# Patient Record
Sex: MALE | Race: BLACK OR AFRICAN AMERICAN | NOT HISPANIC OR LATINO | URBAN - METROPOLITAN AREA
[De-identification: names, ages, dates, MRNs, and addresses within clinical notes are randomized per-mention and may not be internally consistent; named-entity substitution may affect disease eponyms.]

---

## 2021-07-29 ENCOUNTER — INPATIENT (INPATIENT)
Facility: HOSPITAL | Age: 56
LOS: 45 days | Discharge: HOME HEALTH | DRG: 871 | End: 2021-09-13
Attending: INTERNAL MEDICINE | Admitting: INTERNAL MEDICINE
Payer: COMMERCIAL

## 2021-07-29 VITALS
TEMPERATURE: 100 F | SYSTOLIC BLOOD PRESSURE: 113 MMHG | WEIGHT: 250 LBS | OXYGEN SATURATION: 81 % | DIASTOLIC BLOOD PRESSURE: 75 MMHG | HEIGHT: 67 IN | HEART RATE: 84 BPM | RESPIRATION RATE: 22 BRPM

## 2021-07-29 DIAGNOSIS — S76.119A STRAIN OF UNSPECIFIED QUADRICEPS MUSCLE, FASCIA AND TENDON, INITIAL ENCOUNTER: Chronic | ICD-10-CM

## 2021-07-29 DIAGNOSIS — Z98.890 OTHER SPECIFIED POSTPROCEDURAL STATES: Chronic | ICD-10-CM

## 2021-07-29 DIAGNOSIS — R09.02 HYPOXEMIA: ICD-10-CM

## 2021-07-29 DIAGNOSIS — U07.1 COVID-19: ICD-10-CM

## 2021-07-29 LAB
ALBUMIN SERPL ELPH-MCNC: 3.7 G/DL — SIGNIFICANT CHANGE UP (ref 3.3–5)
ALP SERPL-CCNC: 60 U/L — SIGNIFICANT CHANGE UP (ref 40–120)
ALT FLD-CCNC: 54 U/L — HIGH (ref 10–45)
ANION GAP SERPL CALC-SCNC: 12 MMOL/L — SIGNIFICANT CHANGE UP (ref 5–17)
AST SERPL-CCNC: 85 U/L — HIGH (ref 10–40)
BASE EXCESS BLDV CALC-SCNC: 6.6 MMOL/L — HIGH (ref -2–2)
BASOPHILS # BLD AUTO: 0.01 K/UL — SIGNIFICANT CHANGE UP (ref 0–0.2)
BASOPHILS NFR BLD AUTO: 0.1 % — SIGNIFICANT CHANGE UP (ref 0–2)
BILIRUB SERPL-MCNC: 0.4 MG/DL — SIGNIFICANT CHANGE UP (ref 0.2–1.2)
BUN SERPL-MCNC: 16 MG/DL — SIGNIFICANT CHANGE UP (ref 7–23)
CA-I SERPL-SCNC: 1 MMOL/L — LOW (ref 1.12–1.3)
CALCIUM SERPL-MCNC: 9.2 MG/DL — SIGNIFICANT CHANGE UP (ref 8.4–10.5)
CHLORIDE BLDV-SCNC: 105 MMOL/L — SIGNIFICANT CHANGE UP (ref 96–108)
CHLORIDE SERPL-SCNC: 97 MMOL/L — SIGNIFICANT CHANGE UP (ref 96–108)
CO2 BLDV-SCNC: 33 MMOL/L — HIGH (ref 22–30)
CO2 SERPL-SCNC: 26 MMOL/L — SIGNIFICANT CHANGE UP (ref 22–31)
CREAT SERPL-MCNC: 1.01 MG/DL — SIGNIFICANT CHANGE UP (ref 0.5–1.3)
CRP SERPL-MCNC: 33 MG/L — HIGH (ref 0–4)
D DIMER BLD IA.RAPID-MCNC: 188 NG/ML DDU — SIGNIFICANT CHANGE UP
EOSINOPHIL # BLD AUTO: 0 K/UL — SIGNIFICANT CHANGE UP (ref 0–0.5)
EOSINOPHIL NFR BLD AUTO: 0 % — SIGNIFICANT CHANGE UP (ref 0–6)
FERRITIN SERPL-MCNC: 1296 NG/ML — HIGH (ref 30–400)
GAS PNL BLDV: 132 MMOL/L — LOW (ref 135–145)
GAS PNL BLDV: SIGNIFICANT CHANGE UP
GAS PNL BLDV: SIGNIFICANT CHANGE UP
GLUCOSE BLDV-MCNC: 110 MG/DL — HIGH (ref 70–99)
GLUCOSE SERPL-MCNC: 110 MG/DL — HIGH (ref 70–99)
HCO3 BLDV-SCNC: 32 MMOL/L — HIGH (ref 21–29)
HCT VFR BLD CALC: 45.3 % — SIGNIFICANT CHANGE UP (ref 39–50)
HCT VFR BLDA CALC: 46 % — SIGNIFICANT CHANGE UP (ref 39–50)
HGB BLD CALC-MCNC: 15.1 G/DL — SIGNIFICANT CHANGE UP (ref 13–17)
HGB BLD-MCNC: 14.7 G/DL — SIGNIFICANT CHANGE UP (ref 13–17)
IMM GRANULOCYTES NFR BLD AUTO: 0.6 % — SIGNIFICANT CHANGE UP (ref 0–1.5)
LACTATE BLDV-MCNC: 2.1 MMOL/L — HIGH (ref 0.7–2)
LDH SERPL L TO P-CCNC: 592 U/L — HIGH (ref 50–242)
LYMPHOCYTES # BLD AUTO: 0.99 K/UL — LOW (ref 1–3.3)
LYMPHOCYTES # BLD AUTO: 11.7 % — LOW (ref 13–44)
MCHC RBC-ENTMCNC: 27.8 PG — SIGNIFICANT CHANGE UP (ref 27–34)
MCHC RBC-ENTMCNC: 32.5 GM/DL — SIGNIFICANT CHANGE UP (ref 32–36)
MCV RBC AUTO: 85.6 FL — SIGNIFICANT CHANGE UP (ref 80–100)
MONOCYTES # BLD AUTO: 0.66 K/UL — SIGNIFICANT CHANGE UP (ref 0–0.9)
MONOCYTES NFR BLD AUTO: 7.8 % — SIGNIFICANT CHANGE UP (ref 2–14)
NEUTROPHILS # BLD AUTO: 6.74 K/UL — SIGNIFICANT CHANGE UP (ref 1.8–7.4)
NEUTROPHILS NFR BLD AUTO: 79.8 % — HIGH (ref 43–77)
NRBC # BLD: 0 /100 WBCS — SIGNIFICANT CHANGE UP (ref 0–0)
OTHER CELLS CSF MANUAL: 5 ML/DL — LOW (ref 18–22)
PCO2 BLDV: 47 MMHG — SIGNIFICANT CHANGE UP (ref 35–50)
PH BLDV: 7.44 — SIGNIFICANT CHANGE UP (ref 7.35–7.45)
PLATELET # BLD AUTO: 162 K/UL — SIGNIFICANT CHANGE UP (ref 150–400)
PO2 BLDV: <20 MMHG — LOW (ref 25–45)
POTASSIUM BLDV-SCNC: 3.9 MMOL/L — SIGNIFICANT CHANGE UP (ref 3.5–5.3)
POTASSIUM SERPL-MCNC: 4.3 MMOL/L — SIGNIFICANT CHANGE UP (ref 3.5–5.3)
POTASSIUM SERPL-SCNC: 4.3 MMOL/L — SIGNIFICANT CHANGE UP (ref 3.5–5.3)
PROCALCITONIN SERPL-MCNC: 0.09 NG/ML — SIGNIFICANT CHANGE UP (ref 0.02–0.1)
PROT SERPL-MCNC: 7.8 G/DL — SIGNIFICANT CHANGE UP (ref 6–8.3)
RBC # BLD: 5.29 M/UL — SIGNIFICANT CHANGE UP (ref 4.2–5.8)
RBC # FLD: 12.9 % — SIGNIFICANT CHANGE UP (ref 10.3–14.5)
SAO2 % BLDV: 22 % — LOW (ref 67–88)
SARS-COV-2 RNA SPEC QL NAA+PROBE: DETECTED
SODIUM SERPL-SCNC: 135 MMOL/L — SIGNIFICANT CHANGE UP (ref 135–145)
WBC # BLD: 8.45 K/UL — SIGNIFICANT CHANGE UP (ref 3.8–10.5)
WBC # FLD AUTO: 8.45 K/UL — SIGNIFICANT CHANGE UP (ref 3.8–10.5)

## 2021-07-29 PROCEDURE — 99222 1ST HOSP IP/OBS MODERATE 55: CPT

## 2021-07-29 PROCEDURE — 99291 CRITICAL CARE FIRST HOUR: CPT

## 2021-07-29 PROCEDURE — 71045 X-RAY EXAM CHEST 1 VIEW: CPT | Mod: 26

## 2021-07-29 PROCEDURE — 93010 ELECTROCARDIOGRAM REPORT: CPT

## 2021-07-29 RX ORDER — DEXAMETHASONE 0.5 MG/5ML
6 ELIXIR ORAL DAILY
Refills: 0 | Status: DISCONTINUED | OUTPATIENT
Start: 2021-07-30 | End: 2021-08-10

## 2021-07-29 RX ORDER — PANTOPRAZOLE SODIUM 20 MG/1
40 TABLET, DELAYED RELEASE ORAL
Refills: 0 | Status: DISCONTINUED | OUTPATIENT
Start: 2021-07-29 | End: 2021-08-09

## 2021-07-29 RX ORDER — REMDESIVIR 5 MG/ML
INJECTION INTRAVENOUS
Refills: 0 | Status: COMPLETED | OUTPATIENT
Start: 2021-07-29 | End: 2021-08-02

## 2021-07-29 RX ORDER — REMDESIVIR 5 MG/ML
100 INJECTION INTRAVENOUS EVERY 24 HOURS
Refills: 0 | Status: COMPLETED | OUTPATIENT
Start: 2021-07-30 | End: 2021-08-02

## 2021-07-29 RX ORDER — LANOLIN ALCOHOL/MO/W.PET/CERES
3 CREAM (GRAM) TOPICAL AT BEDTIME
Refills: 0 | Status: DISCONTINUED | OUTPATIENT
Start: 2021-07-29 | End: 2021-08-03

## 2021-07-29 RX ORDER — ENOXAPARIN SODIUM 100 MG/ML
40 INJECTION SUBCUTANEOUS
Refills: 0 | Status: DISCONTINUED | OUTPATIENT
Start: 2021-07-29 | End: 2021-08-04

## 2021-07-29 RX ORDER — DEXAMETHASONE 0.5 MG/5ML
6 ELIXIR ORAL ONCE
Refills: 0 | Status: COMPLETED | OUTPATIENT
Start: 2021-07-29 | End: 2021-07-29

## 2021-07-29 RX ORDER — REMDESIVIR 5 MG/ML
200 INJECTION INTRAVENOUS EVERY 24 HOURS
Refills: 0 | Status: COMPLETED | OUTPATIENT
Start: 2021-07-29 | End: 2021-07-29

## 2021-07-29 RX ORDER — ACETAMINOPHEN 500 MG
975 TABLET ORAL ONCE
Refills: 0 | Status: COMPLETED | OUTPATIENT
Start: 2021-07-29 | End: 2021-07-29

## 2021-07-29 RX ADMIN — ENOXAPARIN SODIUM 40 MILLIGRAM(S): 100 INJECTION SUBCUTANEOUS at 17:02

## 2021-07-29 RX ADMIN — Medication 100 MILLIGRAM(S): at 14:07

## 2021-07-29 RX ADMIN — Medication 975 MILLIGRAM(S): at 07:32

## 2021-07-29 RX ADMIN — REMDESIVIR 500 MILLIGRAM(S): 5 INJECTION INTRAVENOUS at 17:01

## 2021-07-29 RX ADMIN — Medication 100 MILLIGRAM(S): at 22:02

## 2021-07-29 RX ADMIN — Medication 975 MILLIGRAM(S): at 09:51

## 2021-07-29 RX ADMIN — Medication 6 MILLIGRAM(S): at 06:55

## 2021-07-29 NOTE — CONSULT NOTE ADULT - ASSESSMENT
56 y/o M with PMH of DVT/PE s/p achilles tendon repair years ago (not on AC currently). Presents with complaints of SOB, intermittent and fevers with cough productive of white sputum for past week. Tested positive for COVID 2 days ago. Pt is unvaccinated. Endorses progressively worsening SOB over the past 5 days, worse with ambulation, found to be hypoxic on arrival to the  ER. CXR with b/l opacities.

## 2021-07-29 NOTE — ED ADULT NURSE NOTE - OBJECTIVE STATEMENT
Pt A&Ox4, ambulatory with steady gait. Pt presented to ED with c/o worsening SOB and cough. States he noticed symptoms approx 1 week ago and tested positive for COVID on 7/26. Denies N/V/D, dizziness, LOC, weakness, numbness, tingling, fever, chills.   Hypoxic in triage and currently on 6L NC with O2 sat 93%-95%, feels more comfortable on O2

## 2021-07-29 NOTE — CONSULT NOTE ADULT - ASSESSMENT
55 yr old unvaccinated presents with fever, cough, sob for a week after attending a party the week before.  mild loss of taste. history of hypertension but has not been compliant with treatment  currently on 4-5 liters nc, not sob.  chest xray reviewed and compatible with covid  covid test pending but most likely diagnosis  Inflammatory markers are modest.  hx of DVT yrs ago.      decadron' remdesivir   await covid test.

## 2021-07-29 NOTE — ED PROVIDER NOTE - OBJECTIVE STATEMENT
56yo M with Hx of DVT s/p achilles tendon repair years ago not on AC presenting with complaints of SOB. intermittent and fevers with cough productive of white sputum for he past week, tested positive for covid 2 days ago. progressively worsening SOB over the past 5 days, worse with ambulation. found to be hypoxic on arrival to the ED. chest pain with deep breath, no n/v/d, abd pain, LE edema, urinary symptoms.

## 2021-07-29 NOTE — H&P ADULT - NSHPLABSRESULTS_GEN_ALL_CORE
14.7   8.45  )-----------( 162      ( 29 Jul 2021 06:50 )             45.3       07-29    135  |  97  |  16  ----------------------------<  110<H>  4.3   |  26  |  1.01    Ca    9.2      29 Jul 2021 06:50    TPro  7.8  /  Alb  3.7  /  TBili  0.4  /  DBili  x   /  AST  85<H>  /  ALT  54<H>  /  AlkPhos  60  07-29                      Lactate Trend            CAPILLARY BLOOD GLUCOSE

## 2021-07-29 NOTE — CONSULT NOTE ADULT - SUBJECTIVE AND OBJECTIVE BOX
Patient is a 55y old  Male who presents with a chief complaint of   HPI:  56yo M with Hx of DVT s/p achilles tendon repair years ago not on AC presenting with complaints of SOB. intermittent and fevers with cough productive of white sputum for past week, tested positive for covid 2 days ago.  pt is unvaccinated   progressively worsening SOB over the past 5 days, worse with ambulation. found to be hypoxic on arrival to the  er    (29 Jul 2021 10:31)      PAST MEDICAL & SURGICAL HISTORY:  Hyperlipidemia    HTN (hypertension)    Rupture, tendon, quadriceps    History of Achilles tendon repair        Social history:    FAMILY HISTORY:  No pertinent family history in first degree relatives      REVIEW OF SYSTEMS  General:	Denies any malaise fatigue or chills. Fevers absent    Skin:No rash  	  Ophthalmologic:Denies any visual complaints,discharge redness or photophobia  	       Hematology/Lymphatics:	No LN swelling.No gum bleeding     Endocrine:	No recent weight gain or loss.No abnormal heat/cold intolerance    Allergic/Immunologic:	No hives or rash   Allergies    No Known Allergies    Intolerances        Antimicrobials:          Vital Signs Last 24 Hrs  T(C): 36.8 (29 Jul 2021 09:00), Max: 38.4 (29 Jul 2021 07:09)  T(F): 98.2 (29 Jul 2021 09:00), Max: 101.2 (29 Jul 2021 07:09)  HR: 81 (29 Jul 2021 09:00) (81 - 89)  BP: 97/81 (29 Jul 2021 09:00) (97/81 - 121/75)  BP(mean): --  RR: 24 (29 Jul 2021 09:00) (22 - 27)  SpO2: 92% (29 Jul 2021 09:00) (81% - 92%)              No cachexia     Eyes:PERRL EOMI.NO discharge or conjunctival injection    ENMT:No sinus tenderness.No thrush.No pharyngeal exudate or erythema.Fair dental hygiene    Neck:Supple,No LN,no JVD      Respiratory:Good air entry bilaterally,CTA          Rectal:    Extremities:No cyanosis,clubbing or edema.    Vascular:peripheral pulses felt    Neurological:AAO X 3,No grossly focal deficits    Skin:No rash     Lymph Nodes:No palpable LNs    Musculoskeletal:No joint swelling or LOM                                   14.7   8.45  )-----------( 162      ( 29 Jul 2021 06:50 )             45.3         07-29    135  |  97  |  16  ----------------------------<  110<H>  4.3   |  26  |  1.01    Ca    9.2      29 Jul 2021 06:50    TPro  7.8  /  Alb  3.7  /  TBili  0.4  /  DBili  x   /  AST  85<H>  /  ALT  54<H>  /  AlkPhos  60  07-29      RECENT CULTURES:      MICROBIOLOGY:          Radiology:      Assessment:        Recommendations and Plan:    Pager [phone]  After 5 pm/weekends or if no response :[phone]

## 2021-07-29 NOTE — H&P ADULT - ASSESSMENT
pt w/ covid  hypoxia  iv steroids   remdesivir  id / pulm evals  gi / dvt proph  o2  hx htn/ pt noncompliant  monitor bp  f/u inflammatory markers  discussed w/ pt/ id /

## 2021-07-29 NOTE — ED ADULT NURSE NOTE - NSIMPLEMENTINTERV_GEN_ALL_ED
Implemented All Universal Safety Interventions:  [place] to call system. Call bell, personal items and telephone within reach. Instruct patient to call for assistance. Room bathroom lighting operational. Non-slip footwear when patient is off stretcher. Physically safe environment: no spills, clutter or unnecessary equipment. Stretcher in lowest position, wheels locked, appropriate side rails in place.

## 2021-07-29 NOTE — ED PROVIDER NOTE - CLINICAL SUMMARY MEDICAL DECISION MAKING FREE TEXT BOX
54yo M intermittent fevers and cough for one week tested positive for covid 3 days ago. progressively worsening sob and ORDAZ. found to be hypoxic to 79% with ambulation. placed on 6L NC. will obtain labs, cxr, decadron and admission.

## 2021-07-29 NOTE — ED PROVIDER NOTE - ATTENDING CONTRIBUTION TO CARE
Afebrile. Awake and Alert. Lungs bibasilar fine crackles. 75% RA. Corrects to 90% 6L NC. Heart RRR. Abdomen soft NTND. CN II-XII grossly intact. Moves all extremities without lateralization.    COVID-19 with hypoxia requiring oxygen therapy  IV decadron given  Will need admission for hypoxia

## 2021-07-29 NOTE — ED ADULT NURSE REASSESSMENT NOTE - NS ED NURSE REASSESS COMMENT FT1
received report from ROBLES Welch. patient is resting in bed, tachypneic on 6L NC and febrile. MD aware. patient denies pain at this time. to attempt to prone s/p xray. patient is AAOx3. lung sounds diminished. skin intact. pending xray and admission. patient aware. NSR on monitor, call bell In reach, will continue to monitor. patient aware of plan of care

## 2021-07-29 NOTE — ED PROVIDER NOTE - RESPIRATORY, MLM
crackles in the LLL, becomes tachypneic when speaking but able to speak in full sentences, no accessory muscle use, saturating 79% after ambulating to room on RA.

## 2021-07-29 NOTE — CONSULT NOTE ADULT - PROBLEM SELECTOR RECOMMENDATION 2
2nd to COVID PNA  -Wean O2 as tolerated, keep sats >90% (currently 6LNC)  -Prone/side lying positioning as tolerated

## 2021-07-29 NOTE — ED ADULT NURSE REASSESSMENT NOTE - NS ED NURSE REASSESS COMMENT FT1
MD aware of blood work, does not want fluids / repeat lactate. patient resting in bed in no acute distress. VSS. denies pain. waiting for bed

## 2021-07-29 NOTE — ED ADULT NURSE NOTE - CHPI ED NUR SYMPTOMS NEG
no abdominal pain/no chills/no decreased eating/drinking/no diarrhea/no fever/no headache/no rash/no vomiting

## 2021-07-29 NOTE — H&P ADULT - HISTORY OF PRESENT ILLNESS
54yo M with Hx of DVT s/p achilles tendon repair years ago not on AC presenting with complaints of SOB. intermittent and fevers with cough productive of white sputum for past week, tested positive for covid 2 days ago.  pt is unvaccinated   progressively worsening SOB over the past 5 days, worse with ambulation. found to be hypoxic on arrival to the  er

## 2021-07-30 DIAGNOSIS — J96.01 ACUTE RESPIRATORY FAILURE WITH HYPOXIA: ICD-10-CM

## 2021-07-30 LAB
ALBUMIN SERPL ELPH-MCNC: 3 G/DL — LOW (ref 3.3–5)
ALBUMIN SERPL ELPH-MCNC: 3.1 G/DL — LOW (ref 3.3–5)
ALP SERPL-CCNC: 51 U/L — SIGNIFICANT CHANGE UP (ref 40–120)
ALP SERPL-CCNC: 53 U/L — SIGNIFICANT CHANGE UP (ref 40–120)
ALT FLD-CCNC: 45 U/L — SIGNIFICANT CHANGE UP (ref 10–45)
ALT FLD-CCNC: 45 U/L — SIGNIFICANT CHANGE UP (ref 10–45)
ANION GAP SERPL CALC-SCNC: 12 MMOL/L — SIGNIFICANT CHANGE UP (ref 5–17)
AST SERPL-CCNC: 59 U/L — HIGH (ref 10–40)
AST SERPL-CCNC: 64 U/L — HIGH (ref 10–40)
BASE EXCESS BLDA CALC-SCNC: 3.2 MMOL/L — HIGH (ref -2–2)
BILIRUB DIRECT SERPL-MCNC: <0.1 MG/DL — SIGNIFICANT CHANGE UP (ref 0–0.2)
BILIRUB INDIRECT FLD-MCNC: >0.2 MG/DL — SIGNIFICANT CHANGE UP (ref 0.2–1)
BILIRUB SERPL-MCNC: 0.2 MG/DL — SIGNIFICANT CHANGE UP (ref 0.2–1.2)
BILIRUB SERPL-MCNC: 0.3 MG/DL — SIGNIFICANT CHANGE UP (ref 0.2–1.2)
BUN SERPL-MCNC: 16 MG/DL — SIGNIFICANT CHANGE UP (ref 7–23)
CALCIUM SERPL-MCNC: 8.2 MG/DL — LOW (ref 8.4–10.5)
CHLORIDE SERPL-SCNC: 103 MMOL/L — SIGNIFICANT CHANGE UP (ref 96–108)
CO2 BLDA-SCNC: 27 MMOL/L — SIGNIFICANT CHANGE UP (ref 22–30)
CO2 SERPL-SCNC: 24 MMOL/L — SIGNIFICANT CHANGE UP (ref 22–31)
COVID-19 SPIKE DOMAIN AB INTERP: NEGATIVE — SIGNIFICANT CHANGE UP
COVID-19 SPIKE DOMAIN ANTIBODY RESULT: 0.4 U/ML — SIGNIFICANT CHANGE UP
CREAT SERPL-MCNC: 0.85 MG/DL — SIGNIFICANT CHANGE UP (ref 0.5–1.3)
CREAT SERPL-MCNC: 0.87 MG/DL — SIGNIFICANT CHANGE UP (ref 0.5–1.3)
CRP SERPL-MCNC: 37 MG/L — HIGH (ref 0–4)
D DIMER BLD IA.RAPID-MCNC: 196 NG/ML DDU — SIGNIFICANT CHANGE UP
ERYTHROCYTE [SEDIMENTATION RATE] IN BLOOD: 38 MM/HR — HIGH (ref 0–20)
FERRITIN SERPL-MCNC: 1186 NG/ML — HIGH (ref 30–400)
GLUCOSE SERPL-MCNC: 99 MG/DL — SIGNIFICANT CHANGE UP (ref 70–99)
HCO3 BLDA-SCNC: 26 MMOL/L — SIGNIFICANT CHANGE UP (ref 21–29)
HCT VFR BLD CALC: 43.7 % — SIGNIFICANT CHANGE UP (ref 39–50)
HGB BLD-MCNC: 14.1 G/DL — SIGNIFICANT CHANGE UP (ref 13–17)
INR BLD: 1.11 RATIO — SIGNIFICANT CHANGE UP (ref 0.88–1.16)
LDH SERPL L TO P-CCNC: 578 U/L — HIGH (ref 50–242)
MCHC RBC-ENTMCNC: 28 PG — SIGNIFICANT CHANGE UP (ref 27–34)
MCHC RBC-ENTMCNC: 32.3 GM/DL — SIGNIFICANT CHANGE UP (ref 32–36)
MCV RBC AUTO: 86.7 FL — SIGNIFICANT CHANGE UP (ref 80–100)
NRBC # BLD: 0 /100 WBCS — SIGNIFICANT CHANGE UP (ref 0–0)
PCO2 BLDA: 36 MMHG — SIGNIFICANT CHANGE UP (ref 32–46)
PH BLDA: 7.48 — HIGH (ref 7.35–7.45)
PLATELET # BLD AUTO: 174 K/UL — SIGNIFICANT CHANGE UP (ref 150–400)
PO2 BLDA: 69 MMHG — LOW (ref 74–108)
POTASSIUM SERPL-MCNC: 4.3 MMOL/L — SIGNIFICANT CHANGE UP (ref 3.5–5.3)
POTASSIUM SERPL-SCNC: 4.3 MMOL/L — SIGNIFICANT CHANGE UP (ref 3.5–5.3)
PROCALCITONIN SERPL-MCNC: 0.07 NG/ML — SIGNIFICANT CHANGE UP (ref 0.02–0.1)
PROT SERPL-MCNC: 6.6 G/DL — SIGNIFICANT CHANGE UP (ref 6–8.3)
PROT SERPL-MCNC: 6.6 G/DL — SIGNIFICANT CHANGE UP (ref 6–8.3)
PROTHROM AB SERPL-ACNC: 13.3 SEC — SIGNIFICANT CHANGE UP (ref 10.6–13.6)
RBC # BLD: 5.04 M/UL — SIGNIFICANT CHANGE UP (ref 4.2–5.8)
RBC # FLD: 12.8 % — SIGNIFICANT CHANGE UP (ref 10.3–14.5)
SAO2 % BLDA: 94 % — SIGNIFICANT CHANGE UP (ref 92–96)
SARS-COV-2 IGG+IGM SERPL QL IA: 0.4 U/ML — SIGNIFICANT CHANGE UP
SARS-COV-2 IGG+IGM SERPL QL IA: NEGATIVE — SIGNIFICANT CHANGE UP
SODIUM SERPL-SCNC: 139 MMOL/L — SIGNIFICANT CHANGE UP (ref 135–145)
WBC # BLD: 10.14 K/UL — SIGNIFICANT CHANGE UP (ref 3.8–10.5)
WBC # FLD AUTO: 10.14 K/UL — SIGNIFICANT CHANGE UP (ref 3.8–10.5)

## 2021-07-30 PROCEDURE — 99232 SBSQ HOSP IP/OBS MODERATE 35: CPT

## 2021-07-30 PROCEDURE — 71045 X-RAY EXAM CHEST 1 VIEW: CPT | Mod: 26

## 2021-07-30 RX ORDER — TOCILIZUMAB 20 MG/ML
800 INJECTION, SOLUTION, CONCENTRATE INTRAVENOUS ONCE
Refills: 0 | Status: COMPLETED | OUTPATIENT
Start: 2021-07-30 | End: 2021-07-30

## 2021-07-30 RX ADMIN — ENOXAPARIN SODIUM 40 MILLIGRAM(S): 100 INJECTION SUBCUTANEOUS at 05:33

## 2021-07-30 RX ADMIN — TOCILIZUMAB 100 MILLIGRAM(S): 20 INJECTION, SOLUTION, CONCENTRATE INTRAVENOUS at 12:47

## 2021-07-30 RX ADMIN — REMDESIVIR 500 MILLIGRAM(S): 5 INJECTION INTRAVENOUS at 17:32

## 2021-07-30 RX ADMIN — Medication 3 MILLIGRAM(S): at 21:46

## 2021-07-30 RX ADMIN — Medication 100 MILLIGRAM(S): at 21:46

## 2021-07-30 RX ADMIN — ENOXAPARIN SODIUM 40 MILLIGRAM(S): 100 INJECTION SUBCUTANEOUS at 17:32

## 2021-07-30 RX ADMIN — Medication 6 MILLIGRAM(S): at 05:33

## 2021-07-30 NOTE — PROGRESS NOTE ADULT - ASSESSMENT
pt w/ covid  hypoxia  iv steroids   remdesivir  id / pulm evals oted  worsening hypoxia  discussed w/ id   additional tx as per id recs  recheck cxr   gi / dvt proph  o2  hx htn/ pt noncompliant  monitor bp  monitor inflammatory markers

## 2021-07-30 NOTE — PROGRESS NOTE ADULT - SUBJECTIVE AND OBJECTIVE BOX
DATE OF SERVICE: 07-30-21 @ 10:39  CHIEF COMPLAINT:Patient is a 55y old  Male who presents with a chief complaint of covid (29 Jul 2021 10:44)    	        PAST MEDICAL & SURGICAL HISTORY:  Hyperlipidemia    HTN (hypertension)    Rupture, tendon, quadriceps    History of Achilles tendon repair            weak  RESPIRATORY: breathing about the same as last night  CARDIOVASCULAR: No chest pain, palpitations, passing out, dizziness, or leg swelling  GASTROINTESTINAL: No abdominal or epigastric pain. No nausea, vomiting, or hematemesis; No diarrhea or constipation. No melena or hematochezia.  GENITOURINARY: No dysuria, frequency, hematuria, or incontinence  NEUROLOGICAL: No headaches, memory loss,     Medications:  MEDICATIONS  (STANDING):  benzonatate 100 milliGRAM(s) Oral every 8 hours  dexAMETHasone  Injectable 6 milliGRAM(s) IV Push daily  enoxaparin Injectable 40 milliGRAM(s) SubCutaneous two times a day  melatonin 3 milliGRAM(s) Oral at bedtime  pantoprazole    Tablet 40 milliGRAM(s) Oral before breakfast  remdesivir  IVPB   IV Intermittent   remdesivir  IVPB 100 milliGRAM(s) IV Intermittent every 24 hours    MEDICATIONS  (PRN):    	    PHYSICAL EXAM:  T(C): 37.2 (07-30-21 @ 08:23), Max: 37.4 (07-30-21 @ 04:56)  HR: 82 (07-30-21 @ 08:23) (69 - 93)  BP: 117/72 (07-30-21 @ 08:23) (108/76 - 125/69)  RR: 26 (07-30-21 @ 08:23) (19 - 32)  SpO2: 95% (07-30-21 @ 08:23) (81% - 95%)  Wt(kg): --  I&O's Summary    29 Jul 2021 07:01  -  30 Jul 2021 07:00  --------------------------------------------------------  IN: 0 mL / OUT: 1050 mL / NET: -1050 mL        Appearance: Normal	  HEENT:   Normal oral mucosa, PERRL, EOMI	  Lymphatic: No lymphadenopathy  Cardiovascular: Normal S1 S2, No JVD, No murmurs, No edema  Respiratory: dec bs   Gastrointestinal:  Soft, Non-tender, + BS	  Skin: No rashes, No ecchymoses, No cyanosis	  Neurologic: Non-focal  Extremities: Normal range of motion, No clubbing, cyanosis or edema  Vascular: Peripheral pulses palpable 2+ bilaterally    TELEMETRY: 	    ECG:  	  RADIOLOGY:  OTHER: 	  	  LABS:	 	    CARDIAC MARKERS:                                14.1   10.14 )-----------( 174      ( 30 Jul 2021 06:31 )             43.7     07-30    139  |  103  |  16  ----------------------------<  99  4.3   |  24  |  0.85    Ca    8.2<L>      30 Jul 2021 06:31    TPro  6.6  /  Alb  3.1<L>  /  TBili  0.2  /  DBili  <0.1  /  AST  59<H>  /  ALT  45  /  AlkPhos  51  07-30    proBNP:   Lipid Profile:   HgA1c:   TSH:

## 2021-07-30 NOTE — PROGRESS NOTE ADULT - PROBLEM SELECTOR PLAN 2
2nd to COVID PNA  -Bipap 14/7/100% PRN, can try to transition to HFNC 60L/100%  -keep sats >90% with O2  -Check ABG

## 2021-07-30 NOTE — PROGRESS NOTE ADULT - ASSESSMENT
55 yr old unvaccinated presents with fever, cough, sob for a week after attending a party the week before.  mild loss of taste. history of hypertension but has not been compliant with treatment  currently on 4-5 liters nc, not sob.  chest xray reviewed and compatible with covid  covid test pending but most likely diagnosis  Inflammatory markers are modest.  hx of DVT yrs ago. Covid positve     decadron' remdesivir  day 2  looks much worse this am  now on BIPAP  was previously on NC/  Sob and  tired.    I think he meets the indication for toci in view of rapidly progressive deterioration on day 8 of covid.  Discussed with collegues  Toci ordered        .

## 2021-07-30 NOTE — PROGRESS NOTE ADULT - SUBJECTIVE AND OBJECTIVE BOX
infectious diseases progress note:    Patient is a 55y old  Male who presents with a chief complaint of covid (29 Jul 2021 10:44)        Infection due to severe acute respiratory syndrome coronavirus 2 (SARS-CoV-2)           s   s    Allergies    No Known Allergies    Intolerances        ANTIBIOTICS/RELEVANT:  antimicrobials  remdesivir  IVPB   IV Intermittent   remdesivir  IVPB 100 milliGRAM(s) IV Intermittent every 24 hours    immunologic:  tocilizumab IVPB 800 milliGRAM(s) IV Intermittent once    OTHER:  benzonatate 100 milliGRAM(s) Oral every 8 hours  dexAMETHasone  Injectable 6 milliGRAM(s) IV Push daily  enoxaparin Injectable 40 milliGRAM(s) SubCutaneous two times a day  melatonin 3 milliGRAM(s) Oral at bedtime  pantoprazole    Tablet 40 milliGRAM(s) Oral before breakfast      Objective:  Vital Signs Last 24 Hrs  T(C): 36.4 (30 Jul 2021 11:22), Max: 37.4 (30 Jul 2021 04:56)  T(F): 97.6 (30 Jul 2021 11:22), Max: 99.3 (30 Jul 2021 04:56)  HR: 86 (30 Jul 2021 11:22) (73 - 93)  BP: 114/73 (30 Jul 2021 11:22) (108/76 - 125/69)  BP(mean): --  RR: 20 (30 Jul 2021 11:22) (19 - 32)  SpO2: 93% (30 Jul 2021 11:22) (81% - 95%)     Eyes:PORFIRIO, EOMI  Ear/Nose/Throat: no oral lesion, no sinus tenderness on percussion	  Neck:no JVD, no lymphadenopathy, supple  Respiratory: CTA geovany  Cardiovascular: S1S2 RRR, no murmurs  Gastrointestinal:soft, (+) BS, no HSM  Extremities:no e/e/c        LABS:                        14.1   10.14 )-----------( 174      ( 30 Jul 2021 06:31 )             43.7     07-30    139  |  103  |  16  ----------------------------<  99  4.3   |  24  |  0.85    Ca    8.2<L>      30 Jul 2021 06:31    TPro  6.6  /  Alb  3.1<L>  /  TBili  0.2  /  DBili  <0.1  /  AST  59<H>  /  ALT  45  /  AlkPhos  51  07-30    PT/INR - ( 30 Jul 2021 06:36 )   PT: 13.3 sec;   INR: 1.11 ratio                 MICROBIOLOGY:    RECENT CULTURES:        RESPIRATORY CULTURES:              RADIOLOGY & ADDITIONAL STUDIES:        Pager [phone]  After 5 pm/weekends or if no response :[phone]

## 2021-07-30 NOTE — PROGRESS NOTE ADULT - SUBJECTIVE AND OBJECTIVE BOX
Follow-up Pulm Progress Note    worsening hypoxia overnight requiring bipap  currently on bipap 14/8/100% with sats mid 90s  states uncomfortable with mask     Medications:  MEDICATIONS  (STANDING):  benzonatate 100 milliGRAM(s) Oral every 8 hours  dexAMETHasone  Injectable 6 milliGRAM(s) IV Push daily  enoxaparin Injectable 40 milliGRAM(s) SubCutaneous two times a day  melatonin 3 milliGRAM(s) Oral at bedtime  pantoprazole    Tablet 40 milliGRAM(s) Oral before breakfast  remdesivir  IVPB   IV Intermittent   remdesivir  IVPB 100 milliGRAM(s) IV Intermittent every 24 hours          Vital Signs Last 24 Hrs  T(C): 36.4 (30 Jul 2021 11:22), Max: 37.4 (30 Jul 2021 04:56)  T(F): 97.6 (30 Jul 2021 11:22), Max: 99.3 (30 Jul 2021 04:56)  HR: 86 (30 Jul 2021 11:22) (73 - 93)  BP: 114/73 (30 Jul 2021 11:22) (108/76 - 125/69)  BP(mean): --  RR: 20 (30 Jul 2021 11:22) (19 - 32)  SpO2: 93% (30 Jul 2021 11:22) (81% - 95%)      VBG pH 7.44 07-29 @ 06:50    VBG pCO2 47 07-29 @ 06:50    VBG O2 sat 22 07-29 @ 06:50    VBG lactate 2.1 07-29 @ 06:50      07-29 @ 07:01  -  07-30 @ 07:00  --------------------------------------------------------  IN: 0 mL / OUT: 1050 mL / NET: -1050 mL          LABS:                        14.1   10.14 )-----------( 174      ( 30 Jul 2021 06:31 )             43.7     07-30    139  |  103  |  16  ----------------------------<  99  4.3   |  24  |  0.85    Ca    8.2<L>      30 Jul 2021 06:31    TPro  6.6  /  Alb  3.1<L>  /  TBili  0.2  /  DBili  <0.1  /  AST  59<H>  /  ALT  45  /  AlkPhos  51  07-30            PT/INR - ( 30 Jul 2021 06:36 )   PT: 13.3 sec;   INR: 1.11 ratio             Procalcitonin, Serum: 0.07 ng/mL (07-30-21 @ 06:31)  Procalcitonin, Serum: 0.09 ng/mL (07-29-21 @ 06:50)            Physical Examination:  PULM: bilateral crackles   CVS: S1, S2 heard    RADIOLOGY REVIEWED  CXR 7/30: increase in b/l patchy opacities

## 2021-07-30 NOTE — PROGRESS NOTE ADULT - PROBLEM SELECTOR PLAN 1
+COVID PCR as an outpatient  -CXR 7/30 with increase in b/l opacities  -Remdesivir x 5 days (monitor creatinine, LFTs)  -Decadron 6mg IVP qd x 10 days  -Trend inflammatory markers  -Tessalon perle 100mg PO TID.  -Worsening hypoxic respiratory failure today, ID to give Tocilizumab

## 2021-07-31 LAB
ALBUMIN SERPL ELPH-MCNC: 3.1 G/DL — LOW (ref 3.3–5)
ALBUMIN SERPL ELPH-MCNC: 3.2 G/DL — LOW (ref 3.3–5)
ALP SERPL-CCNC: 54 U/L — SIGNIFICANT CHANGE UP (ref 40–120)
ALP SERPL-CCNC: 55 U/L — SIGNIFICANT CHANGE UP (ref 40–120)
ALT FLD-CCNC: 79 U/L — HIGH (ref 10–45)
ALT FLD-CCNC: 81 U/L — HIGH (ref 10–45)
ANION GAP SERPL CALC-SCNC: 12 MMOL/L — SIGNIFICANT CHANGE UP (ref 5–17)
AST SERPL-CCNC: 85 U/L — HIGH (ref 10–40)
AST SERPL-CCNC: 85 U/L — HIGH (ref 10–40)
BILIRUB DIRECT SERPL-MCNC: 0.1 MG/DL — SIGNIFICANT CHANGE UP (ref 0–0.2)
BILIRUB INDIRECT FLD-MCNC: 0.3 MG/DL — SIGNIFICANT CHANGE UP (ref 0.2–1)
BILIRUB SERPL-MCNC: 0.4 MG/DL — SIGNIFICANT CHANGE UP (ref 0.2–1.2)
BILIRUB SERPL-MCNC: 0.4 MG/DL — SIGNIFICANT CHANGE UP (ref 0.2–1.2)
BUN SERPL-MCNC: 19 MG/DL — SIGNIFICANT CHANGE UP (ref 7–23)
CALCIUM SERPL-MCNC: 8.9 MG/DL — SIGNIFICANT CHANGE UP (ref 8.4–10.5)
CHLORIDE SERPL-SCNC: 101 MMOL/L — SIGNIFICANT CHANGE UP (ref 96–108)
CO2 SERPL-SCNC: 23 MMOL/L — SIGNIFICANT CHANGE UP (ref 22–31)
CREAT SERPL-MCNC: 0.76 MG/DL — SIGNIFICANT CHANGE UP (ref 0.5–1.3)
CREAT SERPL-MCNC: 0.81 MG/DL — SIGNIFICANT CHANGE UP (ref 0.5–1.3)
GLUCOSE SERPL-MCNC: 96 MG/DL — SIGNIFICANT CHANGE UP (ref 70–99)
HCT VFR BLD CALC: 45.8 % — SIGNIFICANT CHANGE UP (ref 39–50)
HGB BLD-MCNC: 14.6 G/DL — SIGNIFICANT CHANGE UP (ref 13–17)
INR BLD: 1.2 RATIO — HIGH (ref 0.88–1.16)
MCHC RBC-ENTMCNC: 27.7 PG — SIGNIFICANT CHANGE UP (ref 27–34)
MCHC RBC-ENTMCNC: 31.9 GM/DL — LOW (ref 32–36)
MCV RBC AUTO: 86.9 FL — SIGNIFICANT CHANGE UP (ref 80–100)
NRBC # BLD: 0 /100 WBCS — SIGNIFICANT CHANGE UP (ref 0–0)
PLATELET # BLD AUTO: 217 K/UL — SIGNIFICANT CHANGE UP (ref 150–400)
POTASSIUM SERPL-MCNC: 4.6 MMOL/L — SIGNIFICANT CHANGE UP (ref 3.5–5.3)
POTASSIUM SERPL-SCNC: 4.6 MMOL/L — SIGNIFICANT CHANGE UP (ref 3.5–5.3)
PROT SERPL-MCNC: 6.7 G/DL — SIGNIFICANT CHANGE UP (ref 6–8.3)
PROT SERPL-MCNC: 6.8 G/DL — SIGNIFICANT CHANGE UP (ref 6–8.3)
PROTHROM AB SERPL-ACNC: 14.3 SEC — HIGH (ref 10.6–13.6)
RBC # BLD: 5.27 M/UL — SIGNIFICANT CHANGE UP (ref 4.2–5.8)
RBC # FLD: 12.7 % — SIGNIFICANT CHANGE UP (ref 10.3–14.5)
SODIUM SERPL-SCNC: 136 MMOL/L — SIGNIFICANT CHANGE UP (ref 135–145)
WBC # BLD: 6.86 K/UL — SIGNIFICANT CHANGE UP (ref 3.8–10.5)
WBC # FLD AUTO: 6.86 K/UL — SIGNIFICANT CHANGE UP (ref 3.8–10.5)

## 2021-07-31 PROCEDURE — 99232 SBSQ HOSP IP/OBS MODERATE 35: CPT

## 2021-07-31 RX ORDER — DEXTROSE MONOHYDRATE, SODIUM CHLORIDE, AND POTASSIUM CHLORIDE 50; .745; 4.5 G/1000ML; G/1000ML; G/1000ML
1000 INJECTION, SOLUTION INTRAVENOUS
Refills: 0 | Status: DISCONTINUED | OUTPATIENT
Start: 2021-07-31 | End: 2021-08-01

## 2021-07-31 RX ORDER — POTASSIUM CHLORIDE 20 MEQ
10 PACKET (EA) ORAL ONCE
Refills: 0 | Status: COMPLETED | OUTPATIENT
Start: 2021-07-31 | End: 2021-07-31

## 2021-07-31 RX ADMIN — ENOXAPARIN SODIUM 40 MILLIGRAM(S): 100 INJECTION SUBCUTANEOUS at 05:18

## 2021-07-31 RX ADMIN — Medication 100 MILLIEQUIVALENT(S): at 10:58

## 2021-07-31 RX ADMIN — Medication 100 MILLIGRAM(S): at 05:18

## 2021-07-31 RX ADMIN — Medication 100 MILLIGRAM(S): at 13:55

## 2021-07-31 RX ADMIN — DEXTROSE MONOHYDRATE, SODIUM CHLORIDE, AND POTASSIUM CHLORIDE 70 MILLILITER(S): 50; .745; 4.5 INJECTION, SOLUTION INTRAVENOUS at 10:59

## 2021-07-31 RX ADMIN — REMDESIVIR 500 MILLIGRAM(S): 5 INJECTION INTRAVENOUS at 17:44

## 2021-07-31 RX ADMIN — PANTOPRAZOLE SODIUM 40 MILLIGRAM(S): 20 TABLET, DELAYED RELEASE ORAL at 05:17

## 2021-07-31 RX ADMIN — Medication 6 MILLIGRAM(S): at 05:19

## 2021-07-31 RX ADMIN — Medication 3 MILLIGRAM(S): at 21:19

## 2021-07-31 RX ADMIN — Medication 100 MILLIGRAM(S): at 21:19

## 2021-07-31 RX ADMIN — ENOXAPARIN SODIUM 40 MILLIGRAM(S): 100 INJECTION SUBCUTANEOUS at 17:43

## 2021-07-31 NOTE — PROGRESS NOTE ADULT - PROBLEM SELECTOR PLAN 2
2nd to COVID PNA  -Seen on HFNC 100%/60L, O2 sats 91-92%  -States is more comfortable with HFNC  -Bipap 14/7/100% PRN  -keep sats >90% with O2  -ABG noted  -Incentive spirometer

## 2021-07-31 NOTE — PROGRESS NOTE ADULT - SUBJECTIVE AND OBJECTIVE BOX
INFECTIOUS DISEASES FOLLOW UP--Koko Shin MD  Pager [phone]    This is a follow up note for this  55y Male with  Infection due to severe acute respiratory syndrome coronavirus 2 (SARS-CoV-2)  on bipap    Further ROS:  CARDIOVASCULAR:  No chest pain or palpitations  RESPIRATORY:  No dyspnea  GASTROINTESTINAL:  No nausea, vomiting, diarrhea, or abdominal pain  GENITOURINARY:  No dysuria  NEUROLOGIC:  No headache,     Allergies  No Known Allergies    ANTIBIOTICS/RELEVANT:  antimicrobials  remdesivir  IVPB   IV Intermittent   remdesivir  IVPB 100 milliGRAM(s) IV Intermittent every 24 hours    OTHER:  benzonatate 100 milliGRAM(s) Oral every 8 hours  dexAMETHasone  Injectable 6 milliGRAM(s) IV Push daily  enoxaparin Injectable 40 milliGRAM(s) SubCutaneous two times a day  melatonin 3 milliGRAM(s) Oral at bedtime  pantoprazole    Tablet 40 milliGRAM(s) Oral before breakfast      Objective:  Vital Signs Last 24 Hrs  T(C): 36.5 (31 Jul 2021 05:32), Max: 37.2 (30 Jul 2021 08:23)  T(F): 97.7 (31 Jul 2021 05:32), Max: 98.9 (30 Jul 2021 08:23)  HR: 62 (31 Jul 2021 05:32) (62 - 88)  BP: 110/74 (31 Jul 2021 05:32) (105/69 - 128/85)  BP(mean): --  RR: 20 (31 Jul 2021 05:32) (20 - 26)  SpO2: 96% (31 Jul 2021 05:32) (91% - 96%)    PHYSICAL EXAM:  Constitutional:no acute distress  Eyes:PORFIRIO, EOMI  Ear/Nose/Throat: no oral lesions, 	  Respiratory: clear BL  Cardiovascular: S1S2  Gastrointestinal:soft, (+) BS, no tenderness  Extremities:no e/e/c  No Lymphadenopathy  IV sites not inflammed.    LABS:                        14.6   6.86  )-----------( 217      ( 31 Jul 2021 06:32 )             45.8     07-31    136  |  101  |  19  ----------------------------<  96  4.6   |  23  |  0.76    Ca    8.9      31 Jul 2021 06:32    TPro  6.7  /  Alb  3.1<L>  /  TBili  0.4  /  DBili  x   /  AST  85<H>  /  ALT  79<H>  /  AlkPhos  54  07-31    PT/INR - ( 31 Jul 2021 06:32 )   PT: 14.3 sec;   INR: 1.20 ratio      imp/rx:    severe covid.  on steroids and rdv.  s/p toci.    to continue current Rx.

## 2021-07-31 NOTE — PROGRESS NOTE ADULT - SUBJECTIVE AND OBJECTIVE BOX
Follow-up Pulm Progress Note    Seen on HFNC 100%/60L  O2 sats 90-91%  Mild dyspnea  Denies CP    Medications:  MEDICATIONS  (STANDING):  benzonatate 100 milliGRAM(s) Oral every 8 hours  dexAMETHasone  Injectable 6 milliGRAM(s) IV Push daily  dextrose 5% + sodium chloride 0.9% with potassium chloride 20 mEq/L 1000 milliLiter(s) (70 mL/Hr) IV Continuous <Continuous>  enoxaparin Injectable 40 milliGRAM(s) SubCutaneous two times a day  melatonin 3 milliGRAM(s) Oral at bedtime  pantoprazole    Tablet 40 milliGRAM(s) Oral before breakfast  remdesivir  IVPB   IV Intermittent   remdesivir  IVPB 100 milliGRAM(s) IV Intermittent every 24 hours      Vital Signs Last 24 Hrs  T(C): 36.7 (31 Jul 2021 10:49), Max: 37 (30 Jul 2021 16:01)  T(F): 98.1 (31 Jul 2021 10:49), Max: 98.6 (30 Jul 2021 16:01)  HR: 70 (31 Jul 2021 10:49) (62 - 82)  BP: 100/69 (31 Jul 2021 10:49) (100/69 - 128/85)  BP(mean): --  RR: 20 (31 Jul 2021 10:49) (20 - 23)  SpO2: 89% (31 Jul 2021 10:49) (89% - 96%)    ABG - ( 30 Jul 2021 15:14 )  pH, Arterial: 7.48  pH, Blood: x     /  pCO2: 36    /  pO2: 69    / HCO3: 26    / Base Excess: 3.2   /  SaO2: 94        07-30 @ 07:01  -  07-31 @ 07:00  --------------------------------------------------------  IN: 0 mL / OUT: 600 mL / NET: -600 mL      LABS:                        14.6   6.86  )-----------( 217      ( 31 Jul 2021 06:32 )             45.8     07-31    136  |  101  |  19  ----------------------------<  96  4.6   |  23  |  0.76    Ca    8.9      31 Jul 2021 06:32    TPro  6.7  /  Alb  3.1<L>  /  TBili  0.4  /  DBili  0.1  /  AST  85<H>  /  ALT  79<H>  /  AlkPhos  54  07-31    PT/INR - ( 31 Jul 2021 06:32 )   PT: 14.3 sec;   INR: 1.20 ratio        Procalcitonin, Serum: 0.07 ng/mL (07-30-21 @ 06:31)  Procalcitonin, Serum: 0.09 ng/mL (07-29-21 @ 06:50)    Physical Examination:  PULM: Decreased BS  CVS: RRR    RADIOLOGY REVIEWED  CXR 7/30: increase in b/l patchy opacities

## 2021-07-31 NOTE — PROGRESS NOTE ADULT - PROBLEM SELECTOR PLAN 1
+COVID PCR as an outpatient  -CXR 7/30 with increase in b/l opacities  -Remdesivir x 5 days (monitor creatinine, LFTs)  -Decadron 6mg IVP qd x 10 days  -Trend inflammatory markers  -Tessalon perle 100mg PO TID  -S/p Tocilizumab 7/30 per ID for worsening hypoxic respiratory failure  -Self proning as tolerated  -Incentive spirometer

## 2021-07-31 NOTE — PROGRESS NOTE ADULT - SUBJECTIVE AND OBJECTIVE BOX
DATE OF SERVICE: 07-31-21 @ 10:19  CHIEF COMPLAINT:Patient is a 55y old  Male who presents with a chief complaint of covid (30 Jul 2021 11:34)    	        PAST MEDICAL & SURGICAL HISTORY:  Hyperlipidemia    HTN (hypertension)    Rupture, tendon, quadriceps    History of Achilles tendon repair              RESPIRATORY: breathing w/o change  on bpap  CARDIOVASCULAR: No chest pain, palpitations, passing out, dizziness, or leg swelling  GASTROINTESTINAL: No abdominal or epigastric pain. No nausea, vomiting, or hematemesis;   GENITOURINARY: No dysuria, frequency, hematuria,   NEUROLOGICAL: No headaches,   Medications:  MEDICATIONS  (STANDING):  benzonatate 100 milliGRAM(s) Oral every 8 hours  dexAMETHasone  Injectable 6 milliGRAM(s) IV Push daily  dextrose 5% + sodium chloride 0.9% with potassium chloride 20 mEq/L 1000 milliLiter(s) (70 mL/Hr) IV Continuous <Continuous>  enoxaparin Injectable 40 milliGRAM(s) SubCutaneous two times a day  melatonin 3 milliGRAM(s) Oral at bedtime  pantoprazole    Tablet 40 milliGRAM(s) Oral before breakfast  potassium chloride  10 mEq/100 mL IVPB 10 milliEquivalent(s) IV Intermittent once  remdesivir  IVPB   IV Intermittent   remdesivir  IVPB 100 milliGRAM(s) IV Intermittent every 24 hours    MEDICATIONS  (PRN):    	    PHYSICAL EXAM:  T(C): 36.5 (07-31-21 @ 05:32), Max: 37 (07-30-21 @ 16:01)  HR: 62 (07-31-21 @ 05:32) (62 - 88)  BP: 110/74 (07-31-21 @ 05:32) (105/69 - 128/85)  RR: 20 (07-31-21 @ 05:32) (20 - 20)  SpO2: 96% (07-31-21 @ 05:32) (91% - 96%)  Wt(kg): --  I&O's Summary    30 Jul 2021 07:01  -  31 Jul 2021 07:00  --------------------------------------------------------  IN: 0 mL / OUT: 600 mL / NET: -600 mL        	  HEENT:   Normal oral mucosa, PERRL, EOMI	  Lymphatic: No lymphadenopathy  Cardiovascular: Normal S1 S2, No JVD, No murmurs, No edema  Respiratory: dec bs   Gastrointestinal:  Soft, Non-tender, + BS	  Skin: No rashes, No ecchymoses, No cyanosis	  Neurologic: Non-focal  Extremities: Normal range of motion, No clubbing, cyanosis or edema  Vascular: Peripheral pulses palpable 2+ bilaterally    TELEMETRY: 	    ECG:  	  RADIOLOGY:  OTHER: 	  	  LABS:	 	    CARDIAC MARKERS:                                14.6   6.86  )-----------( 217      ( 31 Jul 2021 06:32 )             45.8     07-31    136  |  101  |  19  ----------------------------<  96  4.6   |  23  |  0.76    Ca    8.9      31 Jul 2021 06:32    TPro  6.7  /  Alb  3.1<L>  /  TBili  0.4  /  DBili  0.1  /  AST  85<H>  /  ALT  79<H>  /  AlkPhos  54  07-31    proBNP:   Lipid Profile:   HgA1c:   TSH:

## 2021-08-01 LAB
ALBUMIN SERPL ELPH-MCNC: 3 G/DL — LOW (ref 3.3–5)
ALP SERPL-CCNC: 52 U/L — SIGNIFICANT CHANGE UP (ref 40–120)
ALT FLD-CCNC: 93 U/L — HIGH (ref 10–45)
ANION GAP SERPL CALC-SCNC: 10 MMOL/L — SIGNIFICANT CHANGE UP (ref 5–17)
AST SERPL-CCNC: 75 U/L — HIGH (ref 10–40)
BILIRUB SERPL-MCNC: 0.4 MG/DL — SIGNIFICANT CHANGE UP (ref 0.2–1.2)
BUN SERPL-MCNC: 21 MG/DL — SIGNIFICANT CHANGE UP (ref 7–23)
CALCIUM SERPL-MCNC: 8.9 MG/DL — SIGNIFICANT CHANGE UP (ref 8.4–10.5)
CHLORIDE SERPL-SCNC: 104 MMOL/L — SIGNIFICANT CHANGE UP (ref 96–108)
CO2 SERPL-SCNC: 21 MMOL/L — LOW (ref 22–31)
CREAT SERPL-MCNC: 0.78 MG/DL — SIGNIFICANT CHANGE UP (ref 0.5–1.3)
CRP SERPL-MCNC: 15 MG/L — HIGH (ref 0–4)
D DIMER BLD IA.RAPID-MCNC: 192 NG/ML DDU — SIGNIFICANT CHANGE UP
FERRITIN SERPL-MCNC: 1342 NG/ML — HIGH (ref 30–400)
GLUCOSE SERPL-MCNC: 108 MG/DL — HIGH (ref 70–99)
INR BLD: 1.21 RATIO — HIGH (ref 0.88–1.16)
POTASSIUM SERPL-MCNC: 4.7 MMOL/L — SIGNIFICANT CHANGE UP (ref 3.5–5.3)
POTASSIUM SERPL-SCNC: 4.7 MMOL/L — SIGNIFICANT CHANGE UP (ref 3.5–5.3)
PROCALCITONIN SERPL-MCNC: 0.07 NG/ML — SIGNIFICANT CHANGE UP (ref 0.02–0.1)
PROT SERPL-MCNC: 7 G/DL — SIGNIFICANT CHANGE UP (ref 6–8.3)
PROTHROM AB SERPL-ACNC: 14.4 SEC — HIGH (ref 10.6–13.6)
SODIUM SERPL-SCNC: 135 MMOL/L — SIGNIFICANT CHANGE UP (ref 135–145)

## 2021-08-01 RX ORDER — DEXTROSE MONOHYDRATE, SODIUM CHLORIDE, AND POTASSIUM CHLORIDE 50; .745; 4.5 G/1000ML; G/1000ML; G/1000ML
1000 INJECTION, SOLUTION INTRAVENOUS
Refills: 0 | Status: DISCONTINUED | OUTPATIENT
Start: 2021-08-01 | End: 2021-08-09

## 2021-08-01 RX ADMIN — ENOXAPARIN SODIUM 40 MILLIGRAM(S): 100 INJECTION SUBCUTANEOUS at 05:34

## 2021-08-01 RX ADMIN — ENOXAPARIN SODIUM 40 MILLIGRAM(S): 100 INJECTION SUBCUTANEOUS at 17:29

## 2021-08-01 RX ADMIN — Medication 6 MILLIGRAM(S): at 05:35

## 2021-08-01 RX ADMIN — REMDESIVIR 500 MILLIGRAM(S): 5 INJECTION INTRAVENOUS at 17:26

## 2021-08-01 RX ADMIN — DEXTROSE MONOHYDRATE, SODIUM CHLORIDE, AND POTASSIUM CHLORIDE 50 MILLILITER(S): 50; .745; 4.5 INJECTION, SOLUTION INTRAVENOUS at 09:50

## 2021-08-01 RX ADMIN — Medication 100 MILLIGRAM(S): at 05:34

## 2021-08-01 RX ADMIN — PANTOPRAZOLE SODIUM 40 MILLIGRAM(S): 20 TABLET, DELAYED RELEASE ORAL at 05:34

## 2021-08-01 NOTE — PROGRESS NOTE ADULT - ASSESSMENT
pt w/ covid  hypoxia  iv steroids   remdesivir  id / pulm evals oted  worsening hypoxia  discussed w/ /pulm  c/e resp support  mild fluids as pt not able to eat or drink  hypokalemia better  supp k+ prn  additional tx as per id recs  s/p toci  gi / dvt proph  o2  hx htn/ pt noncompliant  monitor bp  monitor inflammatory markers  prognosis guarded

## 2021-08-01 NOTE — PROGRESS NOTE ADULT - SUBJECTIVE AND OBJECTIVE BOX
DATE OF SERVICE: 08-01-21 @ 13:06  CHIEF COMPLAINT:Patient is a 55y old  Male who presents with a chief complaint of covid (30 Jul 2021 11:34)    	        PAST MEDICAL & SURGICAL HISTORY:  Hyperlipidemia    HTN (hypertension)    Rupture, tendon, quadriceps    History of Achilles tendon repair            REVIEW OF SYSTEMS:  weak  breathing no better  on bipap  no cp  cough  no abd pain  no diarrhea  Medications:  MEDICATIONS  (STANDING):  benzonatate 100 milliGRAM(s) Oral every 8 hours  dexAMETHasone  Injectable 6 milliGRAM(s) IV Push daily  dextrose 5% + sodium chloride 0.9% with potassium chloride 20 mEq/L 1000 milliLiter(s) (50 mL/Hr) IV Continuous <Continuous>  enoxaparin Injectable 40 milliGRAM(s) SubCutaneous two times a day  melatonin 3 milliGRAM(s) Oral at bedtime  pantoprazole    Tablet 40 milliGRAM(s) Oral before breakfast  remdesivir  IVPB   IV Intermittent   remdesivir  IVPB 100 milliGRAM(s) IV Intermittent every 24 hours    MEDICATIONS  (PRN):    	    PHYSICAL EXAM:  T(C): 36.4 (08-01-21 @ 11:48), Max: 37.2 (07-31-21 @ 20:30)  HR: 75 (08-01-21 @ 11:48) (64 - 80)  BP: 119/75 (08-01-21 @ 11:48) (108/72 - 142/87)  RR: 22 (08-01-21 @ 11:48) (20 - 22)  SpO2: 95% (08-01-21 @ 11:48) (90% - 100%)  Wt(kg): --  I&O's Summary    31 Jul 2021 07:01  -  01 Aug 2021 07:00  --------------------------------------------------------  IN: 1820 mL / OUT: 800 mL / NET: 1020 mL        Appearance: Normal	  HEENT:   Normal oral mucosa, PERRL, EOMI	  Lymphatic: No lymphadenopathy  Cardiovascular: Normal S1 S2, No JVD, No murmurs, No edema  Respiratory: dec bs   Gastrointestinal:  Soft, Non-tender, + BS	  Skin: No rashes, No ecchymoses, No cyanosis	  Neurologic: Non-focal  Extremities: Normal range of motionly    TELEMETRY: 	    ECG:  	  RADIOLOGY:  OTHER: 	  	  LABS:	 	    CARDIAC MARKERS:                                14.6   6.86  )-----------( 217      ( 31 Jul 2021 06:32 )             45.8     08-01    135  |  104  |  21  ----------------------------<  108<H>  4.7   |  21<L>  |  0.78    Ca    8.9      01 Aug 2021 07:21    TPro  7.0  /  Alb  3.0<L>  /  TBili  0.4  /  DBili  x   /  AST  75<H>  /  ALT  93<H>  /  AlkPhos  52  08-01    proBNP:   Lipid Profile:   HgA1c:   TSH:

## 2021-08-02 LAB
ALBUMIN SERPL ELPH-MCNC: 3 G/DL — LOW (ref 3.3–5)
ALP SERPL-CCNC: 47 U/L — SIGNIFICANT CHANGE UP (ref 40–120)
ALT FLD-CCNC: 88 U/L — HIGH (ref 10–45)
AST SERPL-CCNC: 68 U/L — HIGH (ref 10–40)
BILIRUB DIRECT SERPL-MCNC: 0.1 MG/DL — SIGNIFICANT CHANGE UP (ref 0–0.2)
BILIRUB INDIRECT FLD-MCNC: 0.3 MG/DL — SIGNIFICANT CHANGE UP (ref 0.2–1)
BILIRUB SERPL-MCNC: 0.4 MG/DL — SIGNIFICANT CHANGE UP (ref 0.2–1.2)
CREAT SERPL-MCNC: 0.8 MG/DL — SIGNIFICANT CHANGE UP (ref 0.5–1.3)
CREAT SERPL-MCNC: 0.81 MG/DL — SIGNIFICANT CHANGE UP (ref 0.5–1.3)
INR BLD: 1.26 RATIO — HIGH (ref 0.88–1.16)
PROT SERPL-MCNC: 6.5 G/DL — SIGNIFICANT CHANGE UP (ref 6–8.3)
PROTHROM AB SERPL-ACNC: 15 SEC — HIGH (ref 10.6–13.6)

## 2021-08-02 PROCEDURE — 99232 SBSQ HOSP IP/OBS MODERATE 35: CPT

## 2021-08-02 RX ADMIN — Medication 100 MILLIGRAM(S): at 22:30

## 2021-08-02 RX ADMIN — Medication 100 MILLIGRAM(S): at 14:40

## 2021-08-02 RX ADMIN — Medication 3 MILLIGRAM(S): at 22:31

## 2021-08-02 RX ADMIN — DEXTROSE MONOHYDRATE, SODIUM CHLORIDE, AND POTASSIUM CHLORIDE 50 MILLILITER(S): 50; .745; 4.5 INJECTION, SOLUTION INTRAVENOUS at 05:41

## 2021-08-02 RX ADMIN — REMDESIVIR 500 MILLIGRAM(S): 5 INJECTION INTRAVENOUS at 18:25

## 2021-08-02 RX ADMIN — ENOXAPARIN SODIUM 40 MILLIGRAM(S): 100 INJECTION SUBCUTANEOUS at 05:40

## 2021-08-02 RX ADMIN — Medication 6 MILLIGRAM(S): at 05:41

## 2021-08-02 RX ADMIN — DEXTROSE MONOHYDRATE, SODIUM CHLORIDE, AND POTASSIUM CHLORIDE 50 MILLILITER(S): 50; .745; 4.5 INJECTION, SOLUTION INTRAVENOUS at 22:32

## 2021-08-02 RX ADMIN — ENOXAPARIN SODIUM 40 MILLIGRAM(S): 100 INJECTION SUBCUTANEOUS at 18:25

## 2021-08-02 NOTE — PROGRESS NOTE ADULT - SUBJECTIVE AND OBJECTIVE BOX
Follow-up Pulm Progress Note    Still SOB, but slightly better today  Sats 97-98% on HFNC 60L/100%  Denies CP     Medications:  MEDICATIONS  (STANDING):  benzonatate 100 milliGRAM(s) Oral every 8 hours  dexAMETHasone  Injectable 6 milliGRAM(s) IV Push daily  dextrose 5% + sodium chloride 0.9% with potassium chloride 20 mEq/L 1000 milliLiter(s) (50 mL/Hr) IV Continuous <Continuous>  enoxaparin Injectable 40 milliGRAM(s) SubCutaneous two times a day  melatonin 3 milliGRAM(s) Oral at bedtime  pantoprazole    Tablet 40 milliGRAM(s) Oral before breakfast  remdesivir  IVPB   IV Intermittent   remdesivir  IVPB 100 milliGRAM(s) IV Intermittent every 24 hours      Vital Signs Last 24 Hrs  T(C): 36.9 (02 Aug 2021 11:15), Max: 36.9 (02 Aug 2021 11:15)  T(F): 98.4 (02 Aug 2021 11:15), Max: 98.4 (02 Aug 2021 11:15)  HR: 80 (02 Aug 2021 11:15) (64 - 85)  BP: 125/78 (02 Aug 2021 11:15) (115/70 - 141/80)  BP(mean): --  RR: 18 (02 Aug 2021 11:15) (18 - 19)  SpO2: 97% (02 Aug 2021 11:15) (91% - 100%)          08-01 @ 07:01  -  08-02 @ 07:00  --------------------------------------------------------  IN: 600 mL / OUT: 1450 mL / NET: -850 mL          LABS:    08-02    x   |  x   |  x   ----------------------------<  x   x    |  x   |  0.80    Ca    8.9      01 Aug 2021 07:21    TPro  6.5  /  Alb  3.0<L>  /  TBili  0.4  /  DBili  0.1  /  AST  68<H>  /  ALT  88<H>  /  AlkPhos  47  08-02        PT/INR - ( 02 Aug 2021 06:45 )   PT: 15.0 sec;   INR: 1.26 ratio         Procalcitonin, Serum: 0.07 ng/mL (08-01-21 @ 07:21)        Physical Examination:  PULM: Decreased BS  CVS: RRR    RADIOLOGY REVIEWED  CXR 7/30: increase in b/l patchy opacities

## 2021-08-02 NOTE — PROGRESS NOTE ADULT - SUBJECTIVE AND OBJECTIVE BOX
infectious diseases progress note:    Patient is a 55y old  Male who presents with a chief complaint of covid (30 Jul 2021 11:34)        Infection due to severe acute respiratory syndrome coronavirus 2 (SARS-CoV-2)               Allergies    No Known Allergies    Intolerances        ANTIBIOTICS/RELEVANT:  antimicrobials  remdesivir  IVPB   IV Intermittent   remdesivir  IVPB 100 milliGRAM(s) IV Intermittent every 24 hours    immunologic:    OTHER:  benzonatate 100 milliGRAM(s) Oral every 8 hours  dexAMETHasone  Injectable 6 milliGRAM(s) IV Push daily  dextrose 5% + sodium chloride 0.9% with potassium chloride 20 mEq/L 1000 milliLiter(s) IV Continuous <Continuous>  enoxaparin Injectable 40 milliGRAM(s) SubCutaneous two times a day  melatonin 3 milliGRAM(s) Oral at bedtime  pantoprazole    Tablet 40 milliGRAM(s) Oral before breakfast      Objective:  Vital Signs Last 24 Hrs  T(C): 36.3 (02 Aug 2021 05:57), Max: 36.8 (01 Aug 2021 17:41)  T(F): 97.4 (02 Aug 2021 05:57), Max: 98.3 (01 Aug 2021 17:41)  HR: 72 (02 Aug 2021 05:57) (64 - 85)  BP: 115/70 (02 Aug 2021 05:57) (115/70 - 141/80)  BP(mean): --  RR: 18 (02 Aug 2021 05:57) (18 - 22)  SpO2: 98% (02 Aug 2021 05:57) (90% - 99%)     s  Eyes:PORFIRIO, EOMI  Ear/Nose/Throat: no oral lesion, no sinus tenderness on percussion	  Neck:no JVD, no lymphadenopathy, supple  Respiratory: CTA geovany  Cardiovascular: S1S2 RRR, no murmurs  Gastrointestinal:soft, (+) BS, no HSM  Extremities:no e/e/c        LABS:    08-02    x   |  x   |  x   ----------------------------<  x   x    |  x   |  0.80    Ca    8.9      01 Aug 2021 07:21    TPro  6.5  /  Alb  3.0<L>  /  TBili  0.4  /  DBili  0.1  /  AST  68<H>  /  ALT  88<H>  /  AlkPhos  47  08-02    PT/INR - ( 02 Aug 2021 06:45 )   PT: 15.0 sec;   INR: 1.26 ratio                 MICROBIOLOGY:    RECENT CULTURES:        RESPIRATORY CULTURES:              RADIOLOGY & ADDITIONAL STUDIES:        Pager [phone]  After 5 pm/weekends or if no response :[phone]

## 2021-08-02 NOTE — PROGRESS NOTE ADULT - ASSESSMENT
covid    seems better sats reimroved eating  small amounts  and not as sob  I would hope to lower hisFIO2 as much as possible   continue remdesivir and decadron and proning

## 2021-08-02 NOTE — PROGRESS NOTE ADULT - PROBLEM SELECTOR PLAN 2
2nd to COVID PNA  -Decrease O2 to 60L/90%. Wean O2 as tolerated, keep sats >90%.   -Bipap 14/7/90% PRN  -Incentive spirometer

## 2021-08-02 NOTE — PROGRESS NOTE ADULT - ASSESSMENT
pt w/ covid  hypoxia  iv steroids   remdesivir  id / pulm evals oted  pt better this am on h.f o2   discussed w/ /pulm  c/e resp support    hypokalemia better  supp k+ prn  additional tx as per id recs  s/p toci  gi / dvt proph  o2  hx htn/ pt noncompliant  monitor bp  monitor inflammatory markers  prognosis guarded

## 2021-08-02 NOTE — PROGRESS NOTE ADULT - SUBJECTIVE AND OBJECTIVE BOX
DATE OF SERVICE: 08-02-21 @ 12:53  CHIEF COMPLAINT:Patient is a 55y old  Male who presents with a chief complaint of covid (02 Aug 2021 07:46)    	        PAST MEDICAL & SURGICAL HISTORY:  Hyperlipidemia    HTN (hypertension)    Rupture, tendon, quadriceps    History of Achilles tendon repair            REVIEW OF SYSTEMS:  feels better  RESPIRATORY: dec sob  CARDIOVASCULAR: No chest pain, palpitations, passing out, dizziness, or leg swelling  GASTROINTESTINAL: No abdominal or epigastric pain. No nausea, vomiting, or hematemesis; No diarrhea or constipation. No melena or hematochezia.  GENITOURINARY: No dysuria, frequency, hematuria, or incontinence  NEUROLOGICAL: No headaches,     Medications:  MEDICATIONS  (STANDING):  benzonatate 100 milliGRAM(s) Oral every 8 hours  dexAMETHasone  Injectable 6 milliGRAM(s) IV Push daily  dextrose 5% + sodium chloride 0.9% with potassium chloride 20 mEq/L 1000 milliLiter(s) (50 mL/Hr) IV Continuous <Continuous>  enoxaparin Injectable 40 milliGRAM(s) SubCutaneous two times a day  melatonin 3 milliGRAM(s) Oral at bedtime  pantoprazole    Tablet 40 milliGRAM(s) Oral before breakfast  remdesivir  IVPB   IV Intermittent   remdesivir  IVPB 100 milliGRAM(s) IV Intermittent every 24 hours    MEDICATIONS  (PRN):    	    PHYSICAL EXAM:  T(C): 36.9 (08-02-21 @ 11:15), Max: 36.9 (08-02-21 @ 11:15)  HR: 80 (08-02-21 @ 11:15) (64 - 85)  BP: 125/78 (08-02-21 @ 11:15) (115/70 - 141/80)  RR: 18 (08-02-21 @ 11:15) (18 - 19)  SpO2: 97% (08-02-21 @ 11:15) (91% - 100%)  Wt(kg): --  I&O's Summary    01 Aug 2021 07:01  -  02 Aug 2021 07:00  --------------------------------------------------------  IN: 600 mL / OUT: 1450 mL / NET: -850 mL    02 Aug 2021 07:01  -  02 Aug 2021 12:53  --------------------------------------------------------  IN: 0 mL / OUT: 600 mL / NET: -600 mL        Appearance: Normal	  HEENT:   Normal oral mucosa, PERRL, EOMI	  Lymphatic: No lymphadenopathy  Cardiovascular: Normal S1 S2, No JVD, No murmurs, No edema  Respiratory: dec bs   Psychiatry: A & O x 3, Mood & affect appropriate  Gastrointestinal:  Soft, Non-tender, + BS	  Skin: No rashes, No ecchymoses, No cyanosis	  Neurologic: Non-focal  Extremities: Normal range of motion, No clubbing, cyanosis or edema  Vascular: Peripheral pulses palpable 2+ bilaterally    TELEMETRY: 	    ECG:  	  RADIOLOGY:  OTHER: 	  	  LABS:	 	    CARDIAC MARKERS:            08-02    x   |  x   |  x   ----------------------------<  x   x    |  x   |  0.80    Ca    8.9      01 Aug 2021 07:21    TPro  6.5  /  Alb  3.0<L>  /  TBili  0.4  /  DBili  0.1  /  AST  68<H>  /  ALT  88<H>  /  AlkPhos  47  08-02    proBNP:   Lipid Profile:   HgA1c:   TSH:

## 2021-08-03 LAB
ALBUMIN SERPL ELPH-MCNC: 3 G/DL — LOW (ref 3.3–5)
ALP SERPL-CCNC: 45 U/L — SIGNIFICANT CHANGE UP (ref 40–120)
ALT FLD-CCNC: 81 U/L — HIGH (ref 10–45)
ANION GAP SERPL CALC-SCNC: 10 MMOL/L — SIGNIFICANT CHANGE UP (ref 5–17)
APTT BLD: 25.8 SEC — LOW (ref 27.5–35.5)
AST SERPL-CCNC: 72 U/L — HIGH (ref 10–40)
BASOPHILS # BLD AUTO: 0.02 K/UL — SIGNIFICANT CHANGE UP (ref 0–0.2)
BASOPHILS NFR BLD AUTO: 0.3 % — SIGNIFICANT CHANGE UP (ref 0–2)
BILIRUB SERPL-MCNC: 0.5 MG/DL — SIGNIFICANT CHANGE UP (ref 0.2–1.2)
BUN SERPL-MCNC: 18 MG/DL — SIGNIFICANT CHANGE UP (ref 7–23)
CALCIUM SERPL-MCNC: 8.6 MG/DL — SIGNIFICANT CHANGE UP (ref 8.4–10.5)
CHLORIDE SERPL-SCNC: 104 MMOL/L — SIGNIFICANT CHANGE UP (ref 96–108)
CO2 SERPL-SCNC: 19 MMOL/L — LOW (ref 22–31)
CREAT SERPL-MCNC: 0.8 MG/DL — SIGNIFICANT CHANGE UP (ref 0.5–1.3)
CRP SERPL-MCNC: 4 MG/L — SIGNIFICANT CHANGE UP (ref 0–4)
D DIMER BLD IA.RAPID-MCNC: 411 NG/ML DDU — HIGH
EOSINOPHIL # BLD AUTO: 0.16 K/UL — SIGNIFICANT CHANGE UP (ref 0–0.5)
EOSINOPHIL NFR BLD AUTO: 2.2 % — SIGNIFICANT CHANGE UP (ref 0–6)
FERRITIN SERPL-MCNC: 1209 NG/ML — HIGH (ref 30–400)
GLUCOSE BLDC GLUCOMTR-MCNC: 85 MG/DL — SIGNIFICANT CHANGE UP (ref 70–99)
GLUCOSE SERPL-MCNC: 93 MG/DL — SIGNIFICANT CHANGE UP (ref 70–99)
HCT VFR BLD CALC: 45.1 % — SIGNIFICANT CHANGE UP (ref 39–50)
HGB BLD-MCNC: 14.7 G/DL — SIGNIFICANT CHANGE UP (ref 13–17)
IMM GRANULOCYTES NFR BLD AUTO: 0.7 % — SIGNIFICANT CHANGE UP (ref 0–1.5)
INR BLD: 1.37 RATIO — HIGH (ref 0.88–1.16)
LYMPHOCYTES # BLD AUTO: 1.21 K/UL — SIGNIFICANT CHANGE UP (ref 1–3.3)
LYMPHOCYTES # BLD AUTO: 16.3 % — SIGNIFICANT CHANGE UP (ref 13–44)
MAGNESIUM SERPL-MCNC: 2.2 MG/DL — SIGNIFICANT CHANGE UP (ref 1.6–2.6)
MCHC RBC-ENTMCNC: 27.9 PG — SIGNIFICANT CHANGE UP (ref 27–34)
MCHC RBC-ENTMCNC: 32.6 GM/DL — SIGNIFICANT CHANGE UP (ref 32–36)
MCV RBC AUTO: 85.7 FL — SIGNIFICANT CHANGE UP (ref 80–100)
MONOCYTES # BLD AUTO: 0.34 K/UL — SIGNIFICANT CHANGE UP (ref 0–0.9)
MONOCYTES NFR BLD AUTO: 4.6 % — SIGNIFICANT CHANGE UP (ref 2–14)
NEUTROPHILS # BLD AUTO: 5.64 K/UL — SIGNIFICANT CHANGE UP (ref 1.8–7.4)
NEUTROPHILS NFR BLD AUTO: 75.9 % — SIGNIFICANT CHANGE UP (ref 43–77)
NRBC # BLD: 0 /100 WBCS — SIGNIFICANT CHANGE UP (ref 0–0)
PHOSPHATE SERPL-MCNC: 2.8 MG/DL — SIGNIFICANT CHANGE UP (ref 2.5–4.5)
PLATELET # BLD AUTO: 267 K/UL — SIGNIFICANT CHANGE UP (ref 150–400)
POTASSIUM SERPL-MCNC: 4.1 MMOL/L — SIGNIFICANT CHANGE UP (ref 3.5–5.3)
POTASSIUM SERPL-SCNC: 4.1 MMOL/L — SIGNIFICANT CHANGE UP (ref 3.5–5.3)
PROT SERPL-MCNC: 6.1 G/DL — SIGNIFICANT CHANGE UP (ref 6–8.3)
PROTHROM AB SERPL-ACNC: 16.2 SEC — HIGH (ref 10.6–13.6)
RBC # BLD: 5.26 M/UL — SIGNIFICANT CHANGE UP (ref 4.2–5.8)
RBC # FLD: 12.4 % — SIGNIFICANT CHANGE UP (ref 10.3–14.5)
SODIUM SERPL-SCNC: 133 MMOL/L — LOW (ref 135–145)
WBC # BLD: 7.42 K/UL — SIGNIFICANT CHANGE UP (ref 3.8–10.5)
WBC # FLD AUTO: 7.42 K/UL — SIGNIFICANT CHANGE UP (ref 3.8–10.5)

## 2021-08-03 PROCEDURE — 99232 SBSQ HOSP IP/OBS MODERATE 35: CPT

## 2021-08-03 PROCEDURE — 71045 X-RAY EXAM CHEST 1 VIEW: CPT | Mod: 26

## 2021-08-03 RX ORDER — LANOLIN ALCOHOL/MO/W.PET/CERES
5 CREAM (GRAM) TOPICAL AT BEDTIME
Refills: 0 | Status: DISCONTINUED | OUTPATIENT
Start: 2021-08-03 | End: 2021-08-10

## 2021-08-03 RX ADMIN — DEXTROSE MONOHYDRATE, SODIUM CHLORIDE, AND POTASSIUM CHLORIDE 50 MILLILITER(S): 50; .745; 4.5 INJECTION, SOLUTION INTRAVENOUS at 09:00

## 2021-08-03 RX ADMIN — Medication 5 MILLIGRAM(S): at 21:53

## 2021-08-03 RX ADMIN — ENOXAPARIN SODIUM 40 MILLIGRAM(S): 100 INJECTION SUBCUTANEOUS at 05:53

## 2021-08-03 RX ADMIN — Medication 1200 MILLIGRAM(S): at 20:49

## 2021-08-03 RX ADMIN — Medication 6 MILLIGRAM(S): at 05:52

## 2021-08-03 RX ADMIN — ENOXAPARIN SODIUM 40 MILLIGRAM(S): 100 INJECTION SUBCUTANEOUS at 18:34

## 2021-08-03 NOTE — PROGRESS NOTE ADULT - SUBJECTIVE AND OBJECTIVE BOX
Follow-up Pulm Progress Note    RRT overnight for hypoxia - improved with proning  Per RN at bedside, pt with multiple episodes of desaturation today on HFNC  Currently on bipap 14/7/100% TV ~1100  settings changed to 10/5/100% TV decreased to 700s/800s sats maintained 97%  C/o productive sputum production - sometimes brownish, sometimes white. Difficult to expectorate at times.  Denies CP, pleuritic CP    Medications:  MEDICATIONS  (STANDING):  dexAMETHasone  Injectable 6 milliGRAM(s) IV Push daily  dextrose 5% + sodium chloride 0.9% with potassium chloride 20 mEq/L 1000 milliLiter(s) (50 mL/Hr) IV Continuous <Continuous>  enoxaparin Injectable 40 milliGRAM(s) SubCutaneous two times a day  guaiFENesin ER 1200 milliGRAM(s) Oral every 12 hours  melatonin 3 milliGRAM(s) Oral at bedtime  pantoprazole    Tablet 40 milliGRAM(s) Oral before breakfast        Vital Signs Last 24 Hrs  T(C): 36.1 (03 Aug 2021 12:13), Max: 37.1 (02 Aug 2021 22:19)  T(F): 97 (03 Aug 2021 12:13), Max: 98.7 (02 Aug 2021 22:19)  HR: 85 (03 Aug 2021 10:24) (52 - 86)  BP: 116/76 (03 Aug 2021 12:13) (113/73 - 130/81)  BP(mean): --  RR: 20 (03 Aug 2021 12:13) (18 - 20)  SpO2: 96% (03 Aug 2021 12:13) (86% - 97%)          08-02 @ 07:01  -  08-03 @ 07:00  --------------------------------------------------------  IN: 240 mL / OUT: 1350 mL / NET: -1110 mL          LABS:                        14.7   7.42  )-----------( 267      ( 03 Aug 2021 02:16 )             45.1     08-03    133<L>  |  104  |  18  ----------------------------<  93  4.1   |  19<L>  |  0.80    Ca    8.6      03 Aug 2021 02:16  Phos  2.8     08-03  Mg     2.2     08-03    TPro  6.1  /  Alb  3.0<L>  /  TBili  0.5  /  DBili  x   /  AST  72<H>  /  ALT  81<H>  /  AlkPhos  45  08-03          CAPILLARY BLOOD GLUCOSE      POCT Blood Glucose.: 85 mg/dL (03 Aug 2021 02:00)    PT/INR - ( 03 Aug 2021 02:16 )   PT: 16.2 sec;   INR: 1.37 ratio         PTT - ( 03 Aug 2021 02:16 )  PTT:25.8 sec    Procalcitonin, Serum: 0.07 ng/mL (08-01-21 @ 07:21)            Physical Examination:  PULM: Coarse bilaterally   CVS: S1, S2 heard      RADIOLOGY REVIEWED  CXR 7/30: increase in b/l patchy opacities

## 2021-08-03 NOTE — PROGRESS NOTE ADULT - SUBJECTIVE AND OBJECTIVE BOX
DATE OF SERVICE: 08-03-21 @ 10:56  CHIEF COMPLAINT:Patient is a 55y old  Male who presents with a chief complaint of covid (03 Aug 2021 09:04)    	        PAST MEDICAL & SURGICAL HISTORY:  Hyperlipidemia    HTN (hypertension)    Rupture, tendon, quadriceps    History of Achilles tendon repair            REVIEW OF SYSTEMS:  weak  RESPIRATORY: breathing better than earlier this am  CARDIOVASCULAR: No chest pain, palpitations, passing out, dizziness, or leg swelling  GASTROINTESTINAL: No abdominal or epigastric pain. No nausea, vomiting, or hematemesis; No diarrhea or constipation. No melena or hematochezia.  GENITOURINARY: No dysuria, frequency, hematuria, or incontinence  NEUROLOGICAL: No headaches,    Medications:  MEDICATIONS  (STANDING):  benzonatate 100 milliGRAM(s) Oral every 8 hours  dexAMETHasone  Injectable 6 milliGRAM(s) IV Push daily  dextrose 5% + sodium chloride 0.9% with potassium chloride 20 mEq/L 1000 milliLiter(s) (50 mL/Hr) IV Continuous <Continuous>  enoxaparin Injectable 40 milliGRAM(s) SubCutaneous two times a day  melatonin 3 milliGRAM(s) Oral at bedtime  pantoprazole    Tablet 40 milliGRAM(s) Oral before breakfast    MEDICATIONS  (PRN):    	    PHYSICAL EXAM:  T(C): 36.6 (08-03-21 @ 04:56), Max: 37.1 (08-02-21 @ 22:19)  HR: 76 (08-03-21 @ 04:56) (52 - 86)  BP: 130/81 (08-03-21 @ 04:56) (113/73 - 130/81)  RR: 20 (08-03-21 @ 04:56) (18 - 20)  SpO2: 95% (08-03-21 @ 04:56) (86% - 97%)  Wt(kg): --  I&O's Summary    02 Aug 2021 07:01  -  03 Aug 2021 07:00  --------------------------------------------------------  IN: 240 mL / OUT: 1350 mL / NET: -1110 mL        Appearance: Normal	  HEENT:   Normal oral mucosa, PERRL, EOMI	  Lymphatic: No lymphadenopathy  Cardiovascular: Normal S1 S2, No JVD, No murmurs, No edema  Respiratory: dec bs   Gastrointestinal:  Soft, Non-tender, + BS	    Neurologic: Non-focal  Extremities: Normal range of motion, No clubbing, cyanosis or edema  Vascular: Peripheral pulses palpable 2+ bilaterally    TELEMETRY: 	    ECG:  	  RADIOLOGY:  OTHER: 	  	  LABS:	 	    CARDIAC MARKERS:                                14.7   7.42  )-----------( 267      ( 03 Aug 2021 02:16 )             45.1     08-03    133<L>  |  104  |  18  ----------------------------<  93  4.1   |  19<L>  |  0.80    Ca    8.6      03 Aug 2021 02:16  Phos  2.8     08-03  Mg     2.2     08-03    TPro  6.1  /  Alb  3.0<L>  /  TBili  0.5  /  DBili  x   /  AST  72<H>  /  ALT  81<H>  /  AlkPhos  45  08-03    proBNP:   Lipid Profile:   HgA1c:   TSH:

## 2021-08-03 NOTE — CHART NOTE - NSCHARTNOTEFT_GEN_A_CORE
RRT called by RN for patient who was sustaining oxygen saturation of 80% with brief drop to 68% on high flow nasal cannula 60L, 80%. Patient was placed in prone position and HFNC settings were increased to 60L, 100%. Patients oxygen saturation with improvement to low 90%. Patient remains on HFNC. Patients wife, Sayra, was called and updated with overnight events. Dr. Higginbotham to be called and updated in the AM. Please see RRT note for additional information.    Tiana Douglas PA-C  [facility]  [id number] RRT called by RN for patient who was sustaining oxygen saturation of 80% with brief drop to 68% on high flow nasal cannula 60L, 80%. Patient was placed in prone position and HFNC settings were increased to 60L, 100% and chest PT was performed. Patients oxygen saturation improved to low 90%. Patient remains on HFNC with goal O2 sat >80%. If patient desats, will switch to to BiPAP. Patients wife, Sayra, was called and updated with overnight events. Dr. Higginbotham to be called and updated in the AM. Please see RRT note for additional information.    Tiana Douglas PA-C  [facility]  [id number] RRT called by RN for patient who was sustaining oxygen saturation of 80% with brief drop to 68% on high flow nasal cannula 60L, 80%. Patient was placed in prone position and HFNC settings were increased to 60L, 100% and chest PT was performed. Patients oxygen saturation improved to low 90%. Patient remains on HFNC with goal O2 sat >88%. If patient desats, will switch to to BiPAP. Patients wife, Sayra, was called and updated with overnight events. Dr. Higginbotham to be called and updated in the AM. Please see RRT note for additional information.    Tiana Douglas PA-C  [facility]  [id number] RRT called by RN for patient who was sustaining oxygen saturation of 80% with brief drop to 68% on high flow nasal cannula 60L, 80%. Patient was placed in prone position and HFNC settings were increased to 60L, 100% and chest PT was performed. Patients oxygen saturation improved to low 90%. Patient remains on HFNC with goal O2 sat >88%. If patient desats, will switch to to BiPAP. Patients wife, Sayra, was called and updated with overnight events. Dr. Higginbotham notified. Please see RRT note for additional information.    Tiana Douglas PA-C  [facility]  [id number]

## 2021-08-03 NOTE — RAPID RESPONSE TEAM SUMMARY - NSOTHERINTERVENTIONSRRT_GEN_ALL_CORE
Plan:  - continue with HFNC (goal O2 >88%); if patient desats can escalate to BiPAP (patient was previously stable on BiPAP)  - try to keep patient prone for most of day to increase oxygenation   - f/u RRT labs

## 2021-08-03 NOTE — PROGRESS NOTE ADULT - SUBJECTIVE AND OBJECTIVE BOX
infectious diseases progress note:    Patient is a 55y old  Male who presents with a chief complaint of covid (02 Aug 2021 07:46)        Infection due to severe acute respiratory syndrome coronavirus 2 (SARS-CoV-2)           s    Allergies    No Known Allergies    Intolerances        ANTIBIOTICS/RELEVANT:  antimicrobials    immunologic:    OTHER:  benzonatate 100 milliGRAM(s) Oral every 8 hours  dexAMETHasone  Injectable 6 milliGRAM(s) IV Push daily  dextrose 5% + sodium chloride 0.9% with potassium chloride 20 mEq/L 1000 milliLiter(s) IV Continuous <Continuous>  enoxaparin Injectable 40 milliGRAM(s) SubCutaneous two times a day  melatonin 3 milliGRAM(s) Oral at bedtime  pantoprazole    Tablet 40 milliGRAM(s) Oral before breakfast      Objective:  Vital Signs Last 24 Hrs  T(C): 36.6 (03 Aug 2021 04:56), Max: 37.1 (02 Aug 2021 22:19)  T(F): 97.9 (03 Aug 2021 04:56), Max: 98.7 (02 Aug 2021 22:19)  HR: 76 (03 Aug 2021 04:56) (52 - 86)  BP: 130/81 (03 Aug 2021 04:56) (113/73 - 130/81)  BP(mean): --  RR: 20 (03 Aug 2021 04:56) (18 - 20)  SpO2: 95% (03 Aug 2021 04:56) (86% - 97%)       Eyes:PORFIRIO, EOMI  Ear/Nose/Throat: no oral lesion, no sinus tenderness on percussion	  Neck:no JVD, no lymphadenopathy, supple  Respiratory: CTA geovany  Cardiovascular: S1S2 RRR, no murmurs  Gastrointestinal:soft, (+) BS, no HSM  Extremities:no e/e/c        LABS:                        14.7   7.42  )-----------( 267      ( 03 Aug 2021 02:16 )             45.1     08-03    133<L>  |  104  |  18  ----------------------------<  93  4.1   |  19<L>  |  0.80    Ca    8.6      03 Aug 2021 02:16  Phos  2.8     08-03  Mg     2.2     08-03    TPro  6.1  /  Alb  3.0<L>  /  TBili  0.5  /  DBili  x   /  AST  72<H>  /  ALT  81<H>  /  AlkPhos  45  08-03    PT/INR - ( 03 Aug 2021 02:16 )   PT: 16.2 sec;   INR: 1.37 ratio         PTT - ( 03 Aug 2021 02:16 )  PTT:25.8 sec        MICROBIOLOGY:    RECENT CULTURES:        RESPIRATORY CULTURES:              RADIOLOGY & ADDITIONAL STUDIES:        Pager [phone]  After 5 pm/weekends or if no response :[phone]

## 2021-08-03 NOTE — PROGRESS NOTE ADULT - PROBLEM SELECTOR PLAN 1
+COVID PCR as an outpatient  -CXR 7/30 with increase in b/l opacities  -S/p Remdesivir x 5 days   -Decadron 6mg IVP qd x 10 days  -Trend inflammatory markers  -S/p Tocilizumab 7/30 per ID for worsening hypoxic respiratory failure  -Self proning as tolerated  -Incentive spirometer  -DVT ppx  -Pt with white/sometimes brownish sputum production. Check CXR, check sputum culture. No new fevers or increase in WBC, but may need to consider empiric coverage for superimposed bacterial PNA.   -Start Mucinex 1200mg PO BID   -DDimer in AM +COVID PCR as an outpatient  -CXR 7/30 with increase in b/l opacities  -S/p Remdesivir x 5 days   -Decadron 6mg IVP qd x 10 days  -Trend inflammatory markers  -S/p Tocilizumab 7/30 per ID for worsening hypoxic respiratory failure  -Self proning as tolerated  -Incentive spirometer  -DVT ppx  -Pt with white/sometimes brownish sputum production. Check CXR, check sputum culture. No new fevers or increase in WBC. Daily CBC, trend procalcitonin, monitor closely superimposed bacterial PNA.   -Start Mucinex 1200mg PO BID   -DDimer in AM

## 2021-08-03 NOTE — PROGRESS NOTE ADULT - ASSESSMENT
pt w/ covid  hypoxia  iv steroids   remdesivir  id / f/u noted  pulm to f/u  c/w bipap  c/e resp support    hypokalemia better  supp k+ prn  additional tx as per id recs  s/p toci  gi / dvt proph  o2  hx htn/ pt noncompliant  monitor bp  monitor inflammatory markers  prognosis guarded

## 2021-08-03 NOTE — PROGRESS NOTE ADULT - PROBLEM SELECTOR PLAN 2
2nd to COVID PNA  -Has not been able to tolerate HFNC much today.  -Bipap settings changed from 14/7/100% to 10/5/100% due to very high TV. Sats maintaining high 90's on new settings.   -Can attempt HFNC 60L/100% if sats maintaining high 90s on bipap  -Keep sats >90% with O2 as able  -Incentive spirometer

## 2021-08-03 NOTE — PROGRESS NOTE ADULT - ASSESSMENT
covid    seems better sats  improved eating  small amounts  had several episodes of desat yesterday   I would hope to lower hisFIO2    continue remdesivir and decadron and proning   no other good options   sp toci

## 2021-08-04 LAB
ALBUMIN SERPL ELPH-MCNC: 3.2 G/DL — LOW (ref 3.3–5)
ALP SERPL-CCNC: 53 U/L — SIGNIFICANT CHANGE UP (ref 40–120)
ALT FLD-CCNC: 75 U/L — HIGH (ref 10–45)
ANION GAP SERPL CALC-SCNC: 11 MMOL/L — SIGNIFICANT CHANGE UP (ref 5–17)
AST SERPL-CCNC: 65 U/L — HIGH (ref 10–40)
BILIRUB SERPL-MCNC: 0.5 MG/DL — SIGNIFICANT CHANGE UP (ref 0.2–1.2)
BUN SERPL-MCNC: 20 MG/DL — SIGNIFICANT CHANGE UP (ref 7–23)
CALCIUM SERPL-MCNC: 8.6 MG/DL — SIGNIFICANT CHANGE UP (ref 8.4–10.5)
CHLORIDE SERPL-SCNC: 105 MMOL/L — SIGNIFICANT CHANGE UP (ref 96–108)
CO2 SERPL-SCNC: 20 MMOL/L — LOW (ref 22–31)
CREAT SERPL-MCNC: 0.88 MG/DL — SIGNIFICANT CHANGE UP (ref 0.5–1.3)
CRP SERPL-MCNC: <3 MG/L — SIGNIFICANT CHANGE UP (ref 0–4)
D DIMER BLD IA.RAPID-MCNC: 1295 NG/ML DDU — HIGH
FERRITIN SERPL-MCNC: 993 NG/ML — HIGH (ref 30–400)
GLUCOSE BLDC GLUCOMTR-MCNC: 94 MG/DL — SIGNIFICANT CHANGE UP (ref 70–99)
GLUCOSE SERPL-MCNC: 86 MG/DL — SIGNIFICANT CHANGE UP (ref 70–99)
GRAM STN FLD: SIGNIFICANT CHANGE UP
HCT VFR BLD CALC: 46.5 % — SIGNIFICANT CHANGE UP (ref 39–50)
HGB BLD-MCNC: 14.8 G/DL — SIGNIFICANT CHANGE UP (ref 13–17)
INR BLD: 1.28 RATIO — HIGH (ref 0.88–1.16)
MCHC RBC-ENTMCNC: 27.7 PG — SIGNIFICANT CHANGE UP (ref 27–34)
MCHC RBC-ENTMCNC: 31.8 GM/DL — LOW (ref 32–36)
MCV RBC AUTO: 86.9 FL — SIGNIFICANT CHANGE UP (ref 80–100)
NRBC # BLD: 0 /100 WBCS — SIGNIFICANT CHANGE UP (ref 0–0)
PLATELET # BLD AUTO: 284 K/UL — SIGNIFICANT CHANGE UP (ref 150–400)
POTASSIUM SERPL-MCNC: 4.4 MMOL/L — SIGNIFICANT CHANGE UP (ref 3.5–5.3)
POTASSIUM SERPL-SCNC: 4.4 MMOL/L — SIGNIFICANT CHANGE UP (ref 3.5–5.3)
PROCALCITONIN SERPL-MCNC: 0.05 NG/ML — SIGNIFICANT CHANGE UP (ref 0.02–0.1)
PROT SERPL-MCNC: 6.2 G/DL — SIGNIFICANT CHANGE UP (ref 6–8.3)
PROTHROM AB SERPL-ACNC: 15.2 SEC — HIGH (ref 10.6–13.6)
RBC # BLD: 5.35 M/UL — SIGNIFICANT CHANGE UP (ref 4.2–5.8)
RBC # FLD: 12.5 % — SIGNIFICANT CHANGE UP (ref 10.3–14.5)
SODIUM SERPL-SCNC: 136 MMOL/L — SIGNIFICANT CHANGE UP (ref 135–145)
SPECIMEN SOURCE: SIGNIFICANT CHANGE UP
WBC # BLD: 7.66 K/UL — SIGNIFICANT CHANGE UP (ref 3.8–10.5)
WBC # FLD AUTO: 7.66 K/UL — SIGNIFICANT CHANGE UP (ref 3.8–10.5)

## 2021-08-04 PROCEDURE — 93970 EXTREMITY STUDY: CPT | Mod: 26

## 2021-08-04 PROCEDURE — 99232 SBSQ HOSP IP/OBS MODERATE 35: CPT

## 2021-08-04 RX ORDER — ENOXAPARIN SODIUM 100 MG/ML
90 INJECTION SUBCUTANEOUS EVERY 12 HOURS
Refills: 0 | Status: DISCONTINUED | OUTPATIENT
Start: 2021-08-04 | End: 2021-08-10

## 2021-08-04 RX ADMIN — ENOXAPARIN SODIUM 40 MILLIGRAM(S): 100 INJECTION SUBCUTANEOUS at 05:24

## 2021-08-04 RX ADMIN — PANTOPRAZOLE SODIUM 40 MILLIGRAM(S): 20 TABLET, DELAYED RELEASE ORAL at 05:24

## 2021-08-04 RX ADMIN — Medication 1200 MILLIGRAM(S): at 05:24

## 2021-08-04 RX ADMIN — DEXTROSE MONOHYDRATE, SODIUM CHLORIDE, AND POTASSIUM CHLORIDE 50 MILLILITER(S): 50; .745; 4.5 INJECTION, SOLUTION INTRAVENOUS at 05:24

## 2021-08-04 RX ADMIN — ENOXAPARIN SODIUM 90 MILLIGRAM(S): 100 INJECTION SUBCUTANEOUS at 17:57

## 2021-08-04 RX ADMIN — Medication 6 MILLIGRAM(S): at 05:24

## 2021-08-04 NOTE — PROGRESS NOTE ADULT - PROBLEM SELECTOR PLAN 1
+COVID PCR as an outpatient  -CXR 7/30 with increase in b/l opacities, CXR 8/3 grossly unchanged from prior   -S/p Remdesivir x 5 days   -Decadron 6mg IVP qd x 10 days  -Trend inflammatory markers  -S/p Tocilizumab 7/30 per ID for worsening hypoxic respiratory failure  -Self proning as tolerated  -Incentive spirometer  -Pt with white/sometimes brownish sputum production. F/u sputum culture. WBC normal, PCT normal. No new fevers or increase in WBC, WBC normal.   -Mucinex 1200mg PO BID   -ddimer increased from 411 to 1295 in 24 hrs, will start empiric full dose AC with Lovenox 1mg/kg BID and check LE duplex

## 2021-08-04 NOTE — PROGRESS NOTE ADULT - SUBJECTIVE AND OBJECTIVE BOX
infectious diseases progress note:    Patient is a 55y old  Male who presents with a chief complaint of covid (03 Aug 2021 09:04)        Infection due to severe acute respiratory syndrome coronavirus 2 (SARS-CoV-2)               Allergies    No Known Allergies    Intolerances        ANTIBIOTICS/RELEVANT:  antimicrobials    immunologic:    OTHER:  dexAMETHasone  Injectable 6 milliGRAM(s) IV Push daily  dextrose 5% + sodium chloride 0.9% with potassium chloride 20 mEq/L 1000 milliLiter(s) IV Continuous <Continuous>  enoxaparin Injectable 40 milliGRAM(s) SubCutaneous two times a day  guaiFENesin ER 1200 milliGRAM(s) Oral every 12 hours  melatonin 5 milliGRAM(s) Oral at bedtime  pantoprazole    Tablet 40 milliGRAM(s) Oral before breakfast      Objective:  Vital Signs Last 24 Hrs  T(C): 36.9 (04 Aug 2021 04:55), Max: 36.9 (03 Aug 2021 21:33)  T(F): 98.4 (04 Aug 2021 04:55), Max: 98.4 (03 Aug 2021 21:33)  HR: 72 (04 Aug 2021 05:25) (68 - 85)  BP: 98/62 (04 Aug 2021 04:55) (98/62 - 116/76)  BP(mean): --  RR: 20 (04 Aug 2021 04:55) (20 - 20)  SpO2: 92% (04 Aug 2021 05:25) (92% - 96%)     ss  Eyes:PORFIRIO, EOMI  Ear/Nose/Throat: no oral lesion, no sinus tenderness on percussion	  Neck:no JVD, no lymphadenopathy, supple  Respiratory: CTA geovany  Cardiovascular: S1S2 RRR, no murmurs  Gastrointestinal:soft, (+) BS, no HSM  Extremities:no e/e/c        LABS:                        14.8   7.66  )-----------( 284      ( 04 Aug 2021 06:39 )             46.5     08-04    136  |  105  |  20  ----------------------------<  86  4.4   |  20<L>  |  0.88    Ca    8.6      04 Aug 2021 06:39  Phos  2.8     08-03  Mg     2.2     08-03    TPro  6.2  /  Alb  3.2<L>  /  TBili  0.5  /  DBili  x   /  AST  65<H>  /  ALT  75<H>  /  AlkPhos  53  08-04    PT/INR - ( 04 Aug 2021 06:39 )   PT: 15.2 sec;   INR: 1.28 ratio         PTT - ( 03 Aug 2021 02:16 )  PTT:25.8 sec        MICROBIOLOGY:    RECENT CULTURES:        RESPIRATORY CULTURES:              RADIOLOGY & ADDITIONAL STUDIES:        Pager [phone]  After 5 pm/weekends or if no response :[phone]

## 2021-08-04 NOTE — DIETITIAN INITIAL EVALUATION ADULT. - OTHER INFO
spouse Denies nausea/vomit :  spouse Denies difficulty chewing /swallow :  spouse Denies diarrhea/constipation:  Last BM : 7/28 as per flow sheet  NKFA  Ht: 5'7.5"  Ht taken from spouse  Dosing ht: 170.2cm  Dosing wt: 92kg  BMI: 31.2  BMI calculated using wt from dosing  BMI calculated using ht from spouse  Education Provided : spouse was educated on the importance of supplements to increase calorie and protein intake in light of RD's nutritional findings.   pressure injury: none

## 2021-08-04 NOTE — DIETITIAN INITIAL EVALUATION ADULT. - ORAL INTAKE PTA/DIET HISTORY
spoke with spouse who could not provide details of what pt was consuming PTA, she did say that he generally eats 2 meals daily at no specific times. breakfast may consist of bagel, cereal or oatmeal.

## 2021-08-04 NOTE — PROGRESS NOTE ADULT - ASSESSMENT
covid       had several episodes of desat yesterday and looks SOB       continue remdesivir and decadron and jesusita   completed 5 days of remdesivir not sure there is a benefit of going longer but it is allowed   no other good options   sp toci

## 2021-08-04 NOTE — PROGRESS NOTE ADULT - SUBJECTIVE AND OBJECTIVE BOX
DATE OF SERVICE: 08-04-21 @ 13:12  CHIEF COMPLAINT:Patient is a 55y old  Male who presents with a chief complaint of covid (04 Aug 2021 08:56)    	        PAST MEDICAL & SURGICAL HISTORY:  Hyperlipidemia    HTN (hypertension)    Rupture, tendon, quadriceps    History of Achilles tendon repair            REVIEW OF SYSTEMS:  weak  breathing same  no cp  no fever  no vomiting      Medications:  MEDICATIONS  (STANDING):  dexAMETHasone  Injectable 6 milliGRAM(s) IV Push daily  dextrose 5% + sodium chloride 0.9% with potassium chloride 20 mEq/L 1000 milliLiter(s) (50 mL/Hr) IV Continuous <Continuous>  enoxaparin Injectable 40 milliGRAM(s) SubCutaneous two times a day  guaiFENesin ER 1200 milliGRAM(s) Oral every 12 hours  melatonin 5 milliGRAM(s) Oral at bedtime  pantoprazole    Tablet 40 milliGRAM(s) Oral before breakfast    MEDICATIONS  (PRN):    	    PHYSICAL EXAM:  T(C): 36.6 (08-04-21 @ 12:21), Max: 36.9 (08-03-21 @ 21:33)  HR: 91 (08-04-21 @ 12:38) (68 - 91)  BP: 108/72 (08-04-21 @ 12:21) (98/62 - 113/73)  RR: 26 (08-04-21 @ 12:38) (20 - 26)  SpO2: 86% (08-04-21 @ 12:38) (86% - 95%)  Wt(kg): --  I&O's Summary    03 Aug 2021 07:01  -  04 Aug 2021 07:00  --------------------------------------------------------  IN: 0 mL / OUT: 675 mL / NET: -675 mL        Appearance: Normal	  HEENT:   Normal oral mucosa, PERRL, EOMI	  Lymphatic: No lymphadenopathy  Cardiovascular: Normal S1 S2, No JVD, No murmurs, No edema  Respiratory:dec bs   Psychiatry: A & O  Gastrointestinal:  Soft, Non-tender, + BS	  Skin: No rashes, No ecchymoses, No cyanosis	  Neurologic: Non-focal  Extremities: Normal range of motion, No clubbing, cyanosis or edema      TELEMETRY: 	    ECG:  	  RADIOLOGY:  OTHER: 	  	  LABS:	 	    CARDIAC MARKERS:                                14.8   7.66  )-----------( 284      ( 04 Aug 2021 06:39 )             46.5     08-04    136  |  105  |  20  ----------------------------<  86  4.4   |  20<L>  |  0.88    Ca    8.6      04 Aug 2021 06:39  Phos  2.8     08-03  Mg     2.2     08-03    TPro  6.2  /  Alb  3.2<L>  /  TBili  0.5  /  DBili  x   /  AST  65<H>  /  ALT  75<H>  /  AlkPhos  53  08-04    proBNP:   Lipid Profile:   HgA1c:   TSH:

## 2021-08-04 NOTE — PROGRESS NOTE ADULT - ASSESSMENT
pt w/ covid  hypoxia  iv steroids   remdesivir  id / f/u noted  pulm to f/u  c/w bipap/h/f o2 as able  c/e resp support    hypokalemia improved  supp k+ prn  additional tx as per id recs  s/p toci  gi / dvt proph  o2  hx htn/ pt noncompliant  monitor bp  monitor inflammatory markers  prognosis guarded

## 2021-08-04 NOTE — PROGRESS NOTE ADULT - PROBLEM SELECTOR PLAN 2
2nd to COVID PNA  -Only tolerated minimal time off bipap today   -C/w Bipap 10/5/100% continuous for now, can attempt HFNC 60L/100% if sats maintaining high 90s on bipap  -Keep sats >90% with O2 as able  -Incentive spirometer  -Prognosis guarded

## 2021-08-04 NOTE — CHART NOTE - NSCHARTNOTEFT_GEN_A_CORE
CC: Post RRT    Notified by RN that RRT was called for hypoxia.  Patient was eating dinner on Hi-flow and desaturated to the 70's and RRT was subsequently called.  Patient placed back on Bipap, proned and now with an O2 saturation of ~92%.  Please refer to RRT note for further details.  Will hold off on PO medications overnight.  Spoke with patient and wife, Sayra, in detail regarding GOC and they agree on intubation if it was necessary.  Dr. Higginbotham updated.  Will continue to monitor patient closely.  Will endorse to AM team, Attending to follow.    Jerome Yuen PA-C  Dept. of Medicine  #[id number]

## 2021-08-04 NOTE — DIETITIAN INITIAL EVALUATION ADULT. - PERTINENT MEDS FT
MEDICATIONS  (STANDING):  dexAMETHasone  Injectable 6 milliGRAM(s) IV Push daily  dextrose 5% + sodium chloride 0.9% with potassium chloride 20 mEq/L 1000 milliLiter(s) (50 mL/Hr) IV Continuous <Continuous>  guaiFENesin ER 1200 milliGRAM(s) Oral every 12 hours  melatonin 5 milliGRAM(s) Oral at bedtime  pantoprazole    Tablet 40 milliGRAM(s) Oral before breakfast    MEDICATIONS  (PRN):

## 2021-08-04 NOTE — DIETITIAN INITIAL EVALUATION ADULT. - ENERGY INTAKE
varies-50% of breakfast today/Fair (>50%) intake varies, spouse reports pt did not eat anything yesterday due to difficulty breathing. today he was able to consume some breakfast.

## 2021-08-04 NOTE — PROGRESS NOTE ADULT - SUBJECTIVE AND OBJECTIVE BOX
Follow-up Pulm Progress Note    Only able to tolerate about 45 min off bipap this AM then desaturated to low 80s  Currently in prone position on bipap 10/5/100% with sats 93/94%, MV ~18  minimal sputum production today  Denies CP/pleuritic CP       Medications:  MEDICATIONS  (STANDING):  dexAMETHasone  Injectable 6 milliGRAM(s) IV Push daily  dextrose 5% + sodium chloride 0.9% with potassium chloride 20 mEq/L 1000 milliLiter(s) (50 mL/Hr) IV Continuous <Continuous>  enoxaparin Injectable 40 milliGRAM(s) SubCutaneous two times a day  guaiFENesin ER 1200 milliGRAM(s) Oral every 12 hours  melatonin 5 milliGRAM(s) Oral at bedtime  pantoprazole    Tablet 40 milliGRAM(s) Oral before breakfast        Vital Signs Last 24 Hrs  T(C): 36.6 (04 Aug 2021 12:21), Max: 36.9 (03 Aug 2021 21:33)  T(F): 97.8 (04 Aug 2021 12:21), Max: 98.4 (03 Aug 2021 21:33)  HR: 91 (04 Aug 2021 12:38) (68 - 91)  BP: 108/72 (04 Aug 2021 12:21) (98/62 - 113/73)  BP(mean): --  RR: 26 (04 Aug 2021 12:38) (20 - 26)  SpO2: 86% (04 Aug 2021 12:38) (86% - 95%)          08-03 @ 07:01  -  08-04 @ 07:00  --------------------------------------------------------  IN: 0 mL / OUT: 675 mL / NET: -675 mL          LABS:                        14.8   7.66  )-----------( 284      ( 04 Aug 2021 06:39 )             46.5     08-04    136  |  105  |  20  ----------------------------<  86  4.4   |  20<L>  |  0.88    Ca    8.6      04 Aug 2021 06:39  Phos  2.8     08-03  Mg     2.2     08-03    TPro  6.2  /  Alb  3.2<L>  /  TBili  0.5  /  DBili  x   /  AST  65<H>  /  ALT  75<H>  /  AlkPhos  53  08-04          CAPILLARY BLOOD GLUCOSE      POCT Blood Glucose.: 85 mg/dL (03 Aug 2021 02:00)    PT/INR - ( 04 Aug 2021 06:39 )   PT: 15.2 sec;   INR: 1.28 ratio         PTT - ( 03 Aug 2021 02:16 )  PTT:25.8 sec    Procalcitonin, Serum: 0.05 ng/mL (08-04-21 @ 06:39)            Physical Examination:  PULM: Coarse bilaterally   CVS: S1, S2 heard      RADIOLOGY REVIEWED  CXR 8/3: b/l patchy opacities grossly unchanged from prior

## 2021-08-04 NOTE — DIETITIAN INITIAL EVALUATION ADULT. - PERTINENT LABORATORY DATA
08-04 @ 06:39: Na 136, BUN 20, Cr 0.88, BG 86, K+ 4.4, Phos --, Mg --, Alk Phos 53, ALT/SGPT 75<H>, AST/SGOT 65<H>, HbA1c --

## 2021-08-04 NOTE — DIETITIAN INITIAL EVALUATION ADULT. - ORAL NUTRITION SUPPLEMENTS
spouse prefers supplements that at not dairy based. RD discontinued mighty shake. recommend ensure clear x 2 daily.

## 2021-08-05 LAB
ALBUMIN SERPL ELPH-MCNC: 3 G/DL — LOW (ref 3.3–5)
ALP SERPL-CCNC: 68 U/L — SIGNIFICANT CHANGE UP (ref 40–120)
ALT FLD-CCNC: 58 U/L — HIGH (ref 10–45)
AST SERPL-CCNC: 53 U/L — HIGH (ref 10–40)
BILIRUB DIRECT SERPL-MCNC: 0.1 MG/DL — SIGNIFICANT CHANGE UP (ref 0–0.2)
BILIRUB INDIRECT FLD-MCNC: 0.4 MG/DL — SIGNIFICANT CHANGE UP (ref 0.2–1)
BILIRUB SERPL-MCNC: 0.5 MG/DL — SIGNIFICANT CHANGE UP (ref 0.2–1.2)
CREAT SERPL-MCNC: 0.82 MG/DL — SIGNIFICANT CHANGE UP (ref 0.5–1.3)
INR BLD: 1.36 RATIO — HIGH (ref 0.88–1.16)
PROT SERPL-MCNC: 6.2 G/DL — SIGNIFICANT CHANGE UP (ref 6–8.3)
PROTHROM AB SERPL-ACNC: 16.1 SEC — HIGH (ref 10.6–13.6)

## 2021-08-05 PROCEDURE — 99221 1ST HOSP IP/OBS SF/LOW 40: CPT | Mod: GC

## 2021-08-05 PROCEDURE — 99232 SBSQ HOSP IP/OBS MODERATE 35: CPT

## 2021-08-05 RX ADMIN — ENOXAPARIN SODIUM 90 MILLIGRAM(S): 100 INJECTION SUBCUTANEOUS at 17:12

## 2021-08-05 RX ADMIN — Medication 6 MILLIGRAM(S): at 03:53

## 2021-08-05 RX ADMIN — DEXTROSE MONOHYDRATE, SODIUM CHLORIDE, AND POTASSIUM CHLORIDE 50 MILLILITER(S): 50; .745; 4.5 INJECTION, SOLUTION INTRAVENOUS at 05:14

## 2021-08-05 RX ADMIN — ENOXAPARIN SODIUM 90 MILLIGRAM(S): 100 INJECTION SUBCUTANEOUS at 05:13

## 2021-08-05 NOTE — PROGRESS NOTE ADULT - SUBJECTIVE AND OBJECTIVE BOX
infectious diseases progress note:    Patient is a 55y old  Male who presents with a chief complaint of COVID PNA (05 Aug 2021 06:21)        Infection due to severe acute respiratory syndrome coronavirus 2 (SARS-CoV-2)          RO     Allergies    No Known Allergies    Intolerances     Eyes:PORFIRIO, EOMI  Ear/Nose/Throat: no oral lesion, no sinus tenderness on percussion	  Neck:no JVD, no lymphadenopathy, supple  Respiratory: CTA geovany  Cardiovascular: S1S2 RRR, no murmurs  Gastrointestinal:soft, (+) BS, no HSM  Extremities:no e/e/c        LABS:                        14.8   7.66  )-----------( 284      ( 04 Aug 2021 06:39 )             46.5     08-05    x   |  x   |  x   ----------------------------<  x   x    |  x   |  0.82    Ca    8.6      04 Aug 2021 06:39    TPro  6.2  /  Alb  3.0<L>  /  TBili  0.5  /  DBili  0.1  /  AST  53<H>  /  ALT  58<H>  /  AlkPhos  68  08-05    PT/INR - ( 05 Aug 2021 06:15 )   PT: 16.1 sec;   INR: 1.36 ratio                 MICROBIOLOGY:    RECENT CULTURES:  08-04 @ 09:06 .Sputum Sputum, cup       Few polymorphonuclear leukocytes per low power field  Rare Squamous epithelial cells per low power field  Moderate Gram Positive Cocci in Pairs and Chains per oil power field  Rare Gram Negative Rods per oil power field           Normal Respiratory Tiki present          RESPIRATORY CULTURES:              RADIOLOGY & ADDITIONAL STUDIES:        Pager [phone]  After 5 pm/weekends or if no response :[phone]

## 2021-08-05 NOTE — PROGRESS NOTE ADULT - SUBJECTIVE AND OBJECTIVE BOX
Follow-up Pulm Progress Note    RRT yesterday evening for hypoxia   Currently on bipap 12/7/100% with sats 94%     Medications:  MEDICATIONS  (STANDING):  dexAMETHasone  Injectable 6 milliGRAM(s) IV Push daily  dextrose 5% + sodium chloride 0.9% with potassium chloride 20 mEq/L 1000 milliLiter(s) (50 mL/Hr) IV Continuous <Continuous>  enoxaparin Injectable 90 milliGRAM(s) SubCutaneous every 12 hours  guaiFENesin ER 1200 milliGRAM(s) Oral every 12 hours  melatonin 5 milliGRAM(s) Oral at bedtime  pantoprazole    Tablet 40 milliGRAM(s) Oral before breakfast      Vital Signs Last 24 Hrs  T(C): 36.4 (05 Aug 2021 08:53), Max: 37 (04 Aug 2021 21:13)  T(F): 97.6 (05 Aug 2021 08:53), Max: 98.6 (04 Aug 2021 21:13)  HR: 67 (05 Aug 2021 08:53) (62 - 91)  BP: 99/67 (05 Aug 2021 08:53) (99/67 - 108/72)  BP(mean): --  RR: 23 (05 Aug 2021 08:53) (20 - 29)  SpO2: 97% (05 Aug 2021 08:53) (73% - 98%)          08-04 @ 07:01  -  08-05 @ 07:00  --------------------------------------------------------  IN: 180 mL / OUT: 750 mL / NET: -570 mL          LABS:                        14.8   7.66  )-----------( 284      ( 04 Aug 2021 06:39 )             46.5     08-05    x   |  x   |  x   ----------------------------<  x   x    |  x   |  0.82    Ca    8.6      04 Aug 2021 06:39    TPro  6.2  /  Alb  3.0<L>  /  TBili  0.5  /  DBili  0.1  /  AST  53<H>  /  ALT  58<H>  /  AlkPhos  68  08-05          CAPILLARY BLOOD GLUCOSE      POCT Blood Glucose.: 94 mg/dL (04 Aug 2021 19:10)    PT/INR - ( 05 Aug 2021 06:15 )   PT: 16.1 sec;   INR: 1.36 ratio             Procalcitonin, Serum: 0.05 ng/mL (08-04-21 @ 06:39)          Physical Examination:  PULM: Coarse bilaterally   CVS: S1, S2 heard      RADIOLOGY REVIEWED  CXR 8/3: b/l patchy opacities grossly unchanged from prior

## 2021-08-05 NOTE — PROGRESS NOTE ADULT - SUBJECTIVE AND OBJECTIVE BOX
DATE OF SERVICE: 08-05-21 @ 11:54  CHIEF COMPLAINT:Patient is a 55y old  Male who presents with a chief complaint of sob (05 Aug 2021 09:02)    	        PAST MEDICAL & SURGICAL HISTORY:  Hyperlipidemia    HTN (hypertension)    Rupture, tendon, quadriceps    History of Achilles tendon repair            REVIEW OF SYSTEMS:  weak    RESPIRATORY:sob  CARDIOVASCULAR: No chest pain, palpitations,  GASTROINTESTINAL: No abdominal or epigastric pain. No nausea, vomiting, or hematemesis;   GENITOURINARY: No dysuria, frequency, hematuria,   NEUROLOGICAL: No headaches,     Medications:  MEDICATIONS  (STANDING):  dexAMETHasone  Injectable 6 milliGRAM(s) IV Push daily  dextrose 5% + sodium chloride 0.9% with potassium chloride 20 mEq/L 1000 milliLiter(s) (50 mL/Hr) IV Continuous <Continuous>  enoxaparin Injectable 90 milliGRAM(s) SubCutaneous every 12 hours  guaiFENesin ER 1200 milliGRAM(s) Oral every 12 hours  melatonin 5 milliGRAM(s) Oral at bedtime  pantoprazole    Tablet 40 milliGRAM(s) Oral before breakfast    MEDICATIONS  (PRN):    	    PHYSICAL EXAM:  T(C): 36.4 (08-05-21 @ 08:53), Max: 37 (08-04-21 @ 21:13)  HR: 67 (08-05-21 @ 08:53) (62 - 91)  BP: 99/67 (08-05-21 @ 08:53) (99/67 - 108/72)  RR: 23 (08-05-21 @ 08:53) (20 - 29)  SpO2: 97% (08-05-21 @ 08:53) (73% - 98%)  Wt(kg): --  I&O's Summary    04 Aug 2021 07:01  -  05 Aug 2021 07:00  --------------------------------------------------------  IN: 180 mL / OUT: 750 mL / NET: -570 mL        Appearance: Normal	  HEENT:   Normal oral mucosa, PERRL, EOMI	  Lymphatic: No lymphadenopathy  Cardiovascular: Normal S1 S2, No JVD, No murmurs, No edema  Respiratory:dec bs   Psychiatry: A & O  Gastrointestinal:  Soft, Non-tender, + BS	  Skin: No rashes, No ecchymoses, No cyanosis	  Neurologic: Non-focal  Extremities: Normal range of motion, No clubbing, cyanosis or edema  Vascular: Peripheral pulses palpable 2+ bilaterally    TELEMETRY: 	    ECG:  	  RADIOLOGY:  OTHER: 	  	  LABS:	 	    CARDIAC MARKERS:                                14.8   7.66  )-----------( 284      ( 04 Aug 2021 06:39 )             46.5     08-05    x   |  x   |  x   ----------------------------<  x   x    |  x   |  0.82    Ca    8.6      04 Aug 2021 06:39    TPro  6.2  /  Alb  3.0<L>  /  TBili  0.5  /  DBili  0.1  /  AST  53<H>  /  ALT  58<H>  /  AlkPhos  68  08-05    proBNP:   Lipid Profile:   HgA1c:   TSH:

## 2021-08-05 NOTE — CONSULT NOTE ADULT - ASSESSMENT
54 y/o male with pmh of DVT/PE s/p achilles tendon repair who presented to the ED for SOB, intermittent fevers, and productive cough who tested positive for COVID on 7/27/21 found to be in acute hypoxic respiratory failure, initially requiring HFNC and now transitioned to BIPAP, MICU consulted in setting of pt having worsening respiratory status with increased work of breathing. S/p RRT on 8/4/21 for transient hypoxia while pt was on BIPAP and attempting to eat.    # Acute Hypoxic Respiratory Failure in the setting of COVID PNA  COVID + on 7/29/21  CXR demonstrated increased bilateral opacities from 7/30 to 8/3. Euvolemic on exam.  Currently pt does not appear lethargic, is communicative on exam, and at bedside pulse ox of 98%  S/p tocilizumab on 7/30 per ID   Completed 5 day course of remdesivir  Continue dexamethasone 6mg IVPq6 Day 8/10  Trial HFNC while pt eats, while off HFNC can be on BIPAP  If pt is unsuccessful on HFNC while eating and desat's will likely require intubation  Pt will speak with his family today requiring whether he would be interested in intubation, currently pt is leaning towards it  Continue to prone pt as necessary  Continue to trend inflammatory markers    Thank you for the consult, please re-consult as necessary.    Concepcion Christensen MD, PGY2  Internal Medicine     56 y/o male with pmh of DVT/PE s/p achilles tendon repair who presented to the ED for SOB, intermittent fevers, and productive cough who tested positive for COVID on 7/27/21 found to be in acute hypoxic respiratory failure, initially requiring HFNC and now transitioned to BIPAP, MICU consulted in setting of pt having worsening respiratory status with increased work of breathing. S/p RRT on 8/4/21 for transient hypoxia while pt was on BIPAP and attempting to eat.    # Acute Hypoxic Respiratory Failure in the setting of COVID PNA  COVID + on 7/29/21  CXR demonstrated increased bilateral opacities from 7/30 to 8/3. Euvolemic on exam.  Currently pt does not appear lethargic, is communicative on exam, and at bedside pulse ox of 98%  S/p tocilizumab on 7/30 per ID   Completed 5 day course of remdesivir  Continue dexamethasone 6mg IVPq6 Day 8/10  Trial HFNC while pt eats, while off HFNC can be on BIPAP  If pt is unsuccessful on HFNC while eating and desat's will likely require intubation  Pt will speak with his family today requiring whether he would be interested in intubation, currently pt is leaning towards it  Continue to prone pt as necessary  Continue to trend inflammatory markers    Pt not a candidate for the MICU currently, thank you for the consult, please re-consult as necessary.    Concepcion Christensen MD, PGY2  Internal Medicine

## 2021-08-05 NOTE — PROGRESS NOTE ADULT - ASSESSMENT
pt w/ covid  hypoxia  iv steroids   remdesivir  id / f/u noted  pulm to f/u  c/w bipap/h/f o2 as able  c/e resp support/intubation strong possibility   apprec micu eval  additional tx as per id/pulm / micu  recs  s/p toci  gi / dvt proph  o2  hx htn/   monitor bp  monitor inflammatory markers  prognosis guarded

## 2021-08-05 NOTE — PROGRESS NOTE ADULT - ASSESSMENT
covid       had several episodes of desat yesterday and looks SOB       continue remdesivir and decadron and proning   completed 5 days of remdesivir  i dont think there is a benefit  there is a benefit of going longer    no other good options   sp toci   [id number] situation  continue bicap

## 2021-08-05 NOTE — CONSULT NOTE ADULT - SUBJECTIVE AND OBJECTIVE BOX
CHIEF COMPLAINT: SOB  REVIEW OF SYSTEMS:    CONSTITUTIONAL: No weakness, fevers or chills  EYES/ENT: No visual changes;  No vertigo or throat pain   NECK: No pain or stiffness  RESPIRATORY: No cough, wheezing, hemoptysis; No shortness of breath  CARDIOVASCULAR: No chest pain or palpitations  GASTROINTESTINAL: No abdominal or epigastric pain. No nausea, vomiting, or hematemesis; No diarrhea or constipation. No melena or hematochezia.  GENITOURINARY: No dysuria, frequency or hematuria  NEUROLOGICAL: No numbness or weakness  SKIN: No itching, rashes    HPI:    54 y/o male with pmh of DVT/PE s/p achilles tendon repair who presented to the ED for SOB, intermittent fevers, and productive cough who tested positive for COVID on 7/27/21. Pt noted to have been unvaccinated. Chest xray demonstrated bilateral patchy infiltrates on 7/30 with progression on 8/3. Pt completed a 5 day course of Remdesivir and is currently on dexamethasone Day 8/10. RRT called on 8/3/21 for hypoxia to 70s while pt removed their HFNC mask, however improved with proning. RRT on 8/4/21 called for hypoxia while pt was off BIPAP, with improvment once placed on HFNC and BIPAP. MICU consulted in the setting of pt appearing lethargic while on BIPAP with increased respiratory effort.    RRT was called for hypoxia to 70's patient removed HFNC mask and desatted immediately after.    PAST MEDICAL & SURGICAL HISTORY:    DVT/PE s/p achilles tendon repair    FAMILY HISTORY:  No pertinent family history in first degree relatives    SOCIAL HISTORY:  Smoking: None  EtOH Use: Unknown  Marital Status:    Advance Directives: Full Code    Allergies    No Known Allergies    Intolerances    HOME MEDICATIONS:    Albuterol inhaler    REVIEW OF SYSTEMS:    REVIEW OF SYSTEMS:    CONSTITUTIONAL: No weakness, fevers or chills  EYES/ENT: No visual changes;  No vertigo or throat pain   NECK: No pain or stiffness  RESPIRATORY: SOB, no wheezing, hemoptysis.  CARDIOVASCULAR: No chest pain or palpitations  GASTROINTESTINAL: No abdominal or epigastric pain. No nausea, vomiting, or hematemesis; No diarrhea or constipation. No melena or hematochezia.  GENITOURINARY: No dysuria, frequency or hematuria  NEUROLOGICAL: No numbness or weakness  SKIN: No itching, rashes    OBJECTIVE:  ICU Vital Signs Last 24 Hrs  T(C): 36.8 (05 Aug 2021 04:55), Max: 37 (04 Aug 2021 21:13)  T(F): 98.3 (05 Aug 2021 04:55), Max: 98.6 (04 Aug 2021 21:13)  HR: 89 (05 Aug 2021 05:41) (68 - 91)  BP: 108/70 (05 Aug 2021 04:55) (101/66 - 113/73)  BP(mean): --  ABP: --  ABP(mean): --  RR: 29 (05 Aug 2021 05:41) (20 - 29)  SpO2: 96% (05 Aug 2021 05:41) (73% - 98%)        08-03 @ 07:01  -  08-04 @ 07:00  --------------------------------------------------------  IN: 0 mL / OUT: 675 mL / NET: -675 mL    08-04 @ 07:01  -  08-05 @ 06:23  --------------------------------------------------------  IN: 180 mL / OUT: 750 mL / NET: -570 mL      CAPILLARY BLOOD GLUCOSE      POCT Blood Glucose.: 94 mg/dL (04 Aug 2021 19:10)      PHYSICAL EXAM:  T(C): 36.8 (08-05-21 @ 04:55), Max: 37 (08-04-21 @ 21:13)  HR: 89 (08-05-21 @ 05:41) (68 - 91)  BP: 108/70 (08-05-21 @ 04:55) (101/66 - 113/73)  RR: 29 (08-05-21 @ 05:41) (20 - 29)  SpO2: 96% (08-05-21 @ 05:41) (73% - 98%)  GENERAL: NAD, well-developed, pt using some respiratory muscles to assist with breathing, no apparent intercostal retractions  HEAD:  Atraumatic, Normocephalic  EYES: EOMI, PERRLA, conjunctiva and sclera clear  NECK: Supple, No JVD  CHEST/LUNG: Decreased air entry bilaterally in lung bases, no crackles noted  HEART: Regular rate and rhythm; No murmurs, rubs, or gallops  ABDOMEN: Soft, Nontender, Nondistended; Bowel sounds present  EXTREMITIES:  2+ Peripheral Pulses, No clubbing, cyanosis, or edema  PSYCH: AAOx4  NEUROLOGY: non-focal  SKIN: No rashes or lesions  Psych: Not depressed or anxious    HOSPITAL MEDICATIONS:  MEDICATIONS  (STANDING):  dexAMETHasone  Injectable 6 milliGRAM(s) IV Push daily  dextrose 5% + sodium chloride 0.9% with potassium chloride 20 mEq/L 1000 milliLiter(s) (50 mL/Hr) IV Continuous <Continuous>  enoxaparin Injectable 90 milliGRAM(s) SubCutaneous every 12 hours  guaiFENesin ER 1200 milliGRAM(s) Oral every 12 hours  melatonin 5 milliGRAM(s) Oral at bedtime  pantoprazole    Tablet 40 milliGRAM(s) Oral before breakfast    MEDICATIONS  (PRN):      LABS:                        14.8   7.66  )-----------( 284      ( 04 Aug 2021 06:39 )             46.5     08-04    136  |  105  |  20  ----------------------------<  86  4.4   |  20<L>  |  0.88    Ca    8.6      04 Aug 2021 06:39    TPro  6.2  /  Alb  3.2<L>  /  TBili  0.5  /  DBili  x   /  AST  65<H>  /  ALT  75<H>  /  AlkPhos  53  08-04    PT/INR - ( 04 Aug 2021 06:39 )   PT: 15.2 sec;   INR: 1.28 ratio      < from: Xray Chest 1 View- PORTABLE-Urgent (Xray Chest 1 View- PORTABLE-Urgent .) (07.30.21 @ 11:15) >  IMPRESSION:  The cardiomediastinal silhouette is within normal limits.  Left lower lung opacities not significantly changed - consistent with Covid 19 pneumonia.  Progression of mid and lower right lung opacities - consistent with Covid 19 pneumonia.  No pleural effusion or pneumothorax.  Bony structures are intact.    < end of copied text >      < from: Xray Chest 1 View- PORTABLE-Urgent (Xray Chest 1 View- PORTABLE-Urgent .) (08.03.21 @ 13:04) >  IMPRESSION:  Progression of infiltrates.    < end of copied text         EKG reviewed by myself:    Normal sinus rhythm, HR 84, no ST or T wave abnormalities CHIEF COMPLAINT: SOB    HPI:    56 y/o male with pmh of DVT/PE s/p achilles tendon repair who presented to the ED for SOB, intermittent fevers, and productive cough who tested positive for COVID on 7/27/21. Pt noted to have been unvaccinated. Chest xray demonstrated bilateral patchy infiltrates on 7/30 with progression on 8/3. Pt completed a 5 day course of Remdesivir and is currently on dexamethasone Day 8/10. RRT called on 8/3/21 for hypoxia to 70s while pt removed their HFNC mask, however improved with proning. RRT on 8/4/21 called for hypoxia while pt was off BIPAP, with improvment once placed on HFNC and BIPAP. MICU consulted in the setting of pt appearing lethargic while on BIPAP with increased respiratory effort.    RRT was called for hypoxia to 70's patient removed HFNC mask and desatted immediately after.    PAST MEDICAL & SURGICAL HISTORY:    DVT/PE s/p achilles tendon repair    FAMILY HISTORY:  No pertinent family history in first degree relatives    SOCIAL HISTORY:  Smoking: None  EtOH Use: Unknown  Marital Status:    Advance Directives: Full Code    Allergies    No Known Allergies    Intolerances    HOME MEDICATIONS:    Albuterol inhaler    REVIEW OF SYSTEMS:    CONSTITUTIONAL: No weakness, fevers or chills  EYES/ENT: No visual changes;  No vertigo or throat pain   NECK: No pain or stiffness  RESPIRATORY: SOB, no wheezing, hemoptysis.  CARDIOVASCULAR: No chest pain or palpitations  GASTROINTESTINAL: No abdominal or epigastric pain. No nausea, vomiting, or hematemesis; No diarrhea or constipation. No melena or hematochezia.  GENITOURINARY: No dysuria, frequency or hematuria  NEUROLOGICAL: No numbness or weakness  SKIN: No itching, rashes    OBJECTIVE:  ICU Vital Signs Last 24 Hrs  T(C): 36.8 (05 Aug 2021 04:55), Max: 37 (04 Aug 2021 21:13)  T(F): 98.3 (05 Aug 2021 04:55), Max: 98.6 (04 Aug 2021 21:13)  HR: 89 (05 Aug 2021 05:41) (68 - 91)  BP: 108/70 (05 Aug 2021 04:55) (101/66 - 113/73)  BP(mean): --  ABP: --  ABP(mean): --  RR: 29 (05 Aug 2021 05:41) (20 - 29)  SpO2: 96% (05 Aug 2021 05:41) (73% - 98%)        08-03 @ 07:01  -  08-04 @ 07:00  --------------------------------------------------------  IN: 0 mL / OUT: 675 mL / NET: -675 mL    08-04 @ 07:01  -  08-05 @ 06:23  --------------------------------------------------------  IN: 180 mL / OUT: 750 mL / NET: -570 mL      CAPILLARY BLOOD GLUCOSE      POCT Blood Glucose.: 94 mg/dL (04 Aug 2021 19:10)      PHYSICAL EXAM:  T(C): 36.8 (08-05-21 @ 04:55), Max: 37 (08-04-21 @ 21:13)  HR: 89 (08-05-21 @ 05:41) (68 - 91)  BP: 108/70 (08-05-21 @ 04:55) (101/66 - 113/73)  RR: 29 (08-05-21 @ 05:41) (20 - 29)  SpO2: 96% (08-05-21 @ 05:41) (73% - 98%)  GENERAL: NAD, well-developed, pt using some respiratory muscles to assist with breathing, no apparent intercostal retractions  HEAD:  Atraumatic, Normocephalic  EYES: EOMI, PERRLA, conjunctiva and sclera clear  NECK: Supple, No JVD  CHEST/LUNG: Decreased air entry bilaterally in lung bases, no crackles noted  HEART: Regular rate and rhythm; No murmurs, rubs, or gallops  ABDOMEN: Soft, Nontender, Nondistended; Bowel sounds present  EXTREMITIES:  2+ Peripheral Pulses, No clubbing, cyanosis, or edema  PSYCH: AAOx4  NEUROLOGY: non-focal  SKIN: No rashes or lesions  Psych: Not depressed or anxious    HOSPITAL MEDICATIONS:  MEDICATIONS  (STANDING):  dexAMETHasone  Injectable 6 milliGRAM(s) IV Push daily  dextrose 5% + sodium chloride 0.9% with potassium chloride 20 mEq/L 1000 milliLiter(s) (50 mL/Hr) IV Continuous <Continuous>  enoxaparin Injectable 90 milliGRAM(s) SubCutaneous every 12 hours  guaiFENesin ER 1200 milliGRAM(s) Oral every 12 hours  melatonin 5 milliGRAM(s) Oral at bedtime  pantoprazole    Tablet 40 milliGRAM(s) Oral before breakfast    MEDICATIONS  (PRN):      LABS:                        14.8   7.66  )-----------( 284      ( 04 Aug 2021 06:39 )             46.5     08-04    136  |  105  |  20  ----------------------------<  86  4.4   |  20<L>  |  0.88    Ca    8.6      04 Aug 2021 06:39    TPro  6.2  /  Alb  3.2<L>  /  TBili  0.5  /  DBili  x   /  AST  65<H>  /  ALT  75<H>  /  AlkPhos  53  08-04    PT/INR - ( 04 Aug 2021 06:39 )   PT: 15.2 sec;   INR: 1.28 ratio      < from: Xray Chest 1 View- PORTABLE-Urgent (Xray Chest 1 View- PORTABLE-Urgent .) (07.30.21 @ 11:15) >  IMPRESSION:  The cardiomediastinal silhouette is within normal limits.  Left lower lung opacities not significantly changed - consistent with Covid 19 pneumonia.  Progression of mid and lower right lung opacities - consistent with Covid 19 pneumonia.  No pleural effusion or pneumothorax.  Bony structures are intact.    < end of copied text >      < from: Xray Chest 1 View- PORTABLE-Urgent (Xray Chest 1 View- PORTABLE-Urgent .) (08.03.21 @ 13:04) >  IMPRESSION:  Progression of infiltrates.    < end of copied text         EKG reviewed by myself:    Normal sinus rhythm, HR 84, no ST or T wave abnormalities CHIEF COMPLAINT: SOB    HPI:    56 y/o male with pmh of DVT/PE s/p achilles tendon repair who presented to the ED for SOB, intermittent fevers, and productive cough who tested positive for COVID on 7/27/21. Pt noted to have been unvaccinated. Chest xray demonstrated bilateral patchy infiltrates on 7/30 with progression on 8/3. Pt completed a 5 day course of Remdesivir and is currently on dexamethasone Day 8/10. RRT called on 8/3/21 for hypoxia to 70s while pt removed their HFNC mask, however improved with proning. RRT on 8/4/21 called for hypoxia while pt was off BIPAP, with improvment once placed on HFNC and BIPAP. MICU consulted in the setting of pt appearing lethargic while on BIPAP with increased respiratory effort.     PAST MEDICAL & SURGICAL HISTORY:    DVT/PE s/p achilles tendon repair    FAMILY HISTORY:  No pertinent family history in first degree relatives    SOCIAL HISTORY:  Smoking: None  EtOH Use: Unknown  Marital Status:    Advance Directives: Full Code    Allergies    No Known Allergies    Intolerances    HOME MEDICATIONS:    Albuterol inhaler    REVIEW OF SYSTEMS:    CONSTITUTIONAL: No weakness, fevers or chills  EYES/ENT: No visual changes;  No vertigo or throat pain   NECK: No pain or stiffness  RESPIRATORY: SOB, no wheezing, hemoptysis.  CARDIOVASCULAR: No chest pain or palpitations  GASTROINTESTINAL: No abdominal or epigastric pain. No nausea, vomiting, or hematemesis; No diarrhea or constipation. No melena or hematochezia.  GENITOURINARY: No dysuria, frequency or hematuria  NEUROLOGICAL: No numbness or weakness  SKIN: No itching, rashes    OBJECTIVE:  ICU Vital Signs Last 24 Hrs  T(C): 36.8 (05 Aug 2021 04:55), Max: 37 (04 Aug 2021 21:13)  T(F): 98.3 (05 Aug 2021 04:55), Max: 98.6 (04 Aug 2021 21:13)  HR: 89 (05 Aug 2021 05:41) (68 - 91)  BP: 108/70 (05 Aug 2021 04:55) (101/66 - 113/73)  BP(mean): --  ABP: --  ABP(mean): --  RR: 29 (05 Aug 2021 05:41) (20 - 29)  SpO2: 96% (05 Aug 2021 05:41) (73% - 98%)        08-03 @ 07:01  -  08-04 @ 07:00  --------------------------------------------------------  IN: 0 mL / OUT: 675 mL / NET: -675 mL    08-04 @ 07:01  -  08-05 @ 06:23  --------------------------------------------------------  IN: 180 mL / OUT: 750 mL / NET: -570 mL      CAPILLARY BLOOD GLUCOSE      POCT Blood Glucose.: 94 mg/dL (04 Aug 2021 19:10)      PHYSICAL EXAM:  T(C): 36.8 (08-05-21 @ 04:55), Max: 37 (08-04-21 @ 21:13)  HR: 89 (08-05-21 @ 05:41) (68 - 91)  BP: 108/70 (08-05-21 @ 04:55) (101/66 - 113/73)  RR: 29 (08-05-21 @ 05:41) (20 - 29)  SpO2: 96% (08-05-21 @ 05:41) (73% - 98%)  GENERAL: NAD, well-developed, pt using some respiratory muscles to assist with breathing, no apparent intercostal retractions  HEAD:  Atraumatic, Normocephalic  EYES: EOMI, PERRLA, conjunctiva and sclera clear  NECK: Supple, No JVD  CHEST/LUNG: Decreased air entry bilaterally in lung bases, no crackles noted  HEART: Regular rate and rhythm; No murmurs, rubs, or gallops  ABDOMEN: Soft, Nontender, Nondistended; Bowel sounds present  EXTREMITIES:  2+ Peripheral Pulses, No clubbing, cyanosis, or edema  PSYCH: AAOx4  NEUROLOGY: non-focal  SKIN: No rashes or lesions  Psych: Not depressed or anxious    HOSPITAL MEDICATIONS:  MEDICATIONS  (STANDING):  dexAMETHasone  Injectable 6 milliGRAM(s) IV Push daily  dextrose 5% + sodium chloride 0.9% with potassium chloride 20 mEq/L 1000 milliLiter(s) (50 mL/Hr) IV Continuous <Continuous>  enoxaparin Injectable 90 milliGRAM(s) SubCutaneous every 12 hours  guaiFENesin ER 1200 milliGRAM(s) Oral every 12 hours  melatonin 5 milliGRAM(s) Oral at bedtime  pantoprazole    Tablet 40 milliGRAM(s) Oral before breakfast    MEDICATIONS  (PRN):      LABS:                        14.8   7.66  )-----------( 284      ( 04 Aug 2021 06:39 )             46.5     08-04    136  |  105  |  20  ----------------------------<  86  4.4   |  20<L>  |  0.88    Ca    8.6      04 Aug 2021 06:39    TPro  6.2  /  Alb  3.2<L>  /  TBili  0.5  /  DBili  x   /  AST  65<H>  /  ALT  75<H>  /  AlkPhos  53  08-04    PT/INR - ( 04 Aug 2021 06:39 )   PT: 15.2 sec;   INR: 1.28 ratio      < from: Xray Chest 1 View- PORTABLE-Urgent (Xray Chest 1 View- PORTABLE-Urgent .) (07.30.21 @ 11:15) >  IMPRESSION:  The cardiomediastinal silhouette is within normal limits.  Left lower lung opacities not significantly changed - consistent with Covid 19 pneumonia.  Progression of mid and lower right lung opacities - consistent with Covid 19 pneumonia.  No pleural effusion or pneumothorax.  Bony structures are intact.    < end of copied text >      < from: Xray Chest 1 View- PORTABLE-Urgent (Xray Chest 1 View- PORTABLE-Urgent .) (08.03.21 @ 13:04) >  IMPRESSION:  Progression of infiltrates.    < end of copied text         EKG reviewed by myself:    Normal sinus rhythm, HR 84, no ST or T wave abnormalities

## 2021-08-05 NOTE — PROGRESS NOTE ADULT - PROBLEM SELECTOR PLAN 1
+COVID PCR as an outpatient  -CXR 7/30 with increase in b/l opacities, CXR 8/3 grossly unchanged from prior   -S/p Remdesivir x 5 days   -Decadron 6mg IVP qd x 10 days  -Trend inflammatory markers  -S/p Tocilizumab 7/30 per ID for worsening hypoxic respiratory failure  -Self proning as tolerated  -Incentive spirometer  -Sputum culture with normal respiratory aba. WBC normal, PCT normal, afebrile   -Mucinex 1200mg PO BID   -ddimer increased from 411 to 1295. LE duplex 8/4 neg DVT. C/w empiric full dose AC with Lovenox 1mg/kg BID for now, if ddimer starts to downtrend will change to Lovenox 40mg BID.

## 2021-08-05 NOTE — PROGRESS NOTE ADULT - PROBLEM SELECTOR PLAN 2
2nd to COVID PNA  -S/p RRT 8/3 and 8/4 for hypoxia.   -Was unable to tolerate being off high flow yesterday evening.   -C/w Bipap 12/5/100% continuous for now, can attempt HFNC 60L/100% if sats maintaining mid high 90s on bipap. -Keep sats >90% with O2 as able  -If patient is unsuccessful on HFNC while eating and continues to desaturate, will likely require intubation. Discussed with pt at length today at bedside as well as his wife (Sayra) over the phone.   -Prognosis guarded

## 2021-08-06 LAB
ALBUMIN SERPL ELPH-MCNC: 3.3 G/DL — SIGNIFICANT CHANGE UP (ref 3.3–5)
ALP SERPL-CCNC: 74 U/L — SIGNIFICANT CHANGE UP (ref 40–120)
ALT FLD-CCNC: 51 U/L — HIGH (ref 10–45)
AST SERPL-CCNC: 44 U/L — HIGH (ref 10–40)
BILIRUB DIRECT SERPL-MCNC: 0.1 MG/DL — SIGNIFICANT CHANGE UP (ref 0–0.2)
BILIRUB INDIRECT FLD-MCNC: 0.5 MG/DL — SIGNIFICANT CHANGE UP (ref 0.2–1)
BILIRUB SERPL-MCNC: 0.6 MG/DL — SIGNIFICANT CHANGE UP (ref 0.2–1.2)
CREAT SERPL-MCNC: 0.85 MG/DL — SIGNIFICANT CHANGE UP (ref 0.5–1.3)
CULTURE RESULTS: SIGNIFICANT CHANGE UP
INR BLD: 1.32 RATIO — HIGH (ref 0.88–1.16)
PROT SERPL-MCNC: 6.5 G/DL — SIGNIFICANT CHANGE UP (ref 6–8.3)
PROTHROM AB SERPL-ACNC: 15.6 SEC — HIGH (ref 10.6–13.6)
SPECIMEN SOURCE: SIGNIFICANT CHANGE UP

## 2021-08-06 PROCEDURE — 99232 SBSQ HOSP IP/OBS MODERATE 35: CPT

## 2021-08-06 RX ADMIN — Medication 6 MILLIGRAM(S): at 05:29

## 2021-08-06 RX ADMIN — ENOXAPARIN SODIUM 90 MILLIGRAM(S): 100 INJECTION SUBCUTANEOUS at 16:59

## 2021-08-06 RX ADMIN — ENOXAPARIN SODIUM 90 MILLIGRAM(S): 100 INJECTION SUBCUTANEOUS at 05:29

## 2021-08-06 NOTE — PROGRESS NOTE ADULT - PROBLEM SELECTOR PLAN 2
2nd to COVID PNA  -S/p RRT 8/3 and 8/4 for hypoxia.   -Unable to tolerate being off high flow for the past 2 days  -Sats currently high 90s on bipap, can attempt HFNC 60L/100%.   -C/w Bipap 12/5/100%  -Keep sats >90% with O2 as able  -If patient is unsuccessful on HFNC while eating and continues to desaturate, will likely require intubation.   -Prognosis guarded

## 2021-08-06 NOTE — PROGRESS NOTE ADULT - SUBJECTIVE AND OBJECTIVE BOX
DATE OF SERVICE: 08-06-21 @ 11:25  CHIEF COMPLAINT:Patient is a 55y old  Male who presents with a chief complaint of sob (05 Aug 2021 09:02)    	        PAST MEDICAL & SURGICAL HISTORY:  Hyperlipidemia    HTN (hypertension)    Rupture, tendon, quadriceps    History of Achilles tendon repair            REVIEW OF SYSTEMS:  feels approx chris  RESPIRATORY: breathing better on bipap  CARDIOVASCULAR: No chest pain, palpitations, passing out,   GASTROINTESTINAL: No abdominal or epigastric pain. No nausea, vomiting, or hematemesis;   GENITOURINARY: No dysuria, frequency, hematuria, or incontinence  NEUROLOGICAL: No headaches, memory loss, loss of strength, numbness, or tremors      Medications:  MEDICATIONS  (STANDING):  dexAMETHasone  Injectable 6 milliGRAM(s) IV Push daily  dextrose 5% + sodium chloride 0.9% with potassium chloride 20 mEq/L 1000 milliLiter(s) (50 mL/Hr) IV Continuous <Continuous>  enoxaparin Injectable 90 milliGRAM(s) SubCutaneous every 12 hours  guaiFENesin ER 1200 milliGRAM(s) Oral every 12 hours  melatonin 5 milliGRAM(s) Oral at bedtime  pantoprazole    Tablet 40 milliGRAM(s) Oral before breakfast    MEDICATIONS  (PRN):    	    PHYSICAL EXAM:  T(C): 36.4 (08-06-21 @ 08:20), Max: 36.4 (08-05-21 @ 12:30)  HR: 73 (08-06-21 @ 08:20) (70 - 81)  BP: 99/64 (08-06-21 @ 08:20) (94/60 - 112/62)  RR: 22 (08-06-21 @ 08:20) (22 - 24)  SpO2: 95% (08-06-21 @ 08:20) (90% - 98%)  Wt(kg): --  I&O's Summary    05 Aug 2021 07:01  -  06 Aug 2021 07:00  --------------------------------------------------------  IN: 1190 mL / OUT: 400 mL / NET: 790 mL        Appearance: Normal	  HEENT:   Normal oral mucosa, PERRL, EOMI	  Lymphatic: No lymphadenopathy  Cardiovascular: Normal S1 S2, No JVD, No murmurs, No edema  Respiratory: dec bs   Gastrointestinal:  Soft, Non-tender, + BS	  Skin: No rashes, No ecchymoses, No cyanosis	  Neurologic: Non-focal  Extremities: Normal range of motion, No clubbing, cyanosis or edema  Vascular: Peripheral pulses palpable 2+ bilaterally    TELEMETRY: 	    ECG:  	  RADIOLOGY:  OTHER: 	  	  LABS:	 	    CARDIAC MARKERS:            08-06    x   |  x   |  x   ----------------------------<  x   x    |  x   |  0.85      TPro  6.5  /  Alb  3.3  /  TBili  0.6  /  DBili  0.1  /  AST  44<H>  /  ALT  51<H>  /  AlkPhos  74  08-06    proBNP:   Lipid Profile:   HgA1c:   TSH:

## 2021-08-06 NOTE — PROGRESS NOTE ADULT - ASSESSMENT
pt w/ covid  hypoxia  iv steroids   s/p remdesivir  id / f/u noted  pulm to f/u  c/w bipap/h/f o2 as able  c/e resp support/intubation strong possibility   apprec micu eval  discussed w pt and with at length at bedside  wife was facetime  additional tx as per id/pulm / micu  recs  s/p toci  gi / dvt proph  o2  hx htn/   monitor bp  monitor inflammatory markers  prognosis guarded

## 2021-08-06 NOTE — PROGRESS NOTE ADULT - SUBJECTIVE AND OBJECTIVE BOX
infectious diseases progress note:    Patient is a 55y old  Male who presents with a chief complaint of sob (05 Aug 2021 09:02)        Infection due to severe acute respiratory syndrome coronavirus 2 (SARS-CoV-2)           Allergies    No Known Allergies    Intolerances        ANTIBIOTICS/RELEVANT:  antimicrobials    immunologic:    OTHER:  dexAMETHasone  Injectable 6 milliGRAM(s) IV Push daily  dextrose 5% + sodium chloride 0.9% with potassium chloride 20 mEq/L 1000 milliLiter(s) IV Continuous <Continuous>  enoxaparin Injectable 90 milliGRAM(s) SubCutaneous every 12 hours  guaiFENesin ER 1200 milliGRAM(s) Oral every 12 hours  melatonin 5 milliGRAM(s) Oral at bedtime  pantoprazole    Tablet 40 milliGRAM(s) Oral before breakfast      Objective:  Vital Signs Last 24 Hrs  T(C): 36.4 (06 Aug 2021 08:20), Max: 36.4 (05 Aug 2021 16:23)  T(F): 97.6 (06 Aug 2021 08:20), Max: 97.6 (05 Aug 2021 16:23)  HR: 73 (06 Aug 2021 08:20) (70 - 78)  BP: 99/64 (06 Aug 2021 08:20) (94/60 - 112/62)  BP(mean): --  RR: 22 (06 Aug 2021 08:20) (22 - 24)  SpO2: 95% (06 Aug 2021 08:20) (94% - 98%)       Eyes:PORFIRIO, EOMI  Ear/Nose/Throat: no oral lesion, no sinus tenderness on percussion	  Neck:no JVD, no lymphadenopathy, supple  Respiratory: CTA geovany  Cardiovascular: S1S2 RRR, no murmurs  Gastrointestinal:soft, (+) BS, no HSM  Extremities:no e/e/c        LABS:    08-06    x   |  x   |  x   ----------------------------<  x   x    |  x   |  0.85      TPro  6.5  /  Alb  3.3  /  TBili  0.6  /  DBili  0.1  /  AST  44<H>  /  ALT  51<H>  /  AlkPhos  74  08-06    PT/INR - ( 06 Aug 2021 06:22 )   PT: 15.6 sec;   INR: 1.32 ratio                 MICROBIOLOGY:    RECENT CULTURES:  08-04 @ 09:06 .Sputum Sputum, cup       Few polymorphonuclear leukocytes per low power field  Rare Squamous epithelial cells per low power field  Moderate Gram Positive Cocci in Pairs and Chains per oil power field  Rare Gram Negative Rods per oil power field           Normal Respiratory Tiki present          RESPIRATORY CULTURES:              RADIOLOGY & ADDITIONAL STUDIES:        Pager [phone]  After 5 pm/weekends or if no response :[phone]

## 2021-08-06 NOTE — PROGRESS NOTE ADULT - PROBLEM SELECTOR PLAN 1
+COVID PCR as an outpatient  -CXR 7/30 with increase in b/l opacities, CXR 8/3 grossly unchanged from prior   -S/p Remdesivir x 5 days   -Decadron 6mg IVP qd x 10 days  -Trend inflammatory markers  -S/p Tocilizumab 7/30 per ID for worsening hypoxic respiratory failure  -Self proning as tolerated  -Incentive spirometer  -Sputum culture with normal respiratory aba. WBC normal, PCT normal, afebrile   -Mucinex 1200mg PO BID   -ddimer increased from 411 to 1295. LE duplex 8/4 neg DVT. C/w empiric full dose AC with Lovenox 1mg/kg BID for now, if ddimer starts to downtrend will change to Lovenox 40mg BID. Check ddimer in AM.

## 2021-08-06 NOTE — PROGRESS NOTE ADULT - ASSESSMENT
54 y/o M with PMH of DVT/PE s/p achilles tendon repair years ago (not on AC currently). Presents with complaints of SOB, intermittent and fevers with cough productive of white sputum for past week. Tested positive for COVID 2 days ago. Pt is unvaccinated. Endorses progressively worsening SOB over the past 5 days, worse with ambulation, found to be hypoxic on arrival to the  ER. CXR with b/l opacities.

## 2021-08-06 NOTE — PROGRESS NOTE ADULT - SUBJECTIVE AND OBJECTIVE BOX
Follow-up Pulm Progress Note    Seen on bipap 12/7/100%   MV ~18  Sats 97%   Mild dyspnea while on bipap   Denies CP      Medications:  MEDICATIONS  (STANDING):  dexAMETHasone  Injectable 6 milliGRAM(s) IV Push daily  dextrose 5% + sodium chloride 0.9% with potassium chloride 20 mEq/L 1000 milliLiter(s) (50 mL/Hr) IV Continuous <Continuous>  enoxaparin Injectable 90 milliGRAM(s) SubCutaneous every 12 hours  guaiFENesin ER 1200 milliGRAM(s) Oral every 12 hours  melatonin 5 milliGRAM(s) Oral at bedtime  pantoprazole    Tablet 40 milliGRAM(s) Oral before breakfast        Vital Signs Last 24 Hrs  T(C): 36.4 (06 Aug 2021 08:20), Max: 36.4 (05 Aug 2021 16:23)  T(F): 97.6 (06 Aug 2021 08:20), Max: 97.6 (05 Aug 2021 16:23)  HR: 73 (06 Aug 2021 08:20) (70 - 78)  BP: 99/64 (06 Aug 2021 08:20) (94/60 - 112/62)  BP(mean): --  RR: 22 (06 Aug 2021 08:20) (22 - 24)  SpO2: 95% (06 Aug 2021 08:20) (94% - 98%)          08-05 @ 07:01  -  08-06 @ 07:00  --------------------------------------------------------  IN: 1190 mL / OUT: 400 mL / NET: 790 mL          LABS:    08-06    x   |  x   |  x   ----------------------------<  x   x    |  x   |  0.85      TPro  6.5  /  Alb  3.3  /  TBili  0.6  /  DBili  0.1  /  AST  44<H>  /  ALT  51<H>  /  AlkPhos  74  08-06          CAPILLARY BLOOD GLUCOSE      POCT Blood Glucose.: 94 mg/dL (04 Aug 2021 19:10)    PT/INR - ( 06 Aug 2021 06:22 )   PT: 15.6 sec;   INR: 1.32 ratio             Procalcitonin, Serum: 0.05 ng/mL (08-04-21 @ 06:39)            Physical Examination:  PULM: Coarse bilaterally   CVS: S1, S2 heard      RADIOLOGY REVIEWED  CXR 8/3: b/l patchy opacities grossly unchanged from prior

## 2021-08-06 NOTE — PROGRESS NOTE ADULT - ASSESSMENT
covid       had several episodes of desat yesterday and looks SOB       continue remdesivir and decadron and proning   completed 5 days of remdesivir  i dont think there is a benefit  there is a benefit of going longer    no other good options   sp toci    situation remains' difficult   continue bicap     no additional therapy complete 10 days of decadron

## 2021-08-07 LAB
ALBUMIN SERPL ELPH-MCNC: 3.1 G/DL — LOW (ref 3.3–5)
ALP SERPL-CCNC: 80 U/L — SIGNIFICANT CHANGE UP (ref 40–120)
ALT FLD-CCNC: 39 U/L — SIGNIFICANT CHANGE UP (ref 10–45)
AST SERPL-CCNC: 44 U/L — HIGH (ref 10–40)
BILIRUB DIRECT SERPL-MCNC: 0.1 MG/DL — SIGNIFICANT CHANGE UP (ref 0–0.2)
BILIRUB INDIRECT FLD-MCNC: 0.4 MG/DL — SIGNIFICANT CHANGE UP (ref 0.2–1)
BILIRUB SERPL-MCNC: 0.5 MG/DL — SIGNIFICANT CHANGE UP (ref 0.2–1.2)
CREAT SERPL-MCNC: 0.85 MG/DL — SIGNIFICANT CHANGE UP (ref 0.5–1.3)
D DIMER BLD IA.RAPID-MCNC: 3785 NG/ML DDU — HIGH
INR BLD: 1.32 RATIO — HIGH (ref 0.88–1.16)
PROT SERPL-MCNC: 5.8 G/DL — LOW (ref 6–8.3)
PROTHROM AB SERPL-ACNC: 15.6 SEC — HIGH (ref 10.6–13.6)

## 2021-08-07 RX ADMIN — DEXTROSE MONOHYDRATE, SODIUM CHLORIDE, AND POTASSIUM CHLORIDE 50 MILLILITER(S): 50; .745; 4.5 INJECTION, SOLUTION INTRAVENOUS at 17:42

## 2021-08-07 RX ADMIN — ENOXAPARIN SODIUM 90 MILLIGRAM(S): 100 INJECTION SUBCUTANEOUS at 06:11

## 2021-08-07 RX ADMIN — Medication 5 MILLIGRAM(S): at 21:18

## 2021-08-07 RX ADMIN — Medication 6 MILLIGRAM(S): at 06:10

## 2021-08-07 RX ADMIN — ENOXAPARIN SODIUM 90 MILLIGRAM(S): 100 INJECTION SUBCUTANEOUS at 17:41

## 2021-08-07 RX ADMIN — Medication 1200 MILLIGRAM(S): at 06:10

## 2021-08-07 RX ADMIN — PANTOPRAZOLE SODIUM 40 MILLIGRAM(S): 20 TABLET, DELAYED RELEASE ORAL at 06:10

## 2021-08-07 NOTE — PROGRESS NOTE ADULT - PROBLEM SELECTOR PLAN 2
2nd to COVID PNA  -S/p RRT 8/3 and 8/4 for hypoxia.   -Able to tolerate a few hrs off bipap yesterday. Can attempt HFNC again today.  Bipap 12/7/100% qHS and PRN, HFNC 60L/100% while off bipap   -Keep sats >88% with O2 as able  -Prognosis guarded

## 2021-08-07 NOTE — PROGRESS NOTE ADULT - PROBLEM SELECTOR PLAN 1
+COVID PCR as an outpatient  -CXR 7/30 with increase in b/l opacities, CXR 8/3 grossly unchanged from prior   -S/p Remdesivir x 5 days   -Decadron 6mg IVP qd x 10 days  -Trend inflammatory markers  -S/p Tocilizumab 7/30 per ID for worsening hypoxic respiratory failure  -Self proning as tolerated  -Incentive spirometer  -Sputum culture with normal respiratory aba. WBC normal, PCT normal, afebrile   -Mucinex 1200mg PO BID   -LE duplex 8/4 neg DVT. C/w empiric full dose AC with Lovenox 1mg/kg BID for now as ddimer keeps increasing, too unstable for transport to CTA chest. Continue to trend ddimer.

## 2021-08-07 NOTE — PROGRESS NOTE ADULT - ASSESSMENT
pt w/ covid  hypoxia  iv steroids   s/p remdesivir  id / f/u noted  pulm f/u   on h/f this am sats in low 90 s   c/e resp support/  apprec micu eval  additional tx as per id/pulm / micu  recs  s/p toci  gi / dvt proph  o2  hx htn/   monitor bp  monitor inflammatory markers  prognosis guarded

## 2021-08-07 NOTE — PROGRESS NOTE ADULT - SUBJECTIVE AND OBJECTIVE BOX
DATE OF SERVICE: 08-07-21 @ 14:24  CHIEF COMPLAINT:Patient is a 55y old  Male who presents with a chief complaint of covid (06 Aug 2021 14:06)    	        PAST MEDICAL & SURGICAL HISTORY:  Hyperlipidemia    HTN (hypertension)    Rupture, tendon, quadriceps    History of Achilles tendon repair            REVIEW OF SYSTEMS:  feels bettr  RESPIRATORY:breathing slightly better  CARDIOVASCULAR: No chest pain, palpitations, passing out, dizziness, or leg swelling  GASTROINTESTINAL: No abdominal or epigastric pain. No nausea, vomiting, or hematemesis; No diarrhea or constipation. No melena or hematochezia.  GENITOURINARY: No dysuria, frequency, hematuria, or incontinence  NEUROLOGICAL: No headaches,     Medications:  MEDICATIONS  (STANDING):  dexAMETHasone  Injectable 6 milliGRAM(s) IV Push daily  dextrose 5% + sodium chloride 0.9% with potassium chloride 20 mEq/L 1000 milliLiter(s) (50 mL/Hr) IV Continuous <Continuous>  enoxaparin Injectable 90 milliGRAM(s) SubCutaneous every 12 hours  guaiFENesin ER 1200 milliGRAM(s) Oral every 12 hours  melatonin 5 milliGRAM(s) Oral at bedtime  pantoprazole    Tablet 40 milliGRAM(s) Oral before breakfast    MEDICATIONS  (PRN):    	    PHYSICAL EXAM:  T(C): 36.8 (08-07-21 @ 13:07), Max: 36.8 (08-07-21 @ 13:07)  HR: 79 (08-07-21 @ 13:07) (72 - 85)  BP: 98/65 (08-07-21 @ 13:07) (97/64 - 115/73)  RR: 20 (08-07-21 @ 13:07) (14 - 22)  SpO2: 90% (08-07-21 @ 13:07) (90% - 97%)  Wt(kg): --  I&O's Summary    06 Aug 2021 07:01  -  07 Aug 2021 07:00  --------------------------------------------------------  IN: 0 mL / OUT: 950 mL / NET: -950 mL        Appearance: Normal	  HEENT:   Normal oral mucosa, PERRL, EOMI	  Lymphatic: No lymphadenopathy  Cardiovascular: Normal S1 S2, No JVD, No murmurs, No edema  Respiratory:dec bs   Gastrointestinal:  Soft, Non-tender, + BS	  Neurologic: Non-focal  Extremities: Normal range of motion, No clubbing, cyanosis or edema  Vascular: Peripheral pulses palpable 2+ bilaterally    TELEMETRY: 	    ECG:  	  RADIOLOGY:  OTHER: 	  	  LABS:	 	    CARDIAC MARKERS:            08-07    x   |  x   |  x   ----------------------------<  x   x    |  x   |  0.85      TPro  5.8<L>  /  Alb  3.1<L>  /  TBili  0.5  /  DBili  0.1  /  AST  44<H>  /  ALT  39  /  AlkPhos  80  08-07    proBNP:   Lipid Profile:   HgA1c:   TSH:

## 2021-08-07 NOTE — PROGRESS NOTE ADULT - SUBJECTIVE AND OBJECTIVE BOX
Follow-up Pulm Progress Note    Able to tolerate about 3 hrs off bipap yesterday evening   Changed to HFNC 60L/100% sats maintaining 89-90%   +SOB  Denies CP, pleuritic CP    Medications:  MEDICATIONS  (STANDING):  dexAMETHasone  Injectable 6 milliGRAM(s) IV Push daily  dextrose 5% + sodium chloride 0.9% with potassium chloride 20 mEq/L 1000 milliLiter(s) (50 mL/Hr) IV Continuous <Continuous>  enoxaparin Injectable 90 milliGRAM(s) SubCutaneous every 12 hours  guaiFENesin ER 1200 milliGRAM(s) Oral every 12 hours  melatonin 5 milliGRAM(s) Oral at bedtime  pantoprazole    Tablet 40 milliGRAM(s) Oral before breakfast        Vital Signs Last 24 Hrs  T(C): 36.7 (07 Aug 2021 08:49), Max: 36.7 (07 Aug 2021 04:00)  T(F): 98 (07 Aug 2021 08:49), Max: 98 (07 Aug 2021 04:00)  HR: 73 (07 Aug 2021 11:31) (72 - 85)  BP: 112/74 (07 Aug 2021 08:49) (97/64 - 115/73)  BP(mean): --  RR: 20 (07 Aug 2021 11:31) (14 - 22)  SpO2: 91% (07 Aug 2021 11:31) (90% - 97%)          08-06 @ 07:01  -  08-07 @ 07:00  --------------------------------------------------------  IN: 0 mL / OUT: 950 mL / NET: -950 mL          LABS:    08-07    x   |  x   |  x   ----------------------------<  x   x    |  x   |  0.85      TPro  5.8<L>  /  Alb  3.1<L>  /  TBili  0.5  /  DBili  0.1  /  AST  44<H>  /  ALT  39  /  AlkPhos  80  08-07          PT/INR - ( 07 Aug 2021 05:55 )   PT: 15.6 sec;   INR: 1.32 ratio             Physical Examination:  PULM: Coarse bilaterally   CVS: S1, S2 heard      RADIOLOGY REVIEWED  CXR 8/3: b/l patchy opacities grossly unchanged from prior    Patient

## 2021-08-07 NOTE — PROVIDER CONTACT NOTE (OTHER) - RECOMMENDATIONS
NP Autumn aware, leave Pt on Bipap at this time, respiratory also made aware, Pt asymptomatic during episode, mentating appropriately

## 2021-08-08 LAB
ALBUMIN SERPL ELPH-MCNC: 3.1 G/DL — LOW (ref 3.3–5)
ALP SERPL-CCNC: 108 U/L — SIGNIFICANT CHANGE UP (ref 40–120)
ALT FLD-CCNC: 39 U/L — SIGNIFICANT CHANGE UP (ref 10–45)
ANION GAP SERPL CALC-SCNC: 10 MMOL/L — SIGNIFICANT CHANGE UP (ref 5–17)
AST SERPL-CCNC: 55 U/L — HIGH (ref 10–40)
BASE EXCESS BLDA CALC-SCNC: 0.6 MMOL/L — SIGNIFICANT CHANGE UP (ref -2–2)
BILIRUB SERPL-MCNC: 0.4 MG/DL — SIGNIFICANT CHANGE UP (ref 0.2–1.2)
BUN SERPL-MCNC: 18 MG/DL — SIGNIFICANT CHANGE UP (ref 7–23)
CALCIUM SERPL-MCNC: 7.8 MG/DL — LOW (ref 8.4–10.5)
CHLORIDE SERPL-SCNC: 106 MMOL/L — SIGNIFICANT CHANGE UP (ref 96–108)
CO2 BLDA-SCNC: 26 MMOL/L — SIGNIFICANT CHANGE UP (ref 22–30)
CO2 SERPL-SCNC: 21 MMOL/L — LOW (ref 22–31)
CREAT SERPL-MCNC: 0.68 MG/DL — SIGNIFICANT CHANGE UP (ref 0.5–1.3)
D DIMER BLD IA.RAPID-MCNC: 3038 NG/ML DDU — HIGH
FERRITIN SERPL-MCNC: 743 NG/ML — HIGH (ref 30–400)
GAS PNL BLDA: SIGNIFICANT CHANGE UP
GLUCOSE SERPL-MCNC: 276 MG/DL — HIGH (ref 70–99)
HCO3 BLDA-SCNC: 25 MMOL/L — SIGNIFICANT CHANGE UP (ref 21–29)
HCT VFR BLD CALC: 42.6 % — SIGNIFICANT CHANGE UP (ref 39–50)
HGB BLD-MCNC: 13.6 G/DL — SIGNIFICANT CHANGE UP (ref 13–17)
INR BLD: 1.24 RATIO — HIGH (ref 0.88–1.16)
LDH SERPL L TO P-CCNC: 942 U/L — HIGH (ref 50–242)
MCHC RBC-ENTMCNC: 28.2 PG — SIGNIFICANT CHANGE UP (ref 27–34)
MCHC RBC-ENTMCNC: 31.9 GM/DL — LOW (ref 32–36)
MCV RBC AUTO: 88.4 FL — SIGNIFICANT CHANGE UP (ref 80–100)
NRBC # BLD: 0 /100 WBCS — SIGNIFICANT CHANGE UP (ref 0–0)
PCO2 BLDA: 41 MMHG — SIGNIFICANT CHANGE UP (ref 32–46)
PH BLDA: 7.4 — SIGNIFICANT CHANGE UP (ref 7.35–7.45)
PLATELET # BLD AUTO: 152 K/UL — SIGNIFICANT CHANGE UP (ref 150–400)
PO2 BLDA: 62 MMHG — LOW (ref 74–108)
POTASSIUM SERPL-MCNC: 5.3 MMOL/L — SIGNIFICANT CHANGE UP (ref 3.5–5.3)
POTASSIUM SERPL-SCNC: 5.3 MMOL/L — SIGNIFICANT CHANGE UP (ref 3.5–5.3)
PROT SERPL-MCNC: 5.7 G/DL — LOW (ref 6–8.3)
PROTHROM AB SERPL-ACNC: 14.7 SEC — HIGH (ref 10.6–13.6)
RBC # BLD: 4.82 M/UL — SIGNIFICANT CHANGE UP (ref 4.2–5.8)
RBC # FLD: 12.5 % — SIGNIFICANT CHANGE UP (ref 10.3–14.5)
SAO2 % BLDA: 90 % — LOW (ref 92–96)
SODIUM SERPL-SCNC: 137 MMOL/L — SIGNIFICANT CHANGE UP (ref 135–145)
WBC # BLD: 11.02 K/UL — HIGH (ref 3.8–10.5)
WBC # FLD AUTO: 11.02 K/UL — HIGH (ref 3.8–10.5)

## 2021-08-08 PROCEDURE — 99232 SBSQ HOSP IP/OBS MODERATE 35: CPT

## 2021-08-08 RX ADMIN — Medication 5 MILLIGRAM(S): at 21:39

## 2021-08-08 RX ADMIN — Medication 6 MILLIGRAM(S): at 05:43

## 2021-08-08 RX ADMIN — Medication 1200 MILLIGRAM(S): at 19:39

## 2021-08-08 RX ADMIN — DEXTROSE MONOHYDRATE, SODIUM CHLORIDE, AND POTASSIUM CHLORIDE 50 MILLILITER(S): 50; .745; 4.5 INJECTION, SOLUTION INTRAVENOUS at 21:39

## 2021-08-08 RX ADMIN — ENOXAPARIN SODIUM 90 MILLIGRAM(S): 100 INJECTION SUBCUTANEOUS at 05:43

## 2021-08-08 RX ADMIN — ENOXAPARIN SODIUM 90 MILLIGRAM(S): 100 INJECTION SUBCUTANEOUS at 17:59

## 2021-08-08 NOTE — PROGRESS NOTE ADULT - ASSESSMENT
55 M wiyh covid     Had episodic desaturation       completed remdesivir, continue decadron and proning   completed 5 days of remdesivir    no other good options   sp toci    situation remains' difficult     continue bicap   On enoxaparin for DVT prophylaxis (D-dimer uo) Duplex negative for DVT  LDH increased, ferritin declining  Sputum with few polys G+ and g-s, likely colonization    no additional therapy complete 10 days of decadron     Cordell Valderrama MD  office [phone]  ID is available over the weekend for questions at [phone]

## 2021-08-08 NOTE — PROGRESS NOTE ADULT - SUBJECTIVE AND OBJECTIVE BOX
Patient is a 55y old  Male who presents with a chief complaint of covid (06 Aug 2021 14:06)    Being followed by ID for OVID    Interval history:  Afebrile, elevated WBC  No acute events      ROS:  Breathing stable  No cough, CP  No N/V/D./abd pain  No other complaints      Antimicrobials:        Vital Signs Last 24 Hrs  T(C): 36.5 (08-08-21 @ 08:36), Max: 36.8 (08-07-21 @ 13:07)  T(F): 97.7 (08-08-21 @ 08:36), Max: 98.3 (08-07-21 @ 13:07)  HR: 87 (08-08-21 @ 08:36) (65 - 87)  BP: 127/65 (08-08-21 @ 08:36) (98/65 - 127/65)  BP(mean): --  RR: 20 (08-08-21 @ 08:36) (20 - 22)  SpO2: 95% (08-08-21 @ 08:36) (75% - 97%)    Physical Exam:    Constitutional well preserved, NAD    HEENT EOMI, No pallor or icterus, BIPAP    No oral exudate or erythema    Neck supple no LN    Chest Decreased BS    CVS S1 S2 WNl No murmur or rub or gallop    Abd soft BS normal No tenderness no masses    Ext No cyanosis clubbing or edema    IV site no erythema tenderness or discharge    Joints no swelling or LOM    CNS AAO X 3 non focal    Lab Data:                          13.6   11.02 )-----------( 152      ( 08 Aug 2021 06:55 )             42.6       08-08    137  |  106  |  18  ----------------------------<  276<H>  5.3   |  21<L>  |  0.68    Ca    7.8<L>      08 Aug 2021 06:55    TPro  5.7<L>  /  Alb  3.1<L>  /  TBili  0.4  /  DBili  x   /  AST  55<H>  /  ALT  39  /  AlkPhos  108  08-08        .Sputum Sputum, cup  08-04-21   Normal Respiratory Tiki present  --    Few polymorphonuclear leukocytes per low power field  Rare Squamous epithelial cells per low power field  Moderate Gram Positive Cocci in Pairs and Chains per oil power field  Rare Gram Negative Rods per oil power field      < from: [facility] Duplex Lower Ext Vein Scan, Bilat (08.04.21 @ 16:22) >  EXAM:  DUPLEX SCAN EXT VEINS LOWER BI                            PROCEDURE DATE:  08/04/2021            INTERPRETATION:  CLINICAL INFORMATION: COVID positive, short of breath, elevated d-dimer values.    COMPARISON: A prior examination, dated 2/26/2015, showed DVT affecting a right posterior tibial and peroneal vein.    TECHNIQUE: Duplex sonography of the BILATERAL LOWER extremity veins with color and spectral Doppler, with and without compression.    FINDINGS:    RIGHT:  Normal compressibilityof the RIGHT common femoral, femoral and popliteal veins.  Doppler examination shows normal spontaneous and phasic flow.  No RIGHT calf vein thrombosis is detected.    LEFT:  Normal compressibility of the LEFT common femoral, femoral and popliteal veins.  Doppler examination shows normal spontaneous and phasic flow.  No LEFT calf vein thrombosis is detected.    IMPRESSION:  No evidence of deep venous thrombosis in either lower extremity.      < end of copied text >  < from: Xray Chest 1 View- PORTABLE-Urgent (Xray Chest 1 View- PORTABLE-Urgent .) (08.03.21 @ 13:04) >  EXAM:  XR CHEST PORTABLE URGENT 1V                            PROCEDURE DATE:  08/03/2021            INTERPRETATION:  Chest one view    HISTORY: COVID-19    COMPARISON STUDY: 7/30/2021    Frontal expiratory view of the chest shows the heart to be normal in size. The lungs show progression of bilateral patchy infiltrates and there is no evidence of pneumothorax nor pleural effusion. Accessory ossicles near the distal left clavicle are again noted.    IMPRESSION:  Progression of infiltrates.    < end of copied text >

## 2021-08-08 NOTE — PROGRESS NOTE ADULT - SUBJECTIVE AND OBJECTIVE BOX
DATE OF SERVICE: 08-08-21 @ 12:29  CHIEF COMPLAINT:Patient is a 55y old  Male who presents with a chief complaint of COVID (08 Aug 2021 10:59)    	        PAST MEDICAL & SURGICAL HISTORY:  Hyperlipidemia    HTN (hypertension)    Rupture, tendon, quadriceps    History of Achilles tendon repair              NECK: No pain or stiffness  RESPIRATORY: breathng apprx same  CARDIOVASCULAR: No chest pain, palpitations, passing out, dizziness, or leg swelling  GASTROINTESTINAL: No abdominal or epigastric pain. No nausea, vomiting, or hematemesis; No diarrhea or constipation. No melena or hematochezia.  GENITOURINARY: No dysuria, frequency, hematuria, or incontinence  NEUROLOGICAL: No headaches,     Medications:  MEDICATIONS  (STANDING):  dexAMETHasone  Injectable 6 milliGRAM(s) IV Push daily  dextrose 5% + sodium chloride 0.9% with potassium chloride 20 mEq/L 1000 milliLiter(s) (50 mL/Hr) IV Continuous <Continuous>  enoxaparin Injectable 90 milliGRAM(s) SubCutaneous every 12 hours  guaiFENesin ER 1200 milliGRAM(s) Oral every 12 hours  melatonin 5 milliGRAM(s) Oral at bedtime  pantoprazole    Tablet 40 milliGRAM(s) Oral before breakfast    MEDICATIONS  (PRN):    	    PHYSICAL EXAM:  T(C): 36.5 (08-08-21 @ 08:36), Max: 36.8 (08-07-21 @ 13:07)  HR: 87 (08-08-21 @ 08:36) (65 - 87)  BP: 127/65 (08-08-21 @ 08:36) (98/65 - 127/65)  RR: 20 (08-08-21 @ 08:36) (20 - 22)  SpO2: 95% (08-08-21 @ 08:36) (75% - 97%)  Wt(kg): --  I&O's Summary    07 Aug 2021 07:01  -  08 Aug 2021 07:00  --------------------------------------------------------  IN: 0 mL / OUT: 150 mL / NET: -150 mL    08 Aug 2021 07:01  -  08 Aug 2021 12:29  --------------------------------------------------------  IN: 0 mL / OUT: 150 mL / NET: -150 mL        Appearance: Normal	  HEENT:   Normal oral mucosa, PERRL, EOMI	  Lymphatic: No lymphadenopathy  Cardiovascular: Normal S1 S2, No JVD, No murmurs, No edema  Respiratory: dec bs   Psychiatry: A & O x 3, Mood & affect appropriate  Gastrointestinal:  Soft, Non-tender, + BS	  Skin: No rashes, No ecchymoses, No cyanosis	  Neurologic: Non-focal  Extremities: Normal range of motion, No clubbing, cyanosis or edema  Vascular: Peripheral pulses palpable 2+ bilaterally    TELEMETRY: 	    ECG:  	  RADIOLOGY:  OTHER: 	  	  LABS:	 	    CARDIAC MARKERS:                                13.6   11.02 )-----------( 152      ( 08 Aug 2021 06:55 )             42.6     08-08    137  |  106  |  18  ----------------------------<  276<H>  5.3   |  21<L>  |  0.68    Ca    7.8<L>      08 Aug 2021 06:55    TPro  5.7<L>  /  Alb  3.1<L>  /  TBili  0.4  /  DBili  x   /  AST  55<H>  /  ALT  39  /  AlkPhos  108  08-08    proBNP:   Lipid Profile:   HgA1c:   TSH:

## 2021-08-09 LAB
A1C WITH ESTIMATED AVERAGE GLUCOSE RESULT: 6.3 % — HIGH (ref 4–5.6)
ALBUMIN SERPL ELPH-MCNC: 2.9 G/DL — LOW (ref 3.3–5)
ALP SERPL-CCNC: 110 U/L — SIGNIFICANT CHANGE UP (ref 40–120)
ALT FLD-CCNC: 39 U/L — SIGNIFICANT CHANGE UP (ref 10–45)
ANION GAP SERPL CALC-SCNC: 11 MMOL/L — SIGNIFICANT CHANGE UP (ref 5–17)
ANION GAP SERPL CALC-SCNC: 6 MMOL/L — SIGNIFICANT CHANGE UP (ref 5–17)
AST SERPL-CCNC: 51 U/L — HIGH (ref 10–40)
B-OH-BUTYR SERPL-SCNC: 0.1 MMOL/L — SIGNIFICANT CHANGE UP
BILIRUB SERPL-MCNC: 0.5 MG/DL — SIGNIFICANT CHANGE UP (ref 0.2–1.2)
BUN SERPL-MCNC: 16 MG/DL — SIGNIFICANT CHANGE UP (ref 7–23)
BUN SERPL-MCNC: 18 MG/DL — SIGNIFICANT CHANGE UP (ref 7–23)
CALCIUM SERPL-MCNC: 7.3 MG/DL — LOW (ref 8.4–10.5)
CALCIUM SERPL-MCNC: 8.4 MG/DL — SIGNIFICANT CHANGE UP (ref 8.4–10.5)
CHLORIDE SERPL-SCNC: 101 MMOL/L — SIGNIFICANT CHANGE UP (ref 96–108)
CHLORIDE SERPL-SCNC: 107 MMOL/L — SIGNIFICANT CHANGE UP (ref 96–108)
CO2 SERPL-SCNC: 21 MMOL/L — LOW (ref 22–31)
CO2 SERPL-SCNC: 24 MMOL/L — SIGNIFICANT CHANGE UP (ref 22–31)
CREAT SERPL-MCNC: 0.61 MG/DL — SIGNIFICANT CHANGE UP (ref 0.5–1.3)
CREAT SERPL-MCNC: 0.72 MG/DL — SIGNIFICANT CHANGE UP (ref 0.5–1.3)
ESTIMATED AVERAGE GLUCOSE: 134 MG/DL — HIGH (ref 68–114)
GLUCOSE BLDC GLUCOMTR-MCNC: 108 MG/DL — HIGH (ref 70–99)
GLUCOSE BLDC GLUCOMTR-MCNC: 92 MG/DL — SIGNIFICANT CHANGE UP (ref 70–99)
GLUCOSE BLDC GLUCOMTR-MCNC: 98 MG/DL — SIGNIFICANT CHANGE UP (ref 70–99)
GLUCOSE SERPL-MCNC: 100 MG/DL — HIGH (ref 70–99)
GLUCOSE SERPL-MCNC: 437 MG/DL — HIGH (ref 70–99)
HCT VFR BLD CALC: 39.4 % — SIGNIFICANT CHANGE UP (ref 39–50)
HCT VFR BLD CALC: 44.5 % — SIGNIFICANT CHANGE UP (ref 39–50)
HGB BLD-MCNC: 12.5 G/DL — LOW (ref 13–17)
HGB BLD-MCNC: 14.3 G/DL — SIGNIFICANT CHANGE UP (ref 13–17)
MAGNESIUM SERPL-MCNC: 1.9 MG/DL — SIGNIFICANT CHANGE UP (ref 1.6–2.6)
MCHC RBC-ENTMCNC: 27.5 PG — SIGNIFICANT CHANGE UP (ref 27–34)
MCHC RBC-ENTMCNC: 27.8 PG — SIGNIFICANT CHANGE UP (ref 27–34)
MCHC RBC-ENTMCNC: 31.7 GM/DL — LOW (ref 32–36)
MCHC RBC-ENTMCNC: 32.1 GM/DL — SIGNIFICANT CHANGE UP (ref 32–36)
MCV RBC AUTO: 86.6 FL — SIGNIFICANT CHANGE UP (ref 80–100)
MCV RBC AUTO: 86.8 FL — SIGNIFICANT CHANGE UP (ref 80–100)
NRBC # BLD: 0 /100 WBCS — SIGNIFICANT CHANGE UP (ref 0–0)
NRBC # BLD: 0 /100 WBCS — SIGNIFICANT CHANGE UP (ref 0–0)
PLATELET # BLD AUTO: 127 K/UL — LOW (ref 150–400)
PLATELET # BLD AUTO: 153 K/UL — SIGNIFICANT CHANGE UP (ref 150–400)
POTASSIUM SERPL-MCNC: 4.5 MMOL/L — SIGNIFICANT CHANGE UP (ref 3.5–5.3)
POTASSIUM SERPL-MCNC: 5.9 MMOL/L — HIGH (ref 3.5–5.3)
POTASSIUM SERPL-SCNC: 4.5 MMOL/L — SIGNIFICANT CHANGE UP (ref 3.5–5.3)
POTASSIUM SERPL-SCNC: 5.9 MMOL/L — HIGH (ref 3.5–5.3)
PROCALCITONIN SERPL-MCNC: 0.03 NG/ML — SIGNIFICANT CHANGE UP (ref 0.02–0.1)
PROT SERPL-MCNC: 5.3 G/DL — LOW (ref 6–8.3)
RBC # BLD: 4.54 M/UL — SIGNIFICANT CHANGE UP (ref 4.2–5.8)
RBC # BLD: 5.14 M/UL — SIGNIFICANT CHANGE UP (ref 4.2–5.8)
RBC # FLD: 12.5 % — SIGNIFICANT CHANGE UP (ref 10.3–14.5)
RBC # FLD: 12.6 % — SIGNIFICANT CHANGE UP (ref 10.3–14.5)
SODIUM SERPL-SCNC: 134 MMOL/L — LOW (ref 135–145)
SODIUM SERPL-SCNC: 136 MMOL/L — SIGNIFICANT CHANGE UP (ref 135–145)
WBC # BLD: 11.14 K/UL — HIGH (ref 3.8–10.5)
WBC # BLD: 9.42 K/UL — SIGNIFICANT CHANGE UP (ref 3.8–10.5)
WBC # FLD AUTO: 11.14 K/UL — HIGH (ref 3.8–10.5)
WBC # FLD AUTO: 9.42 K/UL — SIGNIFICANT CHANGE UP (ref 3.8–10.5)

## 2021-08-09 PROCEDURE — 71045 X-RAY EXAM CHEST 1 VIEW: CPT | Mod: 26

## 2021-08-09 RX ORDER — DEXTROSE MONOHYDRATE, SODIUM CHLORIDE, AND POTASSIUM CHLORIDE 50; .745; 4.5 G/1000ML; G/1000ML; G/1000ML
1000 INJECTION, SOLUTION INTRAVENOUS
Refills: 0 | Status: DISCONTINUED | OUTPATIENT
Start: 2021-08-09 | End: 2021-08-11

## 2021-08-09 RX ORDER — SODIUM CHLORIDE 9 MG/ML
1000 INJECTION, SOLUTION INTRAVENOUS
Refills: 0 | Status: DISCONTINUED | OUTPATIENT
Start: 2021-08-09 | End: 2021-08-09

## 2021-08-09 RX ORDER — INSULIN LISPRO 100/ML
VIAL (ML) SUBCUTANEOUS EVERY 6 HOURS
Refills: 0 | Status: DISCONTINUED | OUTPATIENT
Start: 2021-08-09 | End: 2021-08-09

## 2021-08-09 RX ORDER — DEXTROSE 50 % IN WATER 50 %
25 SYRINGE (ML) INTRAVENOUS ONCE
Refills: 0 | Status: DISCONTINUED | OUTPATIENT
Start: 2021-08-09 | End: 2021-08-09

## 2021-08-09 RX ORDER — PANTOPRAZOLE SODIUM 20 MG/1
40 TABLET, DELAYED RELEASE ORAL DAILY
Refills: 0 | Status: DISCONTINUED | OUTPATIENT
Start: 2021-08-09 | End: 2021-08-26

## 2021-08-09 RX ORDER — GLUCAGON INJECTION, SOLUTION 0.5 MG/.1ML
1 INJECTION, SOLUTION SUBCUTANEOUS ONCE
Refills: 0 | Status: DISCONTINUED | OUTPATIENT
Start: 2021-08-09 | End: 2021-08-09

## 2021-08-09 RX ORDER — SODIUM ZIRCONIUM CYCLOSILICATE 10 G/10G
10 POWDER, FOR SUSPENSION ORAL EVERY 8 HOURS
Refills: 0 | Status: DISCONTINUED | OUTPATIENT
Start: 2021-08-09 | End: 2021-08-09

## 2021-08-09 RX ORDER — DEXTROSE 50 % IN WATER 50 %
15 SYRINGE (ML) INTRAVENOUS ONCE
Refills: 0 | Status: DISCONTINUED | OUTPATIENT
Start: 2021-08-09 | End: 2021-08-09

## 2021-08-09 RX ORDER — DEXTROSE 50 % IN WATER 50 %
12.5 SYRINGE (ML) INTRAVENOUS ONCE
Refills: 0 | Status: DISCONTINUED | OUTPATIENT
Start: 2021-08-09 | End: 2021-08-09

## 2021-08-09 RX ORDER — BUDESONIDE AND FORMOTEROL FUMARATE DIHYDRATE 160; 4.5 UG/1; UG/1
2 AEROSOL RESPIRATORY (INHALATION)
Refills: 0 | Status: DISCONTINUED | OUTPATIENT
Start: 2021-08-09 | End: 2021-08-26

## 2021-08-09 RX ADMIN — Medication 1200 MILLIGRAM(S): at 06:12

## 2021-08-09 RX ADMIN — PANTOPRAZOLE SODIUM 40 MILLIGRAM(S): 20 TABLET, DELAYED RELEASE ORAL at 13:30

## 2021-08-09 RX ADMIN — Medication 1200 MILLIGRAM(S): at 18:08

## 2021-08-09 RX ADMIN — DEXTROSE MONOHYDRATE, SODIUM CHLORIDE, AND POTASSIUM CHLORIDE 50 MILLILITER(S): 50; .745; 4.5 INJECTION, SOLUTION INTRAVENOUS at 18:08

## 2021-08-09 RX ADMIN — Medication 5 MILLIGRAM(S): at 21:23

## 2021-08-09 RX ADMIN — BUDESONIDE AND FORMOTEROL FUMARATE DIHYDRATE 2 PUFF(S): 160; 4.5 AEROSOL RESPIRATORY (INHALATION) at 18:08

## 2021-08-09 RX ADMIN — ENOXAPARIN SODIUM 90 MILLIGRAM(S): 100 INJECTION SUBCUTANEOUS at 18:08

## 2021-08-09 RX ADMIN — ENOXAPARIN SODIUM 90 MILLIGRAM(S): 100 INJECTION SUBCUTANEOUS at 06:11

## 2021-08-09 RX ADMIN — PANTOPRAZOLE SODIUM 40 MILLIGRAM(S): 20 TABLET, DELAYED RELEASE ORAL at 06:12

## 2021-08-09 RX ADMIN — Medication 6 MILLIGRAM(S): at 06:11

## 2021-08-09 NOTE — PROGRESS NOTE ADULT - NSICDXPILOT_GEN_ALL_CORE
[place]

## 2021-08-09 NOTE — PROGRESS NOTE ADULT - SUBJECTIVE AND OBJECTIVE BOX
Follow-up Pulm Progress Note    Seen on Bipap 15/10/100%  No acute distress, o2 sat 90-91%  Wife at bedside    Medications:  MEDICATIONS  (STANDING):  budesonide 160 MICROgram(s)/formoterol 4.5 MICROgram(s) Inhaler 2 Puff(s) Inhalation two times a day  dexAMETHasone  Injectable 6 milliGRAM(s) IV Push daily  dextrose 5% + sodium chloride 0.9% with potassium chloride 20 mEq/L 1000 milliLiter(s) (50 mL/Hr) IV Continuous <Continuous>  enoxaparin Injectable 90 milliGRAM(s) SubCutaneous every 12 hours  guaiFENesin ER 1200 milliGRAM(s) Oral every 12 hours  melatonin 5 milliGRAM(s) Oral at bedtime  pantoprazole  Injectable 40 milliGRAM(s) IV Push daily    Vital Signs Last 24 Hrs  T(C): 36.4 (09 Aug 2021 11:14), Max: 36.8 (09 Aug 2021 05:41)  T(F): 97.5 (09 Aug 2021 11:14), Max: 98.3 (09 Aug 2021 05:41)  HR: 73 (09 Aug 2021 12:54) (63 - 83)  BP: 115/75 (09 Aug 2021 11:14) (107/65 - 136/87)  BP(mean): --  RR: 20 (09 Aug 2021 11:14) (20 - 20)  SpO2: 90% (09 Aug 2021 12:54) (90% - 97%)    ABG - ( 08 Aug 2021 00:42 )  pH, Arterial: 7.40  pH, Blood: x     /  pCO2: 41    /  pO2: 62    / HCO3: 25    / Base Excess: .6    /  SaO2: 90        08-08 @ 07:01  -  08-09 @ 07:00  --------------------------------------------------------  IN: 0 mL / OUT: 550 mL / NET: -550 mL    LABS:                        14.3   11.14 )-----------( 153      ( 09 Aug 2021 09:21 )             44.5     08-09    136  |  101  |  18  ----------------------------<  100<H>  4.5   |  24  |  0.72    Ca    8.4      09 Aug 2021 09:21  Mg     1.9     08-09    TPro  5.3<L>  /  Alb  2.9<L>  /  TBili  0.5  /  DBili  x   /  AST  51<H>  /  ALT  39  /  AlkPhos  110  08-09    CAPILLARY BLOOD GLUCOSE  POCT Blood Glucose.: 98 mg/dL (09 Aug 2021 08:48)    PT/INR - ( 08 Aug 2021 06:55 )   PT: 14.7 sec;   INR: 1.24 ratio      Physical Examination:  PULM: Decreased at bases   CVS: RRR     RADIOLOGY REVIEWED  CXR: 8/9 worsening b/l lung opacities       < from: [facility] Duplex Lower Ext Vein Scan, Bilat (08.04.21 @ 16:22) >    FINDINGS:    RIGHT:  Normal compressibilityof the RIGHT common femoral, femoral and popliteal veins.  Doppler examination shows normal spontaneous and phasic flow.  No RIGHT calf vein thrombosis is detected.    LEFT:  Normal compressibility of the LEFT common femoral, femoral and popliteal veins.  Doppler examination shows normal spontaneous and phasic flow.  No LEFT calf vein thrombosis is detected.    IMPRESSION:  No evidence of deep venous thrombosis in either lower extremity.    < end of copied text >

## 2021-08-09 NOTE — PROGRESS NOTE ADULT - SUBJECTIVE AND OBJECTIVE BOX
DATE OF SERVICE: 08-09-21 @ 12:08  CHIEF COMPLAINT:Patient is a 55y old  Male who presents with a chief complaint of covid (09 Aug 2021 09:13)    	        PAST MEDICAL & SURGICAL HISTORY:  Hyperlipidemia    HTN (hypertension)    Rupture, tendon, quadriceps    History of Achilles tendon repair            REVIEW OF SYSTEMS:  weak  RESPIRATORY: sob  CARDIOVASCULAR: No chest pain, palpitations, passing out, dizziness, or leg swelling  GASTROINTESTINAL: No abdominal or epigastric pain. No nausea, vomiting, or hematemesis;  GENITOURINARY: No dysuria, frequency, hematuria, or incontinence  NEUROLOGICAL: No headaches,    Medications:  MEDICATIONS  (STANDING):  budesonide 160 MICROgram(s)/formoterol 4.5 MICROgram(s) Inhaler 2 Puff(s) Inhalation two times a day  dexAMETHasone  Injectable 6 milliGRAM(s) IV Push daily  dextrose 5% + sodium chloride 0.9% with potassium chloride 20 mEq/L 1000 milliLiter(s) (50 mL/Hr) IV Continuous <Continuous>  enoxaparin Injectable 90 milliGRAM(s) SubCutaneous every 12 hours  guaiFENesin ER 1200 milliGRAM(s) Oral every 12 hours  melatonin 5 milliGRAM(s) Oral at bedtime  pantoprazole  Injectable 40 milliGRAM(s) IV Push daily    MEDICATIONS  (PRN):    	    PHYSICAL EXAM:  T(C): 36.4 (08-09-21 @ 11:14), Max: 36.8 (08-09-21 @ 05:41)  HR: 65 (08-09-21 @ 11:14) (63 - 83)  BP: 115/75 (08-09-21 @ 11:14) (103/71 - 136/87)  RR: 20 (08-09-21 @ 11:14) (20 - 20)  SpO2: 94% (08-09-21 @ 11:14) (90% - 97%)  Wt(kg): --  I&O's Summary    08 Aug 2021 07:01  -  09 Aug 2021 07:00  --------------------------------------------------------  IN: 0 mL / OUT: 550 mL / NET: -550 mL        Appearance: Normal	  HEENT:   Normal oral mucosa, PERRL, EOMI	  Lymphatic: No lymphadenopathy  Cardiovascular: Normal S1 S2, No JVD, No murmurs, No edema  Respiratory: dec bs   Gastrointestinal:  Soft, Non-tender, + BS	  Skin: No rashes, No ecchymoses, No cyanosis	  Neurologic: Non-focal  Extremities: Normal range of motion, No clubbing, cyanosis or edema  Vascular: Peripheral pulses palpable 2+ bilaterally    TELEMETRY: 	    ECG:  	  RADIOLOGY:  OTHER: 	  	  LABS:	 	    CARDIAC MARKERS:                                14.3   11.14 )-----------( 153      ( 09 Aug 2021 09:21 )             44.5     08-09    136  |  101  |  18  ----------------------------<  100<H>  4.5   |  24  |  0.72    Ca    8.4      09 Aug 2021 09:21  Mg     1.9     08-09    TPro  5.3<L>  /  Alb  2.9<L>  /  TBili  0.5  /  DBili  x   /  AST  51<H>  /  ALT  39  /  AlkPhos  110  08-09    proBNP:   Lipid Profile:   HgA1c:   TSH:

## 2021-08-09 NOTE — PROGRESS NOTE ADULT - PROBLEM SELECTOR PLAN 2
2nd to COVID PNA  -S/p RRT 8/3 and 8/4 for hypoxia.   -Per RN was able to tolerate 5-6 hrs of HFNC yesterday, but has not been able to tolerate HFNC this AM.  -Can attempt HFNC again if able  -Bipap 15/10/100% qHS and PRN, HFNC 60L/100% while off bipap   -Keep sats >88% with O2 as able  -Prognosis guarded

## 2021-08-09 NOTE — CHART NOTE - NSCHARTNOTEFT_GEN_A_CORE
DEEJAY KARISSA  55y Male  Patient is a 55y old  Male who presents with a chief complaint of COVID (08 Aug 2021 10:59)    Event Summary:  Reviewed patient's AM labs, notable for K 5.9 (not hemolyzed) & glucose 430s. Patient seen & examined, inquired about ? DM hx, however patient denies. Bedside fingerstick check x 2 are 98 & 100.  BMP & CBC to be repeated STAT for concern for lab or collection error. Patient & wife informed of plan via FaceTime. Patient's respiratory status unchanged from yesterday.    Nathaniel Saintus DNP, AGACNP-BC, FNP-BC  #[phone]

## 2021-08-09 NOTE — PROGRESS NOTE ADULT - PROBLEM SELECTOR PLAN 1
+COVID PCR as an outpatient  -CXR 8/9 with worsening b/l lung opacities   -S/p Remdesivir x 5 days   -Decadron 6mg IVP qd x 10 days (to complete tomorrow 8/10)  -Trend inflammatory markers  -S/p Tocilizumab 7/30 per ID for worsening hypoxic respiratory failure  -Self proning as tolerated  -Incentive spirometer  -Sputum culture with normal respiratory aba. WBC normal, PCT normal, afebrile   -Mucinex 1200mg PO BID   -LE duplex 8/4 neg DVT. C/w empiric full dose AC with Lovenox 1mg/kg BID for now as ddimer keeps increasing, too unstable for transport to CTA chest. Continue to trend ddimer.

## 2021-08-09 NOTE — PROGRESS NOTE ADULT - ASSESSMENT
55 M wiyh covid     Had episodic desaturation       completed remdesivir, continue decadron and proning   completed 5 days of remdesivir    no other good options   sp toci    situation remains' difficult     continue bicap   On enoxaparin for DVT prophylaxis (D-dimer uo) Duplex negative for DVT  LDH increased, ferritin declining  Sputum with few polys G+ and g-s, likely colonization    no additional therapy complete 10 days of decadron today

## 2021-08-09 NOTE — PROGRESS NOTE ADULT - ASSESSMENT
pt w/ covid  hypoxia  iv steroids   s/p remdesivir  id / f/u noted  pulm f/u   on bipap this am   c/e resp support/  additional tx as per id/pulm / micu  recs  s/p toci  gi / dvt proph  o2  hx htn/   monitor bp  monitor inflammatory markers  f/u cxr noted  discussed w/ wife at length this am   prognosis guarded

## 2021-08-09 NOTE — PROGRESS NOTE ADULT - SUBJECTIVE AND OBJECTIVE BOX
infectious diseases progress note:    Patient is a 55y old  Male who presents with a chief complaint of COVID (08 Aug 2021 10:59)        Infection due to severe acute respiratory syndrome coronavirus 2 (SARS-CoV-2)             Allergies    No Known Allergies    Intolerances        ANTIBIOTICS/RELEVANT:  antimicrobials    immunologic:    OTHER:  dexAMETHasone  Injectable 6 milliGRAM(s) IV Push daily  enoxaparin Injectable 90 milliGRAM(s) SubCutaneous every 12 hours  guaiFENesin ER 1200 milliGRAM(s) Oral every 12 hours  melatonin 5 milliGRAM(s) Oral at bedtime  pantoprazole    Tablet 40 milliGRAM(s) Oral before breakfast      Objective:  Vital Signs Last 24 Hrs  T(C): 36.8 (09 Aug 2021 05:41), Max: 36.8 (09 Aug 2021 05:41)  T(F): 98.3 (09 Aug 2021 05:41), Max: 98.3 (09 Aug 2021 05:41)  HR: 74 (09 Aug 2021 08:38) (67 - 83)  BP: 107/65 (09 Aug 2021 05:41) (103/71 - 136/87)  BP(mean): --  RR: 20 (09 Aug 2021 08:38) (20 - 20)           LABS:                        12.5   9.42  )-----------( 127      ( 09 Aug 2021 07:01 )             39.4     08-09    134<L>  |  107  |  16  ----------------------------<  437<H>  5.9<H>   |  21<L>  |  0.61    Ca    7.3<L>      09 Aug 2021 07:01  Mg     1.9     08-09    TPro  5.3<L>  /  Alb  2.9<L>  /  TBili  0.5  /  DBili  x   /  AST  51<H>  /  ALT  39  /  AlkPhos  110  08-09    PT/INR - ( 08 Aug 2021 06:55 )   PT: 14.7 sec;   INR: 1.24 ratio                 MICROBIOLOGY:    RECENT CULTURES:  08-04 @ 09:06 .Sputum Sputum, cup       Few polymorphonuclear leukocytes per low power field  Rare Squamous epithelial cells per low power field  Moderate Gram Positive Cocci in Pairs and Chains per oil power field  Rare Gram Negative Rods per oil power field           Normal Respiratory Tiki present          RESPIRATORY CULTURES:              RADIOLOGY & ADDITIONAL STUDIES:        Pager [phone]  After 5 pm/weekends or if no response :[phone]

## 2021-08-10 LAB
A1C WITH ESTIMATED AVERAGE GLUCOSE RESULT: 6 % — HIGH (ref 4–5.6)
ALBUMIN SERPL ELPH-MCNC: 3.5 G/DL — SIGNIFICANT CHANGE UP (ref 3.3–5)
ALP SERPL-CCNC: 153 U/L — HIGH (ref 40–120)
ALT FLD-CCNC: 59 U/L — HIGH (ref 10–45)
ANION GAP SERPL CALC-SCNC: 13 MMOL/L — SIGNIFICANT CHANGE UP (ref 5–17)
AST SERPL-CCNC: 69 U/L — HIGH (ref 10–40)
BILIRUB SERPL-MCNC: 0.7 MG/DL — SIGNIFICANT CHANGE UP (ref 0.2–1.2)
BUN SERPL-MCNC: 15 MG/DL — SIGNIFICANT CHANGE UP (ref 7–23)
CALCIUM SERPL-MCNC: 8.9 MG/DL — SIGNIFICANT CHANGE UP (ref 8.4–10.5)
CHLORIDE SERPL-SCNC: 97 MMOL/L — SIGNIFICANT CHANGE UP (ref 96–108)
CO2 SERPL-SCNC: 23 MMOL/L — SIGNIFICANT CHANGE UP (ref 22–31)
CREAT SERPL-MCNC: 0.73 MG/DL — SIGNIFICANT CHANGE UP (ref 0.5–1.3)
CRP SERPL-MCNC: <3 MG/L — SIGNIFICANT CHANGE UP (ref 0–4)
ERYTHROCYTE [SEDIMENTATION RATE] IN BLOOD: 13 MM/HR — SIGNIFICANT CHANGE UP (ref 0–20)
ESTIMATED AVERAGE GLUCOSE: 126 MG/DL — HIGH (ref 68–114)
GLUCOSE BLDC GLUCOMTR-MCNC: 95 MG/DL — SIGNIFICANT CHANGE UP (ref 70–99)
GLUCOSE SERPL-MCNC: 89 MG/DL — SIGNIFICANT CHANGE UP (ref 70–99)
HCT VFR BLD CALC: 43.5 % — SIGNIFICANT CHANGE UP (ref 39–50)
HGB BLD-MCNC: 14.1 G/DL — SIGNIFICANT CHANGE UP (ref 13–17)
MAGNESIUM SERPL-MCNC: 2.3 MG/DL — SIGNIFICANT CHANGE UP (ref 1.6–2.6)
MCHC RBC-ENTMCNC: 28.1 PG — SIGNIFICANT CHANGE UP (ref 27–34)
MCHC RBC-ENTMCNC: 32.4 GM/DL — SIGNIFICANT CHANGE UP (ref 32–36)
MCV RBC AUTO: 86.8 FL — SIGNIFICANT CHANGE UP (ref 80–100)
NRBC # BLD: 0 /100 WBCS — SIGNIFICANT CHANGE UP (ref 0–0)
PHOSPHATE SERPL-MCNC: 3.2 MG/DL — SIGNIFICANT CHANGE UP (ref 2.5–4.5)
PLATELET # BLD AUTO: 142 K/UL — LOW (ref 150–400)
POTASSIUM SERPL-MCNC: 4.1 MMOL/L — SIGNIFICANT CHANGE UP (ref 3.5–5.3)
POTASSIUM SERPL-SCNC: 4.1 MMOL/L — SIGNIFICANT CHANGE UP (ref 3.5–5.3)
PROT SERPL-MCNC: 6.3 G/DL — SIGNIFICANT CHANGE UP (ref 6–8.3)
RBC # BLD: 5.01 M/UL — SIGNIFICANT CHANGE UP (ref 4.2–5.8)
RBC # FLD: 12.4 % — SIGNIFICANT CHANGE UP (ref 10.3–14.5)
SODIUM SERPL-SCNC: 133 MMOL/L — LOW (ref 135–145)
WBC # BLD: 10.27 K/UL — SIGNIFICANT CHANGE UP (ref 3.8–10.5)
WBC # FLD AUTO: 10.27 K/UL — SIGNIFICANT CHANGE UP (ref 3.8–10.5)

## 2021-08-10 PROCEDURE — 99291 CRITICAL CARE FIRST HOUR: CPT

## 2021-08-10 PROCEDURE — 99232 SBSQ HOSP IP/OBS MODERATE 35: CPT

## 2021-08-10 RX ORDER — POLYETHYLENE GLYCOL 3350 17 G/17G
17 POWDER, FOR SOLUTION ORAL DAILY
Refills: 0 | Status: DISCONTINUED | OUTPATIENT
Start: 2021-08-10 | End: 2021-08-26

## 2021-08-10 RX ORDER — INSULIN LISPRO 100/ML
VIAL (ML) SUBCUTANEOUS EVERY 6 HOURS
Refills: 0 | Status: DISCONTINUED | OUTPATIENT
Start: 2021-08-10 | End: 2021-08-11

## 2021-08-10 RX ORDER — SENNA PLUS 8.6 MG/1
2 TABLET ORAL AT BEDTIME
Refills: 0 | Status: DISCONTINUED | OUTPATIENT
Start: 2021-08-10 | End: 2021-08-26

## 2021-08-10 RX ORDER — ENOXAPARIN SODIUM 100 MG/ML
40 INJECTION SUBCUTANEOUS
Refills: 0 | Status: DISCONTINUED | OUTPATIENT
Start: 2021-08-10 | End: 2021-08-11

## 2021-08-10 RX ORDER — CHLORHEXIDINE GLUCONATE 213 G/1000ML
1 SOLUTION TOPICAL
Refills: 0 | Status: DISCONTINUED | OUTPATIENT
Start: 2021-08-10 | End: 2021-08-19

## 2021-08-10 RX ADMIN — CHLORHEXIDINE GLUCONATE 1 APPLICATION(S): 213 SOLUTION TOPICAL at 21:00

## 2021-08-10 RX ADMIN — ENOXAPARIN SODIUM 90 MILLIGRAM(S): 100 INJECTION SUBCUTANEOUS at 05:23

## 2021-08-10 RX ADMIN — ENOXAPARIN SODIUM 90 MILLIGRAM(S): 100 INJECTION SUBCUTANEOUS at 17:26

## 2021-08-10 RX ADMIN — Medication 1200 MILLIGRAM(S): at 05:23

## 2021-08-10 RX ADMIN — BUDESONIDE AND FORMOTEROL FUMARATE DIHYDRATE 2 PUFF(S): 160; 4.5 AEROSOL RESPIRATORY (INHALATION) at 05:25

## 2021-08-10 RX ADMIN — PANTOPRAZOLE SODIUM 40 MILLIGRAM(S): 20 TABLET, DELAYED RELEASE ORAL at 12:49

## 2021-08-10 RX ADMIN — DEXTROSE MONOHYDRATE, SODIUM CHLORIDE, AND POTASSIUM CHLORIDE 50 MILLILITER(S): 50; .745; 4.5 INJECTION, SOLUTION INTRAVENOUS at 12:51

## 2021-08-10 RX ADMIN — Medication 1200 MILLIGRAM(S): at 18:30

## 2021-08-10 RX ADMIN — Medication 6 MILLIGRAM(S): at 05:22

## 2021-08-10 NOTE — CHART NOTE - NSCHARTNOTEFT_GEN_A_CORE
COVID ICU Accept Note  ---------------------------  Transfer from: [facility]  Accepted by: [facility] ICU  Accepting Physician: Dr. Ben Viera     [facility] COURSE:        OBJECTIVE --  Vital Signs Last 24 Hrs  T(C): 36.9 (10 Aug 2021 19:00), Max: 37 (10 Aug 2021 10:40)  T(F): 98.5 (10 Aug 2021 19:00), Max: 98.6 (10 Aug 2021 10:40)  HR: 52 (10 Aug 2021 21:00) (52 - 83)  BP: 130/63 (10 Aug 2021 21:00) (97/55 - 130/63)  BP(mean): 89 (10 Aug 2021 21:00) (69 - 98)  RR: 26 (10 Aug 2021 21:00) (20 - 37)  SpO2: 95% (10 Aug 2021 21:00) (87% - 99%)    I&O's Summary    09 Aug 2021 07:01  -  10 Aug 2021 07:00  --------------------------------------------------------  IN: 650 mL / OUT: 520 mL / NET: 130 mL    10 Aug 2021 07:01  -  10 Aug 2021 21:36  --------------------------------------------------------  IN: 970 mL / OUT: 1475 mL / NET: -505 mL        MEDICATIONS  (STANDING):  budesonide 160 MICROgram(s)/formoterol 4.5 MICROgram(s) Inhaler 2 Puff(s) Inhalation two times a day  chlorhexidine 4% Liquid 1 Application(s) Topical <User Schedule>  dextrose 5% + sodium chloride 0.9% with potassium chloride 20 mEq/L 1000 milliLiter(s) (50 mL/Hr) IV Continuous <Continuous>  enoxaparin Injectable 40 milliGRAM(s) SubCutaneous two times a day  guaiFENesin ER 1200 milliGRAM(s) Oral every 12 hours  insulin lispro (ADMELOG) corrective regimen sliding scale   SubCutaneous every 6 hours  pantoprazole  Injectable 40 milliGRAM(s) IV Push daily  polyethylene glycol 3350 17 Gram(s) Oral daily  senna 2 Tablet(s) Oral at bedtime    MEDICATIONS  (PRN):      LABS                                            14.1                  Neurophils% (auto):   x      (08-10 @ 07:08):    10.27)-----------(142          Lymphocytes% (auto):  x                                             43.5                   Eosinphils% (auto):   x        Manual%: Neutrophils x    ; Lymphocytes x    ; Eosinophils x    ; Bands%: x    ; Blasts x                                    133    |  97     |  15                  Calcium: 8.9   / iCa: x      (08-10 @ 07:02)    ----------------------------<  89        Magnesium: 2.3                              4.1     |  23     |  0.73             Phosphorous: 3.2      TPro  6.3    /  Alb  3.5    /  TBili  0.7    /  DBili  x      /  AST  69     /  ALT  59     /  AlkPhos  153    10 Aug 2021 07:02        ASSESSMENT & PLAN:   55y-year-old Male with a past medical history of HTN, RLL DVT and Pe ( not on AC at home) admitted for acute respiratory failure secondary to COVID-19, now requiring HFNC with nocturnal BiPAP for oxygen supplementation, admitted to ICU for further assessment and management.     NEUROLOGY:  - A,Ox4 at baseline; No Active Issues     PULMONARY:  Acute Respiratory Failure  - Continue HFNC 100%/60L with Nocturnal BiPAP   - Titrate FiO2 and PEEP as tolerated.     CARDIOLOGY:  Hypotension  - Levophed to maintain MAP > 65.     GASTROINTESTINAL:  - Continue Regular Diet   - Continue   - Start Bowel Regimen with Miralax and Senna     RENAL/:  - Santana  - Strict I&Os.     INFECTIOUS DISEASE:  -   -  COVID-19  - Continue with Remdesvmir and Dexamethasone to fully complete course (    HEMATOLOGY:  - VTE prophylaxis with Lovenox 40 BID   -      ENDOCRINE  - Glucose checks Q6 while on tube feeds.   - Low Dose SSI  - Add NPH as needed.     ETHICS/DISPOSITION:  - Full code.   - Transfer to COVID ICU .     LINES/DRAINS/TUBES/DEVICES:  -   -    Above assesment and plan of care performed, created, and reviewed in real time with ICU attending physician  ____.     Celestino Padilla NP COVID ICU Accept Note  ---------------------------  Transfer from: [facility]  Accepted by: COVID ICU  Accepting Physician: Dr. Ben Viera     [facility] [place] COURSE:  56 y/o M with PMH of HTN, DVT and PE ( not on AC at home), s/p Achilles Tendon repair years ago wiecent diagnosis of COVID-19 on 7/27/2021 presented with complaints of SOB, intermittent fevers, productive cough with white sputum and reports to be unvaccinated for COVID-19.       OBJECTIVE --  Vital Signs Last 24 Hrs  T(C): 36.9 (10 Aug 2021 19:00), Max: 37 (10 Aug 2021 10:40)  T(F): 98.5 (10 Aug 2021 19:00), Max: 98.6 (10 Aug 2021 10:40)  HR: 52 (10 Aug 2021 21:00) (52 - 83)  BP: 130/63 (10 Aug 2021 21:00) (97/55 - 130/63)  BP(mean): 89 (10 Aug 2021 21:00) (69 - 98)  RR: 26 (10 Aug 2021 21:00) (20 - 37)  SpO2: 95% (10 Aug 2021 21:00) (87% - 99%)    I&O's Summary    09 Aug 2021 07:01  -  10 Aug 2021 07:00  --------------------------------------------------------  IN: 650 mL / OUT: 520 mL / NET: 130 mL    10 Aug 2021 07:01  -  10 Aug 2021 21:36  --------------------------------------------------------  IN: 970 mL / OUT: 1475 mL / NET: -505 mL        MEDICATIONS  (STANDING):  budesonide 160 MICROgram(s)/formoterol 4.5 MICROgram(s) Inhaler 2 Puff(s) Inhalation two times a day  chlorhexidine 4% Liquid 1 Application(s) Topical <User Schedule>  dextrose 5% + sodium chloride 0.9% with potassium chloride 20 mEq/L 1000 milliLiter(s) (50 mL/Hr) IV Continuous <Continuous>  enoxaparin Injectable 40 milliGRAM(s) SubCutaneous two times a day  guaiFENesin ER 1200 milliGRAM(s) Oral every 12 hours  insulin lispro (ADMELOG) corrective regimen sliding scale   SubCutaneous every 6 hours  pantoprazole  Injectable 40 milliGRAM(s) IV Push daily  polyethylene glycol 3350 17 Gram(s) Oral daily  senna 2 Tablet(s) Oral at bedtime    MEDICATIONS  (PRN):      LABS                                            14.1                  Neurophils% (auto):   x      (08-10 @ 07:08):    10.27)-----------(142          Lymphocytes% (auto):  x                                             43.5                   Eosinphils% (auto):   x        Manual%: Neutrophils x    ; Lymphocytes x    ; Eosinophils x    ; Bands%: x    ; Blasts x                                    133    |  97     |  15                  Calcium: 8.9   / iCa: x      (08-10 @ 07:02)    ----------------------------<  89        Magnesium: 2.3                              4.1     |  23     |  0.73             Phosphorous: 3.2      TPro  6.3    /  Alb  3.5    /  TBili  0.7    /  DBili  x      /  AST  69     /  ALT  59     /  AlkPhos  153    10 Aug 2021 07:02        ASSESSMENT & PLAN:   55y-year-old Male with a past medical history of HTN, RLL DVT and Pe ( not on AC at home) admitted for acute respiratory failure secondary to COVID-19, now requiring HFNC with nocturnal BiPAP for oxygen supplementation, admitted to ICU for further assessment and management.     NEUROLOGY:  - A,Ox4 at baseline; No Active Issues     PULMONARY:  Acute Respiratory Failure  - Continue HFNC 100%/60L with Nocturnal BiPAP   - Titrate FiO2 and PEEP as tolerated.     CARDIOLOGY:  Hypotension  - Levophed to maintain MAP > 65.     GASTROINTESTINAL:  - Continue Regular Diet   - Continue   - Start Bowel Regimen with Miralax and Senna     RENAL/:  - Santana  - Strict I&Os.     INFECTIOUS DISEASE:  -   -  COVID-19  - Continue with Remdesvmir and Dexamethasone to fully complete course (    HEMATOLOGY:  - VTE prophylaxis with Lovenox 40 BID   -      ENDOCRINE  - Glucose checks Q6 while on tube feeds.   - Low Dose SSI  - Add NPH as needed.     ETHICS/DISPOSITION:  - Full code.   - Transfer to [facility] ICU .     LINES/DRAINS/TUBES/DEVICES:  -   -    Above assesment and plan of care performed, created, and reviewed in real time with ICU attending physician  ____.     Celestino Padilla NP COVID ICU Accept Note  ---------------------------  Transfer from: [facility]  Accepted by: COVID ICU  Accepting Physician: Dr. Ben Viera     [facility] COURSE:  56 y/o M with PMH of HTN, DVT and PE ( not on AC at home), s/p Achilles Tendon repair years ago presented on 07/29/21 with complaints of SOB, intermittent fevers, productive cough with white sputum and reports to be unvaccinated for COVID-19.       OBJECTIVE --  Vital Signs Last 24 Hrs  T(C): 36.9 (10 Aug 2021 19:00), Max: 37 (10 Aug 2021 10:40)  T(F): 98.5 (10 Aug 2021 19:00), Max: 98.6 (10 Aug 2021 10:40)  HR: 52 (10 Aug 2021 21:00) (52 - 83)  BP: 130/63 (10 Aug 2021 21:00) (97/55 - 130/63)  BP(mean): 89 (10 Aug 2021 21:00) (69 - 98)  RR: 26 (10 Aug 2021 21:00) (20 - 37)  SpO2: 95% (10 Aug 2021 21:00) (87% - 99%)    I&O's Summary    09 Aug 2021 07:01  -  10 Aug 2021 07:00  --------------------------------------------------------  IN: 650 mL / OUT: 520 mL / NET: 130 mL    10 Aug 2021 07:01  -  10 Aug 2021 21:36  --------------------------------------------------------  IN: 970 mL / OUT: 1475 mL / NET: -505 mL        MEDICATIONS  (STANDING):  budesonide 160 MICROgram(s)/formoterol 4.5 MICROgram(s) Inhaler 2 Puff(s) Inhalation two times a day  chlorhexidine 4% Liquid 1 Application(s) Topical <User Schedule>  dextrose 5% + sodium chloride 0.9% with potassium chloride 20 mEq/L 1000 milliLiter(s) (50 mL/Hr) IV Continuous <Continuous>  enoxaparin Injectable 40 milliGRAM(s) SubCutaneous two times a day  guaiFENesin ER 1200 milliGRAM(s) Oral every 12 hours  insulin lispro (ADMELOG) corrective regimen sliding scale   SubCutaneous every 6 hours  pantoprazole  Injectable 40 milliGRAM(s) IV Push daily  polyethylene glycol 3350 17 Gram(s) Oral daily  senna 2 Tablet(s) Oral at bedtime    MEDICATIONS  (PRN):      LABS                                            14.1                  Neurophils% (auto):   x      (08-10 @ 07:08):    10.27)-----------(142          Lymphocytes% (auto):  x                                             43.5                   Eosinphils% (auto):   x        Manual%: Neutrophils x    ; Lymphocytes x    ; Eosinophils x    ; Bands%: x    ; Blasts x                                    133    |  97     |  15                  Calcium: 8.9   / iCa: x      (08-10 @ 07:02)    ----------------------------<  89        Magnesium: 2.3                              4.1     |  23     |  0.73             Phosphorous: 3.2      TPro  6.3    /  Alb  3.5    /  TBili  0.7    /  DBili  x      /  AST  69     /  ALT  59     /  AlkPhos  153    10 Aug 2021 07:02        ASSESSMENT & PLAN:   55y-year-old Male with a past medical history of HTN, RLL DVT and Pe ( not on AC at home) admitted for acute respiratory failure secondary to COVID-19, now requiring HFNC with nocturnal BiPAP for oxygen supplementation, admitted to ICU for further assessment and management.     NEUROLOGY:  - A,Ox4 at baseline; No Active Issues     PULMONARY:  Acute Respiratory Failure  - Continue HFNC 100%/60L with Nocturnal BiPAP   - Titrate FiO2 and PEEP as tolerated.     CARDIOLOGY:  Hypotension  - Levophed to maintain MAP > 65.     GASTROINTESTINAL:  - Continue Regular Diet   - Continue   - Start Bowel Regimen with Miralax and Senna     RENAL/:  - Santana  - Strict I&Os.     INFECTIOUS DISEASE:  -   -  COVID-19  - Continue with Remdesvmir and Dexamethasone to fully complete course (    HEMATOLOGY:  - VTE prophylaxis with Lovenox 40 BID   -      ENDOCRINE  - Glucose checks Q6 while on tube feeds.   - Low Dose SSI  - Add NPH as needed.     ETHICS/DISPOSITION:  - Full code.   - Transfer to [facility] ICU .     LINES/DRAINS/TUBES/DEVICES:  -   -    Above assesment and plan of care performed, created, and reviewed in real time with ICU attending physician  ____.     Celestino Padilla NP COVID ICU Accept Note  ---------------------------  Transfer from: [facility]  Accepted by: COVID ICU  Accepting Physician: Dr. Calvillo     HOSPITAL COURSE:  54 y/o M with PMH of HTN, DVT and PE ( not on AC at home), s/p Achilles Tendon repair years ago presented on 07/29/21 with complaints of SOB, intermittent fevers, and productive cough with white sputum. Patient states he was not vaccinated for COVID-19 and was recently diagnosed on 07/27/21 as outpatient. Patient admitted for COVID PNA completing 5 day course of Remdesivir and 10 day course of Decadron. Hospital Course c/b RRT called x2 for hypoxia desaturating to 70s while on HFNC requiring BiPAP      OBJECTIVE --  Vital Signs Last 24 Hrs  T(C): 36.9 (10 Aug 2021 19:00), Max: 37 (10 Aug 2021 10:40)  T(F): 98.5 (10 Aug 2021 19:00), Max: 98.6 (10 Aug 2021 10:40)  HR: 52 (10 Aug 2021 21:00) (52 - 83)  BP: 130/63 (10 Aug 2021 21:00) (97/55 - 130/63)  BP(mean): 89 (10 Aug 2021 21:00) (69 - 98)  RR: 26 (10 Aug 2021 21:00) (20 - 37)  SpO2: 95% (10 Aug 2021 21:00) (87% - 99%)    I&O's Summary    09 Aug 2021 07:01  -  10 Aug 2021 07:00  --------------------------------------------------------  IN: 650 mL / OUT: 520 mL / NET: 130 mL    10 Aug 2021 07:01  -  10 Aug 2021 21:36  --------------------------------------------------------  IN: 970 mL / OUT: 1475 mL / NET: -505 mL        MEDICATIONS  (STANDING):  budesonide 160 MICROgram(s)/formoterol 4.5 MICROgram(s) Inhaler 2 Puff(s) Inhalation two times a day  chlorhexidine 4% Liquid 1 Application(s) Topical <User Schedule>  dextrose 5% + sodium chloride 0.9% with potassium chloride 20 mEq/L 1000 milliLiter(s) (50 mL/Hr) IV Continuous <Continuous>  enoxaparin Injectable 40 milliGRAM(s) SubCutaneous two times a day  guaiFENesin ER 1200 milliGRAM(s) Oral every 12 hours  insulin lispro (ADMELOG) corrective regimen sliding scale   SubCutaneous every 6 hours  pantoprazole  Injectable 40 milliGRAM(s) IV Push daily  polyethylene glycol 3350 17 Gram(s) Oral daily  senna 2 Tablet(s) Oral at bedtime    MEDICATIONS  (PRN):      LABS                                            14.1                  Neurophils% (auto):   x      (08-10 @ 07:08):    10.27)-----------(142          Lymphocytes% (auto):  x                                             43.5                   Eosinphils% (auto):   x        Manual%: Neutrophils x    ; Lymphocytes x    ; Eosinophils x    ; Bands%: x    ; Blasts x                                    133    |  97     |  15                  Calcium: 8.9   / iCa: x      (08-10 @ 07:02)    ----------------------------<  89        Magnesium: 2.3                              4.1     |  23     |  0.73             Phosphorous: 3.2      TPro  6.3    /  Alb  3.5    /  TBili  0.7    /  DBili  x      /  AST  69     /  ALT  59     /  AlkPhos  153    10 Aug 2021 07:02        ASSESSMENT & PLAN:   55y-year-old Male with a past medical history of HTN, RLL DVT and Pe ( not on AC at home) admitted for acute respiratory failure secondary to COVID-19, now requiring HFNC with nocturnal BiPAP for oxygen supplementation, admitted to ICU for further assessment and management.     NEUROLOGY:  - A,Ox4 at baseline; No Active Issues     PULMONARY:  Acute Respiratory Failure  - Continue HFNC 100%/60L with Nocturnal BiPAP   - Titrate FiO2 and PEEP as tolerated.     CARDIOLOGY:  Hypotension  - Levophed to maintain MAP > 65.     GASTROINTESTINAL:  - Continue Regular Diet   - Continue   - Start Bowel Regimen with Miralax and Senna     RENAL/:  - Santana  - Strict I&Os.     INFECTIOUS DISEASE:  -   -  COVID-19  - Continue with Remdesvmir and Dexamethasone to fully complete course (    HEMATOLOGY:  - VTE prophylaxis with Lovenox 40 BID   -      ENDOCRINE  - Glucose checks Q6 while on tube feeds.   - Low Dose SSI  - Add NPH as needed.     ETHICS/DISPOSITION:  - Full code.   - Transfer to [facility] ICU .     LINES/DRAINS/TUBES/DEVICES:  -   -    Above assesment and plan of care performed, created, and reviewed in real time with ICU attending physician  ____.     Celestino Padilla NP COVID ICU Accept Note  ---------------------------  Transfer from: [facility]  Accepted by: [facility] ICU  Accepting Physician: Dr. Calvillo     HOSPITAL COURSE:  56 y/o M with PMH of HTN, DVT and PE ( not on AC at home), s/p Achilles Tendon repair years ago presented on 07/29/21 with complaints of SOB, intermittent fevers, and productive cough with white sputum. Patient states he was not vaccinated for COVID-19 and was recently diagnosed on 07/27/21 as outpatient. Patient admitted for [facility] PNA completing 5 day course of Remdesivir and 10 day course of Decadron. Hospital Course c/b RRT called x2 for hypoxia desaturating to 70s while on HFNC requiring BiPAP. Patient transferred to [facility] ICU for continuing assessment and management.       OBJECTIVE --  Vital Signs Last 24 Hrs  T(C): 36.9 (10 Aug 2021 19:00), Max: 37 (10 Aug 2021 10:40)  T(F): 98.5 (10 Aug 2021 19:00), Max: 98.6 (10 Aug 2021 10:40)  HR: 52 (10 Aug 2021 21:00) (52 - 83)  BP: 130/63 (10 Aug 2021 21:00) (97/55 - 130/63)  BP(mean): 89 (10 Aug 2021 21:00) (69 - 98)  RR: 26 (10 Aug 2021 21:00) (20 - 37)  SpO2: 95% (10 Aug 2021 21:00) (87% - 99%)    I&O's Summary    09 Aug 2021 07:01  -  10 Aug 2021 07:00  --------------------------------------------------------  IN: 650 mL / OUT: 520 mL / NET: 130 mL    10 Aug 2021 07:01  -  10 Aug 2021 21:36  --------------------------------------------------------  IN: 970 mL / OUT: 1475 mL / NET: -505 mL        MEDICATIONS  (STANDING):  budesonide 160 MICROgram(s)/formoterol 4.5 MICROgram(s) Inhaler 2 Puff(s) Inhalation two times a day  chlorhexidine 4% Liquid 1 Application(s) Topical <User Schedule>  dextrose 5% + sodium chloride 0.9% with potassium chloride 20 mEq/L 1000 milliLiter(s) (50 mL/Hr) IV Continuous <Continuous>  enoxaparin Injectable 40 milliGRAM(s) SubCutaneous two times a day  guaiFENesin ER 1200 milliGRAM(s) Oral every 12 hours  insulin lispro (ADMELOG) corrective regimen sliding scale   SubCutaneous every 6 hours  pantoprazole  Injectable 40 milliGRAM(s) IV Push daily  polyethylene glycol 3350 17 Gram(s) Oral daily  senna 2 Tablet(s) Oral at bedtime    MEDICATIONS  (PRN):      LABS                                            14.1                  Neurophils% (auto):   x      (08-10 @ 07:08):    10.27)-----------(142          Lymphocytes% (auto):  x                                             43.5                   Eosinphils% (auto):   x        Manual%: Neutrophils x    ; Lymphocytes x    ; Eosinophils x    ; Bands%: x    ; Blasts x                                    133    |  97     |  15                  Calcium: 8.9   / iCa: x      (08-10 @ 07:02)    ----------------------------<  89        Magnesium: 2.3                              4.1     |  23     |  0.73             Phosphorous: 3.2      TPro  6.3    /  Alb  3.5    /  TBili  0.7    /  DBili  x      /  AST  69     /  ALT  59     /  AlkPhos  153    10 Aug 2021 07:02        ASSESSMENT & PLAN:   55y-year-old Male with a past medical history of HTN, RLL DVT and Pe ( not on AC at home) admitted for acute respiratory failure secondary to COVID-19, now requiring HFNC with nocturnal BiPAP for oxygen supplementation, admitted to ICU for further assessment and management.     NEUROLOGY:  - A,Ox4 at baseline; No Active Issues     PULMONARY:  Acute Respiratory Failure  - Continue HFNC 100%/60L with Nocturnal BiPAP   - Titrate FiO2 and PEEP as tolerated.     CARDIOLOGY:  Hypotension  - Levophed to maintain MAP > 65.     GASTROINTESTINAL:  - Continue Regular Diet   - Continue   - Start Bowel Regimen with Miralax and Senna     RENAL/:  - Santana  - Strict I&Os.     INFECTIOUS DISEASE:  -   -  COVID-19  - Continue with Remdesvmir and Dexamethasone to fully complete course (    HEMATOLOGY:  - VTE prophylaxis with Lovenox 40 BID   -      ENDOCRINE  - Glucose checks Q6 while on tube feeds.   - Low Dose SSI  - Add NPH as needed.     ETHICS/DISPOSITION:  - Full code.   - Transfer to [facility] ICU .     LINES/DRAINS/TUBES/DEVICES:  -   -    Above assesment and plan of care performed, created, and reviewed in real time with ICU attending physician  ____.     Celestino Padilla NP COVID ICU Accept Note  ---------------------------  Transfer from: [facility]  Accepted by: [facility] ICU  Accepting Physician: Dr. Calvillo     HOSPITAL COURSE:  56 y/o M with PMH of HTN, DVT and PE ( not on AC at home), s/p Achilles Tendon repair years ago presented on 07/29/21 with complaints of SOB, intermittent fevers, and productive cough with white sputum for 5 days. Patient states he was not vaccinated for COVID-19 and was recently diagnosed on 07/27/21 as an outpatient. Patient admitted for COVID PNA completing 5 day course of Remdesivir and 10 day course of Decadron. Hospital Course c/b RRT called x2 for hypoxia desaturating to 70s while on HFNC requiring BiPAP. Patient transferred to [facility] ICU for continuing assessment and management.       OBJECTIVE --  Vital Signs Last 24 Hrs  T(C): 36.9 (10 Aug 2021 19:00), Max: 37 (10 Aug 2021 10:40)  T(F): 98.5 (10 Aug 2021 19:00), Max: 98.6 (10 Aug 2021 10:40)  HR: 52 (10 Aug 2021 21:00) (52 - 83)  BP: 130/63 (10 Aug 2021 21:00) (97/55 - 130/63)  BP(mean): 89 (10 Aug 2021 21:00) (69 - 98)  RR: 26 (10 Aug 2021 21:00) (20 - 37)  SpO2: 95% (10 Aug 2021 21:00) (87% - 99%)    I&O's Summary    09 Aug 2021 07:01  -  10 Aug 2021 07:00  --------------------------------------------------------  IN: 650 mL / OUT: 520 mL / NET: 130 mL    10 Aug 2021 07:01  -  10 Aug 2021 21:36  --------------------------------------------------------  IN: 970 mL / OUT: 1475 mL / NET: -505 mL        MEDICATIONS  (STANDING):  budesonide 160 MICROgram(s)/formoterol 4.5 MICROgram(s) Inhaler 2 Puff(s) Inhalation two times a day  chlorhexidine 4% Liquid 1 Application(s) Topical <User Schedule>  dextrose 5% + sodium chloride 0.9% with potassium chloride 20 mEq/L 1000 milliLiter(s) (50 mL/Hr) IV Continuous <Continuous>  enoxaparin Injectable 40 milliGRAM(s) SubCutaneous two times a day  guaiFENesin ER 1200 milliGRAM(s) Oral every 12 hours  insulin lispro (ADMELOG) corrective regimen sliding scale   SubCutaneous every 6 hours  pantoprazole  Injectable 40 milliGRAM(s) IV Push daily  polyethylene glycol 3350 17 Gram(s) Oral daily  senna 2 Tablet(s) Oral at bedtime    MEDICATIONS  (PRN):      LABS                                            14.1                  Neurophils% (auto):   x      (08-10 @ 07:08):    10.27)-----------(142          Lymphocytes% (auto):  x                                             43.5                   Eosinphils% (auto):   x        Manual%: Neutrophils x    ; Lymphocytes x    ; Eosinophils x    ; Bands%: x    ; Blasts x                                    133    |  97     |  15                  Calcium: 8.9   / iCa: x      (08-10 @ 07:02)    ----------------------------<  89        Magnesium: 2.3                              4.1     |  23     |  0.73             Phosphorous: 3.2      TPro  6.3    /  Alb  3.5    /  TBili  0.7    /  DBili  x      /  AST  69     /  ALT  59     /  AlkPhos  153    10 Aug 2021 07:02        ASSESSMENT & PLAN:   55y-year-old Male with a past medical history of HTN, RLL DVT and PE ( not on AC at home) admitted for acute respiratory failure secondary to COVID-19, now requiring HFNC with nocturnal BiPAP for oxygen supplementation, admitted to ICU for further assessment and management.     NEUROLOGY:  - A,Ox4 at baseline; No Active Issues     PULMONARY:  Acute Respiratory Failure  - Continue HFNC 100%/60L with Nocturnal BiPAP   - Titrate FiO2 and PEEP as tolerated.     CARDIOLOGY:  - No Active Issues     GASTROINTESTINAL:  - Continue Regular Diet   - Continue Protonix 40mg Daily  - Start Bowel Regimen with Miralax and Senna     RENAL/:  - Strict I&Os     INFECTIOUS DISEASE:  COVID-19  - Completed 5 day course Remdesivir and 10 day course Decadron   - s/p Toci on 07/30/21    HEMATOLOGY:  - VTE prophylaxis with Lovenox 40 BID     ENDOCRINE  - HbA1c 6.0 on 08/10/21  - Glucose checks Q6 with Low Dose SSI  - Add NPH as needed.     ETHICS/DISPOSITION:  - Full code.   - Transfer to [facility] ICU .       Above assesment and plan of care performed, created, and reviewed in real time with ICU attending physician Dr. Karli Castillo, PA #[id number]

## 2021-08-10 NOTE — PROGRESS NOTE ADULT - SUBJECTIVE AND OBJECTIVE BOX
Follow-up Pulm Progress Note    Seen on Bipap 15/10/100%  States he feels much more tired today  O2 sats 93%    Medications:  MEDICATIONS  (STANDING):  budesonide 160 MICROgram(s)/formoterol 4.5 MICROgram(s) Inhaler 2 Puff(s) Inhalation two times a day  dexAMETHasone  Injectable 6 milliGRAM(s) IV Push daily  dextrose 5% + sodium chloride 0.9% with potassium chloride 20 mEq/L 1000 milliLiter(s) (50 mL/Hr) IV Continuous <Continuous>  enoxaparin Injectable 90 milliGRAM(s) SubCutaneous every 12 hours  guaiFENesin ER 1200 milliGRAM(s) Oral every 12 hours  melatonin 5 milliGRAM(s) Oral at bedtime  pantoprazole  Injectable 40 milliGRAM(s) IV Push daily    Vital Signs Last 24 Hrs  T(C): 36.6 (10 Aug 2021 09:49), Max: 36.7 (09 Aug 2021 20:29)  T(F): 97.9 (10 Aug 2021 09:49), Max: 98.1 (09 Aug 2021 20:29)  HR: 66 (10 Aug 2021 09:49) (63 - 83)  BP: 99/64 (10 Aug 2021 09:49) (99/64 - 132/80)  BP(mean): --  RR: 20 (10 Aug 2021 09:49) (19 - 20)  SpO2: 96% (10 Aug 2021 09:49) (90% - 96%)      08-09 @ 07:01  -  08-10 @ 07:00  --------------------------------------------------------  IN: 650 mL / OUT: 520 mL / NET: 130 mL    LABS:                        14.1   10.27 )-----------( 142      ( 10 Aug 2021 07:08 )             43.5     08-10    133<L>  |  97  |  15  ----------------------------<  89  4.1   |  23  |  0.73    Ca    8.9      10 Aug 2021 07:02  Phos  3.2     08-10  Mg     2.3     08-10    TPro  6.3  /  Alb  3.5  /  TBili  0.7  /  DBili  x   /  AST  69<H>  /  ALT  59<H>  /  AlkPhos  153<H>  08-10    CAPILLARY BLOOD GLUCOSE  POCT Blood Glucose.: 95 mg/dL (10 Aug 2021 06:19)    Procalcitonin, Serum: 0.03 ng/mL (08-09-21 @ 09:21)    Physical Examination:  PULM: Decreased BS  CVS: RRR     RADIOLOGY REVIEWED  CXR: 8/9 worsening b/l opacities     < from: [facility] Duplex Lower Ext Vein Scan, Bilat (08.04.21 @ 16:22) >    FINDINGS:    RIGHT:  Normal compressibilityof the RIGHT common femoral, femoral and popliteal veins.  Doppler examination shows normal spontaneous and phasic flow.  No RIGHT calf vein thrombosis is detected.    LEFT:  Normal compressibility of the LEFT common femoral, femoral and popliteal veins.  Doppler examination shows normal spontaneous and phasic flow.  No LEFT calf vein thrombosis is detected.    IMPRESSION:  No evidence of deep venous thrombosis in either lower extremity.      < end of copied text >

## 2021-08-10 NOTE — PROGRESS NOTE ADULT - PROBLEM SELECTOR PLAN 2
2nd to COVID PNA  -S/p RRT 8/3 and 8/4 for hypoxia.   -Continues to require Bipap 15/10/100%, unable to tolerate HFNC when previously able to tolerate for a few hours  -Pt appears more tachypneic, verbalizing that he is more tired today   -MICU consult, pt to be transferred to 5ICU for closer monitoring   -Keep sats >88% with O2 as able  -Prognosis guarded  -Plan of care discussed with patient and wife

## 2021-08-10 NOTE — CHART NOTE - NSCHARTNOTEFT_GEN_A_CORE
CHIEF COMPLAINT: Hypoxic Respiratory Failure    HPI:This is 55yr old male with PMHx of PE and RLL DVT (not on AC currently) s/p achilles tendon repair years ago presents with progresseively worsening SOB, intermittent fevers and  with cough with white sputum production for past week. Tested positive for COVID 2 days prior to arrival to the hospital. Pt is unvaccinated. In the ED pt febrile 100.4, hypoxic to 81 on RA, placed on NC 6L, o2sat improved to 93-95%. CXR with b/l peripheral patchy infiltares consistent with COVID infection. Pt admitted for COVID PNA. Today (8/10) MICU consulted for hypoxic respiratory failure. Pt hypoxic to 80s, placed on 100% NRB and transfered to COVID ICU for closer monitoring and mangement.      PAST MEDICAL & SURGICAL HISTORY:  Hyperlipidemia    HTN (hypertension)    Rupture, tendon, quadriceps    History of Achilles tendon repair        FAMILY HISTORY:  No pertinent family history in first degree relatives        SOCIAL HISTORY:  Smoking: [ ] Never Smoked [ ] Former Smoker (__ packs x ___ years) [ ] Current Smoker  (__ packs x ___ years)  Substance Use: [ ] Never Used [ ] Used ____  EtOH Use:  Marital Status: [ ] Single [ ]  [ ]  [ ]   Sexual History:   Occupation:  Recent Travel:  Country of Birth:  Advance Directives:    Allergies    No Known Allergies    Intolerances        HOME MEDICATIONS:    REVIEW OF SYSTEMS:  Constitutional: [ ] fevers [ ] chills [ ] weight loss [ ] weight gain  HEENT: [ ] dry eyes [ ] eye irritation [ ] postnasal drip [ ] nasal congestion  CV: [ ] chest pain [ ] orthopnea [ ] palpitations [ ] murmur  Resp: [ ] cough [ ] shortness of breath [ ] dyspnea [ ] wheezing [ ] sputum [ ] hemoptysis  GI: [ ] nausea [ ] vomiting [ ] diarrhea [ ] constipation [ ] abd pain [ ] dysphagia   : [ ] dysuria [ ] nocturia [ ] hematuria [ ] increased urinary frequency  Musculoskeletal: [ ] back pain [ ] myalgias [ ] arthralgias [ ] fracture  Skin: [ ] rash [ ] itch  Neurological: [ ] headache [ ] dizziness [ ] syncope [ ] weakness [ ] numbness  Psychiatric: [ ] anxiety [ ] depression  Endocrine: [ ] diabetes [ ] thyroid problem  Hematologic/Lymphatic: [ ] anemia [ ] bleeding problem  Allergic/Immunologic: [ ] itchy eyes [ ] nasal discharge [ ] hives [ ] angioedema  [ ] All other systems negative  [ ] Unable to assess ROS because ________    OBJECTIVE:  ICU Vital Signs Last 24 Hrs  T(C): 37 (10 Aug 2021 10:40), Max: 37 (10 Aug 2021 10:40)  T(F): 98.6 (10 Aug 2021 10:40), Max: 98.6 (10 Aug 2021 10:40)  HR: 69 (10 Aug 2021 11:00) (63 - 83)  BP: 117/68 (10 Aug 2021 11:00) (99/64 - 132/80)  BP(mean): 87 (10 Aug 2021 11:00) (87 - 88)  ABP: --  ABP(mean): --  RR: 34 (10 Aug 2021 11:00) (19 - 34)  SpO2: 98% (10 Aug 2021 11:00) (90% - 98%)        08-09 @ 07:01  -  08-10 @ 07:00  --------------------------------------------------------  IN: 650 mL / OUT: 520 mL / NET: 130 mL    08-10 @ 07:01 - 08-10 @ 12:05  --------------------------------------------------------  IN: 100 mL / OUT: 0 mL / NET: 100 mL      CAPILLARY BLOOD GLUCOSE      POCT Blood Glucose.: 95 mg/dL (10 Aug 2021 06:19)      PHYSICAL EXAM:  General:   HEENT:   Lymph Nodes:  Neck:   Respiratory:   Cardiovascular:   Abdomen:   Extremities:   Skin:   Neurological:  Psychiatry:    LINES:     HOSPITAL MEDICATIONS:  MEDICATIONS  (STANDING):  budesonide 160 MICROgram(s)/formoterol 4.5 MICROgram(s) Inhaler 2 Puff(s) Inhalation two times a day  chlorhexidine 4% Liquid 1 Application(s) Topical <User Schedule>  dexAMETHasone  Injectable 6 milliGRAM(s) IV Push daily  dextrose 5% + sodium chloride 0.9% with potassium chloride 20 mEq/L 1000 milliLiter(s) (50 mL/Hr) IV Continuous <Continuous>  enoxaparin Injectable 90 milliGRAM(s) SubCutaneous every 12 hours  guaiFENesin ER 1200 milliGRAM(s) Oral every 12 hours  pantoprazole  Injectable 40 milliGRAM(s) IV Push daily    MEDICATIONS  (PRN):      LABS:                        14.1   10.27 )-----------( 142      ( 10 Aug 2021 07:08 )             43.5     Hgb Trend: 14.1<--, 14.3<--, 12.5<--, 13.6<--, 14.8<--  08-10    133<L>  |  97  |  15  ----------------------------<  89  4.1   |  23  |  0.73    Ca    8.9      10 Aug 2021 07:02  Phos  3.2     08-10  Mg     2.3     08-10    TPro  6.3  /  Alb  3.5  /  TBili  0.7  /  DBili  x   /  AST  69<H>  /  ALT  59<H>  /  AlkPhos  153<H>  08-10    Creatinine Trend: 0.73<--, 0.72<--, 0.61<--, 0.68<--, 0.85<--, 0.85<--            MICROBIOLOGY:     RADIOLOGY:  [ ] Reviewed and interpreted by me    EKG: CHIEF COMPLAINT: Hypoxic Respiratory Failure    HPI: This is 55yr old male with PMHx of PE and RLL DVT (not on AC currently) s/p achilles tendon repair years ago presents with progressively worsening SOB, intermittent fevers and  with cough with white sputum production for past week. Tested positive for COVID 2 days prior to arrival to the hospital. Pt is unvaccinated. In the ED pt febrile 100.4, hypoxic to 81 on RA, placed on NC 6L, o2sat improved to 93-95%. CXR with b/l peripheral patchy infiltrates consistent with COVID infection. Pt admitted for COVID PNA. Today (8/10) MICU consulted for hypoxic respiratory failure. Pt hypoxic to 80s, placed on 100% NRB and transferred to COVID ICU for closer monitoring and management.      PAST MEDICAL & SURGICAL HISTORY:  Hyperlipidemia    HTN (hypertension)    Rupture, tendon, quadriceps    History of Achilles tendon repair        FAMILY HISTORY:  No pertinent family history in first degree relatives        SOCIAL HISTORY:  Smoking: [ ] Never Smoked [ ] Former Smoker (__ packs x ___ years) [ ] Current Smoker  (__ packs x ___ years)  Substance Use: [ ] Never Used [ ] Used ____  EtOH Use:  Marital Status: [ ] Single [ ]  [ ]  [ ]   Sexual History:   Occupation:  Recent Travel:  Country of Birth:  Advance Directives:    Allergies    No Known Allergies    Intolerances        HOME MEDICATIONS:    REVIEW OF SYSTEMS:  Constitutional: [ ] fevers [ ] chills [ ] weight loss [ ] weight gain  HEENT: [ ] dry eyes [ ] eye irritation [ ] postnasal drip [ ] nasal congestion  CV: [ ] chest pain [ ] orthopnea [ ] palpitations [ ] murmur  Resp: [ ] cough [ ] shortness of breath [ ] dyspnea [ ] wheezing [ ] sputum [ ] hemoptysis  GI: [ ] nausea [ ] vomiting [ ] diarrhea [ ] constipation [ ] abd pain [ ] dysphagia   : [ ] dysuria [ ] nocturia [ ] hematuria [ ] increased urinary frequency  Musculoskeletal: [ ] back pain [ ] myalgias [ ] arthralgias [ ] fracture  Skin: [ ] rash [ ] itch  Neurological: [ ] headache [ ] dizziness [ ] syncope [ ] weakness [ ] numbness  Psychiatric: [ ] anxiety [ ] depression  Endocrine: [ ] diabetes [ ] thyroid problem  Hematologic/Lymphatic: [ ] anemia [ ] bleeding problem  Allergic/Immunologic: [ ] itchy eyes [ ] nasal discharge [ ] hives [ ] angioedema  [ ] All other systems negative  [ ] Unable to assess ROS because ________    OBJECTIVE:  ICU Vital Signs Last 24 Hrs  T(C): 37 (10 Aug 2021 10:40), Max: 37 (10 Aug 2021 10:40)  T(F): 98.6 (10 Aug 2021 10:40), Max: 98.6 (10 Aug 2021 10:40)  HR: 69 (10 Aug 2021 11:00) (63 - 83)  BP: 117/68 (10 Aug 2021 11:00) (99/64 - 132/80)  BP(mean): 87 (10 Aug 2021 11:00) (87 - 88)  ABP: --  ABP(mean): --  RR: 34 (10 Aug 2021 11:00) (19 - 34)  SpO2: 98% (10 Aug 2021 11:00) (90% - 98%)        08-09 @ 07:01  -  08-10 @ 07:00  --------------------------------------------------------  IN: 650 mL / OUT: 520 mL / NET: 130 mL    08-10 @ 07:01  -  08-10 @ 12:05  --------------------------------------------------------  IN: 100 mL / OUT: 0 mL / NET: 100 mL      CAPILLARY BLOOD GLUCOSE      POCT Blood Glucose.: 95 mg/dL (10 Aug 2021 06:19)      PHYSICAL EXAM:  General:   HEENT:   Lymph Nodes:  Neck:   Respiratory:   Cardiovascular:   Abdomen:   Extremities:   Skin:   Neurological:  Psychiatry:    LINES:     HOSPITAL MEDICATIONS:  MEDICATIONS  (STANDING):  budesonide 160 MICROgram(s)/formoterol 4.5 MICROgram(s) Inhaler 2 Puff(s) Inhalation two times a day  chlorhexidine 4% Liquid 1 Application(s) Topical <User Schedule>  dexAMETHasone  Injectable 6 milliGRAM(s) IV Push daily  dextrose 5% + sodium chloride 0.9% with potassium chloride 20 mEq/L 1000 milliLiter(s) (50 mL/Hr) IV Continuous <Continuous>  enoxaparin Injectable 90 milliGRAM(s) SubCutaneous every 12 hours  guaiFENesin ER 1200 milliGRAM(s) Oral every 12 hours  pantoprazole  Injectable 40 milliGRAM(s) IV Push daily    MEDICATIONS  (PRN):      LABS:                        14.1   10.27 )-----------( 142      ( 10 Aug 2021 07:08 )             43.5     Hgb Trend: 14.1<--, 14.3<--, 12.5<--, 13.6<--, 14.8<--  08-10    133<L>  |  97  |  15  ----------------------------<  89  4.1   |  23  |  0.73    Ca    8.9      10 Aug 2021 07:02  Phos  3.2     08-10  Mg     2.3     08-10    TPro  6.3  /  Alb  3.5  /  TBili  0.7  /  DBili  x   /  AST  69<H>  /  ALT  59<H>  /  AlkPhos  153<H>  08-10    Creatinine Trend: 0.73<--, 0.72<--, 0.61<--, 0.68<--, 0.85<--, 0.85<--            This is 55yr old male with above history presents with progressively worsening SOB, intermittent fevers, COVID positive, admitted for COVID PNA.  Today (8/10) MICU consulted for hypoxic respiratory failure. Pt hypoxic to 80s, placed on 100% NRB and transferred to COVID ICU for closer monitoring and management.    #Neuro:  -Pt A&Ox3    #Cardiac:  -Hemodynamically stable  -No Pressors    #Pulm:  -Pt COVID positive  -CXR with b/l peripheral patchy infiltrates consistent with COVID infection.  -Pt currently on Bipap 15/10 100% o2sat in 90s, will transition to HighFlow as tolerated    #GI:  -NO active issues  -NPO for now    #Renal:  -Making adequate urine  -Strict I/O    #ID:  -S/p Remdesivir x 5 days   -S/p Decadron 6mg IVP qd x 10 days   -S/p Tocilizumab 7/30 per ID for worsening hypoxic respiratory failure  -Sputum culture with normal respiratory aba.   - WBC normal, afebrile     #Heme:  -c/w Lovenox  -LE duplex 8/4 neg DVT.   -Worsening ddimer 3038 from 8/8  -Continue to trend ddimer & inflammatory markers.     #Endo:  -Monitor FS q6hrs CHIEF COMPLAINT: Hypoxic Respiratory Failure    HPI: This is 55yr old male with PMHx of HTN, PE and RLL DVT (not on AC currently) s/p achilles tendon repair years ago presents with progressively worsening SOB, intermittent fevers, and cough with white sputum production for past week. Tested positive for COVID two days prior to hospital admission. Pt is unvaccinated. In the ED pt febrile 100.4, hypoxic to 81 on RA, placed on NC 6L, o2sat improved to 93-95%. CXR with b/l peripheral patchy infiltrates consistent with COVID infection. Pt admitted for COVID PNA. Today (8/10) MICU consulted for hypoxic respiratory failure. Pt hypoxic to 80s, placed on 100% NRB and transferred to COVID ICU for closer monitoring and management.      PAST MEDICAL & SURGICAL HISTORY:  Hyperlipidemia    HTN (hypertension)    Rupture, tendon, quadriceps    History of Achilles tendon repair        FAMILY HISTORY:  No pertinent family history in first degree relatives        SOCIAL HISTORY:  Smoking: [ ] Never Smoked [ ] Former Smoker (__ packs x ___ years) [ ] Current Smoker  (__ packs x ___ years)  Substance Use: [ ] Never Used [ ] Used ____  EtOH Use:  Marital Status: [ ] Single [ ]  [ ]  [ ]   Sexual History:   Occupation:  Recent Travel:  Country of Birth:  Advance Directives:    Allergies    No Known Allergies    Intolerances        HOME MEDICATIONS:    REVIEW OF SYSTEMS:  Constitutional: [ ] fevers [ ] chills [ ] weight loss [ ] weight gain  HEENT: [ ] dry eyes [ ] eye irritation [ ] postnasal drip [ ] nasal congestion  CV: [ ] chest pain [ ] orthopnea [ ] palpitations [ ] murmur  Resp: [ ] cough [ ] shortness of breath [ ] dyspnea [ ] wheezing [ ] sputum [ ] hemoptysis  GI: [ ] nausea [ ] vomiting [ ] diarrhea [ ] constipation [ ] abd pain [ ] dysphagia   : [ ] dysuria [ ] nocturia [ ] hematuria [ ] increased urinary frequency  Musculoskeletal: [ ] back pain [ ] myalgias [ ] arthralgias [ ] fracture  Skin: [ ] rash [ ] itch  Neurological: [ ] headache [ ] dizziness [ ] syncope [ ] weakness [ ] numbness  Psychiatric: [ ] anxiety [ ] depression  Endocrine: [ ] diabetes [ ] thyroid problem  Hematologic/Lymphatic: [ ] anemia [ ] bleeding problem  Allergic/Immunologic: [ ] itchy eyes [ ] nasal discharge [ ] hives [ ] angioedema  [ ] All other systems negative  [ ] Unable to assess ROS because ________    OBJECTIVE:  ICU Vital Signs Last 24 Hrs  T(C): 37 (10 Aug 2021 10:40), Max: 37 (10 Aug 2021 10:40)  T(F): 98.6 (10 Aug 2021 10:40), Max: 98.6 (10 Aug 2021 10:40)  HR: 69 (10 Aug 2021 11:00) (63 - 83)  BP: 117/68 (10 Aug 2021 11:00) (99/64 - 132/80)  BP(mean): 87 (10 Aug 2021 11:00) (87 - 88)  ABP: --  ABP(mean): --  RR: 34 (10 Aug 2021 11:00) (19 - 34)  SpO2: 98% (10 Aug 2021 11:00) (90% - 98%)        08-09 @ 07:01  -  08-10 @ 07:00  --------------------------------------------------------  IN: 650 mL / OUT: 520 mL / NET: 130 mL    08-10 @ 07:01  -  08-10 @ 12:05  --------------------------------------------------------  IN: 100 mL / OUT: 0 mL / NET: 100 mL      CAPILLARY BLOOD GLUCOSE      POCT Blood Glucose.: 95 mg/dL (10 Aug 2021 06:19)      PHYSICAL EXAM:  General:   HEENT:   Lymph Nodes:  Neck:   Respiratory:   Cardiovascular:   Abdomen:   Extremities:   Skin:   Neurological:  Psychiatry:    LINES:     HOSPITAL MEDICATIONS:  MEDICATIONS  (STANDING):  budesonide 160 MICROgram(s)/formoterol 4.5 MICROgram(s) Inhaler 2 Puff(s) Inhalation two times a day  chlorhexidine 4% Liquid 1 Application(s) Topical <User Schedule>  dexAMETHasone  Injectable 6 milliGRAM(s) IV Push daily  dextrose 5% + sodium chloride 0.9% with potassium chloride 20 mEq/L 1000 milliLiter(s) (50 mL/Hr) IV Continuous <Continuous>  enoxaparin Injectable 90 milliGRAM(s) SubCutaneous every 12 hours  guaiFENesin ER 1200 milliGRAM(s) Oral every 12 hours  pantoprazole  Injectable 40 milliGRAM(s) IV Push daily    MEDICATIONS  (PRN):      LABS:                        14.1   10.27 )-----------( 142      ( 10 Aug 2021 07:08 )             43.5     Hgb Trend: 14.1<--, 14.3<--, 12.5<--, 13.6<--, 14.8<--  08-10    133<L>  |  97  |  15  ----------------------------<  89  4.1   |  23  |  0.73    Ca    8.9      10 Aug 2021 07:02  Phos  3.2     08-10  Mg     2.3     08-10    TPro  6.3  /  Alb  3.5  /  TBili  0.7  /  DBili  x   /  AST  69<H>  /  ALT  59<H>  /  AlkPhos  153<H>  08-10    Creatinine Trend: 0.73<--, 0.72<--, 0.61<--, 0.68<--, 0.85<--, 0.85<--        Assessment/ Plan:    This is 55yr old male with above history presents with progressively worsening SOB, intermittent fevers, COVID positive, admitted for COVID PNA.  Today (8/10) MICU consulted for hypoxic respiratory failure. Pt hypoxic to 80s, placed on 100% NRB and transferred to COVID ICU for closer monitoring and management.    #Neuro:  -Pt A&Ox3    #Cardiac:  -Hemodynamically stable  -No Pressors    #Pulm:  -Pt COVID positive  -CXR with b/l peripheral patchy infiltrates consistent with COVID infection.  -Pt currently on Bipap 15/10 100% o2sat in 90s, will transition to HighFlow as tolerated    #GI:  -NO active issues  -NPO for now    #Renal:  -Making adequate urine  -Strict I/O    #ID:  -S/p Remdesivir x 5 days   -S/p Decadron 6mg IVP qd x 10 days   -S/p Tocilizumab 7/30 per ID for worsening hypoxic respiratory failure  -Sputum culture with moderate GP cocci in pairs and chains & rare GNRs, likely colonization  -WBC normal, afebrile     #Heme:  -c/w Lovenox  -LE duplex 8/4 neg DVT.   -Worsening ddimer 3038 from 8/8  -Continue to trend ddimer & inflammatory markers.     #Endo:  -Monitor FS q6hrs CHIEF COMPLAINT: Hypoxic Respiratory Failure    HPI: This is 55yr old male with PMHx of HTN, PE and RLL DVT (not on AC currently) s/p achilles tendon repair years ago presents with progressively worsening SOB, intermittent fevers, and cough with white sputum production for past week. Tested positive for COVID two days prior to hospital admission. Pt is unvaccinated. In the ED pt febrile 100.4, hypoxic to 81 on RA, placed on NC 6L, o2sat improved to 93-95%. CXR with b/l peripheral patchy infiltrates consistent with COVID infection. Pt admitted for COVID PNA. Today (8/10) MICU consulted for hypoxic respiratory failure. Pt hypoxic to 80s, placed on 100% NRB and transferred to COVID ICU for closer monitoring and management.      PAST MEDICAL & SURGICAL HISTORY:  Hyperlipidemia    HTN (hypertension)    Rupture, tendon, quadriceps    History of Achilles tendon repair        FAMILY HISTORY:  No pertinent family history in first degree relatives        SOCIAL HISTORY:  Smoking: [ ] Never Smoked [ ] Former Smoker (__ packs x ___ years) [ ] Current Smoker  (__ packs x ___ years)  Substance Use: [ ] Never Used [ ] Used ____  EtOH Use:  Marital Status: [ ] Single [ ]  [ ]  [ ]   Sexual History:   Occupation:  Recent Travel:  Country of Birth:  Advance Directives:    Allergies    No Known Allergies    Intolerances        HOME MEDICATIONS:    REVIEW OF SYSTEMS:  Constitutional: [ ] fevers [ ] chills [ ] weight loss [ ] weight gain  HEENT: [ ] dry eyes [ ] eye irritation [ ] postnasal drip [ ] nasal congestion  CV: [ ] chest pain [ ] orthopnea [ ] palpitations [ ] murmur  Resp: [ ] cough [ ] shortness of breath [ ] dyspnea [ ] wheezing [ ] sputum [ ] hemoptysis  GI: [ ] nausea [ ] vomiting [ ] diarrhea [ ] constipation [ ] abd pain [ ] dysphagia   : [ ] dysuria [ ] nocturia [ ] hematuria [ ] increased urinary frequency  Musculoskeletal: [ ] back pain [ ] myalgias [ ] arthralgias [ ] fracture  Skin: [ ] rash [ ] itch  Neurological: [ ] headache [ ] dizziness [ ] syncope [ ] weakness [ ] numbness  Psychiatric: [ ] anxiety [ ] depression  Endocrine: [ ] diabetes [ ] thyroid problem  Hematologic/Lymphatic: [ ] anemia [ ] bleeding problem  Allergic/Immunologic: [ ] itchy eyes [ ] nasal discharge [ ] hives [ ] angioedema  [ ] All other systems negative  [ ] Unable to assess ROS because ________    OBJECTIVE:  ICU Vital Signs Last 24 Hrs  T(C): 37 (10 Aug 2021 10:40), Max: 37 (10 Aug 2021 10:40)  T(F): 98.6 (10 Aug 2021 10:40), Max: 98.6 (10 Aug 2021 10:40)  HR: 69 (10 Aug 2021 11:00) (63 - 83)  BP: 117/68 (10 Aug 2021 11:00) (99/64 - 132/80)  BP(mean): 87 (10 Aug 2021 11:00) (87 - 88)  ABP: --  ABP(mean): --  RR: 34 (10 Aug 2021 11:00) (19 - 34)  SpO2: 98% (10 Aug 2021 11:00) (90% - 98%)        08-09 @ 07:01  -  08-10 @ 07:00  --------------------------------------------------------  IN: 650 mL / OUT: 520 mL / NET: 130 mL    08-10 @ 07:01  -  08-10 @ 12:05  --------------------------------------------------------  IN: 100 mL / OUT: 0 mL / NET: 100 mL      CAPILLARY BLOOD GLUCOSE      POCT Blood Glucose.: 95 mg/dL (10 Aug 2021 06:19)      PHYSICAL EXAM:  General:   HEENT:   Lymph Nodes:  Neck:   Respiratory:   Cardiovascular:   Abdomen:   Extremities:   Skin:   Neurological:  Psychiatry:    LINES:     HOSPITAL MEDICATIONS:  MEDICATIONS  (STANDING):  budesonide 160 MICROgram(s)/formoterol 4.5 MICROgram(s) Inhaler 2 Puff(s) Inhalation two times a day  chlorhexidine 4% Liquid 1 Application(s) Topical <User Schedule>  dexAMETHasone  Injectable 6 milliGRAM(s) IV Push daily  dextrose 5% + sodium chloride 0.9% with potassium chloride 20 mEq/L 1000 milliLiter(s) (50 mL/Hr) IV Continuous <Continuous>  enoxaparin Injectable 90 milliGRAM(s) SubCutaneous every 12 hours  guaiFENesin ER 1200 milliGRAM(s) Oral every 12 hours  pantoprazole  Injectable 40 milliGRAM(s) IV Push daily    MEDICATIONS  (PRN):      LABS:                        14.1   10.27 )-----------( 142      ( 10 Aug 2021 07:08 )             43.5     Hgb Trend: 14.1<--, 14.3<--, 12.5<--, 13.6<--, 14.8<--  08-10    133<L>  |  97  |  15  ----------------------------<  89  4.1   |  23  |  0.73    Ca    8.9      10 Aug 2021 07:02  Phos  3.2     08-10  Mg     2.3     08-10    TPro  6.3  /  Alb  3.5  /  TBili  0.7  /  DBili  x   /  AST  69<H>  /  ALT  59<H>  /  AlkPhos  153<H>  08-10    Creatinine Trend: 0.73<--, 0.72<--, 0.61<--, 0.68<--, 0.85<--, 0.85<--        Assessment/ Plan:    This is 55yr old male with above history presents with progressively worsening SOB, intermittent fevers, COVID positive, admitted for COVID PNA.  Today (8/10) MICU consulted for hypoxic respiratory failure. Pt hypoxic to 80s, placed on 100% NRB and transferred to COVID ICU for closer monitoring and management.    #Neuro:  -Pt A&Ox3    #Cardiac:  -Hemodynamically stable  -No Pressors    #Pulm:  -Pt COVID positive  -CXR with b/l peripheral patchy infiltrates consistent with COVID infection.  -For the past day, patient has been unable to tolerate HFNC for meals. Will require further monitoring in ICU  -Pt currently on Bipap 15/10 100% o2sat in 90s, will transition to HighFlow as tolerated    #GI:  -NO active issues  -NPO for now    #Renal:  -Making adequate urine  -Strict I/O    #ID:  -S/p Remdesivir x 5 days   -S/p Decadron 6mg IVP qd x 10 days   -S/p Tocilizumab 7/30 per ID for worsening hypoxic respiratory failure  -Sputum culture with moderate GP cocci in pairs and chains & rare GNRs, likely colonization  -WBC normal, afebrile     #Heme:  -c/w full dose Lovenox  -LE duplex 8/4 neg DVT.   -Worsening ddimer 3038 from 8/8  -Continue to trend ddimer & inflammatory markers.     #Endo:  -Monitor FS q6hrs CHIEF COMPLAINT: Hypoxic Respiratory Failure    HPI: This is 55yr old male with PMHx of HTN, PE and RLL DVT (not on AC currently) s/p achilles tendon repair years ago presents with progressively worsening SOB, intermittent fevers, and cough with white sputum production for past week. Tested positive for COVID two days prior to hospital admission. Pt is unvaccinated. In the ED pt febrile 100.4, hypoxic to 81 on RA, placed on NC 6L, o2sat improved to 93-95%. CXR with b/l peripheral patchy infiltrates consistent with COVID infection. Pt admitted for COVID PNA. Today (8/10) MICU consulted for hypoxic respiratory failure. Pt hypoxic to 80s, placed on 100% NRB and transferred to COVID ICU for closer monitoring and management.      PAST MEDICAL & SURGICAL HISTORY:  Hyperlipidemia    HTN (hypertension)    Rupture, tendon, quadriceps    History of Achilles tendon repair        FAMILY HISTORY:  No pertinent family history in first degree relatives        SOCIAL HISTORY:  Smoking: [ ] Never Smoked [ ] Former Smoker (__ packs x ___ years) [ ] Current Smoker  (__ packs x ___ years)  Substance Use: [ ] Never Used [ ] Used ____  EtOH Use:  Marital Status: [ ] Single [ ]  [ ]  [ ]   Sexual History:   Occupation:  Recent Travel:  Country of Birth:  Advance Directives:    Allergies    No Known Allergies    Intolerances        HOME MEDICATIONS:    REVIEW OF SYSTEMS:  Constitutional: [ ] fevers [ ] chills [ ] weight loss [ ] weight gain  HEENT: [ ] dry eyes [ ] eye irritation [ ] postnasal drip [ ] nasal congestion  CV: [ ] chest pain [ ] orthopnea [ ] palpitations [ ] murmur  Resp: [ ] cough [ ] shortness of breath [ ] dyspnea [ ] wheezing [ ] sputum [ ] hemoptysis  GI: [ ] nausea [ ] vomiting [ ] diarrhea [ ] constipation [ ] abd pain [ ] dysphagia   : [ ] dysuria [ ] nocturia [ ] hematuria [ ] increased urinary frequency  Musculoskeletal: [ ] back pain [ ] myalgias [ ] arthralgias [ ] fracture  Skin: [ ] rash [ ] itch  Neurological: [ ] headache [ ] dizziness [ ] syncope [ ] weakness [ ] numbness  Psychiatric: [ ] anxiety [ ] depression  Endocrine: [ ] diabetes [ ] thyroid problem  Hematologic/Lymphatic: [ ] anemia [ ] bleeding problem  Allergic/Immunologic: [ ] itchy eyes [ ] nasal discharge [ ] hives [ ] angioedema  [ ] All other systems negative  [ ] Unable to assess ROS because ________    OBJECTIVE:  ICU Vital Signs Last 24 Hrs  T(C): 37 (10 Aug 2021 10:40), Max: 37 (10 Aug 2021 10:40)  T(F): 98.6 (10 Aug 2021 10:40), Max: 98.6 (10 Aug 2021 10:40)  HR: 69 (10 Aug 2021 11:00) (63 - 83)  BP: 117/68 (10 Aug 2021 11:00) (99/64 - 132/80)  BP(mean): 87 (10 Aug 2021 11:00) (87 - 88)  ABP: --  ABP(mean): --  RR: 34 (10 Aug 2021 11:00) (19 - 34)  SpO2: 98% (10 Aug 2021 11:00) (90% - 98%)        08-09 @ 07:01  -  08-10 @ 07:00  --------------------------------------------------------  IN: 650 mL / OUT: 520 mL / NET: 130 mL    08-10 @ 07:01  -  08-10 @ 12:05  --------------------------------------------------------  IN: 100 mL / OUT: 0 mL / NET: 100 mL      CAPILLARY BLOOD GLUCOSE      POCT Blood Glucose.: 95 mg/dL (10 Aug 2021 06:19)      PHYSICAL EXAM:  General:   HEENT:   Lymph Nodes:  Neck:   Respiratory:   Cardiovascular:   Abdomen:   Extremities:   Skin:   Neurological:  Psychiatry:    LINES:     HOSPITAL MEDICATIONS:  MEDICATIONS  (STANDING):  budesonide 160 MICROgram(s)/formoterol 4.5 MICROgram(s) Inhaler 2 Puff(s) Inhalation two times a day  chlorhexidine 4% Liquid 1 Application(s) Topical <User Schedule>  dexAMETHasone  Injectable 6 milliGRAM(s) IV Push daily  dextrose 5% + sodium chloride 0.9% with potassium chloride 20 mEq/L 1000 milliLiter(s) (50 mL/Hr) IV Continuous <Continuous>  enoxaparin Injectable 90 milliGRAM(s) SubCutaneous every 12 hours  guaiFENesin ER 1200 milliGRAM(s) Oral every 12 hours  pantoprazole  Injectable 40 milliGRAM(s) IV Push daily    MEDICATIONS  (PRN):      LABS:                        14.1   10.27 )-----------( 142      ( 10 Aug 2021 07:08 )             43.5     Hgb Trend: 14.1<--, 14.3<--, 12.5<--, 13.6<--, 14.8<--  08-10    133<L>  |  97  |  15  ----------------------------<  89  4.1   |  23  |  0.73    Ca    8.9      10 Aug 2021 07:02  Phos  3.2     08-10  Mg     2.3     08-10    TPro  6.3  /  Alb  3.5  /  TBili  0.7  /  DBili  x   /  AST  69<H>  /  ALT  59<H>  /  AlkPhos  153<H>  08-10    Creatinine Trend: 0.73<--, 0.72<--, 0.61<--, 0.68<--, 0.85<--, 0.85<--        Assessment/ Plan:    This is 55yr old male with above history presents with progressively worsening SOB, intermittent fevers, COVID positive, admitted for COVID PNA.  Today (8/10) MICU consulted for hypoxic respiratory failure. Pt hypoxic to 80s, placed on 100% NRB and transferred to COVID ICU for closer monitoring and management.    #Neuro:  -Pt A&Ox3    #Cardiac:  -Hemodynamically stable  -No Pressors    #Pulm:  -Pt COVID positive  -CXR with b/l peripheral patchy infiltrates consistent with COVID infection.  -For the past day, patient has been unable to tolerate HFNC for meals. Will require further monitoring in ICU  -Pt currently on Bipap 15/10 100% o2sat in 90s, will transition to HighFlow as tolerated    #GI:  -NO active issues  -NPO for now    #Renal:  -Making adequate urine  -Strict I/O    #ID:  -S/p Remdesivir x 5 days   -S/p Decadron 6mg IVP qd x 10 days   -S/p Tocilizumab 7/30 per ID for worsening hypoxic respiratory failure  -Sputum culture with moderate GP cocci in pairs and chains & rare GNRs, likely colonization  -WBC normal, afebrile     #Heme:  -c/w full dose Lovenox  -LE duplex 8/4 neg DVT.   -Worsening ddimer 3038 from 8/8  -Continue to trend ddimer & inflammatory markers.     #Endo:  -Monitor FS q6hrs      ATTENDING ATTESTATION:    Acute hypoxemic respiratory failure due to COVID-19 viral pneumonia, now BiPAP-dependent.    - Continue BiPAP 15/10, FiO2 decreased from 100% to 80%  - Trial on high flow nasal cannula 50-60 LPM/100% FiO2 intermittently for meals  - S/p tocilizumab 7/30  - Day 10/10 of dexamethasone  - S/p Remdesivir 5/5 (completed 8/2)  - Continue therapeutic enoxaparin 90 mg sq q12h  - Check D-dimer in AM  - HD stable  - Awake and alert, following all commands, moving all extremities  - Stable kidney function and lytes  - No evidence of bacterial superinfection. CRP is negative and ESR is normalized  - Close respiratory monitoring in ICU, does not require intubation at this time    CC time spent: 35 min

## 2021-08-10 NOTE — PROGRESS NOTE ADULT - SUBJECTIVE AND OBJECTIVE BOX
DATE OF SERVICE: 08-10-21 @ 15:33  CHIEF COMPLAINT:Patient is a 55y old  Male who presents with a chief complaint of covid (09 Aug 2021 09:13)    	        PAST MEDICAL & SURGICAL HISTORY:  Hyperlipidemia    HTN (hypertension)    Rupture, tendon, quadriceps    History of Achilles tendon repair            sob  weak  CARDIOVASCULAR: No chest pain, palpitations, passing out, dizziness, or leg swelling  GASTROINTESTINAL: No abdominal or epigastric pain. No nausea, vomiting, or hematemesis; No diarrhea or constipation. No melena or hematochezia.  GENITOURINARY: No dysuria, frequency, hematuria, or incontinence  NEUROLOGICAL: No headaches,   MUSCULOSKELETAL: No joint pain or swelling;    Medications:  MEDICATIONS  (STANDING):  budesonide 160 MICROgram(s)/formoterol 4.5 MICROgram(s) Inhaler 2 Puff(s) Inhalation two times a day  chlorhexidine 4% Liquid 1 Application(s) Topical <User Schedule>  dexAMETHasone  Injectable 6 milliGRAM(s) IV Push daily  dextrose 5% + sodium chloride 0.9% with potassium chloride 20 mEq/L 1000 milliLiter(s) (50 mL/Hr) IV Continuous <Continuous>  enoxaparin Injectable 90 milliGRAM(s) SubCutaneous every 12 hours  guaiFENesin ER 1200 milliGRAM(s) Oral every 12 hours  pantoprazole  Injectable 40 milliGRAM(s) IV Push daily    MEDICATIONS  (PRN):    	    PHYSICAL EXAM:  T(C): 37 (08-10-21 @ 10:40), Max: 37 (08-10-21 @ 10:40)  HR: 58 (08-10-21 @ 15:02) (57 - 83)  BP: 129/78 (08-10-21 @ 14:00) (99/64 - 132/80)  RR: 28 (08-10-21 @ 14:00) (19 - 36)  SpO2: 95% (08-10-21 @ 15:02) (91% - 99%)  Wt(kg): --  I&O's Summary    09 Aug 2021 07:01  -  10 Aug 2021 07:00  --------------------------------------------------------  IN: 650 mL / OUT: 520 mL / NET: 130 mL    10 Aug 2021 07:01  -  10 Aug 2021 15:33  --------------------------------------------------------  IN: 250 mL / OUT: 275 mL / NET: -25 mL        Appearance: Normal	    Cardiovascular: Normal S1 S2, No JVD, No murmurs, No edema  Respiratory: dec bs   Psychiatry: A & O x 3, Mood & affect appropriate  Gastrointestinal:  Soft, Non-tender, + BS	  Skin: No rashes, No ecchymoses, No cyanosis	  Neurologic: Non-focal  Extremities: Normal range of motion, No clubbing, cyanosis or edema  Vascular: Peripheral pulses palpable 2+ bilaterally    TELEMETRY: 	    ECG:  	  RADIOLOGY:  OTHER: 	  	  LABS:	 	    CARDIAC MARKERS:                                14.1   10.27 )-----------( 142      ( 10 Aug 2021 07:08 )             43.5     08-10    133<L>  |  97  |  15  ----------------------------<  89  4.1   |  23  |  0.73    Ca    8.9      10 Aug 2021 07:02  Phos  3.2     08-10  Mg     2.3     08-10    TPro  6.3  /  Alb  3.5  /  TBili  0.7  /  DBili  x   /  AST  69<H>  /  ALT  59<H>  /  AlkPhos  153<H>  08-10    proBNP:   Lipid Profile:   HgA1c:   TSH:

## 2021-08-10 NOTE — PROGRESS NOTE ADULT - ASSESSMENT
pt w/ covid  hypoxia worse  iv steroids   s/p remdesivir  id / f/u noted  pulm f/u   c/e resp support/  transferring to micu  family aware  additional tx as per id/pulm / micu  recs  s/p toci  gi / dvt proph  o2  hx htn/   monitor bp  monitor inflammatory markers     prognosis guarded

## 2021-08-10 NOTE — PROGRESS NOTE ADULT - PROBLEM SELECTOR PLAN 1
+COVID PCR as an outpatient  -CXR 8/9 with worsening b/l lung opacities   -S/p Remdesivir x 5 days   -S/p Decadron 6mg IVP qd x 10 days (completed 8/10)  -S/p Tocilizumab 7/30 per ID for worsening hypoxic respiratory failure  -Sputum culture with normal respiratory aba. WBC normal, PCT normal, afebrile   -LE duplex 8/4 neg DVT.   -C/w empiric full dose AC with Lovenox 1mg/kg BID for now as ddimer keeps increasing, too unstable for transport to CTA chest.   -Continue to trend ddimer & inflammatory markers

## 2021-08-11 LAB
ALBUMIN SERPL ELPH-MCNC: 3.1 G/DL — LOW (ref 3.3–5)
ALP SERPL-CCNC: 139 U/L — HIGH (ref 40–120)
ALT FLD-CCNC: 59 U/L — HIGH (ref 10–45)
ANION GAP SERPL CALC-SCNC: 12 MMOL/L — SIGNIFICANT CHANGE UP (ref 5–17)
APTT BLD: 32.5 SEC — SIGNIFICANT CHANGE UP (ref 27.5–35.5)
AST SERPL-CCNC: 61 U/L — HIGH (ref 10–40)
BILIRUB SERPL-MCNC: 0.6 MG/DL — SIGNIFICANT CHANGE UP (ref 0.2–1.2)
BUN SERPL-MCNC: 12 MG/DL — SIGNIFICANT CHANGE UP (ref 7–23)
CALCIUM SERPL-MCNC: 8.5 MG/DL — SIGNIFICANT CHANGE UP (ref 8.4–10.5)
CHLORIDE SERPL-SCNC: 102 MMOL/L — SIGNIFICANT CHANGE UP (ref 96–108)
CO2 SERPL-SCNC: 22 MMOL/L — SIGNIFICANT CHANGE UP (ref 22–31)
COVID-19 NUCLEOCAPSID GAM AB INTERP: POSITIVE
COVID-19 NUCLEOCAPSID TOTAL GAM ANTIBODY RESULT: 93.7 INDEX — HIGH
COVID-19 SPIKE DOMAIN AB INTERP: POSITIVE
COVID-19 SPIKE DOMAIN ANTIBODY RESULT: >250 U/ML — HIGH
CREAT SERPL-MCNC: 0.61 MG/DL — SIGNIFICANT CHANGE UP (ref 0.5–1.3)
D DIMER BLD IA.RAPID-MCNC: 2068 NG/ML DDU — HIGH
FERRITIN SERPL-MCNC: 953 NG/ML — HIGH (ref 30–400)
GAS PNL BLDA: SIGNIFICANT CHANGE UP
GLUCOSE BLDC GLUCOMTR-MCNC: 92 MG/DL — SIGNIFICANT CHANGE UP (ref 70–99)
GLUCOSE SERPL-MCNC: 78 MG/DL — SIGNIFICANT CHANGE UP (ref 70–99)
HCT VFR BLD CALC: 40.3 % — SIGNIFICANT CHANGE UP (ref 39–50)
HGB BLD-MCNC: 13.1 G/DL — SIGNIFICANT CHANGE UP (ref 13–17)
INR BLD: 1.22 RATIO — HIGH (ref 0.88–1.16)
LDH SERPL L TO P-CCNC: 1388 U/L — HIGH (ref 50–242)
MAGNESIUM SERPL-MCNC: 2.2 MG/DL — SIGNIFICANT CHANGE UP (ref 1.6–2.6)
MCHC RBC-ENTMCNC: 28 PG — SIGNIFICANT CHANGE UP (ref 27–34)
MCHC RBC-ENTMCNC: 32.5 GM/DL — SIGNIFICANT CHANGE UP (ref 32–36)
MCV RBC AUTO: 86.1 FL — SIGNIFICANT CHANGE UP (ref 80–100)
NRBC # BLD: 0 /100 WBCS — SIGNIFICANT CHANGE UP (ref 0–0)
PHOSPHATE SERPL-MCNC: 3.1 MG/DL — SIGNIFICANT CHANGE UP (ref 2.5–4.5)
PLATELET # BLD AUTO: 105 K/UL — LOW (ref 150–400)
POTASSIUM SERPL-MCNC: 4.5 MMOL/L — SIGNIFICANT CHANGE UP (ref 3.5–5.3)
POTASSIUM SERPL-SCNC: 4.5 MMOL/L — SIGNIFICANT CHANGE UP (ref 3.5–5.3)
PROCALCITONIN SERPL-MCNC: 0.04 NG/ML — SIGNIFICANT CHANGE UP (ref 0.02–0.1)
PROT SERPL-MCNC: 5.9 G/DL — LOW (ref 6–8.3)
PROTHROM AB SERPL-ACNC: 14.5 SEC — HIGH (ref 10.6–13.6)
RBC # BLD: 4.68 M/UL — SIGNIFICANT CHANGE UP (ref 4.2–5.8)
RBC # FLD: 12.5 % — SIGNIFICANT CHANGE UP (ref 10.3–14.5)
SARS-COV-2 IGG+IGM SERPL QL IA: 93.7 INDEX — HIGH
SARS-COV-2 IGG+IGM SERPL QL IA: >250 U/ML — HIGH
SARS-COV-2 IGG+IGM SERPL QL IA: POSITIVE
SARS-COV-2 IGG+IGM SERPL QL IA: POSITIVE
SODIUM SERPL-SCNC: 136 MMOL/L — SIGNIFICANT CHANGE UP (ref 135–145)
WBC # BLD: 11.4 K/UL — HIGH (ref 3.8–10.5)
WBC # FLD AUTO: 11.4 K/UL — HIGH (ref 3.8–10.5)

## 2021-08-11 PROCEDURE — 99291 CRITICAL CARE FIRST HOUR: CPT

## 2021-08-11 PROCEDURE — 99232 SBSQ HOSP IP/OBS MODERATE 35: CPT

## 2021-08-11 RX ORDER — ALPRAZOLAM 0.25 MG
0.25 TABLET ORAL EVERY 12 HOURS
Refills: 0 | Status: DISCONTINUED | OUTPATIENT
Start: 2021-08-11 | End: 2021-08-14

## 2021-08-11 RX ORDER — ACETAMINOPHEN 500 MG
650 TABLET ORAL EVERY 6 HOURS
Refills: 0 | Status: DISCONTINUED | OUTPATIENT
Start: 2021-08-11 | End: 2021-08-17

## 2021-08-11 RX ORDER — CHOLECALCIFEROL (VITAMIN D3) 125 MCG
2000 CAPSULE ORAL DAILY
Refills: 0 | Status: DISCONTINUED | OUTPATIENT
Start: 2021-08-11 | End: 2021-08-26

## 2021-08-11 RX ORDER — DEXTROSE MONOHYDRATE, SODIUM CHLORIDE, AND POTASSIUM CHLORIDE 50; .745; 4.5 G/1000ML; G/1000ML; G/1000ML
1000 INJECTION, SOLUTION INTRAVENOUS
Refills: 0 | Status: DISCONTINUED | OUTPATIENT
Start: 2021-08-11 | End: 2021-08-14

## 2021-08-11 RX ORDER — ENOXAPARIN SODIUM 100 MG/ML
90 INJECTION SUBCUTANEOUS EVERY 12 HOURS
Refills: 0 | Status: DISCONTINUED | OUTPATIENT
Start: 2021-08-11 | End: 2021-08-26

## 2021-08-11 RX ORDER — ACETAMINOPHEN 500 MG
1000 TABLET ORAL ONCE
Refills: 0 | Status: COMPLETED | OUTPATIENT
Start: 2021-08-11 | End: 2021-08-11

## 2021-08-11 RX ADMIN — ENOXAPARIN SODIUM 40 MILLIGRAM(S): 100 INJECTION SUBCUTANEOUS at 05:54

## 2021-08-11 RX ADMIN — Medication 0.25 MILLIGRAM(S): at 12:48

## 2021-08-11 RX ADMIN — Medication 0.25 MILLIGRAM(S): at 09:35

## 2021-08-11 RX ADMIN — BUDESONIDE AND FORMOTEROL FUMARATE DIHYDRATE 2 PUFF(S): 160; 4.5 AEROSOL RESPIRATORY (INHALATION) at 05:18

## 2021-08-11 RX ADMIN — PANTOPRAZOLE SODIUM 40 MILLIGRAM(S): 20 TABLET, DELAYED RELEASE ORAL at 11:37

## 2021-08-11 RX ADMIN — DEXTROSE MONOHYDRATE, SODIUM CHLORIDE, AND POTASSIUM CHLORIDE 50 MILLILITER(S): 50; .745; 4.5 INJECTION, SOLUTION INTRAVENOUS at 05:54

## 2021-08-11 RX ADMIN — ENOXAPARIN SODIUM 90 MILLIGRAM(S): 100 INJECTION SUBCUTANEOUS at 17:02

## 2021-08-11 RX ADMIN — Medication 1 TABLET(S): at 12:02

## 2021-08-11 RX ADMIN — BUDESONIDE AND FORMOTEROL FUMARATE DIHYDRATE 2 PUFF(S): 160; 4.5 AEROSOL RESPIRATORY (INHALATION) at 18:38

## 2021-08-11 RX ADMIN — Medication 2000 UNIT(S): at 12:02

## 2021-08-11 RX ADMIN — CHLORHEXIDINE GLUCONATE 1 APPLICATION(S): 213 SOLUTION TOPICAL at 06:01

## 2021-08-11 NOTE — PROGRESS NOTE ADULT - ASSESSMENT
55 M wiyh covid     Had episodic desaturation       completed remdesivir, continue decadron and proning   completed 5 days of remdesivir    no other good options   sp toci    situation remains' difficult     continue bicap   On enoxaparin for DVT prophylaxis (D-dimer uo) Duplex negative for DVT  LDH increased, ferritin declining  Sputum with few polys G+ and g-s, likely colonization    no additional therapy completed 10 days of decadron  supportive care by MICU

## 2021-08-11 NOTE — PROGRESS NOTE ADULT - SUBJECTIVE AND OBJECTIVE BOX
infectious diseases progress note:    Patient is a 55y old  Male who presents with a chief complaint of covid (09 Aug 2021 09:13)        Infection due to severe acute respiratory syndrome coronavirus 2 (SARS-CoV-2)          ROS:       Allergies    No Known Allergies    Intolerances        ANTIBIOTICS/RELEVANT:  antimicrobials    immunologic:    OTHER:  budesonide 160 MICROgram(s)/formoterol 4.5 MICROgram(s) Inhaler 2 Puff(s) Inhalation two times a day  chlorhexidine 4% Liquid 1 Application(s) Topical <User Schedule>  dextrose 5% + sodium chloride 0.9% with potassium chloride 20 mEq/L 1000 milliLiter(s) IV Continuous <Continuous>  enoxaparin Injectable 40 milliGRAM(s) SubCutaneous two times a day  guaiFENesin ER 1200 milliGRAM(s) Oral every 12 hours  insulin lispro (ADMELOG) corrective regimen sliding scale   SubCutaneous every 6 hours  pantoprazole  Injectable 40 milliGRAM(s) IV Push daily  polyethylene glycol 3350 17 Gram(s) Oral daily  senna 2 Tablet(s) Oral at bedtime      Objective:  Vital Signs Last 24 Hrs  T(C): 36.9 (11 Aug 2021 07:00), Max: 37 (10 Aug 2021 10:40)  T(F): 98.4 (11 Aug 2021 07:00), Max: 98.6 (10 Aug 2021 10:40)  HR: 71 (11 Aug 2021 08:00) (52 - 89)  BP: 107/70 (11 Aug 2021 08:00) (97/55 - 143/78)  BP(mean): 84 (11 Aug 2021 08:00) (69 - 104)  RR: 32 (11 Aug 2021 08:00) (20 - 38)  SpO2: 95% (11 Aug 2021 08:00) (87% - 99%)       Eyes:PORFIRIO, EOMI  Ear/Nose/Throat: no oral lesion, no sinus tenderness on percussion	  Neck:no JVD, no lymphadenopathy, supple  Respiratory: CTA geovany  Cardiovascular: S1S2 RRR, no murmurs  Gastrointestinal:soft, (+) BS, no HSM  Extremities:no e/e/c        LABS:                        13.1   11.40 )-----------( 105      ( 11 Aug 2021 00:37 )             40.3     08-11    136  |  102  |  12  ----------------------------<  78  4.5   |  22  |  0.61    Ca    8.5      11 Aug 2021 00:37  Phos  3.1     08-11  Mg     2.2     08-11    TPro  5.9<L>  /  Alb  3.1<L>  /  TBili  0.6  /  DBili  x   /  AST  61<H>  /  ALT  59<H>  /  AlkPhos  139<H>  08-11    PT/INR - ( 11 Aug 2021 00:37 )   PT: 14.5 sec;   INR: 1.22 ratio         PTT - ( 11 Aug 2021 00:37 )  PTT:32.5 sec        MICROBIOLOGY:    RECENT CULTURES:  08-04 @ 09:06 .Sputum Sputum, cup       Few polymorphonuclear leukocytes per low power field  Rare Squamous epithelial cells per low power field  Moderate Gram Positive Cocci in Pairs and Chains per oil power field  Rare Gram Negative Rods per oil power field           Normal Respiratory Tiki present          RESPIRATORY CULTURES:              RADIOLOGY & ADDITIONAL STUDIES:        Pager [phone]  After 5 pm/weekends or if no response :[phone]

## 2021-08-11 NOTE — PROGRESS NOTE ADULT - SUBJECTIVE AND OBJECTIVE BOX
DATE OF SERVICE: 08-11-21 @ 10:47  CHIEF COMPLAINT:Patient is a 55y old  Male who presents with a chief complaint of covid (11 Aug 2021 08:04)    	        PAST MEDICAL & SURGICAL HISTORY:  Hyperlipidemia    HTN (hypertension)    Rupture, tendon, quadriceps    History of Achilles tendon repair            REVIEW OF SYSTEMS:  weak  RESPIRATORY: sob  CARDIOVASCULAR: No chest pain, palpitations, passing out, dizziness, or leg swelling  GASTROINTESTINAL: No abdominal or epigastric pain. No nausea, vomiting, or hematemesis;   GENITOURINARY: No dysuria, frequency, hematuria  NEUROLOGICAL: No headaches,     Medications:  MEDICATIONS  (STANDING):  budesonide 160 MICROgram(s)/formoterol 4.5 MICROgram(s) Inhaler 2 Puff(s) Inhalation two times a day  chlorhexidine 4% Liquid 1 Application(s) Topical <User Schedule>  dextrose 5% + sodium chloride 0.9% with potassium chloride 20 mEq/L 1000 milliLiter(s) (50 mL/Hr) IV Continuous <Continuous>  enoxaparin Injectable 40 milliGRAM(s) SubCutaneous two times a day  guaiFENesin ER 1200 milliGRAM(s) Oral every 12 hours  pantoprazole  Injectable 40 milliGRAM(s) IV Push daily  polyethylene glycol 3350 17 Gram(s) Oral daily  senna 2 Tablet(s) Oral at bedtime    MEDICATIONS  (PRN):  ALPRAZolam 0.25 milliGRAM(s) Oral every 12 hours PRN anxiety    	    PHYSICAL EXAM:  T(C): 36.9 (08-11-21 @ 07:00), Max: 36.9 (08-10-21 @ 19:00)  HR: 57 (08-11-21 @ 10:00) (52 - 89)  BP: 117/71 (08-11-21 @ 10:00) (97/55 - 143/78)  RR: 24 (08-11-21 @ 10:00) (24 - 38)  SpO2: 98% (08-11-21 @ 10:00) (87% - 99%)  Wt(kg): --  I&O's Summary    10 Aug 2021 07:01  -  11 Aug 2021 07:00  --------------------------------------------------------  IN: 1520 mL / OUT: 1775 mL / NET: -255 mL    11 Aug 2021 07:01  -  11 Aug 2021 10:47  --------------------------------------------------------  IN: 150 mL / OUT: 125 mL / NET: 25 mL        Appearance: Normal	  HEENT:   Normal oral mucosa, PERRL, EOMI	  Lymphatic: No lymphadenopathy  Cardiovascular: Normal S1 S2, No JVD, No murmurs, No edema  Respiratory: dec bs   Gastrointestinal:  Soft, Non-tender, + BS	  Skin: No rashes, No ecchymoses, No cyanosis	  Neurologic: Non-focal  Extremities: Normal range of motion, No clubbing, cyanosis or edema  Vascular: Peripheral pulses palpable 2+ bilaterally    TELEMETRY: 	    ECG:  	  RADIOLOGY:  OTHER: 	  	  LABS:	 	    CARDIAC MARKERS:                                13.1   11.40 )-----------( 105      ( 11 Aug 2021 00:37 )             40.3     08-11    136  |  102  |  12  ----------------------------<  78  4.5   |  22  |  0.61    Ca    8.5      11 Aug 2021 00:37  Phos  3.1     08-11  Mg     2.2     08-11    TPro  5.9<L>  /  Alb  3.1<L>  /  TBili  0.6  /  DBili  x   /  AST  61<H>  /  ALT  59<H>  /  AlkPhos  139<H>  08-11    proBNP:   Lipid Profile:   HgA1c:   TSH:

## 2021-08-11 NOTE — PROGRESS NOTE ADULT - PROBLEM SELECTOR PLAN 2
2nd to COVID PNA  -S/p RRT 8/3 and 8/4 for hypoxia  -Tx to 5ICU 8/10 for further management  -Continues to require Bipap 15/10/100% qhs & PRN  -Tolerates few hours of HFNC with O2 sats 88-90%  -Keep sats >88% with O2 as able  -ABG noted  -Prognosis guarded  -Management per ICU team

## 2021-08-11 NOTE — PROGRESS NOTE ADULT - SUBJECTIVE AND OBJECTIVE BOX
CHIEF COMPLAINT:  Patient is a 55y old  Male who presents with a chief complaint of covid (09 Aug 2021 09:13)    HPI:  54yo M with Hx of DVT s/p achilles tendon repair years ago not on AC presenting with complaints of SOB. intermittent and fevers with cough productive of white sputum for past week, tested positive for covid 2 days ago.  pt is unvaccinated   progressively worsening SOB over the past 5 days, worse with ambulation. found to be hypoxic on arrival to the  er    (29 Jul 2021 10:31)      Interval Events:      REVIEW OF SYSTEMS:          OBJECTIVE:  ICU Vital Signs Last 24 Hrs  T(C): 36.9 (11 Aug 2021 05:00), Max: 37 (10 Aug 2021 10:40)  T(F): 98.5 (11 Aug 2021 05:00), Max: 98.6 (10 Aug 2021 10:40)  HR: 73 (11 Aug 2021 06:00) (52 - 89)  BP: 100/61 (11 Aug 2021 06:00) (97/55 - 143/78)  BP(mean): 74 (11 Aug 2021 06:00) (69 - 104)  ABP: --  ABP(mean): --  RR: 32 (11 Aug 2021 06:00) (20 - 38)  SpO2: 93% (11 Aug 2021 06:00) (87% - 99%)        08-10-21 @ 07:01  -  08-11-21 @ 07:00  --------------------------------------------------------  IN: 1470 mL / OUT: 1775 mL / NET: -305 mL      CAPILLARY BLOOD GLUCOSE      POCT Blood Glucose.: 92 mg/dL (11 Aug 2021 05:58)          PHYSICAL EXAM:          HOSPITAL MEDICATIONS:  MEDICATIONS  (STANDING):  budesonide 160 MICROgram(s)/formoterol 4.5 MICROgram(s) Inhaler 2 Puff(s) Inhalation two times a day  chlorhexidine 4% Liquid 1 Application(s) Topical <User Schedule>  dextrose 5% + sodium chloride 0.9% with potassium chloride 20 mEq/L 1000 milliLiter(s) (50 mL/Hr) IV Continuous <Continuous>  enoxaparin Injectable 40 milliGRAM(s) SubCutaneous two times a day  guaiFENesin ER 1200 milliGRAM(s) Oral every 12 hours  insulin lispro (ADMELOG) corrective regimen sliding scale   SubCutaneous every 6 hours  pantoprazole  Injectable 40 milliGRAM(s) IV Push daily  polyethylene glycol 3350 17 Gram(s) Oral daily  senna 2 Tablet(s) Oral at bedtime    MEDICATIONS  (PRN):      LABS:                        13.1   11.40 )-----------( 105      ( 11 Aug 2021 00:37 )             40.3     Hgb Trend: 13.1<--, 14.1<--, 14.3<--, 12.5<--, 13.6<--  08-11    136  |  102  |  12  ----------------------------<  78  4.5   |  22  |  0.61    Ca    8.5      11 Aug 2021 00:37  Phos  3.1     08-11  Mg     2.2     08-11    TPro  5.9<L>  /  Alb  3.1<L>  /  TBili  0.6  /  DBili  x   /  AST  61<H>  /  ALT  59<H>  /  AlkPhos  139<H>  08-11    LIVER FUNCTIONS - ( 11 Aug 2021 00:37 )  Alb: 3.1 g/dL / Pro: 5.9 g/dL / ALK PHOS: 139 U/L / ALT: 59 U/L / AST: 61 U/L / GGT: x           Creatinine Trend: 0.61<--, 0.73<--, 0.72<--, 0.61<--, 0.68<--, 0.85<--  PT/INR - ( 11 Aug 2021 00:37 )   PT: 14.5 sec;   INR: 1.22 ratio         PTT - ( 11 Aug 2021 00:37 )  PTT:32.5 sec    Arterial Blood Gas:  08-11 @ 00:39  7.44/43/57/29/88/4.4  ABG lactate: --        MICROBIOLOGY: Reviewed      RADIOLOGY: Reviewed and interpreted by me    EKG:   CHIEF COMPLAINT:  Patient is a 55y old  Male who presents with a chief complaint of covid (09 Aug 2021 09:13)    HPI:  54yo M with Hx of DVT s/p achilles tendon repair years ago not on AC presenting with complaints of SOB. intermittent and fevers with cough productive of white sputum for past week, tested positive for covid 2 days ago.  pt is unvaccinated   progressively worsening SOB over the past 5 days, worse with ambulation. found to be hypoxic on arrival to the  er    (29 Jul 2021 10:31)      Interval Events:  -intermittent bipap use overnight 15/10 90%    OBJECTIVE:  ICU Vital Signs Last 24 Hrs  T(C): 36.9 (11 Aug 2021 05:00), Max: 37 (10 Aug 2021 10:40)  T(F): 98.5 (11 Aug 2021 05:00), Max: 98.6 (10 Aug 2021 10:40)  HR: 73 (11 Aug 2021 06:00) (52 - 89)  BP: 100/61 (11 Aug 2021 06:00) (97/55 - 143/78)  BP(mean): 74 (11 Aug 2021 06:00) (69 - 104)  ABP: --  ABP(mean): --  RR: 32 (11 Aug 2021 06:00) (20 - 38)  SpO2: 93% (11 Aug 2021 06:00) (87% - 99%)        08-10-21 @ 07:01  -  08-11-21 @ 07:00  --------------------------------------------------------  IN: 1470 mL / OUT: 1775 mL / NET: -305 mL      CAPILLARY BLOOD GLUCOSE  POCT Blood Glucose.: 92 mg/dL (11 Aug 2021 05:58)      PHYSICAL EXAM:      HOSPITAL MEDICATIONS:  MEDICATIONS  (STANDING):  budesonide 160 MICROgram(s)/formoterol 4.5 MICROgram(s) Inhaler 2 Puff(s) Inhalation two times a day  chlorhexidine 4% Liquid 1 Application(s) Topical <User Schedule>  dextrose 5% + sodium chloride 0.9% with potassium chloride 20 mEq/L 1000 milliLiter(s) (50 mL/Hr) IV Continuous <Continuous>  enoxaparin Injectable 40 milliGRAM(s) SubCutaneous two times a day  guaiFENesin ER 1200 milliGRAM(s) Oral every 12 hours  insulin lispro (ADMELOG) corrective regimen sliding scale   SubCutaneous every 6 hours  pantoprazole  Injectable 40 milliGRAM(s) IV Push daily  polyethylene glycol 3350 17 Gram(s) Oral daily  senna 2 Tablet(s) Oral at bedtime    MEDICATIONS  (PRN):      LABS:                        13.1   11.40 )-----------( 105      ( 11 Aug 2021 00:37 )             40.3     Hgb Trend: 13.1<--, 14.1<--, 14.3<--, 12.5<--, 13.6<--  08-11    136  |  102  |  12  ----------------------------<  78  4.5   |  22  |  0.61    Ca    8.5      11 Aug 2021 00:37  Phos  3.1     08-11  Mg     2.2     08-11    TPro  5.9<L>  /  Alb  3.1<L>  /  TBili  0.6  /  DBili  x   /  AST  61<H>  /  ALT  59<H>  /  AlkPhos  139<H>  08-11    LIVER FUNCTIONS - ( 11 Aug 2021 00:37 )  Alb: 3.1 g/dL / Pro: 5.9 g/dL / ALK PHOS: 139 U/L / ALT: 59 U/L / AST: 61 U/L / GGT: x           Creatinine Trend: 0.61<--, 0.73<--, 0.72<--, 0.61<--, 0.68<--, 0.85<--  PT/INR - ( 11 Aug 2021 00:37 )   PT: 14.5 sec;   INR: 1.22 ratio    PTT - ( 11 Aug 2021 00:37 )  PTT:32.5 sec    Arterial Blood Gas:  08-11 @ 00:39  7.44/43/57/29/88/4.4  ABG lactate: --    MICROBIOLOGY: Reviewed    RADIOLOGY: Reviewed and interpreted by me    EKG: nsr   CHIEF COMPLAINT:  Patient is a 55y old  Male who presents with a chief complaint of covid (09 Aug 2021 09:13)    HPI:  54yo M with Hx of DVT s/p achilles tendon repair years ago not on AC presenting with complaints of SOB. intermittent and fevers with cough productive of white sputum for past week, tested positive for covid 2 days ago.  pt is unvaccinated   progressively worsening SOB over the past 5 days, worse with ambulation. found to be hypoxic on arrival to the  er    (29 Jul 2021 10:31)      Interval Events:  -intermittent bipap use overnight 15/10 90%    OBJECTIVE:  ICU Vital Signs Last 24 Hrs  T(C): 36.9 (11 Aug 2021 05:00), Max: 37 (10 Aug 2021 10:40)  T(F): 98.5 (11 Aug 2021 05:00), Max: 98.6 (10 Aug 2021 10:40)  HR: 73 (11 Aug 2021 06:00) (52 - 89)  BP: 100/61 (11 Aug 2021 06:00) (97/55 - 143/78)  BP(mean): 74 (11 Aug 2021 06:00) (69 - 104)  ABP: --  ABP(mean): --  RR: 32 (11 Aug 2021 06:00) (20 - 38)  SpO2: 93% (11 Aug 2021 06:00) (87% - 99%)      08-10-21 @ 07:01  -  08-11-21 @ 07:00  --------------------------------------------------------  IN: 1470 mL / OUT: 1775 mL / NET: -305 mL      CAPILLARY BLOOD GLUCOSE  POCT Blood Glucose.: 92 mg/dL (11 Aug 2021 05:58)      PHYSICAL EXAM:  GENERAL: NAD on BiPap, well-groomed, well-developed  HEAD: Atraumatic, Normocephalic  EYES: PERRL, conjunctiva and sclera clear  ENMT: No oropharyngeal exudates, erythema or lesions,  Moist mucous membranes  NECK: Supple, no JVD  NERVOUS SYSTEM: A&O X3, Moves all extremities; Upper extremities 5/5; Lower extremities 5/5, full sensation to light touch   CHEST/LUNG: Breath sounds bilaterally without crackles, rhonchi, wheezes, rubs   HEART: S1/S2 without murmurs, without rubs, or gallops.   ABDOMEN: Soft, Nontender, Nondistended; Bowel sounds present, Bladder non distended, non palpable  EXTREMITIES: Pulses palpable radial pulses 2+ bilat, DP/PT 1+/1+ bilat, without clubbing, cyanosis. Digits warm to touch with good cap refill <3 secs  SKIN: warm, dry, intact, normal color, no rash or abnormal lesions    HOSPITAL MEDICATIONS:  MEDICATIONS  (STANDING):  budesonide 160 MICROgram(s)/formoterol 4.5 MICROgram(s) Inhaler 2 Puff(s) Inhalation two times a day  chlorhexidine 4% Liquid 1 Application(s) Topical <User Schedule>  dextrose 5% + sodium chloride 0.9% with potassium chloride 20 mEq/L 1000 milliLiter(s) (50 mL/Hr) IV Continuous <Continuous>  enoxaparin Injectable 40 milliGRAM(s) SubCutaneous two times a day  guaiFENesin ER 1200 milliGRAM(s) Oral every 12 hours  insulin lispro (ADMELOG) corrective regimen sliding scale   SubCutaneous every 6 hours  pantoprazole  Injectable 40 milliGRAM(s) IV Push daily  polyethylene glycol 3350 17 Gram(s) Oral daily  senna 2 Tablet(s) Oral at bedtime    MEDICATIONS  (PRN):      LABS:                        13.1   11.40 )-----------( 105      ( 11 Aug 2021 00:37 )             40.3     Hgb Trend: 13.1<--, 14.1<--, 14.3<--, 12.5<--, 13.6<--  08-11    136  |  102  |  12  ----------------------------<  78  4.5   |  22  |  0.61    Ca    8.5      11 Aug 2021 00:37  Phos  3.1     08-11  Mg     2.2     08-11    TPro  5.9<L>  /  Alb  3.1<L>  /  TBili  0.6  /  DBili  x   /  AST  61<H>  /  ALT  59<H>  /  AlkPhos  139<H>  08-11    LIVER FUNCTIONS - ( 11 Aug 2021 00:37 )  Alb: 3.1 g/dL / Pro: 5.9 g/dL / ALK PHOS: 139 U/L / ALT: 59 U/L / AST: 61 U/L / GGT: x           Creatinine Trend: 0.61<--, 0.73<--, 0.72<--, 0.61<--, 0.68<--, 0.85<--  PT/INR - ( 11 Aug 2021 00:37 )   PT: 14.5 sec;   INR: 1.22 ratio    PTT - ( 11 Aug 2021 00:37 )  PTT:32.5 sec    Arterial Blood Gas:  08-11 @ 00:39  7.44/43/57/29/88/4.4  ABG lactate: --    MICROBIOLOGY: Reviewed    RADIOLOGY: Reviewed and interpreted by me    EKG: david

## 2021-08-11 NOTE — PROGRESS NOTE ADULT - ASSESSMENT
55y-year-old Male with a past medical history of HTN, RLL DVT and PE (not on AC at home) admitted for acute respiratory failure secondary to COVID-19, now requiring HFNC with nocturnal BiPAP for oxygen supplementation, admitted to ICU for further assessment and management.     NEUROLOGY:  - A&Ox4 at baseline; No Active Issues     PULMONARY:  Acute Respiratory Failure  - Continue HFNC with BiPAP Nocternal/PRN   - Titrate settings as tolerated to maintain SpO2>88%   - Prone as tolerated    CARDIOLOGY:  - No Active Issues     GASTROINTESTINAL:  - c/w Regular Diet w/ supp shakes while intermittently on HFNC  - c/w Protonix 40mg QD while intermittently NPO on Bipap  - c/w bowel regimen with Miralax and Senna     RENAL/:  - Strict I&Os   - Monitor and replete lytes daily    INFECTIOUS DISEASE:  COVID-19  - s/p completed courses of Remdesivir x 5 days and Decadron x 10 days   - s/p Toci on 7/30    HEMATOLOGY:  - c/w Full AC Lovenox 90mg BID (D-dimer peaked at 3785 on 8/7 in setting of severe hypoxia; now downtrending)  - LE duplex neg for DVT on 8/4  - Consider CTA chest when stabilized    ENDOCRINE:  - HbA1c 6.0 on 8/10  - Glucose checks Q6    ETHICS/DISPOSITION:  - Full code   - Remain in ICU

## 2021-08-11 NOTE — PROGRESS NOTE ADULT - SUBJECTIVE AND OBJECTIVE BOX
Follow-up Pulm Progress Note    Now in 5ICU  Awake & alert  Seen on HFNC 100%/60L  O2 sats 88-89%  Tachypneic, but no acute distress    Medications:  MEDICATIONS  (STANDING):  budesonide 160 MICROgram(s)/formoterol 4.5 MICROgram(s) Inhaler 2 Puff(s) Inhalation two times a day  chlorhexidine 4% Liquid 1 Application(s) Topical <User Schedule>  cholecalciferol 2000 Unit(s) Oral daily  enoxaparin Injectable 90 milliGRAM(s) SubCutaneous every 12 hours  guaiFENesin ER 1200 milliGRAM(s) Oral every 12 hours  multivitamin 1 Tablet(s) Oral daily  pantoprazole  Injectable 40 milliGRAM(s) IV Push daily  polyethylene glycol 3350 17 Gram(s) Oral daily  senna 2 Tablet(s) Oral at bedtime    MEDICATIONS  (PRN):  ALPRAZolam 0.25 milliGRAM(s) Oral every 12 hours PRN anxiety    Vital Signs Last 24 Hrs  T(C): 36.8 (11 Aug 2021 11:00), Max: 36.9 (10 Aug 2021 19:00)  T(F): 98.2 (11 Aug 2021 11:00), Max: 98.5 (10 Aug 2021 19:00)  HR: 80 (11 Aug 2021 12:00) (52 - 89)  BP: 104/75 (11 Aug 2021 12:00) (97/55 - 143/78)  BP(mean): 82 (11 Aug 2021 12:00) (69 - 104)  RR: 27 (11 Aug 2021 12:00) (24 - 38)  SpO2: 89% (11 Aug 2021 12:00) (87% - 98%)    ABG - ( 11 Aug 2021 00:39 )  pH, Arterial: 7.44  pH, Blood: x     /  pCO2: 43    /  pO2: 57    / HCO3: 29    / Base Excess: 4.4   /  SaO2: 88        08-10 @ 07:01  -  08-11 @ 07:00  --------------------------------------------------------  IN: 1520 mL / OUT: 1775 mL / NET: -255 mL    LABS:                        13.1   11.40 )-----------( 105      ( 11 Aug 2021 00:37 )             40.3     08-11    136  |  102  |  12  ----------------------------<  78  4.5   |  22  |  0.61    Ca    8.5      11 Aug 2021 00:37  Phos  3.1     08-11  Mg     2.2     08-11    TPro  5.9<L>  /  Alb  3.1<L>  /  TBili  0.6  /  DBili  x   /  AST  61<H>  /  ALT  59<H>  /  AlkPhos  139<H>  08-11      CAPILLARY BLOOD GLUCOSE  POCT Blood Glucose.: 92 mg/dL (11 Aug 2021 05:58)    PT/INR - ( 11 Aug 2021 00:37 )   PT: 14.5 sec;   INR: 1.22 ratio    PTT - ( 11 Aug 2021 00:37 )  PTT:32.5 sec    Procalcitonin, Serum: 0.04 ng/mL (08-11-21 @ 00:37)  Procalcitonin, Serum: 0.03 ng/mL (08-09-21 @ 09:21)    Physical Examination:  PULM: Decreased BS at bases   CVS: RRR     RADIOLOGY REVIEWED  CXR: 8/9 b/l lung opacities     < from: [facility] Duplex Lower Ext Vein Scan, Bilat (08.04.21 @ 16:22) >  FINDINGS:    RIGHT:  Normal compressibilityof the RIGHT common femoral, femoral and popliteal veins.  Doppler examination shows normal spontaneous and phasic flow.  No RIGHT calf vein thrombosis is detected.    LEFT:  Normal compressibility of the LEFT common femoral, femoral and popliteal veins.  Doppler examination shows normal spontaneous and phasic flow.  No LEFT calf vein thrombosis is detected.    IMPRESSION:  No evidence of deep venous thrombosis in either lower extremity.      < end of copied text >

## 2021-08-11 NOTE — PROGRESS NOTE ADULT - ASSESSMENT
pt w/ covid  hypoxia worse  iv steroids   s/p remdesivir  id / f/u noted  pulm f/u   c/e resp support/    additional tx as per id/pulm / micu  recs  s/p toci  gi / dvt proph  o2  hx htn/   monitor bp  monitor inflammatory markers     prognosis guarded   c/w micu txx

## 2021-08-11 NOTE — CHART NOTE - NSCHARTNOTEFT_GEN_A_CORE
Nutrition Follow Up Note  Patient seen for: new ICU admission, nutrition follow-up    Chart reviewed, events noted.    Source: [x] Patient       [x] EMR        [x] RN        [] Family at bedside       [] Other:  - Limited information obtained, pt is on hi flow focusing on breathing and catching his breath. Extensive conversation was not appropriate.     Nutrition-Related Events:   - Pressors:  [] Yes    [x] No   - Propofol:  [] Yes    [x] No    Diet Order:   Diet, Regular (08-11-21)   - was previously ordered for Ensure clear TID as well, pt's wife preferred pt to avoid milk-based items    Is current diet order appropriate/adequate? [] Yes  [x]  No: pt requires nutrition supplementation    PO intake :   [] >75%  Adequate    [] 50-75%  Fair       [x] <50%  Poor    Nutrition-related concerns: BiPAP need prohibits PO intake. Pt was on bipap this morning, did not have breakfast. Now progressed to [place] to allow him to eat. Will provide Orgain protein supplement as well- pt amenable to try.    GI:    Bowel Regimen? [x] Yes   [] No    Weights:   Daily     MEDICATIONS  (STANDING):  cholecalciferol  multivitamin  pantoprazole  Injectable  polyethylene glycol 3350  senna    Pertinent Labs: 08-11 @ 00:37: Na 136, BUN 12, Cr 0.61, BG 78, K+ 4.5, Phos 3.1, Mg 2.2, Alk Phos 139<H>, ALT/SGPT 59<H>, AST/SGOT 61<H>, HbA1c --    A1C with Estimated Average Glucose Result: 6.0 % (08-10-21 @ 09:48)  A1C with Estimated Average Glucose Result: 6.3 % (08-09-21 @ 14:51)    Finger Sticks:  POCT Blood Glucose.: 92 mg/dL (08-11 @ 05:58)        Skin per nursing documentation: no pressure injuries  Edema: none    Previous Nutrition Diagnosis: inadequate protein-energy intake, obesity  Nutrition Diagnosis is: [x] ongoing  [] resolved [] not applicable     New Nutrition Diagnosis: Severe, acute malnutrition related to inability to eat while on BiPAP as evidenced by <50% estimated energy needs >7 days, suspected wt loss with inadequate intake and increased WOB    Nutrition Care Plan:  [x] In Progress  [] Achieved  [] Not applicable    Nutrition Interventions:     Education Provided:       [] Yes:  [x] No:        Recommendations:         [x] Continue current diet order- add Orgain BID as provider to RN, increase as needed.     [x] Continue current micronutrient supplementation: Multivitamin + Vitamin D      [] Other:     Monitoring and Evaluation:   Continue to monitor nutritional intake, tolerance to diet prescription, weights, labs, skin integrity    Quita Palafox RD, CDN. Pager: [phone]   RD remains available upon request and will follow up per protocol

## 2021-08-11 NOTE — PROGRESS NOTE ADULT - PROBLEM SELECTOR PLAN 1
+COVID PCR as an outpatient  -CXR 8/9 with worsening b/l lung opacities   -S/p Remdesivir x 5 days   -S/p Decadron 6mg IVP qd x 10 days (completed 8/10)  -S/p Tocilizumab 7/30 per ID for worsening hypoxic respiratory failure  -Sputum culture with normal respiratory aba. WBC normal, PCT normal, afebrile   -LE duplex 8/4 neg DVT.   -On full dose AC Lovenox 1mg/kg BID given elevated ddimer, too unstable for transport to CTA chest.   -Continue to trend ddimer & inflammatory markers

## 2021-08-12 LAB
ALBUMIN SERPL ELPH-MCNC: 3.4 G/DL — SIGNIFICANT CHANGE UP (ref 3.3–5)
ALP SERPL-CCNC: 138 U/L — HIGH (ref 40–120)
ALT FLD-CCNC: 66 U/L — HIGH (ref 10–45)
ANION GAP SERPL CALC-SCNC: 13 MMOL/L — SIGNIFICANT CHANGE UP (ref 5–17)
APTT BLD: 27 SEC — LOW (ref 27.5–35.5)
AST SERPL-CCNC: 54 U/L — HIGH (ref 10–40)
BASE EXCESS BLDV CALC-SCNC: 2.8 MMOL/L — HIGH (ref -2–2)
BILIRUB SERPL-MCNC: 0.5 MG/DL — SIGNIFICANT CHANGE UP (ref 0.2–1.2)
BUN SERPL-MCNC: 16 MG/DL — SIGNIFICANT CHANGE UP (ref 7–23)
CA-I SERPL-SCNC: 1.19 MMOL/L — SIGNIFICANT CHANGE UP (ref 1.12–1.3)
CALCIUM SERPL-MCNC: 8.6 MG/DL — SIGNIFICANT CHANGE UP (ref 8.4–10.5)
CHLORIDE BLDV-SCNC: 102 MMOL/L — SIGNIFICANT CHANGE UP (ref 96–108)
CHLORIDE SERPL-SCNC: 102 MMOL/L — SIGNIFICANT CHANGE UP (ref 96–108)
CO2 BLDV-SCNC: 30 MMOL/L — SIGNIFICANT CHANGE UP (ref 22–30)
CO2 SERPL-SCNC: 23 MMOL/L — SIGNIFICANT CHANGE UP (ref 22–31)
CREAT SERPL-MCNC: 0.68 MG/DL — SIGNIFICANT CHANGE UP (ref 0.5–1.3)
GAS PNL BLDV: 136 MMOL/L — SIGNIFICANT CHANGE UP (ref 135–145)
GAS PNL BLDV: SIGNIFICANT CHANGE UP
GAS PNL BLDV: SIGNIFICANT CHANGE UP
GLUCOSE BLDV-MCNC: 98 MG/DL — SIGNIFICANT CHANGE UP (ref 70–99)
GLUCOSE SERPL-MCNC: 95 MG/DL — SIGNIFICANT CHANGE UP (ref 70–99)
HCO3 BLDV-SCNC: 28 MMOL/L — SIGNIFICANT CHANGE UP (ref 21–29)
HCT VFR BLD CALC: 41.4 % — SIGNIFICANT CHANGE UP (ref 39–50)
HCT VFR BLDA CALC: 42 % — SIGNIFICANT CHANGE UP (ref 39–50)
HEPARIN-PF4 AB RESULT: SIGNIFICANT CHANGE UP U/ML (ref 0–0.9)
HGB BLD CALC-MCNC: 13.6 G/DL — SIGNIFICANT CHANGE UP (ref 13–17)
HGB BLD-MCNC: 13.2 G/DL — SIGNIFICANT CHANGE UP (ref 13–17)
INR BLD: 1.19 RATIO — HIGH (ref 0.88–1.16)
LACTATE BLDV-MCNC: 2.7 MMOL/L — HIGH (ref 0.7–2)
MAGNESIUM SERPL-MCNC: 2.2 MG/DL — SIGNIFICANT CHANGE UP (ref 1.6–2.6)
MCHC RBC-ENTMCNC: 28.1 PG — SIGNIFICANT CHANGE UP (ref 27–34)
MCHC RBC-ENTMCNC: 31.9 GM/DL — LOW (ref 32–36)
MCV RBC AUTO: 88.3 FL — SIGNIFICANT CHANGE UP (ref 80–100)
NRBC # BLD: 0 /100 WBCS — SIGNIFICANT CHANGE UP (ref 0–0)
OTHER CELLS CSF MANUAL: 15 ML/DL — LOW (ref 18–22)
PCO2 BLDV: 48 MMHG — SIGNIFICANT CHANGE UP (ref 35–50)
PF4 HEPARIN CMPLX AB SER-ACNC: NEGATIVE — SIGNIFICANT CHANGE UP
PH BLDV: 7.38 — SIGNIFICANT CHANGE UP (ref 7.35–7.45)
PHOSPHATE SERPL-MCNC: 3.6 MG/DL — SIGNIFICANT CHANGE UP (ref 2.5–4.5)
PLATELET # BLD AUTO: 137 K/UL — LOW (ref 150–400)
PO2 BLDV: 50 MMHG — HIGH (ref 25–45)
POTASSIUM BLDV-SCNC: 4.3 MMOL/L — SIGNIFICANT CHANGE UP (ref 3.5–5.3)
POTASSIUM SERPL-MCNC: 4.6 MMOL/L — SIGNIFICANT CHANGE UP (ref 3.5–5.3)
POTASSIUM SERPL-SCNC: 4.6 MMOL/L — SIGNIFICANT CHANGE UP (ref 3.5–5.3)
PROT SERPL-MCNC: 6.2 G/DL — SIGNIFICANT CHANGE UP (ref 6–8.3)
PROTHROM AB SERPL-ACNC: 14.2 SEC — HIGH (ref 10.6–13.6)
RBC # BLD: 4.69 M/UL — SIGNIFICANT CHANGE UP (ref 4.2–5.8)
RBC # FLD: 12.6 % — SIGNIFICANT CHANGE UP (ref 10.3–14.5)
SAO2 % BLDV: 82 % — SIGNIFICANT CHANGE UP (ref 67–88)
SODIUM SERPL-SCNC: 138 MMOL/L — SIGNIFICANT CHANGE UP (ref 135–145)
WBC # BLD: 10.4 K/UL — SIGNIFICANT CHANGE UP (ref 3.8–10.5)
WBC # FLD AUTO: 10.4 K/UL — SIGNIFICANT CHANGE UP (ref 3.8–10.5)

## 2021-08-12 PROCEDURE — 71045 X-RAY EXAM CHEST 1 VIEW: CPT | Mod: 26

## 2021-08-12 PROCEDURE — 99291 CRITICAL CARE FIRST HOUR: CPT

## 2021-08-12 RX ADMIN — Medication 650 MILLIGRAM(S): at 15:58

## 2021-08-12 RX ADMIN — ENOXAPARIN SODIUM 90 MILLIGRAM(S): 100 INJECTION SUBCUTANEOUS at 05:27

## 2021-08-12 RX ADMIN — Medication 1200 MILLIGRAM(S): at 07:31

## 2021-08-12 RX ADMIN — PANTOPRAZOLE SODIUM 40 MILLIGRAM(S): 20 TABLET, DELAYED RELEASE ORAL at 11:34

## 2021-08-12 RX ADMIN — CHLORHEXIDINE GLUCONATE 1 APPLICATION(S): 213 SOLUTION TOPICAL at 06:00

## 2021-08-12 RX ADMIN — BUDESONIDE AND FORMOTEROL FUMARATE DIHYDRATE 2 PUFF(S): 160; 4.5 AEROSOL RESPIRATORY (INHALATION) at 05:26

## 2021-08-12 RX ADMIN — Medication 1200 MILLIGRAM(S): at 17:09

## 2021-08-12 RX ADMIN — DEXTROSE MONOHYDRATE, SODIUM CHLORIDE, AND POTASSIUM CHLORIDE 50 MILLILITER(S): 50; .745; 4.5 INJECTION, SOLUTION INTRAVENOUS at 05:28

## 2021-08-12 RX ADMIN — Medication 0.25 MILLIGRAM(S): at 09:47

## 2021-08-12 RX ADMIN — Medication 1 TABLET(S): at 11:34

## 2021-08-12 RX ADMIN — Medication 650 MILLIGRAM(S): at 17:04

## 2021-08-12 RX ADMIN — Medication 0.25 MILLIGRAM(S): at 05:27

## 2021-08-12 RX ADMIN — POLYETHYLENE GLYCOL 3350 17 GRAM(S): 17 POWDER, FOR SOLUTION ORAL at 11:34

## 2021-08-12 RX ADMIN — Medication 2000 UNIT(S): at 11:34

## 2021-08-12 RX ADMIN — ENOXAPARIN SODIUM 90 MILLIGRAM(S): 100 INJECTION SUBCUTANEOUS at 17:08

## 2021-08-12 NOTE — PROGRESS NOTE ADULT - ASSESSMENT
55y-year-old Male with a past medical history of HTN, RLL DVT and PE (not on AC at home) admitted for acute respiratory failure secondary to COVID-19, now requiring HFNC alternating with BiPAP for oxygen supplementation, admitted to ICU for further assessment and management.     NEUROLOGY:  - A&Ox4 at baseline; No Active Issues     PULMONARY:  Acute Respiratory Failure  - Continue HFNC with BiPAP Nocternal/PRN   - Titrate settings as tolerated to maintain SpO2>88%   - Prone as tolerated    CARDIOLOGY:  - No Active Issues     GASTROINTESTINAL:  - c/w Regular Diet w/ supp shakes while intermittently on HFNC  - c/w Protonix 40mg QD while intermittently NPO on Bipap  - c/w bowel regimen with Miralax and Senna     RENAL/:  - Strict I&Os   - Monitor and replete lytes daily    INFECTIOUS DISEASE:  COVID-19  - s/p completed courses of Remdesivir x 5 days and Decadron x 10 days   - s/p Toci on 7/30    HEMATOLOGY:  - c/w Full AC Lovenox 90mg BID (D-dimer peaked at 3785 on 8/7 in setting of severe hypoxia; now downtrending)  - LE duplex neg for DVT on 8/4  - Consider CTA chest when stabilized    ENDOCRINE:  - HbA1c 6.0 on 8/10  - Glucose checks Q6    ETHICS/DISPOSITION:  - Full code   - Remain in ICU

## 2021-08-12 NOTE — PROGRESS NOTE ADULT - SUBJECTIVE AND OBJECTIVE BOX
Follow-up Pulm Progress Note    Anxious    Was on HFNC this AM 60L/100%, sats were maintaining low 90s but pt tachypneic with increased WOB  Currently on Bipap 15/10/90% with sats high 90s     Medications:  MEDICATIONS  (STANDING):  budesonide 160 MICROgram(s)/formoterol 4.5 MICROgram(s) Inhaler 2 Puff(s) Inhalation two times a day  chlorhexidine 4% Liquid 1 Application(s) Topical <User Schedule>  cholecalciferol 2000 Unit(s) Oral daily  dextrose 5% + sodium chloride 0.9% with potassium chloride 20 mEq/L 1000 milliLiter(s) (50 mL/Hr) IV Continuous <Continuous>  enoxaparin Injectable 90 milliGRAM(s) SubCutaneous every 12 hours  guaiFENesin ER 1200 milliGRAM(s) Oral every 12 hours  multivitamin 1 Tablet(s) Oral daily  pantoprazole  Injectable 40 milliGRAM(s) IV Push daily  polyethylene glycol 3350 17 Gram(s) Oral daily  senna 2 Tablet(s) Oral at bedtime    MEDICATIONS  (PRN):  acetaminophen   Tablet .. 650 milliGRAM(s) Oral every 6 hours PRN Temp greater or equal to 38C (100.4F), Mild Pain (1 - 3)  ALPRAZolam 0.25 milliGRAM(s) Oral every 12 hours PRN anxiety  benzonatate 100 milliGRAM(s) Oral every 8 hours PRN Cough          Vital Signs Last 24 Hrs  T(C): 36.7 (12 Aug 2021 03:00), Max: 37 (11 Aug 2021 23:00)  T(F): 98 (12 Aug 2021 03:00), Max: 98.6 (11 Aug 2021 23:00)  HR: 91 (12 Aug 2021 08:00) (58 - 96)  BP: 104/66 (12 Aug 2021 08:00) (82/53 - 117/73)  BP(mean): 80 (12 Aug 2021 08:00) (61 - 90)  RR: 40 (12 Aug 2021 08:00) (15 - 40)  SpO2: 92% (12 Aug 2021 08:00) (88% - 100%)    ABG - ( 11 Aug 2021 00:39 )  pH, Arterial: 7.44  pH, Blood: x     /  pCO2: 43    /  pO2: 57    / HCO3: 29    / Base Excess: 4.4   /  SaO2: 88                VBG pH 7.38 08-12 @ 00:35    VBG pCO2 48 08-12 @ 00:35    VBG O2 sat 82 08-12 @ 00:35    VBG lactate 2.7 08-12 @ 00:35      08-11 @ 07:01  -  08-12 @ 07:00  --------------------------------------------------------  IN: 1100 mL / OUT: 825 mL / NET: 275 mL          LABS:                        13.2   10.40 )-----------( 137      ( 12 Aug 2021 00:33 )             41.4     08-12    138  |  102  |  16  ----------------------------<  95  4.6   |  23  |  0.68    Ca    8.6      12 Aug 2021 00:33  Phos  3.6     08-12  Mg     2.2     08-12    TPro  6.2  /  Alb  3.4  /  TBili  0.5  /  DBili  x   /  AST  54<H>  /  ALT  66<H>  /  AlkPhos  138<H>  08-12          CAPILLARY BLOOD GLUCOSE      POCT Blood Glucose.: 92 mg/dL (11 Aug 2021 05:58)    PT/INR - ( 12 Aug 2021 00:34 )   PT: 14.2 sec;   INR: 1.19 ratio         PTT - ( 12 Aug 2021 00:34 )  PTT:27.0 sec    Procalcitonin, Serum: 0.04 ng/mL (08-11-21 @ 00:37)                  CULTURES:    Culture - Blood (collected 08-11-21 @ 03:10)  Source: .Blood Blood-Peripheral  Preliminary Report (08-12-21 @ 04:01):    No growth to date.          Physical Examination:  PULM: Decreased BS at bases   CVS: RRR     RADIOLOGY REVIEWED  CXR: 8/9 b/l lung opacities     < from: [facility] Duplex Lower Ext Vein Scan, Bilat (08.04.21 @ 16:22) >  FINDINGS:    RIGHT:  Normal compressibilityof the RIGHT common femoral, femoral and popliteal veins.  Doppler examination shows normal spontaneous and phasic flow.  No RIGHT calf vein thrombosis is detected.    LEFT:  Normal compressibility of the LEFT common femoral, femoral and popliteal veins.  Doppler examination shows normal spontaneous and phasic flow.  No LEFT calf vein thrombosis is detected.    IMPRESSION:  No evidence of deep venous thrombosis in either lower extremity.      < end of copied text >

## 2021-08-12 NOTE — PROGRESS NOTE ADULT - PROBLEM SELECTOR PLAN 1
+COVID PCR as an outpatient  -CXR 8/9 with worsening b/l lung opacities   -S/p Remdesivir x 5 days   -S/p Decadron 6mg IVP qd x 10 days (completed 8/10)  -S/p Tocilizumab 7/30 per ID for worsening hypoxic respiratory failure  -Sputum culture 8/4 with normal respiratory aba. WBC normal, PCT normal, afebrile   -LE duplex 8/4 neg DVT.   -On full dose AC Lovenox 1mg/kg BID given elevated ddimer, too unstable for transport to CTA chest.   -Continue to trend ddimer & inflammatory markers

## 2021-08-12 NOTE — PROGRESS NOTE ADULT - PROBLEM SELECTOR PLAN 2
2nd to COVID PNA  -S/p RRT 8/3 and 8/4 for hypoxia  -Tx to 5ICU 8/10 for further management  -Continues to require Bipap 15/10/100% qhs & PRN  -Tolerates few hours of HFNC with O2 sats 88-90%  -Keep sats >88% with O2 as able  -Prognosis guarded  -Management per ICU team

## 2021-08-12 NOTE — PROGRESS NOTE ADULT - SUBJECTIVE AND OBJECTIVE BOX
DATE OF SERVICE: 08-12-21 @ 12:12  CHIEF COMPLAINT:Patient is a 55y old  Male who presents with a chief complaint of covid (11 Aug 2021 08:04)    	        PAST MEDICAL & SURGICAL HISTORY:  Hyperlipidemia    HTN (hypertension)    Rupture, tendon, quadriceps    History of Achilles tendon repair          weak  sob  on bipap  no cp  no abd pain    Medications:  MEDICATIONS  (STANDING):  budesonide 160 MICROgram(s)/formoterol 4.5 MICROgram(s) Inhaler 2 Puff(s) Inhalation two times a day  chlorhexidine 4% Liquid 1 Application(s) Topical <User Schedule>  cholecalciferol 2000 Unit(s) Oral daily  dextrose 5% + sodium chloride 0.9% with potassium chloride 20 mEq/L 1000 milliLiter(s) (50 mL/Hr) IV Continuous <Continuous>  enoxaparin Injectable 90 milliGRAM(s) SubCutaneous every 12 hours  guaiFENesin ER 1200 milliGRAM(s) Oral every 12 hours  multivitamin 1 Tablet(s) Oral daily  pantoprazole  Injectable 40 milliGRAM(s) IV Push daily  polyethylene glycol 3350 17 Gram(s) Oral daily  senna 2 Tablet(s) Oral at bedtime    MEDICATIONS  (PRN):  acetaminophen   Tablet .. 650 milliGRAM(s) Oral every 6 hours PRN Temp greater or equal to 38C (100.4F), Mild Pain (1 - 3)  ALPRAZolam 0.25 milliGRAM(s) Oral every 12 hours PRN anxiety  benzonatate 100 milliGRAM(s) Oral every 8 hours PRN Cough    	    PHYSICAL EXAM:  T(C): 36.7 (08-12-21 @ 03:00), Max: 37 (08-11-21 @ 23:00)  HR: 69 (08-12-21 @ 11:00) (58 - 96)  BP: 113/71 (08-12-21 @ 11:00) (82/53 - 117/73)  RR: 26 (08-12-21 @ 11:00) (15 - 40)  SpO2: 97% (08-12-21 @ 11:00) (88% - 100%)  Wt(kg): --  I&O's Summary    11 Aug 2021 07:01  -  12 Aug 2021 07:00  --------------------------------------------------------  IN: 1100 mL / OUT: 825 mL / NET: 275 mL    12 Aug 2021 07:01  -  12 Aug 2021 12:12  --------------------------------------------------------  IN: 200 mL / OUT: 0 mL / NET: 200 mL        	  HEENT:   Normal oral mucosa, PERRL, EOMI	    Cardiovascular: Normal S1 S2, No JVD, No murmurs, No edema  Respiratory:dec b s   Gastrointestinal:  Soft, Non-tender, + BS	  Skin: No rashes, No ecchymoses, No cyanosis	  Neurologic: Non-focal  Extremities: Normal range of motion, No clubbing, cyanosis or edema  Vascular: Peripheral pulses palpable 2+ bilaterally    TELEMETRY: 	    ECG:  	  RADIOLOGY:  OTHER: 	  	  LABS:	 	    CARDIAC MARKERS:                                13.2   10.40 )-----------( 137      ( 12 Aug 2021 00:33 )             41.4     08-12    138  |  102  |  16  ----------------------------<  95  4.6   |  23  |  0.68    Ca    8.6      12 Aug 2021 00:33  Phos  3.6     08-12  Mg     2.2     08-12    TPro  6.2  /  Alb  3.4  /  TBili  0.5  /  DBili  x   /  AST  54<H>  /  ALT  66<H>  /  AlkPhos  138<H>  08-12    proBNP:   Lipid Profile:   HgA1c:   TSH:

## 2021-08-12 NOTE — PROGRESS NOTE ADULT - SUBJECTIVE AND OBJECTIVE BOX
CHIEF COMPLAINT:  Patient is a 55y old  Male who presents with a chief complaint of covid (11 Aug 2021 08:04)    HPI:  56yo M with Hx of DVT s/p achilles tendon repair years ago not on AC presenting with complaints of SOB. intermittent and fevers with cough productive of white sputum for past week, tested positive for covid 2 days ago.  pt is unvaccinated   progressively worsening SOB over the past 5 days, worse with ambulation. found to be hypoxic on arrival to the  er    (29 Jul 2021 10:31)      Interval Events:      REVIEW OF SYSTEMS:          OBJECTIVE:  ICU Vital Signs Last 24 Hrs  T(C): 36.7 (12 Aug 2021 03:00), Max: 37 (11 Aug 2021 23:00)  T(F): 98 (12 Aug 2021 03:00), Max: 98.6 (11 Aug 2021 23:00)  HR: 67 (12 Aug 2021 06:00) (57 - 96)  BP: 116/63 (12 Aug 2021 06:00) (82/53 - 117/73)  BP(mean): 81 (12 Aug 2021 06:00) (61 - 90)  ABP: --  ABP(mean): --  RR: 29 (12 Aug 2021 06:00) (15 - 40)  SpO2: 98% (12 Aug 2021 06:00) (88% - 100%)        08-11 @ 07:01  -  08-12 @ 07:00  --------------------------------------------------------  IN: 1100 mL / OUT: 825 mL / NET: 275 mL      CAPILLARY BLOOD GLUCOSE      POCT Blood Glucose.: 92 mg/dL (11 Aug 2021 05:58)          PHYSICAL EXAM:          HOSPITAL MEDICATIONS:  MEDICATIONS  (STANDING):  budesonide 160 MICROgram(s)/formoterol 4.5 MICROgram(s) Inhaler 2 Puff(s) Inhalation two times a day  chlorhexidine 4% Liquid 1 Application(s) Topical <User Schedule>  cholecalciferol 2000 Unit(s) Oral daily  dextrose 5% + sodium chloride 0.9% with potassium chloride 20 mEq/L 1000 milliLiter(s) (50 mL/Hr) IV Continuous <Continuous>  enoxaparin Injectable 90 milliGRAM(s) SubCutaneous every 12 hours  guaiFENesin ER 1200 milliGRAM(s) Oral every 12 hours  multivitamin 1 Tablet(s) Oral daily  pantoprazole  Injectable 40 milliGRAM(s) IV Push daily  polyethylene glycol 3350 17 Gram(s) Oral daily  senna 2 Tablet(s) Oral at bedtime    MEDICATIONS  (PRN):  acetaminophen   Tablet .. 650 milliGRAM(s) Oral every 6 hours PRN Temp greater or equal to 38C (100.4F), Mild Pain (1 - 3)  ALPRAZolam 0.25 milliGRAM(s) Oral every 12 hours PRN anxiety  benzonatate 100 milliGRAM(s) Oral every 8 hours PRN Cough      LABS:                        13.2   10.40 )-----------( 137      ( 12 Aug 2021 00:33 )             41.4     Hgb Trend: 13.2<--, 13.1<--, 14.1<--, 14.3<--, 12.5<--  08-12    138  |  102  |  16  ----------------------------<  95  4.6   |  23  |  0.68    Ca    8.6      12 Aug 2021 00:33  Phos  3.6     08-12  Mg     2.2     08-12    TPro  6.2  /  Alb  3.4  /  TBili  0.5  /  DBili  x   /  AST  54<H>  /  ALT  66<H>  /  AlkPhos  138<H>  08-12    LIVER FUNCTIONS - ( 12 Aug 2021 00:33 )  Alb: 3.4 g/dL / Pro: 6.2 g/dL / ALK PHOS: 138 U/L / ALT: 66 U/L / AST: 54 U/L / GGT: x           Creatinine Trend: 0.68<--, 0.61<--, 0.73<--, 0.72<--, 0.61<--, 0.68<--  PT/INR - ( 12 Aug 2021 00:34 )   PT: 14.2 sec;   INR: 1.19 ratio         PTT - ( 12 Aug 2021 00:34 )  PTT:27.0 sec    Arterial Blood Gas:  08-11 @ 00:39  7.44/43/57/29/88/4.4  ABG lactate: --    Venous Blood Gas:  08-12 @ 00:35  7.38/48/50/28/82  VBG Lactate: 2.7      MICROBIOLOGY:     RADIOLOGY:  [ ] Reviewed and interpreted by me    EKG:       CHIEF COMPLAINT:  Patient is a 55y old  Male who presents with a chief complaint of covid (11 Aug 2021 08:04)    HPI:  54yo M with Hx of DVT s/p achilles tendon repair years ago not on AC presenting with complaints of SOB. intermittent and fevers with cough productive of white sputum for past week, tested positive for covid 2 days ago.  pt is unvaccinated   progressively worsening SOB over the past 5 days, worse with ambulation. found to be hypoxic on arrival to the  er    (29 Jul 2021 10:31)      Interval Events:      REVIEW OF SYSTEMS:          OBJECTIVE:  ICU Vital Signs Last 24 Hrs  T(C): 36.7 (12 Aug 2021 03:00), Max: 37 (11 Aug 2021 23:00)  T(F): 98 (12 Aug 2021 03:00), Max: 98.6 (11 Aug 2021 23:00)  HR: 67 (12 Aug 2021 06:00) (57 - 96)  BP: 116/63 (12 Aug 2021 06:00) (82/53 - 117/73)  BP(mean): 81 (12 Aug 2021 06:00) (61 - 90)  ABP: --  ABP(mean): --  RR: 29 (12 Aug 2021 06:00) (15 - 40)  SpO2: 98% (12 Aug 2021 06:00) (88% - 100%)        08-11 @ 07:01  -  08-12 @ 07:00  --------------------------------------------------------  IN: 1100 mL / OUT: 825 mL / NET: 275 mL      CAPILLARY BLOOD GLUCOSE      POCT Blood Glucose.: 92 mg/dL (11 Aug 2021 05:58)          PHYSICAL EXAM:  GENERAL: NAD,  HEENT:  Atraumatic, Normocephalic  EYES: PERRLA, conjunctiva and sclera clear  NECK: Supple, No JVD  CHEST/LUNG: diminished bilaterally; No wheezes, rales, or rhonchi  HEART: Regular rate and rhythm; No murmurs, rubs, or gallops  ABDOMEN: Soft, Nontender, Nondistended; Bowel sounds present  EXTREMITIES:  2+ Peripheral Pulses, No clubbing, cyanosis, or edema  PSYCH: unable as pt is sedated  NEUROLOGY: Awake alert anxious  SKIN: No rashes or lesions        HOSPITAL MEDICATIONS:  MEDICATIONS  (STANDING):  budesonide 160 MICROgram(s)/formoterol 4.5 MICROgram(s) Inhaler 2 Puff(s) Inhalation two times a day  chlorhexidine 4% Liquid 1 Application(s) Topical <User Schedule>  cholecalciferol 2000 Unit(s) Oral daily  dextrose 5% + sodium chloride 0.9% with potassium chloride 20 mEq/L 1000 milliLiter(s) (50 mL/Hr) IV Continuous <Continuous>  enoxaparin Injectable 90 milliGRAM(s) SubCutaneous every 12 hours  guaiFENesin ER 1200 milliGRAM(s) Oral every 12 hours  multivitamin 1 Tablet(s) Oral daily  pantoprazole  Injectable 40 milliGRAM(s) IV Push daily  polyethylene glycol 3350 17 Gram(s) Oral daily  senna 2 Tablet(s) Oral at bedtime    MEDICATIONS  (PRN):  acetaminophen   Tablet .. 650 milliGRAM(s) Oral every 6 hours PRN Temp greater or equal to 38C (100.4F), Mild Pain (1 - 3)  ALPRAZolam 0.25 milliGRAM(s) Oral every 12 hours PRN anxiety  benzonatate 100 milliGRAM(s) Oral every 8 hours PRN Cough      LABS:                        13.2   10.40 )-----------( 137      ( 12 Aug 2021 00:33 )             41.4     Hgb Trend: 13.2<--, 13.1<--, 14.1<--, 14.3<--, 12.5<--  08-12    138  |  102  |  16  ----------------------------<  95  4.6   |  23  |  0.68    Ca    8.6      12 Aug 2021 00:33  Phos  3.6     08-12  Mg     2.2     08-12    TPro  6.2  /  Alb  3.4  /  TBili  0.5  /  DBili  x   /  AST  54<H>  /  ALT  66<H>  /  AlkPhos  138<H>  08-12    LIVER FUNCTIONS - ( 12 Aug 2021 00:33 )  Alb: 3.4 g/dL / Pro: 6.2 g/dL / ALK PHOS: 138 U/L / ALT: 66 U/L / AST: 54 U/L / GGT: x           Creatinine Trend: 0.68<--, 0.61<--, 0.73<--, 0.72<--, 0.61<--, 0.68<--  PT/INR - ( 12 Aug 2021 00:34 )   PT: 14.2 sec;   INR: 1.19 ratio         PTT - ( 12 Aug 2021 00:34 )  PTT:27.0 sec    Arterial Blood Gas:  08-11 @ 00:39  7.44/43/57/29/88/4.4  ABG lactate: --    Venous Blood Gas:  08-12 @ 00:35  7.38/48/50/28/82  VBG Lactate: 2.7      MICROBIOLOGY:     RADIOLOGY:  [ ] Reviewed and interpreted by me    EKG:       CHIEF COMPLAINT:  Patient is a 55y old  Male who presents with a chief complaint of covid (11 Aug 2021 08:04)    HPI:  56yo M with Hx of DVT s/p achilles tendon repair years ago not on AC presenting with complaints of SOB. intermittent and fevers with cough productive of white sputum for past week, tested positive for covid 2 days ago.  pt is unvaccinated   progressively worsening SOB over the past 5 days, worse with ambulation. found to be hypoxic on arrival to the  er    (29 Jul 2021 10:31)      Interval Events: No events O/N      REVIEW OF SYSTEMS: increased work of breathing per patient          OBJECTIVE:  ICU Vital Signs Last 24 Hrs  T(C): 36.7 (12 Aug 2021 03:00), Max: 37 (11 Aug 2021 23:00)  T(F): 98 (12 Aug 2021 03:00), Max: 98.6 (11 Aug 2021 23:00)  HR: 67 (12 Aug 2021 06:00) (57 - 96)  BP: 116/63 (12 Aug 2021 06:00) (82/53 - 117/73)  BP(mean): 81 (12 Aug 2021 06:00) (61 - 90)  ABP: --  ABP(mean): --  RR: 29 (12 Aug 2021 06:00) (15 - 40)  SpO2: 98% (12 Aug 2021 06:00) (88% - 100%)        08-11 @ 07:01  -  08-12 @ 07:00  --------------------------------------------------------  IN: 1100 mL / OUT: 825 mL / NET: 275 mL      CAPILLARY BLOOD GLUCOSE      POCT Blood Glucose.: 92 mg/dL (11 Aug 2021 05:58)          PHYSICAL EXAM:  GENERAL: NAD,  HEENT:  Atraumatic, Normocephalic  EYES: PERRLA, conjunctiva and sclera clear  NECK: Supple, No JVD  CHEST/LUNG: diminished bilaterally; No wheezes, rales, or rhonchi  HEART: Regular rate and rhythm; No murmurs, rubs, or gallops  ABDOMEN: Soft, Nontender, Nondistended; Bowel sounds present  EXTREMITIES:  2+ Peripheral Pulses, No clubbing, cyanosis, or edema  PSYCH: unable as pt is sedated  NEUROLOGY: Awake alert anxious  SKIN: No rashes or lesions        HOSPITAL MEDICATIONS:  MEDICATIONS  (STANDING):  budesonide 160 MICROgram(s)/formoterol 4.5 MICROgram(s) Inhaler 2 Puff(s) Inhalation two times a day  chlorhexidine 4% Liquid 1 Application(s) Topical <User Schedule>  cholecalciferol 2000 Unit(s) Oral daily  dextrose 5% + sodium chloride 0.9% with potassium chloride 20 mEq/L 1000 milliLiter(s) (50 mL/Hr) IV Continuous <Continuous>  enoxaparin Injectable 90 milliGRAM(s) SubCutaneous every 12 hours  guaiFENesin ER 1200 milliGRAM(s) Oral every 12 hours  multivitamin 1 Tablet(s) Oral daily  pantoprazole  Injectable 40 milliGRAM(s) IV Push daily  polyethylene glycol 3350 17 Gram(s) Oral daily  senna 2 Tablet(s) Oral at bedtime    MEDICATIONS  (PRN):  acetaminophen   Tablet .. 650 milliGRAM(s) Oral every 6 hours PRN Temp greater or equal to 38C (100.4F), Mild Pain (1 - 3)  ALPRAZolam 0.25 milliGRAM(s) Oral every 12 hours PRN anxiety  benzonatate 100 milliGRAM(s) Oral every 8 hours PRN Cough      LABS:                        13.2   10.40 )-----------( 137      ( 12 Aug 2021 00:33 )             41.4     Hgb Trend: 13.2<--, 13.1<--, 14.1<--, 14.3<--, 12.5<--  08-12    138  |  102  |  16  ----------------------------<  95  4.6   |  23  |  0.68    Ca    8.6      12 Aug 2021 00:33  Phos  3.6     08-12  Mg     2.2     08-12    TPro  6.2  /  Alb  3.4  /  TBili  0.5  /  DBili  x   /  AST  54<H>  /  ALT  66<H>  /  AlkPhos  138<H>  08-12    LIVER FUNCTIONS - ( 12 Aug 2021 00:33 )  Alb: 3.4 g/dL / Pro: 6.2 g/dL / ALK PHOS: 138 U/L / ALT: 66 U/L / AST: 54 U/L / GGT: x           Creatinine Trend: 0.68<--, 0.61<--, 0.73<--, 0.72<--, 0.61<--, 0.68<--  PT/INR - ( 12 Aug 2021 00:34 )   PT: 14.2 sec;   INR: 1.19 ratio         PTT - ( 12 Aug 2021 00:34 )  PTT:27.0 sec    Arterial Blood Gas:  08-11 @ 00:39  7.44/43/57/29/88/4.4  ABG lactate: --    Venous Blood Gas:  08-12 @ 00:35  7.38/48/50/28/82  VBG Lactate: 2.7      MICROBIOLOGY:     RADIOLOGY:  [ ] Reviewed and interpreted by me    EKG:

## 2021-08-12 NOTE — PROGRESS NOTE ADULT - ASSESSMENT
pt w/ covid  hypoxia worse  iv steroids   s/p remdesivir  id / f/u noted  pulm f/u   c/e resp support/bipap    additional tx as per id/pulm / micu  recs  s/p toci  gi / dvt proph  o2  hx htn/   monitor bp  monitor inflammatory markers     prognosis guarded   c/w micu txx

## 2021-08-13 LAB
ALBUMIN SERPL ELPH-MCNC: 3.3 G/DL — SIGNIFICANT CHANGE UP (ref 3.3–5)
ALP SERPL-CCNC: 129 U/L — HIGH (ref 40–120)
ALT FLD-CCNC: 65 U/L — HIGH (ref 10–45)
ANION GAP SERPL CALC-SCNC: 11 MMOL/L — SIGNIFICANT CHANGE UP (ref 5–17)
APTT BLD: 29.1 SEC — SIGNIFICANT CHANGE UP (ref 27.5–35.5)
AST SERPL-CCNC: 48 U/L — HIGH (ref 10–40)
BILIRUB SERPL-MCNC: 0.6 MG/DL — SIGNIFICANT CHANGE UP (ref 0.2–1.2)
BUN SERPL-MCNC: 14 MG/DL — SIGNIFICANT CHANGE UP (ref 7–23)
CALCIUM SERPL-MCNC: 8.8 MG/DL — SIGNIFICANT CHANGE UP (ref 8.4–10.5)
CHLORIDE SERPL-SCNC: 105 MMOL/L — SIGNIFICANT CHANGE UP (ref 96–108)
CO2 SERPL-SCNC: 23 MMOL/L — SIGNIFICANT CHANGE UP (ref 22–31)
CREAT SERPL-MCNC: 0.69 MG/DL — SIGNIFICANT CHANGE UP (ref 0.5–1.3)
D DIMER BLD IA.RAPID-MCNC: 2175 NG/ML DDU — HIGH
FERRITIN SERPL-MCNC: 734 NG/ML — HIGH (ref 30–400)
GAS PNL BLDA: SIGNIFICANT CHANGE UP
GLUCOSE SERPL-MCNC: 89 MG/DL — SIGNIFICANT CHANGE UP (ref 70–99)
HCT VFR BLD CALC: 40 % — SIGNIFICANT CHANGE UP (ref 39–50)
HGB BLD-MCNC: 12.9 G/DL — LOW (ref 13–17)
INR BLD: 1.22 RATIO — HIGH (ref 0.88–1.16)
LDH SERPL L TO P-CCNC: 1117 U/L — HIGH (ref 50–242)
MAGNESIUM SERPL-MCNC: 2.2 MG/DL — SIGNIFICANT CHANGE UP (ref 1.6–2.6)
MCHC RBC-ENTMCNC: 28.4 PG — SIGNIFICANT CHANGE UP (ref 27–34)
MCHC RBC-ENTMCNC: 32.3 GM/DL — SIGNIFICANT CHANGE UP (ref 32–36)
MCV RBC AUTO: 87.9 FL — SIGNIFICANT CHANGE UP (ref 80–100)
NRBC # BLD: 0 /100 WBCS — SIGNIFICANT CHANGE UP (ref 0–0)
PHOSPHATE SERPL-MCNC: 3 MG/DL — SIGNIFICANT CHANGE UP (ref 2.5–4.5)
PLATELET # BLD AUTO: 146 K/UL — LOW (ref 150–400)
POTASSIUM SERPL-MCNC: 4.6 MMOL/L — SIGNIFICANT CHANGE UP (ref 3.5–5.3)
POTASSIUM SERPL-SCNC: 4.6 MMOL/L — SIGNIFICANT CHANGE UP (ref 3.5–5.3)
PROCALCITONIN SERPL-MCNC: 0.04 NG/ML — SIGNIFICANT CHANGE UP (ref 0.02–0.1)
PROT SERPL-MCNC: 6 G/DL — SIGNIFICANT CHANGE UP (ref 6–8.3)
PROTHROM AB SERPL-ACNC: 14.5 SEC — HIGH (ref 10.6–13.6)
RBC # BLD: 4.55 M/UL — SIGNIFICANT CHANGE UP (ref 4.2–5.8)
RBC # FLD: 12.9 % — SIGNIFICANT CHANGE UP (ref 10.3–14.5)
SODIUM SERPL-SCNC: 139 MMOL/L — SIGNIFICANT CHANGE UP (ref 135–145)
WBC # BLD: 9.8 K/UL — SIGNIFICANT CHANGE UP (ref 3.8–10.5)
WBC # FLD AUTO: 9.8 K/UL — SIGNIFICANT CHANGE UP (ref 3.8–10.5)

## 2021-08-13 PROCEDURE — 99291 CRITICAL CARE FIRST HOUR: CPT

## 2021-08-13 RX ORDER — ALPRAZOLAM 0.25 MG
0.25 TABLET ORAL ONCE
Refills: 0 | Status: DISCONTINUED | OUTPATIENT
Start: 2021-08-13 | End: 2021-08-13

## 2021-08-13 RX ORDER — LANOLIN ALCOHOL/MO/W.PET/CERES
3 CREAM (GRAM) TOPICAL AT BEDTIME
Refills: 0 | Status: DISCONTINUED | OUTPATIENT
Start: 2021-08-13 | End: 2021-08-26

## 2021-08-13 RX ORDER — SODIUM CHLORIDE 0.65 %
1 AEROSOL, SPRAY (ML) NASAL
Refills: 0 | Status: DISCONTINUED | OUTPATIENT
Start: 2021-08-13 | End: 2021-08-26

## 2021-08-13 RX ORDER — DEXMEDETOMIDINE HYDROCHLORIDE IN 0.9% SODIUM CHLORIDE 4 UG/ML
0.2 INJECTION INTRAVENOUS
Qty: 200 | Refills: 0 | Status: DISCONTINUED | OUTPATIENT
Start: 2021-08-13 | End: 2021-08-14

## 2021-08-13 RX ADMIN — DEXTROSE MONOHYDRATE, SODIUM CHLORIDE, AND POTASSIUM CHLORIDE 50 MILLILITER(S): 50; .745; 4.5 INJECTION, SOLUTION INTRAVENOUS at 06:06

## 2021-08-13 RX ADMIN — DEXTROSE MONOHYDRATE, SODIUM CHLORIDE, AND POTASSIUM CHLORIDE 50 MILLILITER(S): 50; .745; 4.5 INJECTION, SOLUTION INTRAVENOUS at 20:46

## 2021-08-13 RX ADMIN — POLYETHYLENE GLYCOL 3350 17 GRAM(S): 17 POWDER, FOR SOLUTION ORAL at 12:08

## 2021-08-13 RX ADMIN — Medication 200 MILLIGRAM(S): at 12:09

## 2021-08-13 RX ADMIN — Medication 1 SPRAY(S): at 20:47

## 2021-08-13 RX ADMIN — DEXTROSE MONOHYDRATE, SODIUM CHLORIDE, AND POTASSIUM CHLORIDE 50 MILLILITER(S): 50; .745; 4.5 INJECTION, SOLUTION INTRAVENOUS at 13:06

## 2021-08-13 RX ADMIN — Medication 1 SPRAY(S): at 13:06

## 2021-08-13 RX ADMIN — CHLORHEXIDINE GLUCONATE 1 APPLICATION(S): 213 SOLUTION TOPICAL at 06:22

## 2021-08-13 RX ADMIN — DEXTROSE MONOHYDRATE, SODIUM CHLORIDE, AND POTASSIUM CHLORIDE 50 MILLILITER(S): 50; .745; 4.5 INJECTION, SOLUTION INTRAVENOUS at 01:43

## 2021-08-13 RX ADMIN — PANTOPRAZOLE SODIUM 40 MILLIGRAM(S): 20 TABLET, DELAYED RELEASE ORAL at 12:08

## 2021-08-13 RX ADMIN — Medication 0.25 MILLIGRAM(S): at 21:28

## 2021-08-13 RX ADMIN — Medication 1 TABLET(S): at 12:10

## 2021-08-13 RX ADMIN — Medication 200 MILLIGRAM(S): at 20:47

## 2021-08-13 RX ADMIN — ENOXAPARIN SODIUM 90 MILLIGRAM(S): 100 INJECTION SUBCUTANEOUS at 06:06

## 2021-08-13 RX ADMIN — Medication 0.25 MILLIGRAM(S): at 13:21

## 2021-08-13 RX ADMIN — ENOXAPARIN SODIUM 90 MILLIGRAM(S): 100 INJECTION SUBCUTANEOUS at 17:10

## 2021-08-13 RX ADMIN — Medication 3 MILLIGRAM(S): at 21:28

## 2021-08-13 RX ADMIN — Medication 2000 UNIT(S): at 12:08

## 2021-08-13 RX ADMIN — Medication 1200 MILLIGRAM(S): at 12:09

## 2021-08-13 RX ADMIN — DEXTROSE MONOHYDRATE, SODIUM CHLORIDE, AND POTASSIUM CHLORIDE 50 MILLILITER(S): 50; .745; 4.5 INJECTION, SOLUTION INTRAVENOUS at 13:21

## 2021-08-13 NOTE — PROGRESS NOTE ADULT - ASSESSMENT
NEUROLOGY:  - A&Ox4 at baseline; No Active Issues     PULMONARY:  Acute Respiratory Failure  - Continue HFNC 60L 100% alternate with BiPAP 15/10 70% Nocternal/PRN   - Titrate settings as tolerated to maintain SpO2>88%   - Not tolerating and refusing prone positioning.     CARDIOLOGY:  - No Active Issues     GASTROINTESTINAL:  - Regular Diet w/ supp shakes while intermittently on HFNC  - Protonix 40mg QD while intermittently NPO on Bipap  - D5NS + 20 MEq of Potassium at 50 mL/hour while NPO.   - Bowel regimen with Miralax and Senna BM. Last BM 8/12.     RENAL/:  - Strict I&Os   - Monitor and replete lytes daily    INFECTIOUS DISEASE:  COVID-19  - s/p completed courses of Remdesivir x 5 days and Decadron x 10 days   - s/p Toci on 7/30    HEMATOLOGY:  - Full AC Lovenox 90mg BID   - LE duplex neg for DVT on 8/4. History of RLE DVT & PE.   - Consider CTA chest when stabilized    ENDOCRINE:  - HbA1c 6.0    ETHICS/DISPOSITION:  - Full code   - Remain in ICU

## 2021-08-13 NOTE — CHART NOTE - NSCHARTNOTEFT_GEN_A_CORE
Seen for: malnutrition follow up  Source: patient, EMR, NP    Chart reviewed, events noted. COVID-19. Intermittently on BiPAP/HiFlo    Diet Order: Full liquid, No Dairy, Ensure Enlive 3x/day, Orgain 3x/day    PO intake :   <50%  Has been NPO on BiPAP  Tolerated 1 soy milk and 1 Orgain so far today, hasn't tried Ensure  Patient willing for try regular foods while on HiFlo, NP in agreement    GI:  Last BM 8/12 x2.   Bowel Regimen: Miralax, senna  - Denies GI distress    No new weight to address.     Drug Dosing Weight  Height (cm): 170.2 (30 Jul 2021 15:42)  Weight (kg): 92 (30 Jul 2021 15:42)  BMI (kg/m2): 31.8 (30 Jul 2021 15:42)  BSA (m2): 2.03 (30 Jul 2021 15:42)    MEDICATIONS  (STANDING):  cholecalciferol  dextrose 5% + sodium chloride 0.9% with potassium chloride 20 mEq/L  multivitamin  pantoprazole  Injectable  polyethylene glycol 3350  senna    Pertinent Labs: 08-13 @ 00:15: Na 139, BUN 14, Cr 0.69, BG 89, K+ 4.6, Phos 3.0, Mg 2.2, Alk Phos 129<H>, ALT/SGPT 65<H>, AST/SGOT 48<H>, HbA1c --    A1C with Estimated Average Glucose Result: 6.0 % (08-10-21 @ 09:48)  A1C with Estimated Average Glucose Result: 6.3 % (08-09-21 @ 14:51)    Skin per chart: no pressure injuries  Edema per chart: 1+ generalized    Estimated Nutrition Needs:   - no change since previous assessment    Previous Nutrition Diagnosis:  Severe malnutrition, obesity  Nutrition diagnoses are ongoing and addressed wit oral nutrition supplements     New Nutrition Diagnosis: none at this time       Recommendations:      1) Consider upgrading diet from full liquid to regular food to better assist in meeting nutrient needs; texture/consistency per team  2) Continue provider to RN for Orgain 2x/day  3) Continue multivitamin, vit D  4) Encourage PO intake as appropriate  5) IVF per team  6) Obtain new weight and trend as able    Continue monitoring and evaluating:   - Labs: blood glucose, electrolytes, renal indices  - GI function and bowel movements  - Nutritional intake, weight trends, skin integrity    RD remains available PRN and will follow up per protocol.   Elizabet Nicolas RD, CDN, [facility]   Pager # [phone]

## 2021-08-13 NOTE — PROGRESS NOTE ADULT - SUBJECTIVE AND OBJECTIVE BOX
DATE OF SERVICE: 08-13-21 @ 11:58  CHIEF COMPLAINT:Patient is a 55y old  Male who presents with a chief complaint of covid (11 Aug 2021 08:04)    	        PAST MEDICAL & SURGICAL HISTORY:  Hyperlipidemia    HTN (hypertension)    Rupture, tendon, quadriceps    History of Achilles tendon repair            REVIEW OF SYSTEMS:    NECK: No pain or stiffness  RESPIRATORY: breathing about same  CARDIOVASCULAR: No chest pain, palpitations, passing out, dizziness, or leg swelling  GASTROINTESTINAL: No abdominal or epigastric pain. No nausea, vomiting, or hematemesis; No diarrhea or constipation. No melena or hematochezia.  GENITOURINARY: No dysuria, frequency, hematuria, or incontinence  NEUROLOGICAL: No headaches,    Medications:  MEDICATIONS  (STANDING):  benzonatate 200 milliGRAM(s) Oral every 8 hours  budesonide 160 MICROgram(s)/formoterol 4.5 MICROgram(s) Inhaler 2 Puff(s) Inhalation two times a day  chlorhexidine 4% Liquid 1 Application(s) Topical <User Schedule>  cholecalciferol 2000 Unit(s) Oral daily  dextrose 5% + sodium chloride 0.9% with potassium chloride 20 mEq/L 1000 milliLiter(s) (50 mL/Hr) IV Continuous <Continuous>  enoxaparin Injectable 90 milliGRAM(s) SubCutaneous every 12 hours  guaiFENesin ER 1200 milliGRAM(s) Oral every 12 hours  multivitamin 1 Tablet(s) Oral daily  pantoprazole  Injectable 40 milliGRAM(s) IV Push daily  polyethylene glycol 3350 17 Gram(s) Oral daily  senna 2 Tablet(s) Oral at bedtime    MEDICATIONS  (PRN):  acetaminophen   Tablet .. 650 milliGRAM(s) Oral every 6 hours PRN Temp greater or equal to 38C (100.4F), Mild Pain (1 - 3)  ALPRAZolam 0.25 milliGRAM(s) Oral every 12 hours PRN anxiety    	    PHYSICAL EXAM:  T(C): 36.1 (08-13-21 @ 11:00), Max: 36.4 (08-12-21 @ 13:00)  HR: 62 (08-13-21 @ 11:00) (55 - 92)  BP: 104/62 (08-13-21 @ 11:00) (83/51 - 124/67)  RR: 19 (08-13-21 @ 11:00) (19 - 40)  SpO2: 96% (08-13-21 @ 11:00) (88% - 99%)  Wt(kg): --  I&O's Summary    12 Aug 2021 07:01  -  13 Aug 2021 07:00  --------------------------------------------------------  IN: 1200 mL / OUT: 500 mL / NET: 700 mL    13 Aug 2021 07:01  -  13 Aug 2021 11:58  --------------------------------------------------------  IN: 200 mL / OUT: 0 mL / NET: 200 mL        Appearance: Normal	  HEENT:   Normal oral mucosa, PERRL, EOMI	  Lymphatic: No lymphadenopathy  Cardiovascular: Normal S1 S2, No JVD, No murmurs, No edema  Respiratory: dec bs   Gastrointestinal:  Soft, Non-tender, + BS	  Skin: No rashes, No ecchymoses, No cyanosis	  Neurologic: Non-focal  Extremities: Normal range of motion, No clubbing, cyanosis or edema  Vascular: Peripheral pulses palpable 2+ bilaterally    TELEMETRY: 	    ECG:  	  RADIOLOGY:  OTHER: 	  	  LABS:	 	    CARDIAC MARKERS:                                12.9   9.80  )-----------( 146      ( 13 Aug 2021 00:15 )             40.0     08-13    139  |  105  |  14  ----------------------------<  89  4.6   |  23  |  0.69    Ca    8.8      13 Aug 2021 00:15  Phos  3.0     08-13  Mg     2.2     08-13    TPro  6.0  /  Alb  3.3  /  TBili  0.6  /  DBili  x   /  AST  48<H>  /  ALT  65<H>  /  AlkPhos  129<H>  08-13    proBNP:   Lipid Profile:   HgA1c:   TSH:

## 2021-08-13 NOTE — PROGRESS NOTE ADULT - SUBJECTIVE AND OBJECTIVE BOX
Follow-up Pulm Progress Note    Seen on Bipap 15/10 100%  Non-labored, appears comfortable  Plan to try HFNC to eat lunch this afternoon  O2 sats 95-96%    Medications:  MEDICATIONS  (STANDING):  benzonatate 200 milliGRAM(s) Oral every 8 hours  budesonide 160 MICROgram(s)/formoterol 4.5 MICROgram(s) Inhaler 2 Puff(s) Inhalation two times a day  chlorhexidine 4% Liquid 1 Application(s) Topical <User Schedule>  cholecalciferol 2000 Unit(s) Oral daily  dextrose 5% + sodium chloride 0.9% with potassium chloride 20 mEq/L 1000 milliLiter(s) (50 mL/Hr) IV Continuous <Continuous>  enoxaparin Injectable 90 milliGRAM(s) SubCutaneous every 12 hours  guaiFENesin ER 1200 milliGRAM(s) Oral every 12 hours  multivitamin 1 Tablet(s) Oral daily  pantoprazole  Injectable 40 milliGRAM(s) IV Push daily  polyethylene glycol 3350 17 Gram(s) Oral daily  senna 2 Tablet(s) Oral at bedtime    MEDICATIONS  (PRN):  acetaminophen   Tablet .. 650 milliGRAM(s) Oral every 6 hours PRN Temp greater or equal to 38C (100.4F), Mild Pain (1 - 3)  ALPRAZolam 0.25 milliGRAM(s) Oral every 12 hours PRN anxiety    Vital Signs Last 24 Hrs  T(C): 36.1 (13 Aug 2021 07:00), Max: 36.4 (12 Aug 2021 13:00)  T(F): 97 (13 Aug 2021 07:00), Max: 97.6 (12 Aug 2021 13:00)  HR: 71 (13 Aug 2021 10:00) (55 - 92)  BP: 102/57 (13 Aug 2021 10:00) (83/51 - 124/67)  BP(mean): 73 (13 Aug 2021 10:00) (61 - 86)  RR: 29 (13 Aug 2021 10:00) (22 - 40)  SpO2: 98% (13 Aug 2021 10:00) (88% - 99%)    ABG - ( 13 Aug 2021 00:06 )  pH, Arterial: 7.43  pH, Blood: x     /  pCO2: 40    /  pO2: 179   / HCO3: 26    / Base Excess: 2.5   /  SaO2: 99          VBG pH 7.38 08-12 @ 00:35    VBG pCO2 48 08-12 @ 00:35    VBG O2 sat 82 08-12 @ 00:35    VBG lactate 2.7 08-12 @ 00:35      08-12 @ 07:01  -  08-13 @ 07:00  --------------------------------------------------------  IN: 1200 mL / OUT: 500 mL / NET: 700 mL      LABS:                        12.9   9.80  )-----------( 146      ( 13 Aug 2021 00:15 )             40.0     08-13    139  |  105  |  14  ----------------------------<  89  4.6   |  23  |  0.69    Ca    8.8      13 Aug 2021 00:15  Phos  3.0     08-13  Mg     2.2     08-13    TPro  6.0  /  Alb  3.3  /  TBili  0.6  /  DBili  x   /  AST  48<H>  /  ALT  65<H>  /  AlkPhos  129<H>  08-13    PT/INR - ( 13 Aug 2021 00:15 )   PT: 14.5 sec;   INR: 1.22 ratio        PTT - ( 13 Aug 2021 00:15 )  PTT:29.1 sec    Procalcitonin, Serum: 0.04 ng/mL (08-13-21 @ 00:15)  Procalcitonin, Serum: 0.04 ng/mL (08-11-21 @ 00:37)      CULTURES:    Culture - Blood (collected 08-11-21 @ 03:10)  Source: .Blood Blood-Peripheral  Preliminary Report (08-12-21 @ 04:01):    No growth to date.      Physical Examination:  PULM: Decreased at bases   CVS: RRR     RADIOLOGY REVIEWED  CXR: 8/12 b/l lung opacities, largely unchanged compared to 8/9    < from: [facility] Duplex Lower Ext Vein Scan, Bilat (08.04.21 @ 16:22) >    FINDINGS:    RIGHT:  Normal compressibilityof the RIGHT common femoral, femoral and popliteal veins.  Doppler examination shows normal spontaneous and phasic flow.  No RIGHT calf vein thrombosis is detected.    LEFT:  Normal compressibility of the LEFT common femoral, femoral and popliteal veins.  Doppler examination shows normal spontaneous and phasic flow.  No LEFT calf vein thrombosis is detected.    IMPRESSION:  No evidence of deep venous thrombosis in either lower extremity.    < end of copied text >

## 2021-08-13 NOTE — PROGRESS NOTE ADULT - ASSESSMENT
pt w/ covid  hypoxia   s/p steroids  s/p remdesivir  id / f/u noted  pulm f/u noted  c/e resp support/bipap    additional tx as per id/pulm / micu  recs  s/p toci  gi / dvt proph  o2  hx htn/   monitor bp  monitor inflammatory markers     prognosis guarded   c/w micu txx

## 2021-08-13 NOTE — PROGRESS NOTE ADULT - SUBJECTIVE AND OBJECTIVE BOX
CHIEF COMPLAINT:  Patient is a 55y old  Male who presents with a chief complaint of covid (11 Aug 2021 08:04)    HPI:  54yo M with Hx of DVT s/p achilles tendon repair years ago not on AC presenting with complaints of SOB. intermittent and fevers with cough productive of white sputum for past week, tested positive for covid 2 days ago.  pt is unvaccinated   progressively worsening SOB over the past 5 days, worse with ambulation. found to be hypoxic on arrival to the  er    (29 Jul 2021 10:31)      Interval Events:      REVIEW OF SYSTEMS:          OBJECTIVE:  ICU Vital Signs Last 24 Hrs  T(C): 36.1 (13 Aug 2021 07:00), Max: 36.4 (12 Aug 2021 13:00)  T(F): 97 (13 Aug 2021 07:00), Max: 97.6 (12 Aug 2021 13:00)  HR: 74 (13 Aug 2021 08:52) (55 - 92)  BP: 105/59 (13 Aug 2021 08:00) (83/51 - 124/67)  BP(mean): 77 (13 Aug 2021 08:00) (61 - 89)  ABP: --  ABP(mean): --  RR: 30 (13 Aug 2021 08:00) (22 - 40)  SpO2: 98% (13 Aug 2021 08:52) (88% - 100%)        08-12 @ 07:01  -  08-13 @ 07:00  --------------------------------------------------------  IN: 1200 mL / OUT: 500 mL / NET: 700 mL    08-13 @ 07:01  -  08-13 @ 09:23  --------------------------------------------------------  IN: 50 mL / OUT: 0 mL / NET: 50 mL      CAPILLARY BLOOD GLUCOSE              PHYSICAL EXAM:  GENERAL: NAD,  HEENT:  Atraumatic, Normocephalic  EYES: PERRLA, conjunctiva and sclera clear  NECK: Supple, No JVD  CHEST/LUNG: diminished bilaterally; No wheezes, rales, or rhonchi  HEART: Regular rate and rhythm; No murmurs, rubs, or gallops  ABDOMEN: Soft, Nontender, Nondistended; Bowel sounds present  EXTREMITIES:  2+ Peripheral Pulses, No clubbing, cyanosis, or edema  PSYCH: Calm intermittently anxious  NEUROLOGY: A+Ox3  SKIN: No rashes or lesions        HOSPITAL MEDICATIONS:  MEDICATIONS  (STANDING):  benzonatate 200 milliGRAM(s) Oral every 8 hours  budesonide 160 MICROgram(s)/formoterol 4.5 MICROgram(s) Inhaler 2 Puff(s) Inhalation two times a day  chlorhexidine 4% Liquid 1 Application(s) Topical <User Schedule>  cholecalciferol 2000 Unit(s) Oral daily  dextrose 5% + sodium chloride 0.9% with potassium chloride 20 mEq/L 1000 milliLiter(s) (50 mL/Hr) IV Continuous <Continuous>  enoxaparin Injectable 90 milliGRAM(s) SubCutaneous every 12 hours  guaiFENesin ER 1200 milliGRAM(s) Oral every 12 hours  multivitamin 1 Tablet(s) Oral daily  pantoprazole  Injectable 40 milliGRAM(s) IV Push daily  polyethylene glycol 3350 17 Gram(s) Oral daily  senna 2 Tablet(s) Oral at bedtime    MEDICATIONS  (PRN):  acetaminophen   Tablet .. 650 milliGRAM(s) Oral every 6 hours PRN Temp greater or equal to 38C (100.4F), Mild Pain (1 - 3)  ALPRAZolam 0.25 milliGRAM(s) Oral every 12 hours PRN anxiety      LABS:                        12.9   9.80  )-----------( 146      ( 13 Aug 2021 00:15 )             40.0     Hgb Trend: 12.9<--, 13.2<--, 13.1<--, 14.1<--, 14.3<--  08-13    139  |  105  |  14  ----------------------------<  89  4.6   |  23  |  0.69    Ca    8.8      13 Aug 2021 00:15  Phos  3.0     08-13  Mg     2.2     08-13    TPro  6.0  /  Alb  3.3  /  TBili  0.6  /  DBili  x   /  AST  48<H>  /  ALT  65<H>  /  AlkPhos  129<H>  08-13    LIVER FUNCTIONS - ( 13 Aug 2021 00:15 )  Alb: 3.3 g/dL / Pro: 6.0 g/dL / ALK PHOS: 129 U/L / ALT: 65 U/L / AST: 48 U/L / GGT: x           Creatinine Trend: 0.69<--, 0.68<--, 0.61<--, 0.73<--, 0.72<--, 0.61<--  PT/INR - ( 13 Aug 2021 00:15 )   PT: 14.5 sec;   INR: 1.22 ratio         PTT - ( 13 Aug 2021 00:15 )  PTT:29.1 sec    Arterial Blood Gas:  08-13 @ 00:06  7.43/40/179/26/99/2.5  ABG lactate: --    Venous Blood Gas:  08-12 @ 00:35  7.38/48/50/28/82  VBG Lactate: 2.7      MICROBIOLOGY:     RADIOLOGY:  [ ] Reviewed and interpreted by me    EKG:

## 2021-08-14 LAB
ALBUMIN SERPL ELPH-MCNC: 3 G/DL — LOW (ref 3.3–5)
ALP SERPL-CCNC: 108 U/L — SIGNIFICANT CHANGE UP (ref 40–120)
ALT FLD-CCNC: 49 U/L — HIGH (ref 10–45)
ANION GAP SERPL CALC-SCNC: 9 MMOL/L — SIGNIFICANT CHANGE UP (ref 5–17)
AST SERPL-CCNC: 44 U/L — HIGH (ref 10–40)
BILIRUB SERPL-MCNC: 0.3 MG/DL — SIGNIFICANT CHANGE UP (ref 0.2–1.2)
BUN SERPL-MCNC: 17 MG/DL — SIGNIFICANT CHANGE UP (ref 7–23)
CALCIUM SERPL-MCNC: 8.2 MG/DL — LOW (ref 8.4–10.5)
CHLORIDE SERPL-SCNC: 106 MMOL/L — SIGNIFICANT CHANGE UP (ref 96–108)
CO2 SERPL-SCNC: 24 MMOL/L — SIGNIFICANT CHANGE UP (ref 22–31)
CREAT SERPL-MCNC: 0.83 MG/DL — SIGNIFICANT CHANGE UP (ref 0.5–1.3)
GLUCOSE SERPL-MCNC: 104 MG/DL — HIGH (ref 70–99)
HCT VFR BLD CALC: 36.6 % — LOW (ref 39–50)
HGB BLD-MCNC: 11.9 G/DL — LOW (ref 13–17)
MAGNESIUM SERPL-MCNC: 2.2 MG/DL — SIGNIFICANT CHANGE UP (ref 1.6–2.6)
MCHC RBC-ENTMCNC: 28.5 PG — SIGNIFICANT CHANGE UP (ref 27–34)
MCHC RBC-ENTMCNC: 32.5 GM/DL — SIGNIFICANT CHANGE UP (ref 32–36)
MCV RBC AUTO: 87.8 FL — SIGNIFICANT CHANGE UP (ref 80–100)
NRBC # BLD: 0 /100 WBCS — SIGNIFICANT CHANGE UP (ref 0–0)
PHOSPHATE SERPL-MCNC: 3.1 MG/DL — SIGNIFICANT CHANGE UP (ref 2.5–4.5)
PLATELET # BLD AUTO: 160 K/UL — SIGNIFICANT CHANGE UP (ref 150–400)
POTASSIUM SERPL-MCNC: 4.8 MMOL/L — SIGNIFICANT CHANGE UP (ref 3.5–5.3)
POTASSIUM SERPL-SCNC: 4.8 MMOL/L — SIGNIFICANT CHANGE UP (ref 3.5–5.3)
PROT SERPL-MCNC: 5.6 G/DL — LOW (ref 6–8.3)
RBC # BLD: 4.17 M/UL — LOW (ref 4.2–5.8)
RBC # FLD: 12.8 % — SIGNIFICANT CHANGE UP (ref 10.3–14.5)
SODIUM SERPL-SCNC: 139 MMOL/L — SIGNIFICANT CHANGE UP (ref 135–145)
WBC # BLD: 9.43 K/UL — SIGNIFICANT CHANGE UP (ref 3.8–10.5)
WBC # FLD AUTO: 9.43 K/UL — SIGNIFICANT CHANGE UP (ref 3.8–10.5)

## 2021-08-14 PROCEDURE — 99291 CRITICAL CARE FIRST HOUR: CPT

## 2021-08-14 RX ORDER — CLONAZEPAM 1 MG
0.5 TABLET ORAL EVERY 8 HOURS
Refills: 0 | Status: DISCONTINUED | OUTPATIENT
Start: 2021-08-14 | End: 2021-08-21

## 2021-08-14 RX ORDER — FUROSEMIDE 40 MG
20 TABLET ORAL ONCE
Refills: 0 | Status: COMPLETED | OUTPATIENT
Start: 2021-08-14 | End: 2021-08-14

## 2021-08-14 RX ADMIN — Medication 1 TABLET(S): at 11:08

## 2021-08-14 RX ADMIN — Medication 1200 MILLIGRAM(S): at 17:12

## 2021-08-14 RX ADMIN — Medication 200 MILLIGRAM(S): at 21:02

## 2021-08-14 RX ADMIN — Medication 0.5 MILLIGRAM(S): at 21:01

## 2021-08-14 RX ADMIN — Medication 200 MILLIGRAM(S): at 05:51

## 2021-08-14 RX ADMIN — PANTOPRAZOLE SODIUM 40 MILLIGRAM(S): 20 TABLET, DELAYED RELEASE ORAL at 11:08

## 2021-08-14 RX ADMIN — BUDESONIDE AND FORMOTEROL FUMARATE DIHYDRATE 2 PUFF(S): 160; 4.5 AEROSOL RESPIRATORY (INHALATION) at 06:41

## 2021-08-14 RX ADMIN — Medication 2000 UNIT(S): at 11:08

## 2021-08-14 RX ADMIN — Medication 200 MILLIGRAM(S): at 13:53

## 2021-08-14 RX ADMIN — CHLORHEXIDINE GLUCONATE 1 APPLICATION(S): 213 SOLUTION TOPICAL at 05:52

## 2021-08-14 RX ADMIN — Medication 650 MILLIGRAM(S): at 22:00

## 2021-08-14 RX ADMIN — Medication 20 MILLIGRAM(S): at 16:44

## 2021-08-14 RX ADMIN — BUDESONIDE AND FORMOTEROL FUMARATE DIHYDRATE 2 PUFF(S): 160; 4.5 AEROSOL RESPIRATORY (INHALATION) at 16:46

## 2021-08-14 RX ADMIN — Medication 0.25 MILLIGRAM(S): at 09:18

## 2021-08-14 RX ADMIN — Medication 650 MILLIGRAM(S): at 21:01

## 2021-08-14 RX ADMIN — Medication 1200 MILLIGRAM(S): at 05:52

## 2021-08-14 RX ADMIN — ENOXAPARIN SODIUM 90 MILLIGRAM(S): 100 INJECTION SUBCUTANEOUS at 05:51

## 2021-08-14 RX ADMIN — Medication 3 MILLIGRAM(S): at 21:01

## 2021-08-14 RX ADMIN — ENOXAPARIN SODIUM 90 MILLIGRAM(S): 100 INJECTION SUBCUTANEOUS at 17:12

## 2021-08-14 RX ADMIN — Medication 0.5 MILLIGRAM(S): at 13:53

## 2021-08-14 NOTE — PROGRESS NOTE ADULT - ASSESSMENT
pt w/ covid  hypoxia   s/p steroids  s/p remdesivir  id / f/u noted  pulm f/u noted  c/e resp support/bipap/h/f    additional tx as per id/pulm / micu  recs  s/p toci  gi / dvt proph  o2  hx htn/   monitor bp  monitor inflammatory markers     prognosis guarded   c/w micu txx

## 2021-08-14 NOTE — PROGRESS NOTE ADULT - SUBJECTIVE AND OBJECTIVE BOX
CHIEF COMPLAINT:  Patient is a 55y old  Male who presents with a chief complaint of covid (11 Aug 2021 08:04)    HPI:  54yo M with Hx of DVT s/p achilles tendon repair years ago not on AC presenting with complaints of SOB. intermittent and fevers with cough productive of white sputum for past week, tested positive for covid 2 days ago.  pt is unvaccinated   progressively worsening SOB over the past 5 days, worse with ambulation. found to be hypoxic on arrival to the  er    (29 Jul 2021 10:31)      Interval Events: Anxiety attack overnight resulting in precedex placement to which pt became bradycardic to 40. Precedex was stopped IVF with potassium stopped as K was 4.8      REVIEW OF SYSTEMS: unremarkable          OBJECTIVE:  ICU Vital Signs Last 24 Hrs  T(C): 36.3 (14 Aug 2021 04:00), Max: 37.3 (13 Aug 2021 20:00)  T(F): 97.3 (14 Aug 2021 04:00), Max: 99.1 (13 Aug 2021 20:00)  HR: 61 (14 Aug 2021 07:00) (47 - 93)  BP: 146/67 (14 Aug 2021 07:00) (89/55 - 146/67)  BP(mean): 96 (14 Aug 2021 07:00) (65 - 96)  ABP: --  ABP(mean): --  RR: 28 (14 Aug 2021 07:00) (19 - 32)  SpO2: 94% (14 Aug 2021 07:00) (90% - 98%)        08-13 @ 07:01  -  08-14 @ 07:00  --------------------------------------------------------  IN: 1375.3 mL / OUT: 1000 mL / NET: 375.3 mL      CAPILLARY BLOOD GLUCOSE              PHYSICAL EXAM:  GENERAL: NAD,  HEENT:  Atraumatic, Normocephalic  EYES: PERRLA, conjunctiva and sclera clear  NECK: Supple, No JVD  CHEST/LUNG: diminished bilaterally; No wheezes, rales, or rhonchi  HEART: Regular rate and rhythm; No murmurs, rubs, or gallops  ABDOMEN: Soft, Nontender, Nondistended; Bowel sounds present  EXTREMITIES:  2+ Peripheral Pulses, No clubbing, cyanosis, or edema  PSYCH: calm with occasional periods of anxiety  NEUROLOGY: A=Ox4  SKIN: No rashes or lesions        HOSPITAL MEDICATIONS:  MEDICATIONS  (STANDING):  benzonatate 200 milliGRAM(s) Oral every 8 hours  budesonide 160 MICROgram(s)/formoterol 4.5 MICROgram(s) Inhaler 2 Puff(s) Inhalation two times a day  chlorhexidine 4% Liquid 1 Application(s) Topical <User Schedule>  cholecalciferol 2000 Unit(s) Oral daily  enoxaparin Injectable 90 milliGRAM(s) SubCutaneous every 12 hours  guaiFENesin ER 1200 milliGRAM(s) Oral every 12 hours  melatonin 3 milliGRAM(s) Oral at bedtime  multivitamin 1 Tablet(s) Oral daily  pantoprazole  Injectable 40 milliGRAM(s) IV Push daily  polyethylene glycol 3350 17 Gram(s) Oral daily  senna 2 Tablet(s) Oral at bedtime    MEDICATIONS  (PRN):  acetaminophen   Tablet .. 650 milliGRAM(s) Oral every 6 hours PRN Temp greater or equal to 38C (100.4F), Mild Pain (1 - 3)  ALPRAZolam 0.25 milliGRAM(s) Oral every 12 hours PRN anxiety  sodium chloride 0.65% Nasal 1 Spray(s) Both Nostrils every 2 hours PRN Nasal Congestion      LABS:                        11.9   9.43  )-----------( 160      ( 14 Aug 2021 00:20 )             36.6     Hgb Trend: 11.9<--, 12.9<--, 13.2<--, 13.1<--, 14.1<--  08-14    139  |  106  |  17  ----------------------------<  104<H>  4.8   |  24  |  0.83    Ca    8.2<L>      14 Aug 2021 00:20  Phos  3.1     08-14  Mg     2.2     08-14    TPro  5.6<L>  /  Alb  3.0<L>  /  TBili  0.3  /  DBili  x   /  AST  44<H>  /  ALT  49<H>  /  AlkPhos  108  08-14    LIVER FUNCTIONS - ( 14 Aug 2021 00:20 )  Alb: 3.0 g/dL / Pro: 5.6 g/dL / ALK PHOS: 108 U/L / ALT: 49 U/L / AST: 44 U/L / GGT: x           Creatinine Trend: 0.83<--, 0.69<--, 0.68<--, 0.61<--, 0.73<--, 0.72<--  PT/INR - ( 13 Aug 2021 00:15 )   PT: 14.5 sec;   INR: 1.22 ratio         PTT - ( 13 Aug 2021 00:15 )  PTT:29.1 sec    Arterial Blood Gas:  08-13 @ 00:06  7.43/40/179/26/99/2.5  ABG lactate: --        MICROBIOLOGY:     RADIOLOGY:  [ ] Reviewed and interpreted by me    EKG:

## 2021-08-14 NOTE — PROGRESS NOTE ADULT - ASSESSMENT
55yr old male with HTN, RLE DVT, and PE (not on home AC) presents with progressively worsening SOB, intermittent fevers, COVID positive, admitted for COVID PNA.  Course complciated by worsening respiratory failure requiring NIPPV and ICU admission.     NEUROLOGY:  - A&Ox4 at baseline; No Active Issues   - Xanax 0.25 mg q12 prn for anxiety.   - Started on Precedex overnight for tachypnea and anxiety.     PULMONARY:  Acute Respiratory Failure  - Continue HFNC 60L 100% alternate with BiPAP 15/10/80% Nocternal/PRN   - Titrate settings as tolerated to maintain SpO2>88%   - Not tolerating and refusing prone positioning.     CARDIOLOGY:  - No Active Issues     GASTROINTESTINAL:  - Regular Diet w/ supp shakes while intermittently on HFNC  - Protonix 40mg QD while intermittently NPO on Bipap  - D5NS of Potassium at 75 mL/hour while NPO.   - Bowel regimen with Miralax and Senna BM. Last BM 8/12.     RENAL/:  - Strict I&Os   - Monitor and replete lytes daily    INFECTIOUS DISEASE:  COVID-19  - s/p completed courses of Remdesivir x 5 days and Decadron x 10 days   - s/p Toci on 7/30    HEMATOLOGY:  - Full AC Lovenox 90mg BID   - LE duplex neg for DVT on 8/4. History of RLE DVT & PE.   - Consider CTA chest when stabilized    ENDOCRINE:  - HbA1c 6.0    ETHICS/DISPOSITION:  - Full code   - Remain in ICU

## 2021-08-14 NOTE — PROGRESS NOTE ADULT - SUBJECTIVE AND OBJECTIVE BOX
DATE OF SERVICE: 08-14-21 @ 13:26  CHIEF COMPLAINT:Patient is a 55y old  Male who presents with a chief complaint of covid (11 Aug 2021 08:04)    	        PAST MEDICAL & SURGICAL HISTORY:  Hyperlipidemia    HTN (hypertension)    Rupture, tendon, quadriceps    History of Achilles tendon repair            on bipapa  weak  anxiety last night  events noted      Medications:  MEDICATIONS  (STANDING):  benzonatate 200 milliGRAM(s) Oral every 8 hours  budesonide 160 MICROgram(s)/formoterol 4.5 MICROgram(s) Inhaler 2 Puff(s) Inhalation two times a day  chlorhexidine 4% Liquid 1 Application(s) Topical <User Schedule>  cholecalciferol 2000 Unit(s) Oral daily  clonazePAM  Tablet 0.5 milliGRAM(s) Oral every 8 hours  enoxaparin Injectable 90 milliGRAM(s) SubCutaneous every 12 hours  guaiFENesin ER 1200 milliGRAM(s) Oral every 12 hours  melatonin 3 milliGRAM(s) Oral at bedtime  multivitamin 1 Tablet(s) Oral daily  pantoprazole  Injectable 40 milliGRAM(s) IV Push daily  polyethylene glycol 3350 17 Gram(s) Oral daily  senna 2 Tablet(s) Oral at bedtime    MEDICATIONS  (PRN):  acetaminophen   Tablet .. 650 milliGRAM(s) Oral every 6 hours PRN Temp greater or equal to 38C (100.4F), Mild Pain (1 - 3)  sodium chloride 0.65% Nasal 1 Spray(s) Both Nostrils every 2 hours PRN Nasal Congestion    	    PHYSICAL EXAM:  T(C): 36 (08-14-21 @ 07:00), Max: 37.3 (08-13-21 @ 20:00)  HR: 68 (08-14-21 @ 11:00) (47 - 93)  BP: 109/65 (08-14-21 @ 11:00) (91/55 - 146/67)  RR: 26 (08-14-21 @ 11:00) (20 - 33)  SpO2: 98% (08-14-21 @ 11:00) (90% - 98%)  Wt(kg): --  I&O's Summary    13 Aug 2021 07:01  -  14 Aug 2021 07:00  --------------------------------------------------------  IN: 1375.3 mL / OUT: 1000 mL / NET: 375.3 mL    14 Aug 2021 07:01  -  14 Aug 2021 13:26  --------------------------------------------------------  IN: 240 mL / OUT: 300 mL / NET: -60 mL      Appearance: Normal	  HEENT:   Normal oral mucosa, PERRL, EOMI	  Lymphatic: No lymphadenopathy  Cardiovascular: Normal S1 S2,   Respiratory: dec  bs   Gastrointestinal:  Soft, Non-tender, + BS	  	  Neurologic: Non-focal  Extremities: Normal range of motion, No clubbing, cyanosis or edema  Vascular: Peripheral pulses palpable 2+ bilaterally    TELEMETRY: 	    ECG:  	  RADIOLOGY:  OTHER: 	  	  LABS:	 	    CARDIAC MARKERS:                                11.9   9.43  )-----------( 160      ( 14 Aug 2021 00:20 )             36.6     08-14    139  |  106  |  17  ----------------------------<  104<H>  4.8   |  24  |  0.83    Ca    8.2<L>      14 Aug 2021 00:20  Phos  3.1     08-14  Mg     2.2     08-14    TPro  5.6<L>  /  Alb  3.0<L>  /  TBili  0.3  /  DBili  x   /  AST  44<H>  /  ALT  49<H>  /  AlkPhos  108  08-14    proBNP:   Lipid Profile:   HgA1c:   TSH:

## 2021-08-15 LAB
ALBUMIN SERPL ELPH-MCNC: 3.3 G/DL — SIGNIFICANT CHANGE UP (ref 3.3–5)
ALP SERPL-CCNC: 107 U/L — SIGNIFICANT CHANGE UP (ref 40–120)
ALT FLD-CCNC: 49 U/L — HIGH (ref 10–45)
ANION GAP SERPL CALC-SCNC: 9 MMOL/L — SIGNIFICANT CHANGE UP (ref 5–17)
APTT BLD: 27 SEC — LOW (ref 27.5–35.5)
AST SERPL-CCNC: 40 U/L — SIGNIFICANT CHANGE UP (ref 10–40)
BILIRUB SERPL-MCNC: 0.4 MG/DL — SIGNIFICANT CHANGE UP (ref 0.2–1.2)
BUN SERPL-MCNC: 16 MG/DL — SIGNIFICANT CHANGE UP (ref 7–23)
CALCIUM SERPL-MCNC: 8.9 MG/DL — SIGNIFICANT CHANGE UP (ref 8.4–10.5)
CHLORIDE SERPL-SCNC: 102 MMOL/L — SIGNIFICANT CHANGE UP (ref 96–108)
CO2 SERPL-SCNC: 27 MMOL/L — SIGNIFICANT CHANGE UP (ref 22–31)
CREAT SERPL-MCNC: 0.81 MG/DL — SIGNIFICANT CHANGE UP (ref 0.5–1.3)
GLUCOSE SERPL-MCNC: 89 MG/DL — SIGNIFICANT CHANGE UP (ref 70–99)
HCT VFR BLD CALC: 38.5 % — LOW (ref 39–50)
HGB BLD-MCNC: 12.3 G/DL — LOW (ref 13–17)
INR BLD: 1.25 RATIO — HIGH (ref 0.88–1.16)
MAGNESIUM SERPL-MCNC: 2.2 MG/DL — SIGNIFICANT CHANGE UP (ref 1.6–2.6)
MCHC RBC-ENTMCNC: 28.4 PG — SIGNIFICANT CHANGE UP (ref 27–34)
MCHC RBC-ENTMCNC: 31.9 GM/DL — LOW (ref 32–36)
MCV RBC AUTO: 88.9 FL — SIGNIFICANT CHANGE UP (ref 80–100)
NRBC # BLD: 0 /100 WBCS — SIGNIFICANT CHANGE UP (ref 0–0)
PHOSPHATE SERPL-MCNC: 3.9 MG/DL — SIGNIFICANT CHANGE UP (ref 2.5–4.5)
PLATELET # BLD AUTO: 162 K/UL — SIGNIFICANT CHANGE UP (ref 150–400)
POTASSIUM SERPL-MCNC: 5 MMOL/L — SIGNIFICANT CHANGE UP (ref 3.5–5.3)
POTASSIUM SERPL-SCNC: 5 MMOL/L — SIGNIFICANT CHANGE UP (ref 3.5–5.3)
PROT SERPL-MCNC: 6.2 G/DL — SIGNIFICANT CHANGE UP (ref 6–8.3)
PROTHROM AB SERPL-ACNC: 14.8 SEC — HIGH (ref 10.6–13.6)
RBC # BLD: 4.33 M/UL — SIGNIFICANT CHANGE UP (ref 4.2–5.8)
RBC # FLD: 12.8 % — SIGNIFICANT CHANGE UP (ref 10.3–14.5)
SODIUM SERPL-SCNC: 138 MMOL/L — SIGNIFICANT CHANGE UP (ref 135–145)
WBC # BLD: 9.26 K/UL — SIGNIFICANT CHANGE UP (ref 3.8–10.5)
WBC # FLD AUTO: 9.26 K/UL — SIGNIFICANT CHANGE UP (ref 3.8–10.5)

## 2021-08-15 PROCEDURE — 99291 CRITICAL CARE FIRST HOUR: CPT

## 2021-08-15 RX ORDER — FENTANYL CITRATE 50 UG/ML
50 INJECTION INTRAVENOUS ONCE
Refills: 0 | Status: DISCONTINUED | OUTPATIENT
Start: 2021-08-15 | End: 2021-08-15

## 2021-08-15 RX ORDER — KETOROLAC TROMETHAMINE 30 MG/ML
15 SYRINGE (ML) INJECTION ONCE
Refills: 0 | Status: DISCONTINUED | OUTPATIENT
Start: 2021-08-15 | End: 2021-08-15

## 2021-08-15 RX ORDER — ACETAMINOPHEN 500 MG
1000 TABLET ORAL ONCE
Refills: 0 | Status: COMPLETED | OUTPATIENT
Start: 2021-08-15 | End: 2021-08-15

## 2021-08-15 RX ORDER — ACETAMINOPHEN 500 MG
1000 TABLET ORAL ONCE
Refills: 0 | Status: DISCONTINUED | OUTPATIENT
Start: 2021-08-15 | End: 2021-08-15

## 2021-08-15 RX ORDER — LIDOCAINE 4 G/100G
1 CREAM TOPICAL EVERY 24 HOURS
Refills: 0 | Status: DISCONTINUED | OUTPATIENT
Start: 2021-08-15 | End: 2021-08-26

## 2021-08-15 RX ADMIN — Medication 0.5 MILLIGRAM(S): at 08:40

## 2021-08-15 RX ADMIN — LIDOCAINE 1 PATCH: 4 CREAM TOPICAL at 19:52

## 2021-08-15 RX ADMIN — Medication 15 MILLIGRAM(S): at 23:23

## 2021-08-15 RX ADMIN — Medication 3 MILLIGRAM(S): at 22:24

## 2021-08-15 RX ADMIN — FENTANYL CITRATE 50 MICROGRAM(S): 50 INJECTION INTRAVENOUS at 17:20

## 2021-08-15 RX ADMIN — Medication 1 TABLET(S): at 11:26

## 2021-08-15 RX ADMIN — Medication 15 MILLIGRAM(S): at 23:53

## 2021-08-15 RX ADMIN — POLYETHYLENE GLYCOL 3350 17 GRAM(S): 17 POWDER, FOR SOLUTION ORAL at 11:36

## 2021-08-15 RX ADMIN — Medication 400 MILLIGRAM(S): at 18:51

## 2021-08-15 RX ADMIN — Medication 0.5 MILLIGRAM(S): at 15:49

## 2021-08-15 RX ADMIN — CHLORHEXIDINE GLUCONATE 1 APPLICATION(S): 213 SOLUTION TOPICAL at 05:57

## 2021-08-15 RX ADMIN — Medication 650 MILLIGRAM(S): at 11:56

## 2021-08-15 RX ADMIN — Medication 1200 MILLIGRAM(S): at 08:40

## 2021-08-15 RX ADMIN — ENOXAPARIN SODIUM 90 MILLIGRAM(S): 100 INJECTION SUBCUTANEOUS at 17:08

## 2021-08-15 RX ADMIN — Medication 650 MILLIGRAM(S): at 11:26

## 2021-08-15 RX ADMIN — Medication 1000 MILLIGRAM(S): at 19:06

## 2021-08-15 RX ADMIN — Medication 2000 UNIT(S): at 11:26

## 2021-08-15 RX ADMIN — Medication 200 MILLIGRAM(S): at 22:23

## 2021-08-15 RX ADMIN — BUDESONIDE AND FORMOTEROL FUMARATE DIHYDRATE 2 PUFF(S): 160; 4.5 AEROSOL RESPIRATORY (INHALATION) at 18:02

## 2021-08-15 RX ADMIN — BUDESONIDE AND FORMOTEROL FUMARATE DIHYDRATE 2 PUFF(S): 160; 4.5 AEROSOL RESPIRATORY (INHALATION) at 07:02

## 2021-08-15 RX ADMIN — Medication 0.5 MILLIGRAM(S): at 22:24

## 2021-08-15 RX ADMIN — Medication 1200 MILLIGRAM(S): at 17:09

## 2021-08-15 RX ADMIN — PANTOPRAZOLE SODIUM 40 MILLIGRAM(S): 20 TABLET, DELAYED RELEASE ORAL at 11:27

## 2021-08-15 RX ADMIN — Medication 200 MILLIGRAM(S): at 08:40

## 2021-08-15 RX ADMIN — ENOXAPARIN SODIUM 90 MILLIGRAM(S): 100 INJECTION SUBCUTANEOUS at 05:57

## 2021-08-15 RX ADMIN — FENTANYL CITRATE 50 MICROGRAM(S): 50 INJECTION INTRAVENOUS at 17:35

## 2021-08-15 RX ADMIN — LIDOCAINE 1 PATCH: 4 CREAM TOPICAL at 17:09

## 2021-08-15 RX ADMIN — Medication 200 MILLIGRAM(S): at 15:49

## 2021-08-15 NOTE — PROGRESS NOTE ADULT - SUBJECTIVE AND OBJECTIVE BOX
CHIEF COMPLAINT:  Patient is a 55y old  Male who presents with a chief complaint of covid (11 Aug 2021 08:04)    HPI:  56yo M with Hx of DVT s/p achilles tendon repair years ago not on AC presenting with complaints of SOB. intermittent and fevers with cough productive of white sputum for past week, tested positive for covid 2 days ago.  pt is unvaccinated   progressively worsening SOB over the past 5 days, worse with ambulation. found to be hypoxic on arrival to the  er    (29 Jul 2021 10:31)      Interval Events:  -no overnight events    OBJECTIVE:  ICU Vital Signs Last 24 Hrs  T(C): 37 (15 Aug 2021 06:00), Max: 37.2 (14 Aug 2021 23:00)  T(F): 98.6 (15 Aug 2021 06:00), Max: 98.9 (14 Aug 2021 23:00)  HR: 58 (15 Aug 2021 06:00) (58 - 94)  BP: 98/56 (15 Aug 2021 06:00) (83/59 - 145/63)  BP(mean): 72 (15 Aug 2021 06:00) (67 - 98)  ABP: --  ABP(mean): --  RR: 27 (15 Aug 2021 06:00) (21 - 35)  SpO2: 95% (15 Aug 2021 06:00) (78% - 98%)        08-14-21 @ 07:01  -  08-15-21 @ 07:00  --------------------------------------------------------  IN: 840 mL / OUT: 2400 mL / NET: -1560 mL      CAPILLARY BLOOD GLUCOSE              PHYSICAL EXAM:          HOSPITAL MEDICATIONS:  MEDICATIONS  (STANDING):  benzonatate 200 milliGRAM(s) Oral every 8 hours  budesonide 160 MICROgram(s)/formoterol 4.5 MICROgram(s) Inhaler 2 Puff(s) Inhalation two times a day  chlorhexidine 4% Liquid 1 Application(s) Topical <User Schedule>  cholecalciferol 2000 Unit(s) Oral daily  clonazePAM  Tablet 0.5 milliGRAM(s) Oral every 8 hours  enoxaparin Injectable 90 milliGRAM(s) SubCutaneous every 12 hours  guaiFENesin ER 1200 milliGRAM(s) Oral every 12 hours  melatonin 3 milliGRAM(s) Oral at bedtime  multivitamin 1 Tablet(s) Oral daily  pantoprazole  Injectable 40 milliGRAM(s) IV Push daily  polyethylene glycol 3350 17 Gram(s) Oral daily  senna 2 Tablet(s) Oral at bedtime    MEDICATIONS  (PRN):  acetaminophen   Tablet .. 650 milliGRAM(s) Oral every 6 hours PRN Temp greater or equal to 38C (100.4F), Mild Pain (1 - 3)  sodium chloride 0.65% Nasal 1 Spray(s) Both Nostrils every 2 hours PRN Nasal Congestion      LABS:                        12.3   9.26  )-----------( 162      ( 15 Aug 2021 02:54 )             38.5     Hgb Trend: 12.3<--, 11.9<--, 12.9<--, 13.2<--, 13.1<--  08-15    138  |  102  |  16  ----------------------------<  89  5.0   |  27  |  0.81    Ca    8.9      15 Aug 2021 02:54  Phos  3.9     08-15  Mg     2.2     08-15    TPro  6.2  /  Alb  3.3  /  TBili  0.4  /  DBili  x   /  AST  40  /  ALT  49<H>  /  AlkPhos  107  08-15    LIVER FUNCTIONS - ( 15 Aug 2021 02:54 )  Alb: 3.3 g/dL / Pro: 6.2 g/dL / ALK PHOS: 107 U/L / ALT: 49 U/L / AST: 40 U/L / GGT: x           Creatinine Trend: 0.81<--, 0.83<--, 0.69<--, 0.68<--, 0.61<--, 0.73<--  PT/INR - ( 15 Aug 2021 02:54 )   PT: 14.8 sec;   INR: 1.25 ratio         PTT - ( 15 Aug 2021 02:54 )  PTT:27.0 sec          MICROBIOLOGY: Reviewed      RADIOLOGY: Reviewed and interpreted by me    EKG:   CHIEF COMPLAINT:  Patient is a 55y old  Male who presents with a chief complaint of covid (11 Aug 2021 08:04)    HPI:  56yo M with Hx of DVT s/p achilles tendon repair years ago not on AC presenting with complaints of SOB. intermittent and fevers with cough productive of white sputum for past week, tested positive for covid 2 days ago.  pt is unvaccinated   progressively worsening SOB over the past 5 days, worse with ambulation. found to be hypoxic on arrival to the  er    (29 Jul 2021 10:31)      Interval Events:  -no overnight events    OBJECTIVE:  ICU Vital Signs Last 24 Hrs  T(C): 37 (15 Aug 2021 06:00), Max: 37.2 (14 Aug 2021 23:00)  T(F): 98.6 (15 Aug 2021 06:00), Max: 98.9 (14 Aug 2021 23:00)  HR: 58 (15 Aug 2021 06:00) (58 - 94)  BP: 98/56 (15 Aug 2021 06:00) (83/59 - 145/63)  BP(mean): 72 (15 Aug 2021 06:00) (67 - 98)  ABP: --  ABP(mean): --  RR: 27 (15 Aug 2021 06:00) (21 - 35)  SpO2: 95% (15 Aug 2021 06:00) (78% - 98%)        08-14-21 @ 07:01  -  08-15-21 @ 07:00  --------------------------------------------------------  IN: 840 mL / OUT: 2400 mL / NET: -1560 mL      CAPILLARY BLOOD GLUCOSE      PHYSICAL EXAM:  GENERAL: NAD  HEENT:  Atraumatic, Normocephalic  EYES: PERRL, conjunctiva and sclera clear  NECK: Supple, No JVD  CHEST/LUNG: diminished bilaterally; No wheezes, rales, or rhonchi; nonproductive cough  HEART: Regular rate and rhythm; No murmurs, rubs, or gallops  ABDOMEN: Soft, Nontender, Nondistended; Bowel sounds present  EXTREMITIES:  2+ Peripheral Pulses, No clubbing, cyanosis, or edema  PSYCH: calm with occasional periods of anxiety  NEUROLOGY: A&Ox4  SKIN: No rashes or lesions    HOSPITAL MEDICATIONS:  MEDICATIONS  (STANDING):  benzonatate 200 milliGRAM(s) Oral every 8 hours  budesonide 160 MICROgram(s)/formoterol 4.5 MICROgram(s) Inhaler 2 Puff(s) Inhalation two times a day  chlorhexidine 4% Liquid 1 Application(s) Topical <User Schedule>  cholecalciferol 2000 Unit(s) Oral daily  clonazePAM  Tablet 0.5 milliGRAM(s) Oral every 8 hours  enoxaparin Injectable 90 milliGRAM(s) SubCutaneous every 12 hours  guaiFENesin ER 1200 milliGRAM(s) Oral every 12 hours  melatonin 3 milliGRAM(s) Oral at bedtime  multivitamin 1 Tablet(s) Oral daily  pantoprazole  Injectable 40 milliGRAM(s) IV Push daily  polyethylene glycol 3350 17 Gram(s) Oral daily  senna 2 Tablet(s) Oral at bedtime    MEDICATIONS  (PRN):  acetaminophen   Tablet .. 650 milliGRAM(s) Oral every 6 hours PRN Temp greater or equal to 38C (100.4F), Mild Pain (1 - 3)  sodium chloride 0.65% Nasal 1 Spray(s) Both Nostrils every 2 hours PRN Nasal Congestion      LABS:                        12.3   9.26  )-----------( 162      ( 15 Aug 2021 02:54 )             38.5     Hgb Trend: 12.3<--, 11.9<--, 12.9<--, 13.2<--, 13.1<--  08-15    138  |  102  |  16  ----------------------------<  89  5.0   |  27  |  0.81    Ca    8.9      15 Aug 2021 02:54  Phos  3.9     08-15  Mg     2.2     08-15    TPro  6.2  /  Alb  3.3  /  TBili  0.4  /  DBili  x   /  AST  40  /  ALT  49<H>  /  AlkPhos  107  08-15    LIVER FUNCTIONS - ( 15 Aug 2021 02:54 )  Alb: 3.3 g/dL / Pro: 6.2 g/dL / ALK PHOS: 107 U/L / ALT: 49 U/L / AST: 40 U/L / GGT: x           Creatinine Trend: 0.81<--, 0.83<--, 0.69<--, 0.68<--, 0.61<--, 0.73<--  PT/INR - ( 15 Aug 2021 02:54 )   PT: 14.8 sec;   INR: 1.25 ratio    PTT - ( 15 Aug 2021 02:54 )  PTT:27.0 sec    MICROBIOLOGY: Reviewed    RADIOLOGY: Reviewed and interpreted by me    EKG: nsr

## 2021-08-15 NOTE — PROGRESS NOTE ADULT - ASSESSMENT
pt w/ covid  hypoxia   s/p steroids  s/p remdesivir  id / f/u   pulm f/u   c/e resp support/bipap/h/f  h/f this am     additional tx as per id/pulm / micu  recs  s/p toci  gi / dvt proph  o2  hx htn/   monitor bp  monitor inflammatory markers/improving     prognosis guarded   c/w micu txx

## 2021-08-15 NOTE — PROGRESS NOTE ADULT - SUBJECTIVE AND OBJECTIVE BOX
DATE OF SERVICE: 08-15-21 @ 10:03  CHIEF COMPLAINT:Patient is a 55y old  Male who presents with a chief complaint of covid (11 Aug 2021 08:04)    	        PAST MEDICAL & SURGICAL HISTORY:  Hyperlipidemia    HTN (hypertension)    Rupture, tendon, quadriceps    History of Achilles tendon repair            REVIEW OF SYSTEMS:  on h/f this am  conversing  breathing a bit better  no cp   no vomiting       Medications:  MEDICATIONS  (STANDING):  benzonatate 200 milliGRAM(s) Oral every 8 hours  budesonide 160 MICROgram(s)/formoterol 4.5 MICROgram(s) Inhaler 2 Puff(s) Inhalation two times a day  chlorhexidine 4% Liquid 1 Application(s) Topical <User Schedule>  cholecalciferol 2000 Unit(s) Oral daily  clonazePAM  Tablet 0.5 milliGRAM(s) Oral every 8 hours  enoxaparin Injectable 90 milliGRAM(s) SubCutaneous every 12 hours  guaiFENesin ER 1200 milliGRAM(s) Oral every 12 hours  melatonin 3 milliGRAM(s) Oral at bedtime  multivitamin 1 Tablet(s) Oral daily  pantoprazole  Injectable 40 milliGRAM(s) IV Push daily  polyethylene glycol 3350 17 Gram(s) Oral daily  senna 2 Tablet(s) Oral at bedtime    MEDICATIONS  (PRN):  acetaminophen   Tablet .. 650 milliGRAM(s) Oral every 6 hours PRN Temp greater or equal to 38C (100.4F), Mild Pain (1 - 3)  sodium chloride 0.65% Nasal 1 Spray(s) Both Nostrils every 2 hours PRN Nasal Congestion    	    PHYSICAL EXAM:  T(C): 37.2 (08-15-21 @ 07:00), Max: 37.2 (08-14-21 @ 23:00)  HR: 93 (08-15-21 @ 09:26) (58 - 94)  BP: 99/63 (08-15-21 @ 09:00) (83/59 - 119/72)  RR: 28 (08-15-21 @ 09:00) (20 - 35)  SpO2: 93% (08-15-21 @ 09:26) (78% - 98%)  Wt(kg): --  I&O's Summary    14 Aug 2021 07:01  -  15 Aug 2021 07:00  --------------------------------------------------------  IN: 840 mL / OUT: 2400 mL / NET: -1560 mL        Appearance: Normal	  HEENT:   Normal oral mucosa, PERRL, EOMI	  Lymphatic: No lymphadenopathy  Cardiovascular: Normal S1 S2, No JVD, No murmurs, No edema  Respiratory:dec bs   Psychiatry: A & O x 3, Mood & affect appropriate  Gastrointestinal:  Soft, Non-tender, + BS	  Skin: No rashes, No ecchymoses, No cyanosis	  Neurologic: Non-focal  Extremities: Normal range of motion, No clubbing, cyanosis or edema  Vascular: Peripheral pulses palpable 2+ bilaterally    TELEMETRY: 	    ECG:  	  RADIOLOGY:  OTHER: 	  	  LABS:	 	    CARDIAC MARKERS:                                12.3   9.26  )-----------( 162      ( 15 Aug 2021 02:54 )             38.5     08-15    138  |  102  |  16  ----------------------------<  89  5.0   |  27  |  0.81    Ca    8.9      15 Aug 2021 02:54  Phos  3.9     08-15  Mg     2.2     08-15    TPro  6.2  /  Alb  3.3  /  TBili  0.4  /  DBili  x   /  AST  40  /  ALT  49<H>  /  AlkPhos  107  08-15    proBNP:   Lipid Profile:   HgA1c:   TSH:

## 2021-08-15 NOTE — PROGRESS NOTE ADULT - ASSESSMENT
55yr old male with HTN, RLE DVT, and PE (not on home AC) presents with progressively worsening SOB, intermittent fevers, COVID positive, admitted for COVID PNA.  Course complciated by worsening respiratory failure requiring NIPPV and ICU admission.     NEUROLOGY:  - A&Ox4 at baseline; No Active Issues   - Xanax 0.25 mg q12 prn for anxiety.   - Started on Precedex overnight for tachypnea and anxiety.     PULMONARY:  Acute Respiratory Failure  - Continue HFNC 60L 100% alternate with BiPAP 15/10/80% Nocternal/PRN   - Titrate settings as tolerated to maintain SpO2>88%   - Not tolerating and refusing prone positioning.     CARDIOLOGY:  - No Active Issues     GASTROINTESTINAL:  - Regular Diet w/ supp shakes while intermittently on HFNC  - Protonix 40mg QD while intermittently NPO on Bipap  - D5NS of Potassium at 75 mL/hour while NPO.   - Bowel regimen with Miralax and Senna BM. Last BM 8/12.     RENAL/:  - Strict I&Os   - Monitor and replete lytes daily    INFECTIOUS DISEASE:  COVID-19  - s/p completed courses of Remdesivir x 5 days and Decadron x 10 days   - s/p Toci on 7/30    HEMATOLOGY:  - Full AC Lovenox 90mg BID   - LE duplex neg for DVT on 8/4. History of RLE DVT & PE.   - Consider CTA chest when stabilized    ENDOCRINE:  - HbA1c 6.0    ETHICS/DISPOSITION:  - Full code   - Remain in ICU   55yr old male with HTN, RLE DVT, and PE (not on home AC) presents with progressively worsening SOB, intermittent fevers, COVID positive, admitted for COVID PNA.  Course complciated by worsening respiratory failure requiring NIPPV and ICU admission.     NEUROLOGY:  - A&Ox4 at baseline; No Active Issues   - Klonopin 0.5 mg q8h for anxiety    PULMONARY:  Acute Respiratory Failure  - Continue HFNC 60L 100% alternate with BiPAP 15/10/80% Nocternal/PRN   - Titrate settings as tolerated to maintain SpO2>88%   - Encourage prone positioning and bed in chair position.     CARDIOLOGY:  - No Active Issues     GASTROINTESTINAL:  - Regular Diet w/ supp shakes while intermittently on HFNC  - Protonix 40mg QD while intermittently NPO on Bipap  - D5NS of Potassium at 75 mL/hour while NPO.   - Bowel regimen with Miralax and Senna BM. Last BM 8/12.     RENAL/:  - Strict I&Os   - Monitor and replete lytes daily    INFECTIOUS DISEASE:  COVID-19  - s/p completed courses of Remdesivir x 5 days and Decadron x 10 days   - s/p Toci on 7/30    HEMATOLOGY:  - Full AC Lovenox 90mg BID   - LE duplex neg for DVT on 8/4. History of RLE DVT & PE.   - Consider CTA chest when stabilized    ENDOCRINE:  - HbA1c 6.0    ETHICS/DISPOSITION:  - Full code   - Remain in ICU

## 2021-08-16 LAB
ALBUMIN SERPL ELPH-MCNC: 3.3 G/DL — SIGNIFICANT CHANGE UP (ref 3.3–5)
ALP SERPL-CCNC: 99 U/L — SIGNIFICANT CHANGE UP (ref 40–120)
ALT FLD-CCNC: 45 U/L — SIGNIFICANT CHANGE UP (ref 10–45)
ANION GAP SERPL CALC-SCNC: 9 MMOL/L — SIGNIFICANT CHANGE UP (ref 5–17)
APTT BLD: 31.5 SEC — SIGNIFICANT CHANGE UP (ref 27.5–35.5)
AST SERPL-CCNC: 38 U/L — SIGNIFICANT CHANGE UP (ref 10–40)
BILIRUB SERPL-MCNC: 0.4 MG/DL — SIGNIFICANT CHANGE UP (ref 0.2–1.2)
BUN SERPL-MCNC: 17 MG/DL — SIGNIFICANT CHANGE UP (ref 7–23)
CALCIUM SERPL-MCNC: 8.9 MG/DL — SIGNIFICANT CHANGE UP (ref 8.4–10.5)
CHLORIDE SERPL-SCNC: 101 MMOL/L — SIGNIFICANT CHANGE UP (ref 96–108)
CO2 SERPL-SCNC: 28 MMOL/L — SIGNIFICANT CHANGE UP (ref 22–31)
CREAT SERPL-MCNC: 0.82 MG/DL — SIGNIFICANT CHANGE UP (ref 0.5–1.3)
CRP SERPL-MCNC: <3 MG/L — SIGNIFICANT CHANGE UP (ref 0–4)
CULTURE RESULTS: SIGNIFICANT CHANGE UP
D DIMER BLD IA.RAPID-MCNC: 1743 NG/ML DDU — HIGH
FERRITIN SERPL-MCNC: 643 NG/ML — HIGH (ref 30–400)
GLUCOSE SERPL-MCNC: 90 MG/DL — SIGNIFICANT CHANGE UP (ref 70–99)
HCT VFR BLD CALC: 39.5 % — SIGNIFICANT CHANGE UP (ref 39–50)
HGB BLD-MCNC: 12.4 G/DL — LOW (ref 13–17)
INR BLD: 1.18 RATIO — HIGH (ref 0.88–1.16)
LDH SERPL L TO P-CCNC: 879 U/L — HIGH (ref 50–242)
MAGNESIUM SERPL-MCNC: 2.3 MG/DL — SIGNIFICANT CHANGE UP (ref 1.6–2.6)
MCHC RBC-ENTMCNC: 27.9 PG — SIGNIFICANT CHANGE UP (ref 27–34)
MCHC RBC-ENTMCNC: 31.4 GM/DL — LOW (ref 32–36)
MCV RBC AUTO: 89 FL — SIGNIFICANT CHANGE UP (ref 80–100)
NRBC # BLD: 0 /100 WBCS — SIGNIFICANT CHANGE UP (ref 0–0)
PHOSPHATE SERPL-MCNC: 3 MG/DL — SIGNIFICANT CHANGE UP (ref 2.5–4.5)
PLATELET # BLD AUTO: 169 K/UL — SIGNIFICANT CHANGE UP (ref 150–400)
POTASSIUM SERPL-MCNC: 3.9 MMOL/L — SIGNIFICANT CHANGE UP (ref 3.5–5.3)
POTASSIUM SERPL-SCNC: 3.9 MMOL/L — SIGNIFICANT CHANGE UP (ref 3.5–5.3)
PROCALCITONIN SERPL-MCNC: 0.05 NG/ML — SIGNIFICANT CHANGE UP (ref 0.02–0.1)
PROT SERPL-MCNC: 6.3 G/DL — SIGNIFICANT CHANGE UP (ref 6–8.3)
PROTHROM AB SERPL-ACNC: 14.1 SEC — HIGH (ref 10.6–13.6)
RBC # BLD: 4.44 M/UL — SIGNIFICANT CHANGE UP (ref 4.2–5.8)
RBC # FLD: 13.1 % — SIGNIFICANT CHANGE UP (ref 10.3–14.5)
SODIUM SERPL-SCNC: 138 MMOL/L — SIGNIFICANT CHANGE UP (ref 135–145)
SPECIMEN SOURCE: SIGNIFICANT CHANGE UP
WBC # BLD: 9.48 K/UL — SIGNIFICANT CHANGE UP (ref 3.8–10.5)
WBC # FLD AUTO: 9.48 K/UL — SIGNIFICANT CHANGE UP (ref 3.8–10.5)

## 2021-08-16 PROCEDURE — 71045 X-RAY EXAM CHEST 1 VIEW: CPT | Mod: 26

## 2021-08-16 PROCEDURE — 99291 CRITICAL CARE FIRST HOUR: CPT

## 2021-08-16 RX ORDER — KETOROLAC TROMETHAMINE 30 MG/ML
15 SYRINGE (ML) INJECTION ONCE
Refills: 0 | Status: DISCONTINUED | OUTPATIENT
Start: 2021-08-16 | End: 2021-08-16

## 2021-08-16 RX ORDER — ACETAMINOPHEN 500 MG
1000 TABLET ORAL ONCE
Refills: 0 | Status: COMPLETED | OUTPATIENT
Start: 2021-08-16 | End: 2021-08-16

## 2021-08-16 RX ORDER — OXYCODONE HYDROCHLORIDE 5 MG/1
10 TABLET ORAL EVERY 4 HOURS
Refills: 0 | Status: DISCONTINUED | OUTPATIENT
Start: 2021-08-16 | End: 2021-08-19

## 2021-08-16 RX ORDER — LIDOCAINE 4 G/100G
1 CREAM TOPICAL EVERY 24 HOURS
Refills: 0 | Status: DISCONTINUED | OUTPATIENT
Start: 2021-08-16 | End: 2021-08-19

## 2021-08-16 RX ORDER — OXYCODONE HYDROCHLORIDE 5 MG/1
5 TABLET ORAL EVERY 4 HOURS
Refills: 0 | Status: DISCONTINUED | OUTPATIENT
Start: 2021-08-16 | End: 2021-08-23

## 2021-08-16 RX ADMIN — Medication 1000 MILLIGRAM(S): at 05:48

## 2021-08-16 RX ADMIN — Medication 3 MILLIGRAM(S): at 21:49

## 2021-08-16 RX ADMIN — OXYCODONE HYDROCHLORIDE 10 MILLIGRAM(S): 5 TABLET ORAL at 14:00

## 2021-08-16 RX ADMIN — OXYCODONE HYDROCHLORIDE 10 MILLIGRAM(S): 5 TABLET ORAL at 13:30

## 2021-08-16 RX ADMIN — OXYCODONE HYDROCHLORIDE 10 MILLIGRAM(S): 5 TABLET ORAL at 07:57

## 2021-08-16 RX ADMIN — Medication 200 MILLIGRAM(S): at 05:16

## 2021-08-16 RX ADMIN — Medication 15 MILLIGRAM(S): at 15:00

## 2021-08-16 RX ADMIN — LIDOCAINE 1 PATCH: 4 CREAM TOPICAL at 06:04

## 2021-08-16 RX ADMIN — ENOXAPARIN SODIUM 90 MILLIGRAM(S): 100 INJECTION SUBCUTANEOUS at 17:30

## 2021-08-16 RX ADMIN — Medication 1200 MILLIGRAM(S): at 17:30

## 2021-08-16 RX ADMIN — Medication 15 MILLIGRAM(S): at 08:00

## 2021-08-16 RX ADMIN — LIDOCAINE 1 PATCH: 4 CREAM TOPICAL at 18:51

## 2021-08-16 RX ADMIN — Medication 2000 UNIT(S): at 13:30

## 2021-08-16 RX ADMIN — LIDOCAINE 1 PATCH: 4 CREAM TOPICAL at 17:03

## 2021-08-16 RX ADMIN — Medication 0.5 MILLIGRAM(S): at 13:30

## 2021-08-16 RX ADMIN — LIDOCAINE 1 PATCH: 4 CREAM TOPICAL at 17:04

## 2021-08-16 RX ADMIN — LIDOCAINE 1 PATCH: 4 CREAM TOPICAL at 05:57

## 2021-08-16 RX ADMIN — Medication 0.5 MILLIGRAM(S): at 05:16

## 2021-08-16 RX ADMIN — OXYCODONE HYDROCHLORIDE 10 MILLIGRAM(S): 5 TABLET ORAL at 17:30

## 2021-08-16 RX ADMIN — BUDESONIDE AND FORMOTEROL FUMARATE DIHYDRATE 2 PUFF(S): 160; 4.5 AEROSOL RESPIRATORY (INHALATION) at 17:02

## 2021-08-16 RX ADMIN — OXYCODONE HYDROCHLORIDE 10 MILLIGRAM(S): 5 TABLET ORAL at 21:49

## 2021-08-16 RX ADMIN — CHLORHEXIDINE GLUCONATE 1 APPLICATION(S): 213 SOLUTION TOPICAL at 05:17

## 2021-08-16 RX ADMIN — Medication 15 MILLIGRAM(S): at 07:45

## 2021-08-16 RX ADMIN — Medication 200 MILLIGRAM(S): at 13:30

## 2021-08-16 RX ADMIN — Medication 0.5 MILLIGRAM(S): at 21:49

## 2021-08-16 RX ADMIN — OXYCODONE HYDROCHLORIDE 10 MILLIGRAM(S): 5 TABLET ORAL at 22:04

## 2021-08-16 RX ADMIN — OXYCODONE HYDROCHLORIDE 10 MILLIGRAM(S): 5 TABLET ORAL at 18:00

## 2021-08-16 RX ADMIN — Medication 200 MILLIGRAM(S): at 21:49

## 2021-08-16 RX ADMIN — Medication 1 TABLET(S): at 13:51

## 2021-08-16 RX ADMIN — PANTOPRAZOLE SODIUM 40 MILLIGRAM(S): 20 TABLET, DELAYED RELEASE ORAL at 13:30

## 2021-08-16 RX ADMIN — SENNA PLUS 2 TABLET(S): 8.6 TABLET ORAL at 21:49

## 2021-08-16 RX ADMIN — OXYCODONE HYDROCHLORIDE 10 MILLIGRAM(S): 5 TABLET ORAL at 08:30

## 2021-08-16 RX ADMIN — Medication 15 MILLIGRAM(S): at 14:36

## 2021-08-16 RX ADMIN — POLYETHYLENE GLYCOL 3350 17 GRAM(S): 17 POWDER, FOR SOLUTION ORAL at 13:31

## 2021-08-16 RX ADMIN — LIDOCAINE 1 PATCH: 4 CREAM TOPICAL at 07:47

## 2021-08-16 RX ADMIN — Medication 400 MILLIGRAM(S): at 05:18

## 2021-08-16 RX ADMIN — ENOXAPARIN SODIUM 90 MILLIGRAM(S): 100 INJECTION SUBCUTANEOUS at 05:16

## 2021-08-16 RX ADMIN — Medication 1200 MILLIGRAM(S): at 05:14

## 2021-08-16 NOTE — PROGRESS NOTE ADULT - ASSESSMENT
55yr old male with HTN, RLE DVT, and PE (not on home AC) presents with progressively worsening SOB, intermittent fevers, COVID positive, admitted for COVID PNA.  Course complciated by worsening respiratory failure requiring NIPPV and ICU admission.     NEUROLOGY:  - A&Ox4 at baseline; No Active Issues   - Klonopin 0.5 mg q8h for anxiety    PULMONARY:  Acute Respiratory Failure  - Continue HFNC 60L 100% alternate with BiPAP 15/10/80% Nocternal/PRN   - Titrate settings as tolerated to maintain SpO2>88%   - Encourage prone positioning and bed in chair position.     CARDIOLOGY:  - No Active Issues     GASTROINTESTINAL:  - Regular Diet w/ supp shakes while intermittently on HFNC  - Protonix 40mg QD while intermittently NPO on Bipap  - D5NS of Potassium at 75 mL/hour while NPO.   - Bowel regimen with Miralax and Senna BM. Last BM 8/12.     RENAL/:  - Strict I&Os   - Monitor and replete lytes daily    INFECTIOUS DISEASE:  COVID-19  - s/p completed courses of Remdesivir x 5 days and Decadron x 10 days   - s/p Toci on 7/30    HEMATOLOGY:  - Full AC Lovenox 90mg BID   - LE duplex neg for DVT on 8/4. History of RLE DVT & PE.   - Consider CTA chest when stabilized    ENDOCRINE:  - HbA1c 6.0    ETHICS/DISPOSITION:  - Full code   - Remain in ICU

## 2021-08-16 NOTE — PROGRESS NOTE ADULT - PROBLEM SELECTOR PLAN 2
2nd to COVID PNA  -S/p RRT 8/3 and 8/4 for hypoxia  -Tx to 5ICU 8/10 for further management  -Continues to require Bipap 15/10/80% qhs & PRN  -HFNC while not on bipap (currently 60L/90%), keep sats >88%   -Prognosis guarded  -Management per ICU team

## 2021-08-16 NOTE — PROGRESS NOTE ADULT - SUBJECTIVE AND OBJECTIVE BOX
Follow-up Pulm Progress Note    Bipap worn overnight  Currently on HFNC 60L/90% with sats mid/high 90s     Medications:  MEDICATIONS  (STANDING):  benzonatate 200 milliGRAM(s) Oral every 8 hours  budesonide 160 MICROgram(s)/formoterol 4.5 MICROgram(s) Inhaler 2 Puff(s) Inhalation two times a day  chlorhexidine 4% Liquid 1 Application(s) Topical <User Schedule>  cholecalciferol 2000 Unit(s) Oral daily  clonazePAM  Tablet 0.5 milliGRAM(s) Oral every 8 hours  enoxaparin Injectable 90 milliGRAM(s) SubCutaneous every 12 hours  guaiFENesin ER 1200 milliGRAM(s) Oral every 12 hours  lidocaine   4% Patch 1 Patch Transdermal every 24 hours  lidocaine   4% Patch 1 Patch Transdermal every 24 hours  melatonin 3 milliGRAM(s) Oral at bedtime  multivitamin 1 Tablet(s) Oral daily  pantoprazole  Injectable 40 milliGRAM(s) IV Push daily  polyethylene glycol 3350 17 Gram(s) Oral daily  senna 2 Tablet(s) Oral at bedtime    MEDICATIONS  (PRN):  acetaminophen   Tablet .. 650 milliGRAM(s) Oral every 6 hours PRN Temp greater or equal to 38C (100.4F), Mild Pain (1 - 3)  oxyCODONE    IR 10 milliGRAM(s) Oral every 4 hours PRN Severe Pain (7 - 10)  oxyCODONE    IR 5 milliGRAM(s) Oral every 4 hours PRN Moderate Pain (4 - 6)  sodium chloride 0.65% Nasal 1 Spray(s) Both Nostrils every 2 hours PRN Nasal Congestion          Vital Signs Last 24 Hrs  T(C): 36.1 (16 Aug 2021 08:00), Max: 36.8 (15 Aug 2021 15:00)  T(F): 97 (16 Aug 2021 08:00), Max: 98.3 (15 Aug 2021 15:00)  HR: 62 (16 Aug 2021 11:46) (56 - 93)  BP: 99/61 (16 Aug 2021 09:00) (94/54 - 139/78)  BP(mean): 74 (16 Aug 2021 09:00) (66 - 101)  RR: 17 (16 Aug 2021 11:45) (17 - 42)  SpO2: 97% (16 Aug 2021 11:46) (90% - 98%)          08-15 @ 07:01  -  08-16 @ 07:00  --------------------------------------------------------  IN: 440 mL / OUT: 650 mL / NET: -210 mL          LABS:                        12.4   9.48  )-----------( 169      ( 16 Aug 2021 05:42 )             39.5     08-16    138  |  101  |  17  ----------------------------<  90  3.9   |  28  |  0.82    Ca    8.9      16 Aug 2021 05:42  Phos  3.0     08-16  Mg     2.3     08-16    TPro  6.3  /  Alb  3.3  /  TBili  0.4  /  DBili  x   /  AST  38  /  ALT  45  /  AlkPhos  99  08-16          CAPILLARY BLOOD GLUCOSE        PT/INR - ( 16 Aug 2021 05:42 )   PT: 14.1 sec;   INR: 1.18 ratio         PTT - ( 16 Aug 2021 05:42 )  PTT:31.5 sec    Procalcitonin, Serum: 0.05 ng/mL (08-16-21 @ 05:42)                  CULTURES:     Culture - Blood (collected 08-11-21 @ 03:10)  Source: .Blood Blood-Peripheral  Final Report (08-16-21 @ 04:01):    No Growth Final            Physical Examination:  PULM: Decreased at bases   CVS: RRR     RADIOLOGY REVIEWED  CXR 8/16: grossly unchanged diffuse b/l opacities

## 2021-08-16 NOTE — PROGRESS NOTE ADULT - SUBJECTIVE AND OBJECTIVE BOX
DATE OF SERVICE: 08-16-21 @ 11:47  CHIEF COMPLAINT:Patient is a 55y old  Male who presents with a chief complaint of covid (11 Aug 2021 08:04)    	        PAST MEDICAL & SURGICAL HISTORY:  Hyperlipidemia    HTN (hypertension)    Rupture, tendon, quadriceps    History of Achilles tendon repair            REVIEW OF SYSTEMS:  weak   RESPIRATORY: dec bs   CARDIOVASCULAR: No chest pain, palpitations, passing out,  GASTROINTESTINAL: No abdominal or epigastric pain. No nausea, vomiting, or hematemesis;  GENITOURINARY: No dysuria, frequency, hematuria, or incontinence  NEUROLOGICAL: No headaches,   Medications:  MEDICATIONS  (STANDING):  benzonatate 200 milliGRAM(s) Oral every 8 hours  budesonide 160 MICROgram(s)/formoterol 4.5 MICROgram(s) Inhaler 2 Puff(s) Inhalation two times a day  chlorhexidine 4% Liquid 1 Application(s) Topical <User Schedule>  cholecalciferol 2000 Unit(s) Oral daily  clonazePAM  Tablet 0.5 milliGRAM(s) Oral every 8 hours  enoxaparin Injectable 90 milliGRAM(s) SubCutaneous every 12 hours  guaiFENesin ER 1200 milliGRAM(s) Oral every 12 hours  lidocaine   4% Patch 1 Patch Transdermal every 24 hours  lidocaine   4% Patch 1 Patch Transdermal every 24 hours  melatonin 3 milliGRAM(s) Oral at bedtime  multivitamin 1 Tablet(s) Oral daily  pantoprazole  Injectable 40 milliGRAM(s) IV Push daily  polyethylene glycol 3350 17 Gram(s) Oral daily  senna 2 Tablet(s) Oral at bedtime    MEDICATIONS  (PRN):  acetaminophen   Tablet .. 650 milliGRAM(s) Oral every 6 hours PRN Temp greater or equal to 38C (100.4F), Mild Pain (1 - 3)  oxyCODONE    IR 10 milliGRAM(s) Oral every 4 hours PRN Severe Pain (7 - 10)  oxyCODONE    IR 5 milliGRAM(s) Oral every 4 hours PRN Moderate Pain (4 - 6)  sodium chloride 0.65% Nasal 1 Spray(s) Both Nostrils every 2 hours PRN Nasal Congestion    	    PHYSICAL EXAM:  T(C): 36.1 (08-16-21 @ 08:00), Max: 36.8 (08-15-21 @ 15:00)  HR: 62 (08-16-21 @ 11:46) (56 - 93)  BP: 99/61 (08-16-21 @ 09:00) (94/54 - 139/78)  RR: 17 (08-16-21 @ 11:45) (17 - 42)  SpO2: 97% (08-16-21 @ 11:46) (90% - 98%)  Wt(kg): --  I&O's Summary    15 Aug 2021 07:01  -  16 Aug 2021 07:00  --------------------------------------------------------  IN: 440 mL / OUT: 650 mL / NET: -210 mL        Appearance: Normal	  HEENT:   Normal oral mucosa, PERRL, EOMI	  Lymphatic: No lymphadenopathy  Cardiovascular: Normal S1 S2, No JVD, No murmurs, No edema  Respiratory: dec bs   Gastrointestinal:  Soft, Non-tender, + BS	  Skin: No rashes, No ecchymoses, No cyanosis	  Neurologic: Non-focal  Extremities: Normal range of motion, No clubbing, cyanosis or edema  Vascular: Peripheral pulses palpable 2+ bilaterally    TELEMETRY: 	    ECG:  	  RADIOLOGY:  OTHER: 	  	  LABS:	 	    CARDIAC MARKERS:                                12.4   9.48  )-----------( 169      ( 16 Aug 2021 05:42 )             39.5     08-16    138  |  101  |  17  ----------------------------<  90  3.9   |  28  |  0.82    Ca    8.9      16 Aug 2021 05:42  Phos  3.0     08-16  Mg     2.3     08-16    TPro  6.3  /  Alb  3.3  /  TBili  0.4  /  DBili  x   /  AST  38  /  ALT  45  /  AlkPhos  99  08-16    proBNP:   Lipid Profile:   HgA1c:   TSH:

## 2021-08-16 NOTE — PROGRESS NOTE ADULT - SUBJECTIVE AND OBJECTIVE BOX
CHIEF COMPLAINT:  Patient is a 55y old  Male who presents with a chief complaint of covid (11 Aug 2021 08:04)    HPI:  54yo M with Hx of DVT s/p achilles tendon repair years ago not on AC presenting with complaints of SOB. intermittent and fevers with cough productive of white sputum for past week, tested positive for covid 2 days ago.  pt is unvaccinated   progressively worsening SOB over the past 5 days, worse with ambulation. found to be hypoxic on arrival to the  er    (29 Jul 2021 10:31)      Interval Events:  -tolerated nocturnal bipap well  -c/o pleuritic back and chest pain--> Lidocaine patch and Toradol with good relief    OBJECTIVE:  ICU Vital Signs Last 24 Hrs  T(C): 36.7 (16 Aug 2021 05:00), Max: 37.1 (15 Aug 2021 11:00)  T(F): 98.1 (16 Aug 2021 05:00), Max: 98.7 (15 Aug 2021 11:00)  HR: 60 (16 Aug 2021 06:00) (56 - 93)  BP: 100/61 (16 Aug 2021 06:00) (91/50 - 139/78)  BP(mean): 78 (16 Aug 2021 06:00) (63 - 101)  ABP: --  ABP(mean): --  RR: 25 (16 Aug 2021 06:00) (19 - 42)  SpO2: 97% (16 Aug 2021 06:00) (90% - 98%)        08-15-21 @ 07:01  -  08-16-21 @ 07:00  --------------------------------------------------------  IN: 440 mL / OUT: 650 mL / NET: -210 mL      CAPILLARY BLOOD GLUCOSE    PHYSICAL EXAM:  GENERAL: NAD  HEENT:  Atraumatic, Normocephalic  EYES: PERRL, conjunctiva and sclera clear  NECK: Supple, No JVD  CHEST/LUNG: diminished bilaterally; No wheezes, rales, or rhonchi; nonproductive cough  HEART: Regular rate and rhythm; No murmurs, rubs, or gallops  ABDOMEN: Soft, Nontender, Nondistended; Bowel sounds present  EXTREMITIES:  2+ Peripheral Pulses, No clubbing, cyanosis, or edema  PSYCH: calm with occasional periods of anxiety  NEUROLOGY: A&Ox4  SKIN: No rashes or lesions    HOSPITAL MEDICATIONS:  MEDICATIONS  (STANDING):  benzonatate 200 milliGRAM(s) Oral every 8 hours  budesonide 160 MICROgram(s)/formoterol 4.5 MICROgram(s) Inhaler 2 Puff(s) Inhalation two times a day  chlorhexidine 4% Liquid 1 Application(s) Topical <User Schedule>  cholecalciferol 2000 Unit(s) Oral daily  clonazePAM  Tablet 0.5 milliGRAM(s) Oral every 8 hours  enoxaparin Injectable 90 milliGRAM(s) SubCutaneous every 12 hours  guaiFENesin ER 1200 milliGRAM(s) Oral every 12 hours  ketorolac   Injectable 15 milliGRAM(s) IV Push once  lidocaine   4% Patch 1 Patch Transdermal every 24 hours  lidocaine   4% Patch 1 Patch Transdermal every 24 hours  melatonin 3 milliGRAM(s) Oral at bedtime  multivitamin 1 Tablet(s) Oral daily  pantoprazole  Injectable 40 milliGRAM(s) IV Push daily  polyethylene glycol 3350 17 Gram(s) Oral daily  senna 2 Tablet(s) Oral at bedtime    MEDICATIONS  (PRN):  acetaminophen   Tablet .. 650 milliGRAM(s) Oral every 6 hours PRN Temp greater or equal to 38C (100.4F), Mild Pain (1 - 3)  oxyCODONE    IR 5 milliGRAM(s) Oral every 4 hours PRN Moderate Pain (4 - 6)  oxyCODONE    IR 10 milliGRAM(s) Oral every 4 hours PRN Severe Pain (7 - 10)  sodium chloride 0.65% Nasal 1 Spray(s) Both Nostrils every 2 hours PRN Nasal Congestion      LABS:                        12.4   9.48  )-----------( 169      ( 16 Aug 2021 05:42 )             39.5     Hgb Trend: 12.4<--, 12.3<--, 11.9<--, 12.9<--, 13.2<--  08-16    138  |  101  |  17  ----------------------------<  90  3.9   |  28  |  0.82    Ca    8.9      16 Aug 2021 05:42  Phos  3.0     08-16  Mg     2.3     08-16    TPro  6.3  /  Alb  3.3  /  TBili  0.4  /  DBili  x   /  AST  38  /  ALT  45  /  AlkPhos  99  08-16    LIVER FUNCTIONS - ( 16 Aug 2021 05:42 )  Alb: 3.3 g/dL / Pro: 6.3 g/dL / ALK PHOS: 99 U/L / ALT: 45 U/L / AST: 38 U/L / GGT: x           Creatinine Trend: 0.82<--, 0.81<--, 0.83<--, 0.69<--, 0.68<--, 0.61<--  PT/INR - ( 16 Aug 2021 05:42 )   PT: 14.1 sec;   INR: 1.18 ratio    PTT - ( 16 Aug 2021 05:42 )  PTT:31.5 sec    MICROBIOLOGY: Reviewed    RADIOLOGY: Reviewed and interpreted by me    EKG: nsr

## 2021-08-17 LAB
ALBUMIN SERPL ELPH-MCNC: 3.7 G/DL — SIGNIFICANT CHANGE UP (ref 3.3–5)
ALBUMIN SERPL ELPH-MCNC: 3.7 G/DL — SIGNIFICANT CHANGE UP (ref 3.3–5)
ALBUMIN SERPL ELPH-MCNC: 3.8 G/DL — SIGNIFICANT CHANGE UP (ref 3.3–5)
ALP SERPL-CCNC: 101 U/L — SIGNIFICANT CHANGE UP (ref 40–120)
ALP SERPL-CCNC: 102 U/L — SIGNIFICANT CHANGE UP (ref 40–120)
ALP SERPL-CCNC: 97 U/L — SIGNIFICANT CHANGE UP (ref 40–120)
ALT FLD-CCNC: 42 U/L — SIGNIFICANT CHANGE UP (ref 10–45)
ALT FLD-CCNC: 43 U/L — SIGNIFICANT CHANGE UP (ref 10–45)
ALT FLD-CCNC: 45 U/L — SIGNIFICANT CHANGE UP (ref 10–45)
ANION GAP SERPL CALC-SCNC: 10 MMOL/L — SIGNIFICANT CHANGE UP (ref 5–17)
ANION GAP SERPL CALC-SCNC: 11 MMOL/L — SIGNIFICANT CHANGE UP (ref 5–17)
ANION GAP SERPL CALC-SCNC: 14 MMOL/L — SIGNIFICANT CHANGE UP (ref 5–17)
AST SERPL-CCNC: 32 U/L — SIGNIFICANT CHANGE UP (ref 10–40)
AST SERPL-CCNC: 35 U/L — SIGNIFICANT CHANGE UP (ref 10–40)
AST SERPL-CCNC: 35 U/L — SIGNIFICANT CHANGE UP (ref 10–40)
BILIRUB SERPL-MCNC: 0.3 MG/DL — SIGNIFICANT CHANGE UP (ref 0.2–1.2)
BILIRUB SERPL-MCNC: 0.3 MG/DL — SIGNIFICANT CHANGE UP (ref 0.2–1.2)
BILIRUB SERPL-MCNC: 0.4 MG/DL — SIGNIFICANT CHANGE UP (ref 0.2–1.2)
BUN SERPL-MCNC: 16 MG/DL — SIGNIFICANT CHANGE UP (ref 7–23)
BUN SERPL-MCNC: 17 MG/DL — SIGNIFICANT CHANGE UP (ref 7–23)
BUN SERPL-MCNC: 19 MG/DL — SIGNIFICANT CHANGE UP (ref 7–23)
CALCIUM SERPL-MCNC: 9.2 MG/DL — SIGNIFICANT CHANGE UP (ref 8.4–10.5)
CALCIUM SERPL-MCNC: 9.2 MG/DL — SIGNIFICANT CHANGE UP (ref 8.4–10.5)
CALCIUM SERPL-MCNC: 9.3 MG/DL — SIGNIFICANT CHANGE UP (ref 8.4–10.5)
CHLORIDE SERPL-SCNC: 101 MMOL/L — SIGNIFICANT CHANGE UP (ref 96–108)
CHLORIDE SERPL-SCNC: 99 MMOL/L — SIGNIFICANT CHANGE UP (ref 96–108)
CHLORIDE SERPL-SCNC: 99 MMOL/L — SIGNIFICANT CHANGE UP (ref 96–108)
CO2 SERPL-SCNC: 26 MMOL/L — SIGNIFICANT CHANGE UP (ref 22–31)
CO2 SERPL-SCNC: 26 MMOL/L — SIGNIFICANT CHANGE UP (ref 22–31)
CO2 SERPL-SCNC: 27 MMOL/L — SIGNIFICANT CHANGE UP (ref 22–31)
CREAT SERPL-MCNC: 0.81 MG/DL — SIGNIFICANT CHANGE UP (ref 0.5–1.3)
CREAT SERPL-MCNC: 0.83 MG/DL — SIGNIFICANT CHANGE UP (ref 0.5–1.3)
CREAT SERPL-MCNC: 0.83 MG/DL — SIGNIFICANT CHANGE UP (ref 0.5–1.3)
GLUCOSE SERPL-MCNC: 106 MG/DL — HIGH (ref 70–99)
GLUCOSE SERPL-MCNC: 89 MG/DL — SIGNIFICANT CHANGE UP (ref 70–99)
GLUCOSE SERPL-MCNC: 98 MG/DL — SIGNIFICANT CHANGE UP (ref 70–99)
HCT VFR BLD CALC: 42.6 % — SIGNIFICANT CHANGE UP (ref 39–50)
HCT VFR BLD CALC: 43.3 % — SIGNIFICANT CHANGE UP (ref 39–50)
HGB BLD-MCNC: 13.3 G/DL — SIGNIFICANT CHANGE UP (ref 13–17)
HGB BLD-MCNC: 13.3 G/DL — SIGNIFICANT CHANGE UP (ref 13–17)
MAGNESIUM SERPL-MCNC: 2.4 MG/DL — SIGNIFICANT CHANGE UP (ref 1.6–2.6)
MAGNESIUM SERPL-MCNC: 2.4 MG/DL — SIGNIFICANT CHANGE UP (ref 1.6–2.6)
MCHC RBC-ENTMCNC: 27.7 PG — SIGNIFICANT CHANGE UP (ref 27–34)
MCHC RBC-ENTMCNC: 28.2 PG — SIGNIFICANT CHANGE UP (ref 27–34)
MCHC RBC-ENTMCNC: 30.7 GM/DL — LOW (ref 32–36)
MCHC RBC-ENTMCNC: 31.2 GM/DL — LOW (ref 32–36)
MCV RBC AUTO: 90 FL — SIGNIFICANT CHANGE UP (ref 80–100)
MCV RBC AUTO: 90.3 FL — SIGNIFICANT CHANGE UP (ref 80–100)
NRBC # BLD: 0 /100 WBCS — SIGNIFICANT CHANGE UP (ref 0–0)
NRBC # BLD: 0 /100 WBCS — SIGNIFICANT CHANGE UP (ref 0–0)
PHOSPHATE SERPL-MCNC: 3.3 MG/DL — SIGNIFICANT CHANGE UP (ref 2.5–4.5)
PHOSPHATE SERPL-MCNC: 3.5 MG/DL — SIGNIFICANT CHANGE UP (ref 2.5–4.5)
PLATELET # BLD AUTO: 183 K/UL — SIGNIFICANT CHANGE UP (ref 150–400)
PLATELET # BLD AUTO: 214 K/UL — SIGNIFICANT CHANGE UP (ref 150–400)
POTASSIUM SERPL-MCNC: 4.8 MMOL/L — SIGNIFICANT CHANGE UP (ref 3.5–5.3)
POTASSIUM SERPL-MCNC: 4.9 MMOL/L — SIGNIFICANT CHANGE UP (ref 3.5–5.3)
POTASSIUM SERPL-MCNC: 5.4 MMOL/L — HIGH (ref 3.5–5.3)
POTASSIUM SERPL-SCNC: 4.8 MMOL/L — SIGNIFICANT CHANGE UP (ref 3.5–5.3)
POTASSIUM SERPL-SCNC: 4.9 MMOL/L — SIGNIFICANT CHANGE UP (ref 3.5–5.3)
POTASSIUM SERPL-SCNC: 5.4 MMOL/L — HIGH (ref 3.5–5.3)
PROCALCITONIN SERPL-MCNC: 0.03 NG/ML — SIGNIFICANT CHANGE UP (ref 0.02–0.1)
PROT SERPL-MCNC: 6.8 G/DL — SIGNIFICANT CHANGE UP (ref 6–8.3)
PROT SERPL-MCNC: 7 G/DL — SIGNIFICANT CHANGE UP (ref 6–8.3)
PROT SERPL-MCNC: 7.1 G/DL — SIGNIFICANT CHANGE UP (ref 6–8.3)
RBC # BLD: 4.72 M/UL — SIGNIFICANT CHANGE UP (ref 4.2–5.8)
RBC # BLD: 4.81 M/UL — SIGNIFICANT CHANGE UP (ref 4.2–5.8)
RBC # FLD: 13.2 % — SIGNIFICANT CHANGE UP (ref 10.3–14.5)
RBC # FLD: 13.2 % — SIGNIFICANT CHANGE UP (ref 10.3–14.5)
SODIUM SERPL-SCNC: 137 MMOL/L — SIGNIFICANT CHANGE UP (ref 135–145)
SODIUM SERPL-SCNC: 137 MMOL/L — SIGNIFICANT CHANGE UP (ref 135–145)
SODIUM SERPL-SCNC: 139 MMOL/L — SIGNIFICANT CHANGE UP (ref 135–145)
WBC # BLD: 14.06 K/UL — HIGH (ref 3.8–10.5)
WBC # BLD: 14.12 K/UL — HIGH (ref 3.8–10.5)
WBC # FLD AUTO: 14.06 K/UL — HIGH (ref 3.8–10.5)
WBC # FLD AUTO: 14.12 K/UL — HIGH (ref 3.8–10.5)

## 2021-08-17 PROCEDURE — 99291 CRITICAL CARE FIRST HOUR: CPT

## 2021-08-17 RX ORDER — ACETAMINOPHEN 500 MG
1000 TABLET ORAL ONCE
Refills: 0 | Status: COMPLETED | OUTPATIENT
Start: 2021-08-17 | End: 2021-08-17

## 2021-08-17 RX ORDER — IBUPROFEN 200 MG
600 TABLET ORAL EVERY 6 HOURS
Refills: 0 | Status: DISCONTINUED | OUTPATIENT
Start: 2021-08-17 | End: 2021-08-20

## 2021-08-17 RX ORDER — ACETAMINOPHEN 500 MG
650 TABLET ORAL EVERY 6 HOURS
Refills: 0 | Status: DISCONTINUED | OUTPATIENT
Start: 2021-08-17 | End: 2021-08-21

## 2021-08-17 RX ORDER — SODIUM ZIRCONIUM CYCLOSILICATE 10 G/10G
10 POWDER, FOR SUSPENSION ORAL EVERY 8 HOURS
Refills: 0 | Status: DISCONTINUED | OUTPATIENT
Start: 2021-08-17 | End: 2021-08-17

## 2021-08-17 RX ORDER — CYCLOBENZAPRINE HYDROCHLORIDE 10 MG/1
5 TABLET, FILM COATED ORAL THREE TIMES A DAY
Refills: 0 | Status: DISCONTINUED | OUTPATIENT
Start: 2021-08-17 | End: 2021-08-19

## 2021-08-17 RX ADMIN — Medication 0.5 MILLIGRAM(S): at 05:14

## 2021-08-17 RX ADMIN — Medication 400 MILLIGRAM(S): at 23:43

## 2021-08-17 RX ADMIN — OXYCODONE HYDROCHLORIDE 10 MILLIGRAM(S): 5 TABLET ORAL at 02:49

## 2021-08-17 RX ADMIN — LIDOCAINE 1 PATCH: 4 CREAM TOPICAL at 04:32

## 2021-08-17 RX ADMIN — BUDESONIDE AND FORMOTEROL FUMARATE DIHYDRATE 2 PUFF(S): 160; 4.5 AEROSOL RESPIRATORY (INHALATION) at 06:36

## 2021-08-17 RX ADMIN — Medication 600 MILLIGRAM(S): at 16:41

## 2021-08-17 RX ADMIN — LIDOCAINE 1 PATCH: 4 CREAM TOPICAL at 06:04

## 2021-08-17 RX ADMIN — POLYETHYLENE GLYCOL 3350 17 GRAM(S): 17 POWDER, FOR SOLUTION ORAL at 11:09

## 2021-08-17 RX ADMIN — ENOXAPARIN SODIUM 90 MILLIGRAM(S): 100 INJECTION SUBCUTANEOUS at 17:00

## 2021-08-17 RX ADMIN — Medication 1 TABLET(S): at 11:09

## 2021-08-17 RX ADMIN — CYCLOBENZAPRINE HYDROCHLORIDE 5 MILLIGRAM(S): 10 TABLET, FILM COATED ORAL at 17:01

## 2021-08-17 RX ADMIN — Medication 600 MILLIGRAM(S): at 10:35

## 2021-08-17 RX ADMIN — BUDESONIDE AND FORMOTEROL FUMARATE DIHYDRATE 2 PUFF(S): 160; 4.5 AEROSOL RESPIRATORY (INHALATION) at 17:54

## 2021-08-17 RX ADMIN — OXYCODONE HYDROCHLORIDE 10 MILLIGRAM(S): 5 TABLET ORAL at 10:35

## 2021-08-17 RX ADMIN — LIDOCAINE 1 PATCH: 4 CREAM TOPICAL at 17:58

## 2021-08-17 RX ADMIN — LIDOCAINE 1 PATCH: 4 CREAM TOPICAL at 19:07

## 2021-08-17 RX ADMIN — PANTOPRAZOLE SODIUM 40 MILLIGRAM(S): 20 TABLET, DELAYED RELEASE ORAL at 11:09

## 2021-08-17 RX ADMIN — LIDOCAINE 1 PATCH: 4 CREAM TOPICAL at 05:22

## 2021-08-17 RX ADMIN — Medication 2000 UNIT(S): at 11:09

## 2021-08-17 RX ADMIN — Medication 600 MILLIGRAM(S): at 18:07

## 2021-08-17 RX ADMIN — Medication 1000 MILLIGRAM(S): at 03:04

## 2021-08-17 RX ADMIN — Medication 1200 MILLIGRAM(S): at 17:00

## 2021-08-17 RX ADMIN — LIDOCAINE 1 PATCH: 4 CREAM TOPICAL at 17:00

## 2021-08-17 RX ADMIN — Medication 200 MILLIGRAM(S): at 21:01

## 2021-08-17 RX ADMIN — Medication 200 MILLIGRAM(S): at 13:06

## 2021-08-17 RX ADMIN — ENOXAPARIN SODIUM 90 MILLIGRAM(S): 100 INJECTION SUBCUTANEOUS at 05:13

## 2021-08-17 RX ADMIN — Medication 0.5 MILLIGRAM(S): at 21:00

## 2021-08-17 RX ADMIN — OXYCODONE HYDROCHLORIDE 10 MILLIGRAM(S): 5 TABLET ORAL at 07:58

## 2021-08-17 RX ADMIN — CHLORHEXIDINE GLUCONATE 1 APPLICATION(S): 213 SOLUTION TOPICAL at 05:54

## 2021-08-17 RX ADMIN — Medication 0.5 MILLIGRAM(S): at 13:06

## 2021-08-17 RX ADMIN — Medication 600 MILLIGRAM(S): at 08:43

## 2021-08-17 RX ADMIN — OXYCODONE HYDROCHLORIDE 10 MILLIGRAM(S): 5 TABLET ORAL at 03:19

## 2021-08-17 RX ADMIN — CYCLOBENZAPRINE HYDROCHLORIDE 5 MILLIGRAM(S): 10 TABLET, FILM COATED ORAL at 22:56

## 2021-08-17 RX ADMIN — SENNA PLUS 2 TABLET(S): 8.6 TABLET ORAL at 21:00

## 2021-08-17 RX ADMIN — Medication 3 MILLIGRAM(S): at 21:00

## 2021-08-17 RX ADMIN — Medication 400 MILLIGRAM(S): at 02:49

## 2021-08-17 RX ADMIN — Medication 200 MILLIGRAM(S): at 05:14

## 2021-08-17 RX ADMIN — Medication 1200 MILLIGRAM(S): at 05:14

## 2021-08-17 NOTE — PHYSICAL THERAPY INITIAL EVALUATION ADULT - GENERAL OBSERVATIONS, REHAB EVAL
Pt received sitting in chair, +ICU monitoring, +HFNC 60L/80%, A&Ox4, follows commands, willing to participate in PT.

## 2021-08-17 NOTE — PROGRESS NOTE ADULT - SUBJECTIVE AND OBJECTIVE BOX
Follow-up Pulm Progress Note    OOB to chair  Sats mid 90s on HFNC 60L/80%     Medications:  MEDICATIONS  (STANDING):  benzonatate 200 milliGRAM(s) Oral every 8 hours  budesonide 160 MICROgram(s)/formoterol 4.5 MICROgram(s) Inhaler 2 Puff(s) Inhalation two times a day  chlorhexidine 4% Liquid 1 Application(s) Topical <User Schedule>  cholecalciferol 2000 Unit(s) Oral daily  clonazePAM  Tablet 0.5 milliGRAM(s) Oral every 8 hours  enoxaparin Injectable 90 milliGRAM(s) SubCutaneous every 12 hours  guaiFENesin ER 1200 milliGRAM(s) Oral every 12 hours  lidocaine   4% Patch 1 Patch Transdermal every 24 hours  lidocaine   4% Patch 1 Patch Transdermal every 24 hours  melatonin 3 milliGRAM(s) Oral at bedtime  multivitamin 1 Tablet(s) Oral daily  pantoprazole  Injectable 40 milliGRAM(s) IV Push daily  polyethylene glycol 3350 17 Gram(s) Oral daily  senna 2 Tablet(s) Oral at bedtime    MEDICATIONS  (PRN):  acetaminophen   Tablet .. 650 milliGRAM(s) Oral every 6 hours PRN Temp greater or equal to 38C (100.4F)  ibuprofen  Tablet. 600 milliGRAM(s) Oral every 6 hours PRN Mild Pain (1 - 3)  oxyCODONE    IR 5 milliGRAM(s) Oral every 4 hours PRN Moderate Pain (4 - 6)  oxyCODONE    IR 10 milliGRAM(s) Oral every 4 hours PRN Severe Pain (7 - 10)  sodium chloride 0.65% Nasal 1 Spray(s) Both Nostrils every 2 hours PRN Nasal Congestion          Vital Signs Last 24 Hrs  T(C): 36.2 (17 Aug 2021 00:00), Max: 36.2 (17 Aug 2021 00:00)  T(F): 97.1 (17 Aug 2021 00:00), Max: 97.1 (17 Aug 2021 00:00)  HR: 81 (17 Aug 2021 12:00) (56 - 95)  BP: 97/60 (17 Aug 2021 12:00) (88/62 - 121/77)  BP(mean): 74 (17 Aug 2021 12:00) (67 - 93)  RR: 26 (17 Aug 2021 12:00) (15 - 34)  SpO2: 95% (17 Aug 2021 12:00) (89% - 100%)          08-16 @ 07:01  -  08-17 @ 07:00  --------------------------------------------------------  IN: 700 mL / OUT: 800 mL / NET: -100 mL          LABS:                        13.3   14.12 )-----------( 183      ( 17 Aug 2021 03:15 )             43.3     08-17    137  |  99  |  17  ----------------------------<  98  4.8   |  27  |  0.83    Ca    9.2      17 Aug 2021 07:46  Phos  3.3     08-17  Mg     2.4     08-17    TPro  6.8  /  Alb  3.7  /  TBili  0.4  /  DBili  x   /  AST  32  /  ALT  42  /  AlkPhos  97  08-17            PT/INR - ( 16 Aug 2021 05:42 )   PT: 14.1 sec;   INR: 1.18 ratio         PTT - ( 16 Aug 2021 05:42 )  PTT:31.5 sec    Procalcitonin, Serum: 0.05 ng/mL (08-16-21 @ 05:42)            CULTURES:     Culture - Blood (collected 08-11-21 @ 03:10)  Source: .Blood Blood-Peripheral  Final Report (08-16-21 @ 04:01):    No Growth Final          Physical Examination:  PULM: Decreased at bases   CVS: RRR     RADIOLOGY REVIEWED  CXR 8/16: grossly unchanged diffuse b/l opacities

## 2021-08-17 NOTE — PROGRESS NOTE ADULT - ASSESSMENT
55yr old male with HTN, RLE DVT, and PE (not on home AC) presents with progressively worsening SOB, intermittent fevers, COVID positive, admitted for COVID PNA.  Course complciated by worsening respiratory failure requiring NIPPV and ICU admission.     NEUROLOGY:  - A&Ox4 at baseline; No Active Issues   - Klonopin 0.5 mg q8h for anxiety    PULMONARY:  Acute Respiratory Failure  - Continue HFNC 60L 100% alternate with BiPAP 15/10/80% Nocternal/PRN   - Titrate settings as tolerated to maintain SpO2>88%   - Encourage prone positioning and bed in chair position.     CARDIOLOGY:  - No Active Issues     GASTROINTESTINAL:  - Regular Diet w/ supp shakes while intermittently on HFNC  - Protonix 40mg QD while intermittently NPO on Bipap  - D5NS of Potassium at 75 mL/hour while NPO.   - Bowel regimen with Miralax and Senna BM. Last BM 8/12.     RENAL/:  - Strict I&Os   - Monitor and replete lytes daily    INFECTIOUS DISEASE:  COVID-19  - s/p completed courses of Remdesivir x 5 days and Decadron x 10 days   - s/p Toci on 7/30    HEMATOLOGY:  - Full AC Lovenox 90mg BID   - LE duplex neg for DVT on 8/4. History of RLE DVT & PE.   - Consider CTA chest when stabilized    ENDOCRINE:  - HbA1c 6.0    ETHICS/DISPOSITION:  - Full code   - Remain in ICU 55yr old male with HTN, RLE DVT, and PE (not on home AC) presents with progressively worsening SOB, intermittent fevers, COVID positive, admitted for COVID PNA.  Course complciated by worsening respiratory failure requiring NIPPV and ICU admission.     NEUROLOGY:  - A&Ox4 at baseline; No Active Issues   - Klonopin 0.5 mg q8h for anxiety    PULMONARY:  Acute Respiratory Failure  - Continue HFNC alternate with BiPAP Nocternal/PRN   - Titrate settings as tolerated to maintain SpO2>88%   - Encourage prone positioning and bed in chair position.     CARDIOLOGY:  - No Active Issues     GASTROINTESTINAL:  - Regular Diet w/ supp shakes while intermittently on HFNC  - Protonix 40mg QD while intermittently NPO on Bipap  - Bowel regimen with Miralax and Senna BM. Last BM 8/12.     RENAL/:  - Strict I&Os   - Monitor and replete lytes daily    INFECTIOUS DISEASE:  COVID-19  - s/p completed courses of Remdesivir x 5 days and Decadron x 10 days   - s/p Toci on 7/30    HEMATOLOGY:  - Full AC Lovenox 90mg BID   - LE duplex neg for DVT on 8/4. History of RLE DVT & PE.   - Consider CTA chest when stabilized    ENDOCRINE:  - HbA1c 6.0    ETHICS/DISPOSITION:  - Full code   - Remain in ICU

## 2021-08-17 NOTE — PHYSICAL THERAPY INITIAL EVALUATION ADULT - ADDITIONAL COMMENTS
pt lives at home with spouse and kids, +1 step to enter, 2 steps to kitchen, flight of stairs in home, PTa ind amb and ADLs, +dirves and works as .

## 2021-08-17 NOTE — PHYSICAL THERAPY INITIAL EVALUATION ADULT - DISCHARGE DISPOSITION, PT EVAL
DC working towards home with home PT services, assist from family for mobility/ADLs, recommend rolling walker

## 2021-08-17 NOTE — PROGRESS NOTE ADULT - SUBJECTIVE AND OBJECTIVE BOX
DATE OF SERVICE: 08-17-21 @ 11:48  CHIEF COMPLAINT:Patient is a 55y old  Male who presents with a chief complaint of covid (11 Aug 2021 08:04)    	        PAST MEDICAL & SURGICAL HISTORY:  Hyperlipidemia    HTN (hypertension)    Rupture, tendon, quadriceps    History of Achilles tendon repair            feels better  on h/f o2  no cp  no vomiting  tolerating po     Medications:  MEDICATIONS  (STANDING):  benzonatate 200 milliGRAM(s) Oral every 8 hours  budesonide 160 MICROgram(s)/formoterol 4.5 MICROgram(s) Inhaler 2 Puff(s) Inhalation two times a day  chlorhexidine 4% Liquid 1 Application(s) Topical <User Schedule>  cholecalciferol 2000 Unit(s) Oral daily  clonazePAM  Tablet 0.5 milliGRAM(s) Oral every 8 hours  enoxaparin Injectable 90 milliGRAM(s) SubCutaneous every 12 hours  guaiFENesin ER 1200 milliGRAM(s) Oral every 12 hours  lidocaine   4% Patch 1 Patch Transdermal every 24 hours  lidocaine   4% Patch 1 Patch Transdermal every 24 hours  melatonin 3 milliGRAM(s) Oral at bedtime  multivitamin 1 Tablet(s) Oral daily  pantoprazole  Injectable 40 milliGRAM(s) IV Push daily  polyethylene glycol 3350 17 Gram(s) Oral daily  senna 2 Tablet(s) Oral at bedtime    MEDICATIONS  (PRN):  acetaminophen   Tablet .. 650 milliGRAM(s) Oral every 6 hours PRN Temp greater or equal to 38C (100.4F)  ibuprofen  Tablet. 600 milliGRAM(s) Oral every 6 hours PRN Mild Pain (1 - 3)  oxyCODONE    IR 5 milliGRAM(s) Oral every 4 hours PRN Moderate Pain (4 - 6)  oxyCODONE    IR 10 milliGRAM(s) Oral every 4 hours PRN Severe Pain (7 - 10)  sodium chloride 0.65% Nasal 1 Spray(s) Both Nostrils every 2 hours PRN Nasal Congestion    	    PHYSICAL EXAM:  T(C): 36.2 (08-17-21 @ 00:00), Max: 36.2 (08-16-21 @ 12:00)  HR: 95 (08-17-21 @ 11:00) (56 - 95)  BP: 121/77 (08-17-21 @ 11:00) (88/62 - 121/77)  RR: 27 (08-17-21 @ 11:00) (15 - 38)  SpO2: 98% (08-17-21 @ 11:00) (88% - 100%)  Wt(kg): --  I&O's Summary    16 Aug 2021 07:01  -  17 Aug 2021 07:00  --------------------------------------------------------  IN: 700 mL / OUT: 800 mL / NET: -100 mL    17 Aug 2021 07:01  -  17 Aug 2021 11:48  --------------------------------------------------------  IN: 0 mL / OUT: 200 mL / NET: -200 mL        Appearance: Normal	  HEENT:   Normal oral mucosa, PERRL, EOMI	  Lymphatic: No lymphadenopathy  Cardiovascular: Normal S1 S2, No JVD, No murmurs, No edema  Respiratory:dec bs 	  Psychiatry: A & O   Gastrointestinal:  Soft, Non-tender, + BS	  Skin: No rashes, No ecchymoses, No cyanosis	  Neurologic: Non-focal  Extremities: Normal range of motion, No clubbing, cyanosis or edema  Vascular: Peripheral pulses palpable 2+ bilaterally    TELEMETRY: 	    ECG:  	  RADIOLOGY:  OTHER: 	  	  LABS:	 	    CARDIAC MARKERS:                                13.3   14.12 )-----------( 183      ( 17 Aug 2021 03:15 )             43.3     08-17    137  |  99  |  17  ----------------------------<  98  4.8   |  27  |  0.83    Ca    9.2      17 Aug 2021 07:46  Phos  3.3     08-17  Mg     2.4     08-17    TPro  6.8  /  Alb  3.7  /  TBili  0.4  /  DBili  x   /  AST  32  /  ALT  42  /  AlkPhos  97  08-17    proBNP:   Lipid Profile:   HgA1c:   TSH:

## 2021-08-17 NOTE — PHYSICAL THERAPY INITIAL EVALUATION ADULT - PERTINENT HX OF CURRENT PROBLEM, REHAB EVAL
54yo M with Hx of DVT s/p achilles tendon repair years ago not on AC presenting with complaints of SOB. intermittent and fevers with cough productive of white sputum for past week, tested positive for covid 2 days ago.

## 2021-08-17 NOTE — PROGRESS NOTE ADULT - PROBLEM SELECTOR PLAN 2
2nd to COVID PNA  -S/p RRT 8/3 and 8/4 for hypoxia  -Tx to 5ICU 8/10 for further management  -Continues to require Bipap 15/10/80% qhs & PRN  -HFNC while not on bipap (currently 60L/80%), keep sats >88%   -Prognosis guarded  -Management per ICU team

## 2021-08-17 NOTE — PROGRESS NOTE ADULT - SUBJECTIVE AND OBJECTIVE BOX
CHIEF COMPLAINT:  Patient is a 55y old  Male who presents with a chief complaint of covid (11 Aug 2021 08:04)    HPI:  54yo M with Hx of DVT s/p achilles tendon repair years ago not on AC presenting with complaints of SOB. intermittent and fevers with cough productive of white sputum for past week, tested positive for covid 2 days ago.  pt is unvaccinated   progressively worsening SOB over the past 5 days, worse with ambulation. found to be hypoxic on arrival to the  er    (29 Jul 2021 10:31)      Interval Events:      REVIEW OF SYSTEMS:          OBJECTIVE:  ICU Vital Signs Last 24 Hrs  T(C): 36.2 (17 Aug 2021 00:00), Max: 36.2 (16 Aug 2021 12:00)  T(F): 97.1 (17 Aug 2021 00:00), Max: 97.1 (16 Aug 2021 12:00)  HR: 82 (17 Aug 2021 08:00) (56 - 91)  BP: 106/76 (17 Aug 2021 08:00) (90/58 - 109/67)  BP(mean): 87 (17 Aug 2021 08:00) (63 - 87)  ABP: --  ABP(mean): --  RR: 25 (17 Aug 2021 08:00) (15 - 38)  SpO2: 95% (17 Aug 2021 08:00) (88% - 100%)        08-16-21 @ 07:01  -  08-17-21 @ 07:00  --------------------------------------------------------  IN: 700 mL / OUT: 800 mL / NET: -100 mL      CAPILLARY BLOOD GLUCOSE              PHYSICAL EXAM:          HOSPITAL MEDICATIONS:  MEDICATIONS  (STANDING):  benzonatate 200 milliGRAM(s) Oral every 8 hours  budesonide 160 MICROgram(s)/formoterol 4.5 MICROgram(s) Inhaler 2 Puff(s) Inhalation two times a day  chlorhexidine 4% Liquid 1 Application(s) Topical <User Schedule>  cholecalciferol 2000 Unit(s) Oral daily  clonazePAM  Tablet 0.5 milliGRAM(s) Oral every 8 hours  enoxaparin Injectable 90 milliGRAM(s) SubCutaneous every 12 hours  guaiFENesin ER 1200 milliGRAM(s) Oral every 12 hours  lidocaine   4% Patch 1 Patch Transdermal every 24 hours  lidocaine   4% Patch 1 Patch Transdermal every 24 hours  melatonin 3 milliGRAM(s) Oral at bedtime  multivitamin 1 Tablet(s) Oral daily  pantoprazole  Injectable 40 milliGRAM(s) IV Push daily  polyethylene glycol 3350 17 Gram(s) Oral daily  senna 2 Tablet(s) Oral at bedtime  sodium zirconium cyclosilicate 10 Gram(s) Oral every 8 hours    MEDICATIONS  (PRN):  acetaminophen   Tablet .. 650 milliGRAM(s) Oral every 6 hours PRN Temp greater or equal to 38C (100.4F), Mild Pain (1 - 3)  oxyCODONE    IR 5 milliGRAM(s) Oral every 4 hours PRN Moderate Pain (4 - 6)  oxyCODONE    IR 10 milliGRAM(s) Oral every 4 hours PRN Severe Pain (7 - 10)  sodium chloride 0.65% Nasal 1 Spray(s) Both Nostrils every 2 hours PRN Nasal Congestion      LABS:                        13.3   14.12 )-----------( 183      ( 17 Aug 2021 03:15 )             43.3     Hgb Trend: 13.3<--, 12.4<--, 12.3<--, 11.9<--, 12.9<--  08-17    137  |  101  |  16  ----------------------------<  106<H>  5.4<H>   |  26  |  0.81    Ca    9.2      17 Aug 2021 03:15  Phos  3.3     08-17  Mg     2.4     08-17    TPro  7.0  /  Alb  3.8  /  TBili  0.3  /  DBili  x   /  AST  35  /  ALT  45  /  AlkPhos  102  08-17    LIVER FUNCTIONS - ( 17 Aug 2021 03:15 )  Alb: 3.8 g/dL / Pro: 7.0 g/dL / ALK PHOS: 102 U/L / ALT: 45 U/L / AST: 35 U/L / GGT: x           Creatinine Trend: 0.81<--, 0.82<--, 0.81<--, 0.83<--, 0.69<--, 0.68<--  PT/INR - ( 16 Aug 2021 05:42 )   PT: 14.1 sec;   INR: 1.18 ratio         PTT - ( 16 Aug 2021 05:42 )  PTT:31.5 sec          MICROBIOLOGY: Reviewed      RADIOLOGY: Reviewed and interpreted by me    EKG:   CHIEF COMPLAINT:  Patient is a 55y old  Male who presents with a chief complaint of covid (11 Aug 2021 08:04)    HPI:  56yo M with Hx of DVT s/p achilles tendon repair years ago not on AC presenting with complaints of SOB. intermittent and fevers with cough productive of white sputum for past week, tested positive for covid 2 days ago.  pt is unvaccinated   progressively worsening SOB over the past 5 days, worse with ambulation. found to be hypoxic on arrival to the  er    (29 Jul 2021 10:31)      Interval Events:  -no overnight events    OBJECTIVE:  ICU Vital Signs Last 24 Hrs  T(C): 36.2 (17 Aug 2021 00:00), Max: 36.2 (16 Aug 2021 12:00)  T(F): 97.1 (17 Aug 2021 00:00), Max: 97.1 (16 Aug 2021 12:00)  HR: 82 (17 Aug 2021 08:00) (56 - 91)  BP: 106/76 (17 Aug 2021 08:00) (90/58 - 109/67)  BP(mean): 87 (17 Aug 2021 08:00) (63 - 87)  ABP: --  ABP(mean): --  RR: 25 (17 Aug 2021 08:00) (15 - 38)  SpO2: 95% (17 Aug 2021 08:00) (88% - 100%)        08-16-21 @ 07:01  -  08-17-21 @ 07:00  --------------------------------------------------------  IN: 700 mL / OUT: 800 mL / NET: -100 mL      CAPILLARY BLOOD GLUCOSE      PHYSICAL EXAM:  GENERAL: NAD  HEENT:  Atraumatic, Normocephalic  EYES: PERRL, conjunctiva and sclera clear  NECK: Supple, No JVD  CHEST/LUNG: diminished bilaterally; No wheezes, rales, or rhonchi; nonproductive cough  HEART: Regular rate and rhythm; No murmurs, rubs, or gallops  ABDOMEN: Soft, Nontender, Nondistended; Bowel sounds present  EXTREMITIES:  2+ Peripheral Pulses, No clubbing, cyanosis, or edema  PSYCH: calm with occasional periods of anxiety  NEUROLOGY: A&Ox4  SKIN: No rashes or lesions    HOSPITAL MEDICATIONS:  MEDICATIONS  (STANDING):  benzonatate 200 milliGRAM(s) Oral every 8 hours  budesonide 160 MICROgram(s)/formoterol 4.5 MICROgram(s) Inhaler 2 Puff(s) Inhalation two times a day  chlorhexidine 4% Liquid 1 Application(s) Topical <User Schedule>  cholecalciferol 2000 Unit(s) Oral daily  clonazePAM  Tablet 0.5 milliGRAM(s) Oral every 8 hours  enoxaparin Injectable 90 milliGRAM(s) SubCutaneous every 12 hours  guaiFENesin ER 1200 milliGRAM(s) Oral every 12 hours  lidocaine   4% Patch 1 Patch Transdermal every 24 hours  lidocaine   4% Patch 1 Patch Transdermal every 24 hours  melatonin 3 milliGRAM(s) Oral at bedtime  multivitamin 1 Tablet(s) Oral daily  pantoprazole  Injectable 40 milliGRAM(s) IV Push daily  polyethylene glycol 3350 17 Gram(s) Oral daily  senna 2 Tablet(s) Oral at bedtime  sodium zirconium cyclosilicate 10 Gram(s) Oral every 8 hours    MEDICATIONS  (PRN):  acetaminophen   Tablet .. 650 milliGRAM(s) Oral every 6 hours PRN Temp greater or equal to 38C (100.4F), Mild Pain (1 - 3)  oxyCODONE    IR 5 milliGRAM(s) Oral every 4 hours PRN Moderate Pain (4 - 6)  oxyCODONE    IR 10 milliGRAM(s) Oral every 4 hours PRN Severe Pain (7 - 10)  sodium chloride 0.65% Nasal 1 Spray(s) Both Nostrils every 2 hours PRN Nasal Congestion      LABS:                        13.3   14.12 )-----------( 183      ( 17 Aug 2021 03:15 )             43.3     Hgb Trend: 13.3<--, 12.4<--, 12.3<--, 11.9<--, 12.9<--  08-17    137  |  101  |  16  ----------------------------<  106<H>  5.4<H>   |  26  |  0.81    Ca    9.2      17 Aug 2021 03:15  Phos  3.3     08-17  Mg     2.4     08-17    TPro  7.0  /  Alb  3.8  /  TBili  0.3  /  DBili  x   /  AST  35  /  ALT  45  /  AlkPhos  102  08-17    LIVER FUNCTIONS - ( 17 Aug 2021 03:15 )  Alb: 3.8 g/dL / Pro: 7.0 g/dL / ALK PHOS: 102 U/L / ALT: 45 U/L / AST: 35 U/L / GGT: x           Creatinine Trend: 0.81<--, 0.82<--, 0.81<--, 0.83<--, 0.69<--, 0.68<--  PT/INR - ( 16 Aug 2021 05:42 )   PT: 14.1 sec;   INR: 1.18 ratio    PTT - ( 16 Aug 2021 05:42 )  PTT:31.5 sec    MICROBIOLOGY: Reviewed    RADIOLOGY: Reviewed and interpreted by me    EKG: david

## 2021-08-18 LAB
ALBUMIN SERPL ELPH-MCNC: 3.6 G/DL — SIGNIFICANT CHANGE UP (ref 3.3–5)
ALP SERPL-CCNC: 96 U/L — SIGNIFICANT CHANGE UP (ref 40–120)
ALT FLD-CCNC: 41 U/L — SIGNIFICANT CHANGE UP (ref 10–45)
ANION GAP SERPL CALC-SCNC: 13 MMOL/L — SIGNIFICANT CHANGE UP (ref 5–17)
APTT BLD: 32.2 SEC — SIGNIFICANT CHANGE UP (ref 27.5–35.5)
AST SERPL-CCNC: 29 U/L — SIGNIFICANT CHANGE UP (ref 10–40)
BILIRUB SERPL-MCNC: 0.4 MG/DL — SIGNIFICANT CHANGE UP (ref 0.2–1.2)
BUN SERPL-MCNC: 17 MG/DL — SIGNIFICANT CHANGE UP (ref 7–23)
CALCIUM SERPL-MCNC: 9.6 MG/DL — SIGNIFICANT CHANGE UP (ref 8.4–10.5)
CHLORIDE SERPL-SCNC: 98 MMOL/L — SIGNIFICANT CHANGE UP (ref 96–108)
CO2 SERPL-SCNC: 26 MMOL/L — SIGNIFICANT CHANGE UP (ref 22–31)
CREAT SERPL-MCNC: 0.73 MG/DL — SIGNIFICANT CHANGE UP (ref 0.5–1.3)
CRP SERPL-MCNC: 20 MG/L — HIGH (ref 0–4)
D DIMER BLD IA.RAPID-MCNC: 890 NG/ML DDU — HIGH
FERRITIN SERPL-MCNC: 643 NG/ML — HIGH (ref 30–400)
GAS PNL BLDA: SIGNIFICANT CHANGE UP
GLUCOSE SERPL-MCNC: 110 MG/DL — HIGH (ref 70–99)
HCT VFR BLD CALC: 40.9 % — SIGNIFICANT CHANGE UP (ref 39–50)
HGB BLD-MCNC: 12.5 G/DL — LOW (ref 13–17)
INR BLD: 1.21 RATIO — HIGH (ref 0.88–1.16)
LDH SERPL L TO P-CCNC: 705 U/L — HIGH (ref 50–242)
MAGNESIUM SERPL-MCNC: 2.2 MG/DL — SIGNIFICANT CHANGE UP (ref 1.6–2.6)
MCHC RBC-ENTMCNC: 27.5 PG — SIGNIFICANT CHANGE UP (ref 27–34)
MCHC RBC-ENTMCNC: 30.6 GM/DL — LOW (ref 32–36)
MCV RBC AUTO: 90.1 FL — SIGNIFICANT CHANGE UP (ref 80–100)
NRBC # BLD: 0 /100 WBCS — SIGNIFICANT CHANGE UP (ref 0–0)
PHOSPHATE SERPL-MCNC: 3.4 MG/DL — SIGNIFICANT CHANGE UP (ref 2.5–4.5)
PLATELET # BLD AUTO: 198 K/UL — SIGNIFICANT CHANGE UP (ref 150–400)
POTASSIUM SERPL-MCNC: 4.8 MMOL/L — SIGNIFICANT CHANGE UP (ref 3.5–5.3)
POTASSIUM SERPL-SCNC: 4.8 MMOL/L — SIGNIFICANT CHANGE UP (ref 3.5–5.3)
PROCALCITONIN SERPL-MCNC: 0.04 NG/ML — SIGNIFICANT CHANGE UP (ref 0.02–0.1)
PROT SERPL-MCNC: 6.9 G/DL — SIGNIFICANT CHANGE UP (ref 6–8.3)
PROTHROM AB SERPL-ACNC: 14.4 SEC — HIGH (ref 10.6–13.6)
RBC # BLD: 4.54 M/UL — SIGNIFICANT CHANGE UP (ref 4.2–5.8)
RBC # FLD: 13.2 % — SIGNIFICANT CHANGE UP (ref 10.3–14.5)
SODIUM SERPL-SCNC: 137 MMOL/L — SIGNIFICANT CHANGE UP (ref 135–145)
WBC # BLD: 13.65 K/UL — HIGH (ref 3.8–10.5)
WBC # FLD AUTO: 13.65 K/UL — HIGH (ref 3.8–10.5)

## 2021-08-18 PROCEDURE — 99291 CRITICAL CARE FIRST HOUR: CPT

## 2021-08-18 RX ORDER — BACLOFEN 100 %
10 POWDER (GRAM) MISCELLANEOUS EVERY 8 HOURS
Refills: 0 | Status: COMPLETED | OUTPATIENT
Start: 2021-08-18 | End: 2021-08-19

## 2021-08-18 RX ORDER — MAGNESIUM SULFATE 500 MG/ML
1 VIAL (ML) INJECTION ONCE
Refills: 0 | Status: COMPLETED | OUTPATIENT
Start: 2021-08-18 | End: 2021-08-18

## 2021-08-18 RX ADMIN — OXYCODONE HYDROCHLORIDE 10 MILLIGRAM(S): 5 TABLET ORAL at 21:21

## 2021-08-18 RX ADMIN — OXYCODONE HYDROCHLORIDE 10 MILLIGRAM(S): 5 TABLET ORAL at 00:52

## 2021-08-18 RX ADMIN — LIDOCAINE 1 PATCH: 4 CREAM TOPICAL at 17:01

## 2021-08-18 RX ADMIN — Medication 100 GRAM(S): at 01:55

## 2021-08-18 RX ADMIN — Medication 0.5 MILLIGRAM(S): at 21:36

## 2021-08-18 RX ADMIN — CYCLOBENZAPRINE HYDROCHLORIDE 5 MILLIGRAM(S): 10 TABLET, FILM COATED ORAL at 17:10

## 2021-08-18 RX ADMIN — Medication 600 MILLIGRAM(S): at 08:25

## 2021-08-18 RX ADMIN — POLYETHYLENE GLYCOL 3350 17 GRAM(S): 17 POWDER, FOR SOLUTION ORAL at 11:21

## 2021-08-18 RX ADMIN — BUDESONIDE AND FORMOTEROL FUMARATE DIHYDRATE 2 PUFF(S): 160; 4.5 AEROSOL RESPIRATORY (INHALATION) at 06:26

## 2021-08-18 RX ADMIN — Medication 1200 MILLIGRAM(S): at 17:01

## 2021-08-18 RX ADMIN — LIDOCAINE 1 PATCH: 4 CREAM TOPICAL at 17:40

## 2021-08-18 RX ADMIN — Medication 10 MILLIGRAM(S): at 21:36

## 2021-08-18 RX ADMIN — ENOXAPARIN SODIUM 90 MILLIGRAM(S): 100 INJECTION SUBCUTANEOUS at 06:15

## 2021-08-18 RX ADMIN — SENNA PLUS 2 TABLET(S): 8.6 TABLET ORAL at 21:36

## 2021-08-18 RX ADMIN — CHLORHEXIDINE GLUCONATE 1 APPLICATION(S): 213 SOLUTION TOPICAL at 06:33

## 2021-08-18 RX ADMIN — Medication 1000 MILLIGRAM(S): at 00:15

## 2021-08-18 RX ADMIN — Medication 200 MILLIGRAM(S): at 13:06

## 2021-08-18 RX ADMIN — Medication 1 TABLET(S): at 11:22

## 2021-08-18 RX ADMIN — OXYCODONE HYDROCHLORIDE 10 MILLIGRAM(S): 5 TABLET ORAL at 08:25

## 2021-08-18 RX ADMIN — Medication 10 MILLIGRAM(S): at 01:55

## 2021-08-18 RX ADMIN — ENOXAPARIN SODIUM 90 MILLIGRAM(S): 100 INJECTION SUBCUTANEOUS at 17:01

## 2021-08-18 RX ADMIN — Medication 0.5 MILLIGRAM(S): at 06:16

## 2021-08-18 RX ADMIN — Medication 10 MILLIGRAM(S): at 13:28

## 2021-08-18 RX ADMIN — LIDOCAINE 1 PATCH: 4 CREAM TOPICAL at 06:00

## 2021-08-18 RX ADMIN — Medication 0.5 MILLIGRAM(S): at 13:06

## 2021-08-18 RX ADMIN — Medication 200 MILLIGRAM(S): at 06:16

## 2021-08-18 RX ADMIN — CYCLOBENZAPRINE HYDROCHLORIDE 5 MILLIGRAM(S): 10 TABLET, FILM COATED ORAL at 06:26

## 2021-08-18 RX ADMIN — BUDESONIDE AND FORMOTEROL FUMARATE DIHYDRATE 2 PUFF(S): 160; 4.5 AEROSOL RESPIRATORY (INHALATION) at 18:05

## 2021-08-18 RX ADMIN — PANTOPRAZOLE SODIUM 40 MILLIGRAM(S): 20 TABLET, DELAYED RELEASE ORAL at 11:22

## 2021-08-18 RX ADMIN — OXYCODONE HYDROCHLORIDE 10 MILLIGRAM(S): 5 TABLET ORAL at 20:51

## 2021-08-18 RX ADMIN — Medication 2000 UNIT(S): at 11:21

## 2021-08-18 RX ADMIN — Medication 1200 MILLIGRAM(S): at 06:16

## 2021-08-18 RX ADMIN — LIDOCAINE 1 PATCH: 4 CREAM TOPICAL at 06:15

## 2021-08-18 RX ADMIN — LIDOCAINE 1 PATCH: 4 CREAM TOPICAL at 10:38

## 2021-08-18 RX ADMIN — OXYCODONE HYDROCHLORIDE 10 MILLIGRAM(S): 5 TABLET ORAL at 09:37

## 2021-08-18 RX ADMIN — Medication 3 MILLIGRAM(S): at 21:36

## 2021-08-18 RX ADMIN — Medication 600 MILLIGRAM(S): at 09:37

## 2021-08-18 RX ADMIN — Medication 200 MILLIGRAM(S): at 21:42

## 2021-08-18 RX ADMIN — OXYCODONE HYDROCHLORIDE 10 MILLIGRAM(S): 5 TABLET ORAL at 01:22

## 2021-08-18 NOTE — PROGRESS NOTE ADULT - ASSESSMENT
55yr old male with HTN, RLE DVT, and PE (not on home AC) presents with progressively worsening SOB, intermittent fevers, COVID positive, admitted for COVID PNA.  Course complciated by worsening respiratory failure requiring NIPPV and ICU admission.     NEUROLOGY:  - A&Ox4 at baseline; No Active Issues   - Klonopin 0.5 mg q8h for anxiety    PULMONARY:  Acute Respiratory Failure  - Continue HFNC alternate with BiPAP Nocternal/PRN   - Titrate settings as tolerated to maintain SpO2>88%   - Encourage prone positioning and bed in chair position.     CARDIOLOGY:  - No Active Issues     GASTROINTESTINAL:  - Regular Diet w/ supp shakes while intermittently on HFNC  - Protonix 40mg QD while intermittently NPO on Bipap  - Bowel regimen with Miralax and Senna BM. Last BM 8/12.     RENAL/:  - Strict I&Os   - Monitor and replete lytes daily    INFECTIOUS DISEASE:  COVID-19  - s/p completed courses of Remdesivir x 5 days and Decadron x 10 days   - s/p Toci on 7/30    HEMATOLOGY:  - Full AC Lovenox 90mg BID   - LE duplex neg for DVT on 8/4. History of RLE DVT & PE.   - Consider CTA chest when stabilized    ENDOCRINE:  - HbA1c 6.0    ETHICS/DISPOSITION:  - Full code   - Remain in ICU

## 2021-08-18 NOTE — CHART NOTE - NSCHARTNOTEFT_GEN_A_CORE
Nutrition Follow Up Note  Patient seen for: Malnutrition Follow up on 5ICU    Chart reviewed, events noted. 56 y/o male with COVID-19 infection. Pt continues on HFNC and BiPap as needed.    Source:    [x] EMR        [x] RN      Diet Order:   Diet, Regular:   No Beef  No Dairy  No Egg (21)  Oral Nutrition Supplement: Orgain 2x/day    - Is current order appropriate/adequate? [x] Yes     - PO intake:    [x] 50-75%  Fair           - Nutrition-related concerns: Upon RD visit, pt currently on phone and not wanting to speak with RD at this time. Per discussion with nurse, pt's appetite has been slightly improving. Reports pt does better with liquids and has been drinking the Orgain shakes daily; will try to eat some of the solid foods. No nausea, vomiting, or diarrhea at this time. Last bowel movement ? per flow sheets. Pt ordered for bowel regimen at this time.    Weights:   Daily Weight in k.6 (08-15); daily weight noted, will continue to monitor trends. Mostly consistent with dosing weight at this time    Nutritionally Pertinent MEDICATIONS  (STANDING):  cholecalciferol  multivitamin  pantoprazole  Injectable  polyethylene glycol 3350  senna    Pertinent Labs:  @ 00:21: Na 137, BUN 17, Cr 0.73, <H>, K+ 4.8, Phos 3.4, Mg 2.2, Alk Phos 96, ALT/SGPT 41, AST/SGOT 29, HbA1c --   @ 16:47: Na 139, BUN 19, Cr 0.83, BG 89, K+ 4.9, Phos 3.5, Mg 2.4, Alk Phos 101, ALT/SGPT 43, AST/SGOT 35, HbA1c --    A1C with Estimated Average Glucose Result: 6.0 % (08-10-21 @ 09:48)  A1C with Estimated Average Glucose Result: 6.3 % (21 @ 14:51)    Finger Sticks: none at this time    Skin per nursing documentation: no pressure injuries per flow sheets  Edema: none noted per flow sheets    Estimated Needs:   [x] no change since previous assessment    Previous Nutrition Diagnosis: Severe, acute malnutrition, Obesity  Nutrition Diagnosis is: [x] ongoing, both ongoing, being addressed with oral nutrition supplementation at this time, food preferences     New Nutrition Diagnosis: [x] Not applicable    Nutrition Care Plan:  [x] In Progress    Nutrition Recommendations:      1. Continue current diet as tolerated; consider adding additional Orgain supplement daily to 3x/day  2. Continue with multivitamin + Vitamin D supplementation daily as appropriate  3. Honor pt food preferences to promote adequate oral intake while in-house     Monitoring and Evaluation:   Continue to monitor nutritional intake, tolerance to diet prescription, weights, labs, skin integrity    RD remains available upon request and will follow up per protocol  Nisha Yusuf MS, RD, CDN, [facility] pgr #[phone]

## 2021-08-18 NOTE — PROGRESS NOTE ADULT - SUBJECTIVE AND OBJECTIVE BOX
DATE OF SERVICE: 08-18-21 @ 10:44  CHIEF COMPLAINT:Patient is a 55y old  Male who presents with a chief complaint of covid (11 Aug 2021 08:04)    	        PAST MEDICAL & SURGICAL HISTORY:  Hyperlipidemia    HTN (hypertension)    Rupture, tendon, quadriceps    History of Achilles tendon repair            feels better  RESPIRATORY: breathing better  CARDIOVASCULAR: No chest pain, palpitations, passing out, dizziness, or leg swelling  GASTROINTESTINAL: No abdominal or epigastric pain. No nausea, vomiting, or hematemesis; No diarrhea or constipation. No melena or hematochezia.  GENITOURINARY: No dysuria, frequency, hematuria, or incontinence  NEUROLOGICAL: No headaches,     Medications:  MEDICATIONS  (STANDING):  benzonatate 200 milliGRAM(s) Oral every 8 hours  budesonide 160 MICROgram(s)/formoterol 4.5 MICROgram(s) Inhaler 2 Puff(s) Inhalation two times a day  chlorhexidine 4% Liquid 1 Application(s) Topical <User Schedule>  cholecalciferol 2000 Unit(s) Oral daily  clonazePAM  Tablet 0.5 milliGRAM(s) Oral every 8 hours  enoxaparin Injectable 90 milliGRAM(s) SubCutaneous every 12 hours  guaiFENesin ER 1200 milliGRAM(s) Oral every 12 hours  lidocaine   4% Patch 1 Patch Transdermal every 24 hours  lidocaine   4% Patch 1 Patch Transdermal every 24 hours  melatonin 3 milliGRAM(s) Oral at bedtime  multivitamin 1 Tablet(s) Oral daily  pantoprazole  Injectable 40 milliGRAM(s) IV Push daily  polyethylene glycol 3350 17 Gram(s) Oral daily  senna 2 Tablet(s) Oral at bedtime    MEDICATIONS  (PRN):  acetaminophen   Tablet .. 650 milliGRAM(s) Oral every 6 hours PRN Temp greater or equal to 38C (100.4F)  baclofen 10 milliGRAM(s) Oral every 8 hours PRN Muscle Spasm  cyclobenzaprine 5 milliGRAM(s) Oral three times a day PRN Muscle Spasm  ibuprofen  Tablet. 600 milliGRAM(s) Oral every 6 hours PRN Mild Pain (1 - 3)  oxyCODONE    IR 5 milliGRAM(s) Oral every 4 hours PRN Moderate Pain (4 - 6)  oxyCODONE    IR 10 milliGRAM(s) Oral every 4 hours PRN Severe Pain (7 - 10)  sodium chloride 0.65% Nasal 1 Spray(s) Both Nostrils every 2 hours PRN Nasal Congestion    	    PHYSICAL EXAM:  T(C): 36.9 (08-18-21 @ 04:00), Max: 37.5 (08-17-21 @ 13:00)  HR: 79 (08-18-21 @ 10:00) (71 - 109)  BP: 88/54 (08-18-21 @ 10:00) (85/54 - 121/77)  RR: 17 (08-18-21 @ 10:00) (17 - 31)  SpO2: 96% (08-18-21 @ 10:00) (88% - 99%)  Wt(kg): --  I&O's Summary    17 Aug 2021 07:01  -  18 Aug 2021 07:00  --------------------------------------------------------  IN: 200 mL / OUT: 750 mL / NET: -550 mL    18 Aug 2021 07:01  -  18 Aug 2021 10:44  --------------------------------------------------------  IN: 0 mL / OUT: 250 mL / NET: -250 mL        Appearance: Normal	  HEENT:   Normal oral mucosa, PERRL, EOMI	  Lymphatic: No lymphadenopathy  Cardiovascular: Normal S1 S2, No JVD, No murmurs, No edema  Respiratory: dec bs   Psychiatry: A & O x 3, Mood & affect appropriate  Gastrointestinal:  Soft, Non-tender, + BS	  Skin: No rashes, No ecchymoses, No cyanosis	  Neurologic: Non-focal  Extremities: Normal range of motion, No clubbing, cyanosis or edema  Vascular: Peripheral pulses palpable 2+ bilaterally    TELEMETRY: 	    ECG:  	  RADIOLOGY:  OTHER: 	  	  LABS:	 	    CARDIAC MARKERS:                                12.5   13.65 )-----------( 198      ( 18 Aug 2021 00:21 )             40.9     08-18    137  |  98  |  17  ----------------------------<  110<H>  4.8   |  26  |  0.73    Ca    9.6      18 Aug 2021 00:21  Phos  3.4     08-18  Mg     2.2     08-18    TPro  6.9  /  Alb  3.6  /  TBili  0.4  /  DBili  x   /  AST  29  /  ALT  41  /  AlkPhos  96  08-18    proBNP:   Lipid Profile:   HgA1c:   TSH:

## 2021-08-18 NOTE — PROGRESS NOTE ADULT - SUBJECTIVE AND OBJECTIVE BOX
CHIEF COMPLAINT:  Patient is a 55y old  Male who presents with a chief complaint of covid (11 Aug 2021 08:04)    HPI:  56yo M with Hx of DVT s/p achilles tendon repair years ago not on AC presenting with complaints of SOB. intermittent and fevers with cough productive of white sputum for past week, tested positive for covid 2 days ago.  pt is unvaccinated   progressively worsening SOB over the past 5 days, worse with ambulation. found to be hypoxic on arrival to the  er    (29 Jul 2021 10:31)      Interval Events:      REVIEW OF SYSTEMS:          OBJECTIVE:  ICU Vital Signs Last 24 Hrs  T(C): 36.9 (18 Aug 2021 04:00), Max: 37.5 (17 Aug 2021 13:00)  T(F): 98.5 (18 Aug 2021 04:00), Max: 99.5 (17 Aug 2021 13:00)  HR: 83 (18 Aug 2021 06:26) (71 - 99)  BP: 94/58 (18 Aug 2021 06:00) (85/54 - 121/77)  BP(mean): 69 (18 Aug 2021 06:00) (64 - 93)  ABP: --  ABP(mean): --  RR: 25 (18 Aug 2021 06:00) (18 - 31)  SpO2: 94% (18 Aug 2021 06:26) (88% - 99%)        08-17 @ 07:01  -  08-18 @ 07:00  --------------------------------------------------------  IN: 200 mL / OUT: 750 mL / NET: -550 mL      CAPILLARY BLOOD GLUCOSE              PHYSICAL EXAM:          HOSPITAL MEDICATIONS:  MEDICATIONS  (STANDING):  benzonatate 200 milliGRAM(s) Oral every 8 hours  budesonide 160 MICROgram(s)/formoterol 4.5 MICROgram(s) Inhaler 2 Puff(s) Inhalation two times a day  chlorhexidine 4% Liquid 1 Application(s) Topical <User Schedule>  cholecalciferol 2000 Unit(s) Oral daily  clonazePAM  Tablet 0.5 milliGRAM(s) Oral every 8 hours  enoxaparin Injectable 90 milliGRAM(s) SubCutaneous every 12 hours  guaiFENesin ER 1200 milliGRAM(s) Oral every 12 hours  lidocaine   4% Patch 1 Patch Transdermal every 24 hours  lidocaine   4% Patch 1 Patch Transdermal every 24 hours  melatonin 3 milliGRAM(s) Oral at bedtime  multivitamin 1 Tablet(s) Oral daily  pantoprazole  Injectable 40 milliGRAM(s) IV Push daily  polyethylene glycol 3350 17 Gram(s) Oral daily  senna 2 Tablet(s) Oral at bedtime    MEDICATIONS  (PRN):  acetaminophen   Tablet .. 650 milliGRAM(s) Oral every 6 hours PRN Temp greater or equal to 38C (100.4F)  baclofen 10 milliGRAM(s) Oral every 8 hours PRN Muscle Spasm  cyclobenzaprine 5 milliGRAM(s) Oral three times a day PRN Muscle Spasm  ibuprofen  Tablet. 600 milliGRAM(s) Oral every 6 hours PRN Mild Pain (1 - 3)  oxyCODONE    IR 5 milliGRAM(s) Oral every 4 hours PRN Moderate Pain (4 - 6)  oxyCODONE    IR 10 milliGRAM(s) Oral every 4 hours PRN Severe Pain (7 - 10)  sodium chloride 0.65% Nasal 1 Spray(s) Both Nostrils every 2 hours PRN Nasal Congestion      LABS:                        12.5   13.65 )-----------( 198      ( 18 Aug 2021 00:21 )             40.9     Hgb Trend: 12.5<--, 13.3<--, 13.3<--, 12.4<--, 12.3<--  08-18    137  |  98  |  17  ----------------------------<  110<H>  4.8   |  26  |  0.73    Ca    9.6      18 Aug 2021 00:21  Phos  3.4     08-18  Mg     2.2     08-18    TPro  6.9  /  Alb  3.6  /  TBili  0.4  /  DBili  x   /  AST  29  /  ALT  41  /  AlkPhos  96  08-18    LIVER FUNCTIONS - ( 18 Aug 2021 00:21 )  Alb: 3.6 g/dL / Pro: 6.9 g/dL / ALK PHOS: 96 U/L / ALT: 41 U/L / AST: 29 U/L / GGT: x           Creatinine Trend: 0.73<--, 0.83<--, 0.83<--, 0.81<--, 0.82<--, 0.81<--  PT/INR - ( 18 Aug 2021 00:21 )   PT: 14.4 sec;   INR: 1.21 ratio         PTT - ( 18 Aug 2021 00:21 )  PTT:32.2 sec    Arterial Blood Gas:  08-18 @ 00:16  7.37/53/197/31/100.0/4.1  ABG lactate: --        MICROBIOLOGY:     RADIOLOGY:  [ ] Reviewed and interpreted by me    EKG:       CHIEF COMPLAINT:  Patient is a 55y old  Male who presents with a chief complaint of covid (11 Aug 2021 08:04)    HPI:  54yo M with Hx of DVT s/p achilles tendon repair years ago not on AC presenting with complaints of SOB. intermittent and fevers with cough productive of white sputum for past week, tested positive for covid 2 days ago.  pt is unvaccinated   progressively worsening SOB over the past 5 days, worse with ambulation. found to be hypoxic on arrival to the  er    (29 Jul 2021 10:31)      Interval Events: Pleuritic pain with coughing o/n      REVIEW OF SYSTEMS: Pleuritic pain with coughing           OBJECTIVE:  ICU Vital Signs Last 24 Hrs  T(C): 36.9 (18 Aug 2021 04:00), Max: 37.5 (17 Aug 2021 13:00)  T(F): 98.5 (18 Aug 2021 04:00), Max: 99.5 (17 Aug 2021 13:00)  HR: 83 (18 Aug 2021 06:26) (71 - 99)  BP: 94/58 (18 Aug 2021 06:00) (85/54 - 121/77)  BP(mean): 69 (18 Aug 2021 06:00) (64 - 93)  ABP: --  ABP(mean): --  RR: 25 (18 Aug 2021 06:00) (18 - 31)  SpO2: 94% (18 Aug 2021 06:26) (88% - 99%)        08-17 @ 07:01  -  08-18 @ 07:00  --------------------------------------------------------  IN: 200 mL / OUT: 750 mL / NET: -550 mL      CAPILLARY BLOOD GLUCOSE              PHYSICAL EXAM:  GENERAL: NAD,  HEENT:  Atraumatic, Normocephalic  EYES: PERRLA, conjunctiva and sclera clear  NECK: Supple, No JVD  CHEST/LUNG: diminished bilaterally; No wheezes, rales, or rhonchi  HEART: Regular rate and rhythm; No murmurs, rubs, or gallops  ABDOMEN: Soft, Nontender, Nondistended; Bowel sounds present  EXTREMITIES:  2+ Peripheral Pulses, No clubbing, cyanosis, or edema  PSYCH: calm  NEUROLOGY: A+O x4  SKIN: No rashes or lesions        HOSPITAL MEDICATIONS:  MEDICATIONS  (STANDING):  benzonatate 200 milliGRAM(s) Oral every 8 hours  budesonide 160 MICROgram(s)/formoterol 4.5 MICROgram(s) Inhaler 2 Puff(s) Inhalation two times a day  chlorhexidine 4% Liquid 1 Application(s) Topical <User Schedule>  cholecalciferol 2000 Unit(s) Oral daily  clonazePAM  Tablet 0.5 milliGRAM(s) Oral every 8 hours  enoxaparin Injectable 90 milliGRAM(s) SubCutaneous every 12 hours  guaiFENesin ER 1200 milliGRAM(s) Oral every 12 hours  lidocaine   4% Patch 1 Patch Transdermal every 24 hours  lidocaine   4% Patch 1 Patch Transdermal every 24 hours  melatonin 3 milliGRAM(s) Oral at bedtime  multivitamin 1 Tablet(s) Oral daily  pantoprazole  Injectable 40 milliGRAM(s) IV Push daily  polyethylene glycol 3350 17 Gram(s) Oral daily  senna 2 Tablet(s) Oral at bedtime    MEDICATIONS  (PRN):  acetaminophen   Tablet .. 650 milliGRAM(s) Oral every 6 hours PRN Temp greater or equal to 38C (100.4F)  baclofen 10 milliGRAM(s) Oral every 8 hours PRN Muscle Spasm  cyclobenzaprine 5 milliGRAM(s) Oral three times a day PRN Muscle Spasm  ibuprofen  Tablet. 600 milliGRAM(s) Oral every 6 hours PRN Mild Pain (1 - 3)  oxyCODONE    IR 5 milliGRAM(s) Oral every 4 hours PRN Moderate Pain (4 - 6)  oxyCODONE    IR 10 milliGRAM(s) Oral every 4 hours PRN Severe Pain (7 - 10)  sodium chloride 0.65% Nasal 1 Spray(s) Both Nostrils every 2 hours PRN Nasal Congestion      LABS:                        12.5   13.65 )-----------( 198      ( 18 Aug 2021 00:21 )             40.9     Hgb Trend: 12.5<--, 13.3<--, 13.3<--, 12.4<--, 12.3<--  08-18    137  |  98  |  17  ----------------------------<  110<H>  4.8   |  26  |  0.73    Ca    9.6      18 Aug 2021 00:21  Phos  3.4     08-18  Mg     2.2     08-18    TPro  6.9  /  Alb  3.6  /  TBili  0.4  /  DBili  x   /  AST  29  /  ALT  41  /  AlkPhos  96  08-18    LIVER FUNCTIONS - ( 18 Aug 2021 00:21 )  Alb: 3.6 g/dL / Pro: 6.9 g/dL / ALK PHOS: 96 U/L / ALT: 41 U/L / AST: 29 U/L / GGT: x           Creatinine Trend: 0.73<--, 0.83<--, 0.83<--, 0.81<--, 0.82<--, 0.81<--  PT/INR - ( 18 Aug 2021 00:21 )   PT: 14.4 sec;   INR: 1.21 ratio         PTT - ( 18 Aug 2021 00:21 )  PTT:32.2 sec    Arterial Blood Gas:  08-18 @ 00:16  7.37/53/197/31/100.0/4.1  ABG lactate: --        MICROBIOLOGY:     RADIOLOGY:  [ ] Reviewed and interpreted by me    EKG:

## 2021-08-18 NOTE — PROGRESS NOTE ADULT - SUBJECTIVE AND OBJECTIVE BOX
Follow-up Pulm Progress Note    Seen OOB to chair  Feeling better   Sats low 90s on HFNC 60L/80%     Medications:  MEDICATIONS  (STANDING):  benzonatate 200 milliGRAM(s) Oral every 8 hours  budesonide 160 MICROgram(s)/formoterol 4.5 MICROgram(s) Inhaler 2 Puff(s) Inhalation two times a day  chlorhexidine 4% Liquid 1 Application(s) Topical <User Schedule>  cholecalciferol 2000 Unit(s) Oral daily  clonazePAM  Tablet 0.5 milliGRAM(s) Oral every 8 hours  enoxaparin Injectable 90 milliGRAM(s) SubCutaneous every 12 hours  guaiFENesin ER 1200 milliGRAM(s) Oral every 12 hours  lidocaine   4% Patch 1 Patch Transdermal every 24 hours  lidocaine   4% Patch 1 Patch Transdermal every 24 hours  melatonin 3 milliGRAM(s) Oral at bedtime  multivitamin 1 Tablet(s) Oral daily  pantoprazole  Injectable 40 milliGRAM(s) IV Push daily  polyethylene glycol 3350 17 Gram(s) Oral daily  senna 2 Tablet(s) Oral at bedtime    MEDICATIONS  (PRN):  acetaminophen   Tablet .. 650 milliGRAM(s) Oral every 6 hours PRN Temp greater or equal to 38C (100.4F)  baclofen 10 milliGRAM(s) Oral every 8 hours PRN Muscle Spasm  cyclobenzaprine 5 milliGRAM(s) Oral three times a day PRN Muscle Spasm  ibuprofen  Tablet. 600 milliGRAM(s) Oral every 6 hours PRN Mild Pain (1 - 3)  oxyCODONE    IR 5 milliGRAM(s) Oral every 4 hours PRN Moderate Pain (4 - 6)  oxyCODONE    IR 10 milliGRAM(s) Oral every 4 hours PRN Severe Pain (7 - 10)  sodium chloride 0.65% Nasal 1 Spray(s) Both Nostrils every 2 hours PRN Nasal Congestion          Vital Signs Last 24 Hrs  T(C): 36.9 (18 Aug 2021 04:00), Max: 37.5 (17 Aug 2021 13:00)  T(F): 98.5 (18 Aug 2021 04:00), Max: 99.5 (17 Aug 2021 13:00)  HR: 79 (18 Aug 2021 10:00) (71 - 99)  BP: 88/54 (18 Aug 2021 10:00) (85/54 - 121/77)  BP(mean): 66 (18 Aug 2021 10:00) (64 - 93)  RR: 17 (18 Aug 2021 10:00) (17 - 31)  SpO2: 96% (18 Aug 2021 10:00) (88% - 99%)    ABG - ( 18 Aug 2021 00:16 )  pH, Arterial: 7.37  pH, Blood: x     /  pCO2: 53    /  pO2: 197   / HCO3: 31    / Base Excess: 4.1   /  SaO2: 100.0                 08-17 @ 07:01  -  08-18 @ 07:00  --------------------------------------------------------  IN: 200 mL / OUT: 750 mL / NET: -550 mL          LABS:                        12.5   13.65 )-----------( 198      ( 18 Aug 2021 00:21 )             40.9     08-18    137  |  98  |  17  ----------------------------<  110<H>  4.8   |  26  |  0.73    Ca    9.6      18 Aug 2021 00:21  Phos  3.4     08-18  Mg     2.2     08-18    TPro  6.9  /  Alb  3.6  /  TBili  0.4  /  DBili  x   /  AST  29  /  ALT  41  /  AlkPhos  96  08-18          CAPILLARY BLOOD GLUCOSE        PT/INR - ( 18 Aug 2021 00:21 )   PT: 14.4 sec;   INR: 1.21 ratio         PTT - ( 18 Aug 2021 00:21 )  PTT:32.2 sec    Procalcitonin, Serum: 0.04 ng/mL (08-18-21 @ 00:21)  Procalcitonin, Serum: 0.03 ng/mL (08-17-21 @ 16:47)  Procalcitonin, Serum: 0.05 ng/mL (08-16-21 @ 05:42)                  CULTURES:    Culture - Blood (collected 08-11-21 @ 03:10)  Source: .Blood Blood-Peripheral  Final Report (08-16-21 @ 04:01):    No Growth Final                Physical Examination:  PULM: Decreased at bases   CVS: RRR     RADIOLOGY REVIEWED  CXR 8/16: grossly unchanged diffuse b/l opacities

## 2021-08-18 NOTE — PROGRESS NOTE ADULT - ASSESSMENT
pt w/ covid  hypoxia   s/p steroids  s/p remdesivir  id / f/u   pulm f/u   c/e resp support/bipap/h/f  h/f this am /oxygenation slowly improving    additional tx as per id/pulm / micu  recs  s/p toci  gi / dvt proph  o2  hx htn/   monitor bp  monitor inflammatory markers/improving     prognosis guarded   c/w micu txx

## 2021-08-18 NOTE — PROGRESS NOTE ADULT - PROBLEM SELECTOR PLAN 2
2nd to COVID PNA  -S/p RRT 8/3 and 8/4 for hypoxia  -Tx to 5ICU 8/10 for further management  -Continues to require Bipap 15/10/60% qhs & PRN  -HFNC while not on bipap (currently 60L/80%), keep sats >88%   -Oxygenation with slow improvement   -Management per ICU team

## 2021-08-19 LAB
ALBUMIN SERPL ELPH-MCNC: 3.2 G/DL — LOW (ref 3.3–5)
ALP SERPL-CCNC: 81 U/L — SIGNIFICANT CHANGE UP (ref 40–120)
ALT FLD-CCNC: 31 U/L — SIGNIFICANT CHANGE UP (ref 10–45)
ANION GAP SERPL CALC-SCNC: 10 MMOL/L — SIGNIFICANT CHANGE UP (ref 5–17)
APTT BLD: 19.8 SEC — LOW (ref 27.5–35.5)
AST SERPL-CCNC: 23 U/L — SIGNIFICANT CHANGE UP (ref 10–40)
BILIRUB SERPL-MCNC: 0.3 MG/DL — SIGNIFICANT CHANGE UP (ref 0.2–1.2)
BUN SERPL-MCNC: 17 MG/DL — SIGNIFICANT CHANGE UP (ref 7–23)
CALCIUM SERPL-MCNC: 8.9 MG/DL — SIGNIFICANT CHANGE UP (ref 8.4–10.5)
CHLORIDE SERPL-SCNC: 99 MMOL/L — SIGNIFICANT CHANGE UP (ref 96–108)
CO2 SERPL-SCNC: 28 MMOL/L — SIGNIFICANT CHANGE UP (ref 22–31)
CREAT SERPL-MCNC: 0.77 MG/DL — SIGNIFICANT CHANGE UP (ref 0.5–1.3)
GAS PNL BLDA: SIGNIFICANT CHANGE UP
GLUCOSE SERPL-MCNC: 96 MG/DL — SIGNIFICANT CHANGE UP (ref 70–99)
HCT VFR BLD CALC: 36.8 % — LOW (ref 39–50)
HGB BLD-MCNC: 11.5 G/DL — LOW (ref 13–17)
INR BLD: 1.22 RATIO — HIGH (ref 0.88–1.16)
MAGNESIUM SERPL-MCNC: 2.2 MG/DL — SIGNIFICANT CHANGE UP (ref 1.6–2.6)
MCHC RBC-ENTMCNC: 28 PG — SIGNIFICANT CHANGE UP (ref 27–34)
MCHC RBC-ENTMCNC: 31.3 GM/DL — LOW (ref 32–36)
MCV RBC AUTO: 89.8 FL — SIGNIFICANT CHANGE UP (ref 80–100)
NRBC # BLD: 0 /100 WBCS — SIGNIFICANT CHANGE UP (ref 0–0)
PHOSPHATE SERPL-MCNC: 3.4 MG/DL — SIGNIFICANT CHANGE UP (ref 2.5–4.5)
PLATELET # BLD AUTO: 188 K/UL — SIGNIFICANT CHANGE UP (ref 150–400)
POTASSIUM SERPL-MCNC: 4.2 MMOL/L — SIGNIFICANT CHANGE UP (ref 3.5–5.3)
POTASSIUM SERPL-SCNC: 4.2 MMOL/L — SIGNIFICANT CHANGE UP (ref 3.5–5.3)
PROT SERPL-MCNC: 6.3 G/DL — SIGNIFICANT CHANGE UP (ref 6–8.3)
PROTHROM AB SERPL-ACNC: 14.5 SEC — HIGH (ref 10.6–13.6)
RBC # BLD: 4.1 M/UL — LOW (ref 4.2–5.8)
RBC # FLD: 13.2 % — SIGNIFICANT CHANGE UP (ref 10.3–14.5)
SODIUM SERPL-SCNC: 137 MMOL/L — SIGNIFICANT CHANGE UP (ref 135–145)
WBC # BLD: 14.67 K/UL — HIGH (ref 3.8–10.5)
WBC # FLD AUTO: 14.67 K/UL — HIGH (ref 3.8–10.5)

## 2021-08-19 PROCEDURE — 99291 CRITICAL CARE FIRST HOUR: CPT

## 2021-08-19 RX ADMIN — PANTOPRAZOLE SODIUM 40 MILLIGRAM(S): 20 TABLET, DELAYED RELEASE ORAL at 11:33

## 2021-08-19 RX ADMIN — Medication 200 MILLIGRAM(S): at 13:21

## 2021-08-19 RX ADMIN — Medication 600 MILLIGRAM(S): at 09:42

## 2021-08-19 RX ADMIN — Medication 1200 MILLIGRAM(S): at 17:22

## 2021-08-19 RX ADMIN — BUDESONIDE AND FORMOTEROL FUMARATE DIHYDRATE 2 PUFF(S): 160; 4.5 AEROSOL RESPIRATORY (INHALATION) at 05:01

## 2021-08-19 RX ADMIN — LIDOCAINE 1 PATCH: 4 CREAM TOPICAL at 05:39

## 2021-08-19 RX ADMIN — Medication 0.5 MILLIGRAM(S): at 13:20

## 2021-08-19 RX ADMIN — Medication 200 MILLIGRAM(S): at 21:12

## 2021-08-19 RX ADMIN — Medication 1200 MILLIGRAM(S): at 06:11

## 2021-08-19 RX ADMIN — Medication 10 MILLIGRAM(S): at 18:28

## 2021-08-19 RX ADMIN — SENNA PLUS 2 TABLET(S): 8.6 TABLET ORAL at 21:12

## 2021-08-19 RX ADMIN — OXYCODONE HYDROCHLORIDE 5 MILLIGRAM(S): 5 TABLET ORAL at 11:10

## 2021-08-19 RX ADMIN — Medication 3 MILLIGRAM(S): at 21:12

## 2021-08-19 RX ADMIN — Medication 1 TABLET(S): at 11:33

## 2021-08-19 RX ADMIN — Medication 600 MILLIGRAM(S): at 11:10

## 2021-08-19 RX ADMIN — Medication 10 MILLIGRAM(S): at 08:30

## 2021-08-19 RX ADMIN — CHLORHEXIDINE GLUCONATE 1 APPLICATION(S): 213 SOLUTION TOPICAL at 06:12

## 2021-08-19 RX ADMIN — LIDOCAINE 1 PATCH: 4 CREAM TOPICAL at 19:46

## 2021-08-19 RX ADMIN — Medication 200 MILLIGRAM(S): at 06:11

## 2021-08-19 RX ADMIN — LIDOCAINE 1 PATCH: 4 CREAM TOPICAL at 06:10

## 2021-08-19 RX ADMIN — ENOXAPARIN SODIUM 90 MILLIGRAM(S): 100 INJECTION SUBCUTANEOUS at 06:11

## 2021-08-19 RX ADMIN — LIDOCAINE 1 PATCH: 4 CREAM TOPICAL at 07:35

## 2021-08-19 RX ADMIN — LIDOCAINE 1 PATCH: 4 CREAM TOPICAL at 18:16

## 2021-08-19 RX ADMIN — ENOXAPARIN SODIUM 90 MILLIGRAM(S): 100 INJECTION SUBCUTANEOUS at 17:24

## 2021-08-19 RX ADMIN — Medication 2000 UNIT(S): at 11:33

## 2021-08-19 RX ADMIN — BUDESONIDE AND FORMOTEROL FUMARATE DIHYDRATE 2 PUFF(S): 160; 4.5 AEROSOL RESPIRATORY (INHALATION) at 17:32

## 2021-08-19 RX ADMIN — Medication 0.5 MILLIGRAM(S): at 21:12

## 2021-08-19 RX ADMIN — OXYCODONE HYDROCHLORIDE 5 MILLIGRAM(S): 5 TABLET ORAL at 09:42

## 2021-08-19 RX ADMIN — LIDOCAINE 1 PATCH: 4 CREAM TOPICAL at 17:23

## 2021-08-19 RX ADMIN — Medication 0.5 MILLIGRAM(S): at 06:11

## 2021-08-19 RX ADMIN — POLYETHYLENE GLYCOL 3350 17 GRAM(S): 17 POWDER, FOR SOLUTION ORAL at 11:33

## 2021-08-19 NOTE — PROGRESS NOTE ADULT - ASSESSMENT
55yr old male with HTN, RLE DVT, and PE (not on home AC) presents with progressively worsening SOB, intermittent fevers, COVID positive, admitted for COVID PNA.  Course complciated by worsening respiratory failure requiring NIPPV and ICU admission.     NEUROLOGY:  - A&Ox4 at baseline; No Active Issues   - Klonopin 0.5 mg q8h for anxiety    PULMONARY:  Acute Respiratory Failure--HF 60L 70%  - Continue to wean HFNC with BiPAP Nocternal/PRN   - Titrate settings as tolerated to maintain SpO2>88%   - Encourage prone positioning and bed in chair position.     CARDIOLOGY:  - No Active Issues     GASTROINTESTINAL:  - Regular Diet w/ supp shakes while intermittently on HFNC  - Protonix 40mg QD while intermittently NPO on Bipap  - Bowel regimen with Miralax and Senna BM. Last BM 8/12.     RENAL/:  - Strict I&Os   - Monitor and replete lytes daily    INFECTIOUS DISEASE:  COVID-19  - s/p completed courses of Remdesivir x 5 days and Decadron x 10 days   - s/p Toci on 7/30    HEMATOLOGY:  - Full AC Lovenox 90mg BID   - LE duplex neg for DVT on 8/4. History of RLE DVT & PE.   - Consider CTA chest when stabilized    ENDOCRINE:  - HbA1c 6.0    ETHICS/DISPOSITION:  - Full code   - Remain in ICU

## 2021-08-19 NOTE — PROGRESS NOTE ADULT - PROBLEM SELECTOR PLAN 2
2nd to COVID PNA  -S/p RRT 8/3 and 8/4 for hypoxia  -Tx to 5ICU 8/10 for further management  -Continues to require Bipap 15/10/60% qhs & PRN  -HFNC while not on bipap (currently 60L/70%), keep sats >88%   -Oxygenation with slow improvement   -Management per ICU team

## 2021-08-19 NOTE — CHART NOTE - NSCHARTNOTEFT_GEN_A_CORE
5 ICU Transfer Note    Transfer from: 5U  Transfer to:  (x  ) Medicine    (  ) Telemetry    (  ) RCU    (  ) Palliative    (  ) Stroke Unit    (  ) _______________  Accepting physican: Dr. Angel Higginbotham        Vital Signs Last 24 Hrs  T(C): 36.8 (19 Aug 2021 03:00), Max: 36.8 (19 Aug 2021 03:00)  T(F): 98.3 (19 Aug 2021 03:00), Max: 98.3 (19 Aug 2021 03:00)  HR: 98 (19 Aug 2021 12:52) (68 - 100)  BP: 92/71 (19 Aug 2021 12:00) (89/50 - 127/75)  BP(mean): 77 (19 Aug 2021 12:00) (64 - 95)  RR: 29 (19 Aug 2021 12:52) (11 - 49)  SpO2: 92% (19 Aug 2021 12:52) (88% - 99%)  I&O's Summary    18 Aug 2021 07:01  -  19 Aug 2021 07:00  --------------------------------------------------------  IN: 200 mL / OUT: 750 mL / NET: -550 mL    19 Aug 2021 07:01  -  19 Aug 2021 14:39  --------------------------------------------------------  IN: 0 mL / OUT: 350 mL / NET: -350 mL          MEDICATIONS  (STANDING):  benzonatate 200 milliGRAM(s) Oral every 8 hours  budesonide 160 MICROgram(s)/formoterol 4.5 MICROgram(s) Inhaler 2 Puff(s) Inhalation two times a day  chlorhexidine 4% Liquid 1 Application(s) Topical <User Schedule>  cholecalciferol 2000 Unit(s) Oral daily  clonazePAM  Tablet 0.5 milliGRAM(s) Oral every 8 hours  enoxaparin Injectable 90 milliGRAM(s) SubCutaneous every 12 hours  guaiFENesin ER 1200 milliGRAM(s) Oral every 12 hours  lidocaine   4% Patch 1 Patch Transdermal every 24 hours  lidocaine   4% Patch 1 Patch Transdermal every 24 hours  melatonin 3 milliGRAM(s) Oral at bedtime  multivitamin 1 Tablet(s) Oral daily  pantoprazole  Injectable 40 milliGRAM(s) IV Push daily  polyethylene glycol 3350 17 Gram(s) Oral daily  senna 2 Tablet(s) Oral at bedtime    MEDICATIONS  (PRN):  acetaminophen   Tablet .. 650 milliGRAM(s) Oral every 6 hours PRN Temp greater or equal to 38C (100.4F)  baclofen 10 milliGRAM(s) Oral every 8 hours PRN Muscle Spasm  hydrocodone/homatropine Syrup 5 milliLiter(s) Oral every 6 hours PRN Cough  ibuprofen  Tablet. 600 milliGRAM(s) Oral every 6 hours PRN Mild Pain (1 - 3)  oxyCODONE    IR 5 milliGRAM(s) Oral every 4 hours PRN Moderate Pain (4 - 6)  sodium chloride 0.65% Nasal 1 Spray(s) Both Nostrils every 2 hours PRN Nasal Congestion        LABS                                            11.5                  Neurophils% (auto):   x      (08-19 @ 04:17):    14.67)-----------(188          Lymphocytes% (auto):  x                                             36.8                   Eosinphils% (auto):   x        Manual%: Neutrophils x    ; Lymphocytes x    ; Eosinophils x    ; Bands%: x    ; Blasts x                                    137    |  99     |  17                  Calcium: 8.9   / iCa: x      (08-19 @ 04:17)    ----------------------------<  96        Magnesium: 2.2                              4.2     |  28     |  0.77             Phosphorous: 3.4      TPro  6.3    /  Alb  3.2    /  TBili  0.3    /  DBili  x      /  AST  23     /  ALT  31     /  AlkPhos  81     19 Aug 2021 04:17    ( 08-19 @ 04:17 )   PT: 14.5 sec;   INR: 1.22 ratio  aPTT: 19.8 sec            5ICU COURSE:  55yr old unvaccinated male with hx of HTN, RLE DVT, and PE (not on home AC) presents with progressively worsening SOB, intermittent fevers, COVID positive, admitted for COVID PNA.  Course complciated by worsening respiratory failure requiring NIPPV and ICU admission. While in the ICU pt alternated with High Flow and BIPAP with eventually less time on BIPap. Pt has now transitioned to Day time on high flow 60L and 70% and remains on nocturnal bipap 15/10 60%. Now OOB to chair and works with physical therapy.        ASSESSMENT & PLAN:   55yr old male with HTN, RLE DVT, and PE (not on home AC) presents with progressively worsening SOB, intermittent fevers, COVID positive, admitted for COVID PNA.  Course complciated by worsening respiratory failure requiring NIPPV and ICU admission.     NEUROLOGY:  - A&Ox4 at baseline; No Active Issues   - Klonopin 0.5 mg q8h for anxiety    PULMONARY:  Acute Respiratory Failure--HF 60L 70%  - Continue to wean HFNC with BiPAP Nocternal/PRN   - Titrate settings as tolerated to maintain SpO2>88%   - Encourage oob to chair.     CARDIOLOGY:  - No Active Issues     GASTROINTESTINAL:  - Regular Diet w/ supp shakes while on HFNC  - Protonix 40mg QD while intermittently NPO on Bipap, may d/c when tolerating High Flow  - Bowel regimen with Miralax and Senna BM. Last BM 8/12.     RENAL/:  - Strict I&Os   - Monitor and replete lytes daily    INFECTIOUS DISEASE:  COVID-19  - s/p completed courses of Remdesivir x 5 days and Decadron x 10 days   - s/p Toci on 7/30    HEMATOLOGY:  - Full AC Lovenox 90mg BID   - LE duplex neg for DVT on 8/4. History of RLE DVT & PE.   - Consider CTA chest when stabilized    ENDOCRINE:  - HbA1c 6.0    ETHICS/DISPOSITION:  - Full code         For Follow-Up:

## 2021-08-19 NOTE — PROGRESS NOTE ADULT - SUBJECTIVE AND OBJECTIVE BOX
Follow-up Pulm Progress Note    Feeling well today  OOB to chair  O2 sats 92-93% on HFNC 70%/60L    Medications:  MEDICATIONS  (STANDING):  benzonatate 200 milliGRAM(s) Oral every 8 hours  budesonide 160 MICROgram(s)/formoterol 4.5 MICROgram(s) Inhaler 2 Puff(s) Inhalation two times a day  chlorhexidine 4% Liquid 1 Application(s) Topical <User Schedule>  cholecalciferol 2000 Unit(s) Oral daily  clonazePAM  Tablet 0.5 milliGRAM(s) Oral every 8 hours  enoxaparin Injectable 90 milliGRAM(s) SubCutaneous every 12 hours  guaiFENesin ER 1200 milliGRAM(s) Oral every 12 hours  lidocaine   4% Patch 1 Patch Transdermal every 24 hours  lidocaine   4% Patch 1 Patch Transdermal every 24 hours  melatonin 3 milliGRAM(s) Oral at bedtime  multivitamin 1 Tablet(s) Oral daily  pantoprazole  Injectable 40 milliGRAM(s) IV Push daily  polyethylene glycol 3350 17 Gram(s) Oral daily  senna 2 Tablet(s) Oral at bedtime    MEDICATIONS  (PRN):  acetaminophen   Tablet .. 650 milliGRAM(s) Oral every 6 hours PRN Temp greater or equal to 38C (100.4F)  baclofen 10 milliGRAM(s) Oral every 8 hours PRN Muscle Spasm  hydrocodone/homatropine Syrup 5 milliLiter(s) Oral every 6 hours PRN Cough  ibuprofen  Tablet. 600 milliGRAM(s) Oral every 6 hours PRN Mild Pain (1 - 3)  oxyCODONE    IR 5 milliGRAM(s) Oral every 4 hours PRN Moderate Pain (4 - 6)  sodium chloride 0.65% Nasal 1 Spray(s) Both Nostrils every 2 hours PRN Nasal Congestion    Vital Signs Last 24 Hrs  T(C): 36.8 (19 Aug 2021 03:00), Max: 36.8 (19 Aug 2021 03:00)  T(F): 98.3 (19 Aug 2021 03:00), Max: 98.3 (19 Aug 2021 03:00)  HR: 97 (19 Aug 2021 10:00) (68 - 110)  BP: 97/66 (19 Aug 2021 10:00) (89/50 - 127/75)  BP(mean): 77 (19 Aug 2021 10:00) (64 - 95)  RR: 26 (19 Aug 2021 10:00) (11 - 49)  SpO2: 93% (19 Aug 2021 10:00) (88% - 99%)    ABG - ( 19 Aug 2021 04:20 )  pH, Arterial: 7.42  pH, Blood: x     /  pCO2: 52    /  pO2: 117   / HCO3: 34    / Base Excess: 7.9   /  SaO2: 99.6      08-18 @ 07:01  -  08-19 @ 07:00  --------------------------------------------------------  IN: 200 mL / OUT: 750 mL / NET: -550 mL    LABS:                        11.5   14.67 )-----------( 188      ( 19 Aug 2021 04:17 )             36.8     08-19    137  |  99  |  17  ----------------------------<  96  4.2   |  28  |  0.77    Ca    8.9      19 Aug 2021 04:17  Phos  3.4     08-19  Mg     2.2     08-19    TPro  6.3  /  Alb  3.2<L>  /  TBili  0.3  /  DBili  x   /  AST  23  /  ALT  31  /  AlkPhos  81  08-19      PT/INR - ( 19 Aug 2021 04:17 )   PT: 14.5 sec;   INR: 1.22 ratio    PTT - ( 19 Aug 2021 04:17 )  PTT:19.8 sec    Procalcitonin, Serum: 0.04 ng/mL (08-18-21 @ 00:21)  Procalcitonin, Serum: 0.03 ng/mL (08-17-21 @ 16:47)      CULTURES:     Culture - Blood (collected 08-11-21 @ 03:10)  Source: .Blood Blood-Peripheral  Final Report (08-16-21 @ 04:01):    No Growth Final    Physical Examination:  PULM: Decreased at bases  CVS: RRR    RADIOLOGY REVIEWED  CXR 8/16: grossly unchanged diffuse b/l opacities

## 2021-08-19 NOTE — PROGRESS NOTE ADULT - PROBLEM SELECTOR PLAN 1
+COVID PCR as an outpatient  -CXR 8/9 with b/l lung opacities, CXR 8/16 grossly unchanged   -S/p Remdesivir x 5 days   -S/p Decadron 6mg IVP qd x 10 days (completed 8/10)  -S/p Tocilizumab 7/30 per ID for worsening hypoxic respiratory failure  -Sputum culture 8/4 with normal respiratory aba. WBC normal, PCT normal, afebrile   -LE duplex 8/4 neg DVT.   -On full dose AC Lovenox 1mg/kg BID given elevated ddimer, too unstable for transport to CTA chest.   -Ddimer now downtrending   -Continue to trend ddimer & inflammatory markers

## 2021-08-19 NOTE — PROGRESS NOTE ADULT - SUBJECTIVE AND OBJECTIVE BOX
CHIEF COMPLAINT:  Patient is a 55y old  Male who presents with a chief complaint of covid (11 Aug 2021 08:04)    HPI:  56yo M with Hx of DVT s/p achilles tendon repair years ago not on AC presenting with complaints of SOB. intermittent and fevers with cough productive of white sputum for past week, tested positive for covid 2 days ago.  pt is unvaccinated   progressively worsening SOB over the past 5 days, worse with ambulation. found to be hypoxic on arrival to the  er    (29 Jul 2021 10:31)      Interval Events:      REVIEW OF SYSTEMS:          OBJECTIVE:  ICU Vital Signs Last 24 Hrs  T(C): 36.8 (19 Aug 2021 03:00), Max: 36.8 (19 Aug 2021 03:00)  T(F): 98.3 (19 Aug 2021 03:00), Max: 98.3 (19 Aug 2021 03:00)  HR: 74 (19 Aug 2021 07:00) (68 - 110)  BP: 95/53 (19 Aug 2021 07:00) (88/54 - 127/75)  BP(mean): 68 (19 Aug 2021 07:00) (66 - 95)  ABP: --  ABP(mean): --  RR: 11 (19 Aug 2021 07:00) (11 - 49)  SpO2: 98% (19 Aug 2021 07:00) (88% - 99%)        08-18 @ 07:01  -  08-19 @ 07:00  --------------------------------------------------------  IN: 200 mL / OUT: 750 mL / NET: -550 mL      CAPILLARY BLOOD GLUCOSE              PHYSICAL EXAM:          HOSPITAL MEDICATIONS:  MEDICATIONS  (STANDING):  benzonatate 200 milliGRAM(s) Oral every 8 hours  budesonide 160 MICROgram(s)/formoterol 4.5 MICROgram(s) Inhaler 2 Puff(s) Inhalation two times a day  chlorhexidine 4% Liquid 1 Application(s) Topical <User Schedule>  cholecalciferol 2000 Unit(s) Oral daily  clonazePAM  Tablet 0.5 milliGRAM(s) Oral every 8 hours  enoxaparin Injectable 90 milliGRAM(s) SubCutaneous every 12 hours  guaiFENesin ER 1200 milliGRAM(s) Oral every 12 hours  lidocaine   4% Patch 1 Patch Transdermal every 24 hours  lidocaine   4% Patch 1 Patch Transdermal every 24 hours  melatonin 3 milliGRAM(s) Oral at bedtime  multivitamin 1 Tablet(s) Oral daily  pantoprazole  Injectable 40 milliGRAM(s) IV Push daily  polyethylene glycol 3350 17 Gram(s) Oral daily  senna 2 Tablet(s) Oral at bedtime    MEDICATIONS  (PRN):  acetaminophen   Tablet .. 650 milliGRAM(s) Oral every 6 hours PRN Temp greater or equal to 38C (100.4F)  baclofen 10 milliGRAM(s) Oral every 8 hours PRN Muscle Spasm  cyclobenzaprine 5 milliGRAM(s) Oral three times a day PRN Muscle Spasm  hydrocodone/homatropine Syrup 5 milliLiter(s) Oral every 6 hours PRN Cough  ibuprofen  Tablet. 600 milliGRAM(s) Oral every 6 hours PRN Mild Pain (1 - 3)  oxyCODONE    IR 10 milliGRAM(s) Oral every 4 hours PRN Severe Pain (7 - 10)  oxyCODONE    IR 5 milliGRAM(s) Oral every 4 hours PRN Moderate Pain (4 - 6)  sodium chloride 0.65% Nasal 1 Spray(s) Both Nostrils every 2 hours PRN Nasal Congestion      LABS:                        11.5   14.67 )-----------( 188      ( 19 Aug 2021 04:17 )             36.8     Hgb Trend: 11.5<--, 12.5<--, 13.3<--, 13.3<--, 12.4<--  08-19    137  |  99  |  17  ----------------------------<  96  4.2   |  28  |  0.77    Ca    8.9      19 Aug 2021 04:17  Phos  3.4     08-19  Mg     2.2     08-19    TPro  6.3  /  Alb  3.2<L>  /  TBili  0.3  /  DBili  x   /  AST  23  /  ALT  31  /  AlkPhos  81  08-19    LIVER FUNCTIONS - ( 19 Aug 2021 04:17 )  Alb: 3.2 g/dL / Pro: 6.3 g/dL / ALK PHOS: 81 U/L / ALT: 31 U/L / AST: 23 U/L / GGT: x           Creatinine Trend: 0.77<--, 0.73<--, 0.83<--, 0.83<--, 0.81<--, 0.82<--  PT/INR - ( 19 Aug 2021 04:17 )   PT: 14.5 sec;   INR: 1.22 ratio         PTT - ( 19 Aug 2021 04:17 )  PTT:19.8 sec    Arterial Blood Gas:  08-19 @ 04:20  7.42/52/117/34/99.6/7.9  ABG lactate: --  Arterial Blood Gas:  08-18 @ 00:16  7.37/53/197/31/100.0/4.1  ABG lactate: --        MICROBIOLOGY:     RADIOLOGY:  [ ] Reviewed and interpreted by me    EKG:

## 2021-08-19 NOTE — PROGRESS NOTE ADULT - SUBJECTIVE AND OBJECTIVE BOX
DATE OF SERVICE: 08-19-21 @ 11:31  CHIEF COMPLAINT:Patient is a 55y old  Male who presents with a chief complaint of covid (11 Aug 2021 08:04)    	        PAST MEDICAL & SURGICAL HISTORY:  Hyperlipidemia    HTN (hypertension)    Rupture, tendon, quadriceps    History of Achilles tendon repair            feels better  RESPIRATORY: less sob  CARDIOVASCULAR: No chest pain, palpitations, passing out, dizziness, or leg swelling  GASTROINTESTINAL: No abdominal or epigastric pain. No nausea, vomiting, or hematemesis; No diarrhea   GENITOURINARY: No dysuria, frequency, hematuria, or incontinence  NEUROLOGICAL: No headaches,     Medications:  MEDICATIONS  (STANDING):  benzonatate 200 milliGRAM(s) Oral every 8 hours  budesonide 160 MICROgram(s)/formoterol 4.5 MICROgram(s) Inhaler 2 Puff(s) Inhalation two times a day  chlorhexidine 4% Liquid 1 Application(s) Topical <User Schedule>  cholecalciferol 2000 Unit(s) Oral daily  clonazePAM  Tablet 0.5 milliGRAM(s) Oral every 8 hours  enoxaparin Injectable 90 milliGRAM(s) SubCutaneous every 12 hours  guaiFENesin ER 1200 milliGRAM(s) Oral every 12 hours  lidocaine   4% Patch 1 Patch Transdermal every 24 hours  lidocaine   4% Patch 1 Patch Transdermal every 24 hours  melatonin 3 milliGRAM(s) Oral at bedtime  multivitamin 1 Tablet(s) Oral daily  pantoprazole  Injectable 40 milliGRAM(s) IV Push daily  polyethylene glycol 3350 17 Gram(s) Oral daily  senna 2 Tablet(s) Oral at bedtime    MEDICATIONS  (PRN):  acetaminophen   Tablet .. 650 milliGRAM(s) Oral every 6 hours PRN Temp greater or equal to 38C (100.4F)  baclofen 10 milliGRAM(s) Oral every 8 hours PRN Muscle Spasm  hydrocodone/homatropine Syrup 5 milliLiter(s) Oral every 6 hours PRN Cough  ibuprofen  Tablet. 600 milliGRAM(s) Oral every 6 hours PRN Mild Pain (1 - 3)  oxyCODONE    IR 5 milliGRAM(s) Oral every 4 hours PRN Moderate Pain (4 - 6)  sodium chloride 0.65% Nasal 1 Spray(s) Both Nostrils every 2 hours PRN Nasal Congestion    	    PHYSICAL EXAM:  T(C): 36.8 (08-19-21 @ 03:00), Max: 36.8 (08-19-21 @ 03:00)  HR: 97 (08-19-21 @ 10:00) (68 - 110)  BP: 97/66 (08-19-21 @ 10:00) (89/50 - 127/75)  RR: 26 (08-19-21 @ 10:00) (11 - 49)  SpO2: 93% (08-19-21 @ 10:00) (88% - 99%)  Wt(kg): --  I&O's Summary    18 Aug 2021 07:01  -  19 Aug 2021 07:00  --------------------------------------------------------  IN: 200 mL / OUT: 750 mL / NET: -550 mL    19 Aug 2021 07:01  -  19 Aug 2021 11:31  --------------------------------------------------------  IN: 0 mL / OUT: 350 mL / NET: -350 mL        Appearance: Normal	  HEENT:   Normal oral mucosa, PERRL, EOMI	  Lymphatic: No lymphadenopathy  Cardiovascular: Normal S1 S2, No JVD, No murmurs, No edema  Respiratory: dec bs   Gastrointestinal:  Soft, Non-tender, + BS	  Skin: No rashes, No ecchymoses, No cyanosis	  Neurologic: Non-focal  Extremities: Normal range of motion, No clubbing, cyanosis or edema  Vascular: Peripheral pulses palpable 2+ bilaterally    TELEMETRY: 	    ECG:  	  RADIOLOGY:  OTHER: 	  	  LABS:	 	    CARDIAC MARKERS:                                11.5   14.67 )-----------( 188      ( 19 Aug 2021 04:17 )             36.8     08-19    137  |  99  |  17  ----------------------------<  96  4.2   |  28  |  0.77    Ca    8.9      19 Aug 2021 04:17  Phos  3.4     08-19  Mg     2.2     08-19    TPro  6.3  /  Alb  3.2<L>  /  TBili  0.3  /  DBili  x   /  AST  23  /  ALT  31  /  AlkPhos  81  08-19    proBNP:   Lipid Profile:   HgA1c:   TSH:

## 2021-08-20 RX ORDER — CYCLOBENZAPRINE HYDROCHLORIDE 10 MG/1
5 TABLET, FILM COATED ORAL THREE TIMES A DAY
Refills: 0 | Status: DISCONTINUED | OUTPATIENT
Start: 2021-08-20 | End: 2021-08-26

## 2021-08-20 RX ADMIN — Medication 0.5 MILLIGRAM(S): at 05:29

## 2021-08-20 RX ADMIN — Medication 200 MILLIGRAM(S): at 05:30

## 2021-08-20 RX ADMIN — ENOXAPARIN SODIUM 90 MILLIGRAM(S): 100 INJECTION SUBCUTANEOUS at 05:30

## 2021-08-20 RX ADMIN — BUDESONIDE AND FORMOTEROL FUMARATE DIHYDRATE 2 PUFF(S): 160; 4.5 AEROSOL RESPIRATORY (INHALATION) at 05:31

## 2021-08-20 RX ADMIN — Medication 600 MILLIGRAM(S): at 10:41

## 2021-08-20 RX ADMIN — LIDOCAINE 1 PATCH: 4 CREAM TOPICAL at 21:31

## 2021-08-20 RX ADMIN — OXYCODONE HYDROCHLORIDE 5 MILLIGRAM(S): 5 TABLET ORAL at 10:41

## 2021-08-20 RX ADMIN — Medication 200 MILLIGRAM(S): at 21:31

## 2021-08-20 RX ADMIN — Medication 0.5 MILLIGRAM(S): at 21:03

## 2021-08-20 RX ADMIN — Medication 1200 MILLIGRAM(S): at 05:30

## 2021-08-20 RX ADMIN — Medication 200 MILLIGRAM(S): at 13:01

## 2021-08-20 RX ADMIN — Medication 600 MILLIGRAM(S): at 11:10

## 2021-08-20 RX ADMIN — ENOXAPARIN SODIUM 90 MILLIGRAM(S): 100 INJECTION SUBCUTANEOUS at 17:16

## 2021-08-20 RX ADMIN — PANTOPRAZOLE SODIUM 40 MILLIGRAM(S): 20 TABLET, DELAYED RELEASE ORAL at 10:47

## 2021-08-20 RX ADMIN — SENNA PLUS 2 TABLET(S): 8.6 TABLET ORAL at 21:03

## 2021-08-20 RX ADMIN — Medication 0.5 MILLIGRAM(S): at 13:41

## 2021-08-20 RX ADMIN — OXYCODONE HYDROCHLORIDE 5 MILLIGRAM(S): 5 TABLET ORAL at 20:21

## 2021-08-20 RX ADMIN — Medication 1200 MILLIGRAM(S): at 17:16

## 2021-08-20 RX ADMIN — OXYCODONE HYDROCHLORIDE 5 MILLIGRAM(S): 5 TABLET ORAL at 21:15

## 2021-08-20 RX ADMIN — LIDOCAINE 1 PATCH: 4 CREAM TOPICAL at 17:16

## 2021-08-20 RX ADMIN — OXYCODONE HYDROCHLORIDE 5 MILLIGRAM(S): 5 TABLET ORAL at 11:10

## 2021-08-20 RX ADMIN — Medication 3 MILLIGRAM(S): at 21:03

## 2021-08-20 RX ADMIN — Medication 2000 UNIT(S): at 13:01

## 2021-08-20 RX ADMIN — LIDOCAINE 1 PATCH: 4 CREAM TOPICAL at 09:55

## 2021-08-20 RX ADMIN — Medication 1 TABLET(S): at 10:47

## 2021-08-20 RX ADMIN — BUDESONIDE AND FORMOTEROL FUMARATE DIHYDRATE 2 PUFF(S): 160; 4.5 AEROSOL RESPIRATORY (INHALATION) at 17:17

## 2021-08-20 NOTE — PROGRESS NOTE ADULT - SUBJECTIVE AND OBJECTIVE BOX
Follow-up Pulm Progress Note    Seen on HFNC 80%/60L  O2 sats 96-97%  Reporting chest discomfort r/t coughing  Mild dyspnea    Medications:  MEDICATIONS  (STANDING):  benzonatate 200 milliGRAM(s) Oral every 8 hours  budesonide 160 MICROgram(s)/formoterol 4.5 MICROgram(s) Inhaler 2 Puff(s) Inhalation two times a day  cholecalciferol 2000 Unit(s) Oral daily  clonazePAM  Tablet 0.5 milliGRAM(s) Oral every 8 hours  enoxaparin Injectable 90 milliGRAM(s) SubCutaneous every 12 hours  guaiFENesin ER 1200 milliGRAM(s) Oral every 12 hours  lidocaine   4% Patch 1 Patch Transdermal every 24 hours  melatonin 3 milliGRAM(s) Oral at bedtime  multivitamin 1 Tablet(s) Oral daily  pantoprazole  Injectable 40 milliGRAM(s) IV Push daily  polyethylene glycol 3350 17 Gram(s) Oral daily  senna 2 Tablet(s) Oral at bedtime    MEDICATIONS  (PRN):  acetaminophen   Tablet .. 650 milliGRAM(s) Oral every 6 hours PRN Temp greater or equal to 38C (100.4F)  hydrocodone/homatropine Syrup 5 milliLiter(s) Oral every 6 hours PRN Cough  oxyCODONE    IR 5 milliGRAM(s) Oral every 4 hours PRN Moderate Pain (4 - 6)  sodium chloride 0.65% Nasal 1 Spray(s) Both Nostrils every 2 hours PRN Nasal Congestion    Vital Signs Last 24 Hrs  T(C): 36.8 (20 Aug 2021 11:15), Max: 36.8 (19 Aug 2021 16:40)  T(F): 98.3 (20 Aug 2021 11:15), Max: 98.3 (20 Aug 2021 11:15)  HR: 87 (20 Aug 2021 11:15) (86 - 102)  BP: 109/66 (20 Aug 2021 11:15) (97/46 - 120/77)  BP(mean): 67 (19 Aug 2021 15:00) (65 - 67)  RR: 18 (20 Aug 2021 11:15) (18 - 29)  SpO2: 98% (20 Aug 2021 11:15) (88% - 98%)    ABG - ( 19 Aug 2021 04:20 )  pH, Arterial: 7.42  pH, Blood: x     /  pCO2: 52    /  pO2: 117   / HCO3: 34    / Base Excess: 7.9   /  SaO2: 99.6      08-19 @ 07:01  -  08-20 @ 07:00  --------------------------------------------------------  IN: 200 mL / OUT: 1050 mL / NET: -850 mL    LABS:                        11.5   14.67 )-----------( 188      ( 19 Aug 2021 04:17 )             36.8     08-19    137  |  99  |  17  ----------------------------<  96  4.2   |  28  |  0.77    Ca    8.9      19 Aug 2021 04:17  Phos  3.4     08-19  Mg     2.2     08-19    TPro  6.3  /  Alb  3.2<L>  /  TBili  0.3  /  DBili  x   /  AST  23  /  ALT  31  /  AlkPhos  81  08-19    PT/INR - ( 19 Aug 2021 04:17 )   PT: 14.5 sec;   INR: 1.22 ratio    PTT - ( 19 Aug 2021 04:17 )  PTT:19.8 sec    Procalcitonin, Serum: 0.04 ng/mL (08-18-21 @ 00:21)  Procalcitonin, Serum: 0.03 ng/mL (08-17-21 @ 16:47)    CULTURES    Culture - Blood (collected 08-11-21 @ 03:10)  Source: .Blood Blood-Peripheral  Final Report (08-16-21 @ 04:01):    No Growth Final    Physical Examination:  PULM: Decreased at bases   CVS: RRR    RADIOLOGY REVIEWED  CXR: 8/16 b/l opacities grossly unchanged    < from: [facility] Duplex Lower Ext Vein Scan, Bilat (08.04.21 @ 16:22) >  FINDINGS:    RIGHT:  Normal compressibilityof the RIGHT common femoral, femoral and popliteal veins.  Doppler examination shows normal spontaneous and phasic flow.  No RIGHT calf vein thrombosis is detected.    LEFT:  Normal compressibility of the LEFT common femoral, femoral and popliteal veins.  Doppler examination shows normal spontaneous and phasic flow.  No LEFT calf vein thrombosis is detected.    IMPRESSION:  No evidence of deep venous thrombosis in either lower extremity.      < end of copied text >

## 2021-08-20 NOTE — PROGRESS NOTE ADULT - SUBJECTIVE AND OBJECTIVE BOX
DATE OF SERVICE: 08-20-21 @ 11:04  CHIEF COMPLAINT:Patient is a 55y old  Male who presents with a chief complaint of covid (11 Aug 2021 08:04)    	        PAST MEDICAL & SURGICAL HISTORY:  Hyperlipidemia    HTN (hypertension)    Rupture, tendon, quadriceps    History of Achilles tendon repair            REVIEW OF SYSTEMS:  feels better overall  transferred to flooe  RESPIRATORY: Nsob better  less cough  CARDIOVASCULAR: No chest pain, palpitations, passing out, dizziness, or leg swelling  GASTROINTESTINAL: No abdominal or epigastric pain. No nausea, vomiting, or hematemesis; No diarrhea or constipation. No melena or hematochezia.  GENITOURINARY: No dysuria, frequency, hematuria, or incontinence  NEUROLOGICAL: No headaches,     Medications:  MEDICATIONS  (STANDING):  benzonatate 200 milliGRAM(s) Oral every 8 hours  budesonide 160 MICROgram(s)/formoterol 4.5 MICROgram(s) Inhaler 2 Puff(s) Inhalation two times a day  cholecalciferol 2000 Unit(s) Oral daily  clonazePAM  Tablet 0.5 milliGRAM(s) Oral every 8 hours  enoxaparin Injectable 90 milliGRAM(s) SubCutaneous every 12 hours  guaiFENesin ER 1200 milliGRAM(s) Oral every 12 hours  lidocaine   4% Patch 1 Patch Transdermal every 24 hours  melatonin 3 milliGRAM(s) Oral at bedtime  multivitamin 1 Tablet(s) Oral daily  pantoprazole  Injectable 40 milliGRAM(s) IV Push daily  polyethylene glycol 3350 17 Gram(s) Oral daily  senna 2 Tablet(s) Oral at bedtime    MEDICATIONS  (PRN):  acetaminophen   Tablet .. 650 milliGRAM(s) Oral every 6 hours PRN Temp greater or equal to 38C (100.4F)  hydrocodone/homatropine Syrup 5 milliLiter(s) Oral every 6 hours PRN Cough  oxyCODONE    IR 5 milliGRAM(s) Oral every 4 hours PRN Moderate Pain (4 - 6)  sodium chloride 0.65% Nasal 1 Spray(s) Both Nostrils every 2 hours PRN Nasal Congestion    	    PHYSICAL EXAM:  T(C): 36.8 (08-20-21 @ 05:12), Max: 36.8 (08-19-21 @ 16:40)  HR: 94 (08-20-21 @ 10:33) (86 - 102)  BP: 120/77 (08-20-21 @ 05:12) (92/71 - 120/77)  RR: 18 (08-20-21 @ 10:33) (18 - 29)  SpO2: 97% (08-20-21 @ 10:33) (88% - 97%)  Wt(kg): --  I&O's Summary    19 Aug 2021 07:01  -  20 Aug 2021 07:00  --------------------------------------------------------  IN: 200 mL / OUT: 1050 mL / NET: -850 mL        Appearance: Normal	  HEENT:   Normal oral mucosa, PERRL, EOMI	  Lymphatic: No lymphadenopathy  Cardiovascular: Normal S1 S2, No JVD,   Respiratory: dec bs  Gastrointestinal:  Soft, Non-tender, + BS	  Skin: No rashes, No ecchymoses, No cyanosis	  Neurologic: Non-focal  Extremities: Normal range of motion, No clubbing, cyanosis or edema  Vascular: Peripheral pulses palpable 2+ bilaterally    TELEMETRY: 	    ECG:  	  RADIOLOGY:  OTHER: 	  	  LABS:	 	    CARDIAC MARKERS:                                11.5   14.67 )-----------( 188      ( 19 Aug 2021 04:17 )             36.8     08-19    137  |  99  |  17  ----------------------------<  96  4.2   |  28  |  0.77    Ca    8.9      19 Aug 2021 04:17  Phos  3.4     08-19  Mg     2.2     08-19    TPro  6.3  /  Alb  3.2<L>  /  TBili  0.3  /  DBili  x   /  AST  23  /  ALT  31  /  AlkPhos  81  08-19    proBNP:   Lipid Profile:   HgA1c:   TSH:

## 2021-08-20 NOTE — PROGRESS NOTE ADULT - PROBLEM SELECTOR PLAN 2
2nd to COVID PNA  -S/p RRT 8/3 and 8/4 for hypoxia  -Tx to 5ICU 8/10 for further management  -Now on 4Monti, hypoxia slowly improving   -Was able to sleep comfortably on HFNC last night  -C/w Bipap 15/10/60% PRN  -HFNC currently at 80%/60L, wean o2 as tolerated to keep sats >88%

## 2021-08-20 NOTE — PROGRESS NOTE ADULT - ASSESSMENT
pt w/ covid  hypoxia   s/p steroids  s/p remdesivir  id / f/u   pulm f/u   c/e resp support/bipap/h/f  h/f this am /oxygenation slowly improving    additional tx as per id/pulm / micu  recs  s/p toci  gi / dvt proph  o2  hx htn/   monitor bp  monitor inflammatory markers/improving     transferred to floor

## 2021-08-20 NOTE — PROGRESS NOTE ADULT - PROBLEM SELECTOR PLAN 1
+COVID PCR as an outpatient  -CXR 8/9 with b/l lung opacities, CXR 8/16 grossly unchanged   -S/p Remdesivir x 5 days   -S/p Decadron 6mg IVP qd x 10 days (completed 8/10)  -S/p Tocilizumab 7/30 per ID for worsening hypoxic respiratory failure  -Sputum culture 8/4 with normal respiratory aba. WBC normal, PCT normal, afebrile   -LE duplex 8/4 neg DVT.   -Ddimer elevated at one point to 4000. Ddimer now downtrending but will need to eventually obtain CTA chest to r/o PE and determine duration of full dose AC once pt able to tolerate NRB for transport. C/w current dose of AC for now.  -Continue to trend ddimer & inflammatory markers

## 2021-08-21 LAB
ANION GAP SERPL CALC-SCNC: 9 MMOL/L — SIGNIFICANT CHANGE UP (ref 5–17)
BUN SERPL-MCNC: 10 MG/DL — SIGNIFICANT CHANGE UP (ref 7–23)
CALCIUM SERPL-MCNC: 9.2 MG/DL — SIGNIFICANT CHANGE UP (ref 8.4–10.5)
CHLORIDE SERPL-SCNC: 97 MMOL/L — SIGNIFICANT CHANGE UP (ref 96–108)
CO2 SERPL-SCNC: 32 MMOL/L — HIGH (ref 22–31)
CREAT SERPL-MCNC: 0.67 MG/DL — SIGNIFICANT CHANGE UP (ref 0.5–1.3)
GLUCOSE SERPL-MCNC: 97 MG/DL — SIGNIFICANT CHANGE UP (ref 70–99)
HCT VFR BLD CALC: 36.9 % — LOW (ref 39–50)
HGB BLD-MCNC: 11.2 G/DL — LOW (ref 13–17)
MCHC RBC-ENTMCNC: 28 PG — SIGNIFICANT CHANGE UP (ref 27–34)
MCHC RBC-ENTMCNC: 30.4 GM/DL — LOW (ref 32–36)
MCV RBC AUTO: 92.3 FL — SIGNIFICANT CHANGE UP (ref 80–100)
NRBC # BLD: 0 /100 WBCS — SIGNIFICANT CHANGE UP (ref 0–0)
PLATELET # BLD AUTO: 274 K/UL — SIGNIFICANT CHANGE UP (ref 150–400)
POTASSIUM SERPL-MCNC: 4.3 MMOL/L — SIGNIFICANT CHANGE UP (ref 3.5–5.3)
POTASSIUM SERPL-SCNC: 4.3 MMOL/L — SIGNIFICANT CHANGE UP (ref 3.5–5.3)
RBC # BLD: 4 M/UL — LOW (ref 4.2–5.8)
RBC # FLD: 13.1 % — SIGNIFICANT CHANGE UP (ref 10.3–14.5)
SODIUM SERPL-SCNC: 138 MMOL/L — SIGNIFICANT CHANGE UP (ref 135–145)
WBC # BLD: 11.16 K/UL — HIGH (ref 3.8–10.5)
WBC # FLD AUTO: 11.16 K/UL — HIGH (ref 3.8–10.5)

## 2021-08-21 RX ORDER — CLONAZEPAM 1 MG
0.5 TABLET ORAL EVERY 8 HOURS
Refills: 0 | Status: DISCONTINUED | OUTPATIENT
Start: 2021-08-21 | End: 2021-08-26

## 2021-08-21 RX ORDER — ACETAMINOPHEN 500 MG
650 TABLET ORAL EVERY 6 HOURS
Refills: 0 | Status: DISCONTINUED | OUTPATIENT
Start: 2021-08-21 | End: 2021-08-24

## 2021-08-21 RX ADMIN — BUDESONIDE AND FORMOTEROL FUMARATE DIHYDRATE 2 PUFF(S): 160; 4.5 AEROSOL RESPIRATORY (INHALATION) at 06:04

## 2021-08-21 RX ADMIN — Medication 3 MILLIGRAM(S): at 21:46

## 2021-08-21 RX ADMIN — ENOXAPARIN SODIUM 90 MILLIGRAM(S): 100 INJECTION SUBCUTANEOUS at 06:00

## 2021-08-21 RX ADMIN — Medication 200 MILLIGRAM(S): at 06:00

## 2021-08-21 RX ADMIN — BUDESONIDE AND FORMOTEROL FUMARATE DIHYDRATE 2 PUFF(S): 160; 4.5 AEROSOL RESPIRATORY (INHALATION) at 19:43

## 2021-08-21 RX ADMIN — LIDOCAINE 1 PATCH: 4 CREAM TOPICAL at 07:32

## 2021-08-21 RX ADMIN — PANTOPRAZOLE SODIUM 40 MILLIGRAM(S): 20 TABLET, DELAYED RELEASE ORAL at 13:22

## 2021-08-21 RX ADMIN — Medication 0.5 MILLIGRAM(S): at 13:22

## 2021-08-21 RX ADMIN — Medication 0.5 MILLIGRAM(S): at 22:04

## 2021-08-21 RX ADMIN — Medication 650 MILLIGRAM(S): at 09:51

## 2021-08-21 RX ADMIN — Medication 0.5 MILLIGRAM(S): at 06:00

## 2021-08-21 RX ADMIN — OXYCODONE HYDROCHLORIDE 5 MILLIGRAM(S): 5 TABLET ORAL at 09:31

## 2021-08-21 RX ADMIN — Medication 200 MILLIGRAM(S): at 13:23

## 2021-08-21 RX ADMIN — OXYCODONE HYDROCHLORIDE 5 MILLIGRAM(S): 5 TABLET ORAL at 18:04

## 2021-08-21 RX ADMIN — Medication 1 TABLET(S): at 13:23

## 2021-08-21 RX ADMIN — Medication 1200 MILLIGRAM(S): at 06:00

## 2021-08-21 RX ADMIN — ENOXAPARIN SODIUM 90 MILLIGRAM(S): 100 INJECTION SUBCUTANEOUS at 17:34

## 2021-08-21 RX ADMIN — OXYCODONE HYDROCHLORIDE 5 MILLIGRAM(S): 5 TABLET ORAL at 08:41

## 2021-08-21 RX ADMIN — Medication 650 MILLIGRAM(S): at 10:45

## 2021-08-21 RX ADMIN — Medication 1200 MILLIGRAM(S): at 17:34

## 2021-08-21 RX ADMIN — Medication 2000 UNIT(S): at 13:23

## 2021-08-21 RX ADMIN — POLYETHYLENE GLYCOL 3350 17 GRAM(S): 17 POWDER, FOR SOLUTION ORAL at 13:23

## 2021-08-21 RX ADMIN — LIDOCAINE 1 PATCH: 4 CREAM TOPICAL at 17:34

## 2021-08-21 RX ADMIN — Medication 200 MILLIGRAM(S): at 21:43

## 2021-08-21 RX ADMIN — OXYCODONE HYDROCHLORIDE 5 MILLIGRAM(S): 5 TABLET ORAL at 17:34

## 2021-08-21 NOTE — PROGRESS NOTE ADULT - ASSESSMENT
pt w/ covid  hypoxia   s/p steroids  s/p remdesivir  id / f/u   pulm f/u noted  c/e resp support/bipap/h/f  h/f this am /oxygenation slowly improving    additional tx as per id/pulm / micu  recs  s/p toci  gi / dvt proph  o2  hx htn/   monitor bp  monitor inflammatory markers/improving

## 2021-08-21 NOTE — PROGRESS NOTE ADULT - SUBJECTIVE AND OBJECTIVE BOX
DATE OF SERVICE: 08-21-21 @ 13:11  CHIEF COMPLAINT:Patient is a 55y old  Male who presents with a chief complaint of covid (11 Aug 2021 08:04)    	        PAST MEDICAL & SURGICAL HISTORY:  Hyperlipidemia    HTN (hypertension)    Rupture, tendon, quadriceps    History of Achilles tendon repair            REVIEW OF SYSTEMS:  feels better eveery day  RESPIRATORY: dec sob  CARDIOVASCULAR: No chest pain, palpitations, passing out, dizziness, or leg swelling  GASTROINTESTINAL: No abdominal or epigastric pain. No nausea, vomiting, or hematemesis; No diarrhea   GENITOURINARY: No dysuria, frequency, hematuria, or incontinence  NEUROLOGICAL: No headaches,  MUSCULOSKELETAL: No joint pain or swelling; No muscle, back, or extremity pain    Medications:  MEDICATIONS  (STANDING):  benzonatate 200 milliGRAM(s) Oral every 8 hours  budesonide 160 MICROgram(s)/formoterol 4.5 MICROgram(s) Inhaler 2 Puff(s) Inhalation two times a day  cholecalciferol 2000 Unit(s) Oral daily  clonazePAM  Tablet 0.5 milliGRAM(s) Oral every 8 hours  enoxaparin Injectable 90 milliGRAM(s) SubCutaneous every 12 hours  guaiFENesin ER 1200 milliGRAM(s) Oral every 12 hours  lidocaine   4% Patch 1 Patch Transdermal every 24 hours  melatonin 3 milliGRAM(s) Oral at bedtime  multivitamin 1 Tablet(s) Oral daily  pantoprazole  Injectable 40 milliGRAM(s) IV Push daily  polyethylene glycol 3350 17 Gram(s) Oral daily  senna 2 Tablet(s) Oral at bedtime    MEDICATIONS  (PRN):  acetaminophen   Tablet .. 650 milliGRAM(s) Oral every 6 hours PRN Temp greater or equal to 38C (100.4F), Moderate Pain (4 - 6)  cyclobenzaprine 5 milliGRAM(s) Oral three times a day PRN Muscle Spasm  hydrocodone/homatropine Syrup 5 milliLiter(s) Oral every 6 hours PRN Cough  oxyCODONE    IR 5 milliGRAM(s) Oral every 4 hours PRN Moderate Pain (4 - 6)  sodium chloride 0.65% Nasal 1 Spray(s) Both Nostrils every 2 hours PRN Nasal Congestion    	    PHYSICAL EXAM:  T(C): 37 (08-21-21 @ 11:45), Max: 37 (08-20-21 @ 20:43)  HR: 93 (08-21-21 @ 11:45) (82 - 96)  BP: 110/71 (08-21-21 @ 11:45) (110/71 - 123/77)  RR: 20 (08-21-21 @ 11:45) (18 - 21)  SpO2: 100% (08-21-21 @ 11:45) (91% - 100%)  Wt(kg): --  I&O's Summary    20 Aug 2021 07:01  -  21 Aug 2021 07:00  --------------------------------------------------------  IN: 440 mL / OUT: 840 mL / NET: -400 mL        Appearance: Normal	  HEENT:   Normal oral mucosa, PERRL, EOMI	  Lymphatic: No lymphadenopathy  Cardiovascular: Normal S1 S2, No JVD, No murmurs, No edema  Respiratory: dec bs   Gastrointestinal:  Soft, Non-tender, + BS	  Skin: No rashes, No ecchymoses, No cyanosis	  Neurologic: Non-focal  Extremities: Normal range of motion, No clubbing, cyanosis or edema  Vascular: Peripheral pulses palpable 2+ bilaterally    TELEMETRY: 	    ECG:  	  RADIOLOGY:  OTHER: 	  	  LABS:	 	    CARDIAC MARKERS:                                11.2   11.16 )-----------( 274      ( 21 Aug 2021 06:23 )             36.9     08-21    138  |  97  |  10  ----------------------------<  97  4.3   |  32<H>  |  0.67    Ca    9.2      21 Aug 2021 06:23      proBNP:   Lipid Profile:   HgA1c:   TSH:

## 2021-08-21 NOTE — PROGRESS NOTE ADULT - SUBJECTIVE AND OBJECTIVE BOX
Follow-up Pulm Progress Note    Seen on HFNC 80%/60L  O2 sats 97%, no acute distress  Lowered to HFNC 70%/60L now  Mild ORDAZ, denies CP    Medications:  MEDICATIONS  (STANDING):  benzonatate 200 milliGRAM(s) Oral every 8 hours  budesonide 160 MICROgram(s)/formoterol 4.5 MICROgram(s) Inhaler 2 Puff(s) Inhalation two times a day  cholecalciferol 2000 Unit(s) Oral daily  clonazePAM  Tablet 0.5 milliGRAM(s) Oral every 8 hours  enoxaparin Injectable 90 milliGRAM(s) SubCutaneous every 12 hours  guaiFENesin ER 1200 milliGRAM(s) Oral every 12 hours  lidocaine   4% Patch 1 Patch Transdermal every 24 hours  melatonin 3 milliGRAM(s) Oral at bedtime  multivitamin 1 Tablet(s) Oral daily  pantoprazole  Injectable 40 milliGRAM(s) IV Push daily  polyethylene glycol 3350 17 Gram(s) Oral daily  senna 2 Tablet(s) Oral at bedtime    MEDICATIONS  (PRN):  acetaminophen   Tablet .. 650 milliGRAM(s) Oral every 6 hours PRN Temp greater or equal to 38C (100.4F), Moderate Pain (4 - 6)  cyclobenzaprine 5 milliGRAM(s) Oral three times a day PRN Muscle Spasm  hydrocodone/homatropine Syrup 5 milliLiter(s) Oral every 6 hours PRN Cough  oxyCODONE    IR 5 milliGRAM(s) Oral every 4 hours PRN Moderate Pain (4 - 6)  sodium chloride 0.65% Nasal 1 Spray(s) Both Nostrils every 2 hours PRN Nasal Congestion    Vital Signs Last 24 Hrs  T(C): 36.6 (21 Aug 2021 05:48), Max: 37 (20 Aug 2021 20:43)  T(F): 97.8 (21 Aug 2021 05:48), Max: 98.6 (20 Aug 2021 20:43)  HR: 92 (21 Aug 2021 05:48) (82 - 96)  BP: 114/77 (21 Aug 2021 05:48) (114/77 - 123/77)  BP(mean): --  RR: 18 (21 Aug 2021 05:48) (18 - 19)  SpO2: 98% (21 Aug 2021 05:48) (96% - 98%)    08-20 @ 07:01  -  08-21 @ 07:00  --------------------------------------------------------  IN: 440 mL / OUT: 840 mL / NET: -400 mL      LABS:                        11.2   11.16 )-----------( 274      ( 21 Aug 2021 06:23 )             36.9     08-21    138  |  97  |  10  ----------------------------<  97  4.3   |  32<H>  |  0.67    Ca    9.2      21 Aug 2021 06:23      Physical Examination:  PULM: Decreased at bases  CVS: RRR     RADIOLOGY REVIEWED  CXR: 8/16 b/l lung opacities    < from: [facility] Duplex Lower Ext Vein Scan, Bilat (08.04.21 @ 16:22) >  FINDINGS:    RIGHT:  Normal compressibilityof the RIGHT common femoral, femoral and popliteal veins.  Doppler examination shows normal spontaneous and phasic flow.  No RIGHT calf vein thrombosis is detected.    LEFT:  Normal compressibility of the LEFT common femoral, femoral and popliteal veins.  Doppler examination shows normal spontaneous and phasic flow.  No LEFT calf vein thrombosis is detected.    IMPRESSION:  No evidence of deep venous thrombosis in either lower extremity.    < end of copied text >

## 2021-08-21 NOTE — PROGRESS NOTE ADULT - PROBLEM SELECTOR PLAN 2
2nd to COVID PNA  -S/p RRT 8/3 and 8/4 for hypoxia  -Tx to 5ICU 8/10 for further management  -Now on 4Monti, hypoxia slowly improving   -Was able to sleep comfortably on HFNC last night  -C/w Bipap 15/10/60% PRN   -HFNC currently at 70%/60L, wean o2 as tolerated to keep sats >88%

## 2021-08-22 LAB
ANION GAP SERPL CALC-SCNC: 8 MMOL/L — SIGNIFICANT CHANGE UP (ref 5–17)
BUN SERPL-MCNC: 8 MG/DL — SIGNIFICANT CHANGE UP (ref 7–23)
CALCIUM SERPL-MCNC: 9 MG/DL — SIGNIFICANT CHANGE UP (ref 8.4–10.5)
CHLORIDE SERPL-SCNC: 98 MMOL/L — SIGNIFICANT CHANGE UP (ref 96–108)
CO2 SERPL-SCNC: 33 MMOL/L — HIGH (ref 22–31)
CREAT SERPL-MCNC: 0.61 MG/DL — SIGNIFICANT CHANGE UP (ref 0.5–1.3)
FERRITIN SERPL-MCNC: 463 NG/ML — HIGH (ref 30–400)
GLUCOSE SERPL-MCNC: 95 MG/DL — SIGNIFICANT CHANGE UP (ref 70–99)
HCT VFR BLD CALC: 37 % — LOW (ref 39–50)
HGB BLD-MCNC: 11.4 G/DL — LOW (ref 13–17)
LDH SERPL L TO P-CCNC: 397 U/L — HIGH (ref 50–242)
MCHC RBC-ENTMCNC: 28.1 PG — SIGNIFICANT CHANGE UP (ref 27–34)
MCHC RBC-ENTMCNC: 30.8 GM/DL — LOW (ref 32–36)
MCV RBC AUTO: 91.1 FL — SIGNIFICANT CHANGE UP (ref 80–100)
NRBC # BLD: 0 /100 WBCS — SIGNIFICANT CHANGE UP (ref 0–0)
PLATELET # BLD AUTO: 293 K/UL — SIGNIFICANT CHANGE UP (ref 150–400)
POTASSIUM SERPL-MCNC: 4.2 MMOL/L — SIGNIFICANT CHANGE UP (ref 3.5–5.3)
POTASSIUM SERPL-SCNC: 4.2 MMOL/L — SIGNIFICANT CHANGE UP (ref 3.5–5.3)
RBC # BLD: 4.06 M/UL — LOW (ref 4.2–5.8)
RBC # FLD: 13.2 % — SIGNIFICANT CHANGE UP (ref 10.3–14.5)
SODIUM SERPL-SCNC: 139 MMOL/L — SIGNIFICANT CHANGE UP (ref 135–145)
WBC # BLD: 13.08 K/UL — HIGH (ref 3.8–10.5)
WBC # FLD AUTO: 13.08 K/UL — HIGH (ref 3.8–10.5)

## 2021-08-22 RX ADMIN — OXYCODONE HYDROCHLORIDE 5 MILLIGRAM(S): 5 TABLET ORAL at 00:33

## 2021-08-22 RX ADMIN — LIDOCAINE 1 PATCH: 4 CREAM TOPICAL at 07:10

## 2021-08-22 RX ADMIN — ENOXAPARIN SODIUM 90 MILLIGRAM(S): 100 INJECTION SUBCUTANEOUS at 05:13

## 2021-08-22 RX ADMIN — Medication 2000 UNIT(S): at 12:41

## 2021-08-22 RX ADMIN — Medication 200 MILLIGRAM(S): at 05:13

## 2021-08-22 RX ADMIN — BUDESONIDE AND FORMOTEROL FUMARATE DIHYDRATE 2 PUFF(S): 160; 4.5 AEROSOL RESPIRATORY (INHALATION) at 06:52

## 2021-08-22 RX ADMIN — PANTOPRAZOLE SODIUM 40 MILLIGRAM(S): 20 TABLET, DELAYED RELEASE ORAL at 12:41

## 2021-08-22 RX ADMIN — Medication 0.5 MILLIGRAM(S): at 21:56

## 2021-08-22 RX ADMIN — ENOXAPARIN SODIUM 90 MILLIGRAM(S): 100 INJECTION SUBCUTANEOUS at 17:39

## 2021-08-22 RX ADMIN — Medication 1 TABLET(S): at 12:41

## 2021-08-22 RX ADMIN — OXYCODONE HYDROCHLORIDE 5 MILLIGRAM(S): 5 TABLET ORAL at 12:41

## 2021-08-22 RX ADMIN — BUDESONIDE AND FORMOTEROL FUMARATE DIHYDRATE 2 PUFF(S): 160; 4.5 AEROSOL RESPIRATORY (INHALATION) at 17:39

## 2021-08-22 RX ADMIN — Medication 0.5 MILLIGRAM(S): at 05:13

## 2021-08-22 RX ADMIN — LIDOCAINE 1 PATCH: 4 CREAM TOPICAL at 06:10

## 2021-08-22 RX ADMIN — LIDOCAINE 1 PATCH: 4 CREAM TOPICAL at 17:39

## 2021-08-22 RX ADMIN — Medication 1200 MILLIGRAM(S): at 17:39

## 2021-08-22 RX ADMIN — Medication 0.5 MILLIGRAM(S): at 14:08

## 2021-08-22 RX ADMIN — Medication 1200 MILLIGRAM(S): at 05:13

## 2021-08-22 RX ADMIN — Medication 200 MILLIGRAM(S): at 21:47

## 2021-08-22 RX ADMIN — Medication 200 MILLIGRAM(S): at 12:37

## 2021-08-22 RX ADMIN — OXYCODONE HYDROCHLORIDE 5 MILLIGRAM(S): 5 TABLET ORAL at 13:13

## 2021-08-22 RX ADMIN — Medication 3 MILLIGRAM(S): at 21:47

## 2021-08-22 NOTE — PROGRESS NOTE ADULT - SUBJECTIVE AND OBJECTIVE BOX
DATE OF SERVICE: 08-22-21 @ 10:41  CHIEF COMPLAINT:Patient is a 55y old  Male who presents with a chief complaint of covid (11 Aug 2021 08:04)    	        PAST MEDICAL & SURGICAL HISTORY:  Hyperlipidemia    HTN (hypertension)    Rupture, tendon, quadriceps    History of Achilles tendon repair            REVIEW OF SYSTEMS:  feels better  RESPIRATORY:  cough / sob better  CARDIOVASCULAR: No chest pain, palpitations, passing out, dizziness, or leg swelling  GASTROINTESTINAL: No abdominal or epigastric pain. No nausea, vomiting, or hematemesis; No diarrhea or constipation. No melena or hematochezia.  GENITOURINARY: No dysuria, frequency, hematuria, or incontinence  NEUROLOGICAL: No headaches,     Medications:  MEDICATIONS  (STANDING):  benzonatate 200 milliGRAM(s) Oral every 8 hours  budesonide 160 MICROgram(s)/formoterol 4.5 MICROgram(s) Inhaler 2 Puff(s) Inhalation two times a day  cholecalciferol 2000 Unit(s) Oral daily  clonazePAM  Tablet 0.5 milliGRAM(s) Oral every 8 hours  enoxaparin Injectable 90 milliGRAM(s) SubCutaneous every 12 hours  guaiFENesin ER 1200 milliGRAM(s) Oral every 12 hours  lidocaine   4% Patch 1 Patch Transdermal every 24 hours  melatonin 3 milliGRAM(s) Oral at bedtime  multivitamin 1 Tablet(s) Oral daily  pantoprazole  Injectable 40 milliGRAM(s) IV Push daily  polyethylene glycol 3350 17 Gram(s) Oral daily  senna 2 Tablet(s) Oral at bedtime    MEDICATIONS  (PRN):  acetaminophen   Tablet .. 650 milliGRAM(s) Oral every 6 hours PRN Temp greater or equal to 38C (100.4F), Moderate Pain (4 - 6)  cyclobenzaprine 5 milliGRAM(s) Oral three times a day PRN Muscle Spasm  hydrocodone/homatropine Syrup 5 milliLiter(s) Oral every 6 hours PRN Cough  oxyCODONE    IR 5 milliGRAM(s) Oral every 4 hours PRN Moderate Pain (4 - 6)  sodium chloride 0.65% Nasal 1 Spray(s) Both Nostrils every 2 hours PRN Nasal Congestion    	    PHYSICAL EXAM:  T(C): 36.8 (08-22-21 @ 03:45), Max: 37.1 (08-21-21 @ 20:58)  HR: 92 (08-22-21 @ 09:04) (88 - 94)  BP: 118/78 (08-22-21 @ 03:45) (110/71 - 118/78)  RR: 20 (08-22-21 @ 09:04) (18 - 20)  SpO2: 90% (08-22-21 @ 09:04) (90% - 100%)  Wt(kg): --  I&O's Summary      Appearance: Normal	  HEENT:   Normal oral mucosa, PERRL, EOMI	  Lymphatic: No lymphadenopathy  Cardiovascular: Normal S1 S2, No JVD, No murmurs, No edema  Respiratory:dec bs   Gastrointestinal:  Soft, Non-tender, + BS	  Skin: No rashes, No ecchymoses, No cyanosis	  Neurologic: Non-focal  Extremities: Normal range of motion, No clubbing, cyanosis or edema  Vascular: Peripheral pulses palpable 2+ bilaterally    TELEMETRY: 	    ECG:  	  RADIOLOGY:  OTHER: 	  	  LABS:	 	    CARDIAC MARKERS:                                11.4   13.08 )-----------( 293      ( 22 Aug 2021 05:53 )             37.0     08-22    139  |  98  |  8   ----------------------------<  95  4.2   |  33<H>  |  0.61    Ca    9.0      22 Aug 2021 05:53      proBNP:   Lipid Profile:   HgA1c:   TSH:

## 2021-08-23 DIAGNOSIS — R07.9 CHEST PAIN, UNSPECIFIED: ICD-10-CM

## 2021-08-23 LAB
ANION GAP SERPL CALC-SCNC: 11 MMOL/L — SIGNIFICANT CHANGE UP (ref 5–17)
BUN SERPL-MCNC: 8 MG/DL — SIGNIFICANT CHANGE UP (ref 7–23)
CALCIUM SERPL-MCNC: 9.2 MG/DL — SIGNIFICANT CHANGE UP (ref 8.4–10.5)
CHLORIDE SERPL-SCNC: 96 MMOL/L — SIGNIFICANT CHANGE UP (ref 96–108)
CO2 SERPL-SCNC: 31 MMOL/L — SIGNIFICANT CHANGE UP (ref 22–31)
CREAT SERPL-MCNC: 0.6 MG/DL — SIGNIFICANT CHANGE UP (ref 0.5–1.3)
CRP SERPL-MCNC: 110 MG/L — HIGH (ref 0–4)
D DIMER BLD IA.RAPID-MCNC: 1076 NG/ML DDU — HIGH
GLUCOSE BLDC GLUCOMTR-MCNC: 87 MG/DL — SIGNIFICANT CHANGE UP (ref 70–99)
GLUCOSE BLDC GLUCOMTR-MCNC: 98 MG/DL — SIGNIFICANT CHANGE UP (ref 70–99)
GLUCOSE SERPL-MCNC: 93 MG/DL — SIGNIFICANT CHANGE UP (ref 70–99)
HCT VFR BLD CALC: 37.4 % — LOW (ref 39–50)
HGB BLD-MCNC: 11.5 G/DL — LOW (ref 13–17)
LDH SERPL L TO P-CCNC: 379 U/L — HIGH (ref 50–242)
MCHC RBC-ENTMCNC: 27.6 PG — SIGNIFICANT CHANGE UP (ref 27–34)
MCHC RBC-ENTMCNC: 30.7 GM/DL — LOW (ref 32–36)
MCV RBC AUTO: 89.7 FL — SIGNIFICANT CHANGE UP (ref 80–100)
NRBC # BLD: 0 /100 WBCS — SIGNIFICANT CHANGE UP (ref 0–0)
PLATELET # BLD AUTO: 320 K/UL — SIGNIFICANT CHANGE UP (ref 150–400)
POTASSIUM SERPL-MCNC: 4.2 MMOL/L — SIGNIFICANT CHANGE UP (ref 3.5–5.3)
POTASSIUM SERPL-SCNC: 4.2 MMOL/L — SIGNIFICANT CHANGE UP (ref 3.5–5.3)
RBC # BLD: 4.17 M/UL — LOW (ref 4.2–5.8)
RBC # FLD: 13.1 % — SIGNIFICANT CHANGE UP (ref 10.3–14.5)
SODIUM SERPL-SCNC: 138 MMOL/L — SIGNIFICANT CHANGE UP (ref 135–145)
TROPONIN T, HIGH SENSITIVITY RESULT: <6 NG/L — SIGNIFICANT CHANGE UP (ref 0–51)
WBC # BLD: 11.71 K/UL — HIGH (ref 3.8–10.5)
WBC # FLD AUTO: 11.71 K/UL — HIGH (ref 3.8–10.5)

## 2021-08-23 PROCEDURE — 99232 SBSQ HOSP IP/OBS MODERATE 35: CPT

## 2021-08-23 PROCEDURE — 93010 ELECTROCARDIOGRAM REPORT: CPT

## 2021-08-23 PROCEDURE — 93010 ELECTROCARDIOGRAM REPORT: CPT | Mod: 77

## 2021-08-23 PROCEDURE — 71045 X-RAY EXAM CHEST 1 VIEW: CPT | Mod: 26

## 2021-08-23 RX ORDER — MORPHINE SULFATE 50 MG/1
2 CAPSULE, EXTENDED RELEASE ORAL ONCE
Refills: 0 | Status: DISCONTINUED | OUTPATIENT
Start: 2021-08-23 | End: 2021-08-23

## 2021-08-23 RX ORDER — ACETAMINOPHEN 500 MG
1000 TABLET ORAL ONCE
Refills: 0 | Status: COMPLETED | OUTPATIENT
Start: 2021-08-23 | End: 2021-08-23

## 2021-08-23 RX ORDER — HYDROMORPHONE HYDROCHLORIDE 2 MG/ML
1 INJECTION INTRAMUSCULAR; INTRAVENOUS; SUBCUTANEOUS ONCE
Refills: 0 | Status: DISCONTINUED | OUTPATIENT
Start: 2021-08-23 | End: 2021-08-23

## 2021-08-23 RX ADMIN — ENOXAPARIN SODIUM 90 MILLIGRAM(S): 100 INJECTION SUBCUTANEOUS at 18:28

## 2021-08-23 RX ADMIN — LIDOCAINE 1 PATCH: 4 CREAM TOPICAL at 18:29

## 2021-08-23 RX ADMIN — BUDESONIDE AND FORMOTEROL FUMARATE DIHYDRATE 2 PUFF(S): 160; 4.5 AEROSOL RESPIRATORY (INHALATION) at 06:31

## 2021-08-23 RX ADMIN — ENOXAPARIN SODIUM 90 MILLIGRAM(S): 100 INJECTION SUBCUTANEOUS at 05:43

## 2021-08-23 RX ADMIN — Medication 1 TABLET(S): at 11:09

## 2021-08-23 RX ADMIN — MORPHINE SULFATE 2 MILLIGRAM(S): 50 CAPSULE, EXTENDED RELEASE ORAL at 18:29

## 2021-08-23 RX ADMIN — Medication 1200 MILLIGRAM(S): at 18:28

## 2021-08-23 RX ADMIN — Medication 0.5 MILLIGRAM(S): at 21:18

## 2021-08-23 RX ADMIN — Medication 0.5 MILLIGRAM(S): at 05:42

## 2021-08-23 RX ADMIN — Medication 200 MILLIGRAM(S): at 07:38

## 2021-08-23 RX ADMIN — BUDESONIDE AND FORMOTEROL FUMARATE DIHYDRATE 2 PUFF(S): 160; 4.5 AEROSOL RESPIRATORY (INHALATION) at 23:12

## 2021-08-23 RX ADMIN — Medication 2000 UNIT(S): at 11:09

## 2021-08-23 RX ADMIN — OXYCODONE HYDROCHLORIDE 5 MILLIGRAM(S): 5 TABLET ORAL at 16:37

## 2021-08-23 RX ADMIN — Medication 3 MILLIGRAM(S): at 21:18

## 2021-08-23 RX ADMIN — Medication 400 MILLIGRAM(S): at 18:28

## 2021-08-23 RX ADMIN — Medication 0.5 MILLIGRAM(S): at 14:52

## 2021-08-23 RX ADMIN — Medication 1200 MILLIGRAM(S): at 05:43

## 2021-08-23 RX ADMIN — Medication 400 MILLIGRAM(S): at 22:08

## 2021-08-23 RX ADMIN — Medication 400 MILLIGRAM(S): at 06:05

## 2021-08-23 RX ADMIN — OXYCODONE HYDROCHLORIDE 5 MILLIGRAM(S): 5 TABLET ORAL at 11:09

## 2021-08-23 RX ADMIN — LIDOCAINE 1 PATCH: 4 CREAM TOPICAL at 19:31

## 2021-08-23 RX ADMIN — HYDROMORPHONE HYDROCHLORIDE 1 MILLIGRAM(S): 2 INJECTION INTRAMUSCULAR; INTRAVENOUS; SUBCUTANEOUS at 06:06

## 2021-08-23 RX ADMIN — PANTOPRAZOLE SODIUM 40 MILLIGRAM(S): 20 TABLET, DELAYED RELEASE ORAL at 11:09

## 2021-08-23 RX ADMIN — Medication 200 MILLIGRAM(S): at 15:03

## 2021-08-23 RX ADMIN — Medication 200 MILLIGRAM(S): at 21:18

## 2021-08-23 NOTE — PROGRESS NOTE ADULT - ASSESSMENT
55 M with covid PNA, CP         completed remdesivir, s/p decadron   sp toci   No fever  CP better- cardio seeing  Hold off on abx for now   CXR - no new findings    Dylan Carter  Attending Physician   Division of Infectious Disease  Pager #[phone]  Available on Microsoft Teams also  After 5pm/weekend or no response, call #[phone]

## 2021-08-23 NOTE — PROGRESS NOTE ADULT - ASSESSMENT
pt w/ covid  hypoxia   s/p steroids  s/p remdesivir  id / f/u   pulm f/u   c/e resp support/bipap/h/f  h/f this am /oxygenation slowly improving  s/p rrt  cards eval / likely atypical cp   s/p toci  gi / dvt proph  o2  hx htn/   monitor bp  monitor inflammatory markers/improving          pt w/ covid  hypoxia   s/p steroids  s/p remdesivir  id / f/u   pulm f/u   c/e resp support/bipap/h/f  h/f this am /oxygenation slowly improving  s/p rrt  cards eval / likely atypical cp   s/p toci  gi / dvt proph  o2  hx htn/   monitor bp  g  discussed w/ wife this am

## 2021-08-23 NOTE — PROGRESS NOTE ADULT - SUBJECTIVE AND OBJECTIVE BOX
KARISSA VILLALBA 55y [id number]    Patient is a 55y old  Male who presents with a chief complaint of covid (11 Aug 2021 08:04)      Follow Up/CC:  ID following for COVID    Interval History/ROS: ID recalled due to CP, no fever, s/p RRT earlier      Allergies    No Known Allergies    Intolerances        ANTIMICROBIALS:      MEDICATIONS  (STANDING):  benzonatate 200 milliGRAM(s) Oral every 8 hours  budesonide 160 MICROgram(s)/formoterol 4.5 MICROgram(s) Inhaler 2 Puff(s) Inhalation two times a day  cholecalciferol 2000 Unit(s) Oral daily  clonazePAM  Tablet 0.5 milliGRAM(s) Oral every 8 hours  enoxaparin Injectable 90 milliGRAM(s) SubCutaneous every 12 hours  guaiFENesin ER 1200 milliGRAM(s) Oral every 12 hours  lidocaine   4% Patch 1 Patch Transdermal every 24 hours  melatonin 3 milliGRAM(s) Oral at bedtime  multivitamin 1 Tablet(s) Oral daily  pantoprazole  Injectable 40 milliGRAM(s) IV Push daily  polyethylene glycol 3350 17 Gram(s) Oral daily  senna 2 Tablet(s) Oral at bedtime    MEDICATIONS  (PRN):  acetaminophen   Tablet .. 650 milliGRAM(s) Oral every 6 hours PRN Temp greater or equal to 38C (100.4F), Moderate Pain (4 - 6)  cyclobenzaprine 5 milliGRAM(s) Oral three times a day PRN Muscle Spasm  hydrocodone/homatropine Syrup 5 milliLiter(s) Oral every 6 hours PRN Cough  oxyCODONE    IR 5 milliGRAM(s) Oral every 4 hours PRN Moderate Pain (4 - 6)  sodium chloride 0.65% Nasal 1 Spray(s) Both Nostrils every 2 hours PRN Nasal Congestion        Vital Signs Last 24 Hrs  T(C): 36.8 (23 Aug 2021 11:03), Max: 36.9 (23 Aug 2021 04:21)  T(F): 98.2 (23 Aug 2021 11:03), Max: 98.4 (23 Aug 2021 04:21)  HR: 101 (23 Aug 2021 15:20) (83 - 110)  BP: 135/85 (23 Aug 2021 14:52) (110/68 - 135/85)  BP(mean): --  RR: 20 (23 Aug 2021 15:20) (18 - 20)  SpO2: 98% (23 Aug 2021 16:24) (93% - 98%)    CBC Full  -  ( 23 Aug 2021 05:44 )  WBC Count : 11.71 K/uL  RBC Count : 4.17 M/uL  Hemoglobin : 11.5 g/dL  Hematocrit : 37.4 %  Platelet Count - Automated : 320 K/uL  Mean Cell Volume : 89.7 fl  Mean Cell Hemoglobin : 27.6 pg  Mean Cell Hemoglobin Concentration : 30.7 gm/dL  Auto Neutrophil # : x  Auto Lymphocyte # : x  Auto Monocyte # : x  Auto Eosinophil # : x  Auto Basophil # : x  Auto Neutrophil % : x  Auto Lymphocyte % : x  Auto Monocyte % : x  Auto Eosinophil % : x  Auto Basophil % : x    08-23    138  |  96  |  8   ----------------------------<  93  4.2   |  31  |  0.60    Ca    9.2      23 Aug 2021 05:44            MICROBIOLOGY:  .Blood Blood-Peripheral  08-11-21   No Growth Final  --  --      .Sputum Sputum, cup  08-04-21   Normal Respiratory Tiki present  --    Few polymorphonuclear leukocytes per low power field  Rare Squamous epithelial cells per low power field  Moderate Gram Positive Cocci in Pairs and Chains per oil power field  Rare Gram Negative Rods per oil power field              v            RADIOLOGY

## 2021-08-23 NOTE — PROGRESS NOTE ADULT - PROBLEM SELECTOR PLAN 2
2nd to COVID PNA  -S/p RRT 8/3 and 8/4 for hypoxia  -Tx to 5ICU 8/10 for further management  -Now on 4Monti, hypoxia slowly improving   -S/p RRT this AM for chest pain where HFNC was briefly increased to 100%/60L  -CXR noted  -Chest pain since resolved  -HFNC currently at 70%/60L, wean o2 as tolerated to keep sats >88%  -C/w Bipap 15/10/60% PRN   -Incentive spirometry   -F/u CTA when able 2nd to COVID PNA  -S/p RRT 8/3 and 8/4 for hypoxia  -Tx to 5ICU 8/10 for further management  -Now on 4Monti, hypoxia slowly improving   -S/p RRT this AM for chest pain where HFNC was briefly increased to 100%/60L  -CXR noted  -Chest pain since resolved  -HFNC currently at 70%/60L, wean o2 as tolerated to keep sats >88%  -Incentive spirometry   -F/u CTA when able 2nd to COVID PNA  -S/p RRT 8/3 and 8/4 for hypoxia  -Tx to 5ICU 8/10 for further management  -Now on 4Monti, hypoxia slowly improving   -S/p RRT this AM for chest pain where HFNC was briefly increased to 100%/60L  -CXR noted  -Chest pain since resolved  -HFNC currently at 70%/60L, wean o2 as tolerated to keep sats >88%  -Incentive spirometry   -F/u CTA when able, check sat on nonrebreather 2nd to COVID PNA  -S/p RRT 8/3 and 8/4 for hypoxia  -Tx to 5ICU 8/10 for further management  -Now on 4Monti, hypoxia slowly improving   -S/p RRT this AM for chest pain where HFNC was briefly increased to 100%/60L  -CXR with worsening b/l opacties R>L, ?effusion vs mucus plugging/collapse, also with opacities 2nd to COVID   -Chest pain since resolved  -HFNC currently at 70%/60L, wean o2 as tolerated to keep sats >88%  -Will reorder Bipap 15/10 qhs for RLL collapse on CXR   -Incentive spirometry   -F/u CTA when able, check sat on nonrebreather 2nd to COVID PNA  -S/p RRT 8/3 and 8/4 for hypoxia  -Tx to 5ICU 8/10 for further management  -Now on 4Monti, hypoxia slowly improving   -S/p RRT this AM for chest pain where HFNC was briefly increased to 100%/60L  -CXR with worsening b/l opacties R>L, ?effusion vs mucus plugging/collapse, also with opacities 2nd to COVID   -Chest pain since resolved  -HFNC currently at 70%/60L, wean o2 as tolerated to keep sats >88%  -Will reorder Bipap 15/10 qhs for RLL collapse on CXR   -Incentive spirometry   -CXR in AM  -F/u CTA when able, check sat on nonrebreather

## 2021-08-23 NOTE — CONSULT NOTE ADULT - TIME BILLING
Patient seen and examined.  Agree with above NP note.  a/p  56yo M with Hx of DVT s/p achilles tendon repair years ago not on AC presenting with complaints of SOB. intermittent and fevers with cough productive of white sputum for past week, tested positive for covid     #Atypical Chest Pain  -RRT this am for chest pain - exacerbated by cough and inspiration -  relieved with 1mg IV dilaudid  -atypical and likely secondary to covid PNA   -trop negative, no acute ischemic ecg abnl   -pending CT A r/o PE  -check echo when feasible     #Covid -19  -CXR noted, s/p completed courses of Remdesivir x 5 days and Decadron x 10 days   -S/p Tocilizumab 7/30 per ID   -LE duplex 8/4 neg DVT  -pending CTA   -pulm  f/u     #DVT (hx)  -LE doppler as above  -on full dose AC

## 2021-08-23 NOTE — PROGRESS NOTE ADULT - SUBJECTIVE AND OBJECTIVE BOX
DATE OF SERVICE: 08-23-21 @ 11:06  CHIEF COMPLAINT:Patient is a 55y old  Male who presents with a chief complaint of covid (11 Aug 2021 08:04)    	        PAST MEDICAL & SURGICAL HISTORY:  Hyperlipidemia    HTN (hypertension)    Rupture, tendon, quadriceps    History of Achilles tendon repair            REVIEW OF SYSTEMS:  events noted   weak  RESPIRATORY: breathing better  CARDIOVASCULAR: atypical chest pain resolved  GASTROINTESTINAL: No abdominal or epigastric pain. No nausea, vomiting, or hematemesis  GENITOURINARY: No dysuria, frequency, hematuria,   NEUROLOGICAL: No headaches,     Medications:  MEDICATIONS  (STANDING):  benzonatate 200 milliGRAM(s) Oral every 8 hours  budesonide 160 MICROgram(s)/formoterol 4.5 MICROgram(s) Inhaler 2 Puff(s) Inhalation two times a day  cholecalciferol 2000 Unit(s) Oral daily  clonazePAM  Tablet 0.5 milliGRAM(s) Oral every 8 hours  enoxaparin Injectable 90 milliGRAM(s) SubCutaneous every 12 hours  guaiFENesin ER 1200 milliGRAM(s) Oral every 12 hours  lidocaine   4% Patch 1 Patch Transdermal every 24 hours  melatonin 3 milliGRAM(s) Oral at bedtime  multivitamin 1 Tablet(s) Oral daily  pantoprazole  Injectable 40 milliGRAM(s) IV Push daily  polyethylene glycol 3350 17 Gram(s) Oral daily  senna 2 Tablet(s) Oral at bedtime    MEDICATIONS  (PRN):  acetaminophen   Tablet .. 650 milliGRAM(s) Oral every 6 hours PRN Temp greater or equal to 38C (100.4F), Moderate Pain (4 - 6)  cyclobenzaprine 5 milliGRAM(s) Oral three times a day PRN Muscle Spasm  hydrocodone/homatropine Syrup 5 milliLiter(s) Oral every 6 hours PRN Cough  oxyCODONE    IR 5 milliGRAM(s) Oral every 4 hours PRN Moderate Pain (4 - 6)  sodium chloride 0.65% Nasal 1 Spray(s) Both Nostrils every 2 hours PRN Nasal Congestion    	    PHYSICAL EXAM:  T(C): 36.8 (08-23-21 @ 11:03), Max: 36.9 (08-22-21 @ 12:04)  HR: 91 (08-23-21 @ 11:03) (83 - 106)  BP: 112/74 (08-23-21 @ 11:03) (105/70 - 112/77)  RR: 19 (08-23-21 @ 11:03) (18 - 20)  SpO2: 95% (08-23-21 @ 11:03) (94% - 97%)  Wt(kg): --  I&O's Summary    22 Aug 2021 07:01  -  23 Aug 2021 07:00  --------------------------------------------------------  IN: 120 mL / OUT: 700 mL / NET: -580 mL        Appearance: Normal	  HEENT:   Normal oral mucosa, PERRL, EOMI	  Cardiovascular: Normal S1 S2, No JVD, No murmurs, No edema  Respiratory: dec bs   Gastrointestinal:  Soft, Non-tender, + BS	  Skin: No rashes, No ecchymoses, No cyanosis	  Neurologic: Non-focal  Extremities: Normal range of motion, No clubbing, cyanosis or edema  Vascular: Peripheral pulses palpable     TELEMETRY: 	    ECG:  	  RADIOLOGY:  OTHER: 	  	  LABS:	 	    CARDIAC MARKERS:                                11.5   11.71 )-----------( 320      ( 23 Aug 2021 05:44 )             37.4     08-23    138  |  96  |  8   ----------------------------<  93  4.2   |  31  |  0.60    Ca    9.2      23 Aug 2021 05:44      proBNP:   Lipid Profile:   HgA1c:   TSH:

## 2021-08-23 NOTE — CONSULT NOTE ADULT - ASSESSMENT
a/p  56yo M with Hx of DVT s/p achilles tendon repair years ago not on AC presenting with complaints of SOB. intermittent and fevers with cough productive of white sputum for past week, tested positive for covid 2 days ago.    #Atypical Chest Pain  -RRT this am for chest pain - exacerbated by cough and inspiration -  relieved with 1mg IV dilaudid  -secondary to covid PNA   -hs T neg, ekg without acute ischemic changes  -pending CT A r/o PE  -check echo when feasible   -Tylenol PRN for pain    #Covid -19  -CXR noted   -s/p completed courses of Remdesivir x 5 days and Decadron x 10 days   -S/p Tocilizumab 7/30 per ID   -LE duplex 8/4 neg DVT  -pending CTA   -pulm  f/u     #DVT (hx)  -LE doppler as above  -on full dose AC    plan discussed with ACP

## 2021-08-23 NOTE — CONSULT NOTE ADULT - SUBJECTIVE AND OBJECTIVE BOX
CARDIOLOGY CONSULT - Dr. Terry         HPI:  54yo M with Hx of DVT s/p achilles tendon repair years ago not on AC presenting with complaints of SOB. intermittent and fevers with cough productive of white sputum for past week, tested positive for covid 2 days ago.  pt is unvaccinated   progressively worsening SOB over the past 5 days, worse with ambulation. found to be hypoxic on arrival to the  er     Cardiac eval for chest pain. Patient report r sided chest pain with coughing and deep inspiration.    denies any recent cardiac testing. Patient denies any palpitations,  syncope, edema, exertional symptoms, nausea, abdominal pain,  chills,  or rash.  Denies any history of CAD, MI, valve disease, cardiomyopathy, or congenital heart disease.       PAST MEDICAL & SURGICAL HISTORY:  Hyperlipidemia    HTN (hypertension)    Rupture, tendon, quadriceps    History of Achilles tendon repair            PREVIOUS DIAGNOSTIC TESTING:    [ ] Echocardiogram:  [ ]  Catheterization:  [ ] Stress Test:  	    MEDICATIONS:  Home Medications:  Albuterol (Eqv-ProAir HFA) 90 mcg/inh inhalation aerosol: 2 puff(s) inhaled every 6 hours, As Needed (29 Jul 2021 09:08)  ivermectin 3 mg oral tablet: 1 tab(s) orally once a day (29 Jul 2021 09:08)  levoFLOXacin 500 mg oral tablet: 1 tab(s) orally every 24 hours (29 Jul 2021 09:08)  predniSONE 50 mg oral tablet: 1 tab(s) orally once a day (29 Jul 2021 09:08)      MEDICATIONS  (STANDING):  benzonatate 200 milliGRAM(s) Oral every 8 hours  budesonide 160 MICROgram(s)/formoterol 4.5 MICROgram(s) Inhaler 2 Puff(s) Inhalation two times a day  cholecalciferol 2000 Unit(s) Oral daily  clonazePAM  Tablet 0.5 milliGRAM(s) Oral every 8 hours  enoxaparin Injectable 90 milliGRAM(s) SubCutaneous every 12 hours  guaiFENesin ER 1200 milliGRAM(s) Oral every 12 hours  lidocaine   4% Patch 1 Patch Transdermal every 24 hours  melatonin 3 milliGRAM(s) Oral at bedtime  multivitamin 1 Tablet(s) Oral daily  pantoprazole  Injectable 40 milliGRAM(s) IV Push daily  polyethylene glycol 3350 17 Gram(s) Oral daily  senna 2 Tablet(s) Oral at bedtime      FAMILY HISTORY:  No pertinent family history in first degree relatives        SOCIAL HISTORY:    [ ] Non-smoker  [ ] Smoker  [ ] Alcohol    Allergies    No Known Allergies    Intolerances    	    REVIEW OF SYSTEMS:  CONSTITUTIONAL: No fever, weight loss, or fatigue  EYES: No eye pain, visual disturbances, or discharge  ENMT:  No difficulty hearing, tinnitus, vertigo; No sinus or throat pain  NECK: No pain or stiffness  RESPIRATORY: No cough, wheezing, chills or hemoptysis; No Shortness of Breath  CARDIOVASCULAR: + chest pain, no palpitations, passing out, dizziness, or leg swelling  GASTROINTESTINAL: No abdominal or epigastric pain. No nausea, vomiting, or hematemesis; No diarrhea or constipation. No melena or hematochezia.  GENITOURINARY: No dysuria, frequency, hematuria, or incontinence  NEUROLOGICAL: No headaches, memory loss, loss of strength, numbness, or tremors  SKIN: No itching, burning, rashes, or lesions   	    [x ] All others negative	 see hpi   [ ] Unable to obtain    PHYSICAL EXAM:  T(C): 36.8 (08-23-21 @ 11:03), Max: 36.9 (08-23-21 @ 04:21)  HR: 102 (08-23-21 @ 12:30) (83 - 110)  BP: 112/74 (08-23-21 @ 11:03) (110/68 - 112/77)  RR: 20 (08-23-21 @ 11:50) (18 - 20)  SpO2: 94% (08-23-21 @ 12:30) (93% - 97%)  Wt(kg): --  I&O's Summary    22 Aug 2021 07:01  -  23 Aug 2021 07:00  --------------------------------------------------------  IN: 120 mL / OUT: 700 mL / NET: -580 mL        Appearance: Normal	  Psychiatry: A & O x 3, Mood & affect appropriate  HEENT:   Normal oral mucosa, PERRL, EOMI	  Lymphatic: No lymphadenopathy  Cardiovascular: Normal S1 S2,RRR, No JVD, No murmurs  Respiratory: Lungs clear to auscultation	  Gastrointestinal:  Soft, Non-tender, + BS	  Skin: No rashes, No ecchymoses, No cyanosis	  Neurologic: Non-focal  Extremities: Normal range of motion, No clubbing, cyanosis or edema  Vascular: Peripheral pulses palpable 2+ bilaterally    TELEMETRY: 	NSR     ECG:  	sinus tach 110 - no acute ischemic changes  RADIOLOGY:  < from: Xray Chest 1 View- PORTABLE-Urgent (Xray Chest 1 View- PORTABLE-Urgent .) (08.23.21 @ 06:11) >    COMPARISON: Chest x-ray 8/16/2021    FINDINGS:  Heart size and mediastinum cannot accurately assessed on this projection.  Low lung volumes. Collapse right lower lobe. Diffuse airspace opacities are seen throughout both lungs. There is no pneumothorax or pleural effusion.  Degenerative changes of thoracic spine. No acute osseous abnormalities.    IMPRESSION:  Lung findings described above compatible with known Covid 19 pneumonia.              OTHER: 	  	  LABS:	 	    CARDIAC MARKERS:  Troponin T, High Sensitivity Result: <6 ng/L (08-23 @ 05:44)                                  11.5   11.71 )-----------( 320      ( 23 Aug 2021 05:44 )             37.4     08-23    138  |  96  |  8   ----------------------------<  93  4.2   |  31  |  0.60    Ca    9.2      23 Aug 2021 05:44        proBNP:   Lipid Profile:   HgA1c:   TSH:

## 2021-08-23 NOTE — PROGRESS NOTE ADULT - SUBJECTIVE AND OBJECTIVE BOX
Follow-up Pulm Progress Note    Noted to have RRT this AM for chest pain  Chest pain resolving per patient, states it was worse with coughing  O2 sats 95% on HFNC 70%/60L  Mild dyspnea     Medications:  MEDICATIONS  (STANDING):  benzonatate 200 milliGRAM(s) Oral every 8 hours  budesonide 160 MICROgram(s)/formoterol 4.5 MICROgram(s) Inhaler 2 Puff(s) Inhalation two times a day  cholecalciferol 2000 Unit(s) Oral daily  clonazePAM  Tablet 0.5 milliGRAM(s) Oral every 8 hours  enoxaparin Injectable 90 milliGRAM(s) SubCutaneous every 12 hours  guaiFENesin ER 1200 milliGRAM(s) Oral every 12 hours  lidocaine   4% Patch 1 Patch Transdermal every 24 hours  melatonin 3 milliGRAM(s) Oral at bedtime  multivitamin 1 Tablet(s) Oral daily  pantoprazole  Injectable 40 milliGRAM(s) IV Push daily  polyethylene glycol 3350 17 Gram(s) Oral daily  senna 2 Tablet(s) Oral at bedtime    MEDICATIONS  (PRN):  acetaminophen   Tablet .. 650 milliGRAM(s) Oral every 6 hours PRN Temp greater or equal to 38C (100.4F), Moderate Pain (4 - 6)  cyclobenzaprine 5 milliGRAM(s) Oral three times a day PRN Muscle Spasm  hydrocodone/homatropine Syrup 5 milliLiter(s) Oral every 6 hours PRN Cough  oxyCODONE    IR 5 milliGRAM(s) Oral every 4 hours PRN Moderate Pain (4 - 6)  sodium chloride 0.65% Nasal 1 Spray(s) Both Nostrils every 2 hours PRN Nasal Congestion    Vital Signs Last 24 Hrs  T(C): 36.8 (23 Aug 2021 11:03), Max: 36.9 (23 Aug 2021 04:21)  T(F): 98.2 (23 Aug 2021 11:03), Max: 98.4 (23 Aug 2021 04:21)  HR: 102 (23 Aug 2021 12:30) (83 - 110)  BP: 112/74 (23 Aug 2021 11:03) (110/68 - 112/77)  BP(mean): --  RR: 20 (23 Aug 2021 11:50) (18 - 20)  SpO2: 94% (23 Aug 2021 12:30) (93% - 97%)    08-22 @ 07:01  -  08-23 @ 07:00  --------------------------------------------------------  IN: 120 mL / OUT: 700 mL / NET: -580 mL      LABS:                        11.5   11.71 )-----------( 320      ( 23 Aug 2021 05:44 )             37.4     08-23    138  |  96  |  8   ----------------------------<  93  4.2   |  31  |  0.60    Ca    9.2      23 Aug 2021 05:44        CAPILLARY BLOOD GLUCOSE  POCT Blood Glucose.: 98 mg/dL (23 Aug 2021 05:50)    Physical Examination:  PULM: Decreased BS   CVS: RRR    RADIOLOGY REVIEWED  CXR: 8/23 worsening b/l opacities R>L    < from: VA Duplex Lower Ext Vein Scan, Bilat (08.04.21 @ 16:22) >    FINDINGS:    RIGHT:  Normal compressibilityof the RIGHT common femoral, femoral and popliteal veins.  Doppler examination shows normal spontaneous and phasic flow.  No RIGHT calf vein thrombosis is detected.    LEFT:  Normal compressibility of the LEFT common femoral, femoral and popliteal veins.  Doppler examination shows normal spontaneous and phasic flow.  No LEFT calf vein thrombosis is detected.    IMPRESSION:  No evidence of deep venous thrombosis in either lower extremity.    < end of copied text >

## 2021-08-23 NOTE — PROGRESS NOTE ADULT - PROBLEM SELECTOR PLAN 1
+COVID PCR as an outpatient  -CXR 8/9 with b/l lung opacities, CXR 8/16 grossly unchanged   -CXR this AM with worsening b/l opacties R>L, ?effusion vs opacities 2nd to COVID   -S/p Remdesivir x 5 days   -S/p Decadron 6mg IVP qd x 10 days (completed 8/10)  -S/p Tocilizumab 7/30 per ID for worsening hypoxic respiratory failure  -Sputum culture 8/4 with normal respiratory aba. WBC normal, PCT normal, afebrile   -LE duplex 8/4 neg DVT.   -Ddimer elevated at one point to 4000. Will need to eventually obtain CTA chest to r/o PE and determine duration of full dose AC once pt able to tolerate NRB for transport. C/w current dose of AC for now.  -Continue to trend ddimer & inflammatory markers +COVID PCR as an outpatient  -CXR 8/9 with b/l lung opacities, CXR 8/16 grossly unchanged   -CXR this AM with worsening b/l opacties R>L, ?effusion vs opacities 2nd to COVID   -S/p Remdesivir x 5 days   -S/p Decadron 6mg IVP qd x 10 days (completed 8/10)  -S/p Tocilizumab 7/30 per ID for worsening hypoxic respiratory failure  -Sputum culture 8/4 with normal respiratory aba. WBC normal, PCT normal, afebrile   -LE duplex 8/4 neg DVT.   -Ddimer elevated at one point to 4000. Will need to eventually obtain CTA chest to r/o PE and determine duration of full dose AC once pt able to tolerate NRB for transport. C/w current dose of AC for now.  -Please check patient on NRB and document sat in flowsheet - discussed with ACP & RN   -Continue to trend ddimer & inflammatory markers +COVID PCR as an outpatient  -CXR 8/9 with b/l lung opacities, CXR 8/16 grossly unchanged   -CXR this AM with worsening b/l opacties R>L, ?effusion vs mucus plugging/collapse, also with opacities 2nd to COVID   -S/p Remdesivir x 5 days   -S/p Decadron 6mg IVP qd x 10 days (completed 8/10)  -S/p Tocilizumab 7/30 per ID for worsening hypoxic respiratory failure  -Sputum culture 8/4 with normal respiratory aba. WBC normal, PCT normal, afebrile   -LE duplex 8/4 neg DVT.   -Ddimer elevated at one point to 4000. Will need to eventually obtain CTA chest to r/o PE and determine duration of full dose AC once pt able to tolerate NRB for transport. C/w current dose of AC for now.  -Please check patient on NRB and document sat in flowsheet - discussed with ACP & RN   -Continue to trend ddimer & inflammatory markers

## 2021-08-23 NOTE — RAPID RESPONSE TEAM SUMMARY - NSMEDICATIONSRRT_GEN_ALL_CORE
1mg IV dilaudid  1g IV tylenol
- CBC, CMP, Mg Phos 
- none.
Low concern for cardiac etiology given sinus tach and cardiac enzymes this AM <6, patient reports CP is the same as this AM.  Morphine 2mg IV x1 and 1g IV tylenol

## 2021-08-23 NOTE — CHART NOTE - NSCHARTNOTEFT_GEN_A_CORE
CC: Post RRT      HPI:  Notified by RN that RRT was called for 10/10 chest pain.  Patient is currently on Oxycodone IR 5mg PO Q4h for pain.  Please refer to RRT sheet for further details.      Vital Signs Last 24 Hrs  T(C): 36.9 (23 Aug 2021 04:21), Max: 36.9 (22 Aug 2021 12:04)  T(F): 98.4 (23 Aug 2021 04:21), Max: 98.4 (22 Aug 2021 12:04)  HR: 100 (23 Aug 2021 06:30) (88 - 106)  BP: 112/77 (23 Aug 2021 04:21) (105/70 - 112/77)  BP(mean): --  RR: 20 (23 Aug 2021 04:21) (18 - 20)  SpO2: 94% (23 Aug 2021 06:30) (90% - 97%)      Physical Exam:  General: WN/WD NAD, AOx3, nontoxic appearing  Head:  NC/AT  CV: RRR, S1S2   Respiratory: CTA B/L, nonlabored  Abdominal: (+) bowel sounds x4. Soft, NT, ND, no palpable mass, no guarding, or rebound tenderness  Genitourinary: ? Santana   MSK: No BLLE edema, + peripheral pulses, FROM all 4 extremity  Skin: (+) warm, dry   Psych: Appropriate affect       Labs:                        11.5   11.71 )-----------( 320      ( 23 Aug 2021 05:44 )             37.4     08-23    138  |  96  |  8   ----------------------------<  93  4.2   |  31  |  0.60    Ca    9.2      23 Aug 2021 05:44        Radiology:    < from: Xray Chest 1 View- PORTABLE-Routine (Xray Chest 1 View- PORTABLE-Routine .) (08.16.21 @ 08:49) >    The heart is normal in size. This airspace opacities are seen throughout both lungs which remain unchanged when compared to previous study done on August 12, 2021. No pleural effusion. No pneumothorax. Compatible with known Covid 19 pneumonia.      Assessment & Plan:  Assessment:  54yo M with Hx of DVT s/p achilles tendon repair years ago not on AC presenting with complaints of SOB. intermittent and fevers with cough productive of white sputum for past week, tested positive for covid 2 days ago.  Patient admitted with Covid pneumonia.  RRT called for 10/10 chest pain.  Patient given Dilaudid 1mg IV x 1 and Tylenol 1g x 1.      Plan:    1. Chest Pain  -Dilaudid 1mg IV x 1 given  -Tylenol 1g IV x 1 given  -Troponins <6  -CXR ordered with preliminary results with no significant change from prior, f/u with final results  -EKG with sinus tachycardia likely 2/2 pain without ST elevations  -High Flow titrated up to 60L/100% with O2 saturation 96%.  -Dr. Higginbotham updated, will monitor on current pain regimen for now and escalate as needed.  -Will continue to closely monitor patient/vitals  -Primary Team to follow up in AM, attending to follow       Jerome Yuen PA-C  Dept of Medicine  #[id number]

## 2021-08-24 PROCEDURE — 71275 CT ANGIOGRAPHY CHEST: CPT | Mod: 26

## 2021-08-24 PROCEDURE — 71045 X-RAY EXAM CHEST 1 VIEW: CPT | Mod: 26

## 2021-08-24 PROCEDURE — 93306 TTE W/DOPPLER COMPLETE: CPT | Mod: 26

## 2021-08-24 RX ORDER — HYDROMORPHONE HYDROCHLORIDE 2 MG/ML
0.5 INJECTION INTRAMUSCULAR; INTRAVENOUS; SUBCUTANEOUS ONCE
Refills: 0 | Status: DISCONTINUED | OUTPATIENT
Start: 2021-08-24 | End: 2021-08-24

## 2021-08-24 RX ORDER — ACETAMINOPHEN 500 MG
1000 TABLET ORAL ONCE
Refills: 0 | Status: COMPLETED | OUTPATIENT
Start: 2021-08-24 | End: 2021-08-24

## 2021-08-24 RX ORDER — OXYCODONE HYDROCHLORIDE 5 MG/1
5 TABLET ORAL EVERY 4 HOURS
Refills: 0 | Status: DISCONTINUED | OUTPATIENT
Start: 2021-08-24 | End: 2021-08-26

## 2021-08-24 RX ADMIN — Medication 200 MILLIGRAM(S): at 21:35

## 2021-08-24 RX ADMIN — Medication 400 MILLIGRAM(S): at 17:54

## 2021-08-24 RX ADMIN — Medication 1200 MILLIGRAM(S): at 17:20

## 2021-08-24 RX ADMIN — OXYCODONE HYDROCHLORIDE 5 MILLIGRAM(S): 5 TABLET ORAL at 22:12

## 2021-08-24 RX ADMIN — OXYCODONE HYDROCHLORIDE 5 MILLIGRAM(S): 5 TABLET ORAL at 10:03

## 2021-08-24 RX ADMIN — Medication 200 MILLIGRAM(S): at 13:34

## 2021-08-24 RX ADMIN — ENOXAPARIN SODIUM 90 MILLIGRAM(S): 100 INJECTION SUBCUTANEOUS at 17:22

## 2021-08-24 RX ADMIN — Medication 0.5 MILLIGRAM(S): at 05:19

## 2021-08-24 RX ADMIN — Medication 400 MILLIGRAM(S): at 11:22

## 2021-08-24 RX ADMIN — LIDOCAINE 1 PATCH: 4 CREAM TOPICAL at 21:28

## 2021-08-24 RX ADMIN — Medication 0.5 MILLIGRAM(S): at 21:35

## 2021-08-24 RX ADMIN — HYDROMORPHONE HYDROCHLORIDE 0.5 MILLIGRAM(S): 2 INJECTION INTRAMUSCULAR; INTRAVENOUS; SUBCUTANEOUS at 04:40

## 2021-08-24 RX ADMIN — HYDROMORPHONE HYDROCHLORIDE 0.5 MILLIGRAM(S): 2 INJECTION INTRAMUSCULAR; INTRAVENOUS; SUBCUTANEOUS at 01:34

## 2021-08-24 RX ADMIN — Medication 1200 MILLIGRAM(S): at 05:20

## 2021-08-24 RX ADMIN — OXYCODONE HYDROCHLORIDE 5 MILLIGRAM(S): 5 TABLET ORAL at 15:52

## 2021-08-24 RX ADMIN — Medication 0.5 MILLIGRAM(S): at 13:34

## 2021-08-24 RX ADMIN — ENOXAPARIN SODIUM 90 MILLIGRAM(S): 100 INJECTION SUBCUTANEOUS at 05:20

## 2021-08-24 RX ADMIN — BUDESONIDE AND FORMOTEROL FUMARATE DIHYDRATE 2 PUFF(S): 160; 4.5 AEROSOL RESPIRATORY (INHALATION) at 06:32

## 2021-08-24 RX ADMIN — Medication 200 MILLIGRAM(S): at 05:19

## 2021-08-24 RX ADMIN — Medication 1000 MILLIGRAM(S): at 12:00

## 2021-08-24 RX ADMIN — LIDOCAINE 1 PATCH: 4 CREAM TOPICAL at 06:31

## 2021-08-24 RX ADMIN — LIDOCAINE 1 PATCH: 4 CREAM TOPICAL at 17:21

## 2021-08-24 RX ADMIN — Medication 3 MILLIGRAM(S): at 21:38

## 2021-08-24 RX ADMIN — Medication 2000 UNIT(S): at 11:23

## 2021-08-24 RX ADMIN — PANTOPRAZOLE SODIUM 40 MILLIGRAM(S): 20 TABLET, DELAYED RELEASE ORAL at 11:22

## 2021-08-24 RX ADMIN — OXYCODONE HYDROCHLORIDE 5 MILLIGRAM(S): 5 TABLET ORAL at 21:42

## 2021-08-24 RX ADMIN — OXYCODONE HYDROCHLORIDE 5 MILLIGRAM(S): 5 TABLET ORAL at 11:00

## 2021-08-24 RX ADMIN — BUDESONIDE AND FORMOTEROL FUMARATE DIHYDRATE 2 PUFF(S): 160; 4.5 AEROSOL RESPIRATORY (INHALATION) at 17:23

## 2021-08-24 RX ADMIN — Medication 1 TABLET(S): at 11:23

## 2021-08-24 NOTE — PROGRESS NOTE ADULT - ASSESSMENT
pt w/ covid  hypoxia   s/p steroids  s/p remdesivir  id / f/u   pulm f/u   c/e resp support/bipap/h/f  h/f this am /oxygenation stable  s/p rrt  cards eval / likely atypical cp   cta pending  s/p toci  gi / dvt proph  o2  hx htn/   monitor bp  g  discussed w/ wife yesterday and left message for today    dr landeros to cover me till 8/27

## 2021-08-24 NOTE — PROGRESS NOTE ADULT - SUBJECTIVE AND OBJECTIVE BOX
CARDIOLOGY FOLLOW UP - Dr. Terry  Date of Service: 8/24/21  CC: RRT last night for r sided chest pain  still with reports of r sided cp with coughing and deep inspiration, notes it radiates to back     Review of Systems:  Constitutional: No fever, weight loss, or fatigue  Respiratory: No cough, wheezing, or hemoptysis, no shortness of breath  Cardiovascular: No chest pain, palpitations, passing out, dizziness, or leg swelling  Gastrointestinal: No abd or epigastric pain.  No nausea, vomiting, or hematemesis; no diarrhea or constipation, no melena or hematochezia  Vascular: no edema       PHYSICAL EXAM:  T(C): 37.1 (08-24-21 @ 11:52), Max: 37.4 (08-24-21 @ 09:30)  HR: 96 (08-24-21 @ 11:52) (96 - 107)  BP: 118/80 (08-24-21 @ 11:52) (107/84 - 135/85)  RR: 20 (08-24-21 @ 11:52) (20 - 25)  SpO2: 97% (08-24-21 @ 11:52) (93% - 98%)  Wt(kg): --  I&O's Summary    23 Aug 2021 07:01  -  24 Aug 2021 07:00  --------------------------------------------------------  IN: 360 mL / OUT: 300 mL / NET: 60 mL        Appearance: Normal	  Cardiovascular: Normal S1 S2,RRR, No JVD, No murmurs  Respiratory: diminished   Gastrointestinal:  Soft, Non-tender, + BS	  Extremities: Normal range of motion, No clubbing, cyanosis or edema      Home Medications:  Albuterol (Eqv-ProAir HFA) 90 mcg/inh inhalation aerosol: 2 puff(s) inhaled every 6 hours, As Needed (29 Jul 2021 09:08)  ivermectin 3 mg oral tablet: 1 tab(s) orally once a day (29 Jul 2021 09:08)  levoFLOXacin 500 mg oral tablet: 1 tab(s) orally every 24 hours (29 Jul 2021 09:08)  predniSONE 50 mg oral tablet: 1 tab(s) orally once a day (29 Jul 2021 09:08)      MEDICATIONS  (STANDING):  benzonatate 200 milliGRAM(s) Oral every 8 hours  budesonide 160 MICROgram(s)/formoterol 4.5 MICROgram(s) Inhaler 2 Puff(s) Inhalation two times a day  cholecalciferol 2000 Unit(s) Oral daily  clonazePAM  Tablet 0.5 milliGRAM(s) Oral every 8 hours  enoxaparin Injectable 90 milliGRAM(s) SubCutaneous every 12 hours  guaiFENesin ER 1200 milliGRAM(s) Oral every 12 hours  lidocaine   4% Patch 1 Patch Transdermal every 24 hours  melatonin 3 milliGRAM(s) Oral at bedtime  multivitamin 1 Tablet(s) Oral daily  pantoprazole  Injectable 40 milliGRAM(s) IV Push daily  polyethylene glycol 3350 17 Gram(s) Oral daily  senna 2 Tablet(s) Oral at bedtime      TELEMETRY: 	 NSR-ST 80-110s    ECG:  	  RADIOLOGY:   < from: Xray Chest 1 View- PORTABLE-Routine (Xray Chest 1 View- PORTABLE-Routine in AM.) (08.24.21 @ 09:02) >    FINDINGS:  Heart size and mediastinum cannot accurately be assessed on this projection.  Low lung volumes. Collapsed right lower lobe with mild improvement in expansion compared to prior study. Diffuse airspace opacities are seen throughout both lungs. There is no pneumothorax. Right pleural effusion. Degenerative changes of thoracic spine. No acute osseous abnormalities.    IMPRESSION:  Diffuse airspace opacities seen throughout both lungs compatible with known Covid 19 pneumonia. Mild improvement in expansion of collapsed right lower lobe. Right Pleural effusion.    < end of copied text >    DIAGNOSTIC TESTING:  [ ] Echocardiogram:  [ ]  Catheterization:  [ ] Stress Test:    OTHER: 	    LABS:	 	    Troponin T, High Sensitivity Result: <6 ng/L [0 - 51] (08-23 @ 05:44)                          11.5   11.71 )-----------( 320      ( 23 Aug 2021 05:44 )             37.4     08-23    138  |  96  |  8   ----------------------------<  93  4.2   |  31  |  0.60    Ca    9.2      23 Aug 2021 05:44

## 2021-08-24 NOTE — PROGRESS NOTE ADULT - PROBLEM SELECTOR PLAN 3
S/p RRT 8/23 x2 for chest pressure  -Appears to pleuritic in nature, worse on R side with inspiration   -EKG noted  -Troponin <6  -Cards recs noted  -F/u CTA chest  -B/L LE duplex with no DVT  -F/u TTE

## 2021-08-24 NOTE — PROGRESS NOTE ADULT - SUBJECTIVE AND OBJECTIVE BOX
Follow-up Pulm Progress Note    S/p RRT last night for chest pain  Pt states chest pain is worse on R side, radiates to his back, exacerbated by coughing & deep breaths  At this time, denies CP, SOB  O2 sats 95% on HFNC 70%/60L  Bipap qhs 15/10    Medications:  MEDICATIONS  (STANDING):  benzonatate 200 milliGRAM(s) Oral every 8 hours  budesonide 160 MICROgram(s)/formoterol 4.5 MICROgram(s) Inhaler 2 Puff(s) Inhalation two times a day  cholecalciferol 2000 Unit(s) Oral daily  clonazePAM  Tablet 0.5 milliGRAM(s) Oral every 8 hours  enoxaparin Injectable 90 milliGRAM(s) SubCutaneous every 12 hours  guaiFENesin ER 1200 milliGRAM(s) Oral every 12 hours  lidocaine   4% Patch 1 Patch Transdermal every 24 hours  melatonin 3 milliGRAM(s) Oral at bedtime  multivitamin 1 Tablet(s) Oral daily  pantoprazole  Injectable 40 milliGRAM(s) IV Push daily  polyethylene glycol 3350 17 Gram(s) Oral daily  senna 2 Tablet(s) Oral at bedtime    MEDICATIONS  (PRN):  acetaminophen   Tablet .. 650 milliGRAM(s) Oral every 6 hours PRN Temp greater or equal to 38C (100.4F), Moderate Pain (4 - 6)  cyclobenzaprine 5 milliGRAM(s) Oral three times a day PRN Muscle Spasm  hydrocodone/homatropine Syrup 5 milliLiter(s) Oral every 6 hours PRN Cough  oxyCODONE    IR 5 milliGRAM(s) Oral every 4 hours PRN Moderate Pain (4 - 6)  sodium chloride 0.65% Nasal 1 Spray(s) Both Nostrils every 2 hours PRN Nasal Congestion      Vital Signs Last 24 Hrs  T(C): 37.1 (24 Aug 2021 11:52), Max: 37.4 (24 Aug 2021 09:30)  T(F): 98.7 (24 Aug 2021 11:52), Max: 99.4 (24 Aug 2021 09:30)  HR: 96 (24 Aug 2021 11:52) (96 - 107)  BP: 118/80 (24 Aug 2021 11:52) (107/84 - 135/85)  BP(mean): --  RR: 20 (24 Aug 2021 11:52) (20 - 25)  SpO2: 97% (24 Aug 2021 11:52) (93% - 98%)    08-23 @ 07:01  -  08-24 @ 07:00  --------------------------------------------------------  IN: 360 mL / OUT: 300 mL / NET: 60 mL      LABS:                        11.5   11.71 )-----------( 320      ( 23 Aug 2021 05:44 )             37.4     08-23    138  |  96  |  8   ----------------------------<  93  4.2   |  31  |  0.60    Ca    9.2      23 Aug 2021 05:44    CAPILLARY BLOOD GLUCOSE  POCT Blood Glucose.: 87 mg/dL (23 Aug 2021 17:27)    Physical Examination:  PULM: Decreased BS R>L  CVS: RRR    RADIOLOGY REVIEWED  CXR: 8/24 b/l opacities R>L

## 2021-08-24 NOTE — PROGRESS NOTE ADULT - PROBLEM SELECTOR PLAN 2
2nd to COVID PNA  -S/p RRT 8/3 and 8/4 for hypoxia  -Tx to 5ICU 8/10 for further management  -Now on 4Monti, hypoxia slowly improving   -S/p RRT x2 8/23 for R sided chest pain, pain appears pleuritic in nature  -CXR with worsening b/l opacties R>L, ?effusion vs mucus plugging/collapse, also with opacities 2nd to COVID   -HFNC currently at 70%/60L, wean o2 as tolerated to keep sats >88%  -Bipap 15/10 qhs for RLL collapse on CXR   -CXR this AM grossly unchanged, some minor improvement in b/l opacities  -F/u CTA chest   -Incentive spirometry

## 2021-08-24 NOTE — PROGRESS NOTE ADULT - ASSESSMENT
a/p  56yo M with Hx of DVT s/p achilles tendon repair years ago not on AC presenting with complaints of SOB. intermittent and fevers with cough productive of white sputum for past week, tested positive for covid 2 days ago.    #Atypical Chest Pain  -RRT 8/23 x2 for chest pain - exacerbated by cough and inspiration -  IV pain meds   -secondary to covid PNA   -hs T neg, ekg without acute ischemic changes  -no ADHF noted on exam   -pending CT A r/o PE  -check echo when feasible   -Tylenol PRN for pain    #Covid -19  -s/p completed courses of Remdesivir x 5 days and Decadron x 10 days   -S/p Tocilizumab 7/30 per ID   -LE duplex 8/4 neg DVT  -repeat CXR noted with worsening b/l opacities and new R pleural effusion   -pending CTA   -pulm  f/u     #Sinus tachycardia  -secondary to covid PNA  -rates overall stable  -pending CTA r/o PE  -cont to monitor   -f/u echo     #DVT (hx)  -LE doppler as above  -on full dose AC    plan discussed with ACP

## 2021-08-24 NOTE — PROGRESS NOTE ADULT - SUBJECTIVE AND OBJECTIVE BOX
DATE OF SERVICE: 08-24-21 @ 11:40  CHIEF COMPLAINT:Patient is a 55y old  Male who presents with a chief complaint of covid (11 Aug 2021 08:04)    	        PAST MEDICAL & SURGICAL HISTORY:  Hyperlipidemia    HTN (hypertension)    Rupture, tendon, quadriceps    History of Achilles tendon repair            REVIEW OF SYSTEMS:  weak  RESPIRATORY:chest discomfort w/ inspiration   CARDIOVASCULAR: No chest pain, palpitations, passing out, dizziness, or leg swelling  GASTROINTESTINAL: No abdominal or epigastric pain. No nausea, vomiting, or hematemesis; No diarrhea or constipation. No melena or hematochezia.  GENITOURINARY: No dysuria, frequency, hematuria, or incontinence  NEUROLOGICAL: No headaches,   MUSCULOSKELETAL: No joint pain or swelling; No muscle, back, or extremity pain    Medications:  MEDICATIONS  (STANDING):  benzonatate 200 milliGRAM(s) Oral every 8 hours  budesonide 160 MICROgram(s)/formoterol 4.5 MICROgram(s) Inhaler 2 Puff(s) Inhalation two times a day  cholecalciferol 2000 Unit(s) Oral daily  clonazePAM  Tablet 0.5 milliGRAM(s) Oral every 8 hours  enoxaparin Injectable 90 milliGRAM(s) SubCutaneous every 12 hours  guaiFENesin ER 1200 milliGRAM(s) Oral every 12 hours  lidocaine   4% Patch 1 Patch Transdermal every 24 hours  melatonin 3 milliGRAM(s) Oral at bedtime  multivitamin 1 Tablet(s) Oral daily  pantoprazole  Injectable 40 milliGRAM(s) IV Push daily  polyethylene glycol 3350 17 Gram(s) Oral daily  senna 2 Tablet(s) Oral at bedtime    MEDICATIONS  (PRN):  acetaminophen   Tablet .. 650 milliGRAM(s) Oral every 6 hours PRN Temp greater or equal to 38C (100.4F), Moderate Pain (4 - 6)  cyclobenzaprine 5 milliGRAM(s) Oral three times a day PRN Muscle Spasm  hydrocodone/homatropine Syrup 5 milliLiter(s) Oral every 6 hours PRN Cough  oxyCODONE    IR 5 milliGRAM(s) Oral every 4 hours PRN Moderate Pain (4 - 6)  sodium chloride 0.65% Nasal 1 Spray(s) Both Nostrils every 2 hours PRN Nasal Congestion    	    PHYSICAL EXAM:  T(C): 37.4 (08-24-21 @ 09:30), Max: 37.4 (08-24-21 @ 09:30)  HR: 107 (08-24-21 @ 09:30) (97 - 110)  BP: 107/84 (08-24-21 @ 09:30) (107/84 - 135/85)  RR: 25 (08-24-21 @ 09:30) (20 - 25)  SpO2: 94% (08-24-21 @ 09:30) (93% - 98%)  Wt(kg): --  I&O's Summary    23 Aug 2021 07:01  -  24 Aug 2021 07:00  --------------------------------------------------------  IN: 360 mL / OUT: 300 mL / NET: 60 mL        Appearance: Normal	  HEENT:   Normal oral mucosa, PERRL, EOMI	  Lymphatic: No lymphadenopathy  Cardiovascular: Normal S1 S2, No JVD, No murmurs, No edema  Respiratory: dec bs   Gastrointestinal:  Soft, Non-tender, + BS	  Skin: No rashes, No ecchymoses, No cyanosis	  Neurologic: Non-focal  Extremities: Normal range of motion, No clubbing, cyanosis or edema  Vascular: Peripheral pulses palpable 2+ bilaterally    TELEMETRY: 	    ECG:  	  RADIOLOGY:  OTHER: 	  	  LABS:	 	    CARDIAC MARKERS:                                11.5   11.71 )-----------( 320      ( 23 Aug 2021 05:44 )             37.4     08-23    138  |  96  |  8   ----------------------------<  93  4.2   |  31  |  0.60    Ca    9.2      23 Aug 2021 05:44      proBNP:   Lipid Profile:   HgA1c:   TSH:

## 2021-08-25 LAB
ALBUMIN SERPL ELPH-MCNC: 3 G/DL — LOW (ref 3.3–5)
ALP SERPL-CCNC: 81 U/L — SIGNIFICANT CHANGE UP (ref 40–120)
ALT FLD-CCNC: 27 U/L — SIGNIFICANT CHANGE UP (ref 10–45)
ANION GAP SERPL CALC-SCNC: 9 MMOL/L — SIGNIFICANT CHANGE UP (ref 5–17)
AST SERPL-CCNC: 17 U/L — SIGNIFICANT CHANGE UP (ref 10–40)
BILIRUB SERPL-MCNC: 0.3 MG/DL — SIGNIFICANT CHANGE UP (ref 0.2–1.2)
BUN SERPL-MCNC: 10 MG/DL — SIGNIFICANT CHANGE UP (ref 7–23)
CALCIUM SERPL-MCNC: 9.8 MG/DL — SIGNIFICANT CHANGE UP (ref 8.4–10.5)
CHLORIDE SERPL-SCNC: 94 MMOL/L — LOW (ref 96–108)
CO2 SERPL-SCNC: 33 MMOL/L — HIGH (ref 22–31)
CREAT SERPL-MCNC: 0.68 MG/DL — SIGNIFICANT CHANGE UP (ref 0.5–1.3)
CRP SERPL-MCNC: 172 MG/L — HIGH (ref 0–4)
D DIMER BLD IA.RAPID-MCNC: 1908 NG/ML DDU — HIGH
FERRITIN SERPL-MCNC: 735 NG/ML — HIGH (ref 30–400)
GLUCOSE SERPL-MCNC: 90 MG/DL — SIGNIFICANT CHANGE UP (ref 70–99)
HCT VFR BLD CALC: 38.5 % — LOW (ref 39–50)
HGB BLD-MCNC: 11.7 G/DL — LOW (ref 13–17)
LDH SERPL L TO P-CCNC: 338 U/L — HIGH (ref 50–242)
MCHC RBC-ENTMCNC: 27.9 PG — SIGNIFICANT CHANGE UP (ref 27–34)
MCHC RBC-ENTMCNC: 30.4 GM/DL — LOW (ref 32–36)
MCV RBC AUTO: 91.7 FL — SIGNIFICANT CHANGE UP (ref 80–100)
NRBC # BLD: 0 /100 WBCS — SIGNIFICANT CHANGE UP (ref 0–0)
PLATELET # BLD AUTO: 332 K/UL — SIGNIFICANT CHANGE UP (ref 150–400)
POTASSIUM SERPL-MCNC: 4.8 MMOL/L — SIGNIFICANT CHANGE UP (ref 3.5–5.3)
POTASSIUM SERPL-SCNC: 4.8 MMOL/L — SIGNIFICANT CHANGE UP (ref 3.5–5.3)
PROT SERPL-MCNC: 7.3 G/DL — SIGNIFICANT CHANGE UP (ref 6–8.3)
RBC # BLD: 4.2 M/UL — SIGNIFICANT CHANGE UP (ref 4.2–5.8)
RBC # FLD: 13.1 % — SIGNIFICANT CHANGE UP (ref 10.3–14.5)
SODIUM SERPL-SCNC: 136 MMOL/L — SIGNIFICANT CHANGE UP (ref 135–145)
WBC # BLD: 11.08 K/UL — HIGH (ref 3.8–10.5)
WBC # FLD AUTO: 11.08 K/UL — HIGH (ref 3.8–10.5)

## 2021-08-25 PROCEDURE — 93970 EXTREMITY STUDY: CPT | Mod: 26

## 2021-08-25 PROCEDURE — 99232 SBSQ HOSP IP/OBS MODERATE 35: CPT

## 2021-08-25 RX ORDER — PIPERACILLIN AND TAZOBACTAM 4; .5 G/20ML; G/20ML
3.38 INJECTION, POWDER, LYOPHILIZED, FOR SOLUTION INTRAVENOUS EVERY 8 HOURS
Refills: 0 | Status: DISCONTINUED | OUTPATIENT
Start: 2021-08-25 | End: 2021-08-26

## 2021-08-25 RX ORDER — ACETAMINOPHEN 500 MG
1000 TABLET ORAL ONCE
Refills: 0 | Status: COMPLETED | OUTPATIENT
Start: 2021-08-25 | End: 2021-08-26

## 2021-08-25 RX ORDER — PIPERACILLIN AND TAZOBACTAM 4; .5 G/20ML; G/20ML
3.38 INJECTION, POWDER, LYOPHILIZED, FOR SOLUTION INTRAVENOUS ONCE
Refills: 0 | Status: COMPLETED | OUTPATIENT
Start: 2021-08-25 | End: 2021-08-25

## 2021-08-25 RX ORDER — ACETAMINOPHEN 500 MG
1000 TABLET ORAL ONCE
Refills: 0 | Status: COMPLETED | OUTPATIENT
Start: 2021-08-25 | End: 2021-08-25

## 2021-08-25 RX ADMIN — Medication 1000 MILLIGRAM(S): at 13:24

## 2021-08-25 RX ADMIN — Medication 0.5 MILLIGRAM(S): at 13:31

## 2021-08-25 RX ADMIN — Medication 400 MILLIGRAM(S): at 12:54

## 2021-08-25 RX ADMIN — LIDOCAINE 1 PATCH: 4 CREAM TOPICAL at 17:59

## 2021-08-25 RX ADMIN — PIPERACILLIN AND TAZOBACTAM 25 GRAM(S): 4; .5 INJECTION, POWDER, LYOPHILIZED, FOR SOLUTION INTRAVENOUS at 21:01

## 2021-08-25 RX ADMIN — Medication 200 MILLIGRAM(S): at 13:31

## 2021-08-25 RX ADMIN — ENOXAPARIN SODIUM 90 MILLIGRAM(S): 100 INJECTION SUBCUTANEOUS at 18:00

## 2021-08-25 RX ADMIN — Medication 0.5 MILLIGRAM(S): at 06:20

## 2021-08-25 RX ADMIN — Medication 200 MILLIGRAM(S): at 06:20

## 2021-08-25 RX ADMIN — Medication 1200 MILLIGRAM(S): at 17:59

## 2021-08-25 RX ADMIN — Medication 3 MILLIGRAM(S): at 21:03

## 2021-08-25 RX ADMIN — Medication 1 TABLET(S): at 11:54

## 2021-08-25 RX ADMIN — Medication 1200 MILLIGRAM(S): at 06:20

## 2021-08-25 RX ADMIN — CYCLOBENZAPRINE HYDROCHLORIDE 5 MILLIGRAM(S): 10 TABLET, FILM COATED ORAL at 18:05

## 2021-08-25 RX ADMIN — ENOXAPARIN SODIUM 90 MILLIGRAM(S): 100 INJECTION SUBCUTANEOUS at 06:20

## 2021-08-25 RX ADMIN — BUDESONIDE AND FORMOTEROL FUMARATE DIHYDRATE 2 PUFF(S): 160; 4.5 AEROSOL RESPIRATORY (INHALATION) at 18:04

## 2021-08-25 RX ADMIN — Medication 200 MILLIGRAM(S): at 21:03

## 2021-08-25 RX ADMIN — Medication 2000 UNIT(S): at 11:54

## 2021-08-25 RX ADMIN — PIPERACILLIN AND TAZOBACTAM 200 GRAM(S): 4; .5 INJECTION, POWDER, LYOPHILIZED, FOR SOLUTION INTRAVENOUS at 17:30

## 2021-08-25 RX ADMIN — Medication 1000 MILLIGRAM(S): at 01:36

## 2021-08-25 RX ADMIN — OXYCODONE HYDROCHLORIDE 5 MILLIGRAM(S): 5 TABLET ORAL at 11:54

## 2021-08-25 RX ADMIN — Medication 0.5 MILLIGRAM(S): at 21:03

## 2021-08-25 RX ADMIN — Medication 400 MILLIGRAM(S): at 00:43

## 2021-08-25 RX ADMIN — LIDOCAINE 1 PATCH: 4 CREAM TOPICAL at 05:41

## 2021-08-25 RX ADMIN — LIDOCAINE 1 PATCH: 4 CREAM TOPICAL at 19:42

## 2021-08-25 RX ADMIN — BUDESONIDE AND FORMOTEROL FUMARATE DIHYDRATE 2 PUFF(S): 160; 4.5 AEROSOL RESPIRATORY (INHALATION) at 06:21

## 2021-08-25 RX ADMIN — OXYCODONE HYDROCHLORIDE 5 MILLIGRAM(S): 5 TABLET ORAL at 17:25

## 2021-08-25 RX ADMIN — PANTOPRAZOLE SODIUM 40 MILLIGRAM(S): 20 TABLET, DELAYED RELEASE ORAL at 11:53

## 2021-08-25 NOTE — PROGRESS NOTE ADULT - ASSESSMENT
55 M with covid PNA, CP         completed remdesivir, s/p decadron   sp toci   No fever  CP better- cardio seeing  Hold off on abx for now   CXR - no new findings  CT chest with cavitary PNA - zosyn started     Dylan Carter  Attending Physician   Division of Infectious Disease  Pager #[phone]  Available on Microsoft Teams also  After 5pm/weekend or no response, call #[phone]

## 2021-08-25 NOTE — PROGRESS NOTE ADULT - SUBJECTIVE AND OBJECTIVE BOX
KARISSA VILLALBA 55y [id number]    Patient is a 55y old  Male who presents with a chief complaint of covid (11 Aug 2021 08:04)      Follow Up/CC:  ID following for COVID    Interval History/ROS: c/o CP, no fever    Allergies    No Known Allergies    Intolerances        ANTIMICROBIALS:  piperacillin/tazobactam IVPB. 3.375 once  piperacillin/tazobactam IVPB.. 3.375 every 8 hours      MEDICATIONS  (STANDING):  benzonatate 200 milliGRAM(s) Oral every 8 hours  budesonide 160 MICROgram(s)/formoterol 4.5 MICROgram(s) Inhaler 2 Puff(s) Inhalation two times a day  cholecalciferol 2000 Unit(s) Oral daily  clonazePAM  Tablet 0.5 milliGRAM(s) Oral every 8 hours  enoxaparin Injectable 90 milliGRAM(s) SubCutaneous every 12 hours  guaiFENesin ER 1200 milliGRAM(s) Oral every 12 hours  lidocaine   4% Patch 1 Patch Transdermal every 24 hours  melatonin 3 milliGRAM(s) Oral at bedtime  multivitamin 1 Tablet(s) Oral daily  pantoprazole  Injectable 40 milliGRAM(s) IV Push daily  piperacillin/tazobactam IVPB. 3.375 Gram(s) IV Intermittent once  piperacillin/tazobactam IVPB.. 3.375 Gram(s) IV Intermittent every 8 hours  polyethylene glycol 3350 17 Gram(s) Oral daily  senna 2 Tablet(s) Oral at bedtime    MEDICATIONS  (PRN):  cyclobenzaprine 5 milliGRAM(s) Oral three times a day PRN Muscle Spasm  hydrocodone/homatropine Syrup 5 milliLiter(s) Oral every 6 hours PRN Cough  oxyCODONE    IR 5 milliGRAM(s) Oral every 4 hours PRN Moderate Pain (4 - 6)  sodium chloride 0.65% Nasal 1 Spray(s) Both Nostrils every 2 hours PRN Nasal Congestion        Vital Signs Last 24 Hrs  T(C): 37 (25 Aug 2021 10:48), Max: 37.1 (25 Aug 2021 04:17)  T(F): 98.6 (25 Aug 2021 10:48), Max: 98.7 (25 Aug 2021 04:17)  HR: 107 (25 Aug 2021 15:50) (79 - 107)  BP: 132/93 (25 Aug 2021 10:48) (103/71 - 132/93)  BP(mean): --  RR: 21 (25 Aug 2021 10:48) (20 - 21)  SpO2: 94% (25 Aug 2021 15:50) (90% - 100%)    CBC Full  -  ( 25 Aug 2021 06:04 )  WBC Count : 11.08 K/uL  RBC Count : 4.20 M/uL  Hemoglobin : 11.7 g/dL  Hematocrit : 38.5 %  Platelet Count - Automated : 332 K/uL  Mean Cell Volume : 91.7 fl  Mean Cell Hemoglobin : 27.9 pg  Mean Cell Hemoglobin Concentration : 30.4 gm/dL  Auto Neutrophil # : x  Auto Lymphocyte # : x  Auto Monocyte # : x  Auto Eosinophil # : x  Auto Basophil # : x  Auto Neutrophil % : x  Auto Lymphocyte % : x  Auto Monocyte % : x  Auto Eosinophil % : x  Auto Basophil % : x    08-25    136  |  94<L>  |  10  ----------------------------<  90  4.8   |  33<H>  |  0.68    Ca    9.8      25 Aug 2021 06:04    TPro  7.3  /  Alb  3.0<L>  /  TBili  0.3  /  DBili  x   /  AST  17  /  ALT  27  /  AlkPhos  81  08-25    LIVER FUNCTIONS - ( 25 Aug 2021 06:04 )  Alb: 3.0 g/dL / Pro: 7.3 g/dL / ALK PHOS: 81 U/L / ALT: 27 U/L / AST: 17 U/L / GGT: x               MICROBIOLOGY:  .Blood Blood-Peripheral  08-11-21   No Growth Final  --  --      .Sputum Sputum, cup  08-04-21   Normal Respiratory Tiki present  --    Few polymorphonuclear leukocytes per low power field  Rare Squamous epithelial cells per low power field  Moderate Gram Positive Cocci in Pairs and Chains per oil power field  Rare Gram Negative Rods per oil power field      RADIOLOGY    < from: CT Angio Chest PE Protocol w/ IV Cont (08.24.21 @ 21:02) >  No pulmonary embolism is identified to the level of the main, left or right main and lobar pulmonary artery branches. Further  evaluation of the segmental and subsegmental pulmonary artery branches is limited by insufficient contrast opacification.    Consolidation in the right upper lobe demonstrating cavitation represents pneumonia.    Patchy opacities are notedthroughout both lungs. The finding is consistent with the patient's known history of Covid pneumonia.    < end of copied text >

## 2021-08-25 NOTE — PROGRESS NOTE ADULT - PROBLEM SELECTOR PLAN 1
+COVID PCR as an outpatient  -CXR 8/9 with b/l lung opacities, CXR 8/16 grossly unchanged   -CXR 8/23 with worsening b/l opacties R>L, ?effusion vs mucus plugging/collapse, also with opacities 2nd to COVID   -S/p Remdesivir x 5 days   -S/p Decadron 6mg IVP qd x 10 days (completed 8/10)  -S/p Tocilizumab 7/30 per ID for worsening hypoxic respiratory failure  -Sputum culture 8/4 with normal respiratory aba. WBC normal, PCT normal, afebrile   -LE duplex 8/4 neg DVT.   -Ddimer elevated at one point to 4000. CTA 8/24 with no clear PE, but ddimer remains elevated. Continue with full dose AC for now and check repeat B/L LE duplex.   -CTA chest with RUL consolidation/cavitation suggestive of superimposed bacterial PNA in addition to COVID 19 PNA  -Will start Zosyn 3.375g q8h, discussed with primary team & ID   -Continue to trend ddimer & inflammatory markers

## 2021-08-25 NOTE — PROGRESS NOTE ADULT - ASSESSMENT
pt w/ covid  hypoxia   s/p steroids   s/p remdesivir   id / f/u   pulm f/u   c/e resp support/bipap/h/f  h/f as needed   s/p rrt  cards eval   cta noted. Start Zosyn for Pneumonia.    s/p toci  gi / dvt proph  o2  hx htn/   monitor bp  COVID19 precautions and isolation    discussed w/ wife

## 2021-08-25 NOTE — PROGRESS NOTE ADULT - PROBLEM SELECTOR PLAN 3
S/p RRT 8/23 x2 for chest pressure  -Appears to pleuritic in nature, worse on R side with inspiration   -EKG noted  -Troponin <6  -Cards recs noted  -F/u TTE

## 2021-08-25 NOTE — PROGRESS NOTE ADULT - ASSESSMENT
Echo 8/24/21: EF 65%, mild MR, grossly nl lv sys fx    a/p  54yo M with Hx of DVT s/p achilles tendon repair years ago not on AC presenting with complaints of SOB. intermittent and fevers with cough productive of white sputum for past week, tested positive for covid 2 days ago.    #Atypical Chest Pain  -RRT 8/23 x2 for chest pain - exacerbated by cough and inspiration -  relieved with IV pain meds   -secondary to covid PNA   -hs T neg, ekg without acute ischemic changes  -no ADHF noted on exam   -CTA prelim noted w R upper lobe consolidation, no PE    -Echo noted w grossly nl lv sys fx, mild MR   -Tylenol PRN for pain    #Covid -19  -s/p completed courses of Remdesivir x 5 days and Decadron x 10 days   -S/p Tocilizumab 7/30 per ID   -LE duplex 8/4 neg DVT  -repeat CXR noted with worsening b/l opacities and new R pleural effusion   -CTA as above   -pulm  f/u     #Sinus tachycardia  -secondary to covid PNA  -rates overall stable  -cont to monitor   -echo as above     #DVT (hx)  -LE doppler as above  -on full dose AC

## 2021-08-25 NOTE — PROGRESS NOTE ADULT - SUBJECTIVE AND OBJECTIVE BOX
Patient is a 55y old  Male who presents with a chief complaint of covid (11 Aug 2021 08:04)    Coverage for Dr. Angel Higginbotham   SUBJECTIVE / OVERNIGHT EVENTS: sick, on supplemental O2.  Review of Systems  chest pain no  palpitations no  sob yes  nausea no  headache no    MEDICATIONS  (STANDING):  benzonatate 200 milliGRAM(s) Oral every 8 hours  budesonide 160 MICROgram(s)/formoterol 4.5 MICROgram(s) Inhaler 2 Puff(s) Inhalation two times a day  cholecalciferol 2000 Unit(s) Oral daily  clonazePAM  Tablet 0.5 milliGRAM(s) Oral every 8 hours  enoxaparin Injectable 90 milliGRAM(s) SubCutaneous every 12 hours  guaiFENesin ER 1200 milliGRAM(s) Oral every 12 hours  lidocaine   4% Patch 1 Patch Transdermal every 24 hours  melatonin 3 milliGRAM(s) Oral at bedtime  multivitamin 1 Tablet(s) Oral daily  pantoprazole  Injectable 40 milliGRAM(s) IV Push daily  piperacillin/tazobactam IVPB.. 3.375 Gram(s) IV Intermittent every 8 hours  polyethylene glycol 3350 17 Gram(s) Oral daily  senna 2 Tablet(s) Oral at bedtime    MEDICATIONS  (PRN):  cyclobenzaprine 5 milliGRAM(s) Oral three times a day PRN Muscle Spasm  hydrocodone/homatropine Syrup 5 milliLiter(s) Oral every 6 hours PRN Cough  oxyCODONE    IR 5 milliGRAM(s) Oral every 4 hours PRN Moderate Pain (4 - 6)  sodium chloride 0.65% Nasal 1 Spray(s) Both Nostrils every 2 hours PRN Nasal Congestion      Vital Signs Last 24 Hrs  T(C): 37 (25 Aug 2021 10:48), Max: 37.1 (25 Aug 2021 04:17)  T(F): 98.6 (25 Aug 2021 10:48), Max: 98.7 (25 Aug 2021 04:17)  HR: 107 (25 Aug 2021 15:50) (79 - 107)  BP: 132/93 (25 Aug 2021 10:48) (103/71 - 132/93)  BP(mean): --  RR: 21 (25 Aug 2021 10:48) (20 - 21)  SpO2: 94% (25 Aug 2021 15:50) (90% - 100%)    PHYSICAL EXAM:  GENERAL: sick  HEAD:  Atraumatic, Normocephalic  EYES: EOMI, PERRLA, conjunctiva and sclera clear  NECK: Supple, No JVD  CHEST/LUNG: few crackles to auscultation bilaterally; No wheeze  HEART: Regular rate and rhythm; No murmurs, rubs, or gallops  ABDOMEN: Soft, Nontender, Nondistended; Bowel sounds present  EXTREMITIES:  2+ Peripheral Pulses, No clubbing, cyanosis, or edema  PSYCH: AAOx3  NEUROLOGY: non-focal  SKIN: No rashes or lesions    LABS:                        11.7   11.08 )-----------( 332      ( 25 Aug 2021 06:04 )             38.5     08-25    136  |  94<L>  |  10  ----------------------------<  90  4.8   |  33<H>  |  0.68    Ca    9.8      25 Aug 2021 06:04    TPro  7.3  /  Alb  3.0<L>  /  TBili  0.3  /  DBili  x   /  AST  17  /  ALT  27  /  AlkPhos  81  08-25                RADIOLOGY & ADDITIONAL TESTS:    Imaging Personally Reviewed:  < from: CT Angio Chest PE Protocol w/ IV Cont (08.24.21 @ 21:02) >  IMPRESSION:  No pulmonary embolism is identified to the level of the main, left or right main and lobar pulmonary artery branches. Further  evaluation of the segmental and subsegmental pulmonary artery branches is limited by insufficient contrast opacification.    Consolidation in the right upper lobe demonstrating cavitation represents pneumonia.    Patchy opacities are notedthroughout both lungs. The finding is consistent with the patient's known history of Covid pneumonia.    --- End of Report ---    < end of copied text >    Consultant(s) Notes Reviewed:      Care Discussed with Consultants/Other Providers:

## 2021-08-25 NOTE — PROGRESS NOTE ADULT - SUBJECTIVE AND OBJECTIVE BOX
Follow-up Pulm Progress Note    Seen on HFNC 70%/60L  Tried to decrease to HFNC 70%/50L, but desaturated to 86-87%  Still with cough, minimal sputum production  Denies CP, SOB at rest    Medications:  MEDICATIONS  (STANDING):  benzonatate 200 milliGRAM(s) Oral every 8 hours  budesonide 160 MICROgram(s)/formoterol 4.5 MICROgram(s) Inhaler 2 Puff(s) Inhalation two times a day  cholecalciferol 2000 Unit(s) Oral daily  clonazePAM  Tablet 0.5 milliGRAM(s) Oral every 8 hours  enoxaparin Injectable 90 milliGRAM(s) SubCutaneous every 12 hours  guaiFENesin ER 1200 milliGRAM(s) Oral every 12 hours  lidocaine   4% Patch 1 Patch Transdermal every 24 hours  melatonin 3 milliGRAM(s) Oral at bedtime  multivitamin 1 Tablet(s) Oral daily  pantoprazole  Injectable 40 milliGRAM(s) IV Push daily  piperacillin/tazobactam IVPB. 3.375 Gram(s) IV Intermittent once  piperacillin/tazobactam IVPB.. 3.375 Gram(s) IV Intermittent every 8 hours  polyethylene glycol 3350 17 Gram(s) Oral daily  senna 2 Tablet(s) Oral at bedtime    MEDICATIONS  (PRN):  cyclobenzaprine 5 milliGRAM(s) Oral three times a day PRN Muscle Spasm  hydrocodone/homatropine Syrup 5 milliLiter(s) Oral every 6 hours PRN Cough  oxyCODONE    IR 5 milliGRAM(s) Oral every 4 hours PRN Moderate Pain (4 - 6)  sodium chloride 0.65% Nasal 1 Spray(s) Both Nostrils every 2 hours PRN Nasal Congestion    Vital Signs Last 24 Hrs  T(C): 37 (25 Aug 2021 10:48), Max: 37.1 (25 Aug 2021 04:17)  T(F): 98.6 (25 Aug 2021 10:48), Max: 98.7 (25 Aug 2021 04:17)  HR: 106 (25 Aug 2021 10:48) (79 - 106)  BP: 132/93 (25 Aug 2021 10:48) (103/71 - 132/93)  BP(mean): --  RR: 21 (25 Aug 2021 10:48) (20 - 21)  SpO2: 90% (25 Aug 2021 10:48) (90% - 100%)    08-24 @ 07:01  -  08-25 @ 07:00  --------------------------------------------------------  IN: 240 mL / OUT: 800 mL / NET: -560 mL    LABS:                        11.7   11.08 )-----------( 332      ( 25 Aug 2021 06:04 )             38.5     08-25    136  |  94<L>  |  10  ----------------------------<  90  4.8   |  33<H>  |  0.68    Ca    9.8      25 Aug 2021 06:04    TPro  7.3  /  Alb  3.0<L>  /  TBili  0.3  /  DBili  x   /  AST  17  /  ALT  27  /  AlkPhos  81  08-25    CAPILLARY BLOOD GLUCOSE  POCT Blood Glucose.: 87 mg/dL (23 Aug 2021 17:27)    Physical Examination:  PULM: Decreased BS R  CVS: RRR    RADIOLOGY REVIEWED    CT chest: < from: CT Angio Chest PE Protocol w/ IV Cont (08.24.21 @ 21:02) >  FINDINGS:    LUNGS, AIRWAYS, AND PLEURA: Diffuse bilateral groundglass opacities in a similar distribution seen in prior chest radiographs. Consolidations in the right upper and lower lobes with development of two areas of cavitation within right upper lobeconsolidations. (5-36: 2.9 x 2.0 cm; 5-21: 2.4 x 3.4 cm), representing pneumonia.    Small right pleural effusion and adjacent right basilar atelectasis.    MEDIASTINUM AND GREGG: No lymphadenopathy.    VESSELS: No filling defects are identified in the pulmonary trunk or main left or right and lobar pulmonary artery branches. Evaluation of the segmental, and subsegmental branches is limited by insufficient contrast opacification.    HEART: Heart size is normal. No pericardial effusion.    CHEST WALL AND LOWER NECK: Within normal limits.    VISUALIZED UPPER ABDOMEN: Right renal cyst.    BONES: Within normal limits.    IMPRESSION:  No pulmonary embolism is identified to the level of the main, left or right main and lobar pulmonary artery branches. Further  evaluation of the segmental and subsegmental pulmonary artery branches is limited by insufficient contrast opacification.    Consolidation in the right upper lobe demonstrating cavitation represents pneumonia.    Patchy opacities are notedthroughout both lungs. The finding is consistent with the patient's known history of Covid pneumonia.    --- End of Report ---      < end of copied text >

## 2021-08-25 NOTE — PROGRESS NOTE ADULT - SUBJECTIVE AND OBJECTIVE BOX
CARDIOLOGY FOLLOW UP - Dr. Terry  Date of Service: 8/25/21  CC: no acute events     Review of Systems:  Constitutional: No fever, weight loss, or fatigue  Respiratory: No cough, wheezing, or hemoptysis, no shortness of breath  Cardiovascular: No chest pain, palpitations, passing out, dizziness, or leg swelling  Gastrointestinal: No abd or epigastric pain.  No nausea, vomiting, or hematemesis; no diarrhea or constipation, no melena or hematochezia  Vascular: no edema       PHYSICAL EXAM:  T(C): 37 (08-25-21 @ 10:48), Max: 37.1 (08-24-21 @ 11:52)  HR: 106 (08-25-21 @ 10:48) (79 - 106)  BP: 132/93 (08-25-21 @ 10:48) (103/71 - 132/93)  RR: 21 (08-25-21 @ 10:48) (20 - 21)  SpO2: 90% (08-25-21 @ 10:48) (90% - 100%)  Wt(kg): --  I&O's Summary    24 Aug 2021 07:01  -  25 Aug 2021 07:00  --------------------------------------------------------  IN: 240 mL / OUT: 800 mL / NET: -560 mL    25 Aug 2021 07:01  -  25 Aug 2021 11:15  --------------------------------------------------------  IN: 120 mL / OUT: 200 mL / NET: -80 mL            Home Medications:  Albuterol (Eqv-ProAir HFA) 90 mcg/inh inhalation aerosol: 2 puff(s) inhaled every 6 hours, As Needed (29 Jul 2021 09:08)  ivermectin 3 mg oral tablet: 1 tab(s) orally once a day (29 Jul 2021 09:08)  levoFLOXacin 500 mg oral tablet: 1 tab(s) orally every 24 hours (29 Jul 2021 09:08)  predniSONE 50 mg oral tablet: 1 tab(s) orally once a day (29 Jul 2021 09:08)      MEDICATIONS  (STANDING):  benzonatate 200 milliGRAM(s) Oral every 8 hours  budesonide 160 MICROgram(s)/formoterol 4.5 MICROgram(s) Inhaler 2 Puff(s) Inhalation two times a day  cholecalciferol 2000 Unit(s) Oral daily  clonazePAM  Tablet 0.5 milliGRAM(s) Oral every 8 hours  enoxaparin Injectable 90 milliGRAM(s) SubCutaneous every 12 hours  guaiFENesin ER 1200 milliGRAM(s) Oral every 12 hours  lidocaine   4% Patch 1 Patch Transdermal every 24 hours  melatonin 3 milliGRAM(s) Oral at bedtime  multivitamin 1 Tablet(s) Oral daily  pantoprazole  Injectable 40 milliGRAM(s) IV Push daily  polyethylene glycol 3350 17 Gram(s) Oral daily  senna 2 Tablet(s) Oral at bedtime      TELEMETRY: 	 NSR    ECG:  	  RADIOLOGY:   < from: CT Angio Chest PE Protocol w/ IV Cont (08.24.21 @ 21:02) >    ******PRELIMINARY REPORT******    ******PRELIMINARY REPORT******              INTERPRETATION:  Bilateral diffuse groundglass opacities. Right upper lobe consolidation with cavitations (2.9 x 2.0 cm, 5-36; 2.4 x 3.4 cm, 5-21). There is no main, right or left central pulmonary embolism. Evaluation of the lobar, segmental and subsegmental branches is limited secondary to poor bolus technique.        < end of copied text >    DIAGNOSTIC TESTING:  [ x] Echocardiogram:   < from: Transthoracic Echocardiogram (08.24.21 @ 13:56) >  ------------------------------------------------------------------------  Dimensions:    Normal Values:  LA:     3.1    2.0 - 4.0 cm  Ao:     2.9    2.0 - 3.8 cm  SEPTUM: 0.6    0.6 - 1.2 cm  PWT:    0.7    0.6 - 1.1 cm  LVIDd:  4.2    3.0 - 5.6 cm  LVIDs:  2.6    1.8 - 4.0 cm  Derived variables:  LVMI: 38 g/m2  RWT: 0.33  Fractional short: 38 %  EF (Visual Estimate): 65 %  Doppler Peak Velocity (m/sec): AoV=1.1  ------------------------------------------------------------------------  Observations:  Mitral Valve: Mitral annular calcification, otherwise  normal mitral valve. Mild mitral regurgitation.  Aortic Valve/Aorta: Normal trileaflet aortic valve. Peak  transaortic valve gradient equals 5 mm Hg. Peak left  ventricular outflow tract gradient equals 3 mm Hg.  Aortic Root: 2.9 cm.  Left Atrium: Normal left atrium.  Left Ventricle: Endocardium not well visualized; grossly  normal left ventricular systolic function. Normal left  ventricular internal dimensions and wall thicknesses.  Right Heart: Normal right atrium. Normal right ventricular  size and function. Normal tricuspid valve. Normal pulmonic  valve.  Pericardium/Pleura: Normal pericardium with no pericardial  effusion.  Hemodynamic: Estimated right atrial pressure is 8 mm Hg.  Estimated right ventricular systolic pressure equals 39 mm  Hg, assuming right atrial pressure equals 8 mm Hg,  consistent with borderline pulmonary hypertension.  ------------------------------------------------------------------------  Conclusions:  1. Normal left ventricular internal dimensions and wall  thicknesses.  2. Endocardium not well visualized; grossly normal left  ventricular systolic function.  3. Normal right ventricular size and function.    < end of copied text >    [ ]  Catheterization:  [ ] Stress Test:    OTHER: 	    LABS:	 	    Troponin T, High Sensitivity Result: <6 ng/L [0 - 51] (08-23 @ 05:44)                          11.7   11.08 )-----------( 332      ( 25 Aug 2021 06:04 )             38.5     08-25    136  |  94<L>  |  10  ----------------------------<  90  4.8   |  33<H>  |  0.68    Ca    9.8      25 Aug 2021 06:04    TPro  7.3  /  Alb  3.0<L>  /  TBili  0.3  /  DBili  x   /  AST  17  /  ALT  27  /  AlkPhos  81  08-25

## 2021-08-25 NOTE — PROGRESS NOTE ADULT - PROBLEM SELECTOR PLAN 2
2nd to COVID PNA  -S/p RRT 8/3 and 8/4 for hypoxia  -Tx to 5ICU 8/10 for further management  -Now on 4Monti, hypoxia slowly improving   -S/p RRT x2 8/23 for R sided chest pain, pain appears pleuritic in nature  -CXR with worsening b/l opacties R>L, ?effusion vs mucus plugging/collapse, also with opacities 2nd to COVID   -HFNC currently at 70%/60L, wean o2 as tolerated to keep sats >88%  -C/w Bipap 15/10 qhs for RLL collapse  -CTA chest with RUL consolidation/cavitation suggestive of superimposed bacterial PNA in addition to COVID 19 PNA, no clear PE  -Incentive spirometry

## 2021-08-26 DIAGNOSIS — J18.9 PNEUMONIA, UNSPECIFIED ORGANISM: ICD-10-CM

## 2021-08-26 DIAGNOSIS — J93.9 PNEUMOTHORAX, UNSPECIFIED: ICD-10-CM

## 2021-08-26 LAB
ALBUMIN FLD-MCNC: 2.2 G/DL — SIGNIFICANT CHANGE UP
ALBUMIN SERPL ELPH-MCNC: 2.8 G/DL — LOW (ref 3.3–5)
ALP SERPL-CCNC: 88 U/L — SIGNIFICANT CHANGE UP (ref 40–120)
ALT FLD-CCNC: 24 U/L — SIGNIFICANT CHANGE UP (ref 10–45)
ANION GAP SERPL CALC-SCNC: 15 MMOL/L — SIGNIFICANT CHANGE UP (ref 5–17)
ANION GAP SERPL CALC-SCNC: 16 MMOL/L — SIGNIFICANT CHANGE UP (ref 5–17)
AST SERPL-CCNC: 26 U/L — SIGNIFICANT CHANGE UP (ref 10–40)
B PERT IGG+IGM PNL SER: ABNORMAL
BASE EXCESS BLDV CALC-SCNC: 10.5 MMOL/L — HIGH (ref -2–2)
BILIRUB SERPL-MCNC: 0.4 MG/DL — SIGNIFICANT CHANGE UP (ref 0.2–1.2)
BUN SERPL-MCNC: 9 MG/DL — SIGNIFICANT CHANGE UP (ref 7–23)
BUN SERPL-MCNC: 9 MG/DL — SIGNIFICANT CHANGE UP (ref 7–23)
CA-I SERPL-SCNC: 1.19 MMOL/L — SIGNIFICANT CHANGE UP (ref 1.15–1.33)
CALCIUM SERPL-MCNC: 9.3 MG/DL — SIGNIFICANT CHANGE UP (ref 8.4–10.5)
CALCIUM SERPL-MCNC: 9.5 MG/DL — SIGNIFICANT CHANGE UP (ref 8.4–10.5)
CHLORIDE BLDV-SCNC: 95 MMOL/L — LOW (ref 96–108)
CHLORIDE SERPL-SCNC: 92 MMOL/L — LOW (ref 96–108)
CHLORIDE SERPL-SCNC: 93 MMOL/L — LOW (ref 96–108)
CO2 BLDV-SCNC: 43 MMOL/L — HIGH (ref 22–26)
CO2 SERPL-SCNC: 24 MMOL/L — SIGNIFICANT CHANGE UP (ref 22–31)
CO2 SERPL-SCNC: 29 MMOL/L — SIGNIFICANT CHANGE UP (ref 22–31)
COLOR FLD: SIGNIFICANT CHANGE UP
CREAT SERPL-MCNC: 0.63 MG/DL — SIGNIFICANT CHANGE UP (ref 0.5–1.3)
CREAT SERPL-MCNC: 0.64 MG/DL — SIGNIFICANT CHANGE UP (ref 0.5–1.3)
D DIMER BLD IA.RAPID-MCNC: 2110 NG/ML DDU — HIGH
FLUID INTAKE SUBSTANCE CLASS: SIGNIFICANT CHANGE UP
FLUID SEGMENTED GRANULOCYTES: 67 % — SIGNIFICANT CHANGE UP
GAS PNL BLDV: 133 MMOL/L — LOW (ref 136–145)
GAS PNL BLDV: SIGNIFICANT CHANGE UP
GAS PNL BLDV: SIGNIFICANT CHANGE UP
GLUCOSE BLDC GLUCOMTR-MCNC: 93 MG/DL — SIGNIFICANT CHANGE UP (ref 70–99)
GLUCOSE BLDV-MCNC: 118 MG/DL — HIGH (ref 70–99)
GLUCOSE FLD-MCNC: 36 MG/DL — SIGNIFICANT CHANGE UP
GLUCOSE SERPL-MCNC: 106 MG/DL — HIGH (ref 70–99)
GLUCOSE SERPL-MCNC: 107 MG/DL — HIGH (ref 70–99)
HCO3 BLDV-SCNC: 40 MMOL/L — HIGH (ref 22–29)
HCT VFR BLDA CALC: 41 % — SIGNIFICANT CHANGE UP (ref 39–51)
HGB BLD CALC-MCNC: 13.8 G/DL — SIGNIFICANT CHANGE UP (ref 12.6–17.4)
HOROWITZ INDEX BLDV+IHG-RTO: SIGNIFICANT CHANGE UP
LACTATE BLDV-MCNC: 1.8 MMOL/L — SIGNIFICANT CHANGE UP (ref 0.7–2)
LACTATE SERPL-SCNC: 1.7 MMOL/L — SIGNIFICANT CHANGE UP (ref 0.7–2)
LDH SERPL L TO P-CCNC: 1640 U/L — SIGNIFICANT CHANGE UP
LYMPHOCYTES # FLD: 24 % — SIGNIFICANT CHANGE UP
MAGNESIUM SERPL-MCNC: 2.2 MG/DL — SIGNIFICANT CHANGE UP (ref 1.6–2.6)
MONOS+MACROS # FLD: 9 % — SIGNIFICANT CHANGE UP
PCO2 BLDV: 80 MMHG — HIGH (ref 42–55)
PH BLDV: 7.31 — LOW (ref 7.32–7.43)
PH FLD: > 7.92 — SIGNIFICANT CHANGE UP
PHOSPHATE SERPL-MCNC: 4.9 MG/DL — HIGH (ref 2.5–4.5)
PO2 BLDV: 38 MMHG — SIGNIFICANT CHANGE UP (ref 25–45)
POTASSIUM BLDV-SCNC: 4.5 MMOL/L — SIGNIFICANT CHANGE UP (ref 3.5–5.1)
POTASSIUM SERPL-MCNC: 4.8 MMOL/L — SIGNIFICANT CHANGE UP (ref 3.5–5.3)
POTASSIUM SERPL-MCNC: 4.8 MMOL/L — SIGNIFICANT CHANGE UP (ref 3.5–5.3)
POTASSIUM SERPL-SCNC: 4.8 MMOL/L — SIGNIFICANT CHANGE UP (ref 3.5–5.3)
POTASSIUM SERPL-SCNC: 4.8 MMOL/L — SIGNIFICANT CHANGE UP (ref 3.5–5.3)
PROCALCITONIN SERPL-MCNC: 0.22 NG/ML — HIGH (ref 0.02–0.1)
PROT FLD-MCNC: 4.7 G/DL — SIGNIFICANT CHANGE UP
PROT SERPL-MCNC: 7.9 G/DL — SIGNIFICANT CHANGE UP (ref 6–8.3)
RCV VOL RI: HIGH /UL (ref 0–0)
SAO2 % BLDV: 61.7 % — LOW (ref 67–88)
SODIUM SERPL-SCNC: 132 MMOL/L — LOW (ref 135–145)
SODIUM SERPL-SCNC: 137 MMOL/L — SIGNIFICANT CHANGE UP (ref 135–145)
TOTAL NUCLEATED CELL COUNT, BODY FLUID: 180 /UL — SIGNIFICANT CHANGE UP
TUBE TYPE: SIGNIFICANT CHANGE UP

## 2021-08-26 PROCEDURE — 71045 X-RAY EXAM CHEST 1 VIEW: CPT | Mod: 26

## 2021-08-26 PROCEDURE — 99222 1ST HOSP IP/OBS MODERATE 55: CPT

## 2021-08-26 PROCEDURE — 99232 SBSQ HOSP IP/OBS MODERATE 35: CPT

## 2021-08-26 PROCEDURE — 93010 ELECTROCARDIOGRAM REPORT: CPT | Mod: 77

## 2021-08-26 PROCEDURE — 93010 ELECTROCARDIOGRAM REPORT: CPT

## 2021-08-26 PROCEDURE — 99291 CRITICAL CARE FIRST HOUR: CPT

## 2021-08-26 RX ORDER — CYCLOBENZAPRINE HYDROCHLORIDE 10 MG/1
5 TABLET, FILM COATED ORAL THREE TIMES A DAY
Refills: 0 | Status: DISCONTINUED | OUTPATIENT
Start: 2021-08-26 | End: 2021-09-13

## 2021-08-26 RX ORDER — CHLORHEXIDINE GLUCONATE 213 G/1000ML
1 SOLUTION TOPICAL
Refills: 0 | Status: DISCONTINUED | OUTPATIENT
Start: 2021-08-26 | End: 2021-08-30

## 2021-08-26 RX ORDER — HYDROMORPHONE HYDROCHLORIDE 2 MG/ML
1 INJECTION INTRAMUSCULAR; INTRAVENOUS; SUBCUTANEOUS ONCE
Refills: 0 | Status: DISCONTINUED | OUTPATIENT
Start: 2021-08-26 | End: 2021-08-26

## 2021-08-26 RX ORDER — HYDROMORPHONE HYDROCHLORIDE 2 MG/ML
0.5 INJECTION INTRAMUSCULAR; INTRAVENOUS; SUBCUTANEOUS ONCE
Refills: 0 | Status: DISCONTINUED | OUTPATIENT
Start: 2021-08-26 | End: 2021-08-26

## 2021-08-26 RX ORDER — PANTOPRAZOLE SODIUM 20 MG/1
40 TABLET, DELAYED RELEASE ORAL DAILY
Refills: 0 | Status: DISCONTINUED | OUTPATIENT
Start: 2021-08-26 | End: 2021-08-26

## 2021-08-26 RX ORDER — SODIUM CHLORIDE 0.65 %
1 AEROSOL, SPRAY (ML) NASAL
Refills: 0 | Status: DISCONTINUED | OUTPATIENT
Start: 2021-08-26 | End: 2021-09-13

## 2021-08-26 RX ORDER — LANOLIN ALCOHOL/MO/W.PET/CERES
3 CREAM (GRAM) TOPICAL AT BEDTIME
Refills: 0 | Status: DISCONTINUED | OUTPATIENT
Start: 2021-08-26 | End: 2021-08-26

## 2021-08-26 RX ORDER — PIPERACILLIN AND TAZOBACTAM 4; .5 G/20ML; G/20ML
3.38 INJECTION, POWDER, LYOPHILIZED, FOR SOLUTION INTRAVENOUS EVERY 8 HOURS
Refills: 0 | Status: DISCONTINUED | OUTPATIENT
Start: 2021-08-26 | End: 2021-08-26

## 2021-08-26 RX ORDER — ENOXAPARIN SODIUM 100 MG/ML
90 INJECTION SUBCUTANEOUS EVERY 12 HOURS
Refills: 0 | Status: DISCONTINUED | OUTPATIENT
Start: 2021-08-26 | End: 2021-08-26

## 2021-08-26 RX ORDER — BUDESONIDE AND FORMOTEROL FUMARATE DIHYDRATE 160; 4.5 UG/1; UG/1
2 AEROSOL RESPIRATORY (INHALATION)
Refills: 0 | Status: DISCONTINUED | OUTPATIENT
Start: 2021-08-26 | End: 2021-08-26

## 2021-08-26 RX ORDER — ENOXAPARIN SODIUM 100 MG/ML
90 INJECTION SUBCUTANEOUS EVERY 12 HOURS
Refills: 0 | Status: DISCONTINUED | OUTPATIENT
Start: 2021-08-26 | End: 2021-09-01

## 2021-08-26 RX ORDER — PANTOPRAZOLE SODIUM 20 MG/1
40 TABLET, DELAYED RELEASE ORAL DAILY
Refills: 0 | Status: DISCONTINUED | OUTPATIENT
Start: 2021-08-26 | End: 2021-09-12

## 2021-08-26 RX ORDER — ACETAMINOPHEN 500 MG
975 TABLET ORAL EVERY 6 HOURS
Refills: 0 | Status: DISCONTINUED | OUTPATIENT
Start: 2021-08-26 | End: 2021-09-13

## 2021-08-26 RX ORDER — POLYETHYLENE GLYCOL 3350 17 G/17G
17 POWDER, FOR SOLUTION ORAL DAILY
Refills: 0 | Status: DISCONTINUED | OUTPATIENT
Start: 2021-08-26 | End: 2021-08-26

## 2021-08-26 RX ORDER — SODIUM CHLORIDE 9 MG/ML
1000 INJECTION, SOLUTION INTRAVENOUS
Refills: 0 | Status: DISCONTINUED | OUTPATIENT
Start: 2021-08-26 | End: 2021-08-27

## 2021-08-26 RX ORDER — CHOLECALCIFEROL (VITAMIN D3) 125 MCG
2000 CAPSULE ORAL DAILY
Refills: 0 | Status: DISCONTINUED | OUTPATIENT
Start: 2021-08-26 | End: 2021-09-13

## 2021-08-26 RX ORDER — SODIUM CHLORIDE 9 MG/ML
1000 INJECTION, SOLUTION INTRAVENOUS ONCE
Refills: 0 | Status: COMPLETED | OUTPATIENT
Start: 2021-08-26 | End: 2021-08-26

## 2021-08-26 RX ORDER — MORPHINE SULFATE 50 MG/1
2 CAPSULE, EXTENDED RELEASE ORAL ONCE
Refills: 0 | Status: DISCONTINUED | OUTPATIENT
Start: 2021-08-26 | End: 2021-08-26

## 2021-08-26 RX ORDER — CHOLECALCIFEROL (VITAMIN D3) 125 MCG
2000 CAPSULE ORAL DAILY
Refills: 0 | Status: DISCONTINUED | OUTPATIENT
Start: 2021-08-26 | End: 2021-08-26

## 2021-08-26 RX ORDER — SODIUM CHLORIDE 0.65 %
1 AEROSOL, SPRAY (ML) NASAL
Refills: 0 | Status: DISCONTINUED | OUTPATIENT
Start: 2021-08-26 | End: 2021-08-26

## 2021-08-26 RX ORDER — BUDESONIDE AND FORMOTEROL FUMARATE DIHYDRATE 160; 4.5 UG/1; UG/1
2 AEROSOL RESPIRATORY (INHALATION)
Refills: 0 | Status: DISCONTINUED | OUTPATIENT
Start: 2021-08-26 | End: 2021-09-13

## 2021-08-26 RX ORDER — LIDOCAINE 4 G/100G
1 CREAM TOPICAL EVERY 24 HOURS
Refills: 0 | Status: DISCONTINUED | OUTPATIENT
Start: 2021-08-26 | End: 2021-09-13

## 2021-08-26 RX ORDER — CYCLOBENZAPRINE HYDROCHLORIDE 10 MG/1
5 TABLET, FILM COATED ORAL THREE TIMES A DAY
Refills: 0 | Status: DISCONTINUED | OUTPATIENT
Start: 2021-08-26 | End: 2021-08-26

## 2021-08-26 RX ORDER — OXYCODONE HYDROCHLORIDE 5 MG/1
5 TABLET ORAL EVERY 4 HOURS
Refills: 0 | Status: DISCONTINUED | OUTPATIENT
Start: 2021-08-26 | End: 2021-08-28

## 2021-08-26 RX ORDER — OXYCODONE HYDROCHLORIDE 5 MG/1
5 TABLET ORAL EVERY 4 HOURS
Refills: 0 | Status: DISCONTINUED | OUTPATIENT
Start: 2021-08-26 | End: 2021-08-26

## 2021-08-26 RX ORDER — SENNA PLUS 8.6 MG/1
2 TABLET ORAL AT BEDTIME
Refills: 0 | Status: DISCONTINUED | OUTPATIENT
Start: 2021-08-26 | End: 2021-09-13

## 2021-08-26 RX ORDER — SENNA PLUS 8.6 MG/1
2 TABLET ORAL AT BEDTIME
Refills: 0 | Status: DISCONTINUED | OUTPATIENT
Start: 2021-08-26 | End: 2021-08-26

## 2021-08-26 RX ORDER — LIDOCAINE 4 G/100G
1 CREAM TOPICAL EVERY 24 HOURS
Refills: 0 | Status: DISCONTINUED | OUTPATIENT
Start: 2021-08-26 | End: 2021-08-26

## 2021-08-26 RX ORDER — CLONAZEPAM 1 MG
0.5 TABLET ORAL EVERY 8 HOURS
Refills: 0 | Status: DISCONTINUED | OUTPATIENT
Start: 2021-08-26 | End: 2021-09-02

## 2021-08-26 RX ORDER — HYDROMORPHONE HYDROCHLORIDE 2 MG/ML
2 INJECTION INTRAMUSCULAR; INTRAVENOUS; SUBCUTANEOUS ONCE
Refills: 0 | Status: DISCONTINUED | OUTPATIENT
Start: 2021-08-26 | End: 2021-08-26

## 2021-08-26 RX ORDER — LANOLIN ALCOHOL/MO/W.PET/CERES
3 CREAM (GRAM) TOPICAL AT BEDTIME
Refills: 0 | Status: DISCONTINUED | OUTPATIENT
Start: 2021-08-26 | End: 2021-09-13

## 2021-08-26 RX ORDER — PIPERACILLIN AND TAZOBACTAM 4; .5 G/20ML; G/20ML
3.38 INJECTION, POWDER, LYOPHILIZED, FOR SOLUTION INTRAVENOUS EVERY 8 HOURS
Refills: 0 | Status: COMPLETED | OUTPATIENT
Start: 2021-08-26 | End: 2021-09-02

## 2021-08-26 RX ORDER — POLYETHYLENE GLYCOL 3350 17 G/17G
17 POWDER, FOR SOLUTION ORAL DAILY
Refills: 0 | Status: DISCONTINUED | OUTPATIENT
Start: 2021-08-26 | End: 2021-09-13

## 2021-08-26 RX ORDER — CLONAZEPAM 1 MG
0.5 TABLET ORAL EVERY 8 HOURS
Refills: 0 | Status: DISCONTINUED | OUTPATIENT
Start: 2021-08-26 | End: 2021-08-26

## 2021-08-26 RX ADMIN — BUDESONIDE AND FORMOTEROL FUMARATE DIHYDRATE 2 PUFF(S): 160; 4.5 AEROSOL RESPIRATORY (INHALATION) at 17:24

## 2021-08-26 RX ADMIN — Medication 400 MILLIGRAM(S): at 00:19

## 2021-08-26 RX ADMIN — Medication 1000 MILLIGRAM(S): at 02:43

## 2021-08-26 RX ADMIN — SODIUM CHLORIDE 1000 MILLILITER(S): 9 INJECTION, SOLUTION INTRAVENOUS at 12:41

## 2021-08-26 RX ADMIN — CHLORHEXIDINE GLUCONATE 1 APPLICATION(S): 213 SOLUTION TOPICAL at 12:40

## 2021-08-26 RX ADMIN — Medication 0.5 MILLIGRAM(S): at 21:34

## 2021-08-26 RX ADMIN — BUDESONIDE AND FORMOTEROL FUMARATE DIHYDRATE 2 PUFF(S): 160; 4.5 AEROSOL RESPIRATORY (INHALATION) at 07:15

## 2021-08-26 RX ADMIN — SODIUM CHLORIDE 100 MILLILITER(S): 9 INJECTION, SOLUTION INTRAVENOUS at 20:06

## 2021-08-26 RX ADMIN — HYDROMORPHONE HYDROCHLORIDE 2 MILLIGRAM(S): 2 INJECTION INTRAMUSCULAR; INTRAVENOUS; SUBCUTANEOUS at 18:25

## 2021-08-26 RX ADMIN — Medication 200 MILLIGRAM(S): at 21:07

## 2021-08-26 RX ADMIN — Medication 200 MILLIGRAM(S): at 16:16

## 2021-08-26 RX ADMIN — MORPHINE SULFATE 2 MILLIGRAM(S): 50 CAPSULE, EXTENDED RELEASE ORAL at 00:29

## 2021-08-26 RX ADMIN — ENOXAPARIN SODIUM 90 MILLIGRAM(S): 100 INJECTION SUBCUTANEOUS at 18:02

## 2021-08-26 RX ADMIN — HYDROMORPHONE HYDROCHLORIDE 2 MILLIGRAM(S): 2 INJECTION INTRAMUSCULAR; INTRAVENOUS; SUBCUTANEOUS at 18:28

## 2021-08-26 RX ADMIN — Medication 3 MILLIGRAM(S): at 21:07

## 2021-08-26 RX ADMIN — OXYCODONE HYDROCHLORIDE 5 MILLIGRAM(S): 5 TABLET ORAL at 17:40

## 2021-08-26 RX ADMIN — PIPERACILLIN AND TAZOBACTAM 25 GRAM(S): 4; .5 INJECTION, POWDER, LYOPHILIZED, FOR SOLUTION INTRAVENOUS at 20:06

## 2021-08-26 RX ADMIN — HYDROMORPHONE HYDROCHLORIDE 0.5 MILLIGRAM(S): 2 INJECTION INTRAMUSCULAR; INTRAVENOUS; SUBCUTANEOUS at 10:30

## 2021-08-26 RX ADMIN — MORPHINE SULFATE 2 MILLIGRAM(S): 50 CAPSULE, EXTENDED RELEASE ORAL at 04:39

## 2021-08-26 RX ADMIN — HYDROMORPHONE HYDROCHLORIDE 0.5 MILLIGRAM(S): 2 INJECTION INTRAMUSCULAR; INTRAVENOUS; SUBCUTANEOUS at 10:51

## 2021-08-26 RX ADMIN — Medication 2000 UNIT(S): at 12:40

## 2021-08-26 RX ADMIN — PIPERACILLIN AND TAZOBACTAM 25 GRAM(S): 4; .5 INJECTION, POWDER, LYOPHILIZED, FOR SOLUTION INTRAVENOUS at 12:40

## 2021-08-26 RX ADMIN — PIPERACILLIN AND TAZOBACTAM 25 GRAM(S): 4; .5 INJECTION, POWDER, LYOPHILIZED, FOR SOLUTION INTRAVENOUS at 06:36

## 2021-08-26 RX ADMIN — MORPHINE SULFATE 2 MILLIGRAM(S): 50 CAPSULE, EXTENDED RELEASE ORAL at 01:44

## 2021-08-26 RX ADMIN — Medication 1200 MILLIGRAM(S): at 18:19

## 2021-08-26 RX ADMIN — PANTOPRAZOLE SODIUM 40 MILLIGRAM(S): 20 TABLET, DELAYED RELEASE ORAL at 12:39

## 2021-08-26 RX ADMIN — Medication 0.5 MILLIGRAM(S): at 15:09

## 2021-08-26 RX ADMIN — Medication 1 TABLET(S): at 15:04

## 2021-08-26 RX ADMIN — OXYCODONE HYDROCHLORIDE 5 MILLIGRAM(S): 5 TABLET ORAL at 16:40

## 2021-08-26 NOTE — CONSULT NOTE ADULT - ASSESSMENT
56yo M with h/o appendectomy admitted to the hospital with COVID and possible superimposed pneumonia found to have a right moderate-sized pneumothorax with leftward shift and small pleural effusion.    Plan/Recommendations:  - Right open chest tube placed at bedside  - F/u post-procedure CXR  - Maintain to wall suction -20mmHg    Discussed with attending.    LIVE Holly, PGY2  Thoracic Surgery [id number]

## 2021-08-26 NOTE — CONSULT NOTE ADULT - SUBJECTIVE AND OBJECTIVE BOX
THORACIC SURGERY CONSULT  ========================    HPI:  54yo M with h/o appendectomy admitted to the hospital with COVID and possible superimposed pneumonia. Patient with intermittently worsening respiratory status, currently off BiPAP but requiring HFNC, currently being treated for pneumonia. Patient has had several RRTs for tachypnea and hypoxia during admission, last one this morning for tachypnea attributed to severe right sided chest pain. Patient received pain medications and remained on HFNC with 60L and FiO2 70% with good response. CXR showed a moderate-sized right pneumothorax with mild left-sided mediastinal shift and a small pleural effusion.    Thoracic surgery consulted for right chest tube placement.      PAST MEDICAL HISTORY:  Hyperlipidemia  HTN (hypertension)  Rupture, tendon, quadriceps  History of Achilles tendon repair    PAST SURGICAL HISTORY:  Appendectomy    MEDICATIONS  (STANDING):  benzonatate 200 milliGRAM(s) Oral every 8 hours  budesonide 160 MICROgram(s)/formoterol 4.5 MICROgram(s) Inhaler 2 Puff(s) Inhalation two times a day  chlorhexidine 4% Liquid 1 Application(s) Topical <User Schedule>  chlorhexidine 4% Liquid 1 Application(s) Topical <User Schedule>  cholecalciferol 2000 Unit(s) Oral daily  clonazePAM  Tablet 0.5 milliGRAM(s) Oral every 8 hours  enoxaparin Injectable 90 milliGRAM(s) SubCutaneous every 12 hours  guaiFENesin ER 1200 milliGRAM(s) Oral every 12 hours  HYDROmorphone  Injectable 0.5 milliGRAM(s) IV Push once  lidocaine   4% Patch 1 Patch Transdermal every 24 hours  melatonin 3 milliGRAM(s) Oral at bedtime  multivitamin 1 Tablet(s) Oral daily  pantoprazole  Injectable 40 milliGRAM(s) IV Push daily  piperacillin/tazobactam IVPB.. 3.375 Gram(s) IV Intermittent every 8 hours  polyethylene glycol 3350 17 Gram(s) Oral daily  senna 2 Tablet(s) Oral at bedtime      ALLERGIES:  NKDA  ___________________________________________  REVIEW OF SYSTEMS:  All ROS negative except as per HPI.    ___________________________________________  VITALS:  Vital Signs Last 24 Hrs  T(C): 36 (26 Aug 2021 09:49), Max: 36.7 (25 Aug 2021 20:33)  T(F): 96.8 (26 Aug 2021 09:49), Max: 98.1 (26 Aug 2021 07:55)  HR: 129 (26 Aug 2021 10:45) (104 - 136)  BP: 152/83 (26 Aug 2021 10:45) (108/80 - 177/95)  BP(mean): 111 (26 Aug 2021 10:45) (109 - 124)  RR: 31 (26 Aug 2021 10:45) (21 - 51)  SpO2: 100% (26 Aug 2021 10:45) (90% - 100%)    CAPILLARY BLOOD GLUCOSE  POCT Blood Glucose.: 93 mg/dL (26 Aug 2021 04:52)      I&O's Detail    25 Aug 2021 07:01  -  26 Aug 2021 07:00  --------------------------------------------------------  IN:    Oral Fluid: 220 mL  Total IN: 220 mL    OUT:    Voided (mL): 450 mL  Total OUT: 450 mL    Total NET: -230 mL      26 Aug 2021 07:01  -  26 Aug 2021 11:45  --------------------------------------------------------  IN:  Total IN: 0 mL    OUT:    Voided (mL): 100 mL  Total OUT: 100 mL    Total NET: -100 mL      PHYSICAL EXAM:  General: AAOx3  Respiratory: using accessory muscles to breathe, tachypneic, on HFNC  Abdomen: soft, nontender, nondistended, no rebound, no guarding  Extremities: Moves all four.     ____________________________________________  LABS:  CBC Full  -  ( 25 Aug 2021 06:04 )  WBC Count : 11.08 K/uL  RBC Count : 4.20 M/uL  Hemoglobin : 11.7 g/dL  Hematocrit : 38.5 %  Platelet Count - Automated : 332 K/uL  Mean Cell Volume : 91.7 fl  Mean Cell Hemoglobin : 27.9 pg  Mean Cell Hemoglobin Concentration : 30.4 gm/dL  Auto Neutrophil # : x  Auto Lymphocyte # : x  Auto Monocyte # : x  Auto Eosinophil # : x  Auto Basophil # : x  Auto Neutrophil % : x  Auto Lymphocyte % : x  Auto Monocyte % : x  Auto Eosinophil % : x  Auto Basophil % : x    08-26    137  |  93<L>  |  9   ----------------------------<  107<H>  4.8   |  29  |  0.63    Ca    9.5      26 Aug 2021 05:12  Phos  4.9     08-26  Mg     2.2     08-26    TPro  7.9  /  Alb  2.8<L>  /  TBili  0.4  /  DBili  x   /  AST  26  /  ALT  24  /  AlkPhos  88  08-26    LIVER FUNCTIONS - ( 26 Aug 2021 05:10 )  Alb: 2.8 g/dL / Pro: 7.9 g/dL / ALK PHOS: 88 U/L / ALT: 24 U/L / AST: 26 U/L / GGT: x           ____________________________________________  RADIOLOGY & ADDITIONAL STUDIES:    e< from: Xray Chest 1 View- PORTABLE-Urgent (Xray Chest 1 View- PORTABLE-Urgent .) (08.26.21 @ 05:20) >  INTERPRETATION:  Moderate right pneumothorax with mild leftward mediastinal shift. Right pleural effusion.  Pulmonary edema.    < end of copied text >

## 2021-08-26 NOTE — CHART NOTE - NSCHARTNOTEFT_GEN_A_CORE
Patient is a 55y old  Male who presents with a chief complaint of covid (11 Aug 2021 08:04)    RRT called for tachypnea and back pain.   RRT actions  - CXR  - CBC, CMP, lactate, D-dimer, VBGs  - 0.5mg Dilaudid   - MICU consult    Vitals  T(C): 36.5 (08-26-21 @ 05:35), Max: 37 (08-25-21 @ 10:48)  HR: 111 (08-26-21 @ 05:40) (103 - 122)  BP: 128/84 (08-26-21 @ 05:35) (108/80 - 132/93)  RR: 32 (08-26-21 @ 05:35) (21 - 32)  SpO2: 92% (08-26-21 @ 05:40) (90% - 95%)    LABS:                        11.7   11.08 )-----------( 332      ( 25 Aug 2021 06:04 )             38.5     08-26    137  |  93<L>  |  9   ----------------------------<  107<H>  4.8   |  29  |  0.63    Ca    9.5      26 Aug 2021 05:12  Phos  4.9     08-26  Mg     2.2     08-26    TPro  7.9  /  Alb  2.8<L>  /  TBili  0.4  /  DBili  x   /  AST  26  /  ALT  24  /  AlkPhos  88  08-26        RADIOLOGY & ADDITIONAL TESTS:    ASSESSMENT & PLAN  HPI:  54yo M with Hx of DVT s/p achilles tendon repair years ago not on AC presenting with complaints of SOB. intermittent and fevers with cough productive of white sputum for past week, tested positive for covid 2 days ago.  pt is unvaccinated   progressively worsening SOB over the past 5 days, worse with ambulation. found to be hypoxic on arrival to the  er    (29 Jul 2021 10:31)      Plan  - MICU consult  - contacted family member, wife to update  - will continue to monitor  - f/u with AM team, Spoke with Dr. Ochoa      Addendum:  < from: Xray Chest 1 View- PORTABLE-Urgent (Xray Chest 1 View- PORTABLE-Urgent .) (08.26.21 @ 05:20) >  ******PRELIMINARY REPORT******        EXAM:  XR CHEST PORTABLE URGENT 1V                        PROCEDURE DATE:  08/26/2021    ******PRELIMINARY REPORT******    ******PRELIMINARY REPORT******        INTERPRETATION:  Moderate right pneumothorax with mild leftward mediastinal shift. Right pleural effusion.  Pulmonary edema.  d/w MD Maile by MD Ron at 5:18 AM on 8/26/21  < end of copied text >    CXR results above.     Plan  - CT surgery consulted  - MICU evaluated- stated will transport to 5ICU after chest tube placed  - transitioned to HF NC per respiratory   - updated Dr. Ochoa  - f/u with AM team     Taylor oBlden PA-C  Department of Medicine

## 2021-08-26 NOTE — PROGRESS NOTE ADULT - PROBLEM SELECTOR PLAN 2
CTA chest 8/24 with RUL consolidation/cavitation suggestive of superimposed bacterial PNA in addition to COVID PNA  -C/w Zosyn (started 8/25)  -ID f/u

## 2021-08-26 NOTE — PROGRESS NOTE ADULT - ASSESSMENT
pt w/ covid  hypoxia   s/p steroids   s/p remdesivir   id / f/u   pulm f/u   c/e resp support/bipap/h/f  h/f as needed   s/p rrt  cards eval   cta noted. Continue Zosyn for Pneumonia.    s/p toci  gi / dvt proph  o2  hx htn/   monitor bp  COVID19 precautions and isolation  Pneumothorax - s/p CT  MICU care    discussed w/ wife over phone QA    Florentin Ochoa MD pager [phone]

## 2021-08-26 NOTE — PROGRESS NOTE ADULT - SUBJECTIVE AND OBJECTIVE BOX
Patient is a 55y old  Male who presents with a chief complaint of COVID-19 (26 Aug 2021 08:52)    Coverage for Dr. Angel Higginbotham   SUBJECTIVE / OVERNIGHT EVENTS: Events noted. s/p CT for R PTX.   Review of Systems  chest pain no  palpitations no  sob improving   nausea no  headache no    MEDICATIONS  (STANDING):  benzonatate 200 milliGRAM(s) Oral every 8 hours  budesonide 160 MICROgram(s)/formoterol 4.5 MICROgram(s) Inhaler 2 Puff(s) Inhalation two times a day  chlorhexidine 4% Liquid 1 Application(s) Topical <User Schedule>  chlorhexidine 4% Liquid 1 Application(s) Topical <User Schedule>  cholecalciferol 2000 Unit(s) Oral daily  clonazePAM  Tablet 0.5 milliGRAM(s) Oral every 8 hours  enoxaparin Injectable 90 milliGRAM(s) SubCutaneous every 12 hours  guaiFENesin ER 1200 milliGRAM(s) Oral every 12 hours  lidocaine   4% Patch 1 Patch Transdermal every 24 hours  melatonin 3 milliGRAM(s) Oral at bedtime  multivitamin 1 Tablet(s) Oral daily  pantoprazole  Injectable 40 milliGRAM(s) IV Push daily  piperacillin/tazobactam IVPB.. 3.375 Gram(s) IV Intermittent every 8 hours  polyethylene glycol 3350 17 Gram(s) Oral daily  senna 2 Tablet(s) Oral at bedtime    MEDICATIONS  (PRN):  acetaminophen   Tablet .. 975 milliGRAM(s) Oral every 6 hours PRN Mild Pain (1 - 3)  cyclobenzaprine 5 milliGRAM(s) Oral three times a day PRN Muscle Spasm  oxyCODONE    IR 5 milliGRAM(s) Oral every 4 hours PRN Moderate Pain (4 - 6)  sodium chloride 0.65% Nasal 1 Spray(s) Both Nostrils every 2 hours PRN Nasal Congestion      Vital Signs Last 24 Hrs  T(C): 36.9 (26 Aug 2021 17:00), Max: 37.4 (26 Aug 2021 12:00)  T(F): 98.4 (26 Aug 2021 17:00), Max: 99.4 (26 Aug 2021 12:00)  HR: 112 (26 Aug 2021 18:00) (104 - 136)  BP: 129/70 (26 Aug 2021 18:00) (108/80 - 177/95)  BP(mean): 90 (26 Aug 2021 18:00) (90 - 124)  RR: 19 (26 Aug 2021 18:00) (17 - 51)  SpO2: 98% (26 Aug 2021 18:00) (87% - 100%)    PHYSICAL EXAM:  GENERAL: sick  HEAD:  Atraumatic, Normocephalic  EYES: EOMI, PERRLA, conjunctiva and sclera clear  NECK: Supple, No JVD  CHEST/LUNG: few crackles to auscultation bilaterally; No wheeze R CT in place  HEART: Regular rate and rhythm; No murmurs, rubs, or gallops  ABDOMEN: Soft, Nontender, Nondistended; Bowel sounds present  EXTREMITIES:  2+ Peripheral Pulses, No clubbing, cyanosis, or edema  PSYCH: AAOx3  NEUROLOGY: non-focal  SKIN: No rashes or lesions    LABS:                        11.7   11.08 )-----------( 332      ( 25 Aug 2021 06:04 )             38.5     08-26    137  |  93<L>  |  9   ----------------------------<  107<H>  4.8   |  29  |  0.63    Ca    9.5      26 Aug 2021 05:12  Phos  4.9     08-26  Mg     2.2     08-26    TPro  7.9  /  Alb  2.8<L>  /  TBili  0.4  /  DBili  x   /  AST  26  /  ALT  24  /  AlkPhos  88  08-26                RADIOLOGY & ADDITIONAL TESTS:    Imaging Personally Reviewed:    Consultant(s) Notes Reviewed:      Care Discussed with Consultants/Other Providers:

## 2021-08-26 NOTE — PROGRESS NOTE ADULT - ASSESSMENT
55 M with covid PNA, CP         completed remdesivir, s/p decadron   sp toci   No fever  CP better- cardio seeing  Hold off on abx for now   CXR - no new findings  CT chest with cavitary PNA - zosyn started, s/p CT by thoracic for PTX/effusion    Dylan Carter  Attending Physician   Division of Infectious Disease  Pager #[phone]  Available on Microsoft Teams also  After 5pm/weekend or no response, call #[phone]

## 2021-08-26 NOTE — PROGRESS NOTE ADULT - SUBJECTIVE AND OBJECTIVE BOX
Called to evaluate this 56 y/o male patient admitted with COVID pneumonia wt hypoxic respiratory failure after having a rapid response for respiratory distress while on BiPAP. Patient was noted to have right sided moderate pneumothorax with some left shift and moderate respiratory distress on exam. At the time of my exam patient is able to speak 2-3 words at a time, tachycardiac on HFNC  with 75% FIO2 and SpO2 88-92%. CT surgery called for urgent chest tube placement. BP stable at present, will transfer patient to 5 ICU for close observation after chest tube placement. Plan discussed with MICU resident , floor RN and PA as well as COVID ICU KIRK. Continue other supportive care and monitor closely.  Prognosis guarded.  Time spent: 35 minutes

## 2021-08-26 NOTE — PROGRESS NOTE ADULT - SUBJECTIVE AND OBJECTIVE BOX
CARDIOLOGY FOLLOW UP - Dr. Terry  DATE OF SERVICE: 08-26-21 @ 12:05    CC events noted.   RRT this AM for tachypnea - found to have ptx on CXR  Sats low 90s on HFNC 60L/70%   transferred to MICU , CTS consult placed and R chest tube was placed.     REVIEW OF SYSTEMS:   CONSTITUTIONAL: No fever, weight loss, or fatigue   RESPIRATORY:  No cough, wheezing, chills or hemoptysis; No SOB  CARDIOVASCULAR: No chest pain, palpitations, passing out, dizziness, or leg swelling   GASTROINTESTINAL: No abdominal or epigastric pain. No nausea, vomiting, or hematemesis, no diarreha, or constipation, No melena or hematochezia   VASCULAR: no edema.       PHYSICAL EXAM:  T(C): 36 (08-26-21 @ 09:49), Max: 36.7 (08-25-21 @ 20:33)  HR: 129 (08-26-21 @ 10:45) (104 - 136)  BP: 152/83 (08-26-21 @ 10:45) (108/80 - 177/95)  RR: 31 (08-26-21 @ 10:45) (21 - 51)  SpO2: 100% (08-26-21 @ 10:45) (90% - 100%)  Wt(kg): --  I&O's Summary    25 Aug 2021 07:01  -  26 Aug 2021 07:00  --------------------------------------------------------  IN: 220 mL / OUT: 450 mL / NET: -230 mL    26 Aug 2021 07:01  -  26 Aug 2021 12:05  --------------------------------------------------------  IN: 0 mL / OUT: 100 mL / NET: -100 mL        HOME MEDICATIONS:  Albuterol (Eqv-ProAir HFA) 90 mcg/inh inhalation aerosol: 2 puff(s) inhaled every 6 hours, As Needed (29 Jul 2021 09:08)  ivermectin 3 mg oral tablet: 1 tab(s) orally once a day (29 Jul 2021 09:08)  levoFLOXacin 500 mg oral tablet: 1 tab(s) orally every 24 hours (29 Jul 2021 09:08)  predniSONE 50 mg oral tablet: 1 tab(s) orally once a day (29 Jul 2021 09:08)      MEDICATIONS  (STANDING):  benzonatate 200 milliGRAM(s) Oral every 8 hours  budesonide 160 MICROgram(s)/formoterol 4.5 MICROgram(s) Inhaler 2 Puff(s) Inhalation two times a day  chlorhexidine 4% Liquid 1 Application(s) Topical <User Schedule>  chlorhexidine 4% Liquid 1 Application(s) Topical <User Schedule>  cholecalciferol 2000 Unit(s) Oral daily  clonazePAM  Tablet 0.5 milliGRAM(s) Oral every 8 hours  enoxaparin Injectable 90 milliGRAM(s) SubCutaneous every 12 hours  guaiFENesin ER 1200 milliGRAM(s) Oral every 12 hours  HYDROmorphone  Injectable 0.5 milliGRAM(s) IV Push once  lidocaine   4% Patch 1 Patch Transdermal every 24 hours  melatonin 3 milliGRAM(s) Oral at bedtime  multivitamin 1 Tablet(s) Oral daily  pantoprazole  Injectable 40 milliGRAM(s) IV Push daily  piperacillin/tazobactam IVPB.. 3.375 Gram(s) IV Intermittent every 8 hours  polyethylene glycol 3350 17 Gram(s) Oral daily  senna 2 Tablet(s) Oral at bedtime      TELEMETRY: ST  	    ECG:  	  RADIOLOGY:   DIAGNOSTIC TESTING:  [ ] Echocardiogram:  [ ]  Catheterization:  [ ] Stress Test:    OTHER: 	    LABS:	 	                                11.7   11.08 )-----------( 332      ( 25 Aug 2021 06:04 )             38.5     08-26    137  |  93<L>  |  9   ----------------------------<  107<H>  4.8   |  29  |  0.63    Ca    9.5      26 Aug 2021 05:12  Phos  4.9     08-26  Mg     2.2     08-26    TPro  7.9  /  Alb  2.8<L>  /  TBili  0.4  /  DBili  x   /  AST  26  /  ALT  24  /  AlkPhos  88  08-26

## 2021-08-26 NOTE — PROGRESS NOTE ADULT - SUBJECTIVE AND OBJECTIVE BOX
KARISSA VILLALBA 55y [id number]    Patient is a 55y old  Male who presents with a chief complaint of COVID-19 (26 Aug 2021 08:52)      Follow Up/CC:  ID following for COVID    Interval History/ROS: in MICU, s/p chest tube by thoracic, no fever    Allergies    No Known Allergies    Intolerances        ANTIMICROBIALS:  piperacillin/tazobactam IVPB.. 3.375 every 8 hours      MEDICATIONS  (STANDING):  benzonatate 200 milliGRAM(s) Oral every 8 hours  budesonide 160 MICROgram(s)/formoterol 4.5 MICROgram(s) Inhaler 2 Puff(s) Inhalation two times a day  chlorhexidine 4% Liquid 1 Application(s) Topical <User Schedule>  chlorhexidine 4% Liquid 1 Application(s) Topical <User Schedule>  cholecalciferol 2000 Unit(s) Oral daily  clonazePAM  Tablet 0.5 milliGRAM(s) Oral every 8 hours  enoxaparin Injectable 90 milliGRAM(s) SubCutaneous every 12 hours  guaiFENesin ER 1200 milliGRAM(s) Oral every 12 hours  lidocaine   4% Patch 1 Patch Transdermal every 24 hours  melatonin 3 milliGRAM(s) Oral at bedtime  multivitamin 1 Tablet(s) Oral daily  pantoprazole  Injectable 40 milliGRAM(s) IV Push daily  piperacillin/tazobactam IVPB.. 3.375 Gram(s) IV Intermittent every 8 hours  polyethylene glycol 3350 17 Gram(s) Oral daily  senna 2 Tablet(s) Oral at bedtime    MEDICATIONS  (PRN):  cyclobenzaprine 5 milliGRAM(s) Oral three times a day PRN Muscle Spasm  oxyCODONE    IR 5 milliGRAM(s) Oral every 4 hours PRN Moderate Pain (4 - 6)  sodium chloride 0.65% Nasal 1 Spray(s) Both Nostrils every 2 hours PRN Nasal Congestion        Vital Signs Last 24 Hrs  T(C): 37.4 (26 Aug 2021 12:00), Max: 37.4 (26 Aug 2021 12:00)  T(F): 99.4 (26 Aug 2021 12:00), Max: 99.4 (26 Aug 2021 12:00)  HR: 111 (26 Aug 2021 17:59) (104 - 136)  BP: 132/75 (26 Aug 2021 14:00) (108/80 - 177/95)  BP(mean): 99 (26 Aug 2021 14:00) (99 - 124)  RR: 25 (26 Aug 2021 14:00) (17 - 51)  SpO2: 98% (26 Aug 2021 17:59) (90% - 100%)    CBC Full  -  ( 25 Aug 2021 06:04 )  WBC Count : 11.08 K/uL  RBC Count : 4.20 M/uL  Hemoglobin : 11.7 g/dL  Hematocrit : 38.5 %  Platelet Count - Automated : 332 K/uL  Mean Cell Volume : 91.7 fl  Mean Cell Hemoglobin : 27.9 pg  Mean Cell Hemoglobin Concentration : 30.4 gm/dL  Auto Neutrophil # : x  Auto Lymphocyte # : x  Auto Monocyte # : x  Auto Eosinophil # : x  Auto Basophil # : x  Auto Neutrophil % : x  Auto Lymphocyte % : x  Auto Monocyte % : x  Auto Eosinophil % : x  Auto Basophil % : x    08-26    137  |  93<L>  |  9   ----------------------------<  107<H>  4.8   |  29  |  0.63    Ca    9.5      26 Aug 2021 05:12  Phos  4.9     08-26  Mg     2.2     08-26    TPro  7.9  /  Alb  2.8<L>  /  TBili  0.4  /  DBili  x   /  AST  26  /  ALT  24  /  AlkPhos  88  08-26    LIVER FUNCTIONS - ( 26 Aug 2021 05:10 )  Alb: 2.8 g/dL / Pro: 7.9 g/dL / ALK PHOS: 88 U/L / ALT: 24 U/L / AST: 26 U/L / GGT: x               MICROBIOLOGY:  .Blood Blood-Peripheral  08-11-21   No Growth Final  --  --      .Sputum Sputum, cup  08-04-21   Normal Respiratory Tiki present  --    Few polymorphonuclear leukocytes per low power field  Rare Squamous epithelial cells per low power field  Moderate Gram Positive Cocci in Pairs and Chains per oil power field  Rare Gram Negative Rods per oil power field              v            RADIOLOGY

## 2021-08-26 NOTE — PROGRESS NOTE ADULT - ASSESSMENT
55yr old male with HTN, RLE DVT, and PE (not on home AC) presents with progressively worsening SOB, intermittent fevers, COVID positive, admitted for COVID PNA.  Course complciated by worsening respiratory failure requiring NIPPV and ICU admission.     NEUROLOGY:  - A&Ox4 at baseline; No Active Issues   - Klonopin 0.5 mg q8h for anxiety    PULMONARY:  Acute Respiratory Failure  - Continue HFNC alternate with BiPAP Nocternal/PRN   - Titrate settings as tolerated to maintain SpO2>88%   - Encourage prone positioning and bed in chair position.     CARDIOLOGY:  - No Active Issues     GASTROINTESTINAL:  - Regular Diet w/ supp shakes while intermittently on HFNC  - Protonix 40mg QD while intermittently NPO on Bipap  - Bowel regimen with Miralax and Senna BM. Last BM 8/12.     RENAL/:  - Strict I&Os   - Monitor and replete lytes daily    INFECTIOUS DISEASE:  COVID-19  - s/p completed courses of Remdesivir x 5 days and Decadron x 10 days   - s/p Toci on 7/30    HEMATOLOGY:  - Full AC Lovenox 90mg BID   - LE duplex neg for DVT on 8/4. History of RLE DVT & PE.   - Consider CTA chest when stabilized    ENDOCRINE:  - HbA1c 6.0    ETHICS/DISPOSITION:  - Full code   - Remain in ICU   55yr old male with HTN, RLE DVT, and PE (not on home AC) presents with progressively worsening SOB, intermittent fevers, COVID positive, admitted for COVID PNA.  Course complicated by worsening respiratory failure  2/2 tension PTX and pleural effusion requiring NIPPV and ICU admission.     NEUROLOGY:  - A&Ox4 at baseline; No Active Issues   - Klonopin 0.5 mg q8h for anxiety    PULMONARY:  Acute Respiratory Failure 2/2 Tension PTX with effusion and possible abscess 2/2 suspected superinfection from COVID PNA  - Continue HFNC alternate with BiPAP Nocternal/PRN   - Titrate settings as tolerated to maintain SpO2>88%   - Encourage prone positioning and bed in chair position.   -s/p 8/26 chest tube placement by CTsx with 300cc serosanguinous drainage immediately  [ ] clamp when 1L     CARDIOLOGY:  - No Active Issues     GASTROINTESTINAL:  - Regular Diet w/ supp shakes while intermittently on HFNC  - Protonix 40mg QD while intermittently NPO on Bipap  - Bowel regimen with Miralax and Senna BM. Last BM 8/12.     RENAL/:  - Strict I&Os   - Monitor and replete lytes daily    INFECTIOUS DISEASE:  COVID-19  - s/p completed courses of Remdesivir x 5 days and Decadron x 10 days   - s/p Toci on 7/30    HEMATOLOGY:  - Full AC Lovenox 90mg BID   - LE duplex neg for DVT on 8/4. History of RLE DVT & PE.   - Consider CTA chest when stabilized    ENDOCRINE:  - HbA1c 6.0    ETHICS/DISPOSITION:  - Full code   - Remain in ICU   55yr old male with HTN, RLE DVT, and PE (not on home AC) presents with progressively worsening SOB, intermittent fevers, COVID positive, admitted for COVID PNA.  Course complicated by worsening respiratory failure  2/2 tension PTX and pleural effusion requiring NIPPV and ICU admission.     NEUROLOGY:  - A&Ox4 at baseline; No Active Issues   - Klonopin 0.5 mg q8h for anxiety    PULMONARY:  Acute Respiratory Failure 2/2 Tension PTX with effusion and possible abscess 2/2 suspected superinfection from COVID PNA  - Continue HFNC alternate with BiPAP Nocternal/PRN   - Titrate settings as tolerated to maintain SpO2>88%   - Encourage prone positioning and bed in chair position.   -s/p 8/26 chest tube placement by CTsx with 300cc serosanguinous drainage immediately  [ ] clamp when 1L     CARDIOLOGY:  - No Active Issues     GASTROINTESTINAL:  - Regular Diet w/ supp shakes while intermittently on HFNC  - Protonix 40mg QD while intermittently NPO on Bipap  - Bowel regimen with Miralax and Senna BM. Last BM 8/12.     RENAL/:  - Strict I&Os   - Monitor and replete lytes daily    INFECTIOUS DISEASE:  COVID-19  - s/p completed courses of Remdesivir x 5 days and Decadron x 10 days   - s/p Toci on 7/30  - Zosyn 3.375 q8hrs    HEMATOLOGY:  - Full AC Lovenox 90mg BID   - LE duplex neg for DVT on 8/4. History of RLE DVT & PE.   - Consider CTA chest when stabilized    ENDOCRINE:  - HbA1c 6.0    ETHICS/DISPOSITION:  - Full code   - Remain in ICU   55yr old male with HTN, RLE DVT, and PE (not on home AC) presents with progressively worsening SOB, intermittent fevers, COVID positive, admitted for COVID PNA.  Course complicated by worsening respiratory failure  2/2 tension PTX and pleural effusion requiring NIPPV and ICU admission.     NEUROLOGY:  - A&Ox4 at baseline; No Active Issues   - Klonopin 0.5 mg q8h for anxiety    PULMONARY:  Acute Respiratory Failure 2/2 Tension PTX with effusion and possible abscess 2/2 suspected superinfection from COVID PNA  - Continue HFNC alternate with BiPAP Nocternal/PRN   - Titrate settings as tolerated to maintain SpO2>88%   - Encourage prone positioning and bed in chair position.   - s/p 8/26 chest tube placement by CTsx with 300cc serosanguinous drainage immediately  - f/u CXR in morning  - repeat bedside US  [ ] clamp when 1L     CARDIOLOGY:  - Tachycardia  - Normal cardiac movements on bedside US  - 1 liter NS blous    GASTROINTESTINAL:  - Regular Diet w/ supp shakes while intermittently on HFNC  - Protonix 40mg QD while intermittently NPO on Bipap  - Bowel regimen with Miralax and Senna BM. Last BM 8/12.     RENAL/:  - Strict I&Os   - Monitor and replete lytes daily    INFECTIOUS DISEASE:  COVID-19  - s/p completed courses of Remdesivir x 5 days and Decadron x 10 days   - s/p Toci on 7/30  - Zosyn 3.375 q8hrs for cavitary pna  - f/u beta d-glycans  - f/u MRSA PCR    HEMATOLOGY:  - Full AC Lovenox 90mg BID   - LE duplex neg for DVT on 8/4. History of RLE DVT & PE.   - Consider CTA chest when stabilized    ENDOCRINE:  - HbA1c 6.0    ETHICS/DISPOSITION:  - Full code   - Remain in ICU

## 2021-08-26 NOTE — PROGRESS NOTE ADULT - PROBLEM SELECTOR PLAN 1
+COVID PCR as an outpatient  -CXR 8/9 with b/l lung opacities, CXR 8/16 grossly unchanged   -CXR 8/23 with worsening b/l opacties R>L, ?effusion vs mucus plugging/collapse, also with opacities 2nd to COVID   -S/p Remdesivir x 5 days   -S/p Decadron 6mg IVP qd x 10 days (completed 8/10)  -S/p Tocilizumab 7/30 per ID for worsening hypoxic respiratory failure  -Sputum culture 8/4 with normal respiratory aba.   -LE duplex 8/4 neg DVT.   -Ddimer elevated at one point to 4000. CTA 8/24 with no clear PE, but ddimer remains elevated. LE duplex 8/25 neg DVT, but with incidental note of thrombosis of the L tibial peroneal trunk with diminished flow within the left popliteal artery. AC per primary team.

## 2021-08-26 NOTE — PROVIDER CONTACT NOTE (CHANGE IN STATUS NOTIFICATION) - ASSESSMENT
pt a&o4, other VSS, pt hypoxic for <30 seconds at 68%. pt currently on 60L and 90%. pt Proned, chest PT done. RRT called
Pt a&ox4, c/o back pain 6/10, BP: 137/95, HR: 128, RR: 61, O2 sat: 92% on bipap. Increased work of breathing noted.

## 2021-08-26 NOTE — PROVIDER CONTACT NOTE (OTHER) - RECOMMENDATIONS
Patient with hx of COVID+ 7/26/21. Patient not clinically improving but has been afebrile. As per guidelines patient meets criteria to have isolation removed. Isolation discontinued as per guidelines.

## 2021-08-26 NOTE — PROGRESS NOTE ADULT - SUBJECTIVE AND OBJECTIVE BOX
Follow-up Pulm Progress Note    RRT this AM for tachypnea - found to have ptx on CXR  Sats low 90s on HFNC 60L/70%   No c/o CP at this time   Appears labored     Medications:  MEDICATIONS  (STANDING):  benzonatate 200 milliGRAM(s) Oral every 8 hours  budesonide 160 MICROgram(s)/formoterol 4.5 MICROgram(s) Inhaler 2 Puff(s) Inhalation two times a day  chlorhexidine 4% Liquid 1 Application(s) Topical <User Schedule>  cholecalciferol 2000 Unit(s) Oral daily  clonazePAM  Tablet 0.5 milliGRAM(s) Oral every 8 hours  enoxaparin Injectable 90 milliGRAM(s) SubCutaneous every 12 hours  guaiFENesin ER 1200 milliGRAM(s) Oral every 12 hours  lidocaine   4% Patch 1 Patch Transdermal every 24 hours  melatonin 3 milliGRAM(s) Oral at bedtime  multivitamin 1 Tablet(s) Oral daily  pantoprazole  Injectable 40 milliGRAM(s) IV Push daily  piperacillin/tazobactam IVPB.. 3.375 Gram(s) IV Intermittent every 8 hours  polyethylene glycol 3350 17 Gram(s) Oral daily  senna 2 Tablet(s) Oral at bedtime    MEDICATIONS  (PRN):  cyclobenzaprine 5 milliGRAM(s) Oral three times a day PRN Muscle Spasm  oxyCODONE    IR 5 milliGRAM(s) Oral every 4 hours PRN Moderate Pain (4 - 6)  sodium chloride 0.65% Nasal 1 Spray(s) Both Nostrils every 2 hours PRN Nasal Congestion          Vital Signs Last 24 Hrs  T(C): 36 (26 Aug 2021 09:49), Max: 37 (25 Aug 2021 10:48)  T(F): 96.8 (26 Aug 2021 09:49), Max: 98.6 (25 Aug 2021 10:48)  HR: 123 (26 Aug 2021 09:49) (104 - 127)  BP: 137/81 (26 Aug 2021 07:55) (108/80 - 137/81)  BP(mean): --  RR: 33 (26 Aug 2021 09:49) (21 - 33)  SpO2: 93% (26 Aug 2021 09:49) (90% - 95%)      VBG pH 7.31 08-26 @ 05:07    VBG pCO2 80 08-26 @ 05:07    VBG O2 sat 61.7 08-26 @ 05:07    VBG lactate 1.8 08-26 @ 05:07      08-25 @ 07:01  -  08-26 @ 07:00  --------------------------------------------------------  IN: 220 mL / OUT: 450 mL / NET: -230 mL          LABS:                        11.7   11.08 )-----------( 332      ( 25 Aug 2021 06:04 )             38.5     08-26    137  |  93<L>  |  9   ----------------------------<  107<H>  4.8   |  29  |  0.63    Ca    9.5      26 Aug 2021 05:12  Phos  4.9     08-26  Mg     2.2     08-26    TPro  7.9  /  Alb  2.8<L>  /  TBili  0.4  /  DBili  x   /  AST  26  /  ALT  24  /  AlkPhos  88  08-26          CAPILLARY BLOOD GLUCOSE      POCT Blood Glucose.: 93 mg/dL (26 Aug 2021 04:52)        Procalcitonin, Serum: 0.22 ng/mL (08-26-21 @ 05:12)          Physical Examination:  PULM: Clear to auscultation bilaterally, no significant sputum production  CVS: S1, S2 heard    RADIOLOGY REVIEWED  CXR: R ptx  b/l opacities     CT chest: < from: CT Angio Chest PE Protocol w/ IV Cont (08.24.21 @ 21:02) >  FINDINGS:    LUNGS, AIRWAYS, AND PLEURA: Diffuse bilateral groundglass opacities in a similar distribution seen in prior chest radiographs. Consolidations in the right upper and lower lobes with development of two areas of cavitation within right upper lobeconsolidations. (5-36: 2.9 x 2.0 cm; 5-21: 2.4 x 3.4 cm), representing pneumonia.    Small right pleural effusion and adjacent right basilar atelectasis.    MEDIASTINUM AND GREGG: No lymphadenopathy.    VESSELS: No filling defects are identified in the pulmonary trunk or main left or right and lobar pulmonary artery branches. Evaluation of the segmental, and subsegmental branches is limited by insufficient contrast opacification.    HEART: Heart size is normal. No pericardial effusion.    CHEST WALL AND LOWER NECK: Within normal limits.    VISUALIZED UPPER ABDOMEN: Right renal cyst.    BONES: Within normal limits.    IMPRESSION:  No pulmonary embolism is identified to the level of the main, left or right main and lobar pulmonary artery branches. Further  evaluation of the segmental and subsegmental pulmonary artery branches is limited by insufficient contrast opacification.    Consolidation in the right upper lobe demonstrating cavitation represents pneumonia.    Patchy opacities are notedthroughout both lungs. The finding is consistent with the patient's known history of Covid pneumonia.    --- End of Report ---    < end of copied text >

## 2021-08-26 NOTE — CHART NOTE - NSCHARTNOTEFT_GEN_A_CORE
MICU Accept Note    HPI / INTERVAL HISTORY:    PAST MEDICAL & SURGICAL HISTORY:  Hyperlipidemia    HTN (hypertension)    Rupture, tendon, quadriceps    History of Achilles tendon repair      FAMILY HISTORY:  No pertinent family history in first degree relatives      Allergies    No Known Allergies    Intolerances        REVIEW OF SYSTEMS:  CONSTITUTIONAL: No weakness, fevers or chills  EYES/ENT: No visual changes;  No vertigo or throat pain   NECK: No pain or stiffness  RESPIRATORY: No cough, wheezing, hemoptysis; No shortness of breath  CARDIOVASCULAR: No chest pain or palpitations  GASTROINTESTINAL: No abdominal or epigastric pain. No nausea, vomiting, or hematemesis; No diarrhea or constipation. No melena or hematochezia.  GENITOURINARY: No dysuria, frequency or hematuria  NEUROLOGICAL: No numbness or weakness  All other review of systems is negative unless indicated above.    OBJECTIVE:  ICU Vital Signs Last 24 Hrs  T(C): 36.7 (26 Aug 2021 07:55), Max: 37 (25 Aug 2021 10:48)  T(F): 98.1 (26 Aug 2021 07:55), Max: 98.6 (25 Aug 2021 10:48)  HR: 105 (26 Aug 2021 07:55) (103 - 122)  BP: 137/81 (26 Aug 2021 07:55) (108/80 - 137/81)  BP(mean): --  ABP: --  ABP(mean): --  RR: 30 (26 Aug 2021 07:55) (21 - 32)  SpO2: 90% (26 Aug 2021 07:55) (90% - 95%)        08-25 @ 07:01  -  08-26 @ 07:00  --------------------------------------------------------  IN: 220 mL / OUT: 450 mL / NET: -230 mL      CAPILLARY BLOOD GLUCOSE      POCT Blood Glucose.: 93 mg/dL (26 Aug 2021 04:52)      PHYSICAL EXAM:  GENERAL: NAD, lying in bed comfortably  HEAD:  Atraumatic, Normocephalic  EYES: EOMI, PERRLA, conjunctiva and sclera clear  ENT: Moist mucous membranes  NECK: Supple, No JVD  CHEST/LUNG: Clear to auscultation bilaterally; No rales, rhonchi, wheezing, or rubs. Unlabored respirations   HEART: Regular rate and rhythm; No murmurs, rubs, or gallops  ABDOMEN: Bowel sounds present; Soft, Nontender, Nondistended. No hepatomegaly  EXTREMITIES:  2+ Peripheral Pulses, brisk capillary refill. No clubbing, cyanosis, or edema  NERVOUS SYSTEM:  Alert & Oriented X3, speech clear. No deficits   MSK: FROM all 4 extremities, full and equal strength  SKIN: No rashes or lesions    HOSPITAL MEDICATIONS:  MEDICATIONS  (STANDING):  benzonatate 200 milliGRAM(s) Oral every 8 hours  budesonide 160 MICROgram(s)/formoterol 4.5 MICROgram(s) Inhaler 2 Puff(s) Inhalation two times a day  cholecalciferol 2000 Unit(s) Oral daily  clonazePAM  Tablet 0.5 milliGRAM(s) Oral every 8 hours  enoxaparin Injectable 90 milliGRAM(s) SubCutaneous every 12 hours  guaiFENesin ER 1200 milliGRAM(s) Oral every 12 hours  lidocaine   4% Patch 1 Patch Transdermal every 24 hours  melatonin 3 milliGRAM(s) Oral at bedtime  multivitamin 1 Tablet(s) Oral daily  pantoprazole  Injectable 40 milliGRAM(s) IV Push daily  piperacillin/tazobactam IVPB.. 3.375 Gram(s) IV Intermittent every 8 hours  polyethylene glycol 3350 17 Gram(s) Oral daily  senna 2 Tablet(s) Oral at bedtime    MEDICATIONS  (PRN):  cyclobenzaprine 5 milliGRAM(s) Oral three times a day PRN Muscle Spasm  oxyCODONE    IR 5 milliGRAM(s) Oral every 4 hours PRN Moderate Pain (4 - 6)  sodium chloride 0.65% Nasal 1 Spray(s) Both Nostrils every 2 hours PRN Nasal Congestion      LABS:                        11.7   11.08 )-----------( 332      ( 25 Aug 2021 06:04 )             38.5     Hgb Trend: 11.7<--, 11.5<--, 11.4<--, 11.2<--  08-26    137  |  93<L>  |  9   ----------------------------<  107<H>  4.8   |  29  |  0.63    Ca    9.5      26 Aug 2021 05:12  Phos  4.9     08-26  Mg     2.2     08-26    TPro  7.9  /  Alb  2.8<L>  /  TBili  0.4  /  DBili  x   /  AST  26  /  ALT  24  /  AlkPhos  88  08-26    Creatinine Trend: 0.63<--, 0.64<--, 0.68<--, 0.60<--, 0.61<--, 0.67<--        Venous Blood Gas:  08-26 @ 05:07  7.31/80/38/40/61.7  VBG Lactate: 1.8      ASSESSMENT AND PLAN:    Neuro  -    Cardiovascular  -     Pulmonary  -    FEN/GI  -    Renal  -      -    ID  -    Heme  -    Endo  -

## 2021-08-26 NOTE — PROGRESS NOTE ADULT - SUBJECTIVE AND OBJECTIVE BOX
***************************************************************  Ciara Delroy, PGY2  Internal Medicine   pager: NS: [phone] [facility]: [id number]  ***************************************************************    MICU Accept Note    CHIEF COMPLAINT:     HPI / INTERVAL HISTORY:  55yr old unvaccinated male with hx of HTN, RLE DVT, and PE (not on home AC) presents with progressively worsening SOB, intermittent fevers, COVID positive, admitted for COVID PNA.  Course complicated by worsening respiratory failure requiring NIPPV and ICU admission. While in the ICU, pt alternated with High Flow and BIPAP with eventually less time on BIPap. Pt was transitioned to Day time high flow 60L and 70% was kept on nocturnal bipap 15/10 60%. He was stable and transferred to the floors on 8/19. This morning, patient had an RRT called for tachypnea, reported RR 70 and increased WOB. Patient switched to AAVAPs with improved WOB and RR to the 20s w/ adequate TVs 400s-500s. STAT CXR was obtained and patient was found to have tension PTX. CT surgery was consulted as was MICU. Patient will be transferred to MICU on BiPAP and an open chest tube will be placed by CTS.     PAST MEDICAL & SURGICAL HISTORY:  Hyperlipidemia    HTN (hypertension)    Rupture, tendon, quadriceps    History of Achilles tendon repair        FAMILY HISTORY:  No pertinent family history in first degree relatives        SOCIAL HISTORY:  Smoking: [  ] Never Smoked  [  ] Former Smoker (# packs x # years)  [  ] Current Smoker (# packs x # years)  Substance Use:   EtOH Use:   Marital Status: [  ] Single  [  ]   [  ]   [  ]   Sexual History:   Occupation:  Recent Travel:  Country of Birth:   Advance Directives:     HOME MEDICATIONS:      Allergies    No Known Allergies    Intolerances          REVIEW OF SYSTEMS:  CONSTITUTIONAL: No fever, weight loss, or fatigue  EYES: No eye pain, visual disturbances, or discharge  ENT:  No difficulty hearing, tinnitus, vertigo; No sinus or throat pain  NECK: No pain or stiffness  BREASTS: No pain, masses, or nipple discharge  RESPIRATORY: No cough, wheezing, chills or hemoptysis; No shortness of breath  CARDIOVASCULAR: No chest pain, palpitations, dizziness, or leg swelling  GASTROINTESTINAL: No abdominal or epigastric pain. No nausea, vomiting, or hematemesis; No diarrhea or constipation. No melena or hematochezia.  GENITOURINARY: No dysuria, frequency, hematuria, or incontinence  NEUROLOGICAL: No headaches, memory loss, loss of strength, numbness, or tremors  SKIN: No itching, burning, rashes, or lesions   LYMPH NODES: No enlarged glands  ENDOCRINE: No heat or cold intolerance; No hair loss  MUSCULOSKELETAL: No joint pain or swelling; No muscle, back, or extremity pain  PSYCHIATRIC: No depression, anxiety, mood swings, or difficulty sleeping  HEME/LYMPH: No easy bruising, or bleeding gums  ALLERGY AND IMMUNOLOGIC: No hives or eczema    OBJECTIVE:  ICU Vital Signs Last 24 Hrs  T(C): 36.7 (26 Aug 2021 07:55), Max: 37 (25 Aug 2021 10:48)  T(F): 98.1 (26 Aug 2021 07:55), Max: 98.6 (25 Aug 2021 10:48)  HR: 105 (26 Aug 2021 07:55) (103 - 122)  BP: 137/81 (26 Aug 2021 07:55) (108/80 - 137/81)  BP(mean): --  ABP: --  ABP(mean): --  RR: 30 (26 Aug 2021 07:55) (21 - 32)  SpO2: 90% (26 Aug 2021 07:55) (90% - 95%)        08-25 @ 07:01  -  08-26 @ 07:00  --------------------------------------------------------  IN: 220 mL / OUT: 450 mL / NET: -230 mL      CAPILLARY BLOOD GLUCOSE      POCT Blood Glucose.: 93 mg/dL (26 Aug 2021 04:52)      PHYSICAL EXAM:  GENERAL: NAD, well-groomed, well-developed  HEAD:  Atraumatic, Normocephalic  EYES: EOMI, PERRLA, conjunctiva and sclera clear  ENMT: No tonsillar erythema, exudates, or enlargement; Moist mucous membranes, Good dentition, No lesions  NECK: Supple, No JVD, Normal thyroid  NERVOUS SYSTEM:  Alert & Oriented X3, Good concentration; Motor Strength 5/5 B/L upper and lower extremities; DTRs 2+ intact and symmetric  CHEST/LUNG: Clear to percussion bilaterally; No rales, rhonchi, wheezing, or rubs  HEART: Regular rate and rhythm; No murmurs, rubs, or gallops  ABDOMEN: Soft, Nontender, Nondistended; Bowel sounds present  EXTREMITIES:  2+ Peripheral Pulses, No clubbing, cyanosis, or edema  LYMPH: No lymphadenopathy noted  SKIN: No rashes or lesions    HOSPITAL MEDICATIONS:  MEDICATIONS  (STANDING):  benzonatate 200 milliGRAM(s) Oral every 8 hours  budesonide 160 MICROgram(s)/formoterol 4.5 MICROgram(s) Inhaler 2 Puff(s) Inhalation two times a day  cholecalciferol 2000 Unit(s) Oral daily  clonazePAM  Tablet 0.5 milliGRAM(s) Oral every 8 hours  enoxaparin Injectable 90 milliGRAM(s) SubCutaneous every 12 hours  guaiFENesin ER 1200 milliGRAM(s) Oral every 12 hours  lidocaine   4% Patch 1 Patch Transdermal every 24 hours  melatonin 3 milliGRAM(s) Oral at bedtime  multivitamin 1 Tablet(s) Oral daily  pantoprazole  Injectable 40 milliGRAM(s) IV Push daily  piperacillin/tazobactam IVPB.. 3.375 Gram(s) IV Intermittent every 8 hours  polyethylene glycol 3350 17 Gram(s) Oral daily  senna 2 Tablet(s) Oral at bedtime    MEDICATIONS  (PRN):  cyclobenzaprine 5 milliGRAM(s) Oral three times a day PRN Muscle Spasm  oxyCODONE    IR 5 milliGRAM(s) Oral every 4 hours PRN Moderate Pain (4 - 6)  sodium chloride 0.65% Nasal 1 Spray(s) Both Nostrils every 2 hours PRN Nasal Congestion      LABS:                        11.7   11.08 )-----------( 332      ( 25 Aug 2021 06:04 )             38.5     Hgb Trend: 11.7<--, 11.5<--, 11.4<--, 11.2<--  08-26    137  |  93<L>  |  9   ----------------------------<  107<H>  4.8   |  29  |  0.63    Ca    9.5      26 Aug 2021 05:12  Phos  4.9     08-26  Mg     2.2     08-26    TPro  7.9  /  Alb  2.8<L>  /  TBili  0.4  /  DBili  x   /  AST  26  /  ALT  24  /  AlkPhos  88  08-26    Creatinine Trend: 0.63<--, 0.64<--, 0.68<--, 0.60<--, 0.61<--, 0.67<--        Venous Blood Gas:  08-26 @ 05:07  7.31/80/38/40/61.7  VBG Lactate: 1.8      MICROBIOLOGY:     RADIOLOGY & ADDITIONAL TESTS:        Ciara Potts, PGY2  Internal Medicine   pager: NS: [phone] [facility]: [id number] ***************************************************************  Ciara Delroy, PGY2  Internal Medicine   pager: NS: [phone] [facility]: [phone]  ***************************************************************    MICU Accept Note    CHIEF COMPLAINT: COVID pna    HPI / INTERVAL HISTORY:  55yr old unvaccinated male with hx of HTN, RLE DVT, and PE (not on home AC) presents with progressively worsening SOB, intermittent fevers, COVID positive, admitted for COVID PNA.  Course complicated by worsening respiratory failure requiring NIPPV and ICU admission. While in the ICU, pt alternated with High Flow and BIPAP with eventually less time on BIPap. Pt was transitioned to Day time high flow 60L and 70% was kept on nocturnal bipap 15/10 60%. He was stable and transferred to the floors on 8/19. This morning, patient had an RRT called for tachypnea, reported RR 70 and increased WOB. Patient switched to AAVAPs with improved WOB and RR to the 20s w/ adequate TVs 400s-500s. STAT CXR was obtained and patient was found to have tension PTX. CT surgery was consulted as was MICU. Patient will be transferred to MICU on BiPAP and an open chest tube will be placed by CTS.     PAST MEDICAL & SURGICAL HISTORY:  Hyperlipidemia    HTN (hypertension)    Rupture, tendon, quadriceps    History of Achilles tendon repair        FAMILY HISTORY:  No pertinent family history in first degree relatives        SOCIAL HISTORY:  Smoking: [  ] Never Smoked  [  ] Former Smoker (# packs x # years)  [  ] Current Smoker (# packs x # years)  Substance Use:   EtOH Use:   Marital Status: [  ] Single  [  ]   [  ]   [  ]   Sexual History:   Occupation:  Recent Travel:  Country of Birth:   Advance Directives:     HOME MEDICATIONS:      Allergies    No Known Allergies    Intolerances          REVIEW OF SYSTEMS:  CONSTITUTIONAL: No fever, weight loss, or fatigue  EYES: No eye pain, visual disturbances, or discharge  ENT:  No difficulty hearing, tinnitus, vertigo; No sinus or throat pain  NECK: No pain or stiffness  BREASTS: No pain, masses, or nipple discharge  RESPIRATORY: No cough, wheezing, chills or hemoptysis; No shortness of breath  CARDIOVASCULAR: No chest pain, palpitations, dizziness, or leg swelling  GASTROINTESTINAL: No abdominal or epigastric pain. No nausea, vomiting, or hematemesis; No diarrhea or constipation. No melena or hematochezia.  GENITOURINARY: No dysuria, frequency, hematuria, or incontinence  NEUROLOGICAL: No headaches, memory loss, loss of strength, numbness, or tremors  SKIN: No itching, burning, rashes, or lesions   LYMPH NODES: No enlarged glands  ENDOCRINE: No heat or cold intolerance; No hair loss  MUSCULOSKELETAL: No joint pain or swelling; No muscle, back, or extremity pain  PSYCHIATRIC: No depression, anxiety, mood swings, or difficulty sleeping  HEME/LYMPH: No easy bruising, or bleeding gums  ALLERGY AND IMMUNOLOGIC: No hives or eczema    OBJECTIVE:  ICU Vital Signs Last 24 Hrs  T(C): 36.7 (26 Aug 2021 07:55), Max: 37 (25 Aug 2021 10:48)  T(F): 98.1 (26 Aug 2021 07:55), Max: 98.6 (25 Aug 2021 10:48)  HR: 105 (26 Aug 2021 07:55) (103 - 122)  BP: 137/81 (26 Aug 2021 07:55) (108/80 - 137/81)  BP(mean): --  ABP: --  ABP(mean): --  RR: 30 (26 Aug 2021 07:55) (21 - 32)  SpO2: 90% (26 Aug 2021 07:55) (90% - 95%)        08-25 @ 07:01  -  08-26 @ 07:00  --------------------------------------------------------  IN: 220 mL / OUT: 450 mL / NET: -230 mL      CAPILLARY BLOOD GLUCOSE      POCT Blood Glucose.: 93 mg/dL (26 Aug 2021 04:52)      PHYSICAL EXAM:  GENERAL: NAD, well-groomed, well-developed  HEAD:  Atraumatic, Normocephalic  EYES: EOMI, PERRLA, conjunctiva and sclera clear  ENMT: No tonsillar erythema, exudates, or enlargement; Moist mucous membranes, Good dentition, No lesions  NECK: Supple, No JVD, Normal thyroid  NERVOUS SYSTEM:  Alert & Oriented X3, Good concentration; Motor Strength 5/5 B/L upper and lower extremities; DTRs 2+ intact and symmetric  CHEST/LUNG: Clear to percussion bilaterally; No rales, rhonchi, wheezing, or rubs  HEART: Regular rate and rhythm; No murmurs, rubs, or gallops  ABDOMEN: Soft, Nontender, Nondistended; Bowel sounds present  EXTREMITIES:  2+ Peripheral Pulses, No clubbing, cyanosis, or edema  LYMPH: No lymphadenopathy noted  SKIN: No rashes or lesions    HOSPITAL MEDICATIONS:  MEDICATIONS  (STANDING):  benzonatate 200 milliGRAM(s) Oral every 8 hours  budesonide 160 MICROgram(s)/formoterol 4.5 MICROgram(s) Inhaler 2 Puff(s) Inhalation two times a day  cholecalciferol 2000 Unit(s) Oral daily  clonazePAM  Tablet 0.5 milliGRAM(s) Oral every 8 hours  enoxaparin Injectable 90 milliGRAM(s) SubCutaneous every 12 hours  guaiFENesin ER 1200 milliGRAM(s) Oral every 12 hours  lidocaine   4% Patch 1 Patch Transdermal every 24 hours  melatonin 3 milliGRAM(s) Oral at bedtime  multivitamin 1 Tablet(s) Oral daily  pantoprazole  Injectable 40 milliGRAM(s) IV Push daily  piperacillin/tazobactam IVPB.. 3.375 Gram(s) IV Intermittent every 8 hours  polyethylene glycol 3350 17 Gram(s) Oral daily  senna 2 Tablet(s) Oral at bedtime    MEDICATIONS  (PRN):  cyclobenzaprine 5 milliGRAM(s) Oral three times a day PRN Muscle Spasm  oxyCODONE    IR 5 milliGRAM(s) Oral every 4 hours PRN Moderate Pain (4 - 6)  sodium chloride 0.65% Nasal 1 Spray(s) Both Nostrils every 2 hours PRN Nasal Congestion      LABS:                        11.7   11.08 )-----------( 332      ( 25 Aug 2021 06:04 )             38.5     Hgb Trend: 11.7<--, 11.5<--, 11.4<--, 11.2<--  08-26    137  |  93<L>  |  9   ----------------------------<  107<H>  4.8   |  29  |  0.63    Ca    9.5      26 Aug 2021 05:12  Phos  4.9     08-26  Mg     2.2     08-26    TPro  7.9  /  Alb  2.8<L>  /  TBili  0.4  /  DBili  x   /  AST  26  /  ALT  24  /  AlkPhos  88  08-26    Creatinine Trend: 0.63<--, 0.64<--, 0.68<--, 0.60<--, 0.61<--, 0.67<--        Venous Blood Gas:  08-26 @ 05:07  7.31/80/38/40/61.7  VBG Lactate: 1.8      MICROBIOLOGY:     RADIOLOGY & ADDITIONAL TESTS:        Ciara Potts, PGY2  Internal Medicine   pager: NS: [phone] [facility]: [id number] ***************************************************************  Ciara Delroy, PGY2  Internal Medicine   pager: NS: [phone] [facility]: [phone]  ***************************************************************    MICU Accept Note    CHIEF COMPLAINT: COVID pna    HPI / INTERVAL HISTORY:  55yr old unvaccinated male with hx of HTN, RLE DVT, and PE (not on home AC) presents with progressively worsening SOB, intermittent fevers, COVID positive, admitted for COVID PNA.  Course complicated by worsening respiratory failure requiring NIPPV and ICU admission. While in the ICU, pt alternated with High Flow and BIPAP with eventually less time on BIPap. Pt was transitioned to Day time high flow 60L and 70% was kept on nocturnal bipap 15/10 60%. He was stable and transferred to the floors on 8/19. This morning, patient had an RRT called for tachypnea, reported RR 70 and increased WOB. Patient switched to AAVAPs with improved WOB and RR to the 20s w/ adequate TVs 400s-500s. STAT CXR was obtained and patient was found to have tension PTX. CT surgery was consulted as was MICU. Patient will be transferred to MICU on BiPAP and an open chest tube will be placed by CTS.     S :Pt c/o SOB    PAST MEDICAL & SURGICAL HISTORY:  Hyperlipidemia    HTN (hypertension)    Rupture, tendon, quadriceps    History of Achilles tendon repair        FAMILY HISTORY:  No pertinent family history in first degree relatives        SOCIAL HISTORY:  Smoking: [  ] Never Smoked  [  ] Former Smoker (# packs x # years)  [  ] Current Smoker (# packs x # years)  Substance Use:   EtOH Use:   Marital Status: [  ] Single  [  ]   [  ]   [  ]   Sexual History:   Occupation:  Recent Travel:  Country of Birth:   Advance Directives:     HOME MEDICATIONS:      Allergies    No Known Allergies    Intolerances          REVIEW OF SYSTEMS:  CONSTITUTIONAL: No fever, weight loss, or fatigue  EYES: No eye pain, visual disturbances, or discharge  ENT:  No difficulty hearing, tinnitus, vertigo; No sinus or throat pain  NECK: No pain or stiffness  BREASTS: No pain, masses, or nipple discharge  RESPIRATORY: No cough, wheezing, chills or hemoptysis;+ shortness of breath  CARDIOVASCULAR: No chest pain, palpitations, dizziness, or leg swelling  GASTROINTESTINAL: No abdominal or epigastric pain. No nausea, vomiting, or hematemesis; No diarrhea or constipation. No melena or hematochezia.  GENITOURINARY: No dysuria, frequency, hematuria, or incontinence  NEUROLOGICAL: No headaches, memory loss, loss of strength, numbness, or tremors  SKIN: No itching, burning, rashes, or lesions   LYMPH NODES: No enlarged glands  ENDOCRINE: No heat or cold intolerance; No hair loss  MUSCULOSKELETAL: No joint pain or swelling; No muscle, back, or extremity pain  PSYCHIATRIC: No depression, anxiety, mood swings, or difficulty sleeping  HEME/LYMPH: No easy bruising, or bleeding gums  ALLERGY AND IMMUNOLOGIC: No hives or eczema    OBJECTIVE:  ICU Vital Signs Last 24 Hrs  T(C): 36.7 (26 Aug 2021 07:55), Max: 37 (25 Aug 2021 10:48)  T(F): 98.1 (26 Aug 2021 07:55), Max: 98.6 (25 Aug 2021 10:48)  HR: 105 (26 Aug 2021 07:55) (103 - 122)  BP: 137/81 (26 Aug 2021 07:55) (108/80 - 137/81)  BP(mean): --  ABP: --  ABP(mean): --  RR: 30 (26 Aug 2021 07:55) (21 - 32)  SpO2: 90% (26 Aug 2021 07:55) (90% - 95%)        08-25 @ 07:01  -  08-26 @ 07:00  --------------------------------------------------------  IN: 220 mL / OUT: 450 mL / NET: -230 mL      CAPILLARY BLOOD GLUCOSE      POCT Blood Glucose.: 93 mg/dL (26 Aug 2021 04:52)      PHYSICAL EXAM:  GENERAL: NAD, well-groomed, well-developed  HEAD:  Atraumatic, Normocephalic  EYES: EOMI, PERRLA, conjunctiva and sclera clear  ENMT: No tonsillar erythema, exudates, or enlargement; Moist mucous membranes, Good dentition, No lesions  NECK: Supple, No JVD, Normal thyroid  NERVOUS SYSTEM:  Alert & Oriented X3, Good concentration; Motor Strength 5/5 B/L upper and lower extremities; DTRs 2+ intact and symmetric  CHEST/LUNG: Clear to percussion bilaterally; No rales, rhonchi, wheezing, or rubs + on bipap  HEART: Regular rate and rhythm; No murmurs, rubs, or gallops  ABDOMEN: Soft, Nontender, Nondistended; Bowel sounds present  EXTREMITIES:  2+ Peripheral Pulses, No clubbing, cyanosis, or edema  LYMPH: No lymphadenopathy noted  SKIN: No rashes or lesions    HOSPITAL MEDICATIONS:  MEDICATIONS  (STANDING):  benzonatate 200 milliGRAM(s) Oral every 8 hours  budesonide 160 MICROgram(s)/formoterol 4.5 MICROgram(s) Inhaler 2 Puff(s) Inhalation two times a day  cholecalciferol 2000 Unit(s) Oral daily  clonazePAM  Tablet 0.5 milliGRAM(s) Oral every 8 hours  enoxaparin Injectable 90 milliGRAM(s) SubCutaneous every 12 hours  guaiFENesin ER 1200 milliGRAM(s) Oral every 12 hours  lidocaine   4% Patch 1 Patch Transdermal every 24 hours  melatonin 3 milliGRAM(s) Oral at bedtime  multivitamin 1 Tablet(s) Oral daily  pantoprazole  Injectable 40 milliGRAM(s) IV Push daily  piperacillin/tazobactam IVPB.. 3.375 Gram(s) IV Intermittent every 8 hours  polyethylene glycol 3350 17 Gram(s) Oral daily  senna 2 Tablet(s) Oral at bedtime    MEDICATIONS  (PRN):  cyclobenzaprine 5 milliGRAM(s) Oral three times a day PRN Muscle Spasm  oxyCODONE    IR 5 milliGRAM(s) Oral every 4 hours PRN Moderate Pain (4 - 6)  sodium chloride 0.65% Nasal 1 Spray(s) Both Nostrils every 2 hours PRN Nasal Congestion      LABS:                        11.7   11.08 )-----------( 332      ( 25 Aug 2021 06:04 )             38.5     Hgb Trend: 11.7<--, 11.5<--, 11.4<--, 11.2<--  08-26    137  |  93<L>  |  9   ----------------------------<  107<H>  4.8   |  29  |  0.63    Ca    9.5      26 Aug 2021 05:12  Phos  4.9     08-26  Mg     2.2     08-26    TPro  7.9  /  Alb  2.8<L>  /  TBili  0.4  /  DBili  x   /  AST  26  /  ALT  24  /  AlkPhos  88  08-26    Creatinine Trend: 0.63<--, 0.64<--, 0.68<--, 0.60<--, 0.61<--, 0.67<--        Venous Blood Gas:  08-26 @ 05:07  7.31/80/38/40/61.7  VBG Lactate: 1.8      MICROBIOLOGY:     RADIOLOGY & ADDITIONAL TESTS:        Ciara Potts, PGY2  Internal Medicine   pager: NS: [phone] [facility]: [phone] ***************************************************************  Ciara Potts, PGY2  Internal Medicine   pager: NS: [phone] [facility]: [id number]  ***************************************************************  Miesha Preston MD   PGY-2 Internal Medicine      MICU Accept Note    CHIEF COMPLAINT: COVID pna    HPI / INTERVAL HISTORY:  55yr old unvaccinated male with hx of HTN, RLE DVT, and PE (not on home AC) presents with progressively worsening SOB, intermittent fevers, COVID positive, admitted for COVID PNA.  Course complicated by worsening respiratory failure requiring NIPPV and ICU admission. While in the ICU, pt alternated with High Flow and BIPAP with eventually less time on BIPap. Pt was transitioned to Day time high flow 60L and 70% was kept on nocturnal bipap 15/10 60%. He was stable and transferred to the floors on 8/19. This morning, patient had an RRT called for tachypnea, reported RR 70 and increased WOB. Patient switched to AAVAPs with improved WOB and RR to the 20s w/ adequate TVs 400s-500s. STAT CXR was obtained and patient was found to have tension PTX. CT surgery was consulted as was MICU. Patient will be transferred to MICU on BiPAP and an open chest tube will be placed by CTS.     S :Pt c/o SOB    PAST MEDICAL & SURGICAL HISTORY:  Hyperlipidemia    HTN (hypertension)    Rupture, tendon, quadriceps    History of Achilles tendon repair        FAMILY HISTORY:  No pertinent family history in first degree relatives        SOCIAL HISTORY:  Smoking: [ x ] Never Smoked  [  ] Former Smoker (# packs x # years)  [  ] Current Smoker (# packs x # years)  Substance Use: Denies  EtOH Use: Denies  Marital Status: [  ] Single  [  x]   [  ]   [  ]   Sexual History:   Occupation:  ; formally incarcerated in early 20s  Recent Travel:  Country of Birth: [place]  Advance Directives:     HOME MEDICATIONS:      Allergies    No Known Allergies    Intolerances          REVIEW OF SYSTEMS:  CONSTITUTIONAL: No fever, weight loss, or fatigue  EYES: No eye pain, visual disturbances, or discharge  ENT:  No difficulty hearing, tinnitus, vertigo; No sinus or throat pain  NECK: No pain or stiffness  BREASTS: No pain, masses, or nipple discharge  RESPIRATORY: No cough, wheezing, chills or hemoptysis;+ shortness of breath  CARDIOVASCULAR: No chest pain, palpitations, dizziness, or leg swelling  GASTROINTESTINAL: No abdominal or epigastric pain. No nausea, vomiting, or hematemesis; No diarrhea or constipation. No melena or hematochezia.  GENITOURINARY: No dysuria, frequency, hematuria, or incontinence  NEUROLOGICAL: No headaches, memory loss, loss of strength, numbness, or tremors  SKIN: No itching, burning, rashes, or lesions   LYMPH NODES: No enlarged glands  ENDOCRINE: No heat or cold intolerance; No hair loss  MUSCULOSKELETAL: No joint pain or swelling; No muscle, back, or extremity pain  PSYCHIATRIC: No depression, anxiety, mood swings, or difficulty sleeping  HEME/LYMPH: No easy bruising, or bleeding gums  ALLERGY AND IMMUNOLOGIC: No hives or eczema    OBJECTIVE:  ICU Vital Signs Last 24 Hrs  T(C): 36.7 (26 Aug 2021 07:55), Max: 37 (25 Aug 2021 10:48)  T(F): 98.1 (26 Aug 2021 07:55), Max: 98.6 (25 Aug 2021 10:48)  HR: 105 (26 Aug 2021 07:55) (103 - 122)  BP: 137/81 (26 Aug 2021 07:55) (108/80 - 137/81)  BP(mean): --  ABP: --  ABP(mean): --  RR: 30 (26 Aug 2021 07:55) (21 - 32)  SpO2: 90% (26 Aug 2021 07:55) (90% - 95%)        08-25 @ 07:01  -  08-26 @ 07:00  --------------------------------------------------------  IN: 220 mL / OUT: 450 mL / NET: -230 mL      CAPILLARY BLOOD GLUCOSE      POCT Blood Glucose.: 93 mg/dL (26 Aug 2021 04:52)      PHYSICAL EXAM:  GENERAL: NAD, well-groomed, well-developed  HEAD:  Atraumatic, Normocephalic  EYES: EOMI, PERRLA, conjunctiva and sclera clear  ENMT: No tonsillar erythema, exudates, or enlargement; Moist mucous membranes, Good dentition, No lesions  NECK: Supple, No JVD, Normal thyroid  NERVOUS SYSTEM:  Alert & Oriented X3, Good concentration; Motor Strength 5/5 B/L upper and lower extremities; DTRs 2+ intact and symmetric  CHEST/LUNG: +rhonchi/wheezing R>L +on HFNC  HEART: Regular rate and rhythm; No murmurs, rubs, or gallops  ABDOMEN: Soft, Nontender, Nondistended; Bowel sounds present  EXTREMITIES:  2+ Peripheral Pulses, No clubbing, cyanosis, or edema  LYMPH: No lymphadenopathy noted  SKIN: No rashes or lesions    HOSPITAL MEDICATIONS:  MEDICATIONS  (STANDING):  benzonatate 200 milliGRAM(s) Oral every 8 hours  budesonide 160 MICROgram(s)/formoterol 4.5 MICROgram(s) Inhaler 2 Puff(s) Inhalation two times a day  cholecalciferol 2000 Unit(s) Oral daily  clonazePAM  Tablet 0.5 milliGRAM(s) Oral every 8 hours  enoxaparin Injectable 90 milliGRAM(s) SubCutaneous every 12 hours  guaiFENesin ER 1200 milliGRAM(s) Oral every 12 hours  lidocaine   4% Patch 1 Patch Transdermal every 24 hours  melatonin 3 milliGRAM(s) Oral at bedtime  multivitamin 1 Tablet(s) Oral daily  pantoprazole  Injectable 40 milliGRAM(s) IV Push daily  piperacillin/tazobactam IVPB.. 3.375 Gram(s) IV Intermittent every 8 hours  polyethylene glycol 3350 17 Gram(s) Oral daily  senna 2 Tablet(s) Oral at bedtime    MEDICATIONS  (PRN):  cyclobenzaprine 5 milliGRAM(s) Oral three times a day PRN Muscle Spasm  oxyCODONE    IR 5 milliGRAM(s) Oral every 4 hours PRN Moderate Pain (4 - 6)  sodium chloride 0.65% Nasal 1 Spray(s) Both Nostrils every 2 hours PRN Nasal Congestion      LABS:                        11.7   11.08 )-----------( 332      ( 25 Aug 2021 06:04 )             38.5     Hgb Trend: 11.7<--, 11.5<--, 11.4<--, 11.2<--  08-26    137  |  93<L>  |  9   ----------------------------<  107<H>  4.8   |  29  |  0.63    Ca    9.5      26 Aug 2021 05:12  Phos  4.9     08-26  Mg     2.2     08-26    TPro  7.9  /  Alb  2.8<L>  /  TBili  0.4  /  DBili  x   /  AST  26  /  ALT  24  /  AlkPhos  88  08-26    Creatinine Trend: 0.63<--, 0.64<--, 0.68<--, 0.60<--, 0.61<--, 0.67<--        Venous Blood Gas:  08-26 @ 05:07  7.31/80/38/40/61.7  VBG Lactate: 1.8      MICROBIOLOGY:     RADIOLOGY & ADDITIONAL TESTS:        Ciara Potts, PGY2  Internal Medicine   pager: NS: [phone] [facility]: [phone] ***************************************************************  Ciara Potts, PGY2  Internal Medicine   pager: NS: [phone] [facility]: [phone]  ***************************************************************  Miesha Preston MD   PGY-2 Internal Medicine      MICU Accept Note    CHIEF COMPLAINT: COVID pna    HPI / INTERVAL HISTORY:  55yr old unvaccinated male with hx of HTN, RLE DVT, and PE (not on home AC) presents with progressively worsening SOB, intermittent fevers, COVID positive, admitted for COVID PNA.  Course complicated by worsening respiratory failure requiring NIPPV and ICU admission. While in the ICU, pt alternated with High Flow and BIPAP with eventually less time on BIPap. Pt was transitioned to Day time high flow 60L and 70% was kept on nocturnal bipap 15/10 60%. He was stable and transferred to the floors on 8/19. This morning, patient had an RRT called for tachypnea, reported RR 70 and increased WOB. Patient switched to AAVAPs with improved WOB and RR to the 20s w/ adequate TVs 400s-500s. STAT CXR was obtained and patient was found to have tension PTX. CT surgery was consulted as was MICU. Patient will be transferred to MICU on BiPAP and an open chest tube will be placed by CTS.     S :Pt c/o SOB    PAST MEDICAL & SURGICAL HISTORY:  Hyperlipidemia    HTN (hypertension)    Rupture, tendon, quadriceps    History of Achilles tendon repair        FAMILY HISTORY:  No pertinent family history in first degree relatives        SOCIAL HISTORY:  Smoking: [ x ] Never Smoked  [  ] Former Smoker (# packs x # years)  [  ] Current Smoker (# packs x # years)  Substance Use: Denies  EtOH Use: Denies  Marital Status: [  ] Single  [  x]   [  ]   [  ]   Sexual History:   Occupation:  ; formally incarcerated in early 20s  Recent Travel:  Country of Birth: [place]  Advance Directives:     HOME MEDICATIONS:      Allergies    No Known Allergies    Intolerances          REVIEW OF SYSTEMS:  CONSTITUTIONAL: No fever, weight loss, or fatigue  EYES: No eye pain, visual disturbances, or discharge  ENT:  No difficulty hearing, tinnitus, vertigo; No sinus or throat pain  NECK: No pain or stiffness  BREASTS: No pain, masses, or nipple discharge  RESPIRATORY: No hemoptysis;+ shortness of breath, +cough, +wheezing  CARDIOVASCULAR: No chest pain, palpitations, dizziness, or leg swelling  GASTROINTESTINAL: No abdominal or epigastric pain. No nausea, vomiting, or hematemesis; No diarrhea or constipation. No melena or hematochezia.  GENITOURINARY: No dysuria, frequency, hematuria, or incontinence  NEUROLOGICAL: No headaches, memory loss, loss of strength, numbness, or tremors  SKIN: No itching, burning, rashes, or lesions   LYMPH NODES: No enlarged glands  ENDOCRINE: No heat or cold intolerance; No hair loss  MUSCULOSKELETAL: No joint pain or swelling; No muscle, back, or extremity pain  PSYCHIATRIC: No depression, anxiety, mood swings, or difficulty sleeping  HEME/LYMPH: No easy bruising, or bleeding gums  ALLERGY AND IMMUNOLOGIC: No hives or eczema    OBJECTIVE:  ICU Vital Signs Last 24 Hrs  T(C): 36.7 (26 Aug 2021 07:55), Max: 37 (25 Aug 2021 10:48)  T(F): 98.1 (26 Aug 2021 07:55), Max: 98.6 (25 Aug 2021 10:48)  HR: 105 (26 Aug 2021 07:55) (103 - 122)  BP: 137/81 (26 Aug 2021 07:55) (108/80 - 137/81)  BP(mean): --  ABP: --  ABP(mean): --  RR: 30 (26 Aug 2021 07:55) (21 - 32)  SpO2: 90% (26 Aug 2021 07:55) (90% - 95%)        08-25 @ 07:01  -  08-26 @ 07:00  --------------------------------------------------------  IN: 220 mL / OUT: 450 mL / NET: -230 mL      CAPILLARY BLOOD GLUCOSE      POCT Blood Glucose.: 93 mg/dL (26 Aug 2021 04:52)      PHYSICAL EXAM:  GENERAL: NAD, well-groomed, well-developed  HEAD:  Atraumatic, Normocephalic  EYES: EOMI, PERRLA, conjunctiva and sclera clear  ENMT: No tonsillar erythema, exudates, or enlargement; Moist mucous membranes, Good dentition, No lesions  NECK: Supple, No JVD, Normal thyroid  NERVOUS SYSTEM:  Alert & Oriented X3, Good concentration; Motor Strength 5/5 B/L upper and lower extremities; DTRs 2+ intact and symmetric  CHEST/LUNG: +rhonchi/wheezing R>L +on HFNC  HEART: Regular rate and rhythm; No murmurs, rubs, or gallops  ABDOMEN: Soft, Nontender, Nondistended; Bowel sounds present  EXTREMITIES:  2+ Peripheral Pulses, No clubbing, cyanosis, or edema  LYMPH: No lymphadenopathy noted  SKIN: No rashes or lesions    HOSPITAL MEDICATIONS:  MEDICATIONS  (STANDING):  benzonatate 200 milliGRAM(s) Oral every 8 hours  budesonide 160 MICROgram(s)/formoterol 4.5 MICROgram(s) Inhaler 2 Puff(s) Inhalation two times a day  cholecalciferol 2000 Unit(s) Oral daily  clonazePAM  Tablet 0.5 milliGRAM(s) Oral every 8 hours  enoxaparin Injectable 90 milliGRAM(s) SubCutaneous every 12 hours  guaiFENesin ER 1200 milliGRAM(s) Oral every 12 hours  lidocaine   4% Patch 1 Patch Transdermal every 24 hours  melatonin 3 milliGRAM(s) Oral at bedtime  multivitamin 1 Tablet(s) Oral daily  pantoprazole  Injectable 40 milliGRAM(s) IV Push daily  piperacillin/tazobactam IVPB.. 3.375 Gram(s) IV Intermittent every 8 hours  polyethylene glycol 3350 17 Gram(s) Oral daily  senna 2 Tablet(s) Oral at bedtime    MEDICATIONS  (PRN):  cyclobenzaprine 5 milliGRAM(s) Oral three times a day PRN Muscle Spasm  oxyCODONE    IR 5 milliGRAM(s) Oral every 4 hours PRN Moderate Pain (4 - 6)  sodium chloride 0.65% Nasal 1 Spray(s) Both Nostrils every 2 hours PRN Nasal Congestion      LABS:                        11.7   11.08 )-----------( 332      ( 25 Aug 2021 06:04 )             38.5     Hgb Trend: 11.7<--, 11.5<--, 11.4<--, 11.2<--  08-26    137  |  93<L>  |  9   ----------------------------<  107<H>  4.8   |  29  |  0.63    Ca    9.5      26 Aug 2021 05:12  Phos  4.9     08-26  Mg     2.2     08-26    TPro  7.9  /  Alb  2.8<L>  /  TBili  0.4  /  DBili  x   /  AST  26  /  ALT  24  /  AlkPhos  88  08-26    Creatinine Trend: 0.63<--, 0.64<--, 0.68<--, 0.60<--, 0.61<--, 0.67<--        Venous Blood Gas:  08-26 @ 05:07  7.31/80/38/40/61.7  VBG Lactate: 1.8      MICROBIOLOGY:     RADIOLOGY & ADDITIONAL TESTS:        Ciara Potts, PGY2  Internal Medicine   pager: NS: [phone] [facility]: [phone]

## 2021-08-26 NOTE — PROVIDER CONTACT NOTE (CHANGE IN STATUS NOTIFICATION) - ACTION/TREATMENT ORDERED:
RRT called. see RRT sheet. pt remained on unit
RRT called, 0.5 mg Dilaudid ordered and given, CXR, labs orderd, see RRT sheet.

## 2021-08-26 NOTE — PROGRESS NOTE ADULT - PROBLEM SELECTOR PLAN 4
2nd to COVID PNA, superimposed bacterial PNA, ptx   -S/p RRT 8/3 and 8/4 for hypoxia  -Tx to 5ICU 8/10 for further management, tx to 4M   -S/p RRT x2 8/23 for R sided chest pain, pain appears pleuritic in nature  -CTA chest 8/25 with no clear PE  -Keep sats >90% with supplemental O2 (currently HFNC 60L/70%)

## 2021-08-26 NOTE — PROGRESS NOTE ADULT - ASSESSMENT
56 y/o M with PMH of DVT/PE s/p achilles tendon repair years ago (not on AC currently). Presents with complaints of SOB, intermittent and fevers with cough productive of white sputum for past week. Tested positive for COVID 2 days ago. Pt is unvaccinated. Endorses progressively worsening SOB over the past 5 days, worse with ambulation, found to be hypoxic on arrival to the  ER. CXR with b/l opacities. Course c/b cavitary PNA, PTX.

## 2021-08-26 NOTE — PROGRESS NOTE ADULT - ASSESSMENT
Echo 8/24/21: EF 65%, mild MR, grossly nl lv sys fx    a/p  56yo M with Hx of DVT s/p achilles tendon repair years ago not on AC presenting with complaints of SOB. intermittent and fevers with cough productive of white sputum for past week, tested positive for covid 2. Now transferred to MICU for tension pneumothorax, s/p chest tube.     #Atypical Chest Pain  -RRT 8/23 x2 for chest pain - exacerbated by cough and inspiration -  relieved with IV pain meds   -secondary to covid PNA   -hs T neg, ekg without acute ischemic changes  -no ADHF noted on exam   -CTA prelim noted w R upper lobe consolidation, no PE    -Echo noted w grossly nl lv sys fx, mild MR   -Tylenol PRN for pain    #Covid -19  -s/p completed courses of Remdesivir x 5 days and Decadron x 10 days   -S/p Tocilizumab 7/30 per ID   -LE duplex 8/4 neg DVT  -repeat CXR noted with worsening b/l opacities and new R pleural effusion   -CTA as above   -RRT this AM for tachypnea - found to have ptx on CXR  -Sats low 90s on HFNC 60L/70%   -transferred to MICU , CTS consult placed and R chest tube was placed.   -pulm  f/u     #Sinus tachycardia  -secondary to covid PNA  -elevated today 2/2 to increased WOB, pneumothorax   -cont to monitor   -echo as above     #DVT (hx)  -LE doppler as above  -on full dose AC    care per MICU .

## 2021-08-27 DIAGNOSIS — J06.0 ACUTE LARYNGOPHARYNGITIS: ICD-10-CM

## 2021-08-27 LAB
ALBUMIN SERPL ELPH-MCNC: 2.5 G/DL — LOW (ref 3.3–5)
ALP SERPL-CCNC: 68 U/L — SIGNIFICANT CHANGE UP (ref 40–120)
ALT FLD-CCNC: 15 U/L — SIGNIFICANT CHANGE UP (ref 10–45)
ANION GAP SERPL CALC-SCNC: 8 MMOL/L — SIGNIFICANT CHANGE UP (ref 5–17)
ANISOCYTOSIS BLD QL: SLIGHT — SIGNIFICANT CHANGE UP
AST SERPL-CCNC: 15 U/L — SIGNIFICANT CHANGE UP (ref 10–40)
BASOPHILS # BLD AUTO: 0 K/UL — SIGNIFICANT CHANGE UP (ref 0–0.2)
BASOPHILS NFR BLD AUTO: 0 % — SIGNIFICANT CHANGE UP (ref 0–2)
BILIRUB SERPL-MCNC: 0.3 MG/DL — SIGNIFICANT CHANGE UP (ref 0.2–1.2)
BUN SERPL-MCNC: 10 MG/DL — SIGNIFICANT CHANGE UP (ref 7–23)
CALCIUM SERPL-MCNC: 8.9 MG/DL — SIGNIFICANT CHANGE UP (ref 8.4–10.5)
CHLORIDE SERPL-SCNC: 96 MMOL/L — SIGNIFICANT CHANGE UP (ref 96–108)
CO2 SERPL-SCNC: 34 MMOL/L — HIGH (ref 22–31)
CREAT SERPL-MCNC: 0.63 MG/DL — SIGNIFICANT CHANGE UP (ref 0.5–1.3)
DACRYOCYTES BLD QL SMEAR: SLIGHT — SIGNIFICANT CHANGE UP
ELLIPTOCYTES BLD QL SMEAR: SLIGHT — SIGNIFICANT CHANGE UP
EOSINOPHIL # BLD AUTO: 0.26 K/UL — SIGNIFICANT CHANGE UP (ref 0–0.5)
EOSINOPHIL NFR BLD AUTO: 1.7 % — SIGNIFICANT CHANGE UP (ref 0–6)
GAS PNL BLDA: SIGNIFICANT CHANGE UP
GLUCOSE SERPL-MCNC: 117 MG/DL — HIGH (ref 70–99)
GRAM STN FLD: SIGNIFICANT CHANGE UP
HCT VFR BLD CALC: 36.3 % — LOW (ref 39–50)
HGB BLD-MCNC: 11.1 G/DL — LOW (ref 13–17)
HIV 1+2 AB+HIV1 P24 AG SERPL QL IA: SIGNIFICANT CHANGE UP
HYPOCHROMIA BLD QL: SLIGHT — SIGNIFICANT CHANGE UP
LYMPHOCYTES # BLD AUTO: 1.84 K/UL — SIGNIFICANT CHANGE UP (ref 1–3.3)
LYMPHOCYTES # BLD AUTO: 12.1 % — LOW (ref 13–44)
MAGNESIUM SERPL-MCNC: 2 MG/DL — SIGNIFICANT CHANGE UP (ref 1.6–2.6)
MANUAL SMEAR VERIFICATION: SIGNIFICANT CHANGE UP
MCHC RBC-ENTMCNC: 27.3 PG — SIGNIFICANT CHANGE UP (ref 27–34)
MCHC RBC-ENTMCNC: 30.6 GM/DL — LOW (ref 32–36)
MCV RBC AUTO: 89.4 FL — SIGNIFICANT CHANGE UP (ref 80–100)
MICROCYTES BLD QL: SLIGHT — SIGNIFICANT CHANGE UP
MONOCYTES # BLD AUTO: 2.1 K/UL — HIGH (ref 0–0.9)
MONOCYTES NFR BLD AUTO: 13.8 % — SIGNIFICANT CHANGE UP (ref 2–14)
MRSA PCR RESULT.: SIGNIFICANT CHANGE UP
NEUTROPHILS # BLD AUTO: 10.9 K/UL — HIGH (ref 1.8–7.4)
NEUTROPHILS NFR BLD AUTO: 71.5 % — SIGNIFICANT CHANGE UP (ref 43–77)
PHOSPHATE SERPL-MCNC: 2.8 MG/DL — SIGNIFICANT CHANGE UP (ref 2.5–4.5)
PLAT MORPH BLD: NORMAL — SIGNIFICANT CHANGE UP
PLATELET # BLD AUTO: 347 K/UL — SIGNIFICANT CHANGE UP (ref 150–400)
POIKILOCYTOSIS BLD QL AUTO: SLIGHT — SIGNIFICANT CHANGE UP
POLYCHROMASIA BLD QL SMEAR: SLIGHT — SIGNIFICANT CHANGE UP
POTASSIUM SERPL-MCNC: 4.4 MMOL/L — SIGNIFICANT CHANGE UP (ref 3.5–5.3)
POTASSIUM SERPL-SCNC: 4.4 MMOL/L — SIGNIFICANT CHANGE UP (ref 3.5–5.3)
PROT SERPL-MCNC: 6.8 G/DL — SIGNIFICANT CHANGE UP (ref 6–8.3)
RBC # BLD: 4.06 M/UL — LOW (ref 4.2–5.8)
RBC # FLD: 13 % — SIGNIFICANT CHANGE UP (ref 10.3–14.5)
RBC BLD AUTO: ABNORMAL
S AUREUS DNA NOSE QL NAA+PROBE: SIGNIFICANT CHANGE UP
SODIUM SERPL-SCNC: 138 MMOL/L — SIGNIFICANT CHANGE UP (ref 135–145)
SPECIMEN SOURCE: SIGNIFICANT CHANGE UP
VARIANT LYMPHS # BLD: 0.9 % — SIGNIFICANT CHANGE UP (ref 0–6)
WBC # BLD: 15.24 K/UL — HIGH (ref 3.8–10.5)
WBC # FLD AUTO: 15.24 K/UL — HIGH (ref 3.8–10.5)

## 2021-08-27 PROCEDURE — 99232 SBSQ HOSP IP/OBS MODERATE 35: CPT | Mod: GC

## 2021-08-27 PROCEDURE — 71045 X-RAY EXAM CHEST 1 VIEW: CPT | Mod: 26

## 2021-08-27 PROCEDURE — 99232 SBSQ HOSP IP/OBS MODERATE 35: CPT | Mod: 57

## 2021-08-27 PROCEDURE — 71045 X-RAY EXAM CHEST 1 VIEW: CPT | Mod: 26,77

## 2021-08-27 PROCEDURE — 70360 X-RAY EXAM OF NECK: CPT | Mod: 26

## 2021-08-27 PROCEDURE — 99232 SBSQ HOSP IP/OBS MODERATE 35: CPT

## 2021-08-27 RX ORDER — DEXAMETHASONE 0.5 MG/5ML
6 ELIXIR ORAL EVERY 8 HOURS
Refills: 0 | Status: COMPLETED | OUTPATIENT
Start: 2021-08-27 | End: 2021-08-28

## 2021-08-27 RX ORDER — DIPHENHYDRAMINE HYDROCHLORIDE AND LIDOCAINE HYDROCHLORIDE AND ALUMINUM HYDROXIDE AND MAGNESIUM HYDRO
5 KIT ONCE
Refills: 0 | Status: DISCONTINUED | OUTPATIENT
Start: 2021-08-27 | End: 2021-08-28

## 2021-08-27 RX ORDER — BENZOCAINE AND MENTHOL 5; 1 G/100ML; G/100ML
1 LIQUID ORAL
Refills: 0 | Status: DISCONTINUED | OUTPATIENT
Start: 2021-08-27 | End: 2021-09-13

## 2021-08-27 RX ORDER — OXYCODONE HYDROCHLORIDE 5 MG/1
5 TABLET ORAL ONCE
Refills: 0 | Status: DISCONTINUED | OUTPATIENT
Start: 2021-08-27 | End: 2021-08-27

## 2021-08-27 RX ORDER — HYDROMORPHONE HYDROCHLORIDE 2 MG/ML
2 INJECTION INTRAMUSCULAR; INTRAVENOUS; SUBCUTANEOUS ONCE
Refills: 0 | Status: DISCONTINUED | OUTPATIENT
Start: 2021-08-27 | End: 2021-08-27

## 2021-08-27 RX ORDER — BENZOCAINE AND MENTHOL 5; 1 G/100ML; G/100ML
1 LIQUID ORAL ONCE
Refills: 0 | Status: DISCONTINUED | OUTPATIENT
Start: 2021-08-27 | End: 2021-09-13

## 2021-08-27 RX ADMIN — OXYCODONE HYDROCHLORIDE 5 MILLIGRAM(S): 5 TABLET ORAL at 02:47

## 2021-08-27 RX ADMIN — PIPERACILLIN AND TAZOBACTAM 25 GRAM(S): 4; .5 INJECTION, POWDER, LYOPHILIZED, FOR SOLUTION INTRAVENOUS at 04:07

## 2021-08-27 RX ADMIN — ENOXAPARIN SODIUM 90 MILLIGRAM(S): 100 INJECTION SUBCUTANEOUS at 17:06

## 2021-08-27 RX ADMIN — Medication 0.5 MILLIGRAM(S): at 22:18

## 2021-08-27 RX ADMIN — Medication 0.5 MILLIGRAM(S): at 15:01

## 2021-08-27 RX ADMIN — Medication 2000 UNIT(S): at 11:53

## 2021-08-27 RX ADMIN — Medication 200 MILLIGRAM(S): at 13:58

## 2021-08-27 RX ADMIN — BUDESONIDE AND FORMOTEROL FUMARATE DIHYDRATE 2 PUFF(S): 160; 4.5 AEROSOL RESPIRATORY (INHALATION) at 06:12

## 2021-08-27 RX ADMIN — CYCLOBENZAPRINE HYDROCHLORIDE 5 MILLIGRAM(S): 10 TABLET, FILM COATED ORAL at 22:37

## 2021-08-27 RX ADMIN — HYDROMORPHONE HYDROCHLORIDE 2 MILLIGRAM(S): 2 INJECTION INTRAMUSCULAR; INTRAVENOUS; SUBCUTANEOUS at 11:35

## 2021-08-27 RX ADMIN — CHLORHEXIDINE GLUCONATE 1 APPLICATION(S): 213 SOLUTION TOPICAL at 06:11

## 2021-08-27 RX ADMIN — OXYCODONE HYDROCHLORIDE 5 MILLIGRAM(S): 5 TABLET ORAL at 00:40

## 2021-08-27 RX ADMIN — Medication 0.5 MILLIGRAM(S): at 05:40

## 2021-08-27 RX ADMIN — OXYCODONE HYDROCHLORIDE 5 MILLIGRAM(S): 5 TABLET ORAL at 00:10

## 2021-08-27 RX ADMIN — OXYCODONE HYDROCHLORIDE 5 MILLIGRAM(S): 5 TABLET ORAL at 03:20

## 2021-08-27 RX ADMIN — Medication 200 MILLIGRAM(S): at 05:41

## 2021-08-27 RX ADMIN — PANTOPRAZOLE SODIUM 40 MILLIGRAM(S): 20 TABLET, DELAYED RELEASE ORAL at 11:14

## 2021-08-27 RX ADMIN — Medication 3 MILLIGRAM(S): at 22:18

## 2021-08-27 RX ADMIN — PIPERACILLIN AND TAZOBACTAM 25 GRAM(S): 4; .5 INJECTION, POWDER, LYOPHILIZED, FOR SOLUTION INTRAVENOUS at 11:14

## 2021-08-27 RX ADMIN — Medication 1200 MILLIGRAM(S): at 17:07

## 2021-08-27 RX ADMIN — ENOXAPARIN SODIUM 90 MILLIGRAM(S): 100 INJECTION SUBCUTANEOUS at 05:42

## 2021-08-27 RX ADMIN — OXYCODONE HYDROCHLORIDE 5 MILLIGRAM(S): 5 TABLET ORAL at 20:22

## 2021-08-27 RX ADMIN — OXYCODONE HYDROCHLORIDE 5 MILLIGRAM(S): 5 TABLET ORAL at 11:13

## 2021-08-27 RX ADMIN — SENNA PLUS 2 TABLET(S): 8.6 TABLET ORAL at 22:17

## 2021-08-27 RX ADMIN — PIPERACILLIN AND TAZOBACTAM 25 GRAM(S): 4; .5 INJECTION, POWDER, LYOPHILIZED, FOR SOLUTION INTRAVENOUS at 19:57

## 2021-08-27 RX ADMIN — BUDESONIDE AND FORMOTEROL FUMARATE DIHYDRATE 2 PUFF(S): 160; 4.5 AEROSOL RESPIRATORY (INHALATION) at 17:23

## 2021-08-27 RX ADMIN — Medication 1 TABLET(S): at 11:14

## 2021-08-27 RX ADMIN — Medication 200 MILLIGRAM(S): at 22:17

## 2021-08-27 RX ADMIN — BENZOCAINE AND MENTHOL 1 LOZENGE: 5; 1 LIQUID ORAL at 19:55

## 2021-08-27 RX ADMIN — OXYCODONE HYDROCHLORIDE 5 MILLIGRAM(S): 5 TABLET ORAL at 21:00

## 2021-08-27 RX ADMIN — Medication 1200 MILLIGRAM(S): at 05:41

## 2021-08-27 NOTE — PROGRESS NOTE ADULT - ASSESSMENT
56yo M with h/o appendectomy admitted to the hospital with COVID and possible superimposed pneumonia found to have a right moderate-sized pneumothorax with leftward shift and small pleural effusion.     8/26 Right open chest tube placed at bedside, Maintain to wall suction -40mmHg  8/27 Remains on hi flow- increased subq emphysema, repeat CXR w/o signs of increasing PTX, chest tube +airleak, functioning, Maintain to wall suction -40mmHg

## 2021-08-27 NOTE — PROGRESS NOTE ADULT - ASSESSMENT
Echo 8/24/21: EF 65%, mild MR, grossly nl lv sys fx    a/p  56yo M with Hx of DVT s/p achilles tendon repair years ago not on AC presenting with complaints of SOB. intermittent and fevers with cough productive of white sputum for past week, tested positive for covid 2. Now transferred to MICU for tension pneumothorax, s/p chest tube.     #Atypical Chest Pain  -RRT 8/23 x2 for chest pain - exacerbated by cough and inspiration -  relieved with IV pain meds   -secondary to covid PNA   -hs T neg, ekg without acute ischemic changes  -no ADHF noted on exam   -CTA prelim noted w R upper lobe consolidation, no PE    -Echo noted w grossly nl lv sys fx, mild MR   -Tylenol PRN for pain    #Covid -19  -s/p completed courses of Remdesivir x 5 days and Decadron x 10 days   -S/p Tocilizumab 7/30 per ID   -LE duplex 8/4 neg DVT  -repeat CXR noted with worsening b/l opacities and new R pleural effusion   -CTA as above   -RRT this 8/26 for tachypnea - found to have ptx on CXR  -Sats low 90s on HFNC 60L/70%   -transferred to MICU , s/p R chest tube   -pulm  f/u     #Sinus tachycardia  -secondary to covid PNA  -rates improved.   -cont to monitor   -echo as above     #DVT (hx)  -LE doppler as above  -on full dose AC    care per MICU .      Echo 8/24/21: EF 65%, mild MR, grossly nl lv sys fx    a/p  54yo M with Hx of DVT s/p achilles tendon repair years ago not on AC presenting with complaints of SOB. intermittent and fevers with cough productive of white sputum for past week, tested positive for covid 2. Now transferred to MICU for tension pneumothorax, s/p chest tube.     #Atypical Chest Pain  -RRT 8/23 x2 for chest pain - exacerbated by cough and inspiration -  relieved with IV pain meds   -secondary to covid PNA, PNX  -trop negative  -no ADHF noted on exam   -CTA without PE   -Echo noted w grossly nl lv sys fx, mild MR     #Covid -19  -s/p completed courses of Remdesivir x 5 days and Decadron x 10 days   -S/p Tocilizumab 7/30 per ID   -LE duplex 8/4 neg DVT  -repeat CXR noted with worsening b/l opacities and new R pleural effusion   -CTA as above   -RRT this 8/26 for tachypnea - found to have ptx on CXR  -transferred to MICU , s/p R chest tube   -pulm f/u     #Sinus tachycardia  -likely secondary to covid PNA, volume, pain, PNX  -rates improved.   -cont to monitor   -echo as above     #DVT (hx)  -LE doppler as above  -on full dose AC    care per MICU         45 minutes spent on total encounter; more than 50% of the visit was spent counseling and/or coordinating care by the attending physician.

## 2021-08-27 NOTE — PROGRESS NOTE ADULT - SUBJECTIVE AND OBJECTIVE BOX
DATE OF SERVICE: 08-27-21 @ 10:58  CHIEF COMPLAINT:Patient is a 55y old  Male who presents with a chief complaint of COVID-19 (26 Aug 2021 08:52)    	        PAST MEDICAL & SURGICAL HISTORY:  Hyperlipidemia    HTN (hypertension)    Rupture, tendon, quadriceps    History of Achilles tendon repair            REVIEW OF SYSTEMS:  weak  RESPIRATORY: sob better  CARDIOVASCULAR: No chest pain, palpitations, passing out, dizziness, or leg swelling  GASTROINTESTINAL: No abdominal or epigastric pain. No nausea, vomiting, or hematemesis  GENITOURINARY: No dysuria, frequency, hematuria,  NEUROLOGICAL: No headaches,   MUSCULOSKELETAL: No joint pain or swelling; No muscle, back, or extremity pain    Medications:  MEDICATIONS  (STANDING):  benzonatate 200 milliGRAM(s) Oral every 8 hours  budesonide 160 MICROgram(s)/formoterol 4.5 MICROgram(s) Inhaler 2 Puff(s) Inhalation two times a day  chlorhexidine 4% Liquid 1 Application(s) Topical <User Schedule>  chlorhexidine 4% Liquid 1 Application(s) Topical <User Schedule>  cholecalciferol 2000 Unit(s) Oral daily  clonazePAM  Tablet 0.5 milliGRAM(s) Oral every 8 hours  enoxaparin Injectable 90 milliGRAM(s) SubCutaneous every 12 hours  guaiFENesin ER 1200 milliGRAM(s) Oral every 12 hours  lactated ringers. 1000 milliLiter(s) (100 mL/Hr) IV Continuous <Continuous>  lidocaine   4% Patch 1 Patch Transdermal every 24 hours  melatonin 3 milliGRAM(s) Oral at bedtime  multivitamin 1 Tablet(s) Oral daily  pantoprazole  Injectable 40 milliGRAM(s) IV Push daily  piperacillin/tazobactam IVPB.. 3.375 Gram(s) IV Intermittent every 8 hours  polyethylene glycol 3350 17 Gram(s) Oral daily  senna 2 Tablet(s) Oral at bedtime    MEDICATIONS  (PRN):  acetaminophen   Tablet .. 975 milliGRAM(s) Oral every 6 hours PRN Mild Pain (1 - 3)  cyclobenzaprine 5 milliGRAM(s) Oral three times a day PRN Muscle Spasm  hydrocodone/homatropine Syrup 5 milliLiter(s) Oral every 4 hours PRN Cough  oxyCODONE    IR 5 milliGRAM(s) Oral every 4 hours PRN Moderate Pain (4 - 6)  sodium chloride 0.65% Nasal 1 Spray(s) Both Nostrils every 2 hours PRN Nasal Congestion    	    PHYSICAL EXAM:  T(C): 36.7 (08-27-21 @ 08:00), Max: 37.4 (08-26-21 @ 12:00)  HR: 93 (08-27-21 @ 10:00) (83 - 128)  BP: 135/63 (08-27-21 @ 10:00) (115/87 - 160/84)  RR: 26 (08-27-21 @ 10:00) (14 - 35)  SpO2: 96% (08-27-21 @ 10:00) (87% - 100%)  Wt(kg): --  I&O's Summary    26 Aug 2021 07:01  -  27 Aug 2021 07:00  --------------------------------------------------------  IN: 2505 mL / OUT: 1420 mL / NET: 1085 mL    27 Aug 2021 07:01  -  27 Aug 2021 10:58  --------------------------------------------------------  IN: 240 mL / OUT: 100 mL / NET: 140 mL          HEENT:   Normal oral mucosa  Cardiovascular: Normal S1 S2, No JVD, No murmurs, No edema  Respiratory:ct /r   Psychiatry: A & O x 3, Mood & affect appropriate  Gastrointestinal:  Soft, Non-tender, + BS	  Skin: No rashes, No ecchymoses, No cyanosis	  Neurologic: Non-focal  Extremities: Normal range of motion, No clubbing, cyanosis or edema  Vascular: Peripheral pulses palpable 2+ bilaterally    TELEMETRY: 	    ECG:  	  RADIOLOGY:  OTHER: 	  	  LABS:	 	    CARDIAC MARKERS:                                11.1   15.24 )-----------( 347      ( 27 Aug 2021 00:26 )             36.3     08-27    138  |  96  |  10  ----------------------------<  117<H>  4.4   |  34<H>  |  0.63    Ca    8.9      27 Aug 2021 00:26  Phos  2.8     08-27  Mg     2.0     08-27    TPro  6.8  /  Alb  2.5<L>  /  TBili  0.3  /  DBili  x   /  AST  15  /  ALT  15  /  AlkPhos  68  08-27    proBNP:   Lipid Profile:   HgA1c:   TSH:     	         DATE OF SERVICE: 08-27-21 @ 10:58  CHIEF COMPLAINT:Patient is a 55y old  Male who presents with a chief complaint of COVID-19 (26 Aug 2021 08:52)    	        PAST MEDICAL & SURGICAL HISTORY:  Hyperlipidemia    HTN (hypertension)    Rupture, tendon, quadriceps    History of Achilles tendon repair            REVIEW OF SYSTEMS:  weak  RESPIRATORY: sob better  CARDIOVASCULAR: less r sided chest discomfort  GASTROINTESTINAL: No abdominal or epigastric pain. No nausea, vomiting, or hematemesis  GENITOURINARY: No dysuria, frequency, hematuria,  NEUROLOGICAL: No headaches,   MUSCULOSKELETAL: No joint pain or swelling; No muscle, back, or extremity pain    Medications:  MEDICATIONS  (STANDING):  benzonatate 200 milliGRAM(s) Oral every 8 hours  budesonide 160 MICROgram(s)/formoterol 4.5 MICROgram(s) Inhaler 2 Puff(s) Inhalation two times a day  chlorhexidine 4% Liquid 1 Application(s) Topical <User Schedule>  chlorhexidine 4% Liquid 1 Application(s) Topical <User Schedule>  cholecalciferol 2000 Unit(s) Oral daily  clonazePAM  Tablet 0.5 milliGRAM(s) Oral every 8 hours  enoxaparin Injectable 90 milliGRAM(s) SubCutaneous every 12 hours  guaiFENesin ER 1200 milliGRAM(s) Oral every 12 hours  lactated ringers. 1000 milliLiter(s) (100 mL/Hr) IV Continuous <Continuous>  lidocaine   4% Patch 1 Patch Transdermal every 24 hours  melatonin 3 milliGRAM(s) Oral at bedtime  multivitamin 1 Tablet(s) Oral daily  pantoprazole  Injectable 40 milliGRAM(s) IV Push daily  piperacillin/tazobactam IVPB.. 3.375 Gram(s) IV Intermittent every 8 hours  polyethylene glycol 3350 17 Gram(s) Oral daily  senna 2 Tablet(s) Oral at bedtime    MEDICATIONS  (PRN):  acetaminophen   Tablet .. 975 milliGRAM(s) Oral every 6 hours PRN Mild Pain (1 - 3)  cyclobenzaprine 5 milliGRAM(s) Oral three times a day PRN Muscle Spasm  hydrocodone/homatropine Syrup 5 milliLiter(s) Oral every 4 hours PRN Cough  oxyCODONE    IR 5 milliGRAM(s) Oral every 4 hours PRN Moderate Pain (4 - 6)  sodium chloride 0.65% Nasal 1 Spray(s) Both Nostrils every 2 hours PRN Nasal Congestion    	    PHYSICAL EXAM:  T(C): 36.7 (08-27-21 @ 08:00), Max: 37.4 (08-26-21 @ 12:00)  HR: 93 (08-27-21 @ 10:00) (83 - 128)  BP: 135/63 (08-27-21 @ 10:00) (115/87 - 160/84)  RR: 26 (08-27-21 @ 10:00) (14 - 35)  SpO2: 96% (08-27-21 @ 10:00) (87% - 100%)  Wt(kg): --  I&O's Summary    26 Aug 2021 07:01  -  27 Aug 2021 07:00  --------------------------------------------------------  IN: 2505 mL / OUT: 1420 mL / NET: 1085 mL    27 Aug 2021 07:01  -  27 Aug 2021 10:58  --------------------------------------------------------  IN: 240 mL / OUT: 100 mL / NET: 140 mL          HEENT:   Normal oral mucosa  Cardiovascular: Normal S1 S2, No JVD, No murmurs, No edema  Respiratory:ct /r   Psychiatry: A & O x 3, Mood & affect appropriate  Gastrointestinal:  Soft, Non-tender, + BS	  Skin: No rashes, No ecchymoses, No cyanosis	  Neurologic: Non-focal  Extremities: Normal range of motion, No clubbing, cyanosis or edema  Vascular: Peripheral pulses palpable 2+ bilaterally    TELEMETRY: 	    ECG:  	  RADIOLOGY:  OTHER: 	  	  LABS:	 	    CARDIAC MARKERS:                                11.1   15.24 )-----------( 347      ( 27 Aug 2021 00:26 )             36.3     08-27    138  |  96  |  10  ----------------------------<  117<H>  4.4   |  34<H>  |  0.63    Ca    8.9      27 Aug 2021 00:26  Phos  2.8     08-27  Mg     2.0     08-27    TPro  6.8  /  Alb  2.5<L>  /  TBili  0.3  /  DBili  x   /  AST  15  /  ALT  15  /  AlkPhos  68  08-27    proBNP:   Lipid Profile:   HgA1c:   TSH:

## 2021-08-27 NOTE — CHART NOTE - NSCHARTNOTEFT_GEN_A_CORE
Nutrition Follow Up Note  Patient seen for: malnutrition follow up s/p RRT and transfer to MICU     Chart reviewed, events noted. "55yr old male with HTN, RLE DVT, and PE (not on home AC) presents with progressively worsening SOB, intermittent fevers, COVID positive, admitted for COVID PNA.  Course complicated by worsening respiratory failure  2/2 tension PTX and pleural effusion requiring NIPPV and ICU admission."    Source: [] Patient       [x] EMR        [] RN        [] Family at bedside       [X] Other: Pt on HFNC    Diet Order:   Diet, Regular:   No Beef  No Dairy  No Egg (21)    - Is current order appropriate/adequate? [X] Yes  []  No:     PO intake :   [] >75%  Adequate    [] 50-75%  Fair       [] <50%  Poor [meal rounds pending]    Nutrition-related concerns:  - Pt has been in the hospital ~ 1 month; Intermittently on BiPAP/HFNC; s/p Remdesivir and Decadron  - Pt ordered for Regular diet with Orgain shakes while on intermittent HFNC; NPO while on BiPAP  - Chest tube output x 24-hrs: 870 ml  - IVF: lactated ringers @ 100 ml/hr x 10 hrs  - Supplementation:  multivitamin    GI:    Last BM  (x2).   Bowel Regimen? [X] Yes; Miralax, senna    Weights:   DOSIN kg () *used for calculations  IBW: 68.4 kg *used for calculations  Daily: 92.6 kg (8/15); non new weight    Nutritionally Pertinent MEDICATIONS  (STANDING):  cholecalciferol  lactated ringers.  multivitamin  pantoprazole  Injectable  piperacillin/tazobactam IVPB..  polyethylene glycol 3350  senna    Pertinent Labs:  @ 00:26: Na 138, BUN 10, Cr 0.63, <H>, K+ 4.4, Phos 2.8, Mg 2.0, Alk Phos 68, ALT/SGPT 15, AST/SGOT 15,    A1C with Estimated Average Glucose Result: 6.0 % (08-10-21 @ 09:48)  A1C with Estimated Average Glucose Result: 6.3 % (21 @ 14:51)    Skin per nursing documentation: no pressure injuries documented  Edema: no edema documented     Estimated Needs: with consideration for BMI>30 and malnutrition  Energy: 3190-2543 calories (20-25 kcal/kg based on dosing wt 92 kg))  Protein:  grams (1.2-1.6 gm/kg based on IBW 68.4 kg)      Previous Nutrition Diagnosis: 1) Severe, acute malnutrition 2) Obesity  Nutrition Diagnosis is: [x] ongoing; addressed with oral nutrition supplents, liberalized diet, food preferences    New Nutrition Diagnosis: [X] Not applicable    Nutrition Care Plan:  [X] In Progress  [] Achieved  [] Not applicable    Nutrition Interventions:     Education Provided:       [] Yes:  [X] No:        Recommendations:      1. Continue Regular diet with food preferences  2. Continue Orgain supplement bid, per pt preference  3. Continue multivitamin    Monitoring and Evaluation:   Continue to monitor nutritional intake, tolerance to diet prescription, weights, labs, skin integrity      RD remains available upon request and will follow up per protocol  Dayanara Leija, MS RD CDN [facility] (pager [phone]) Nutrition Follow Up Note  Patient seen for: malnutrition follow up s/p RRT and transfer to MICU     Chart reviewed, events noted. "55yr old male with HTN, RLE DVT, and PE (not on home AC) presents with progressively worsening SOB, intermittent fevers, COVID positive, admitted for COVID PNA.  Course complicated by worsening respiratory failure  2/2 tension PTX and pleural effusion requiring NIPPV and ICU admission."    Source: [X] Patient       [x] EMR        [X] RN        [] Family at bedside       [X] Other: Pt on HFNC, coughing heavily at time of visit and trying to clear secretions    Diet Order:   Diet, Regular:   No Beef  No Dairy  No Egg (21)    - Is current order appropriate/adequate? [X] Yes  []  No:     PO intake :   [] >75%  Adequate    [] 50-75%  Fair       [X] <50%  Poor; per RN, pt unable to eat adequately due to coughing and work of breathing. Pt ate cheerios and 1 Orgain shake at breakfast, only jello for lunch.    Nutrition-related concerns:  - Pt has been in the hospital ~ 1 month; Intermittently on BiPAP/HFNC; s/p Remdesivir and Decadron  - Pt ordered for Regular diet with Orgain shakes while on intermittent HFNC; previously NPO while on BiPAP  - Chest tube output x 24-hrs: 870 ml  - IVF: lactated ringers @ 100 ml/hr x 10 hrs  - Supplementation:  multivitamin    GI:    Last BM  (x2).   Bowel Regimen? [X] Yes; Miralax, senna    Weights:   DOSIN kg () *used for calculations  IBW: 68.4 kg *used for calculations  Daily: 92.6 kg (8/15); non new weight    Nutritionally Pertinent MEDICATIONS  (STANDING):  cholecalciferol  lactated ringers.  multivitamin  pantoprazole  Injectable  piperacillin/tazobactam IVPB..  polyethylene glycol 3350  senna    Pertinent Labs:  @ 00:26: Na 138, BUN 10, Cr 0.63, <H>, K+ 4.4, Phos 2.8, Mg 2.0, Alk Phos 68, ALT/SGPT 15, AST/SGOT 15,    A1C with Estimated Average Glucose Result: 6.0 % (08-10-21 @ 09:48)  A1C with Estimated Average Glucose Result: 6.3 % (21 @ 14:51)    Skin per nursing documentation: no pressure injuries documented  Edema: no edema documented     Estimated Needs: with consideration for BMI>30 and malnutrition  Energy: 1612-3662 calories (20-25 kcal/kg based on dosing wt 92 kg))  Protein:  grams (1.2-1.6 gm/kg based on IBW 68.4 kg)      Previous Nutrition Diagnosis: 1) Severe, acute malnutrition 2) Obesity  Nutrition Diagnosis is: [x] ongoing; addressed with oral nutrition supplents, liberalized diet, food preferences    New Nutrition Diagnosis: [X] Not applicable    Nutrition Care Plan:  [X] In Progress  [] Achieved  [] Not applicable    Nutrition Interventions:     Education Provided:       [] Yes:  [X] No:        Recommendations:      1. Continue Regular diet with food preferences  2. Continue Orgain supplement bid, per pt preference  3. Continue multivitamin    Monitoring and Evaluation:   Continue to monitor nutritional intake, tolerance to diet prescription, weights, labs, skin integrity      RD remains available upon request and will follow up per protocol  Dayanara Leija, MS RD CDN [facility] (pager [phone])

## 2021-08-27 NOTE — PROGRESS NOTE ADULT - SUBJECTIVE AND OBJECTIVE BOX
KARISSA VILLALBA 55y [id number]    Patient is a 55y old  Male who presents with a chief complaint of COVID-19 (26 Aug 2021 08:52)      Follow Up/CC:  ID following for cavitary PNA     Interval History/ROS: in ICU, no fever    Allergies    No Known Allergies    Intolerances        ANTIMICROBIALS:  piperacillin/tazobactam IVPB.. 3.375 every 8 hours      MEDICATIONS  (STANDING):  benzonatate 200 milliGRAM(s) Oral every 8 hours  budesonide 160 MICROgram(s)/formoterol 4.5 MICROgram(s) Inhaler 2 Puff(s) Inhalation two times a day  chlorhexidine 4% Liquid 1 Application(s) Topical <User Schedule>  chlorhexidine 4% Liquid 1 Application(s) Topical <User Schedule>  cholecalciferol 2000 Unit(s) Oral daily  clonazePAM  Tablet 0.5 milliGRAM(s) Oral every 8 hours  enoxaparin Injectable 90 milliGRAM(s) SubCutaneous every 12 hours  guaiFENesin ER 1200 milliGRAM(s) Oral every 12 hours  lactated ringers. 1000 milliLiter(s) (100 mL/Hr) IV Continuous <Continuous>  lidocaine   4% Patch 1 Patch Transdermal every 24 hours  melatonin 3 milliGRAM(s) Oral at bedtime  multivitamin 1 Tablet(s) Oral daily  pantoprazole  Injectable 40 milliGRAM(s) IV Push daily  piperacillin/tazobactam IVPB.. 3.375 Gram(s) IV Intermittent every 8 hours  polyethylene glycol 3350 17 Gram(s) Oral daily  senna 2 Tablet(s) Oral at bedtime    MEDICATIONS  (PRN):  acetaminophen   Tablet .. 975 milliGRAM(s) Oral every 6 hours PRN Mild Pain (1 - 3)  cyclobenzaprine 5 milliGRAM(s) Oral three times a day PRN Muscle Spasm  hydrocodone/homatropine Syrup 5 milliLiter(s) Oral every 4 hours PRN Cough  oxyCODONE    IR 5 milliGRAM(s) Oral every 4 hours PRN Moderate Pain (4 - 6)  sodium chloride 0.65% Nasal 1 Spray(s) Both Nostrils every 2 hours PRN Nasal Congestion        Vital Signs Last 24 Hrs  T(C): 36.7 (27 Aug 2021 08:00), Max: 37.4 (26 Aug 2021 12:00)  T(F): 98 (27 Aug 2021 08:00), Max: 99.4 (26 Aug 2021 12:00)  HR: 105 (27 Aug 2021 11:00) (83 - 128)  BP: 136/65 (27 Aug 2021 11:00) (115/87 - 151/82)  BP(mean): 94 (27 Aug 2021 11:00) (88 - 109)  RR: 30 (27 Aug 2021 11:00) (14 - 35)  SpO2: 88% (27 Aug 2021 11:00) (87% - 100%)    CBC Full  -  ( 27 Aug 2021 00:26 )  WBC Count : 15.24 K/uL  RBC Count : 4.06 M/uL  Hemoglobin : 11.1 g/dL  Hematocrit : 36.3 %  Platelet Count - Automated : 347 K/uL  Mean Cell Volume : 89.4 fl  Mean Cell Hemoglobin : 27.3 pg  Mean Cell Hemoglobin Concentration : 30.6 gm/dL  Auto Neutrophil # : 10.90 K/uL  Auto Lymphocyte # : 1.84 K/uL  Auto Monocyte # : 2.10 K/uL  Auto Eosinophil # : 0.26 K/uL  Auto Basophil # : 0.00 K/uL  Auto Neutrophil % : 71.5 %  Auto Lymphocyte % : 12.1 %  Auto Monocyte % : 13.8 %  Auto Eosinophil % : 1.7 %  Auto Basophil % : 0.0 %    08-27    138  |  96  |  10  ----------------------------<  117<H>  4.4   |  34<H>  |  0.63    Ca    8.9      27 Aug 2021 00:26  Phos  2.8     08-27  Mg     2.0     08-27    TPro  6.8  /  Alb  2.5<L>  /  TBili  0.3  /  DBili  x   /  AST  15  /  ALT  15  /  AlkPhos  68  08-27    LIVER FUNCTIONS - ( 27 Aug 2021 00:26 )  Alb: 2.5 g/dL / Pro: 6.8 g/dL / ALK PHOS: 68 U/L / ALT: 15 U/L / AST: 15 U/L / GGT: x               MICROBIOLOGY:  .Body Fluid Pleural Fluid  08-26-21   Testing in progress  --    polymorphonuclear leukocytes  Gram Negative Rods  by cytocentrifuge      .Blood Blood  08-26-21   No growth to date.  --  --      .Blood Blood  08-25-21   No growth to date.  --  --      .Blood Blood-Peripheral  08-11-21   No Growth Final  --  --      .Sputum Sputum, cup  08-04-21   Normal Respiratory Tiki present  --    Few polymorphonuclear leukocytes per low power field  Rare Squamous epithelial cells per low power field  Moderate Gram Positive Cocci in Pairs and Chains per oil power field  Rare Gram Negative Rods per oil power field      RADIOLOGY    < from: Xray Chest 1 View- PORTABLE-Urgent (Xray Chest 1 View- PORTABLE-Urgent .) (08.26.21 @ 11:50) >  Previously seen right pneumothorax is again seen butdecreased in size status post right chest tube placement. Right pleural effusion.    Diffuse airspace opacities are seen throughout both lungs, consistent with patient's known Covid 19 pneumonia.    < end of copied text >

## 2021-08-27 NOTE — CONSULT NOTE ADULT - ASSESSMENT
54 YO M with PMH of HTN, RLE DVT, and PE (not on home AC) presents with progressively worsening SOB, intermittent fevers, COVID positive, admitted for COVID PNA. . ENT was consulted for evaluation of Throat Tightness and Voice changes. Pt also reports painful swallowing of solid/liquid foods Currently on soft diet. Admits to productive cough, noted to be self suctioning at bedside. 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 Airway patent, no foreign body visualized. Physical Exam significant for SQ Emphysema around the neck area.    56 YO M with PMH of HTN, RLE DVT, and PE (not on home AC) presents with progressively worsening SOB, intermittent fevers, COVID positive, admitted for COVID PNA. . ENT was consulted for evaluation of Throat Tightness and Voice changes. Pt also reports painful swallowing of solid/liquid foods Currently on soft diet. Admits to productive cough, noted to be self suctioning at bedside. 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 Airway patent, no foreign body visualized. Physical Exam significant for SQ Emphysema around the neck area.

## 2021-08-27 NOTE — CONSULT NOTE ADULT - SUBJECTIVE AND OBJECTIVE BOX
CC: Throat Tightness and Voice Change.    HPI:         PAST MEDICAL & SURGICAL HISTORY:  Hyperlipidemia  HTN (hypertension)  Rupture, tendon, quadriceps  History of Achilles tendon repair    Allergies  No Known Allergies    Intolerances  MEDICATIONS  (STANDING):  benzocaine 15 mG/menthol 3.6 mG (Sugar-Free) Lozenge 1 Lozenge Oral once  benzonatate 200 milliGRAM(s) Oral every 8 hours  budesonide 160 MICROgram(s)/formoterol 4.5 MICROgram(s) Inhaler 2 Puff(s) Inhalation two times a day  chlorhexidine 4% Liquid 1 Application(s) Topical <User Schedule>  chlorhexidine 4% Liquid 1 Application(s) Topical <User Schedule>  cholecalciferol 2000 Unit(s) Oral daily  clonazePAM  Tablet 0.5 milliGRAM(s) Oral every 8 hours  enoxaparin Injectable 90 milliGRAM(s) SubCutaneous every 12 hours  guaiFENesin ER 1200 milliGRAM(s) Oral every 12 hours  lidocaine   4% Patch 1 Patch Transdermal every 24 hours  melatonin 3 milliGRAM(s) Oral at bedtime  multivitamin 1 Tablet(s) Oral daily  pantoprazole  Injectable 40 milliGRAM(s) IV Push daily  piperacillin/tazobactam IVPB.. 3.375 Gram(s) IV Intermittent every 8 hours  polyethylene glycol 3350 17 Gram(s) Oral daily  senna 2 Tablet(s) Oral at bedtime    MEDICATIONS  (PRN):  acetaminophen   Tablet .. 975 milliGRAM(s) Oral every 6 hours PRN Mild Pain (1 - 3)  benzocaine 15 mG/menthol 3.6 mG (Sugar-Free) Lozenge 1 Lozenge Oral four times a day PRN Sore Throat  cyclobenzaprine 5 milliGRAM(s) Oral three times a day PRN Muscle Spasm  FIRST- Mouthwash  BLM 5 milliLiter(s) Swish and Spit once PRN Mouth Care  hydrocodone/homatropine Syrup 5 milliLiter(s) Oral every 4 hours PRN Cough  oxyCODONE    IR 5 milliGRAM(s) Oral every 4 hours PRN Moderate Pain (4 - 6)  sodium chloride 0.65% Nasal 1 Spray(s) Both Nostrils every 2 hours PRN Nasal Congestion    Social History:   Family history: None.     ROS:   ENT: all negative except as noted in HPI.   CV: denies palpitations.  Pulm: + SOB, + cough, hemoptysis.  GI: + Odynophagia.   : denies pertinent urinary symptoms, urgency.  Neuro: denies numbness/tingling, loss of sensation.  Psych: denies anxiety.  MS: denies muscle weakness, instability.  Heme: denies easy bruising or bleeding.  Endo: denies heat/cold intolerance, excessive sweating.  Vascular: denies LE edema.    Vital Signs Last 24 Hrs  T(C): 37.4 (27 Aug 2021 20:00), Max: 37.4 (27 Aug 2021 20:00)  T(F): 99.4 (27 Aug 2021 20:00), Max: 99.4 (27 Aug 2021 20:00)  HR: 110 (27 Aug 2021 21:10) (83 - 132)  BP: 151/79 (27 Aug 2021 20:00) (115/87 - 159/96)  BP(mean): 105 (27 Aug 2021 20:00) (88 - 120)  RR: 33 (27 Aug 2021 21:10) (14 - 48)  SpO2: 100% (27 Aug 2021 21:10) (88% - 100%)                        11.1   15.24 )-----------( 347      ( 27 Aug 2021 00:26 )             36.3    08-27  138  |  96  |  10  ----------------------------<  117<H>  4.4   |  34<H>  |  0.63  Ca    8.9      27 Aug 2021 00:26  Phos  2.8     08-27  Mg     2.0     08-27  TPro  6.8  /  Alb  2.5<L>  /  TBili  0.3  /  DBili  x   /  AST  15  /  ALT  15  /  AlkPhos  68  08-27     PHYSICAL EXAM:  Gen: On HF NC.   Skin: No rashes, bruises, or lesions.  Head: Normocephalic, Atraumatic.  Face: no edema, erythema, or fluctuance. Parotid glands soft without mass.  Eyes: no scleral injection.  Nose: Nares bilaterally patent, no discharge  Mouth: No Stridor / Drooling / Trismus.  Mucosa moist, tongue/uvula midline, oropharynx clear.  Neck: + SQ Emphysema, + crepitus noted around the neck L>R. Flat, Trachea midline, no masses.  Lymphatic: No lymphadenopathy.  Resp: On HF NC, no stridor.  CV: no peripheral edema/cyanosis.  GI: nondistended .  Peripheral vascular: no JVD or edema.  Neuro: facial nerve intact, no facial droop.    Fiberoptic Indirect laryngoscopy:  (Scope #2 used)  Reason for Laryngoscopy: Upper Airway Evaluation.   Patient was unable to cooperate with mirror.   Leukoplakia vs irritation of b/l vocal cords, laryngitis. Airway patent, no foreign body visualized.   Nasopharynx, oropharynx, and hypopharynx clear, no bleeding. Tongue base, posterior pharyngeal wall, vallecula, epiglottis, and subglottis appear normal. No erythema, edema, pooling of secretions, masses or lesions. No glottic/supraglottic edema. True vocal cords, arytenoids, vestibular folds, ventricles, pyriform sinuses, and aryepiglottic folds appear normal bilaterally. Vocal cords mobile with good contact b/l.     IMAGING/ADDITIONAL STUDIES:   CXR [8/27]: FINDINGS: Redemonstration of small right pneumothorax. Appears grossly similar in size to prior study. Right chest tube unchanged in position.  Redemonstration of right pleural effusion.Diffuse airspace opacities are again seen throughout both lungs, mildly decreased.  Heart size and mediastinum cannot be accurately assessed on this projection.  Degenerative changes of thoracic spine.    IMPRESSION: Redemonstration of right pneumothorax, similar in size to prior study. Right chest tube unchanged in position.  Unchanged right pleural effusion.  Mildly decreased diffuse airspace infiltrates throughout both lungs.  CC: Throat Tightness and Voice Change.    HPI: 56 YO M with PMH of HTN, RLE DVT, and PE (not on home AC) presents with progressively worsening SOB, intermittent fevers, COVID positive, admitted for COVID PNA. Patient with intermittently worsening respiratory status, currently off BiPAP but requiring HFNC, currently being treated for pneumonia.  Patient has had several RRTs for tachypnea and hypoxia during admission. Course complicated by worsening respiratory failure  2/2 tension PTX and pleural effusion requiring NIPPV and ICU admission. ENT was consulted for evaluation of Throat Tightness and Voice changes. Per pt and family, no recent Intubation/ NG Tube placement. Last Intubation x 5 Years ago for Knee Sx. Pt also reports painful swallowing of solid/liquid foods Currently on soft diet. Admits to productive cough, noted to be self suctioning at bedside.  Pt with right moderate-sized pneumothorax with leftward shift and small pleural effusion found on CXR [8/26], Chest Tube was placed by Thoracic Surgery on 8/26. Pt denies rhinorrhea, post nasal drip, N/V, Abdominal Pain, Palpitations/Diaphoresis, HA/Dizziness, fever/chills, recent travel.     PAST MEDICAL & SURGICAL HISTORY:  Hyperlipidemia  HTN (hypertension)  Rupture, tendon, quadriceps  History of Achilles tendon repair    Allergies  No Known Allergies    Intolerances  MEDICATIONS  (STANDING):  benzocaine 15 mG/menthol 3.6 mG (Sugar-Free) Lozenge 1 Lozenge Oral once  benzonatate 200 milliGRAM(s) Oral every 8 hours  budesonide 160 MICROgram(s)/formoterol 4.5 MICROgram(s) Inhaler 2 Puff(s) Inhalation two times a day  chlorhexidine 4% Liquid 1 Application(s) Topical <User Schedule>  chlorhexidine 4% Liquid 1 Application(s) Topical <User Schedule>  cholecalciferol 2000 Unit(s) Oral daily  clonazePAM  Tablet 0.5 milliGRAM(s) Oral every 8 hours  enoxaparin Injectable 90 milliGRAM(s) SubCutaneous every 12 hours  guaiFENesin ER 1200 milliGRAM(s) Oral every 12 hours  lidocaine   4% Patch 1 Patch Transdermal every 24 hours  melatonin 3 milliGRAM(s) Oral at bedtime  multivitamin 1 Tablet(s) Oral daily  pantoprazole  Injectable 40 milliGRAM(s) IV Push daily  piperacillin/tazobactam IVPB.. 3.375 Gram(s) IV Intermittent every 8 hours  polyethylene glycol 3350 17 Gram(s) Oral daily  senna 2 Tablet(s) Oral at bedtime    MEDICATIONS  (PRN):  acetaminophen   Tablet .. 975 milliGRAM(s) Oral every 6 hours PRN Mild Pain (1 - 3)  benzocaine 15 mG/menthol 3.6 mG (Sugar-Free) Lozenge 1 Lozenge Oral four times a day PRN Sore Throat  cyclobenzaprine 5 milliGRAM(s) Oral three times a day PRN Muscle Spasm  FIRST- Mouthwash  BLM 5 milliLiter(s) Swish and Spit once PRN Mouth Care  hydrocodone/homatropine Syrup 5 milliLiter(s) Oral every 4 hours PRN Cough  oxyCODONE    IR 5 milliGRAM(s) Oral every 4 hours PRN Moderate Pain (4 - 6)  sodium chloride 0.65% Nasal 1 Spray(s) Both Nostrils every 2 hours PRN Nasal Congestion    Social History:   Family history: None.     ROS:   ENT: all negative except as noted in HPI.   CV: denies palpitations.  Pulm: + SOB, + cough, hemoptysis.  GI: + Odynophagia.   : denies pertinent urinary symptoms, urgency.  Neuro: denies numbness/tingling, loss of sensation.  Psych: denies anxiety.  MS: denies muscle weakness, instability.  Heme: denies easy bruising or bleeding.  Endo: denies heat/cold intolerance, excessive sweating.  Vascular: denies LE edema.    Vital Signs Last 24 Hrs  T(C): 37.4 (27 Aug 2021 20:00), Max: 37.4 (27 Aug 2021 20:00)  T(F): 99.4 (27 Aug 2021 20:00), Max: 99.4 (27 Aug 2021 20:00)  HR: 110 (27 Aug 2021 21:10) (83 - 132)  BP: 151/79 (27 Aug 2021 20:00) (115/87 - 159/96)  BP(mean): 105 (27 Aug 2021 20:00) (88 - 120)  RR: 33 (27 Aug 2021 21:10) (14 - 48)  SpO2: 100% (27 Aug 2021 21:10) (88% - 100%)                        11.1   15.24 )-----------( 347      ( 27 Aug 2021 00:26 )             36.3    08-27  138  |  96  |  10  ----------------------------<  117<H>  4.4   |  34<H>  |  0.63  Ca    8.9      27 Aug 2021 00:26  Phos  2.8     08-27  Mg     2.0     08-27  TPro  6.8  /  Alb  2.5<L>  /  TBili  0.3  /  DBili  x   /  AST  15  /  ALT  15  /  AlkPhos  68  08-27     PHYSICAL EXAM:  Gen: On HF NC.   Skin: No rashes, bruises, or lesions.  Head: Normocephalic, Atraumatic.  Face: no edema, erythema, or fluctuance. Parotid glands soft without mass.  Eyes: no scleral injection.  Nose: Nares bilaterally patent, no discharge  Mouth: No Stridor / Drooling / Trismus.  Mucosa moist, tongue/uvula midline, oropharynx clear.  Neck: + SQ Emphysema, + crepitus noted around the neck L>R. Flat, Trachea midline, no masses.  Lymphatic: No lymphadenopathy.  Resp: On HF NC, no stridor.  CV: no peripheral edema/cyanosis.  GI: nondistended .  Peripheral vascular: no JVD or edema.  Neuro: facial nerve intact, no facial droop.    Fiberoptic Indirect laryngoscopy:  (Scope #2 used)  Reason for Laryngoscopy: Upper Airway Evaluation.   Patient was unable to cooperate with mirror.   Leukoplakia vs irritation of b/l vocal cords, laryngitis. Airway patent, no foreign body visualized.   Nasopharynx, oropharynx, and hypopharynx clear, no bleeding. Tongue base, posterior pharyngeal wall, vallecula, epiglottis, and subglottis appear normal. No erythema, edema, pooling of secretions, masses or lesions. No glottic/supraglottic edema. True vocal cords, arytenoids, vestibular folds, ventricles, pyriform sinuses, and aryepiglottic folds appear normal bilaterally. Vocal cords mobile with good contact b/l.     IMAGING/ADDITIONAL STUDIES:   CXR [8/27]: FINDINGS: Redemonstration of small right pneumothorax. Appears grossly similar in size to prior study. Right chest tube unchanged in position.  Redemonstration of right pleural effusion.Diffuse airspace opacities are again seen throughout both lungs, mildly decreased.  Heart size and mediastinum cannot be accurately assessed on this projection.  Degenerative changes of thoracic spine.    IMPRESSION: Redemonstration of right pneumothorax, similar in size to prior study. Right chest tube unchanged in position.  Unchanged right pleural effusion.  Mildly decreased diffuse airspace infiltrates throughout both lungs.  CC: Throat Tightness and Voice Change.    HPI: 54 YO M with PMH of HTN, RLE DVT, and PE (not on home AC) presents with progressively worsening SOB, intermittent fevers, COVID positive, admitted for COVID PNA. Patient with intermittently worsening respiratory status, currently off BiPAP but requiring HFNC, currently being treated for pneumonia.  Patient has had several RRTs for tachypnea and hypoxia during admission. Course complicated by worsening respiratory failure  2/2 tension PTX and pleural effusion requiring NIPPV and ICU admission. ENT was consulted for evaluation of Throat Tightness and Voice changes. Per pt and family, no recent Intubation/ NG Tube placement. Last Intubation x 5 Years ago for Knee Sx. Pt also reports painful swallowing of solid/liquid foods Currently on soft diet. Admits to productive cough, noted to be self suctioning at bedside.  Pt with right moderate-sized pneumothorax with leftward shift and small pleural effusion found on CXR [8/26], Chest Tube was placed by Thoracic Surgery on 8/26. Pt denies rhinorrhea, post nasal drip, N/V, Abdominal Pain, Palpitations/Diaphoresis, HA/Dizziness, fever/chills, recent travel.     PAST MEDICAL & SURGICAL HISTORY:  Hyperlipidemia  HTN (hypertension)  Rupture, tendon, quadriceps  History of Achilles tendon repair    Allergies  No Known Allergies    Intolerances  MEDICATIONS  (STANDING):  benzocaine 15 mG/menthol 3.6 mG (Sugar-Free) Lozenge 1 Lozenge Oral once  benzonatate 200 milliGRAM(s) Oral every 8 hours  budesonide 160 MICROgram(s)/formoterol 4.5 MICROgram(s) Inhaler 2 Puff(s) Inhalation two times a day  chlorhexidine 4% Liquid 1 Application(s) Topical <User Schedule>  chlorhexidine 4% Liquid 1 Application(s) Topical <User Schedule>  cholecalciferol 2000 Unit(s) Oral daily  clonazePAM  Tablet 0.5 milliGRAM(s) Oral every 8 hours  enoxaparin Injectable 90 milliGRAM(s) SubCutaneous every 12 hours  guaiFENesin ER 1200 milliGRAM(s) Oral every 12 hours  lidocaine   4% Patch 1 Patch Transdermal every 24 hours  melatonin 3 milliGRAM(s) Oral at bedtime  multivitamin 1 Tablet(s) Oral daily  pantoprazole  Injectable 40 milliGRAM(s) IV Push daily  piperacillin/tazobactam IVPB.. 3.375 Gram(s) IV Intermittent every 8 hours  polyethylene glycol 3350 17 Gram(s) Oral daily  senna 2 Tablet(s) Oral at bedtime    MEDICATIONS  (PRN):  acetaminophen   Tablet .. 975 milliGRAM(s) Oral every 6 hours PRN Mild Pain (1 - 3)  benzocaine 15 mG/menthol 3.6 mG (Sugar-Free) Lozenge 1 Lozenge Oral four times a day PRN Sore Throat  cyclobenzaprine 5 milliGRAM(s) Oral three times a day PRN Muscle Spasm  FIRST- Mouthwash  BLM 5 milliLiter(s) Swish and Spit once PRN Mouth Care  hydrocodone/homatropine Syrup 5 milliLiter(s) Oral every 4 hours PRN Cough  oxyCODONE    IR 5 milliGRAM(s) Oral every 4 hours PRN Moderate Pain (4 - 6)  sodium chloride 0.65% Nasal 1 Spray(s) Both Nostrils every 2 hours PRN Nasal Congestion    Social History:   Family history: None.     ROS:   ENT: all negative except as noted in HPI.   CV: denies palpitations.  Pulm: + SOB, + cough, hemoptysis.  GI: + Odynophagia.   : denies pertinent urinary symptoms, urgency.  Neuro: denies numbness/tingling, loss of sensation.  Psych: denies anxiety.  MS: denies muscle weakness, instability.  Heme: denies easy bruising or bleeding.  Endo: denies heat/cold intolerance, excessive sweating.  Vascular: denies LE edema.    Vital Signs Last 24 Hrs  T(C): 37.4 (27 Aug 2021 20:00), Max: 37.4 (27 Aug 2021 20:00)  T(F): 99.4 (27 Aug 2021 20:00), Max: 99.4 (27 Aug 2021 20:00)  HR: 110 (27 Aug 2021 21:10) (83 - 132)  BP: 151/79 (27 Aug 2021 20:00) (115/87 - 159/96)  BP(mean): 105 (27 Aug 2021 20:00) (88 - 120)  RR: 33 (27 Aug 2021 21:10) (14 - 48)  SpO2: 100% (27 Aug 2021 21:10) (88% - 100%)                        11.1   15.24 )-----------( 347      ( 27 Aug 2021 00:26 )             36.3    08-27  138  |  96  |  10  ----------------------------<  117<H>  4.4   |  34<H>  |  0.63  Ca    8.9      27 Aug 2021 00:26  Phos  2.8     08-27  Mg     2.0     08-27  TPro  6.8  /  Alb  2.5<L>  /  TBili  0.3  /  DBili  x   /  AST  15  /  ALT  15  /  AlkPhos  68  08-27     PHYSICAL EXAM:  Gen: On HF NC.   Skin: No rashes, bruises, or lesions.  Head: Normocephalic, Atraumatic.  Face: no edema, erythema, or fluctuance. Parotid glands soft without mass.  Eyes: no scleral injection.  Nose: Nares bilaterally patent, no discharge  Mouth: No Stridor / Drooling / Trismus.  Mucosa moist, tongue/uvula midline, oropharynx clear.  Neck: + SQ Emphysema, + crepitus noted around the neck L>R. Trachea midline, no masses.  Lymphatic: No lymphadenopathy.  Resp: On HF NC, no stridor.  CV: no peripheral edema/cyanosis.  GI: nondistended .  Peripheral vascular: no JVD or edema.  Neuro: facial nerve intact, no facial droop.    Fiberoptic Indirect laryngoscopy:  (Scope #2 used)  Reason for Laryngoscopy: Upper Airway Evaluation.   Patient was unable to cooperate with mirror.   Leukoplakia vs irritation of b/l vocal cords, laryngitis. Airway patent, no foreign body visualized.   Nasopharynx, oropharynx, and hypopharynx clear, no bleeding. Tongue base, posterior pharyngeal wall, vallecula, epiglottis, and subglottis appear normal. No erythema, edema, pooling of secretions, masses or lesions. No glottic/supraglottic edema. True vocal cords, arytenoids, vestibular folds, ventricles, pyriform sinuses, and aryepiglottic folds appear normal bilaterally. Vocal cords mobile with good contact b/l.     IMAGING/ADDITIONAL STUDIES:   CXR [8/27]: FINDINGS: Redemonstration of small right pneumothorax. Appears grossly similar in size to prior study. Right chest tube unchanged in position.  Redemonstration of right pleural effusion.Diffuse airspace opacities are again seen throughout both lungs, mildly decreased.  Heart size and mediastinum cannot be accurately assessed on this projection.  Degenerative changes of thoracic spine.    IMPRESSION: Redemonstration of right pneumothorax, similar in size to prior study. Right chest tube unchanged in position.  Unchanged right pleural effusion.  Mildly decreased diffuse airspace infiltrates throughout both lungs.

## 2021-08-27 NOTE — PROGRESS NOTE ADULT - ASSESSMENT
55 M with covid PNA, CP         completed remdesivir, s/p decadron   sp toci   No fever  CP better- cardio seeing  CXR - no new findings  CT chest with cavitary PNA - zosyn started, s/p CT by thoracic for PTX/effusion  F/u GNR in cx - cont zosym     Dylan Carter  Attending Physician   Division of Infectious Disease  Pager #[phone]  Available on [employer] Teams also  After 5pm/weekend or no response, call #[phone]         55 M with covid PNA, CP         completed remdesivir, s/p decadron   sp toci   No fever  CP better- cardio seeing  CXR - no new findings  CT chest with cavitary PNA - zosyn started, s/p CT by thoracic for PTX/effusion  F/u GNR in cx - cont zosyn     Dylan Carter  Attending Physician   Division of Infectious Disease  Pager #[phone]  Available on [employer] Teams also  After 5pm/weekend or no response, call #[phone]

## 2021-08-27 NOTE — CONSULT NOTE ADULT - ATTENDING COMMENTS
patient sitting up in bed, on high flow w/ increased work of breathing. chest tube in place on suction, no obvious air leak. keep to suction .
56 y/o male with pmh of DVT/PE s/p achilles tendon repair who presented to the ED for SOB, intermittent fevers, and productive cough who tested positive for COVID on 7/27/21 found to be in acute hypoxic respiratory failure, initially requiring HFNC and now transitioned to BIPAP, MICU consulted in setting of pt having worsening respiratory status with increased work of breathing. S/p RRT on 8/4/21 for transient hypoxia while pt was on BIPAP and attempting to eat.  Recommend Trying off Bipap and if unable to come off for High flow.    If unable to come off to high flow would consult again and intubate.
Laryngeal exam directly reviewed with ACP. Agree with assessment and plan. No acute ENT intervention indicated. Crepitus originating from chest tube.
agree w above

## 2021-08-27 NOTE — PROGRESS NOTE ADULT - SUBJECTIVE AND OBJECTIVE BOX
CARDIOLOGY FOLLOW UP - Dr. Terry  DATE OF SERVICE: 08-27-21    CC    REVIEW OF SYSTEMS:   CONSTITUTIONAL: No fever, weight loss, or fatigue   RESPIRATORY:  No cough, wheezing, chills or hemoptysis; No SOB  CARDIOVASCULAR: No chest pain, palpitations, passing out, dizziness, or leg swelling   GASTROINTESTINAL: No abdominal or epigastric pain. No nausea, vomiting, or hematemesis, no diarreha, or constipation, No melena or hematochezia   VASCULAR: no edema.       PHYSICAL EXAM:  T(C): 36.7 (08-27-21 @ 08:00), Max: 37.4 (08-26-21 @ 12:00)  HR: 93 (08-27-21 @ 10:00) (83 - 129)  BP: 135/63 (08-27-21 @ 10:00) (115/87 - 160/84)  RR: 26 (08-27-21 @ 10:00) (14 - 35)  SpO2: 96% (08-27-21 @ 10:00) (87% - 100%)  Wt(kg): --  I&O's Summary    26 Aug 2021 07:01  -  27 Aug 2021 07:00  --------------------------------------------------------  IN: 2505 mL / OUT: 1420 mL / NET: 1085 mL    27 Aug 2021 07:01  -  27 Aug 2021 10:33  --------------------------------------------------------  IN: 240 mL / OUT: 100 mL / NET: 140 mL        Appearance: Normal	  Cardiovascular: Normal S1 S2,RRR, No JVD, No murmurs  Respiratory: Lungs clear to auscultation, R chest tube. 	  Gastrointestinal:  Soft, Non-tender, + BS	  Extremities: Normal range of motion, No clubbing, cyanosis or edema      HOME MEDICATIONS:  Albuterol (Eqv-ProAir HFA) 90 mcg/inh inhalation aerosol: 2 puff(s) inhaled every 6 hours, As Needed (29 Jul 2021 09:08)  ivermectin 3 mg oral tablet: 1 tab(s) orally once a day (29 Jul 2021 09:08)  levoFLOXacin 500 mg oral tablet: 1 tab(s) orally every 24 hours (29 Jul 2021 09:08)  predniSONE 50 mg oral tablet: 1 tab(s) orally once a day (29 Jul 2021 09:08)      MEDICATIONS  (STANDING):  benzonatate 200 milliGRAM(s) Oral every 8 hours  budesonide 160 MICROgram(s)/formoterol 4.5 MICROgram(s) Inhaler 2 Puff(s) Inhalation two times a day  chlorhexidine 4% Liquid 1 Application(s) Topical <User Schedule>  chlorhexidine 4% Liquid 1 Application(s) Topical <User Schedule>  cholecalciferol 2000 Unit(s) Oral daily  clonazePAM  Tablet 0.5 milliGRAM(s) Oral every 8 hours  enoxaparin Injectable 90 milliGRAM(s) SubCutaneous every 12 hours  guaiFENesin ER 1200 milliGRAM(s) Oral every 12 hours  lactated ringers. 1000 milliLiter(s) (100 mL/Hr) IV Continuous <Continuous>  lidocaine   4% Patch 1 Patch Transdermal every 24 hours  melatonin 3 milliGRAM(s) Oral at bedtime  multivitamin 1 Tablet(s) Oral daily  pantoprazole  Injectable 40 milliGRAM(s) IV Push daily  piperacillin/tazobactam IVPB.. 3.375 Gram(s) IV Intermittent every 8 hours  polyethylene glycol 3350 17 Gram(s) Oral daily  senna 2 Tablet(s) Oral at bedtime      TELEMETRY: nsr  	    ECG:  	  RADIOLOGY:   DIAGNOSTIC TESTING:  [ ] Echocardiogram:  [ ]  Catheterization:  [ ] Stress Test:    OTHER: 	    LABS:	 	                                11.1   15.24 )-----------( 347      ( 27 Aug 2021 00:26 )             36.3     08-27    138  |  96  |  10  ----------------------------<  117<H>  4.4   |  34<H>  |  0.63    Ca    8.9      27 Aug 2021 00:26  Phos  2.8     08-27  Mg     2.0     08-27    TPro  6.8  /  Alb  2.5<L>  /  TBili  0.3  /  DBili  x   /  AST  15  /  ALT  15  /  AlkPhos  68  08-27         CARDIOLOGY FOLLOW UP - Dr. Terry  DATE OF SERVICE: 08-27-21    CC  events noted    REVIEW OF SYSTEMS:   CONSTITUTIONAL: No fever, weight loss, or fatigue   RESPIRATORY:  No cough, wheezing, chills or hemoptysis; No SOB  CARDIOVASCULAR: No chest pain, palpitations, passing out, dizziness, or leg swelling   GASTROINTESTINAL: No abdominal or epigastric pain. No nausea, vomiting, or hematemesis, no diarreha, or constipation, No melena or hematochezia   VASCULAR: no edema.       PHYSICAL EXAM:  T(C): 36.7 (08-27-21 @ 08:00), Max: 37.4 (08-26-21 @ 12:00)  HR: 93 (08-27-21 @ 10:00) (83 - 129)  BP: 135/63 (08-27-21 @ 10:00) (115/87 - 160/84)  RR: 26 (08-27-21 @ 10:00) (14 - 35)  SpO2: 96% (08-27-21 @ 10:00) (87% - 100%)  Wt(kg): --  I&O's Summary    26 Aug 2021 07:01  -  27 Aug 2021 07:00  --------------------------------------------------------  IN: 2505 mL / OUT: 1420 mL / NET: 1085 mL    27 Aug 2021 07:01  -  27 Aug 2021 10:33  --------------------------------------------------------  IN: 240 mL / OUT: 100 mL / NET: 140 mL        Appearance: Normal	  Cardiovascular: Normal S1 S2,RRR, No JVD, No murmurs  Respiratory: Lungs clear to auscultation, R chest tube. 	  Gastrointestinal:  Soft, Non-tender, + BS	  Extremities: Normal range of motion, No clubbing, cyanosis or edema      HOME MEDICATIONS:  Albuterol (Eqv-ProAir HFA) 90 mcg/inh inhalation aerosol: 2 puff(s) inhaled every 6 hours, As Needed (29 Jul 2021 09:08)  ivermectin 3 mg oral tablet: 1 tab(s) orally once a day (29 Jul 2021 09:08)  levoFLOXacin 500 mg oral tablet: 1 tab(s) orally every 24 hours (29 Jul 2021 09:08)  predniSONE 50 mg oral tablet: 1 tab(s) orally once a day (29 Jul 2021 09:08)      MEDICATIONS  (STANDING):  benzonatate 200 milliGRAM(s) Oral every 8 hours  budesonide 160 MICROgram(s)/formoterol 4.5 MICROgram(s) Inhaler 2 Puff(s) Inhalation two times a day  chlorhexidine 4% Liquid 1 Application(s) Topical <User Schedule>  chlorhexidine 4% Liquid 1 Application(s) Topical <User Schedule>  cholecalciferol 2000 Unit(s) Oral daily  clonazePAM  Tablet 0.5 milliGRAM(s) Oral every 8 hours  enoxaparin Injectable 90 milliGRAM(s) SubCutaneous every 12 hours  guaiFENesin ER 1200 milliGRAM(s) Oral every 12 hours  lactated ringers. 1000 milliLiter(s) (100 mL/Hr) IV Continuous <Continuous>  lidocaine   4% Patch 1 Patch Transdermal every 24 hours  melatonin 3 milliGRAM(s) Oral at bedtime  multivitamin 1 Tablet(s) Oral daily  pantoprazole  Injectable 40 milliGRAM(s) IV Push daily  piperacillin/tazobactam IVPB.. 3.375 Gram(s) IV Intermittent every 8 hours  polyethylene glycol 3350 17 Gram(s) Oral daily  senna 2 Tablet(s) Oral at bedtime      TELEMETRY: nsr  	    ECG:  	  RADIOLOGY:   DIAGNOSTIC TESTING:  [ ] Echocardiogram:  [ ]  Catheterization:  [ ] Stress Test:    OTHER: 	    LABS:	 	                                11.1   15.24 )-----------( 347      ( 27 Aug 2021 00:26 )             36.3     08-27    138  |  96  |  10  ----------------------------<  117<H>  4.4   |  34<H>  |  0.63    Ca    8.9      27 Aug 2021 00:26  Phos  2.8     08-27  Mg     2.0     08-27    TPro  6.8  /  Alb  2.5<L>  /  TBili  0.3  /  DBili  x   /  AST  15  /  ALT  15  /  AlkPhos  68  08-27

## 2021-08-27 NOTE — PROGRESS NOTE ADULT - ASSESSMENT
54 y/o M with PMH of DVT/PE s/p achilles tendon repair years ago (not on AC currently). Presents with complaints of SOB, intermittent and fevers with cough productive of white sputum for past week. Tested positive for COVID 2 days ago. Pt is unvaccinated. Endorses progressively worsening SOB over the past 5 days, worse with ambulation, found to be hypoxic on arrival to the  ER. CXR with b/l opacities. Course c/b cavitary PNA, PTX.

## 2021-08-27 NOTE — PROGRESS NOTE ADULT - SUBJECTIVE AND OBJECTIVE BOX
CHIEF COMPLAINT:    Interval Events:    REVIEW OF SYSTEMS:  CONSTITUTIONAL: No fever, weight loss, or fatigue  EYES: No eye pain, visual disturbances, or discharge  ENT:  No difficulty hearing, tinnitus, vertigo; No sinus or throat pain  NECK: No pain or stiffness  BREASTS: No pain, masses, or nipple discharge  RESPIRATORY: No hemoptysis;+ shortness of breath, +cough, +wheezing  CARDIOVASCULAR: No chest pain, palpitations, dizziness, or leg swelling  GASTROINTESTINAL: No abdominal or epigastric pain. No nausea, vomiting, or hematemesis; No diarrhea or constipation. No melena or hematochezia.  GENITOURINARY: No dysuria, frequency, hematuria, or incontinence  NEUROLOGICAL: No headaches, memory loss, loss of strength, numbness, or tremors  SKIN: No itching, burning, rashes, or lesions   LYMPH NODES: No enlarged glands  ENDOCRINE: No heat or cold intolerance; No hair loss  MUSCULOSKELETAL: No joint pain or swelling; No muscle, back, or extremity pain  PSYCHIATRIC: No depression, anxiety, mood swings, or difficulty sleeping  HEME/LYMPH: No easy bruising, or bleeding gums  ALLERGY AND IMMUNOLOGIC: No hives or eczema      OBJECTIVE:  ICU Vital Signs Last 24 Hrs  T(C): 36.7 (27 Aug 2021 08:00), Max: 37.4 (26 Aug 2021 12:00)  T(F): 98 (27 Aug 2021 08:00), Max: 99.4 (26 Aug 2021 12:00)  HR: 93 (27 Aug 2021 10:00) (83 - 128)  BP: 135/63 (27 Aug 2021 10:00) (115/87 - 160/84)  BP(mean): 91 (27 Aug 2021 10:00) (88 - 115)  ABP: --  ABP(mean): --  RR: 26 (27 Aug 2021 10:00) (14 - 35)  SpO2: 96% (27 Aug 2021 10:00) (87% - 100%)        08-26 @ 07:01  -  08-27 @ 07:00  --------------------------------------------------------  IN: 2505 mL / OUT: 1420 mL / NET: 1085 mL    08-27 @ 07:01 - 08-27 @ 10:49  --------------------------------------------------------  IN: 240 mL / OUT: 100 mL / NET: 140 mL      CAPILLARY BLOOD GLUCOSE      POCT Blood Glucose.: 93 mg/dL (26 Aug 2021 04:52)    PHYSICAL EXAM:  GENERAL: NAD, well-groomed, well-developed  HEAD:  Atraumatic, Normocephalic  EYES: EOMI, PERRLA, conjunctiva and sclera clear  ENMT: No tonsillar erythema, exudates, or enlargement; Moist mucous membranes, Good dentition, No lesions  NECK: Supple, No JVD, Normal thyroid  NERVOUS SYSTEM:  Alert & Oriented X3, Good concentration; Motor Strength 5/5 B/L upper and lower extremities; DTRs 2+ intact and symmetric  CHEST/LUNG: +rhonchi/wheezing R>L +on HFNC  HEART: Regular rate and rhythm; No murmurs, rubs, or gallops  ABDOMEN: Soft, Nontender, Nondistended; Bowel sounds present  EXTREMITIES:  2+ Peripheral Pulses, No clubbing, cyanosis, or edema  LYMPH: No lymphadenopathy noted  SKIN: No rashes or lesions    HOSPITAL MEDICATIONS:  Standing Meds:  benzonatate 200 milliGRAM(s) Oral every 8 hours  budesonide 160 MICROgram(s)/formoterol 4.5 MICROgram(s) Inhaler 2 Puff(s) Inhalation two times a day  chlorhexidine 4% Liquid 1 Application(s) Topical <User Schedule>  chlorhexidine 4% Liquid 1 Application(s) Topical <User Schedule>  cholecalciferol 2000 Unit(s) Oral daily  clonazePAM  Tablet 0.5 milliGRAM(s) Oral every 8 hours  enoxaparin Injectable 90 milliGRAM(s) SubCutaneous every 12 hours  guaiFENesin ER 1200 milliGRAM(s) Oral every 12 hours  lactated ringers. 1000 milliLiter(s) IV Continuous <Continuous>  lidocaine   4% Patch 1 Patch Transdermal every 24 hours  melatonin 3 milliGRAM(s) Oral at bedtime  multivitamin 1 Tablet(s) Oral daily  pantoprazole  Injectable 40 milliGRAM(s) IV Push daily  piperacillin/tazobactam IVPB.. 3.375 Gram(s) IV Intermittent every 8 hours  polyethylene glycol 3350 17 Gram(s) Oral daily  senna 2 Tablet(s) Oral at bedtime      PRN Meds:  acetaminophen   Tablet .. 975 milliGRAM(s) Oral every 6 hours PRN  cyclobenzaprine 5 milliGRAM(s) Oral three times a day PRN  hydrocodone/homatropine Syrup 5 milliLiter(s) Oral every 4 hours PRN  oxyCODONE    IR 5 milliGRAM(s) Oral every 4 hours PRN  sodium chloride 0.65% Nasal 1 Spray(s) Both Nostrils every 2 hours PRN      LABS:                        11.1   15.24 )-----------( 347      ( 27 Aug 2021 00:26 )             36.3     Hgb Trend: 11.1<--, 11.7<--, 11.5<--, 11.4<--, 11.2<--  08-27    138  |  96  |  10  ----------------------------<  117<H>  4.4   |  34<H>  |  0.63    Ca    8.9      27 Aug 2021 00:26  Phos  2.8     08-27  Mg     2.0     08-27    TPro  6.8  /  Alb  2.5<L>  /  TBili  0.3  /  DBili  x   /  AST  15  /  ALT  15  /  AlkPhos  68  08-27    Creatinine Trend: 0.63<--, 0.63<--, 0.64<--, 0.68<--, 0.60<--, 0.61<--      Arterial Blood Gas:  08-27 @ 00:21  7.41/63/85/40/98.3/12.8  ABG lactate: --    Venous Blood Gas:  08-26 @ 05:07  7.31/80/38/40/61.7  VBG Lactate: 1.8      MICROBIOLOGY:     Culture - Fungal, Body Fluid (collected 26 Aug 2021 23:15)  Source: .Body Fluid Pleural Fluid  Preliminary Report (27 Aug 2021 06:40):    Testing in progress    Culture - Body Fluid with Gram Stain (collected 26 Aug 2021 23:15)  Source: .Body Fluid Pleural Fluid  Gram Stain (27 Aug 2021 03:31):    polymorphonuclear leukocytes    Gram Negative Rods    by cytocentrifuge    Culture - Blood (collected 26 Aug 2021 00:39)  Source: .Blood Blood  Preliminary Report (27 Aug 2021 01:02):    No growth to date.    Culture - Blood (collected 25 Aug 2021 22:56)  Source: .Blood Blood  Preliminary Report (26 Aug 2021 23:01):    No growth to date.      RADIOLOGY:  [ ] Reviewed and interpreted by me    EKG:   CHIEF COMPLAINT:    Interval Events: Pt released 1 liter of serosangiounous fluid from r chest tube on 40cc suction. HFNC titrated up to FiO2 50% due to lower O2 Sats overnight. LR 100cc/1hr for 10 hours for tachycardia and fluid repletion. Pain control with dilaudid once due to chest tube and cough. Morning CXR shows less mid lung opacity with minimal PTX at right apical lung    REVIEW OF SYSTEMS:  CONSTITUTIONAL: No fever, weight loss, or fatigue  EYES: No eye pain, visual disturbances, or discharge  ENT:  No difficulty hearing, tinnitus, vertigo; No sinus or throat pain  NECK: No pain or stiffness  RESPIRATORY: Hemoptysis;+ shortness of breath, +cough, +wheezing  CARDIOVASCULAR: No chest pain, palpitations, dizziness, or leg swelling  GASTROINTESTINAL: No abdominal or epigastric pain. No nausea, vomiting, or hematemesis; No diarrhea or constipation. No melena or hematochezia.  GENITOURINARY: No dysuria, frequency, hematuria, or incontinence  NEUROLOGICAL: No headaches, memory loss, loss of strength, numbness, or tremors  SKIN: No itching, burning, rashes, or lesions   LYMPH NODES: No enlarged glands  ENDOCRINE: No heat or cold intolerance; No hair loss  MUSCULOSKELETAL: No joint pain or swelling; No muscle, back, or extremity pain  PSYCHIATRIC: No depression, anxiety, mood swings, or difficulty sleeping  HEME/LYMPH: No easy bruising, or bleeding gums  ALLERGY AND IMMUNOLOGIC: No hives or eczema      OBJECTIVE:  ICU Vital Signs Last 24 Hrs  T(C): 36.7 (27 Aug 2021 08:00), Max: 37.4 (26 Aug 2021 12:00)  T(F): 98 (27 Aug 2021 08:00), Max: 99.4 (26 Aug 2021 12:00)  HR: 93 (27 Aug 2021 10:00) (83 - 128)  BP: 135/63 (27 Aug 2021 10:00) (115/87 - 160/84)  BP(mean): 91 (27 Aug 2021 10:00) (88 - 115)  ABP: --  ABP(mean): --  RR: 26 (27 Aug 2021 10:00) (14 - 35)  SpO2: 96% (27 Aug 2021 10:00) (87% - 100%)        08-26 @ 07:01  -  08-27 @ 07:00  --------------------------------------------------------  IN: 2505 mL / OUT: 1420 mL / NET: 1085 mL    08-27 @ 07:01 - 08-27 @ 10:49  --------------------------------------------------------  IN: 240 mL / OUT: 100 mL / NET: 140 mL      CAPILLARY BLOOD GLUCOSE      POCT Blood Glucose.: 93 mg/dL (26 Aug 2021 04:52)    PHYSICAL EXAM:  GENERAL: NAD, well-groomed, well-developed  EYES: EOMI, PERRLA, conjunctiva and sclera clear  ENMT: No tonsillar erythema, exudates, or enlargement; Moist mucous membranes, Good dentition, No lesions  NERVOUS SYSTEM:  Alert & Oriented X3, Good concentration;   CHEST/LUNG: +rhonchi/wheezing R>L +on HFNC. right sided chest tube draining serosanguinous fluid  HEART: Tachycardia, S1S2  ABDOMEN: Soft, Nontender, Nondistended; Bowel sounds present  EXTREMITIES:  2+ Peripheral Pulses, No clubbing, cyanosis, or edema  LYMPH: No lymphadenopathy noted  SKIN: No rashes or lesions    HOSPITAL MEDICATIONS:  Standing Meds:  benzonatate 200 milliGRAM(s) Oral every 8 hours  budesonide 160 MICROgram(s)/formoterol 4.5 MICROgram(s) Inhaler 2 Puff(s) Inhalation two times a day  chlorhexidine 4% Liquid 1 Application(s) Topical <User Schedule>  chlorhexidine 4% Liquid 1 Application(s) Topical <User Schedule>  cholecalciferol 2000 Unit(s) Oral daily  clonazePAM  Tablet 0.5 milliGRAM(s) Oral every 8 hours  enoxaparin Injectable 90 milliGRAM(s) SubCutaneous every 12 hours  guaiFENesin ER 1200 milliGRAM(s) Oral every 12 hours  lactated ringers. 1000 milliLiter(s) IV Continuous <Continuous>  lidocaine   4% Patch 1 Patch Transdermal every 24 hours  melatonin 3 milliGRAM(s) Oral at bedtime  multivitamin 1 Tablet(s) Oral daily  pantoprazole  Injectable 40 milliGRAM(s) IV Push daily  piperacillin/tazobactam IVPB.. 3.375 Gram(s) IV Intermittent every 8 hours  polyethylene glycol 3350 17 Gram(s) Oral daily  senna 2 Tablet(s) Oral at bedtime      PRN Meds:  acetaminophen   Tablet .. 975 milliGRAM(s) Oral every 6 hours PRN  cyclobenzaprine 5 milliGRAM(s) Oral three times a day PRN  hydrocodone/homatropine Syrup 5 milliLiter(s) Oral every 4 hours PRN  oxyCODONE    IR 5 milliGRAM(s) Oral every 4 hours PRN  sodium chloride 0.65% Nasal 1 Spray(s) Both Nostrils every 2 hours PRN      LABS:                        11.1   15.24 )-----------( 347      ( 27 Aug 2021 00:26 )             36.3     Hgb Trend: 11.1<--, 11.7<--, 11.5<--, 11.4<--, 11.2<--  08-27    138  |  96  |  10  ----------------------------<  117<H>  4.4   |  34<H>  |  0.63    Ca    8.9      27 Aug 2021 00:26  Phos  2.8     08-27  Mg     2.0     08-27    TPro  6.8  /  Alb  2.5<L>  /  TBili  0.3  /  DBili  x   /  AST  15  /  ALT  15  /  AlkPhos  68  08-27    Creatinine Trend: 0.63<--, 0.63<--, 0.64<--, 0.68<--, 0.60<--, 0.61<--      Arterial Blood Gas:  08-27 @ 00:21  7.41/63/85/40/98.3/12.8  ABG lactate: --    Venous Blood Gas:  08-26 @ 05:07  7.31/80/38/40/61.7  VBG Lactate: 1.8      MICROBIOLOGY:     Culture - Fungal, Body Fluid (collected 26 Aug 2021 23:15)  Source: .Body Fluid Pleural Fluid  Preliminary Report (27 Aug 2021 06:40):    Testing in progress    Culture - Body Fluid with Gram Stain (collected 26 Aug 2021 23:15)  Source: .Body Fluid Pleural Fluid  Gram Stain (27 Aug 2021 03:31):    polymorphonuclear leukocytes    Gram Negative Rods    by cytocentrifuge    Culture - Blood (collected 26 Aug 2021 00:39)  Source: .Blood Blood  Preliminary Report (27 Aug 2021 01:02):    No growth to date.    Culture - Blood (collected 25 Aug 2021 22:56)  Source: .Blood Blood  Preliminary Report (26 Aug 2021 23:01):    No growth to date.      RADIOLOGY:  [ ] Reviewed and interpreted by me    EKG:   Interval Events: Pt released 1 liter of serosangiounous fluid from r chest tube on 40cc suction. HFNC titrated up to FiO2 50% due to lower O2 Sats overnight. LR 100cc/1hr for 10 hours for tachycardia and fluid repletion. Pain control with dilaudid once due to chest tube and cough. Morning CXR shows less mid lung opacity with minimal PTX at right apical lung    REVIEW OF SYSTEMS:  CONSTITUTIONAL: No fever, weight loss, or fatigue  EYES: No eye pain, visual disturbances, or discharge  ENT:  No difficulty hearing, tinnitus, vertigo; No sinus or throat pain  NECK: No pain or stiffness  RESPIRATORY: Hemoptysis;+ shortness of breath, +cough, +wheezing  CARDIOVASCULAR: No chest pain, palpitations, dizziness, or leg swelling  GASTROINTESTINAL: No abdominal or epigastric pain. No nausea, vomiting, or hematemesis; No diarrhea or constipation. No melena or hematochezia.  GENITOURINARY: No dysuria, frequency, hematuria, or incontinence  NEUROLOGICAL: No headaches, memory loss, loss of strength, numbness, or tremors  SKIN: No itching, burning, rashes, or lesions   LYMPH NODES: No enlarged glands  ENDOCRINE: No heat or cold intolerance; No hair loss  MUSCULOSKELETAL: No joint pain or swelling; No muscle, back, or extremity pain  PSYCHIATRIC: No depression, anxiety, mood swings, or difficulty sleeping  HEME/LYMPH: No easy bruising, or bleeding gums  ALLERGY AND IMMUNOLOGIC: No hives or eczema      OBJECTIVE:  ICU Vital Signs Last 24 Hrs  T(C): 36.7 (27 Aug 2021 08:00), Max: 37.4 (26 Aug 2021 12:00)  T(F): 98 (27 Aug 2021 08:00), Max: 99.4 (26 Aug 2021 12:00)  HR: 93 (27 Aug 2021 10:00) (83 - 128)  BP: 135/63 (27 Aug 2021 10:00) (115/87 - 160/84)  BP(mean): 91 (27 Aug 2021 10:00) (88 - 115)  ABP: --  ABP(mean): --  RR: 26 (27 Aug 2021 10:00) (14 - 35)  SpO2: 96% (27 Aug 2021 10:00) (87% - 100%)        08-26 @ 07:01  -  08-27 @ 07:00  --------------------------------------------------------  IN: 2505 mL / OUT: 1420 mL / NET: 1085 mL    08-27 @ 07:01  - 08-27 @ 10:49  --------------------------------------------------------  IN: 240 mL / OUT: 100 mL / NET: 140 mL      CAPILLARY BLOOD GLUCOSE      POCT Blood Glucose.: 93 mg/dL (26 Aug 2021 04:52)    PHYSICAL EXAM:  GENERAL: NAD, well-groomed, well-developed  EYES: EOMI, PERRLA, conjunctiva and sclera clear  ENMT: No tonsillar erythema, exudates, or enlargement; Moist mucous membranes, Good dentition, No lesions  NERVOUS SYSTEM:  Alert & Oriented X3, Good concentration;   CHEST/LUNG: +rhonchi/wheezing R>L +on HFNC. right sided chest tube draining serosanguinous fluid  HEART: Tachycardia, S1S2  ABDOMEN: Soft, Nontender, Nondistended; Bowel sounds present  EXTREMITIES:  2+ Peripheral Pulses, No clubbing, cyanosis, or edema  LYMPH: No lymphadenopathy noted  SKIN: No rashes or lesions    HOSPITAL MEDICATIONS:  Standing Meds:  benzonatate 200 milliGRAM(s) Oral every 8 hours  budesonide 160 MICROgram(s)/formoterol 4.5 MICROgram(s) Inhaler 2 Puff(s) Inhalation two times a day  chlorhexidine 4% Liquid 1 Application(s) Topical <User Schedule>  chlorhexidine 4% Liquid 1 Application(s) Topical <User Schedule>  cholecalciferol 2000 Unit(s) Oral daily  clonazePAM  Tablet 0.5 milliGRAM(s) Oral every 8 hours  enoxaparin Injectable 90 milliGRAM(s) SubCutaneous every 12 hours  guaiFENesin ER 1200 milliGRAM(s) Oral every 12 hours  lactated ringers. 1000 milliLiter(s) IV Continuous <Continuous>  lidocaine   4% Patch 1 Patch Transdermal every 24 hours  melatonin 3 milliGRAM(s) Oral at bedtime  multivitamin 1 Tablet(s) Oral daily  pantoprazole  Injectable 40 milliGRAM(s) IV Push daily  piperacillin/tazobactam IVPB.. 3.375 Gram(s) IV Intermittent every 8 hours  polyethylene glycol 3350 17 Gram(s) Oral daily  senna 2 Tablet(s) Oral at bedtime      PRN Meds:  acetaminophen   Tablet .. 975 milliGRAM(s) Oral every 6 hours PRN  cyclobenzaprine 5 milliGRAM(s) Oral three times a day PRN  hydrocodone/homatropine Syrup 5 milliLiter(s) Oral every 4 hours PRN  oxyCODONE    IR 5 milliGRAM(s) Oral every 4 hours PRN  sodium chloride 0.65% Nasal 1 Spray(s) Both Nostrils every 2 hours PRN      LABS:                        11.1   15.24 )-----------( 347      ( 27 Aug 2021 00:26 )             36.3     Hgb Trend: 11.1<--, 11.7<--, 11.5<--, 11.4<--, 11.2<--  08-27    138  |  96  |  10  ----------------------------<  117<H>  4.4   |  34<H>  |  0.63    Ca    8.9      27 Aug 2021 00:26  Phos  2.8     08-27  Mg     2.0     08-27    TPro  6.8  /  Alb  2.5<L>  /  TBili  0.3  /  DBili  x   /  AST  15  /  ALT  15  /  AlkPhos  68  08-27    Creatinine Trend: 0.63<--, 0.63<--, 0.64<--, 0.68<--, 0.60<--, 0.61<--      Arterial Blood Gas:  08-27 @ 00:21  7.41/63/85/40/98.3/12.8  ABG lactate: --    Venous Blood Gas:  08-26 @ 05:07  7.31/80/38/40/61.7  VBG Lactate: 1.8      MICROBIOLOGY:     Culture - Fungal, Body Fluid (collected 26 Aug 2021 23:15)  Source: .Body Fluid Pleural Fluid  Preliminary Report (27 Aug 2021 06:40):    Testing in progress    Culture - Body Fluid with Gram Stain (collected 26 Aug 2021 23:15)  Source: .Body Fluid Pleural Fluid  Gram Stain (27 Aug 2021 03:31):    polymorphonuclear leukocytes    Gram Negative Rods    by cytocentrifuge    Culture - Blood (collected 26 Aug 2021 00:39)  Source: .Blood Blood  Preliminary Report (27 Aug 2021 01:02):    No growth to date.    Culture - Blood (collected 25 Aug 2021 22:56)  Source: .Blood Blood  Preliminary Report (26 Aug 2021 23:01):    No growth to date.      RADIOLOGY:  [ ] Reviewed and interpreted by me    EKG:

## 2021-08-27 NOTE — PROGRESS NOTE ADULT - SUBJECTIVE AND OBJECTIVE BOX
Follow-up Pulm Progress Note    Desaturates/labored with minimal movement  Sats improved at rest - currently on HFNC 40L/40% with sats high 90s  Denies CP    Medications:  MEDICATIONS  (STANDING):  benzonatate 200 milliGRAM(s) Oral every 8 hours  budesonide 160 MICROgram(s)/formoterol 4.5 MICROgram(s) Inhaler 2 Puff(s) Inhalation two times a day  chlorhexidine 4% Liquid 1 Application(s) Topical <User Schedule>  chlorhexidine 4% Liquid 1 Application(s) Topical <User Schedule>  cholecalciferol 2000 Unit(s) Oral daily  clonazePAM  Tablet 0.5 milliGRAM(s) Oral every 8 hours  enoxaparin Injectable 90 milliGRAM(s) SubCutaneous every 12 hours  guaiFENesin ER 1200 milliGRAM(s) Oral every 12 hours  lidocaine   4% Patch 1 Patch Transdermal every 24 hours  melatonin 3 milliGRAM(s) Oral at bedtime  multivitamin 1 Tablet(s) Oral daily  pantoprazole  Injectable 40 milliGRAM(s) IV Push daily  piperacillin/tazobactam IVPB.. 3.375 Gram(s) IV Intermittent every 8 hours  polyethylene glycol 3350 17 Gram(s) Oral daily  senna 2 Tablet(s) Oral at bedtime    MEDICATIONS  (PRN):  acetaminophen   Tablet .. 975 milliGRAM(s) Oral every 6 hours PRN Mild Pain (1 - 3)  cyclobenzaprine 5 milliGRAM(s) Oral three times a day PRN Muscle Spasm  hydrocodone/homatropine Syrup 5 milliLiter(s) Oral every 4 hours PRN Cough  oxyCODONE    IR 5 milliGRAM(s) Oral every 4 hours PRN Moderate Pain (4 - 6)  sodium chloride 0.65% Nasal 1 Spray(s) Both Nostrils every 2 hours PRN Nasal Congestion          Vital Signs Last 24 Hrs  T(C): 37.1 (27 Aug 2021 12:00), Max: 37.1 (27 Aug 2021 12:00)  T(F): 98.8 (27 Aug 2021 12:00), Max: 98.8 (27 Aug 2021 12:00)  HR: 111 (27 Aug 2021 12:00) (83 - 128)  BP: 142/73 (27 Aug 2021 12:00) (115/87 - 151/82)  BP(mean): 98 (27 Aug 2021 12:00) (88 - 107)  RR: 25 (27 Aug 2021 12:00) (14 - 35)  SpO2: 90% (27 Aug 2021 12:00) (87% - 100%)    ABG - ( 27 Aug 2021 00:21 )  pH, Arterial: 7.41  pH, Blood: x     /  pCO2: 63    /  pO2: 85    / HCO3: 40    / Base Excess: 12.8  /  SaO2: 98.3              VBG pH 7.31 08-26 @ 05:07    VBG pCO2 80 08-26 @ 05:07    VBG O2 sat 61.7 08-26 @ 05:07    VBG lactate 1.8 08-26 @ 05:07      08-26 @ 07:01  -  08-27 @ 07:00  --------------------------------------------------------  IN: 2505 mL / OUT: 1420 mL / NET: 1085 mL          LABS:                        11.1   15.24 )-----------( 347      ( 27 Aug 2021 00:26 )             36.3     08-27    138  |  96  |  10  ----------------------------<  117<H>  4.4   |  34<H>  |  0.63    Ca    8.9      27 Aug 2021 00:26  Phos  2.8     08-27  Mg     2.0     08-27    TPro  6.8  /  Alb  2.5<L>  /  TBili  0.3  /  DBili  x   /  AST  15  /  ALT  15  /  AlkPhos  68  08-27          CAPILLARY BLOOD GLUCOSE      POCT Blood Glucose.: 93 mg/dL (26 Aug 2021 04:52)        Procalcitonin, Serum: 0.22 ng/mL (08-26-21 @ 05:12)          Fluid characteristics  -- 08-26 @ 20:39  pH > 7.92  LDH 1640  tprot 4.7    Cell count  Appearance Cloudy  Fluid type --  BF lymph 24  color Orange  eosinophil --  PMN 67  Mesothelial --  Monocyte 9  Other body cells --      CULTURES:     Culture - Blood (collected 08-26-21 @ 00:39)  Source: .Blood Blood  Preliminary Report (08-27-21 @ 01:02):    No growth to date.    Culture - Blood (collected 08-25-21 @ 22:56)  Source: .Blood Blood  Preliminary Report (08-26-21 @ 23:01):    No growth to date.        Culture - Body Fluid with Gram Stain (collected 08-26-21 @ 23:15)  Source: .Body Fluid Pleural Fluid  Gram Stain (08-27-21 @ 03:31):    polymorphonuclear leukocytes    Gram Negative Rods    by cytocentrifuge        Culture - Fungal, Body Fluid (collected 08-26-21 @ 23:15)  Source: .Body Fluid Pleural Fluid  Preliminary Report (08-27-21 @ 06:40):    Testing in progress          Physical Examination:  PULM: decreased BS R   CVS: S1, S2 heard    RADIOLOGY REVIEWED  CXR: small R ptx  b/l opacities  f/u official report

## 2021-08-27 NOTE — PROGRESS NOTE ADULT - PROBLEM SELECTOR PLAN 1
8/26 Right open chest tube placed at bedside for ptx  Maintain to wall suction -40mmHg  care per MICU  thoracic to follow  serial cxr while chest tube in place

## 2021-08-27 NOTE — PROGRESS NOTE ADULT - PROBLEM SELECTOR PLAN 4
2nd to COVID PNA, superimposed bacterial PNA, ptx   -S/p RRT 8/3 and 8/4 for hypoxia  -Tx to 5ICU 8/10 for further management, tx to 4M   -S/p RRT x2 8/23 for R sided chest pain, pain appears pleuritic in nature  -CTA chest 8/25 with no clear PE  -Tx back to MICU 8/26  -Keep sats >90% with supplemental O2 (currently HFNC 40L/40%)

## 2021-08-27 NOTE — PROGRESS NOTE ADULT - PROBLEM SELECTOR PLAN 2
CTA chest 8/24 with RUL consolidation/cavitation suggestive of superimposed bacterial PNA in addition to COVID PNA  -C/w Zosyn (started 8/25)  -GNR in gram stain from pleural fluid  -ID f/u

## 2021-08-27 NOTE — CONSULT NOTE ADULT - PROBLEM SELECTOR RECOMMENDATION 9
- FOE: Leukoplakia vs irritation of b/l vocal cords, laryngitis. Airway patent, no foreign body visualized.   - IV Zosyn  - Recommend Decadron IV x3 doses.   - PPI.   - Cepacol Lozenges.   - Magic Mouthwash.   - Mucinex/ Hycodan/ Tessalon prn cough.   - Humidified O2 prn.   - F/U Strep Throat Cx, EBV, CMV.   - Thoracic Surgery following PTX and SQ Emphysema.   - ENT will follow.   - Call with questions.     # [phone]  ENT PA.
+COVID PCR as an outpatient  -CXR with b/l opacities  -Remdesivir x 5 days (monitor creatinine, LFTs)  -Decadron 6mg IVP qd x 10 days  -Trend inflammatory markers  -Tessalon perle 100mg PO TID

## 2021-08-27 NOTE — PROGRESS NOTE ADULT - ASSESSMENT
Ms. Cortez is a very sweet 38-year-old female, known to myself, who presents   today for followup of Crestor and with complaints of continued worsened memory   and fatigue.    HISTORY OF PRESENT ILLNESS:  Maame presents for followup of the above.  She   notes that she did in fact forget to have lab work done, is happy to have it   scheduled.  She notes that she had almost forgot about the appointment, but did   get the reminder.  She notes that her short-term memory continues to be very   poor, not sure if there is anything that can be done about that.  She has also   had a rather inordinate amount of fatigue, is ready to go home and take a nap   today.  We have actually been conversing with her mother, Hui, by speaker   phone in the room.  Hui is concerned that she has had such weight gain, wants   to have her thyroid and other blood work checked.  Actually, she weighs in today   at 139 pounds and one year ago was 120 pounds.  She notes that as well off and   on during the last few days she has had some discomfort in the buttocks area.    She believes that it is sciatica-type symptoms.  She has had this in the past,   did have physical therapy, which seemed to help at that time, questions if she   can give that a try.  She notes that her dose of Zoloft was increased to 100 mg   daily by Dr. Amin back in December, not sure if this has helped or   contributed to the fatigue, may be contributing to this.  She has had a history   of some anxiety and depression for a number of years now, even way before the   stroke.  She has had no recurrent stroke-like symptoms or focal neurological   abnormalities other than the continued right-sided residual hemiparesis, which   is mild and some mild difficulties with balance as well as the memory, which is   very poor.  No acute depression issues.  No acute suicidal or homicidal issues.    PAST MEDICAL, SURGICAL, SOCIAL HISTORY:  Please see as thoroughly stated in EPIC    chart, which has been reviewed.    PHYSICAL EXAMINATION:  VITAL SIGNS:  With weight 139 pounds, blood pressure 108/60, pulse 93.  GENERAL:  The patient looks comfortable.  HEENT:  Does show some mild weakness in the left side of the patient's face   musculature, very minimal droop noted, seems to be the same as when last seen,   otherwise HEENT unremarkable.  LUNGS:  Clear.  HEART:  Regular rate and rhythm without arrhythmias.  ABDOMEN:  Positive bowel sounds, soft, nontender, no masses or organomegaly.  EXTREMITIES:  Negative for edema.  NEUROLOGIC:  Shows right upper and lower extremity weakness consistent with mild   residual hemiparesis.  Gait seems to be slightly unsteady with the patient   having weakness in the right leg.  Short-term memory is definitely poor.    ASSESSMENT AND PLAN:  1.  Fatigue, unclear etiology.  The patient has gained 20 pounds in the last   year, could Zoloft be contributing to this, should rule out other.  A.  We will order full fasting lab work to include CBC, full comprehensive   chemistry, lipid panel, TSH, vitamin B12, vitamin D with phone review.  B.  We will taper off Zoloft and initiate Celexa thereafter.  I have recommended   the patient decrease Zoloft to one tablet daily, that is 150 mg tablet daily x3   days, then initiate Celexa.  We will start at 20 mg daily.  C.  We will refer to Physical Therapy for muscle strengthening, especially cor   and other.  2.  Sciatica, right side.  A.  Toradol 60 mg IM.  B.  Refer to Physical Therapy.  I have given prescription.  The patient would   like to go to [facility], which is where she had been before.  3.  Status post CVA, on Crestor.  A.  Recommend the patient continue the Crestor 5 mg daily.  B.  Order fasting lab work.  4.  Cognitive deficits, status post CVA, August 2015 with the patient having   residual short-term memory loss issues and right-sided mild hemiparesis as well   as some imbalance.  A.  Physical therapy referral  pt w/ covid  hypoxia   s/p steroids   s/p remdesivir   id / f/u   pulm f/u   c/e resp support/bipap/h/f  h/f as needed   s/p rrt  cards eval   cta noted. Continue Zosyn for Pneumonia.    s/p toci  gi / dvt proph  o2  hx htn/   monitor bp  COVID19 precautions and isolation  Pneumothorax - s/p CT  MICU care              is placed as above.  B.  I have spoken to Dr. Tulio Perdomo and we will refer back to Physical   Medicine Rehab to see if any other recommendations can be made.    ADDENDUM:  Plan to phone review lab work.  See the patient back at the longest   in three months with the patient to continue other therapy at this point, go   from there.      FMS/HN  dd: 03/30/2017 11:57:54 (CDT)  td: 03/31/2017 02:49:25 (CDT)  Doc ID   #[id number]  Job ID #[id number]    CC:     This office note has been dictated.   pt w/ covid  hypoxia   s/p steroids   s/p remdesivir   id / f/u   pulm f/u   c/e resp support/bipap/h/f as needed  h/f as needed   s/p rrt  cards f.u  cta noted. Continue abs  s/p toci  gi / dvt proph  o2  hx htn/   Pneumothorax - s/p CT  MICU care

## 2021-08-27 NOTE — PROGRESS NOTE ADULT - ASSESSMENT
55yr old male with HTN, RLE DVT, and PE (not on home AC) presents with progressively worsening SOB, intermittent fevers, COVID positive, admitted for COVID PNA.  Course complicated by worsening respiratory failure  2/2 tension PTX and pleural effusion requiring NIPPV and ICU admission.     NEUROLOGY:  - A&Ox4 at baseline; No Active Issues   - Klonopin 0.5 mg q8h for anxiety    PULMONARY:  Acute Respiratory Failure 2/2 Tension PTX with effusion and possible abscess 2/2 suspected superinfection from COVID PNA  - Continue HFNC alternate with BiPAP Nocternal/PRN   - Titrate settings as tolerated to maintain SpO2>88%   - Encourage prone positioning and bed in chair position.   - s/p 8/26 chest tube placement by CTsx with 300cc serosanguinous drainage immediately  - f/u CXR in morning  - repeat bedside US  [ ] clamp when 1L     CARDIOLOGY:  - Tachycardia  - Normal cardiac movements on bedside US  - 1 liter NS bolus s/p 700 ml serosangous fluid drain. consider LR if volume down    GASTROINTESTINAL:  - Regular Diet w/ supp shakes while intermittently on HFNC  - Protonix 40mg QD while intermittently NPO on Bipap  - Bowel regimen with Miralax and Senna BM. Last BM 8/12.     RENAL/:  - Strict I&Os   - Monitor and replete lytes daily    INFECTIOUS DISEASE:  COVID-19  - s/p completed courses of Remdesivir x 5 days and Decadron x 10 days   - s/p Toci on 7/30  - Zosyn 3.375 q8hrs for cavitary pna  - f/u beta d-glycans  - f/u MRSA PCR    HEMATOLOGY:  - Full AC Lovenox 90mg BID   - LE duplex neg for DVT on 8/4. History of RLE DVT & PE.   - Consider CTA chest when stabilized    ENDOCRINE:  - HbA1c 6.0    ETHICS/DISPOSITION:  - Full code   - Remain in ICU       55yr old male with HTN, RLE DVT, and PE (not on home AC) presents with progressively worsening SOB, intermittent fevers, COVID positive, admitted for COVID PNA.  Course complicated by worsening respiratory failure  2/2 tension PTX and pleural effusion requiring NIPPV and ICU admission.     NEUROLOGY:  - A&Ox4 at baseline; No Active Issues   - Klonopin 0.5 mg q8h for anxiety    PULMONARY:  Acute Respiratory Failure 2/2 Tension PTX with effusion and possible abscess 2/2 suspected superinfection from COVID PNA  - Continue HFNC alternate with BiPAP Nocternal/PRN   - Titrate settings as tolerated to maintain SpO2>88%   - Encourage prone positioning and bed in chair position.   - s/p 8/26 chest tube placement by CTsx with 300cc serosanguinous drainage immediately. currently 1 liter  - CXR shows tube in place, decreased midright lung opacification from yesterday, however dffuse airspace opacities are seen throughout both lungs, consistent with patient's known Covid 19 pneumonia. Right pleural effusion.Previously seen right pneumothorax is again seen but decreased in size status post right chest tube placement.       CARDIOLOGY:  - Tachycardia  -  LR if volume down 100cc/hr for 10 hours     GASTROINTESTINAL:  - Soft diet w/ supp shakes while intermittently on HFNC  - Protonix 40mg QD while intermittently NPO on Bipap  - Bowel regimen with Miralax and Senna BM. Last BM 8/12.     RENAL/:  - Strict I&Os   - Monitor and replete lytes daily    INFECTIOUS DISEASE:  COVID-19  - s/p completed courses of Remdesivir x 5 days and Decadron x 10 days   - s/p Toci on 7/30  - Zosyn 3.375 q8hrs for cavitary pna  - f/u beta d-glycans  - f/u MRSA PCR    HEMATOLOGY:  - Full AC Lovenox 90mg BID   - LE duplex neg for DVT on 8/4. History of RLE DVT & PE.   - Consider CTA chest when stabilized    ENDOCRINE:  - HbA1c 6.0    ETHICS/DISPOSITION:  - Full code   - Remain in ICU       55yr old male with HTN, RLE DVT, and PE (not on home AC) presents with progressively worsening SOB, intermittent fevers, COVID positive, admitted for COVID PNA.  Course complicated by worsening respiratory failure  2/2 tension PTX and pleural effusion requiring NIPPV and ICU admission.     NEUROLOGY:  - A&Ox4 at baseline; No Active Issues   - Klonopin 0.5 mg q8h for anxiety    PULMONARY:  Acute Respiratory Failure 2/2 Tension PTX with effusion and possible abscess 2/2 suspected superinfection from COVID PNA  - Continue HFNC alternate with BiPAP Nocternal/PRN   - Titrate settings as tolerated to maintain SpO2>88%   - Encourage prone positioning and bed in chair position.   - s/p 8/26 chest tube placement by CTsx with 1000cc serosanguinous drainage   - CXR shows tube in place, decreased midright lung opacification from yesterday, however diffuse airspace opacities are seen throughout both lungs, consistent with patient's known Covid 19 pneumonia. Right pleural effusion. Previously seen right pneumothorax is again seen but decreased in size status post right chest tube placement.       CARDIOLOGY:  - Tachycardia secondary to possible low volume vs pneumonia      GASTROINTESTINAL:  - Soft diet w/ supp shakes while intermittently on HFNC  - Protonix 40mg QD while intermittently NPO on Bipap  - Bowel regimen with Miralax and Senna BM. Last BM 8/12.     RENAL/:  - Strict I&Os   - Monitor and replete lytes daily  -  LR 100cc/hr for 10 hours for low urinary output overnight    INFECTIOUS DISEASE:  COVID-19  - s/p completed courses of Remdesivir x 5 days and Decadron x 10 days   - s/p Toci on 7/30  - Zosyn 3.375 q8hrs for cavitary pna  - f/u beta d-glycans  - f/u MRSA PCR  - f/u quant  - f/u sputum cultures    HEMATOLOGY:  - Full AC Lovenox 90mg BID   - Consider CTA chest when stabilized    ENDOCRINE:  - HbA1c 6.0    ETHICS/DISPOSITION:  - Full code   - Remain in ICU

## 2021-08-28 LAB
ALBUMIN SERPL ELPH-MCNC: 2.5 G/DL — LOW (ref 3.3–5)
ALP SERPL-CCNC: 67 U/L — SIGNIFICANT CHANGE UP (ref 40–120)
ALT FLD-CCNC: 13 U/L — SIGNIFICANT CHANGE UP (ref 10–45)
ANION GAP SERPL CALC-SCNC: 10 MMOL/L — SIGNIFICANT CHANGE UP (ref 5–17)
AST SERPL-CCNC: 13 U/L — SIGNIFICANT CHANGE UP (ref 10–40)
BASOPHILS # BLD AUTO: 0.05 K/UL — SIGNIFICANT CHANGE UP (ref 0–0.2)
BASOPHILS NFR BLD AUTO: 0.3 % — SIGNIFICANT CHANGE UP (ref 0–2)
BILIRUB SERPL-MCNC: 0.3 MG/DL — SIGNIFICANT CHANGE UP (ref 0.2–1.2)
BUN SERPL-MCNC: 8 MG/DL — SIGNIFICANT CHANGE UP (ref 7–23)
CALCIUM SERPL-MCNC: 8.8 MG/DL — SIGNIFICANT CHANGE UP (ref 8.4–10.5)
CHLORIDE SERPL-SCNC: 93 MMOL/L — LOW (ref 96–108)
CO2 SERPL-SCNC: 33 MMOL/L — HIGH (ref 22–31)
CREAT SERPL-MCNC: 0.6 MG/DL — SIGNIFICANT CHANGE UP (ref 0.5–1.3)
EBV EA AB SER IA-ACNC: <5 U/ML — SIGNIFICANT CHANGE UP
EBV EA AB TITR SER IF: POSITIVE
EBV EA IGG SER-ACNC: NEGATIVE — SIGNIFICANT CHANGE UP
EBV NA IGG SER IA-ACNC: 437 U/ML — HIGH
EBV PATRN SPEC IB-IMP: SIGNIFICANT CHANGE UP
EBV VCA IGG AVIDITY SER QL IA: POSITIVE
EBV VCA IGM SER IA-ACNC: <10 U/ML — SIGNIFICANT CHANGE UP
EBV VCA IGM SER IA-ACNC: >750 U/ML — HIGH
EBV VCA IGM TITR FLD: NEGATIVE — SIGNIFICANT CHANGE UP
EOSINOPHIL # BLD AUTO: 0.35 K/UL — SIGNIFICANT CHANGE UP (ref 0–0.5)
EOSINOPHIL NFR BLD AUTO: 2.2 % — SIGNIFICANT CHANGE UP (ref 0–6)
FUNGITELL: <31 PG/ML — SIGNIFICANT CHANGE UP
GAMMA INTERFERON BACKGROUND BLD IA-ACNC: 0.02 IU/ML — SIGNIFICANT CHANGE UP
GLUCOSE SERPL-MCNC: 136 MG/DL — HIGH (ref 70–99)
HCT VFR BLD CALC: 36.2 % — LOW (ref 39–50)
HGB BLD-MCNC: 11.2 G/DL — LOW (ref 13–17)
IMM GRANULOCYTES NFR BLD AUTO: 1.6 % — HIGH (ref 0–1.5)
LYMPHOCYTES # BLD AUTO: 1.96 K/UL — SIGNIFICANT CHANGE UP (ref 1–3.3)
LYMPHOCYTES # BLD AUTO: 12.3 % — LOW (ref 13–44)
M TB IFN-G BLD-IMP: ABNORMAL
M TB IFN-G CD4+ BCKGRND COR BLD-ACNC: 0 IU/ML — SIGNIFICANT CHANGE UP
M TB IFN-G CD4+CD8+ BCKGRND COR BLD-ACNC: 0 IU/ML — SIGNIFICANT CHANGE UP
MAGNESIUM SERPL-MCNC: 2.1 MG/DL — SIGNIFICANT CHANGE UP (ref 1.6–2.6)
MCHC RBC-ENTMCNC: 28.1 PG — SIGNIFICANT CHANGE UP (ref 27–34)
MCHC RBC-ENTMCNC: 30.9 GM/DL — LOW (ref 32–36)
MCV RBC AUTO: 91 FL — SIGNIFICANT CHANGE UP (ref 80–100)
MONOCYTES # BLD AUTO: 1.25 K/UL — HIGH (ref 0–0.9)
MONOCYTES NFR BLD AUTO: 7.8 % — SIGNIFICANT CHANGE UP (ref 2–14)
NEUTROPHILS # BLD AUTO: 12.07 K/UL — HIGH (ref 1.8–7.4)
NEUTROPHILS NFR BLD AUTO: 75.8 % — SIGNIFICANT CHANGE UP (ref 43–77)
NRBC # BLD: 0 /100 WBCS — SIGNIFICANT CHANGE UP (ref 0–0)
PHOSPHATE SERPL-MCNC: 3.6 MG/DL — SIGNIFICANT CHANGE UP (ref 2.5–4.5)
PLATELET # BLD AUTO: 360 K/UL — SIGNIFICANT CHANGE UP (ref 150–400)
POTASSIUM SERPL-MCNC: 3.9 MMOL/L — SIGNIFICANT CHANGE UP (ref 3.5–5.3)
POTASSIUM SERPL-SCNC: 3.9 MMOL/L — SIGNIFICANT CHANGE UP (ref 3.5–5.3)
PROT SERPL-MCNC: 7 G/DL — SIGNIFICANT CHANGE UP (ref 6–8.3)
QUANT TB PLUS MITOGEN MINUS NIL: 0.09 IU/ML — SIGNIFICANT CHANGE UP
RBC # BLD: 3.98 M/UL — LOW (ref 4.2–5.8)
RBC # FLD: 13 % — SIGNIFICANT CHANGE UP (ref 10.3–14.5)
SODIUM SERPL-SCNC: 136 MMOL/L — SIGNIFICANT CHANGE UP (ref 135–145)
WBC # BLD: 15.94 K/UL — HIGH (ref 3.8–10.5)
WBC # FLD AUTO: 15.94 K/UL — HIGH (ref 3.8–10.5)

## 2021-08-28 PROCEDURE — 99232 SBSQ HOSP IP/OBS MODERATE 35: CPT

## 2021-08-28 PROCEDURE — 99291 CRITICAL CARE FIRST HOUR: CPT

## 2021-08-28 PROCEDURE — 71045 X-RAY EXAM CHEST 1 VIEW: CPT | Mod: 26

## 2021-08-28 PROCEDURE — 99221 1ST HOSP IP/OBS SF/LOW 40: CPT | Mod: 25

## 2021-08-28 PROCEDURE — 71250 CT THORAX DX C-: CPT | Mod: 26

## 2021-08-28 PROCEDURE — 31575 DIAGNOSTIC LARYNGOSCOPY: CPT

## 2021-08-28 RX ORDER — TETRACAINE/BENZOCAINE/BUTAMBEN 2%-14%-2%
1 OINTMENT (GRAM) TOPICAL EVERY 6 HOURS
Refills: 0 | Status: DISCONTINUED | OUTPATIENT
Start: 2021-08-28 | End: 2021-08-28

## 2021-08-28 RX ORDER — IPRATROPIUM/ALBUTEROL SULFATE 18-103MCG
3 AEROSOL WITH ADAPTER (GRAM) INHALATION EVERY 6 HOURS
Refills: 0 | Status: DISCONTINUED | OUTPATIENT
Start: 2021-08-28 | End: 2021-09-13

## 2021-08-28 RX ORDER — DIPHENHYDRAMINE HYDROCHLORIDE AND LIDOCAINE HYDROCHLORIDE AND ALUMINUM HYDROXIDE AND MAGNESIUM HYDRO
5 KIT EVERY 6 HOURS
Refills: 0 | Status: DISCONTINUED | OUTPATIENT
Start: 2021-08-28 | End: 2021-09-13

## 2021-08-28 RX ORDER — BENZOCAINE 10 %
1 GEL (GRAM) MUCOUS MEMBRANE
Refills: 0 | Status: DISCONTINUED | OUTPATIENT
Start: 2021-08-28 | End: 2021-08-28

## 2021-08-28 RX ORDER — OXYCODONE HYDROCHLORIDE 5 MG/1
5 TABLET ORAL EVERY 6 HOURS
Refills: 0 | Status: DISCONTINUED | OUTPATIENT
Start: 2021-08-28 | End: 2021-08-31

## 2021-08-28 RX ORDER — TETRACAINE/BENZOCAINE/BUTAMBEN 2%-14%-2%
1 OINTMENT (GRAM) TOPICAL EVERY 4 HOURS
Refills: 0 | Status: DISCONTINUED | OUTPATIENT
Start: 2021-08-28 | End: 2021-08-28

## 2021-08-28 RX ADMIN — DIPHENHYDRAMINE HYDROCHLORIDE AND LIDOCAINE HYDROCHLORIDE AND ALUMINUM HYDROXIDE AND MAGNESIUM HYDRO 5 MILLILITER(S): KIT at 17:38

## 2021-08-28 RX ADMIN — Medication 200 MILLIGRAM(S): at 05:11

## 2021-08-28 RX ADMIN — BENZOCAINE AND MENTHOL 1 LOZENGE: 5; 1 LIQUID ORAL at 00:15

## 2021-08-28 RX ADMIN — Medication 1 TABLET(S): at 11:07

## 2021-08-28 RX ADMIN — BENZOCAINE AND MENTHOL 1 LOZENGE: 5; 1 LIQUID ORAL at 20:31

## 2021-08-28 RX ADMIN — PIPERACILLIN AND TAZOBACTAM 25 GRAM(S): 4; .5 INJECTION, POWDER, LYOPHILIZED, FOR SOLUTION INTRAVENOUS at 03:33

## 2021-08-28 RX ADMIN — PIPERACILLIN AND TAZOBACTAM 25 GRAM(S): 4; .5 INJECTION, POWDER, LYOPHILIZED, FOR SOLUTION INTRAVENOUS at 20:31

## 2021-08-28 RX ADMIN — CHLORHEXIDINE GLUCONATE 1 APPLICATION(S): 213 SOLUTION TOPICAL at 07:40

## 2021-08-28 RX ADMIN — Medication 200 MILLIGRAM(S): at 14:12

## 2021-08-28 RX ADMIN — Medication 6 MILLIGRAM(S): at 07:40

## 2021-08-28 RX ADMIN — PIPERACILLIN AND TAZOBACTAM 25 GRAM(S): 4; .5 INJECTION, POWDER, LYOPHILIZED, FOR SOLUTION INTRAVENOUS at 11:07

## 2021-08-28 RX ADMIN — Medication 6 MILLIGRAM(S): at 00:24

## 2021-08-28 RX ADMIN — BUDESONIDE AND FORMOTEROL FUMARATE DIHYDRATE 2 PUFF(S): 160; 4.5 AEROSOL RESPIRATORY (INHALATION) at 06:09

## 2021-08-28 RX ADMIN — Medication 0.5 MILLIGRAM(S): at 21:17

## 2021-08-28 RX ADMIN — PANTOPRAZOLE SODIUM 40 MILLIGRAM(S): 20 TABLET, DELAYED RELEASE ORAL at 11:07

## 2021-08-28 RX ADMIN — ENOXAPARIN SODIUM 90 MILLIGRAM(S): 100 INJECTION SUBCUTANEOUS at 05:11

## 2021-08-28 RX ADMIN — Medication 2000 UNIT(S): at 11:07

## 2021-08-28 RX ADMIN — Medication 1 SPRAY(S): at 03:49

## 2021-08-28 RX ADMIN — OXYCODONE HYDROCHLORIDE 5 MILLIGRAM(S): 5 TABLET ORAL at 17:38

## 2021-08-28 RX ADMIN — BUDESONIDE AND FORMOTEROL FUMARATE DIHYDRATE 2 PUFF(S): 160; 4.5 AEROSOL RESPIRATORY (INHALATION) at 17:31

## 2021-08-28 RX ADMIN — Medication 0.5 MILLIGRAM(S): at 14:12

## 2021-08-28 RX ADMIN — Medication 1200 MILLIGRAM(S): at 17:38

## 2021-08-28 RX ADMIN — ENOXAPARIN SODIUM 90 MILLIGRAM(S): 100 INJECTION SUBCUTANEOUS at 17:38

## 2021-08-28 RX ADMIN — POLYETHYLENE GLYCOL 3350 17 GRAM(S): 17 POWDER, FOR SOLUTION ORAL at 11:07

## 2021-08-28 RX ADMIN — Medication 6 MILLIGRAM(S): at 17:37

## 2021-08-28 RX ADMIN — Medication 200 MILLIGRAM(S): at 21:16

## 2021-08-28 RX ADMIN — OXYCODONE HYDROCHLORIDE 5 MILLIGRAM(S): 5 TABLET ORAL at 11:06

## 2021-08-28 RX ADMIN — Medication 1200 MILLIGRAM(S): at 05:11

## 2021-08-28 NOTE — PROGRESS NOTE ADULT - PROBLEM SELECTOR PLAN 1
- FOE 8/27: Leukoplakia vs irritation of b/l vocal cords, laryngitis. Airway patent, no foreign body visualized.   - IV Zosyn  - Recommend Decadron IV x3 doses.   - PPI.   - Cepacol Lozenges.   - Magic Mouthwash.   - Mucinex/ Hycodan/ Tessalon prn cough.   - Humidified O2 prn.   - F/U Strep Throat Cx, EBV, CMV.   - Thoracic Surgery following PTX and SQ Emphysema.   - ENT will follow.   - Call with questions.

## 2021-08-28 NOTE — PROGRESS NOTE ADULT - ASSESSMENT
Echo 8/24/21: EF 65%, mild MR, grossly nl lv sys fx    a/p  56yo M with Hx of DVT s/p achilles tendon repair years ago not on AC presenting with complaints of SOB. intermittent and fevers with cough productive of white sputum for past week, tested positive for covid 2. Now transferred to MICU for tension pneumothorax, s/p chest tube.     #Atypical Chest Pain  -RRT 8/23 x2 for chest pain - exacerbated by cough and inspiration -  relieved with IV pain meds   -secondary to covid PNA, PNX  -trop negative  -no ADHF noted on exam   -CTA without PE   -Echo noted w grossly nl lv sys fx, mild MR     #Covid -19  -s/p completed courses of Remdesivir x 5 days and Decadron x 10 days   -S/p Tocilizumab 7/30 per ID   -LE duplex 8/4 neg DVT  -CTA as above   -transferred to MICU, s/p R chest tube for pnx  -pulm f/u     #Sinus tachycardia  -likely secondary to covid PNA, volume, pain, PNX  -rates improved, cont to monitor   -echo as above     #DVT (hx)  -LE doppler as above  -on full dose AC    #steroids for laryngitis/obstruction    care per MICU       45 minutes spent on total encounter; more than 50% of the visit was spent counseling and/or coordinating care by the attending physician.

## 2021-08-28 NOTE — PROGRESS NOTE ADULT - ASSESSMENT
56 YO M with PMH of HTN, RLE DVT, and PE (not on home AC) presents with progressively worsening SOB, intermittent fevers, COVID positive, admitted for COVID PNA. . ENT was consulted for evaluation of Throat Tightness and Voice changes. Pt also reports painful swallowing of solid/liquid foods Currently on soft diet. Admits to productive cough, noted to be self suctioning at bedside. 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 Airway patent, no foreign body visualized. Physical Exam significant for SQ Emphysema around the neck area.

## 2021-08-28 NOTE — PROGRESS NOTE ADULT - SUBJECTIVE AND OBJECTIVE BOX
DATE OF SERVICE: 08-28-21 @ 10:16  CHIEF COMPLAINT:Patient is a 55y old  Male who presents with a chief complaint of covid pna (27 Aug 2021 20:21)    	        PAST MEDICAL & SURGICAL HISTORY:  Hyperlipidemia    HTN (hypertension)    Rupture, tendon, quadriceps    History of Achilles tendon repair            weak  on h/f  feels a little better  no vomiting  no fever  pain ct better  less sob  Medications:  MEDICATIONS  (STANDING):  benzocaine 15 mG/menthol 3.6 mG (Sugar-Free) Lozenge 1 Lozenge Oral once  benzonatate 200 milliGRAM(s) Oral every 8 hours  budesonide 160 MICROgram(s)/formoterol 4.5 MICROgram(s) Inhaler 2 Puff(s) Inhalation two times a day  chlorhexidine 4% Liquid 1 Application(s) Topical <User Schedule>  chlorhexidine 4% Liquid 1 Application(s) Topical <User Schedule>  cholecalciferol 2000 Unit(s) Oral daily  clonazePAM  Tablet 0.5 milliGRAM(s) Oral every 8 hours  dexAMETHasone  Injectable 6 milliGRAM(s) IV Push every 8 hours  enoxaparin Injectable 90 milliGRAM(s) SubCutaneous every 12 hours  guaiFENesin ER 1200 milliGRAM(s) Oral every 12 hours  lidocaine   4% Patch 1 Patch Transdermal every 24 hours  melatonin 3 milliGRAM(s) Oral at bedtime  multivitamin 1 Tablet(s) Oral daily  pantoprazole  Injectable 40 milliGRAM(s) IV Push daily  piperacillin/tazobactam IVPB.. 3.375 Gram(s) IV Intermittent every 8 hours  polyethylene glycol 3350 17 Gram(s) Oral daily  senna 2 Tablet(s) Oral at bedtime    MEDICATIONS  (PRN):  acetaminophen   Tablet .. 975 milliGRAM(s) Oral every 6 hours PRN Mild Pain (1 - 3)  albuterol/ipratropium for Nebulization 3 milliLiter(s) Nebulizer every 6 hours PRN Shortness of Breath and/or Wheezing  benzocaine 15 mG/menthol 3.6 mG (Sugar-Free) Lozenge 1 Lozenge Oral four times a day PRN Sore Throat  cyclobenzaprine 5 milliGRAM(s) Oral three times a day PRN Muscle Spasm  FIRST- Mouthwash  BLM 5 milliLiter(s) Swish and Spit once PRN Mouth Care  hydrocodone/homatropine Syrup 5 milliLiter(s) Oral every 4 hours PRN Cough  oxyCODONE    IR 5 milliGRAM(s) Oral every 4 hours PRN Moderate Pain (4 - 6)  sodium chloride 0.65% Nasal 1 Spray(s) Both Nostrils every 2 hours PRN Nasal Congestion  tetracaine/benzocaine/butamben Spray 1 Spray(s) Topical every 6 hours PRN pain, soreness    	    PHYSICAL EXAM:  T(C): 36.4 (08-28-21 @ 08:00), Max: 37.4 (08-27-21 @ 20:00)  HR: 87 (08-28-21 @ 09:00) (69 - 132)  BP: 140/77 (08-28-21 @ 09:00) (123/72 - 159/96)  RR: 28 (08-28-21 @ 09:00) (15 - 48)  SpO2: 99% (08-28-21 @ 09:00) (88% - 100%)  Wt(kg): --  I&O's Summary    27 Aug 2021 07:01  -  28 Aug 2021 07:00  --------------------------------------------------------  IN: 750 mL / OUT: 350 mL / NET: 400 mL        Appearance: Normal	  HEENT:   Normal oral mucosa, PERRL, EOMI	  Lymphatic: No lymphadenopathy  Cardiovascular: Normal S1 S2, No JVD, No murmurs, No edema  Respiratory:ct r/ dec marla ambrose  Gastrointestinal:  Soft, Non-tender, + BS	  Skin: No rashes, No ecchymoses, No cyanosis	  Neurologic: Non-focal  Extremities: Normal range of motion, No clubbing, cyanosis or edema  Vascular: Peripheral pulses palpable 2+ bilaterally    TELEMETRY: 	    ECG:  	  RADIOLOGY:  OTHER: 	  	  LABS:	 	    CARDIAC MARKERS:                                11.2   15.94 )-----------( 360      ( 28 Aug 2021 00:13 )             36.2     08-28    136  |  93<L>  |  8   ----------------------------<  136<H>  3.9   |  33<H>  |  0.60    Ca    8.8      28 Aug 2021 00:13  Phos  3.6     08-28  Mg     2.1     08-28    TPro  7.0  /  Alb  2.5<L>  /  TBili  0.3  /  DBili  x   /  AST  13  /  ALT  13  /  AlkPhos  67  08-28    proBNP:   Lipid Profile:   HgA1c:   TSH:

## 2021-08-28 NOTE — PROGRESS NOTE ADULT - SUBJECTIVE AND OBJECTIVE BOX
Patient is a 55y old  Male who presents with a chief complaint of covid pna (27 Aug 2021 20:21)      Vital Signs Last 24 Hrs  T(C): 36.4 (08-28-21 @ 08:00), Max: 37.4 (08-27-21 @ 20:00)  T(F): 97.5 (08-28-21 @ 08:00), Max: 99.4 (08-27-21 @ 20:00)  HR: 78 (08-28-21 @ 11:00) (69 - 132)  BP: 133/73 (08-28-21 @ 11:00) (123/72 - 159/96)  RR: 20 (08-28-21 @ 11:00) (15 - 48)  SpO2: 100% (08-28-21 @ 11:00) (91% - 100%)                08-27-21 @ 07:01  -  08-28-21 @ 07:00  --------------------------------------------------------  IN: 750 mL / OUT: 350 mL / NET: 400 mL                            11.2   15.94 )-----------( 360      ( 28 Aug 2021 00:13 )             36.2     136  |  93<L>  |  8   ----------------------------<  136<H>  3.9   |  33<H>  |  0.60            PHYSICAL EXAM  Neurology: Awake, NAD  CV : RRR+S1S2  Lungs: +HF NC, B/L BS diminished at bases, +subQ emphysema  +Right chest tube to -40 dry suction, +airleak   Abdomen: Soft, NT/ND, +BSx4Q  Extremities: B/L LE edema, negative calf tenderness, +PP               MEDICATIONS  acetaminophen   Tablet .. 975 milliGRAM(s) Oral every 6 hours PRN  albuterol/ipratropium for Nebulization 3 milliLiter(s) Nebulizer every 6 hours PRN  benzocaine 15 mG/menthol 3.6 mG (Sugar-Free) Lozenge 1 Lozenge Oral four times a day PRN  benzocaine 15 mG/menthol 3.6 mG (Sugar-Free) Lozenge 1 Lozenge Oral once  benzonatate 200 milliGRAM(s) Oral every 8 hours  budesonide 160 MICROgram(s)/formoterol 4.5 MICROgram(s) Inhaler 2 Puff(s) Inhalation two times a day  chlorhexidine 4% Liquid 1 Application(s) Topical <User Schedule>  chlorhexidine 4% Liquid 1 Application(s) Topical <User Schedule>  cholecalciferol 2000 Unit(s) Oral daily  clonazePAM  Tablet 0.5 milliGRAM(s) Oral every 8 hours  cyclobenzaprine 5 milliGRAM(s) Oral three times a day PRN  dexAMETHasone  Injectable 6 milliGRAM(s) IV Push every 8 hours  enoxaparin Injectable 90 milliGRAM(s) SubCutaneous every 12 hours  FIRST- Mouthwash  BLM 5 milliLiter(s) Swish and Spit once PRN  guaiFENesin ER 1200 milliGRAM(s) Oral every 12 hours  lidocaine   4% Patch 1 Patch Transdermal every 24 hours  melatonin 3 milliGRAM(s) Oral at bedtime  multivitamin 1 Tablet(s) Oral daily  oxyCODONE    IR 5 milliGRAM(s) Oral every 6 hours  pantoprazole  Injectable 40 milliGRAM(s) IV Push daily  piperacillin/tazobactam IVPB.. 3.375 Gram(s) IV Intermittent every 8 hours  polyethylene glycol 3350 17 Gram(s) Oral daily  senna 2 Tablet(s) Oral at bedtime  sodium chloride 0.65% Nasal 1 Spray(s) Both Nostrils every 2 hours PRN  tetracaine/benzocaine/butamben Spray 1 Spray(s) Topical every 6 hours PRN

## 2021-08-28 NOTE — PROGRESS NOTE ADULT - SUBJECTIVE AND OBJECTIVE BOX
ENT ISSUE/POD: subq emphysema neck/face    HPI: 54 YO M with PMH of HTN, RLE DVT, and PE (not on home AC) presents with progressively worsening SOB, intermittent fevers, COVID positive, admitted for COVID PNA. Patient with intermittently worsening respiratory status, currently off BiPAP but requiring HFNC, currently being treated for pneumonia.  Patient has had several RRTs for tachypnea and hypoxia during admission. Course complicated by worsening respiratory failure  2/2 tension PTX and pleural effusion requiring NIPPV and ICU admission. ENT was consulted for evaluation of Throat Tightness and Voice changes. Per pt and family, no recent Intubation/ NG Tube placement. Last Intubation x 5 Years ago for Knee Sx. Pt also reports painful swallowing of solid/liquid foods Currently on soft diet. Admits to productive cough, noted to be self suctioning at bedside.  Pt with right moderate-sized pneumothorax with leftward shift and small pleural effusion found on CXR [8/26], Chest Tube was placed by Thoracic Surgery on 8/26. Pt denies rhinorrhea, post nasal drip, N/V, Abdominal Pain, Palpitations/Diaphoresis, HA/Dizziness, fever/chills, recent travel.     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 Pt this am mentions slight improvement in breathing since last night      PAST MEDICAL & SURGICAL HISTORY:  Hyperlipidemia    HTN (hypertension)    Rupture, tendon, quadriceps    History of Achilles tendon repair      Allergies    No Known Allergies    Intolerances      MEDICATIONS  (STANDING):  benzocaine 15 mG/menthol 3.6 mG (Sugar-Free) Lozenge 1 Lozenge Oral once  benzonatate 200 milliGRAM(s) Oral every 8 hours  budesonide 160 MICROgram(s)/formoterol 4.5 MICROgram(s) Inhaler 2 Puff(s) Inhalation two times a day  chlorhexidine 4% Liquid 1 Application(s) Topical <User Schedule>  chlorhexidine 4% Liquid 1 Application(s) Topical <User Schedule>  cholecalciferol 2000 Unit(s) Oral daily  clonazePAM  Tablet 0.5 milliGRAM(s) Oral every 8 hours  dexAMETHasone  Injectable 6 milliGRAM(s) IV Push every 8 hours  enoxaparin Injectable 90 milliGRAM(s) SubCutaneous every 12 hours  guaiFENesin ER 1200 milliGRAM(s) Oral every 12 hours  lidocaine   4% Patch 1 Patch Transdermal every 24 hours  melatonin 3 milliGRAM(s) Oral at bedtime  multivitamin 1 Tablet(s) Oral daily  oxyCODONE    IR 5 milliGRAM(s) Oral every 6 hours  pantoprazole  Injectable 40 milliGRAM(s) IV Push daily  piperacillin/tazobactam IVPB.. 3.375 Gram(s) IV Intermittent every 8 hours  polyethylene glycol 3350 17 Gram(s) Oral daily  senna 2 Tablet(s) Oral at bedtime    MEDICATIONS  (PRN):  acetaminophen   Tablet .. 975 milliGRAM(s) Oral every 6 hours PRN Mild Pain (1 - 3)  albuterol/ipratropium for Nebulization 3 milliLiter(s) Nebulizer every 6 hours PRN Shortness of Breath and/or Wheezing  benzocaine 15 mG/menthol 3.6 mG (Sugar-Free) Lozenge 1 Lozenge Oral four times a day PRN Sore Throat  cyclobenzaprine 5 milliGRAM(s) Oral three times a day PRN Muscle Spasm  FIRST- Mouthwash  BLM 5 milliLiter(s) Swish and Spit once PRN Mouth Care  sodium chloride 0.65% Nasal 1 Spray(s) Both Nostrils every 2 hours PRN Nasal Congestion  tetracaine/benzocaine/butamben Spray 1 Spray(s) Topical every 6 hours PRN pain, soreness      ROS:   ENT: all negative except as noted in HPI   Pulm: denies SOB, cough, hemoptysis  Neuro: denies numbness/tingling, loss of sensation  Endo: denies heat/cold intolerance, excessive sweating      Vital Signs Last 24 Hrs  T(C): 36.4 (28 Aug 2021 08:00), Max: 37.4 (27 Aug 2021 20:00)  T(F): 97.5 (28 Aug 2021 08:00), Max: 99.4 (27 Aug 2021 20:00)  HR: 87 (28 Aug 2021 09:00) (69 - 132)  BP: 140/77 (28 Aug 2021 09:00) (123/72 - 159/96)  BP(mean): 101 (28 Aug 2021 09:00) (89 - 120)  RR: 28 (28 Aug 2021 09:00) (15 - 48)  SpO2: 99% (28 Aug 2021 09:00) (88% - 100%)                          11.2   15.94 )-----------( 360      ( 28 Aug 2021 00:13 )             36.2    08-28    136  |  93<L>  |  8   ----------------------------<  136<H>  3.9   |  33<H>  |  0.60    Ca    8.8      28 Aug 2021 00:13  Phos  3.6     08-28  Mg     2.1     08-28    TPro  7.0  /  Alb  2.5<L>  /  TBili  0.3  /  DBili  x   /  AST  13  /  ALT  13  /  AlkPhos  67  08-28       PHYSICAL EXAM:  Gen: On HF NC.   Skin: No rashes, bruises, or lesions.  Head: Normocephalic, Atraumatic.  Face: no edema, erythema, or fluctuance. Parotid glands soft without mass.  Eyes: no scleral injection.  Nose: Nares bilaterally patent, no discharge  Mouth: No Stridor / Drooling / Trismus.  Mucosa moist, tongue/uvula midline, oropharynx clear.  Neck: + SQ Emphysema, + crepitus noted around the neck L>R. Trachea midline, no masses.  Lymphatic: No lymphadenopathy.  Resp: On HF NC, no stridor.  Neuro: facial nerve intact, no facial droop.

## 2021-08-28 NOTE — PROGRESS NOTE ADULT - SUBJECTIVE AND OBJECTIVE BOX
CARDIOLOGY FOLLOW UP NOTE - DR. ARMAS    Patient Name: KARISSA VILLALBA  Date of Service: 08-28-21 @ 13:32    events noted    Subjective:    cv: denies chest pain, dyspnea, palpitations, dizziness  pulmonary: denies cough  GI: denies abdominal pain, nausea, vomiting  vascular/legs: no edema   skin: no rash  ROS: otherwise negative   overnight events:      PHYSICAL EXAM:  T(C): 36.7 (08-28-21 @ 12:00), Max: 37.4 (08-27-21 @ 20:00)  HR: 60 (08-28-21 @ 12:00) (60 - 132)  BP: 151/72 (08-28-21 @ 12:00) (123/72 - 159/96)  RR: 13 (08-28-21 @ 12:00) (13 - 48)  SpO2: 100% (08-28-21 @ 12:00) (91% - 100%)  Wt(kg): --  I&O's Summary    27 Aug 2021 07:01  -  28 Aug 2021 07:00  --------------------------------------------------------  IN: 750 mL / OUT: 350 mL / NET: 400 mL    28 Aug 2021 07:01  -  28 Aug 2021 13:32  --------------------------------------------------------  IN: 240 mL / OUT: 0 mL / NET: 240 mL      Daily     Daily     Appearance: Normal	  Cardiovascular: Normal S1 S2,RRR, No JVD, No murmurs  Respiratory: Lungs clear to auscultation	  Gastrointestinal:  Soft, Non-tender, + BS	  Extremities: Normal range of motion, No clubbing, cyanosis or edema      Home Medications:  Albuterol (Eqv-ProAir HFA) 90 mcg/inh inhalation aerosol: 2 puff(s) inhaled every 6 hours, As Needed (29 Jul 2021 09:08)  ivermectin 3 mg oral tablet: 1 tab(s) orally once a day (29 Jul 2021 09:08)  levoFLOXacin 500 mg oral tablet: 1 tab(s) orally every 24 hours (29 Jul 2021 09:08)  predniSONE 50 mg oral tablet: 1 tab(s) orally once a day (29 Jul 2021 09:08)      MEDICATIONS  (STANDING):  benzocaine 15 mG/menthol 3.6 mG (Sugar-Free) Lozenge 1 Lozenge Oral once  benzonatate 200 milliGRAM(s) Oral every 8 hours  budesonide 160 MICROgram(s)/formoterol 4.5 MICROgram(s) Inhaler 2 Puff(s) Inhalation two times a day  chlorhexidine 4% Liquid 1 Application(s) Topical <User Schedule>  chlorhexidine 4% Liquid 1 Application(s) Topical <User Schedule>  cholecalciferol 2000 Unit(s) Oral daily  clonazePAM  Tablet 0.5 milliGRAM(s) Oral every 8 hours  dexAMETHasone  Injectable 6 milliGRAM(s) IV Push every 8 hours  enoxaparin Injectable 90 milliGRAM(s) SubCutaneous every 12 hours  guaiFENesin ER 1200 milliGRAM(s) Oral every 12 hours  lidocaine   4% Patch 1 Patch Transdermal every 24 hours  melatonin 3 milliGRAM(s) Oral at bedtime  multivitamin 1 Tablet(s) Oral daily  oxyCODONE    IR 5 milliGRAM(s) Oral every 6 hours  pantoprazole  Injectable 40 milliGRAM(s) IV Push daily  piperacillin/tazobactam IVPB.. 3.375 Gram(s) IV Intermittent every 8 hours  polyethylene glycol 3350 17 Gram(s) Oral daily  senna 2 Tablet(s) Oral at bedtime      TELEMETRY: 	    ECG:  	  RADIOLOGY:   DIAGNOSTIC TESTING:  [ ] Echocardiogram:  [ ] Catheterization:  [ ] Stress Test:    OTHER: 	    LABS:	 	    CARDIAC MARKERS:                                      11.2   15.94 )-----------( 360      ( 28 Aug 2021 00:13 )             36.2     08-28    136  |  93<L>  |  8   ----------------------------<  136<H>  3.9   |  33<H>  |  0.60    Ca    8.8      28 Aug 2021 00:13  Phos  3.6     08-28  Mg     2.1     08-28    TPro  7.0  /  Alb  2.5<L>  /  TBili  0.3  /  DBili  x   /  AST  13  /  ALT  13  /  AlkPhos  67  08-28    proBNP:     Lipid Profile:   HgA1c:     Creatinine, Serum: 0.60 mg/dL (08-28-21 @ 00:13)  Creatinine, Serum: 0.63 mg/dL (08-27-21 @ 00:26)  Creatinine, Serum: 0.63 mg/dL (08-26-21 @ 05:12)  Creatinine, Serum: 0.64 mg/dL (08-26-21 @ 05:10)

## 2021-08-28 NOTE — PROGRESS NOTE ADULT - ASSESSMENT
55yr old male with HTN, RLE DVT, and PE (not on home AC) presents with progressively worsening SOB, intermittent fevers, COVID positive, admitted for COVID PNA.  Course complicated by worsening respiratory failure  2/2 tension PTX and pleural effusion requiring NIPPV and ICU admission.     NEUROLOGY:  - A&Ox4 at baseline; No Active Issues   - Klonopin 0.5 mg q8h for anxiety    PULMONARY:  Acute Respiratory Failure 2/2 Tension PTX with effusion and possible abscess 2/2 suspected superinfection from COVID PNA  - Continue HFNC alternate with BiPAP Nocternal/PRN   - Titrate settings as tolerated to maintain SpO2>88%   - Encourage prone positioning and bed in chair position.   - s/p 8/26 chest tube placement by CTsx with 1000cc serosanguinous drainage   - CXR shows tube in place, decreased midright lung opacification from yesterday, however diffuse airspace opacities are seen throughout both lungs, consistent with patient's known Covid 19 pneumonia. Right pleural effusion. Previously seen right pneumothorax is again seen but decreased in size status post right chest tube placement.       CARDIOLOGY:  - Tachycardia secondary to possible low volume vs pneumonia      GASTROINTESTINAL:  - Soft diet w/ supp shakes while intermittently on HFNC  - Protonix 40mg QD while intermittently NPO on Bipap  - Bowel regimen with Miralax and Senna BM. Last BM 8/12.     RENAL/:  - Strict I&Os   - Monitor and replete lytes daily  -  LR 100cc/hr for 10 hours for low urinary output overnight    INFECTIOUS DISEASE:  COVID-19  - s/p completed courses of Remdesivir x 5 days and Decadron x 10 days   - s/p Toci on 7/30  - Zosyn 3.375 q8hrs for cavitary pna  - f/u beta d-glycans  - f/u MRSA PCR  - f/u quant  - f/u sputum cultures    HEMATOLOGY:  - Full AC Lovenox 90mg BID   - Consider CTA chest when stabilized    ENDOCRINE:  - HbA1c 6.0    ETHICS/DISPOSITION:  - Full code   - Remain in ICU       55yr old male with HTN, RLE DVT, and PE (not on home AC) presents with progressively worsening SOB, intermittent fevers, COVID positive, admitted for COVID PNA.  Course complicated by worsening respiratory failure  2/2 tension PTX and pleural effusion requiring NIPPV and ICU admission.     NEUROLOGY:  - A&Ox4 at baseline; No Active Issues   - Klonopin 0.5 mg q8h for anxiety  - dc-ed hydrocan and started oxy 5mg q6hrs     PULMONARY:  Acute Respiratory Failure 2/2 Tension PTX with effusion and possible abscess 2/2 suspected superinfection from COVID PNA  - Continue HFNC alternate with BiPAP Nocternal/PRN   - Titrate settings as tolerated to maintain SpO2>88%   - Encourage prone positioning and bed in chair position.   - s/p 8/26 chest tube placement by CTsx with 1000cc serosanguinous drainage   - CXR shows tube in place, decreased midright lung opacification from yesterday, however diffuse airspace opacities are seen throughout both lungs, consistent with patient's known Covid 19 pneumonia. Right pleural effusion. Previously seen right pneumothorax is again seen but decreased in size status post right chest tube placement.       CARDIOLOGY:  - Tachycardia secondary to possible low volume vs pneumonia      GASTROINTESTINAL:  - Soft diet w/ supp shakes while intermittently on HFNC  - Protonix 40mg QD while intermittently NPO on Bipap  - Bowel regimen with Miralax and Senna BM. Last BM 8/12.     RENAL/:  - Strict I&Os   - Monitor and replete lytes daily  - low urinary output overnight; encourage PO intake    INFECTIOUS DISEASE:  COVID-19  - s/p completed courses of Remdesivir x 5 days and Decadron x 10 days   - s/p Toci on 7/30  - Zosyn 3.375 q8hrs for cavitary pna  - f/u beta d-glycans  - MRSA PCR neg  - f/u quant  - f/u sputum cultures    HEMATOLOGY:  - Full AC Lovenox 90mg BID   - Consider CTA chest when stabilized    ENDOCRINE:  - HbA1c 6.0    ETHICS/DISPOSITION:  - Full code   - Remain in ICU       55yr old male with HTN, RLE DVT, and PE (not on home AC) presents with progressively worsening SOB, intermittent fevers, COVID positive, admitted for COVID PNA.  Course complicated by worsening respiratory failure  2/2 tension PTX and pleural effusion requiring NIPPV and ICU admission.     NEUROLOGY:  - A&Ox4 at baseline; No Active Issues   - Klonopin 0.5 mg q8h for anxiety  - dc-ed hydrocan and started oxy 5mg q6hrs     PULMONARY:  Acute Respiratory Failure 2/2 Tension PTX with effusion and possible abscess 2/2 suspected superinfection from COVID PNA  - Continue HFNC alternate with BiPAP Nocternal/PRN   - Titrate settings as tolerated to maintain SpO2>88%   - Encourage prone positioning and bed in chair position.   - s/p 8/26 chest tube placement by CTsx with 1000cc serosanguinous drainage   - CXR shows tube in place, decreased midright lung opacification from yesterday, however diffuse airspace opacities are seen throughout both lungs, consistent with patient's known Covid 19 pneumonia. Right pleural effusion. Previously seen right pneumothorax is again seen but decreased in size status post right chest tube placement.   - Ordered CT chest non-contrast  - informed CT surgery that patient may have a second PTX      CARDIOLOGY:  - Tachycardia secondary to possible low volume vs pneumonia      GASTROINTESTINAL:  - Soft diet w/ supp shakes while intermittently on HFNC  - Protonix 40mg QD while intermittently NPO on Bipap  - Bowel regimen with Miralax and Senna BM. Last BM 8/12.     RENAL/:  - Strict I&Os   - Monitor and replete lytes daily  - low urinary output overnight; encourage PO intake    INFECTIOUS DISEASE:  COVID-19  - s/p completed courses of Remdesivir x 5 days and Decadron x 10 days   - s/p Toci on 7/30  - Zosyn 3.375 q8hrs for cavitary pna  - f/u beta d-glycans  - MRSA PCR neg  - f/u quant  - f/u sputum cultures    HEMATOLOGY:  - Full AC Lovenox 90mg BID   - Consider CTA chest when stabilized    ENDOCRINE:  - HbA1c 6.0    ETHICS/DISPOSITION:  - Full code   - Remain in ICU       55yr old male with HTN, RLE DVT, and PE (not on home AC) presents with progressively worsening SOB, intermittent fevers, COVID positive, admitted for COVID PNA.  Course complicated by worsening respiratory failure  2/2 tension PTX and pleural effusion requiring NIPPV and ICU admission.     NEUROLOGY:  - A&Ox4 at baseline; No Active Issues   - Klonopin 0.5 mg q8h for anxiety  - dc-ed hydrocan and started oxy 5mg q6hrs     PULMONARY:  Acute Respiratory Failure 2/2 Tension PTX with effusion and possible abscess 2/2 suspected superinfection from COVID PNA  - Continue HFNC alternate with BiPAP Nocternal/PRN   - Titrate settings as tolerated to maintain SpO2>88%   - Encourage prone positioning and bed in chair position.   - s/p 8/26 chest tube placement by CTsx with 1000cc serosanguinous drainage   - CXR shows tube in place, decreased midright lung opacification from yesterday, however diffuse airspace opacities are seen throughout both lungs, consistent with patient's known Covid 19 pneumonia. Right pleural effusion. Previously seen right pneumothorax is again seen but decreased in size status post right chest tube placement.   - Ordered CT chest non-contrast  - CT surgery is following and recommend monitoring    CARDIOLOGY:  - Tachycardia secondary to possible low volume vs pneumonia    GASTROINTESTINAL:  - Soft diet w/ supp shakes while intermittently on HFNC  - Protonix 40mg QD while intermittently NPO on Bipap  - Bowel regimen with Miralax and Senna BM. Last BM 8/12.     RENAL/:  - Strict I&Os   - Monitor and replete lytes daily  - low urinary output overnight; encourage PO intake    INFECTIOUS DISEASE:  COVID-19  - s/p completed courses of Remdesivir x 5 days and Decadron x 10 days   - s/p Toci on 7/30  - Zosyn 3.375 q8hrs for cavitary pna  - f/u beta d-glycans  - MRSA PCR neg  - f/u quant  - f/u sputum cultures    HEMATOLOGY:  - Full AC Lovenox 90mg BID   - Consider CTA chest when stabilized    ENDOCRINE:  - HbA1c 6.0    ETHICS/DISPOSITION:  - Full code   - Remain in ICU

## 2021-08-28 NOTE — PROGRESS NOTE ADULT - PROBLEM SELECTOR PLAN 1
8/26 Right open chest tube placed at bedside for pneumothorax  Maintain right chest tube to dry suction -40mmHg  Monitor chest tube for air leak  Daily CXR while chest tube in place  Continue care as per MICU

## 2021-08-28 NOTE — PROGRESS NOTE ADULT - SUBJECTIVE AND OBJECTIVE BOX
Iris Knox (PGY-1)    INTERVAL HPI/OVERNIGHT EVENTS:    SUBJECTIVE: Patient seen and examined at bedside.     CONSTITUTIONAL: No weakness, fevers or chills  EYES/ENT: No visual changes;  No vertigo or throat pain   NECK: No pain or stiffness  RESPIRATORY: No cough, wheezing, hemoptysis; No shortness of breath  CARDIOVASCULAR: No chest pain or palpitations  GASTROINTESTINAL: No abdominal or epigastric pain. No nausea, vomiting, or hematemesis; No diarrhea or constipation. No melena or hematochezia.  GENITOURINARY: No dysuria, frequency or hematuria  NEUROLOGICAL: No numbness or weakness  SKIN: No itching, rashes    OBJECTIVE:    VITAL SIGNS:  ICU Vital Signs Last 24 Hrs  T(C): 36.4 (28 Aug 2021 08:00), Max: 37.4 (27 Aug 2021 20:00)  T(F): 97.5 (28 Aug 2021 08:00), Max: 99.4 (27 Aug 2021 20:00)  HR: 79 (28 Aug 2021 07:00) (71 - 132)  BP: 148/80 (28 Aug 2021 07:00) (123/72 - 159/96)  BP(mean): 107 (28 Aug 2021 07:00) (89 - 120)  ABP: --  ABP(mean): --  RR: 17 (28 Aug 2021 07:00) (15 - 48)  SpO2: 100% (28 Aug 2021 07:00) (88% - 100%)        08-27 @ 07:01  -  08-28 @ 07:00  --------------------------------------------------------  IN: 750 mL / OUT: 350 mL / NET: 400 mL      CAPILLARY BLOOD GLUCOSE          PHYSICAL EXAM:    General: NAD  HEENT: NC/AT; PERRL, clear conjunctiva  Neck: supple  Respiratory: CTA b/l  Cardiovascular: +S1/S2; RRR  Abdomen: soft, NT/ND; +BS x4  Extremities: WWP, 2+ peripheral pulses b/l; no LE edema  Skin: normal color and turgor; no rash  Neurological:    MEDICATIONS:  MEDICATIONS  (STANDING):  benzocaine 15 mG/menthol 3.6 mG (Sugar-Free) Lozenge 1 Lozenge Oral once  benzonatate 200 milliGRAM(s) Oral every 8 hours  budesonide 160 MICROgram(s)/formoterol 4.5 MICROgram(s) Inhaler 2 Puff(s) Inhalation two times a day  chlorhexidine 4% Liquid 1 Application(s) Topical <User Schedule>  chlorhexidine 4% Liquid 1 Application(s) Topical <User Schedule>  cholecalciferol 2000 Unit(s) Oral daily  clonazePAM  Tablet 0.5 milliGRAM(s) Oral every 8 hours  dexAMETHasone  Injectable 6 milliGRAM(s) IV Push every 8 hours  enoxaparin Injectable 90 milliGRAM(s) SubCutaneous every 12 hours  guaiFENesin ER 1200 milliGRAM(s) Oral every 12 hours  lidocaine   4% Patch 1 Patch Transdermal every 24 hours  melatonin 3 milliGRAM(s) Oral at bedtime  multivitamin 1 Tablet(s) Oral daily  pantoprazole  Injectable 40 milliGRAM(s) IV Push daily  piperacillin/tazobactam IVPB.. 3.375 Gram(s) IV Intermittent every 8 hours  polyethylene glycol 3350 17 Gram(s) Oral daily  senna 2 Tablet(s) Oral at bedtime    MEDICATIONS  (PRN):  acetaminophen   Tablet .. 975 milliGRAM(s) Oral every 6 hours PRN Mild Pain (1 - 3)  albuterol/ipratropium for Nebulization 3 milliLiter(s) Nebulizer every 6 hours PRN Shortness of Breath and/or Wheezing  benzocaine 15 mG/menthol 3.6 mG (Sugar-Free) Lozenge 1 Lozenge Oral four times a day PRN Sore Throat  cyclobenzaprine 5 milliGRAM(s) Oral three times a day PRN Muscle Spasm  FIRST- Mouthwash  BLM 5 milliLiter(s) Swish and Spit once PRN Mouth Care  hydrocodone/homatropine Syrup 5 milliLiter(s) Oral every 4 hours PRN Cough  oxyCODONE    IR 5 milliGRAM(s) Oral every 4 hours PRN Moderate Pain (4 - 6)  sodium chloride 0.65% Nasal 1 Spray(s) Both Nostrils every 2 hours PRN Nasal Congestion  tetracaine/benzocaine/butamben Spray 1 Spray(s) Topical every 6 hours PRN pain, soreness      ALLERGIES:  Allergies    No Known Allergies    Intolerances        LABS:                        11.2   15.94 )-----------( 360      ( 28 Aug 2021 00:13 )             36.2     08-28    136  |  93<L>  |  8   ----------------------------<  136<H>  3.9   |  33<H>  |  0.60    Ca    8.8      28 Aug 2021 00:13  Phos  3.6     08-28  Mg     2.1     08-28    TPro  7.0  /  Alb  2.5<L>  /  TBili  0.3  /  DBili  x   /  AST  13  /  ALT  13  /  AlkPhos  67  08-28          RADIOLOGY & ADDITIONAL TESTS: Reviewed. Iris Knox (PGY-1)    INTERVAL HPI/OVERNIGHT EVENTS:  - No acute events  - Found to have laryngitis w/o obstruction on endoscope and started on IV decadron  - 60 O2 conc / 40L  from 60 O2 conc/ 50L        SUBJECTIVE:   - Patient seen and examined at bedside   - Patient has intermittent bouts of coughing and reports feeling that his SQ emphysema has not improved and feels pain near chest tube site  - Patient reports absence of fevers, chills, overt CP, abd pain    ROS as indicated above; otherwise negative  CONSTITUTIONAL: No weakness, fevers or chills  RESPIRATORY: + cough but no wheezing, hemoptysis; No shortness of breath  CARDIOVASCULAR: +chest pain but no palpitations  GASTROINTESTINAL: No abdominal or epigastric pain. No nausea, vomiting, or hematemesis; No diarrhea or constipation. No melena or hematochezia.  GENITOURINARY: No dysuria, frequency or hematuria  NEUROLOGICAL: No numbness or weakness    OBJECTIVE:    VITAL SIGNS:  ICU Vital Signs Last 24 Hrs  T(C): 36.4 (28 Aug 2021 08:00), Max: 37.4 (27 Aug 2021 20:00)  T(F): 97.5 (28 Aug 2021 08:00), Max: 99.4 (27 Aug 2021 20:00)  HR: 79 (28 Aug 2021 07:00) (71 - 132)  BP: 148/80 (28 Aug 2021 07:00) (123/72 - 159/96)  BP(mean): 107 (28 Aug 2021 07:00) (89 - 120)  RR: 17 (28 Aug 2021 07:00) (15 - 48)  SpO2: 100% (28 Aug 2021 07:00) (88% - 100%)      08-27 @ 07:01  -  08-28 @ 07:00  --------------------------------------------------------  IN: 750 mL / OUT: 350 mL / NET: 400 mL      CAPILLARY BLOOD GLUCOSE    PHYSICAL EXAM:  General: NAD  HEENT: clear conjunctiva  Respiratory: +rhonchi R>L +on HFNC. right sided chest tube draining; air leak suspected  Cardiovascular: +S1/S2; RRR  Abdomen: soft, NT/ND  Extremities: radial pulses intact bilaterally; no LE edema  Skin: normal color  Neurological: AxO3    MEDICATIONS:  MEDICATIONS  (STANDING):  benzocaine 15 mG/menthol 3.6 mG (Sugar-Free) Lozenge 1 Lozenge Oral once  benzonatate 200 milliGRAM(s) Oral every 8 hours  budesonide 160 MICROgram(s)/formoterol 4.5 MICROgram(s) Inhaler 2 Puff(s) Inhalation two times a day  chlorhexidine 4% Liquid 1 Application(s) Topical <User Schedule>  chlorhexidine 4% Liquid 1 Application(s) Topical <User Schedule>  cholecalciferol 2000 Unit(s) Oral daily  clonazePAM  Tablet 0.5 milliGRAM(s) Oral every 8 hours  dexAMETHasone  Injectable 6 milliGRAM(s) IV Push every 8 hours  enoxaparin Injectable 90 milliGRAM(s) SubCutaneous every 12 hours  guaiFENesin ER 1200 milliGRAM(s) Oral every 12 hours  lidocaine   4% Patch 1 Patch Transdermal every 24 hours  melatonin 3 milliGRAM(s) Oral at bedtime  multivitamin 1 Tablet(s) Oral daily  pantoprazole  Injectable 40 milliGRAM(s) IV Push daily  piperacillin/tazobactam IVPB.. 3.375 Gram(s) IV Intermittent every 8 hours  polyethylene glycol 3350 17 Gram(s) Oral daily  senna 2 Tablet(s) Oral at bedtime    MEDICATIONS  (PRN):  acetaminophen   Tablet .. 975 milliGRAM(s) Oral every 6 hours PRN Mild Pain (1 - 3)  albuterol/ipratropium for Nebulization 3 milliLiter(s) Nebulizer every 6 hours PRN Shortness of Breath and/or Wheezing  benzocaine 15 mG/menthol 3.6 mG (Sugar-Free) Lozenge 1 Lozenge Oral four times a day PRN Sore Throat  cyclobenzaprine 5 milliGRAM(s) Oral three times a day PRN Muscle Spasm  FIRST- Mouthwash  BLM 5 milliLiter(s) Swish and Spit once PRN Mouth Care  hydrocodone/homatropine Syrup 5 milliLiter(s) Oral every 4 hours PRN Cough  oxyCODONE    IR 5 milliGRAM(s) Oral every 4 hours PRN Moderate Pain (4 - 6)  sodium chloride 0.65% Nasal 1 Spray(s) Both Nostrils every 2 hours PRN Nasal Congestion  tetracaine/benzocaine/butamben Spray 1 Spray(s) Topical every 6 hours PRN pain, soreness      ALLERGIES:  Allergies    No Known Allergies    Intolerances        LABS:                        11.2   15.94 )-----------( 360      ( 28 Aug 2021 00:13 )             36.2     08-28    136  |  93<L>  |  8   ----------------------------<  136<H>  3.9   |  33<H>  |  0.60    Ca    8.8      28 Aug 2021 00:13  Phos  3.6     08-28  Mg     2.1     08-28    TPro  7.0  /  Alb  2.5<L>  /  TBili  0.3  /  DBili  x   /  AST  13  /  ALT  13  /  AlkPhos  67  08-28          RADIOLOGY & ADDITIONAL TESTS: Reviewed.

## 2021-08-28 NOTE — PROGRESS NOTE ADULT - ASSESSMENT
pt w/ covid  hypoxia   s/p steroids   s/p remdesivir   id / f/u   pulm f/u   c/e resp support/bipap/h/f as needed  h/f as needed   s/p rrt  cards f.u  cta noted. Continue abs  s/p toci  gi / dvt proph  o2  hx htn/   Pneumothorax - s/p CT  MICU care

## 2021-08-28 NOTE — PROGRESS NOTE ADULT - ASSESSMENT
54yo M with h/o appendectomy admitted to the hospital with COVID and possible superimposed pneumonia found to have a right moderate-sized pneumothorax with leftward shift and small pleural effusion.     8/26 Right open chest tube placed at bedside, Maintain to wall suction -40mmHg  8/27 Remains on hi flow- increased subq emphysema, repeat CXR w/o signs of increasing PTX, chest tube +airleak, functioning, Maintain to dry suction -40mmHg  8/28 VSS, CXR with subcutaneous emphysema and persistent right ptx, maintain right chest tube to suction.

## 2021-08-29 LAB
ALBUMIN SERPL ELPH-MCNC: 2.8 G/DL — LOW (ref 3.3–5)
ALP SERPL-CCNC: 66 U/L — SIGNIFICANT CHANGE UP (ref 40–120)
ALT FLD-CCNC: 17 U/L — SIGNIFICANT CHANGE UP (ref 10–45)
ANION GAP SERPL CALC-SCNC: 10 MMOL/L — SIGNIFICANT CHANGE UP (ref 5–17)
AST SERPL-CCNC: 16 U/L — SIGNIFICANT CHANGE UP (ref 10–40)
BASOPHILS # BLD AUTO: 0.04 K/UL — SIGNIFICANT CHANGE UP (ref 0–0.2)
BASOPHILS NFR BLD AUTO: 0.2 % — SIGNIFICANT CHANGE UP (ref 0–2)
BILIRUB SERPL-MCNC: 0.2 MG/DL — SIGNIFICANT CHANGE UP (ref 0.2–1.2)
BUN SERPL-MCNC: 10 MG/DL — SIGNIFICANT CHANGE UP (ref 7–23)
CALCIUM SERPL-MCNC: 9.4 MG/DL — SIGNIFICANT CHANGE UP (ref 8.4–10.5)
CHLORIDE SERPL-SCNC: 97 MMOL/L — SIGNIFICANT CHANGE UP (ref 96–108)
CO2 SERPL-SCNC: 34 MMOL/L — HIGH (ref 22–31)
CREAT SERPL-MCNC: 0.47 MG/DL — LOW (ref 0.5–1.3)
EOSINOPHIL # BLD AUTO: 0.09 K/UL — SIGNIFICANT CHANGE UP (ref 0–0.5)
EOSINOPHIL NFR BLD AUTO: 0.5 % — SIGNIFICANT CHANGE UP (ref 0–6)
GAS PNL BLDA: SIGNIFICANT CHANGE UP
GLUCOSE SERPL-MCNC: 136 MG/DL — HIGH (ref 70–99)
HCT VFR BLD CALC: 34.5 % — LOW (ref 39–50)
HGB BLD-MCNC: 10.6 G/DL — LOW (ref 13–17)
IMM GRANULOCYTES NFR BLD AUTO: 1.3 % — SIGNIFICANT CHANGE UP (ref 0–1.5)
LYMPHOCYTES # BLD AUTO: 1.52 K/UL — SIGNIFICANT CHANGE UP (ref 1–3.3)
LYMPHOCYTES # BLD AUTO: 9.2 % — LOW (ref 13–44)
MAGNESIUM SERPL-MCNC: 2.5 MG/DL — SIGNIFICANT CHANGE UP (ref 1.6–2.6)
MCHC RBC-ENTMCNC: 27.6 PG — SIGNIFICANT CHANGE UP (ref 27–34)
MCHC RBC-ENTMCNC: 30.7 GM/DL — LOW (ref 32–36)
MCV RBC AUTO: 89.8 FL — SIGNIFICANT CHANGE UP (ref 80–100)
MONOCYTES # BLD AUTO: 0.62 K/UL — SIGNIFICANT CHANGE UP (ref 0–0.9)
MONOCYTES NFR BLD AUTO: 3.7 % — SIGNIFICANT CHANGE UP (ref 2–14)
NEUTROPHILS # BLD AUTO: 14.06 K/UL — HIGH (ref 1.8–7.4)
NEUTROPHILS NFR BLD AUTO: 85.1 % — HIGH (ref 43–77)
NRBC # BLD: 0 /100 WBCS — SIGNIFICANT CHANGE UP (ref 0–0)
PHOSPHATE SERPL-MCNC: 2.9 MG/DL — SIGNIFICANT CHANGE UP (ref 2.5–4.5)
PLATELET # BLD AUTO: 386 K/UL — SIGNIFICANT CHANGE UP (ref 150–400)
POTASSIUM SERPL-MCNC: 4.3 MMOL/L — SIGNIFICANT CHANGE UP (ref 3.5–5.3)
POTASSIUM SERPL-SCNC: 4.3 MMOL/L — SIGNIFICANT CHANGE UP (ref 3.5–5.3)
PROT SERPL-MCNC: 7.3 G/DL — SIGNIFICANT CHANGE UP (ref 6–8.3)
RBC # BLD: 3.84 M/UL — LOW (ref 4.2–5.8)
RBC # FLD: 12.9 % — SIGNIFICANT CHANGE UP (ref 10.3–14.5)
SODIUM SERPL-SCNC: 141 MMOL/L — SIGNIFICANT CHANGE UP (ref 135–145)
WBC # BLD: 16.55 K/UL — HIGH (ref 3.8–10.5)
WBC # FLD AUTO: 16.55 K/UL — HIGH (ref 3.8–10.5)

## 2021-08-29 PROCEDURE — 99232 SBSQ HOSP IP/OBS MODERATE 35: CPT

## 2021-08-29 PROCEDURE — 99291 CRITICAL CARE FIRST HOUR: CPT

## 2021-08-29 RX ORDER — HYDROMORPHONE HYDROCHLORIDE 2 MG/ML
1 INJECTION INTRAMUSCULAR; INTRAVENOUS; SUBCUTANEOUS ONCE
Refills: 0 | Status: DISCONTINUED | OUTPATIENT
Start: 2021-08-29 | End: 2021-08-29

## 2021-08-29 RX ADMIN — OXYCODONE HYDROCHLORIDE 5 MILLIGRAM(S): 5 TABLET ORAL at 05:20

## 2021-08-29 RX ADMIN — DIPHENHYDRAMINE HYDROCHLORIDE AND LIDOCAINE HYDROCHLORIDE AND ALUMINUM HYDROXIDE AND MAGNESIUM HYDRO 5 MILLILITER(S): KIT at 11:54

## 2021-08-29 RX ADMIN — Medication 200 MILLIGRAM(S): at 05:19

## 2021-08-29 RX ADMIN — Medication 0.5 MILLIGRAM(S): at 22:00

## 2021-08-29 RX ADMIN — SENNA PLUS 2 TABLET(S): 8.6 TABLET ORAL at 22:00

## 2021-08-29 RX ADMIN — OXYCODONE HYDROCHLORIDE 5 MILLIGRAM(S): 5 TABLET ORAL at 07:07

## 2021-08-29 RX ADMIN — Medication 200 MILLIGRAM(S): at 22:00

## 2021-08-29 RX ADMIN — PIPERACILLIN AND TAZOBACTAM 25 GRAM(S): 4; .5 INJECTION, POWDER, LYOPHILIZED, FOR SOLUTION INTRAVENOUS at 11:51

## 2021-08-29 RX ADMIN — DIPHENHYDRAMINE HYDROCHLORIDE AND LIDOCAINE HYDROCHLORIDE AND ALUMINUM HYDROXIDE AND MAGNESIUM HYDRO 5 MILLILITER(S): KIT at 17:06

## 2021-08-29 RX ADMIN — OXYCODONE HYDROCHLORIDE 5 MILLIGRAM(S): 5 TABLET ORAL at 12:30

## 2021-08-29 RX ADMIN — CHLORHEXIDINE GLUCONATE 1 APPLICATION(S): 213 SOLUTION TOPICAL at 05:20

## 2021-08-29 RX ADMIN — OXYCODONE HYDROCHLORIDE 5 MILLIGRAM(S): 5 TABLET ORAL at 11:53

## 2021-08-29 RX ADMIN — ENOXAPARIN SODIUM 90 MILLIGRAM(S): 100 INJECTION SUBCUTANEOUS at 17:04

## 2021-08-29 RX ADMIN — BENZOCAINE AND MENTHOL 1 LOZENGE: 5; 1 LIQUID ORAL at 05:19

## 2021-08-29 RX ADMIN — Medication 0.5 MILLIGRAM(S): at 05:19

## 2021-08-29 RX ADMIN — PIPERACILLIN AND TAZOBACTAM 25 GRAM(S): 4; .5 INJECTION, POWDER, LYOPHILIZED, FOR SOLUTION INTRAVENOUS at 20:00

## 2021-08-29 RX ADMIN — Medication 0.5 MILLIGRAM(S): at 14:07

## 2021-08-29 RX ADMIN — Medication 200 MILLIGRAM(S): at 14:07

## 2021-08-29 RX ADMIN — LIDOCAINE 1 PATCH: 4 CREAM TOPICAL at 11:51

## 2021-08-29 RX ADMIN — HYDROMORPHONE HYDROCHLORIDE 1 MILLIGRAM(S): 2 INJECTION INTRAMUSCULAR; INTRAVENOUS; SUBCUTANEOUS at 18:14

## 2021-08-29 RX ADMIN — LIDOCAINE 1 PATCH: 4 CREAM TOPICAL at 19:00

## 2021-08-29 RX ADMIN — PANTOPRAZOLE SODIUM 40 MILLIGRAM(S): 20 TABLET, DELAYED RELEASE ORAL at 11:52

## 2021-08-29 RX ADMIN — LIDOCAINE 1 PATCH: 4 CREAM TOPICAL at 23:00

## 2021-08-29 RX ADMIN — OXYCODONE HYDROCHLORIDE 5 MILLIGRAM(S): 5 TABLET ORAL at 17:30

## 2021-08-29 RX ADMIN — ENOXAPARIN SODIUM 90 MILLIGRAM(S): 100 INJECTION SUBCUTANEOUS at 05:21

## 2021-08-29 RX ADMIN — Medication 3 MILLIGRAM(S): at 22:00

## 2021-08-29 RX ADMIN — HYDROMORPHONE HYDROCHLORIDE 1 MILLIGRAM(S): 2 INJECTION INTRAMUSCULAR; INTRAVENOUS; SUBCUTANEOUS at 18:45

## 2021-08-29 RX ADMIN — PIPERACILLIN AND TAZOBACTAM 25 GRAM(S): 4; .5 INJECTION, POWDER, LYOPHILIZED, FOR SOLUTION INTRAVENOUS at 05:00

## 2021-08-29 RX ADMIN — BUDESONIDE AND FORMOTEROL FUMARATE DIHYDRATE 2 PUFF(S): 160; 4.5 AEROSOL RESPIRATORY (INHALATION) at 17:13

## 2021-08-29 RX ADMIN — Medication 1200 MILLIGRAM(S): at 05:20

## 2021-08-29 RX ADMIN — DIPHENHYDRAMINE HYDROCHLORIDE AND LIDOCAINE HYDROCHLORIDE AND ALUMINUM HYDROXIDE AND MAGNESIUM HYDRO 5 MILLILITER(S): KIT at 05:20

## 2021-08-29 RX ADMIN — Medication 30 MILLILITER(S): at 21:16

## 2021-08-29 RX ADMIN — OXYCODONE HYDROCHLORIDE 5 MILLIGRAM(S): 5 TABLET ORAL at 17:03

## 2021-08-29 RX ADMIN — Medication 1 TABLET(S): at 11:52

## 2021-08-29 RX ADMIN — BUDESONIDE AND FORMOTEROL FUMARATE DIHYDRATE 2 PUFF(S): 160; 4.5 AEROSOL RESPIRATORY (INHALATION) at 06:24

## 2021-08-29 RX ADMIN — Medication 2000 UNIT(S): at 11:56

## 2021-08-29 NOTE — PROGRESS NOTE ADULT - PROBLEM SELECTOR PLAN 1
- Continue IV Zosyn  - PPI.   - Cepacol Lozenges.   - Magic Mouthwash.   - Mucinex/ Hycodan/ Tessalon prn cough.   - Humidified O2 prn.   - Thoracic Surgery following PTX and SQ Emphysema.   - ENT will follow.   - Call with questions.

## 2021-08-29 NOTE — PROGRESS NOTE ADULT - SUBJECTIVE AND OBJECTIVE BOX
ENT ISSUE/POD: subq emphysema neck/face    HPI: 56 YO M with PMH of HTN, RLE DVT, and PE (not on home AC) presents with progressively worsening SOB, intermittent fevers, COVID positive, admitted for COVID PNA. Patient with intermittently worsening respiratory status, currently off BiPAP but requiring HFNC, currently being treated for pneumonia.  Patient has had several RRTs for tachypnea and hypoxia during admission. Course complicated by worsening respiratory failure 2/2 tension PTX and pleural effusion requiring NIPPV and ICU admission. ENT was consulted for evaluation of Throat Tightness and Voice changes. Per pt and family, no recent Intubation/ NG Tube placement. Last Intubation x 5 Years ago for Knee Sx. Pt also reports painful swallowing of solid/liquid foods Currently on soft diet. Admits to productive cough, noted to be self suctioning at bedside. Pt with right moderate-sized pneumothorax with leftward shift and small pleural effusion found on CXR [8/26], Chest Tube was placed by Thoracic Surgery on 8/26. Pt denies rhinorrhea, post nasal drip, N/V, Abdominal Pain, Palpitations/Diaphoresis, HA/Dizziness, fever/chills, recent travel. 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 Pt this am mentions significant improvement in breathing and throat tightness, states that he ate breakfast this morning with no issues.         PAST MEDICAL & SURGICAL HISTORY:  Hyperlipidemia    HTN (hypertension)    Rupture, tendon, quadriceps    History of Achilles tendon repair      Allergies    No Known Allergies    Intolerances      MEDICATIONS  (STANDING):  benzocaine 15 mG/menthol 3.6 mG (Sugar-Free) Lozenge 1 Lozenge Oral once  benzonatate 200 milliGRAM(s) Oral every 8 hours  budesonide 160 MICROgram(s)/formoterol 4.5 MICROgram(s) Inhaler 2 Puff(s) Inhalation two times a day  chlorhexidine 4% Liquid 1 Application(s) Topical <User Schedule>  chlorhexidine 4% Liquid 1 Application(s) Topical <User Schedule>  cholecalciferol 2000 Unit(s) Oral daily  clonazePAM  Tablet 0.5 milliGRAM(s) Oral every 8 hours  enoxaparin Injectable 90 milliGRAM(s) SubCutaneous every 12 hours  FIRST- Mouthwash  BLM 5 milliLiter(s) Swish and Spit every 6 hours  lidocaine   4% Patch 1 Patch Transdermal every 24 hours  melatonin 3 milliGRAM(s) Oral at bedtime  multivitamin 1 Tablet(s) Oral daily  oxyCODONE    IR 5 milliGRAM(s) Oral every 6 hours  pantoprazole  Injectable 40 milliGRAM(s) IV Push daily  piperacillin/tazobactam IVPB.. 3.375 Gram(s) IV Intermittent every 8 hours  polyethylene glycol 3350 17 Gram(s) Oral daily  senna 2 Tablet(s) Oral at bedtime    MEDICATIONS  (PRN):  acetaminophen   Tablet .. 975 milliGRAM(s) Oral every 6 hours PRN Mild Pain (1 - 3)  albuterol/ipratropium for Nebulization 3 milliLiter(s) Nebulizer every 6 hours PRN Shortness of Breath and/or Wheezing  benzocaine 15 mG/menthol 3.6 mG (Sugar-Free) Lozenge 1 Lozenge Oral four times a day PRN Sore Throat  cyclobenzaprine 5 milliGRAM(s) Oral three times a day PRN Muscle Spasm  sodium chloride 0.65% Nasal 1 Spray(s) Both Nostrils every 2 hours PRN Nasal Congestion      Social History: see consult    Family history: see consult    ROS:   ENT: all negative except as noted in HPI   Pulm: denies SOB, cough, hemoptysis  Neuro: denies numbness/tingling, loss of sensation  Endo: denies heat/cold intolerance, excessive sweating      Vital Signs Last 24 Hrs  T(C): 36.5 (29 Aug 2021 08:00), Max: 37.1 (29 Aug 2021 04:00)  T(F): 97.7 (29 Aug 2021 08:00), Max: 98.7 (29 Aug 2021 04:00)  HR: 92 (29 Aug 2021 10:00) (55 - 103)  BP: 128/68 (29 Aug 2021 10:00) (114/78 - 158/75)  BP(mean): 92 (29 Aug 2021 10:00) (83 - 113)  RR: 31 (29 Aug 2021 10:00) (14 - 32)  SpO2: 96% (29 Aug 2021 10:00) (80% - 100%)                          10.6   16.55 )-----------( 386      ( 29 Aug 2021 04:45 )             34.5    08-29    141  |  97  |  10  ----------------------------<  136<H>  4.3   |  34<H>  |  0.47<L>    Ca    9.4      29 Aug 2021 04:46  Phos  2.9     08-29  Mg     2.5     08-29    TPro  7.3  /  Alb  2.8<L>  /  TBili  0.2  /  DBili  x   /  AST  16  /  ALT  17  /  AlkPhos  66  08-29       PHYSICAL EXAM:  Gen: On HF NC.   Skin: No rashes, bruises, or lesions.  Head: Normocephalic, Atraumatic.  Face: no edema, erythema, or fluctuance. Parotid glands soft without mass.  Eyes: no scleral injection.  Nose: Nares bilaterally patent, no discharge  Mouth: No Stridor / Drooling / Trismus.  Mucosa moist, tongue/uvula midline, oropharynx clear.  Neck: + SQ Emphysema, + crepitus noted around the neck L>R. Trachea midline, no masses.  Lymphatic: No lymphadenopathy.  Resp: On HF NC, no stridor.  Neuro: facial nerve intact, no facial droop.

## 2021-08-29 NOTE — PROGRESS NOTE ADULT - SUBJECTIVE AND OBJECTIVE BOX
CARDIOLOGY FOLLOW UP NOTE - DR. ARMAS    Patient Name: KARISSA VILLALBA  Date of Service: 08-29-21       events noted  less cough    Subjective:    cv: + chest pain, dyspnea, palpitations, dizziness  pulmonary: + cough  GI: denies abdominal pain, nausea, vomiting  vascular/legs: no edema   skin: no rash  ROS: otherwise negative   overnight events:      PHYSICAL EXAM:  T(C): 36.5 (08-29-21 @ 08:00), Max: 37.1 (08-29-21 @ 04:00)  HR: 92 (08-29-21 @ 10:00) (55 - 103)  BP: 128/68 (08-29-21 @ 10:00) (114/78 - 158/75)  RR: 31 (08-29-21 @ 10:00) (14 - 32)  SpO2: 96% (08-29-21 @ 10:00) (80% - 100%)  Wt(kg): --  I&O's Summary    28 Aug 2021 07:01  -  29 Aug 2021 07:00  --------------------------------------------------------  IN: 980 mL / OUT: 1150 mL / NET: -170 mL    29 Aug 2021 07:01  -  29 Aug 2021 12:59  --------------------------------------------------------  IN: 50 mL / OUT: 0 mL / NET: 50 mL      Daily     Daily     Appearance: Normal	  Cardiovascular: Normal S1 S2,RRR, No JVD, No murmurs  Respiratory: Lungs clear to auscultation	  Gastrointestinal:  Soft, Non-tender, + BS	  Extremities: Normal range of motion, No clubbing, cyanosis or edema      Home Medications:  Albuterol (Eqv-ProAir HFA) 90 mcg/inh inhalation aerosol: 2 puff(s) inhaled every 6 hours, As Needed (29 Jul 2021 09:08)  ivermectin 3 mg oral tablet: 1 tab(s) orally once a day (29 Jul 2021 09:08)  levoFLOXacin 500 mg oral tablet: 1 tab(s) orally every 24 hours (29 Jul 2021 09:08)  predniSONE 50 mg oral tablet: 1 tab(s) orally once a day (29 Jul 2021 09:08)      MEDICATIONS  (STANDING):  benzocaine 15 mG/menthol 3.6 mG (Sugar-Free) Lozenge 1 Lozenge Oral once  benzonatate 200 milliGRAM(s) Oral every 8 hours  budesonide 160 MICROgram(s)/formoterol 4.5 MICROgram(s) Inhaler 2 Puff(s) Inhalation two times a day  chlorhexidine 4% Liquid 1 Application(s) Topical <User Schedule>  chlorhexidine 4% Liquid 1 Application(s) Topical <User Schedule>  cholecalciferol 2000 Unit(s) Oral daily  clonazePAM  Tablet 0.5 milliGRAM(s) Oral every 8 hours  enoxaparin Injectable 90 milliGRAM(s) SubCutaneous every 12 hours  FIRST- Mouthwash  BLM 5 milliLiter(s) Swish and Spit every 6 hours  lidocaine   4% Patch 1 Patch Transdermal every 24 hours  melatonin 3 milliGRAM(s) Oral at bedtime  multivitamin 1 Tablet(s) Oral daily  oxyCODONE    IR 5 milliGRAM(s) Oral every 6 hours  pantoprazole  Injectable 40 milliGRAM(s) IV Push daily  piperacillin/tazobactam IVPB.. 3.375 Gram(s) IV Intermittent every 8 hours  polyethylene glycol 3350 17 Gram(s) Oral daily  senna 2 Tablet(s) Oral at bedtime      TELEMETRY: 	    ECG:  	  RADIOLOGY:   DIAGNOSTIC TESTING:  [ ] Echocardiogram:  [ ] Catheterization:  [ ] Stress Test:    OTHER: 	    LABS:	 	    CARDIAC MARKERS:                                      10.6   16.55 )-----------( 386      ( 29 Aug 2021 04:45 )             34.5     08-29    141  |  97  |  10  ----------------------------<  136<H>  4.3   |  34<H>  |  0.47<L>    Ca    9.4      29 Aug 2021 04:46  Phos  2.9     08-29  Mg     2.5     08-29    TPro  7.3  /  Alb  2.8<L>  /  TBili  0.2  /  DBili  x   /  AST  16  /  ALT  17  /  AlkPhos  66  08-29    proBNP:     Lipid Profile:   HgA1c:     Creatinine, Serum: 0.47 mg/dL (08-29-21 @ 04:46)  Creatinine, Serum: 0.60 mg/dL (08-28-21 @ 00:13)  Creatinine, Serum: 0.63 mg/dL (08-27-21 @ 00:26)

## 2021-08-29 NOTE — PROGRESS NOTE ADULT - ASSESSMENT
Echo 8/24/21: EF 65%, mild MR, grossly nl lv sys fx    a/p  56yo M with Hx of DVT s/p achilles tendon repair years ago not on AC presenting with complaints of SOB. intermittent and fevers with cough productive of white sputum for past week, tested positive for covid 2. Now transferred to MICU for tension pneumothorax, s/p chest tube.     #Atypical Chest Pain  -RRT 8/23 x2 for chest pain - exacerbated by cough and inspiration  -improved, secondary to covid PNA, PNX  -trop negative  -no ADHF noted on exam   -CTA without PE   -Echo noted w grossly nl lv sys fx, mild MR     #Covid -19, PNX  -s/p completed courses of Remdesivir x 5 days and Decadron x 10 days   -S/p Tocilizumab 7/30 per ID   -LE duplex 8/4 neg DVT  -CTA as above   -transferred to MICU, s/p R chest tube for pnx  -pulm f/u   -thoracic f/u    #Sinus tachycardia  -resolved  -likely secondary to covid PNA, volume, pain, PNX  -cont to monitor   -echo as above     #DVT (hx)  -LE doppler as above  -on full dose AC    #steroids for laryngitis/obstruction    care per MICU       45 minutes spent on total encounter; more than 50% of the visit was spent counseling and/or coordinating care by the attending physician.

## 2021-08-29 NOTE — PROGRESS NOTE ADULT - SUBJECTIVE AND OBJECTIVE BOX
Patient is a 55y old  Male who presents with a chief complaint of covid pna (27 Aug 2021 20:21)      Vital Signs Last 24 Hrs  T(C): 36.5 (08-29-21 @ 08:00), Max: 37.1 (08-29-21 @ 04:00)  T(F): 97.7 (08-29-21 @ 08:00), Max: 98.7 (08-29-21 @ 04:00)  HR: 72 (08-29-21 @ 09:15) (55 - 103)  BP: 128/70 (08-29-21 @ 09:00) (114/78 - 158/75)  RR: 29 (08-29-21 @ 09:15) (13 - 32)  SpO2: 80% (08-29-21 @ 09:15) (80% - 100%)              08-28-21 @ 07:01  -  08-29-21 @ 07:00  --------------------------------------------------------  IN: 980 mL / OUT: 1150 mL / NET: -170 mL                            10.6   16.55 )-----------( 386      ( 29 Aug 2021 04:45 )             34.5     141  |  97  |  10  ----------------------------<  136<H>  4.3   |  34<H>  |  0.47<L>            PHYSICAL EXAM  Neurology: Awake, NAD, A&Ox3  CV : RRR+S1S2  Lungs: +HF NC, B/L BS diminished at bases, +subQ emphysema  +Right chest tube to -40 dry suction, +airleak   Abdomen: Soft, NT/ND, +BSx4Q  Extremities: B/L LE edema, negative calf tenderness, +PP        MEDICATIONS  acetaminophen   Tablet .. 975 milliGRAM(s) Oral every 6 hours PRN  albuterol/ipratropium for Nebulization 3 milliLiter(s) Nebulizer every 6 hours PRN  benzocaine 15 mG/menthol 3.6 mG (Sugar-Free) Lozenge 1 Lozenge Oral four times a day PRN  benzocaine 15 mG/menthol 3.6 mG (Sugar-Free) Lozenge 1 Lozenge Oral once  benzonatate 200 milliGRAM(s) Oral every 8 hours  budesonide 160 MICROgram(s)/formoterol 4.5 MICROgram(s) Inhaler 2 Puff(s) Inhalation two times a day  chlorhexidine 4% Liquid 1 Application(s) Topical <User Schedule>  chlorhexidine 4% Liquid 1 Application(s) Topical <User Schedule>  cholecalciferol 2000 Unit(s) Oral daily  clonazePAM  Tablet 0.5 milliGRAM(s) Oral every 8 hours  cyclobenzaprine 5 milliGRAM(s) Oral three times a day PRN  enoxaparin Injectable 90 milliGRAM(s) SubCutaneous every 12 hours  FIRST- Mouthwash  BLM 5 milliLiter(s) Swish and Spit every 6 hours  guaiFENesin ER 1200 milliGRAM(s) Oral every 12 hours  lidocaine   4% Patch 1 Patch Transdermal every 24 hours  melatonin 3 milliGRAM(s) Oral at bedtime  multivitamin 1 Tablet(s) Oral daily  oxyCODONE    IR 5 milliGRAM(s) Oral every 6 hours  pantoprazole  Injectable 40 milliGRAM(s) IV Push daily  piperacillin/tazobactam IVPB.. 3.375 Gram(s) IV Intermittent every 8 hours  polyethylene glycol 3350 17 Gram(s) Oral daily  senna 2 Tablet(s) Oral at bedtime  sodium chloride 0.65% Nasal 1 Spray(s) Both Nostrils every 2 hours PRN

## 2021-08-29 NOTE — PROGRESS NOTE ADULT - ASSESSMENT
55yr old male with HTN, RLE DVT, and PE (not on home AC) presents with progressively worsening SOB, intermittent fevers, COVID positive, admitted for COVID PNA.  Course complicated by worsening respiratory failure  2/2 tension PTX and pleural effusion requiring NIPPV and ICU admission.     NEUROLOGY:  - A&Ox4 at baseline; No Active Issues   - Klonopin 0.5 mg q8h for anxiety  - dc-ed hydrocan and started oxy 5mg q6hrs     PULMONARY:  Acute Respiratory Failure 2/2 Tension PTX with effusion and possible abscess 2/2 suspected superinfection from COVID PNA  - Continue HFNC alternate with BiPAP Nocternal/PRN   - Titrate settings as tolerated to maintain SpO2>88%   - Encourage prone positioning and bed in chair position.   - s/p 8/26 chest tube placement by CTsx with 1000cc serosanguinous drainage   - CXR shows tube in place, decreased midright lung opacification from yesterday, however diffuse airspace opacities are seen throughout both lungs, consistent with patient's known Covid 19 pneumonia. Right pleural effusion. Previously seen right pneumothorax is again seen but decreased in size status post right chest tube placement.   - Ordered CT chest non-contrast  - CT surgery is following and recommend monitoring    CARDIOLOGY:  - Tachycardia secondary to possible low volume vs pneumonia    GASTROINTESTINAL:  - Soft diet w/ supp shakes while intermittently on HFNC  - Protonix 40mg QD while intermittently NPO on Bipap  - Bowel regimen with Miralax and Senna BM. Last BM 8/12.     RENAL/:  - Strict I&Os   - Monitor and replete lytes daily  - low urinary output overnight; encourage PO intake    INFECTIOUS DISEASE:  COVID-19  - s/p completed courses of Remdesivir x 5 days and Decadron x 10 days   - s/p Toci on 7/30  - Zosyn 3.375 q8hrs for cavitary pna  - f/u beta d-glycans  - MRSA PCR neg  - f/u quant  - f/u sputum cultures    HEMATOLOGY:  - Full AC Lovenox 90mg BID   - Consider CTA chest when stabilized    ENDOCRINE:  - HbA1c 6.0    ETHICS/DISPOSITION:  - Full code   - Remain in ICU   55yr old male with HTN, RLE DVT, and PE (not on home AC) presents with progressively worsening SOB, intermittent fevers, COVID positive, admitted for COVID PNA.  Course complicated by worsening respiratory failure  2/2 tension PTX and pleural effusion requiring NIPPV and ICU admission.     NEUROLOGY:  - A&Ox4 at baseline; No Active Issues   - Klonopin 0.5 mg q8h for anxiety  -  oxy 5mg q6hrs for cough suppression     PULMONARY:  Acute Respiratory Failure 2/2 Tension PTX with effusion and possible abscess 2/2 suspected superinfection from COVID PNA  - Continue HFNC  alternate with BiPAP Nocternal/PRN   - Titrate settings as tolerated to maintain SpO2>88%   - Encourage prone positioning and bed in chair position.   - 8/26 chest tube placement by CTsx   - CXR shows tube in place, decreased midright lung opacification from yesterday, however diffuse airspace opacities are seen throughout both lungs, consistent with patient's known Covid 19 pneumonia. Right pleural effusion. Previously seen right pneumothorax is again seen but decreased in size status post right chest tube placement.   - Ordered CT chest non-contrast  - CT surgery is following and recommend monitoring  - Chest PT ordered    CARDIOLOGY:  - Tachycardia resolved    GASTROINTESTINAL:  - Soft diet w/ supp shakes while intermittently on HFNC  - Protonix 40mg QD while intermittently NPO on Bipap  - Bowel regimen with Miralax and Senna BM. Last BM 8/29.     RENAL/:  - Strict I&Os     INFECTIOUS DISEASE:  COVID-19  - Zosyn 3.375 q8hrs for cavitary pna  - Dose abx pending growth  - Pleural culture shows gram negative rods.   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  - F/U Strep Throat Cx, CMV.   - ENT will follow.       HEMATOLOGY:  - Full AC Lovenox 90mg BID   - Consider CTA chest when stabilized    ENDOCRINE:  - HbA1c 6.0    ETHICS/DISPOSITION:  - Full code   - Remain in ICU

## 2021-08-29 NOTE — PROGRESS NOTE ADULT - ASSESSMENT
pt w/ covid  hypoxia   s/p steroids   s/p remdesivir   id / f/u   pulm f/u   c/e resp support/bipap/h/f as needed  h/f as needed   cards f.u  cta noted. Continue abs  s/p toci  gi / dvt proph  o2  hx htn/   Pneumothorax - s/p CT  thoracic f/u  imaging as per micu/thoracic  subq emphysema noted  MICU care

## 2021-08-29 NOTE — PROGRESS NOTE ADULT - SUBJECTIVE AND OBJECTIVE BOX
CHIEF COMPLAINT:    Interval Events:    REVIEW OF SYSTEMS:  Constitutional: [ ] negative [ ] fevers [ ] chills [ ] weight loss [ ] weight gain  HEENT: [ ] negative [ ] dry eyes [ ] eye irritation [ ] postnasal drip [ ] nasal congestion  CV: [ ] negative  [ ] chest pain [ ] orthopnea [ ] palpitations [ ] murmur  Resp: [ ] negative [ ] cough [ ] shortness of breath [ ] dyspnea [ ] wheezing [ ] sputum [ ] hemoptysis  GI: [ ] negative [ ] nausea [ ] vomiting [ ] diarrhea [ ] constipation [ ] abd pain [ ] dysphagia   : [ ] negative [ ] dysuria [ ] nocturia [ ] hematuria [ ] increased urinary frequency  Musculoskeletal: [ ] negative [ ] back pain [ ] myalgias [ ] arthralgias [ ] fracture  Skin: [ ] negative [ ] rash [ ] itch  Neurological: [ ] negative [ ] headache [ ] dizziness [ ] syncope [ ] weakness [ ] numbness  Psychiatric: [ ] negative [ ] anxiety [ ] depression  Endocrine: [ ] negative [ ] diabetes [ ] thyroid problem  Hematologic/Lymphatic: [ ] negative [ ] anemia [ ] bleeding problem  Allergic/Immunologic: [ ] negative [ ] itchy eyes [ ] nasal discharge [ ] hives [ ] angioedema  [ ] All other systems negative  [ ] Unable to assess ROS because ________    OBJECTIVE:  ICU Vital Signs Last 24 Hrs  T(C): 37.1 (29 Aug 2021 04:00), Max: 37.1 (29 Aug 2021 04:00)  T(F): 98.7 (29 Aug 2021 04:00), Max: 98.7 (29 Aug 2021 04:00)  HR: 78 (29 Aug 2021 07:00) (55 - 103)  BP: 127/73 (29 Aug 2021 07:00) (114/78 - 158/75)  BP(mean): 95 (29 Aug 2021 07:00) (83 - 113)  ABP: --  ABP(mean): --  RR: 21 (29 Aug 2021 07:00) (13 - 32)  SpO2: 92% (29 Aug 2021 07:00) (89% - 100%)        08-28 @ 07:01  -  08-29 @ 07:00  --------------------------------------------------------  IN: 980 mL / OUT: 1150 mL / NET: -170 mL      CAPILLARY BLOOD GLUCOSE          PHYSICAL EXAM:  General:   HEENT:   Lymph Nodes:  Neck:   Respiratory:   Cardiovascular:   Abdomen:   Extremities:   Skin:   Neurological:  Psychiatry:    LINES:    HOSPITAL MEDICATIONS:  Standing Meds:  benzocaine 15 mG/menthol 3.6 mG (Sugar-Free) Lozenge 1 Lozenge Oral once  benzonatate 200 milliGRAM(s) Oral every 8 hours  budesonide 160 MICROgram(s)/formoterol 4.5 MICROgram(s) Inhaler 2 Puff(s) Inhalation two times a day  chlorhexidine 4% Liquid 1 Application(s) Topical <User Schedule>  chlorhexidine 4% Liquid 1 Application(s) Topical <User Schedule>  cholecalciferol 2000 Unit(s) Oral daily  clonazePAM  Tablet 0.5 milliGRAM(s) Oral every 8 hours  enoxaparin Injectable 90 milliGRAM(s) SubCutaneous every 12 hours  FIRST- Mouthwash  BLM 5 milliLiter(s) Swish and Spit every 6 hours  guaiFENesin ER 1200 milliGRAM(s) Oral every 12 hours  lidocaine   4% Patch 1 Patch Transdermal every 24 hours  melatonin 3 milliGRAM(s) Oral at bedtime  multivitamin 1 Tablet(s) Oral daily  oxyCODONE    IR 5 milliGRAM(s) Oral every 6 hours  pantoprazole  Injectable 40 milliGRAM(s) IV Push daily  piperacillin/tazobactam IVPB.. 3.375 Gram(s) IV Intermittent every 8 hours  polyethylene glycol 3350 17 Gram(s) Oral daily  senna 2 Tablet(s) Oral at bedtime      PRN Meds:  acetaminophen   Tablet .. 975 milliGRAM(s) Oral every 6 hours PRN  albuterol/ipratropium for Nebulization 3 milliLiter(s) Nebulizer every 6 hours PRN  benzocaine 15 mG/menthol 3.6 mG (Sugar-Free) Lozenge 1 Lozenge Oral four times a day PRN  cyclobenzaprine 5 milliGRAM(s) Oral three times a day PRN  sodium chloride 0.65% Nasal 1 Spray(s) Both Nostrils every 2 hours PRN      LABS:                        10.6   16.55 )-----------( 386      ( 29 Aug 2021 04:45 )             34.5     Hgb Trend: 10.6<--, 11.2<--, 11.1<--, 11.7<--, 11.5<--  08-29    141  |  97  |  10  ----------------------------<  136<H>  4.3   |  34<H>  |  0.47<L>    Ca    9.4      29 Aug 2021 04:46  Phos  2.9     08-29  Mg     2.5     08-29    TPro  7.3  /  Alb  2.8<L>  /  TBili  0.2  /  DBili  x   /  AST  16  /  ALT  17  /  AlkPhos  66  08-29    Creatinine Trend: 0.47<--, 0.60<--, 0.63<--, 0.63<--, 0.64<--, 0.68<--      Arterial Blood Gas:  08-29 @ 04:43  7.48/58/69/43/96.6/17.1  ABG lactate: --        MICROBIOLOGY:     Culture - Fungal, Body Fluid (collected 26 Aug 2021 23:15)  Source: .Body Fluid Pleural Fluid  Preliminary Report (27 Aug 2021 06:40):    Testing in progress    Culture - Body Fluid with Gram Stain (collected 26 Aug 2021 23:15)  Source: .Body Fluid Pleural Fluid  Gram Stain (27 Aug 2021 03:31):    polymorphonuclear leukocytes    Gram Negative Rods    by cytocentrifuge  Preliminary Report (27 Aug 2021 17:47):    No growth      RADIOLOGY:  [ ] Reviewed and interpreted by me    EKG:     Interval Events: Overnight, pt is coughing less with oxycodone 5mg q6. Has been able to sleep through the night. Seen by ENT for difficulty speak, found to have laryngitis, given 3 doses of steroids.     REVIEW OF SYSTEMS:  ROS as indicated above; otherwise negative  CONSTITUTIONAL: No weakness, fevers or chills  RESPIRATORY: + cough but no wheezing, hemoptysis; No shortness of breath  CARDIOVASCULAR: +chest pain but no palpitations  GASTROINTESTINAL: No abdominal or epigastric pain. No nausea, vomiting, or hematemesis; No diarrhea or constipation. No melena or hematochezia.  GENITOURINARY: No dysuria, frequency or hematuria  NEUROLOGICAL: No numbness or weakness      OBJECTIVE:  ICU Vital Signs Last 24 Hrs  T(C): 37.1 (29 Aug 2021 04:00), Max: 37.1 (29 Aug 2021 04:00)  T(F): 98.7 (29 Aug 2021 04:00), Max: 98.7 (29 Aug 2021 04:00)  HR: 78 (29 Aug 2021 07:00) (55 - 103)  BP: 127/73 (29 Aug 2021 07:00) (114/78 - 158/75)  BP(mean): 95 (29 Aug 2021 07:00) (83 - 113)  ABP: --  ABP(mean): --  RR: 21 (29 Aug 2021 07:00) (13 - 32)  SpO2: 92% (29 Aug 2021 07:00) (89% - 100%)        08-28 @ 07:01  -  08-29 @ 07:00  --------------------------------------------------------  IN: 980 mL / OUT: 1150 mL / NET: -170 mL      CAPILLARY BLOOD GLUCOSE        PHYSICAL EXAM:  General: NAD  HEENT: clear conjunctiva  Respiratory: +rhonchi R>L +on HFNC. right sided chest tube draining; air leak suspected  Cardiovascular: +S1/S2; RRR  Abdomen: soft, NT/ND  Extremities: radial pulses intact bilaterally; no LE edema. Crepitus in right chest , neck and arm.   Skin: normal color  Neurological: AxO3        HOSPITAL MEDICATIONS:  Standing Meds:  benzocaine 15 mG/menthol 3.6 mG (Sugar-Free) Lozenge 1 Lozenge Oral once  benzonatate 200 milliGRAM(s) Oral every 8 hours  budesonide 160 MICROgram(s)/formoterol 4.5 MICROgram(s) Inhaler 2 Puff(s) Inhalation two times a day  chlorhexidine 4% Liquid 1 Application(s) Topical <User Schedule>  chlorhexidine 4% Liquid 1 Application(s) Topical <User Schedule>  cholecalciferol 2000 Unit(s) Oral daily  clonazePAM  Tablet 0.5 milliGRAM(s) Oral every 8 hours  enoxaparin Injectable 90 milliGRAM(s) SubCutaneous every 12 hours  FIRST- Mouthwash  BLM 5 milliLiter(s) Swish and Spit every 6 hours  guaiFENesin ER 1200 milliGRAM(s) Oral every 12 hours  lidocaine   4% Patch 1 Patch Transdermal every 24 hours  melatonin 3 milliGRAM(s) Oral at bedtime  multivitamin 1 Tablet(s) Oral daily  oxyCODONE    IR 5 milliGRAM(s) Oral every 6 hours  pantoprazole  Injectable 40 milliGRAM(s) IV Push daily  piperacillin/tazobactam IVPB.. 3.375 Gram(s) IV Intermittent every 8 hours  polyethylene glycol 3350 17 Gram(s) Oral daily  senna 2 Tablet(s) Oral at bedtime      PRN Meds:  acetaminophen   Tablet .. 975 milliGRAM(s) Oral every 6 hours PRN  albuterol/ipratropium for Nebulization 3 milliLiter(s) Nebulizer every 6 hours PRN  benzocaine 15 mG/menthol 3.6 mG (Sugar-Free) Lozenge 1 Lozenge Oral four times a day PRN  cyclobenzaprine 5 milliGRAM(s) Oral three times a day PRN  sodium chloride 0.65% Nasal 1 Spray(s) Both Nostrils every 2 hours PRN      LABS:                        10.6   16.55 )-----------( 386      ( 29 Aug 2021 04:45 )             34.5     Hgb Trend: 10.6<--, 11.2<--, 11.1<--, 11.7<--, 11.5<--  08-29    141  |  97  |  10  ----------------------------<  136<H>  4.3   |  34<H>  |  0.47<L>    Ca    9.4      29 Aug 2021 04:46  Phos  2.9     08-29  Mg     2.5     08-29    TPro  7.3  /  Alb  2.8<L>  /  TBili  0.2  /  DBili  x   /  AST  16  /  ALT  17  /  AlkPhos  66  08-29    Creatinine Trend: 0.47<--, 0.60<--, 0.63<--, 0.63<--, 0.64<--, 0.68<--      Arterial Blood Gas:  08-29 @ 04:43  7.48/58/69/43/96.6/17.1  ABG lactate: --        MICROBIOLOGY:     Culture - Fungal, Body Fluid (collected 26 Aug 2021 23:15)  Source: .Body Fluid Pleural Fluid  Preliminary Report (27 Aug 2021 06:40):    Testing in progress    Culture - Body Fluid with Gram Stain (collected 26 Aug 2021 23:15)  Source: .Body Fluid Pleural Fluid  Gram Stain (27 Aug 2021 03:31):    polymorphonuclear leukocytes    Gram Negative Rods    by cytocentrifuge  Preliminary Report (27 Aug 2021 17:47):    No growth

## 2021-08-29 NOTE — PROGRESS NOTE ADULT - ASSESSMENT
56yo M with h/o appendectomy admitted to the hospital with COVID and possible superimposed pneumonia found to have a right moderate-sized pneumothorax with leftward shift and small pleural effusion.     8/26 Right open chest tube placed at bedside, Maintain to wall suction -40mmHg  8/27 Remains on hi flow- increased subq emphysema, repeat CXR w/o signs of increasing PTX, chest tube +airleak, functioning, Maintain to dry suction -40mmHg  8/28 VSS, CXR with subcutaneous emphysema and persistent right ptx, maintain right chest tube to suction.   8/29 VSS, pt still with air leak on chest tube. s/p non-con Chest CT: Trace right pleural effusion, unchanged. Trace right pneumothorax new from prior. Pneumomediastinum, pneumopericardium and bilateral subcutaneous emphysema new from prior.

## 2021-08-29 NOTE — PROGRESS NOTE ADULT - ASSESSMENT
54 YO M with PMH of HTN, RLE DVT, and PE (not on home AC) presents with progressively worsening SOB, intermittent fevers, COVID positive, admitted for COVID PNA. . ENT was consulted for evaluation of Throat Tightness and Voice changes. Pt also reports painful swallowing of solid/liquid foods Currently on soft diet. Admits to productive cough, noted to be self suctioning at bedside. 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 Airway patent, no foreign body visualized. Physical Exam significant for SQ Emphysema around the neck area. This morning patient feels significant improvement in breathing and neck tightness.

## 2021-08-29 NOTE — PROGRESS NOTE ADULT - PROBLEM SELECTOR PLAN 1
8/26 Right open chest tube placed at bedside for pneumothorax  8/28 CT Chest reviewed by Dr. Staton, no thoracic surgery intervention indicated, recommend continuing chest tube to LWS.  Maintain right chest tube to dry suction -40mmHg  Monitor chest tube for air leak  Daily CXR while chest tube in place  Continue care as per MICU

## 2021-08-29 NOTE — PROGRESS NOTE ADULT - SUBJECTIVE AND OBJECTIVE BOX
DATE OF SERVICE: 08-29-21 @ 10:26  CHIEF COMPLAINT:Patient is a 55y old  Male who presents with a chief complaint of covid pna (27 Aug 2021 20:21)    	        PAST MEDICAL & SURGICAL HISTORY:  Hyperlipidemia    HTN (hypertension)    Rupture, tendon, quadriceps    History of Achilles tendon repair            weak  sob/pain at times  no vomiting  no h/as    Medications:  MEDICATIONS  (STANDING):  benzocaine 15 mG/menthol 3.6 mG (Sugar-Free) Lozenge 1 Lozenge Oral once  benzonatate 200 milliGRAM(s) Oral every 8 hours  budesonide 160 MICROgram(s)/formoterol 4.5 MICROgram(s) Inhaler 2 Puff(s) Inhalation two times a day  chlorhexidine 4% Liquid 1 Application(s) Topical <User Schedule>  chlorhexidine 4% Liquid 1 Application(s) Topical <User Schedule>  cholecalciferol 2000 Unit(s) Oral daily  clonazePAM  Tablet 0.5 milliGRAM(s) Oral every 8 hours  enoxaparin Injectable 90 milliGRAM(s) SubCutaneous every 12 hours  FIRST- Mouthwash  BLM 5 milliLiter(s) Swish and Spit every 6 hours  guaiFENesin ER 1200 milliGRAM(s) Oral every 12 hours  lidocaine   4% Patch 1 Patch Transdermal every 24 hours  melatonin 3 milliGRAM(s) Oral at bedtime  multivitamin 1 Tablet(s) Oral daily  oxyCODONE    IR 5 milliGRAM(s) Oral every 6 hours  pantoprazole  Injectable 40 milliGRAM(s) IV Push daily  piperacillin/tazobactam IVPB.. 3.375 Gram(s) IV Intermittent every 8 hours  polyethylene glycol 3350 17 Gram(s) Oral daily  senna 2 Tablet(s) Oral at bedtime    MEDICATIONS  (PRN):  acetaminophen   Tablet .. 975 milliGRAM(s) Oral every 6 hours PRN Mild Pain (1 - 3)  albuterol/ipratropium for Nebulization 3 milliLiter(s) Nebulizer every 6 hours PRN Shortness of Breath and/or Wheezing  benzocaine 15 mG/menthol 3.6 mG (Sugar-Free) Lozenge 1 Lozenge Oral four times a day PRN Sore Throat  cyclobenzaprine 5 milliGRAM(s) Oral three times a day PRN Muscle Spasm  sodium chloride 0.65% Nasal 1 Spray(s) Both Nostrils every 2 hours PRN Nasal Congestion    	    PHYSICAL EXAM:  T(C): 36.5 (08-29-21 @ 08:00), Max: 37.1 (08-29-21 @ 04:00)  HR: 72 (08-29-21 @ 09:15) (55 - 103)  BP: 128/70 (08-29-21 @ 09:00) (114/78 - 158/75)  RR: 29 (08-29-21 @ 09:15) (13 - 32)  SpO2: 80% (08-29-21 @ 09:15) (80% - 100%)  Wt(kg): --  I&O's Summary    28 Aug 2021 07:01  -  29 Aug 2021 07:00  --------------------------------------------------------  IN: 980 mL / OUT: 1150 mL / NET: -170 mL    29 Aug 2021 07:01  -  29 Aug 2021 10:26  --------------------------------------------------------  IN: 50 mL / OUT: 0 mL / NET: 50 mL        Appearance: Normal	  HEENT:   Normal oral mucosa, PERRL, EOMI	  Lymphatic: No lymphadenopathy  Cardiovascular: Normal S1 S2, No JVD,   Respiratory: dec bs  ct in place  Skin: No rashes, No ecchymoses, No cyanosis	  Neurologic: Non-focal  Extremities: Normal range of motion, No clubbing, cyanosis or edema  Vascular: Peripheral pulses palpable 2+ bilaterally    TELEMETRY: 	    ECG:  	  RADIOLOGY:  OTHER: 	  	  LABS:	 	    CARDIAC MARKERS:                                10.6   16.55 )-----------( 386      ( 29 Aug 2021 04:45 )             34.5     08-29    141  |  97  |  10  ----------------------------<  136<H>  4.3   |  34<H>  |  0.47<L>    Ca    9.4      29 Aug 2021 04:46  Phos  2.9     08-29  Mg     2.5     08-29    TPro  7.3  /  Alb  2.8<L>  /  TBili  0.2  /  DBili  x   /  AST  16  /  ALT  17  /  AlkPhos  66  08-29    proBNP:   Lipid Profile:   HgA1c:   TSH:

## 2021-08-30 LAB
ALBUMIN SERPL ELPH-MCNC: 2.9 G/DL — LOW (ref 3.3–5)
ALP SERPL-CCNC: 60 U/L — SIGNIFICANT CHANGE UP (ref 40–120)
ALT FLD-CCNC: 25 U/L — SIGNIFICANT CHANGE UP (ref 10–45)
ANION GAP SERPL CALC-SCNC: 7 MMOL/L — SIGNIFICANT CHANGE UP (ref 5–17)
APTT BLD: 29.3 SEC — SIGNIFICANT CHANGE UP (ref 27.5–35.5)
AST SERPL-CCNC: 21 U/L — SIGNIFICANT CHANGE UP (ref 10–40)
BASOPHILS # BLD AUTO: 0.03 K/UL — SIGNIFICANT CHANGE UP (ref 0–0.2)
BASOPHILS NFR BLD AUTO: 0.2 % — SIGNIFICANT CHANGE UP (ref 0–2)
BILIRUB SERPL-MCNC: 0.2 MG/DL — SIGNIFICANT CHANGE UP (ref 0.2–1.2)
BUN SERPL-MCNC: 17 MG/DL — SIGNIFICANT CHANGE UP (ref 7–23)
CALCIUM SERPL-MCNC: 9 MG/DL — SIGNIFICANT CHANGE UP (ref 8.4–10.5)
CHLORIDE SERPL-SCNC: 97 MMOL/L — SIGNIFICANT CHANGE UP (ref 96–108)
CO2 SERPL-SCNC: 34 MMOL/L — HIGH (ref 22–31)
CREAT SERPL-MCNC: 0.77 MG/DL — SIGNIFICANT CHANGE UP (ref 0.5–1.3)
CULTURE RESULTS: SIGNIFICANT CHANGE UP
EOSINOPHIL # BLD AUTO: 0.21 K/UL — SIGNIFICANT CHANGE UP (ref 0–0.5)
EOSINOPHIL NFR BLD AUTO: 1.5 % — SIGNIFICANT CHANGE UP (ref 0–6)
GALACTOMANNAN AG SERPL-ACNC: 0.05 INDEX — SIGNIFICANT CHANGE UP (ref 0–0.49)
GAS PNL BLDA: SIGNIFICANT CHANGE UP
GLUCOSE SERPL-MCNC: 115 MG/DL — HIGH (ref 70–99)
HCT VFR BLD CALC: 33.1 % — LOW (ref 39–50)
HGB BLD-MCNC: 10.2 G/DL — LOW (ref 13–17)
IMM GRANULOCYTES NFR BLD AUTO: 1.7 % — HIGH (ref 0–1.5)
INR BLD: 1.23 RATIO — HIGH (ref 0.88–1.16)
LYMPHOCYTES # BLD AUTO: 17.3 % — SIGNIFICANT CHANGE UP (ref 13–44)
LYMPHOCYTES # BLD AUTO: 2.45 K/UL — SIGNIFICANT CHANGE UP (ref 1–3.3)
MAGNESIUM SERPL-MCNC: 2.4 MG/DL — SIGNIFICANT CHANGE UP (ref 1.6–2.6)
MCHC RBC-ENTMCNC: 28 PG — SIGNIFICANT CHANGE UP (ref 27–34)
MCHC RBC-ENTMCNC: 30.8 GM/DL — LOW (ref 32–36)
MCV RBC AUTO: 90.9 FL — SIGNIFICANT CHANGE UP (ref 80–100)
MONOCYTES # BLD AUTO: 1.24 K/UL — HIGH (ref 0–0.9)
MONOCYTES NFR BLD AUTO: 8.8 % — SIGNIFICANT CHANGE UP (ref 2–14)
NEUTROPHILS # BLD AUTO: 9.96 K/UL — HIGH (ref 1.8–7.4)
NEUTROPHILS NFR BLD AUTO: 70.5 % — SIGNIFICANT CHANGE UP (ref 43–77)
NRBC # BLD: 0 /100 WBCS — SIGNIFICANT CHANGE UP (ref 0–0)
PHOSPHATE SERPL-MCNC: 3.3 MG/DL — SIGNIFICANT CHANGE UP (ref 2.5–4.5)
PLATELET # BLD AUTO: 378 K/UL — SIGNIFICANT CHANGE UP (ref 150–400)
POTASSIUM SERPL-MCNC: 3.8 MMOL/L — SIGNIFICANT CHANGE UP (ref 3.5–5.3)
POTASSIUM SERPL-SCNC: 3.8 MMOL/L — SIGNIFICANT CHANGE UP (ref 3.5–5.3)
PROT SERPL-MCNC: 6.9 G/DL — SIGNIFICANT CHANGE UP (ref 6–8.3)
PROTHROM AB SERPL-ACNC: 14.6 SEC — HIGH (ref 10.6–13.6)
RBC # BLD: 3.64 M/UL — LOW (ref 4.2–5.8)
RBC # FLD: 12.9 % — SIGNIFICANT CHANGE UP (ref 10.3–14.5)
SODIUM SERPL-SCNC: 138 MMOL/L — SIGNIFICANT CHANGE UP (ref 135–145)
SPECIMEN SOURCE: SIGNIFICANT CHANGE UP
WBC # BLD: 14.13 K/UL — HIGH (ref 3.8–10.5)
WBC # FLD AUTO: 14.13 K/UL — HIGH (ref 3.8–10.5)

## 2021-08-30 PROCEDURE — 99232 SBSQ HOSP IP/OBS MODERATE 35: CPT

## 2021-08-30 PROCEDURE — 99291 CRITICAL CARE FIRST HOUR: CPT

## 2021-08-30 PROCEDURE — 71045 X-RAY EXAM CHEST 1 VIEW: CPT | Mod: 26

## 2021-08-30 PROCEDURE — 99232 SBSQ HOSP IP/OBS MODERATE 35: CPT | Mod: 57

## 2021-08-30 RX ORDER — HYDROMORPHONE HYDROCHLORIDE 2 MG/ML
0.5 INJECTION INTRAMUSCULAR; INTRAVENOUS; SUBCUTANEOUS ONCE
Refills: 0 | Status: DISCONTINUED | OUTPATIENT
Start: 2021-08-30 | End: 2021-08-30

## 2021-08-30 RX ORDER — HYDROMORPHONE HYDROCHLORIDE 2 MG/ML
1 INJECTION INTRAMUSCULAR; INTRAVENOUS; SUBCUTANEOUS ONCE
Refills: 0 | Status: DISCONTINUED | OUTPATIENT
Start: 2021-08-30 | End: 2021-08-30

## 2021-08-30 RX ORDER — POTASSIUM CHLORIDE 20 MEQ
10 PACKET (EA) ORAL
Refills: 0 | Status: COMPLETED | OUTPATIENT
Start: 2021-08-30 | End: 2021-08-30

## 2021-08-30 RX ADMIN — Medication 2000 UNIT(S): at 11:24

## 2021-08-30 RX ADMIN — OXYCODONE HYDROCHLORIDE 5 MILLIGRAM(S): 5 TABLET ORAL at 00:45

## 2021-08-30 RX ADMIN — DIPHENHYDRAMINE HYDROCHLORIDE AND LIDOCAINE HYDROCHLORIDE AND ALUMINUM HYDROXIDE AND MAGNESIUM HYDRO 5 MILLILITER(S): KIT at 17:07

## 2021-08-30 RX ADMIN — DIPHENHYDRAMINE HYDROCHLORIDE AND LIDOCAINE HYDROCHLORIDE AND ALUMINUM HYDROXIDE AND MAGNESIUM HYDRO 5 MILLILITER(S): KIT at 06:00

## 2021-08-30 RX ADMIN — Medication 100 MILLIEQUIVALENT(S): at 03:45

## 2021-08-30 RX ADMIN — OXYCODONE HYDROCHLORIDE 5 MILLIGRAM(S): 5 TABLET ORAL at 06:00

## 2021-08-30 RX ADMIN — BUDESONIDE AND FORMOTEROL FUMARATE DIHYDRATE 2 PUFF(S): 160; 4.5 AEROSOL RESPIRATORY (INHALATION) at 05:19

## 2021-08-30 RX ADMIN — Medication 3 MILLIGRAM(S): at 21:30

## 2021-08-30 RX ADMIN — LIDOCAINE 1 PATCH: 4 CREAM TOPICAL at 11:25

## 2021-08-30 RX ADMIN — SENNA PLUS 2 TABLET(S): 8.6 TABLET ORAL at 21:29

## 2021-08-30 RX ADMIN — PIPERACILLIN AND TAZOBACTAM 25 GRAM(S): 4; .5 INJECTION, POWDER, LYOPHILIZED, FOR SOLUTION INTRAVENOUS at 11:49

## 2021-08-30 RX ADMIN — Medication 1 TABLET(S): at 11:24

## 2021-08-30 RX ADMIN — OXYCODONE HYDROCHLORIDE 5 MILLIGRAM(S): 5 TABLET ORAL at 11:55

## 2021-08-30 RX ADMIN — OXYCODONE HYDROCHLORIDE 5 MILLIGRAM(S): 5 TABLET ORAL at 21:29

## 2021-08-30 RX ADMIN — HYDROMORPHONE HYDROCHLORIDE 1 MILLIGRAM(S): 2 INJECTION INTRAMUSCULAR; INTRAVENOUS; SUBCUTANEOUS at 09:50

## 2021-08-30 RX ADMIN — Medication 0.5 MILLIGRAM(S): at 21:31

## 2021-08-30 RX ADMIN — Medication 0.5 MILLIGRAM(S): at 06:00

## 2021-08-30 RX ADMIN — PIPERACILLIN AND TAZOBACTAM 25 GRAM(S): 4; .5 INJECTION, POWDER, LYOPHILIZED, FOR SOLUTION INTRAVENOUS at 04:00

## 2021-08-30 RX ADMIN — DIPHENHYDRAMINE HYDROCHLORIDE AND LIDOCAINE HYDROCHLORIDE AND ALUMINUM HYDROXIDE AND MAGNESIUM HYDRO 5 MILLILITER(S): KIT at 11:24

## 2021-08-30 RX ADMIN — ENOXAPARIN SODIUM 90 MILLIGRAM(S): 100 INJECTION SUBCUTANEOUS at 06:00

## 2021-08-30 RX ADMIN — Medication 0.5 MILLIGRAM(S): at 13:58

## 2021-08-30 RX ADMIN — HYDROMORPHONE HYDROCHLORIDE 1 MILLIGRAM(S): 2 INJECTION INTRAMUSCULAR; INTRAVENOUS; SUBCUTANEOUS at 10:20

## 2021-08-30 RX ADMIN — DIPHENHYDRAMINE HYDROCHLORIDE AND LIDOCAINE HYDROCHLORIDE AND ALUMINUM HYDROXIDE AND MAGNESIUM HYDRO 5 MILLILITER(S): KIT at 00:12

## 2021-08-30 RX ADMIN — BENZOCAINE AND MENTHOL 1 LOZENGE: 5; 1 LIQUID ORAL at 08:48

## 2021-08-30 RX ADMIN — Medication 200 MILLIGRAM(S): at 13:58

## 2021-08-30 RX ADMIN — PANTOPRAZOLE SODIUM 40 MILLIGRAM(S): 20 TABLET, DELAYED RELEASE ORAL at 11:24

## 2021-08-30 RX ADMIN — PIPERACILLIN AND TAZOBACTAM 25 GRAM(S): 4; .5 INJECTION, POWDER, LYOPHILIZED, FOR SOLUTION INTRAVENOUS at 21:30

## 2021-08-30 RX ADMIN — OXYCODONE HYDROCHLORIDE 5 MILLIGRAM(S): 5 TABLET ORAL at 11:25

## 2021-08-30 RX ADMIN — CHLORHEXIDINE GLUCONATE 1 APPLICATION(S): 213 SOLUTION TOPICAL at 05:00

## 2021-08-30 RX ADMIN — HYDROMORPHONE HYDROCHLORIDE 0.5 MILLIGRAM(S): 2 INJECTION INTRAMUSCULAR; INTRAVENOUS; SUBCUTANEOUS at 13:59

## 2021-08-30 RX ADMIN — OXYCODONE HYDROCHLORIDE 5 MILLIGRAM(S): 5 TABLET ORAL at 17:07

## 2021-08-30 RX ADMIN — ENOXAPARIN SODIUM 90 MILLIGRAM(S): 100 INJECTION SUBCUTANEOUS at 17:07

## 2021-08-30 RX ADMIN — Medication 100 MILLIEQUIVALENT(S): at 01:30

## 2021-08-30 RX ADMIN — OXYCODONE HYDROCHLORIDE 5 MILLIGRAM(S): 5 TABLET ORAL at 00:15

## 2021-08-30 RX ADMIN — Medication 200 MILLIGRAM(S): at 06:00

## 2021-08-30 RX ADMIN — BUDESONIDE AND FORMOTEROL FUMARATE DIHYDRATE 2 PUFF(S): 160; 4.5 AEROSOL RESPIRATORY (INHALATION) at 17:59

## 2021-08-30 RX ADMIN — Medication 200 MILLIGRAM(S): at 21:31

## 2021-08-30 NOTE — PROGRESS NOTE ADULT - PROBLEM SELECTOR PLAN 1
- Complete course of IV Zosyn  - PPI.   - Cepacol Lozenges as needed  - Magic Mouthwash as needed  - Mucinex/ Hycodan/ Tessalon prn cough.   - Humidified O2 prn.   - Thoracic Surgery following PTX and SQ Emphysema.   Reconsult ENT as needed

## 2021-08-30 NOTE — PROGRESS NOTE ADULT - SUBJECTIVE AND OBJECTIVE BOX
Patient is a 55y old  Male who presents with a chief complaint of covid pna (27 Aug 2021 20:21)      Vital Signs Last 24 Hrs  T(C): 36.7 (08-30-21 @ 08:00), Max: 37.1 (08-29-21 @ 16:00)  T(F): 98 (08-30-21 @ 08:00), Max: 98.7 (08-29-21 @ 16:00)  HR: 80 (08-30-21 @ 10:00) (51 - 111)  BP: 117/67 (08-30-21 @ 10:00) (99/61 - 147/71)  RR: 26 (08-30-21 @ 10:00) (15 - 40)  SpO2: 94% (08-30-21 @ 10:00) (92% - 98%)                08-29-21 @ 07:01  -  08-30-21 @ 07:00  --------------------------------------------------------  IN: 810 mL / OUT: 830 mL / NET: -20 mL        Daily     Daily                           10.2   14.13 )-----------( 378      ( 30 Aug 2021 00:30 )             33.1     08-30    138  |  97  |  17  ----------------------------<  115<H>  3.8   |  34<H>  |  0.77    Ca    9.0      30 Aug 2021 00:30  Phos  3.3     08-30  Mg     2.4     08-30    TPro  6.9  /  Alb  2.9<L>  /  TBili  0.2  /  DBili  x   /  AST  21  /  ALT  25  /  AlkPhos  60  08-30                    MEDICATIONS  acetaminophen   Tablet .. 975 milliGRAM(s) Oral every 6 hours PRN  albuterol/ipratropium for Nebulization 3 milliLiter(s) Nebulizer every 6 hours PRN  aluminum hydroxide/magnesium hydroxide/simethicone Suspension 30 milliLiter(s) Oral every 4 hours PRN  benzocaine 15 mG/menthol 3.6 mG (Sugar-Free) Lozenge 1 Lozenge Oral four times a day PRN  benzocaine 15 mG/menthol 3.6 mG (Sugar-Free) Lozenge 1 Lozenge Oral once  benzonatate 200 milliGRAM(s) Oral every 8 hours  budesonide 160 MICROgram(s)/formoterol 4.5 MICROgram(s) Inhaler 2 Puff(s) Inhalation two times a day  chlorhexidine 4% Liquid 1 Application(s) Topical <User Schedule>  chlorhexidine 4% Liquid 1 Application(s) Topical <User Schedule>  cholecalciferol 2000 Unit(s) Oral daily  clonazePAM  Tablet 0.5 milliGRAM(s) Oral every 8 hours  cyclobenzaprine 5 milliGRAM(s) Oral three times a day PRN  enoxaparin Injectable 90 milliGRAM(s) SubCutaneous every 12 hours  FIRST- Mouthwash  BLM 5 milliLiter(s) Swish and Spit every 6 hours  hydrocodone/homatropine Syrup 5 milliLiter(s) Oral every 6 hours PRN  HYDROmorphone  Injectable 0.5 milliGRAM(s) IV Push once  lidocaine   4% Patch 1 Patch Transdermal every 24 hours  melatonin 3 milliGRAM(s) Oral at bedtime  multivitamin 1 Tablet(s) Oral daily  oxyCODONE    IR 5 milliGRAM(s) Oral every 6 hours  pantoprazole  Injectable 40 milliGRAM(s) IV Push daily  piperacillin/tazobactam IVPB.. 3.375 Gram(s) IV Intermittent every 8 hours  polyethylene glycol 3350 17 Gram(s) Oral daily  senna 2 Tablet(s) Oral at bedtime  sodium chloride 0.65% Nasal 1 Spray(s) Both Nostrils every 2 hours PRN              PHYSICAL EXAM  Neurology: Awake, NAD, A&Ox3  CV : RRR+S1S2  Lungs: +HF NC, B/L BS diminished at bases, +subQ emphysema  +Right chest tube to -40 dry suction, +airleak   Abdomen: Soft, NT/ND, +BSx4Q  Extremities: B/L LE edema, negative calf tenderness, +PP

## 2021-08-30 NOTE — PROGRESS NOTE ADULT - SUBJECTIVE AND OBJECTIVE BOX
ENT ISSUE/POD: subq emphysema face/neck    HPI: 56 YO M with PMH of HTN, RLE DVT, and PE (not on home AC) presents with progressively worsening SOB, intermittent fevers, COVID positive, admitted for COVID PNA. Patient with intermittently worsening respiratory status, currently off BiPAP but requiring HFNC, currently being treated for pneumonia.  Patient has had several RRTs for tachypnea and hypoxia during admission. Course complicated by worsening respiratory failure 2/2 tension PTX and pleural effusion requiring NIPPV and ICU admission. ENT was consulted for evaluation of Throat Tightness and Voice changes. Per pt and family, no recent Intubation/ NG Tube placement. Last Intubation x 5 Years ago for Knee Sx. Pt also reports painful swallowing of solid/liquid foods Currently on soft diet. Admits to productive cough, noted to be self suctioning at bedside. Pt with right moderate-sized pneumothorax with leftward shift and small pleural effusion found on CXR [8/26], Chest Tube was placed by Thoracic Surgery on 8/26. Pt denies rhinorrhea, post nasal drip, N/V, Abdominal Pain, Palpitations/Diaphoresis, HA/Dizziness, fever/chills, recent travel. 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 Pt continues to mention significant improvement in breathing and throat tightness.    PAST MEDICAL & SURGICAL HISTORY:  Hyperlipidemia    HTN (hypertension)    Rupture, tendon, quadriceps    History of Achilles tendon repair      Allergies    No Known Allergies    Intolerances      MEDICATIONS  (STANDING):  benzocaine 15 mG/menthol 3.6 mG (Sugar-Free) Lozenge 1 Lozenge Oral once  benzonatate 200 milliGRAM(s) Oral every 8 hours  budesonide 160 MICROgram(s)/formoterol 4.5 MICROgram(s) Inhaler 2 Puff(s) Inhalation two times a day  chlorhexidine 4% Liquid 1 Application(s) Topical <User Schedule>  chlorhexidine 4% Liquid 1 Application(s) Topical <User Schedule>  cholecalciferol 2000 Unit(s) Oral daily  clonazePAM  Tablet 0.5 milliGRAM(s) Oral every 8 hours  enoxaparin Injectable 90 milliGRAM(s) SubCutaneous every 12 hours  FIRST- Mouthwash  BLM 5 milliLiter(s) Swish and Spit every 6 hours  lidocaine   4% Patch 1 Patch Transdermal every 24 hours  melatonin 3 milliGRAM(s) Oral at bedtime  multivitamin 1 Tablet(s) Oral daily  oxyCODONE    IR 5 milliGRAM(s) Oral every 6 hours  pantoprazole  Injectable 40 milliGRAM(s) IV Push daily  piperacillin/tazobactam IVPB.. 3.375 Gram(s) IV Intermittent every 8 hours  polyethylene glycol 3350 17 Gram(s) Oral daily  senna 2 Tablet(s) Oral at bedtime    MEDICATIONS  (PRN):  acetaminophen   Tablet .. 975 milliGRAM(s) Oral every 6 hours PRN Mild Pain (1 - 3)  albuterol/ipratropium for Nebulization 3 milliLiter(s) Nebulizer every 6 hours PRN Shortness of Breath and/or Wheezing  aluminum hydroxide/magnesium hydroxide/simethicone Suspension 30 milliLiter(s) Oral every 4 hours PRN Dyspepsia  benzocaine 15 mG/menthol 3.6 mG (Sugar-Free) Lozenge 1 Lozenge Oral four times a day PRN Sore Throat  cyclobenzaprine 5 milliGRAM(s) Oral three times a day PRN Muscle Spasm  sodium chloride 0.65% Nasal 1 Spray(s) Both Nostrils every 2 hours PRN Nasal Congestion      ROS:   ENT: all negative except as noted in HPI   Pulm: denies SOB, cough, hemoptysis  Neuro: denies numbness/tingling, loss of sensation  Endo: denies heat/cold intolerance, excessive sweating      Vital Signs Last 24 Hrs  T(C): 36.6 (30 Aug 2021 03:00), Max: 37.1 (29 Aug 2021 16:00)  T(F): 97.8 (30 Aug 2021 03:00), Max: 98.7 (29 Aug 2021 16:00)  HR: 65 (30 Aug 2021 06:21) (55 - 111)  BP: 108/71 (30 Aug 2021 06:00) (99/61 - 147/71)  BP(mean): 85 (30 Aug 2021 06:00) (72 - 102)  RR: 20 (30 Aug 2021 06:00) (16 - 40)  SpO2: 98% (30 Aug 2021 06:21) (80% - 98%)                          10.2   14.13 )-----------( 378      ( 30 Aug 2021 00:30 )             33.1    08-30    138  |  97  |  17  ----------------------------<  115<H>  3.8   |  34<H>  |  0.77    Ca    9.0      30 Aug 2021 00:30  Phos  3.3     08-30  Mg     2.4     08-30    TPro  6.9  /  Alb  2.9<L>  /  TBili  0.2  /  DBili  x   /  AST  21  /  ALT  25  /  AlkPhos  60  08-30   PT/INR - ( 30 Aug 2021 00:30 )   PT: 14.6 sec;   INR: 1.23 ratio         PTT - ( 30 Aug 2021 00:30 )  PTT:29.3 sec    PHYSICAL EXAM:  Gen: On HF NC.   Skin: No rashes, bruises, or lesions.  Head: Normocephalic, Atraumatic.  Face: no edema, erythema, or fluctuance. Parotid glands soft without mass.  Eyes: no scleral injection.  Nose: Nares bilaterally patent, no discharge  Mouth: No Stridor / Drooling / Trismus.  Mucosa moist, tongue/uvula midline, oropharynx clear.  Neck: Improving crepitus around the neck L>R. Trachea midline, no masses.  Lymphatic: No lymphadenopathy.  Resp: On HF NC, no stridor.  Neuro: facial nerve intact, no facial droop.

## 2021-08-30 NOTE — PROGRESS NOTE ADULT - ASSESSMENT
55yr old male with HTN, RLE DVT, and PE (not on home AC) presents with progressively worsening SOB, intermittent fevers, COVID positive, admitted for COVID PNA.  Course complicated by worsening respiratory failure  2/2 tension PTX and pleural effusion requiring NIPPV and ICU admission.     NEUROLOGY:  - A&Ox4 at baseline; No Active Issues   - Klonopin 0.5 mg q8h for anxiety  -  oxy 5mg q6hrs for cough suppression     PULMONARY:  Acute Respiratory Failure 2/2 Tension PTX with effusion and possible abscess 2/2 suspected superinfection from COVID PNA  - Continue HFNC  alternate with BiPAP Nocternal/PRN   - Titrate settings as tolerated to maintain SpO2>88%   - Encourage prone positioning and bed in chair position.   - 8/26 chest tube placement by CTsx   - CXR shows tube in place, decreased midright lung opacification from yesterday, however diffuse airspace opacities are seen throughout both lungs, consistent with patient's known Covid 19 pneumonia. Right pleural effusion. Previously seen right pneumothorax is again seen but decreased in size status post right chest tube placement.   - CT surgery is following and recommend monitoring  - Chest PT ordered  - reordered hycodan 5mg q6 prn for cough    CARDIOLOGY:  - Tachycardia resolved    GASTROINTESTINAL:  - Soft diet w/ supp shakes while intermittently on HFNC  - Protonix 40mg QD while intermittently NPO on Bipap  - Bowel regimen with Miralax and Senna BM. Last BM 8/30.     RENAL/:  - Strict I&Os     INFECTIOUS DISEASE:  COVID-19  - Zosyn 3.375 q8hrs for cavitary pna  - Dose abx pending growth  - Pleural culture shows gram negative rods.   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  - F/U Strep Throat Cx, CMV.       HEMATOLOGY:  - Full AC Lovenox 90mg BID   - Consider CTA chest when stabilized    ENDOCRINE:  - HbA1c 6.0    ETHICS/DISPOSITION:  - Full code   - Remain in ICU

## 2021-08-30 NOTE — CHART NOTE - NSCHARTNOTEFT_GEN_A_CORE
MICU Transfer Note  ---------------------------    Transfer from: MICU  Transfer to:  (X) Medicine    (  ) Telemetry    (  ) RCU    (  ) Palliative    (  ) Stroke Unit    (  ) _______________  Accepting Physician: Dr. Higginbotham      MICU COURSE: Patient transferred to MICU on 8/26 for Acute Respiratory failure (reported RR 70); CXR showed tension PTX/effusion. Patient put on BiPAP and CTS placed an open chest tube, which drained 700cc immediately. Patient was placed on Hydocan for cough and Oxy for pain, and infectious workup was ordered; pleural fluid culture showed gram negative rods, so patient was treated with Zosyn. Patient also given Decadron. 8/28 CT Chest showed new pneumomediastinum, pneumopericardium, and b/l subq emphysema. 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    continuing assessment and management of hypoxia (desaturations to the 70s while on HFNC, requiring BiPAP), believed to be 2/2       To-Do:    [ ]   [ ]     OBJECTIVE --  Vital Signs Last 24 Hrs  T(C): 36.8 (30 Aug 2021 12:00), Max: 36.8 (29 Aug 2021 19:00)  T(F): 98.2 (30 Aug 2021 12:00), Max: 98.2 (29 Aug 2021 19:00)  HR: 71 (30 Aug 2021 15:10) (51 - 84)  BP: 122/64 (30 Aug 2021 14:00) (99/61 - 122/64)  BP(mean): 84 (30 Aug 2021 14:00) (71 - 91)  RR: 26 (30 Aug 2021 15:10) (15 - 26)  SpO2: 94% (30 Aug 2021 15:10) (92% - 98%)    I&O's Summary    29 Aug 2021 07:01  -  30 Aug 2021 07:00  --------------------------------------------------------  IN: 810 mL / OUT: 830 mL / NET: -20 mL    30 Aug 2021 07:01  -  30 Aug 2021 16:56  --------------------------------------------------------  IN: 200 mL / OUT: 0 mL / NET: 200 mL        MEDICATIONS  (STANDING):  benzocaine 15 mG/menthol 3.6 mG (Sugar-Free) Lozenge 1 Lozenge Oral once  benzonatate 200 milliGRAM(s) Oral every 8 hours  budesonide 160 MICROgram(s)/formoterol 4.5 MICROgram(s) Inhaler 2 Puff(s) Inhalation two times a day  chlorhexidine 4% Liquid 1 Application(s) Topical <User Schedule>  chlorhexidine 4% Liquid 1 Application(s) Topical <User Schedule>  cholecalciferol 2000 Unit(s) Oral daily  clonazePAM  Tablet 0.5 milliGRAM(s) Oral every 8 hours  enoxaparin Injectable 90 milliGRAM(s) SubCutaneous every 12 hours  FIRST- Mouthwash  BLM 5 milliLiter(s) Swish and Spit every 6 hours  lidocaine   4% Patch 1 Patch Transdermal every 24 hours  melatonin 3 milliGRAM(s) Oral at bedtime  multivitamin 1 Tablet(s) Oral daily  oxyCODONE    IR 5 milliGRAM(s) Oral every 6 hours  pantoprazole  Injectable 40 milliGRAM(s) IV Push daily  piperacillin/tazobactam IVPB.. 3.375 Gram(s) IV Intermittent every 8 hours  polyethylene glycol 3350 17 Gram(s) Oral daily  senna 2 Tablet(s) Oral at bedtime    MEDICATIONS  (PRN):  acetaminophen   Tablet .. 975 milliGRAM(s) Oral every 6 hours PRN Mild Pain (1 - 3)  albuterol/ipratropium for Nebulization 3 milliLiter(s) Nebulizer every 6 hours PRN Shortness of Breath and/or Wheezing  aluminum hydroxide/magnesium hydroxide/simethicone Suspension 30 milliLiter(s) Oral every 4 hours PRN Dyspepsia  benzocaine 15 mG/menthol 3.6 mG (Sugar-Free) Lozenge 1 Lozenge Oral four times a day PRN Sore Throat  cyclobenzaprine 5 milliGRAM(s) Oral three times a day PRN Muscle Spasm  hydrocodone/homatropine Syrup 5 milliLiter(s) Oral every 6 hours PRN worseneing cough  sodium chloride 0.65% Nasal 1 Spray(s) Both Nostrils every 2 hours PRN Nasal Congestion        LABS                                            10.2                  Neurophils% (auto):   70.5   (08-30 @ 00:30):    14.13)-----------(378          Lymphocytes% (auto):  17.3                                          33.1                   Eosinphils% (auto):   1.5      Manual%: Neutrophils x    ; Lymphocytes x    ; Eosinophils x    ; Bands%: x    ; Blasts x                                    138    |  97     |  17                  Calcium: 9.0   / iCa: x      (08-30 @ 00:30)    ----------------------------<  115       Magnesium: 2.4                              3.8     |  34     |  0.77             Phosphorous: 3.3      TPro  6.9    /  Alb  2.9    /  TBili  0.2    /  DBili  x      /  AST  21     /  ALT  25     /  AlkPhos  60     30 Aug 2021 00:30    ( 08-30 @ 00:30 )   PT: 14.6 sec;   INR: 1.23 ratio  aPTT: 29.3 sec          ASSESSMENT & PLAN:         For Follow-Up: MICU Transfer Note  ---------------------------    Transfer from: MICU  Transfer to:  (X) Medicine    (  ) Telemetry    (  ) RCU    (  ) Palliative    (  ) Stroke Unit    (  ) _______________  Accepting Physician: Dr. Higginbotham      55yr old unvaccinated male with hx of HTN, RLE DVT, and PE (not on home AC) presents with progressively worsening SOB, intermittent fevers, COVID positive, admitted for COVID PNA.  Course complicated by worsening respiratory failure requiring NIPPV and ICU admission. While in the ICU, pt alternated with High Flow and BIPAP with eventually less time on BIPap. Pt was transitioned to Day time high flow 60L and 70% was kept on nocturnal bipap 15/10 60%. He was stable and transferred to the floors on 8/19. Patient then had an RRT called for tachypnea, reported RR 70 and increased WOB. Patient switched to AAVAPs with improved WOB and RR to the 20s w/ adequate TVs 400s-500s. STAT CXR was obtained and patient was found to have tension PTX. admitted to ICU 8/26.  CT surgery placed R chest tube on 8/26 that immediately drained 300cc fluid, and later net 1000 ml.    Pt       MICU COURSE: Patient transferred to MICU on 8/26 for Acute Respiratory failure); CXR showed tension PTX/effusion. Patient put on BiPAP and CTS placed an open chest tube, which drained 700cc immediately.     Patient was placed on Hydocan for cough and Oxy for pain, and infectious workup was ordered; pleural fluid culture showed gram negative rods, so patient was treated with Zosyn. Patient also given Decadron. 8/28 CT Chest showed new pneumomediastinum, pneumopericardium, and b/l subq emphysema. 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    continuing assessment and management of hypoxia (desaturations to the 70s while on HFNC, requiring BiPAP), believed to be 2/2       To-Do:    [ ]   [ ]     OBJECTIVE --  Vital Signs Last 24 Hrs  T(C): 36.8 (30 Aug 2021 12:00), Max: 36.8 (29 Aug 2021 19:00)  T(F): 98.2 (30 Aug 2021 12:00), Max: 98.2 (29 Aug 2021 19:00)  HR: 71 (30 Aug 2021 15:10) (51 - 84)  BP: 122/64 (30 Aug 2021 14:00) (99/61 - 122/64)  BP(mean): 84 (30 Aug 2021 14:00) (71 - 91)  RR: 26 (30 Aug 2021 15:10) (15 - 26)  SpO2: 94% (30 Aug 2021 15:10) (92% - 98%)    I&O's Summary    29 Aug 2021 07:01  -  30 Aug 2021 07:00  --------------------------------------------------------  IN: 810 mL / OUT: 830 mL / NET: -20 mL    30 Aug 2021 07:01  -  30 Aug 2021 16:56  --------------------------------------------------------  IN: 200 mL / OUT: 0 mL / NET: 200 mL        MEDICATIONS  (STANDING):  benzocaine 15 mG/menthol 3.6 mG (Sugar-Free) Lozenge 1 Lozenge Oral once  benzonatate 200 milliGRAM(s) Oral every 8 hours  budesonide 160 MICROgram(s)/formoterol 4.5 MICROgram(s) Inhaler 2 Puff(s) Inhalation two times a day  chlorhexidine 4% Liquid 1 Application(s) Topical <User Schedule>  chlorhexidine 4% Liquid 1 Application(s) Topical <User Schedule>  cholecalciferol 2000 Unit(s) Oral daily  clonazePAM  Tablet 0.5 milliGRAM(s) Oral every 8 hours  enoxaparin Injectable 90 milliGRAM(s) SubCutaneous every 12 hours  FIRST- Mouthwash  BLM 5 milliLiter(s) Swish and Spit every 6 hours  lidocaine   4% Patch 1 Patch Transdermal every 24 hours  melatonin 3 milliGRAM(s) Oral at bedtime  multivitamin 1 Tablet(s) Oral daily  oxyCODONE    IR 5 milliGRAM(s) Oral every 6 hours  pantoprazole  Injectable 40 milliGRAM(s) IV Push daily  piperacillin/tazobactam IVPB.. 3.375 Gram(s) IV Intermittent every 8 hours  polyethylene glycol 3350 17 Gram(s) Oral daily  senna 2 Tablet(s) Oral at bedtime    MEDICATIONS  (PRN):  acetaminophen   Tablet .. 975 milliGRAM(s) Oral every 6 hours PRN Mild Pain (1 - 3)  albuterol/ipratropium for Nebulization 3 milliLiter(s) Nebulizer every 6 hours PRN Shortness of Breath and/or Wheezing  aluminum hydroxide/magnesium hydroxide/simethicone Suspension 30 milliLiter(s) Oral every 4 hours PRN Dyspepsia  benzocaine 15 mG/menthol 3.6 mG (Sugar-Free) Lozenge 1 Lozenge Oral four times a day PRN Sore Throat  cyclobenzaprine 5 milliGRAM(s) Oral three times a day PRN Muscle Spasm  hydrocodone/homatropine Syrup 5 milliLiter(s) Oral every 6 hours PRN worseneing cough  sodium chloride 0.65% Nasal 1 Spray(s) Both Nostrils every 2 hours PRN Nasal Congestion        LABS                                            10.2                  Neurophils% (auto):   70.5   (08-30 @ 00:30):    14.13)-----------(378          Lymphocytes% (auto):  17.3                                          33.1                   Eosinphils% (auto):   1.5      Manual%: Neutrophils x    ; Lymphocytes x    ; Eosinophils x    ; Bands%: x    ; Blasts x                                    138    |  97     |  17                  Calcium: 9.0   / iCa: x      (08-30 @ 00:30)    ----------------------------<  115       Magnesium: 2.4                              3.8     |  34     |  0.77             Phosphorous: 3.3      TPro  6.9    /  Alb  2.9    /  TBili  0.2    /  DBili  x      /  AST  21     /  ALT  25     /  AlkPhos  60     30 Aug 2021 00:30    ( 08-30 @ 00:30 )   PT: 14.6 sec;   INR: 1.23 ratio  aPTT: 29.3 sec          ASSESSMENT & PLAN:         For Follow-Up: MICU Transfer Note  ---------------------------    Transfer from: MICU  Transfer to:  (X) Medicine    (  ) Telemetry    (  ) RCU    (  ) Palliative    (  ) Stroke Unit    (  ) _______________  Accepting Physician: Dr. Higginbotham      55yr old unvaccinated male with hx of HTN, RLE DVT, and PE (not on home AC) presents with progressively worsening SOB, intermittent fevers, COVID positive, admitted for COVID PNA.  Course complicated by worsening respiratory failure requiring NIPPV and ICU admission. While in the ICU, pt alternated with High Flow and BIPAP with eventually less time on BIPap. Pt was transitioned to Day time high flow 60L and 70% was kept on nocturnal bipap 15/10 60%. He was stable and transferred to the floors on 8/19. Patient then had an RRT called for tachypnea, reported RR 70 and increased WOB. Patient switched to AAVAPs with improved WOB and RR to the 20s w/ adequate TVs 400s-500s. STAT CXR was obtained and patient was found to have tension PTX. admitted to ICU 8/26.  CT surgery placed R chest tube on 8/26 that immediately drained 300cc fluid, and later net 1000 ml.  Pt has been coughing up secretions that has prevented him from sleeping through the night.  8/28 CT Chest showed new pneumomediastinum, pneumopericardium, and b/l subq emphysema that was due possibly to broncho-pleural fistula vs dislodging of a hole of chest tube.  Pt cough has been controlled with Oxycodone 5mg q6 and dilaudid prn. Pt O2 requirements on HFNC have been decreasing and he has been stable on 50% FiO2 and 40l. Patient was also placed on Hydocan for cough. PT repeat CXR for tube placement shows minor right apical pneumo and pleural fluid culture showed gram negative rods. Pt will continue to take Zosyn due to empyema  and cavitations in lungs.  Patient has been benefiting with PT. 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 Patient started on Decadron and is on duonebs. Discussed with pt if his breathing feels more difficult and subq emphysema worsens he may need to be intubated.             To-Do:    [ ] f/u morning cxr  [ ]  CT surgery places chest tube and will follow  [ ] Continue HFNC and titrate with goal O2 sat > 90%.   [ ] f/u Beta D Gylcans   [ ] fungal tests  [ ] monitoring PTX and emphysema  continue cough suppression  Continue Zosyn for extended course. ID followup        OBJECTIVE --  Vital Signs Last 24 Hrs  T(C): 36.8 (30 Aug 2021 12:00), Max: 36.8 (29 Aug 2021 19:00)  T(F): 98.2 (30 Aug 2021 12:00), Max: 98.2 (29 Aug 2021 19:00)  HR: 71 (30 Aug 2021 15:10) (51 - 84)  BP: 122/64 (30 Aug 2021 14:00) (99/61 - 122/64)  BP(mean): 84 (30 Aug 2021 14:00) (71 - 91)  RR: 26 (30 Aug 2021 15:10) (15 - 26)  SpO2: 94% (30 Aug 2021 15:10) (92% - 98%)    I&O's Summary    29 Aug 2021 07:01  -  30 Aug 2021 07:00  --------------------------------------------------------  IN: 810 mL / OUT: 830 mL / NET: -20 mL    30 Aug 2021 07:01  -  30 Aug 2021 16:56  --------------------------------------------------------  IN: 200 mL / OUT: 0 mL / NET: 200 mL        MEDICATIONS  (STANDING):  benzocaine 15 mG/menthol 3.6 mG (Sugar-Free) Lozenge 1 Lozenge Oral once  benzonatate 200 milliGRAM(s) Oral every 8 hours  budesonide 160 MICROgram(s)/formoterol 4.5 MICROgram(s) Inhaler 2 Puff(s) Inhalation two times a day  chlorhexidine 4% Liquid 1 Application(s) Topical <User Schedule>  chlorhexidine 4% Liquid 1 Application(s) Topical <User Schedule>  cholecalciferol 2000 Unit(s) Oral daily  clonazePAM  Tablet 0.5 milliGRAM(s) Oral every 8 hours  enoxaparin Injectable 90 milliGRAM(s) SubCutaneous every 12 hours  FIRST- Mouthwash  BLM 5 milliLiter(s) Swish and Spit every 6 hours  lidocaine   4% Patch 1 Patch Transdermal every 24 hours  melatonin 3 milliGRAM(s) Oral at bedtime  multivitamin 1 Tablet(s) Oral daily  oxyCODONE    IR 5 milliGRAM(s) Oral every 6 hours  pantoprazole  Injectable 40 milliGRAM(s) IV Push daily  piperacillin/tazobactam IVPB.. 3.375 Gram(s) IV Intermittent every 8 hours  polyethylene glycol 3350 17 Gram(s) Oral daily  senna 2 Tablet(s) Oral at bedtime    MEDICATIONS  (PRN):  acetaminophen   Tablet .. 975 milliGRAM(s) Oral every 6 hours PRN Mild Pain (1 - 3)  albuterol/ipratropium for Nebulization 3 milliLiter(s) Nebulizer every 6 hours PRN Shortness of Breath and/or Wheezing  aluminum hydroxide/magnesium hydroxide/simethicone Suspension 30 milliLiter(s) Oral every 4 hours PRN Dyspepsia  benzocaine 15 mG/menthol 3.6 mG (Sugar-Free) Lozenge 1 Lozenge Oral four times a day PRN Sore Throat  cyclobenzaprine 5 milliGRAM(s) Oral three times a day PRN Muscle Spasm  hydrocodone/homatropine Syrup 5 milliLiter(s) Oral every 6 hours PRN worseneing cough  sodium chloride 0.65% Nasal 1 Spray(s) Both Nostrils every 2 hours PRN Nasal Congestion        LABS                                            10.2                  Neurophils% (auto):   70.5   (08-30 @ 00:30):    14.13)-----------(378          Lymphocytes% (auto):  17.3                                          33.1                   Eosinphils% (auto):   1.5      Manual%: Neutrophils x    ; Lymphocytes x    ; Eosinophils x    ; Bands%: x    ; Blasts x                                    138    |  97     |  17                  Calcium: 9.0   / iCa: x      (08-30 @ 00:30)    ----------------------------<  115       Magnesium: 2.4                              3.8     |  34     |  0.77             Phosphorous: 3.3      TPro  6.9    /  Alb  2.9    /  TBili  0.2    /  DBili  x      /  AST  21     /  ALT  25     /  AlkPhos  60     30 Aug 2021 00:30    ( 08-30 @ 00:30 )   PT: 14.6 sec;   INR: 1.23 ratio  aPTT: 29.3 sec          ASSESSMENT & PLAN:   · Assessment	  55yr old male with HTN, RLE DVT, and PE (not on home AC) presents with progressively worsening SOB, intermittent fevers, COVID positive, admitted for COVID PNA.  Course complicated by worsening respiratory failure  2/2 tension PTX and pleural effusion requiring NIPPV and ICU admission.     NEUROLOGY:  - A&Ox4 at baseline; No Active Issues   - Klonopin 0.5 mg q8h for anxiety  -  oxy 5mg q6hrs for cough suppression     PULMONARY:  Acute Respiratory Failure 2/2 Tension PTX with effusion and possible abscess 2/2 suspected superinfection from COVID PNA  - Continue HFNC  alternate with BiPAP Nocternal/PRN   - Titrate settings as tolerated to maintain SpO2>88%   - Encourage prone positioning and bed in chair position.   - 8/26 chest tube placement by CTsx   - CXR shows tube in place, decreased midright lung opacification from yesterday, however diffuse airspace opacities are seen throughout both lungs, consistent with patient's known Covid 19 pneumonia. Right pleural effusion. Previously seen right pneumothorax is again seen but decreased in size status post right chest tube placement.   - CT surgery is following and recommend monitoring  - Chest PT ordered  - reordered hycodan 5mg q6 prn for cough    CARDIOLOGY:  - Tachycardia resolved    GASTROINTESTINAL:  - Soft diet w/ supp shakes while intermittently on HFNC  - Protonix 40mg QD while intermittently NPO on Bipap  - Bowel regimen with Miralax and Senna BM. Last BM 8/30.     RENAL/:  - Strict I&Os     INFECTIOUS DISEASE:  COVID-19  - Zosyn 3.375 q8hrs for cavitary pna  - Dose abx pending growth  - Pleural culture shows gram negative rods.   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  - F/U Strep Throat Cx, CMV.       HEMATOLOGY:  - Full AC Lovenox 90mg BID   - Consider CTA chest when stabilized    ENDOCRINE:  - HbA1c 6.0    ETHICS/DISPOSITION:  - Full code   - Remain in ICU

## 2021-08-30 NOTE — PROGRESS NOTE ADULT - ASSESSMENT
Echo 8/24/21: EF 65%, mild MR, grossly nl lv sys fx    a/p  54yo M with Hx of DVT s/p achilles tendon repair years ago not on AC presenting with complaints of SOB. intermittent and fevers with cough productive of white sputum for past week, tested positive for covid 2. Now transferred to MICU for tension pneumothorax, s/p chest tube.     #Atypical Chest Pain  -RRT 8/23 x2 for chest pain - exacerbated by cough and inspiration  -improved, secondary to covid PNA, PNX  -trop negative  -no ADHF noted on exam   -CTA without PE   -Echo noted w grossly nl lv sys fx, mild MR     #Covid -19, PNX  -s/p completed courses of Remdesivir x 5 days and Decadron x 10 days   -S/p Tocilizumab 7/30 per ID   -CTA as above   -transferred to MICU, s/p R chest tube for pnx  -pulm f/u   -thoracic f/u    #Sinus tachycardia  -resolved  -likely secondary to covid PNA, volume, pain, PNX  -cont to monitor   -echo as above     #DVT (hx)  -LE doppler 8/25 with no evidence of deep venous thrombosis in either lower extremity. Incidental note of thrombosis of the left tibial peroneal trunk with diminished flow flow within the left popliteal artery.  -on full dose AC    #steroids for laryngitis/obstruction    care per MICU

## 2021-08-30 NOTE — PROGRESS NOTE ADULT - ASSESSMENT
56yo M with h/o appendectomy admitted to the hospital with COVID and possible superimposed pneumonia found to have a right moderate-sized pneumothorax with leftward shift and small pleural effusion.     8/26 Right open chest tube placed at bedside, Maintain to wall suction -40mmHg  8/27 Remains on hi flow- increased subq emphysema, repeat CXR w/o signs of increasing PTX, chest tube +airleak, functioning, Maintain to dry suction -40mmHg  8/28 VSS, CXR with subcutaneous emphysema and persistent right ptx, maintain right chest tube to suction.   8/29 VSS, pt still with air leak on chest tube. s/p non-con Chest CT: Trace right pleural effusion, unchanged. Trace right pneumothorax new from prior. Pneumomediastinum, pneumopericardium and bilateral subcutaneous emphysema new from prior.  8/30 +Right chest tube to -40 dry suction, +airleak, pt tolerating high flow nasal cannula. Persistent SubQ emphysema without ventilatory compromise. Cont maintain right chest tube ti -40 mmHg dry suction.

## 2021-08-30 NOTE — CHART NOTE - NSCHARTNOTEFT_GEN_A_CORE
MAR Accept Note  Transfer to: Medicine   Accepting Attending Physician: Dr. Higginbotham     Assigned Room: 5MON 502D    Patient seen and examined.   Labs and data reviewed.   No findings precluding transfer of service.       HPI/MICU COURSE:   Please refer to MICU transfer note for full details. Briefly, this is a 55yr old unvaccinated male with hx of HTN, RLE DVT, and PE (not on home AC) presents with progressively worsening SOB, intermittent fevers, COVID positive, admitted for COVID PNA.  Course complicated by worsening respiratory failure requiring NIPPV and ICU admission. While in the ICU, pt alternated with High Flow and BIPAP with eventually less time on BIPap. Pt was transitioned to Day time high flow 60L and 70% was kept on nocturnal bipap 15/10 60%. He was stable and transferred to the floors on 8/19. Patient then had an RRT called for tachypnea, reported RR 70 and increased WOB. Patient switched to AAVAPs with improved WOB and RR to the 20s w/ adequate TVs 400s-500s. STAT CXR was obtained and patient was found to have tension PTX. admitted to ICU 8/26.  CT surgery placed R chest tube on 8/26 that immediately drained 300cc fluid, and later net 1000 ml.  Pt has been coughing up secretions that has prevented him from sleeping through the night.  8/28 CT Chest showed new pneumomediastinum, pneumopericardium, and b/l subq emphysema that was due possibly to broncho-pleural fistula vs dislodging of a hole of chest tube.  Pt cough has been controlled with Oxycodone 5mg q6 and dilaudid prn. Pt O2 requirements on HFNC have been decreasing and he has been stable on 50% FiO2 and 40l. Patient was also placed on Hydocan for cough. PT repeat CXR for tube placement shows minor right apical pneumo and pleural fluid culture showed gram negative rods. Pt will continue to take Zosyn due to empyema  and cavitations in lungs.  Patient has been benefiting with PT. 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 Patient started on Decadron and is on duonebs. Discussed with pt if his breathing feels more difficult and subq emphysema worsens he may need to be intubated.     To-Do:    [ ] f/u morning cxr  [ ]  CT surgery places chest tube and will follow  [ ] Continue HFNC and titrate with goal O2 sat > 90%.   [ ] f/u Beta D Gylcans   [ ] fungal tests  [ ] monitoring PTX and emphysema  continue cough suppression  Continue Zosyn for extended course. ID followup    Ena Hammer MD  Internal Medicine PGY-3  [phone] / [phone]

## 2021-08-30 NOTE — PROGRESS NOTE ADULT - SUBJECTIVE AND OBJECTIVE BOX
DATE OF SERVICE: 08-30-21 @ 14:40  CHIEF COMPLAINT:Patient is a 55y old  Male who presents with a chief complaint of covid pna (30 Aug 2021 10:16)    	        PAST MEDICAL & SURGICAL HISTORY:  Hyperlipidemia    HTN (hypertension)    Rupture, tendon, quadriceps    History of Achilles tendon repair            REVIEW OF SYSTEMS:  weak  RESPIRATORY:cough / sob better  CARDIOVASCULAR: No chest pain, palpitations, passing out, dizziness, or leg swelling  GASTROINTESTINAL: No abdominal or epigastric pain. No nausea, vomiting, or hematemesis; No diarrhea or constipation. No melena or hematochezia.    NEUROLOGICAL: No headaches,     Medications:  MEDICATIONS  (STANDING):  benzocaine 15 mG/menthol 3.6 mG (Sugar-Free) Lozenge 1 Lozenge Oral once  benzonatate 200 milliGRAM(s) Oral every 8 hours  budesonide 160 MICROgram(s)/formoterol 4.5 MICROgram(s) Inhaler 2 Puff(s) Inhalation two times a day  chlorhexidine 4% Liquid 1 Application(s) Topical <User Schedule>  chlorhexidine 4% Liquid 1 Application(s) Topical <User Schedule>  cholecalciferol 2000 Unit(s) Oral daily  clonazePAM  Tablet 0.5 milliGRAM(s) Oral every 8 hours  enoxaparin Injectable 90 milliGRAM(s) SubCutaneous every 12 hours  FIRST- Mouthwash  BLM 5 milliLiter(s) Swish and Spit every 6 hours  lidocaine   4% Patch 1 Patch Transdermal every 24 hours  melatonin 3 milliGRAM(s) Oral at bedtime  multivitamin 1 Tablet(s) Oral daily  oxyCODONE    IR 5 milliGRAM(s) Oral every 6 hours  pantoprazole  Injectable 40 milliGRAM(s) IV Push daily  piperacillin/tazobactam IVPB.. 3.375 Gram(s) IV Intermittent every 8 hours  polyethylene glycol 3350 17 Gram(s) Oral daily  senna 2 Tablet(s) Oral at bedtime    MEDICATIONS  (PRN):  acetaminophen   Tablet .. 975 milliGRAM(s) Oral every 6 hours PRN Mild Pain (1 - 3)  albuterol/ipratropium for Nebulization 3 milliLiter(s) Nebulizer every 6 hours PRN Shortness of Breath and/or Wheezing  aluminum hydroxide/magnesium hydroxide/simethicone Suspension 30 milliLiter(s) Oral every 4 hours PRN Dyspepsia  benzocaine 15 mG/menthol 3.6 mG (Sugar-Free) Lozenge 1 Lozenge Oral four times a day PRN Sore Throat  cyclobenzaprine 5 milliGRAM(s) Oral three times a day PRN Muscle Spasm  hydrocodone/homatropine Syrup 5 milliLiter(s) Oral every 6 hours PRN worseneing cough  sodium chloride 0.65% Nasal 1 Spray(s) Both Nostrils every 2 hours PRN Nasal Congestion    	    PHYSICAL EXAM:  T(C): 36.8 (08-30-21 @ 12:00), Max: 37.1 (08-29-21 @ 16:00)  HR: 75 (08-30-21 @ 14:00) (51 - 111)  BP: 122/64 (08-30-21 @ 14:00) (99/61 - 126/72)  RR: 26 (08-30-21 @ 14:00) (15 - 40)  SpO2: 96% (08-30-21 @ 14:00) (92% - 98%)  Wt(kg): --  I&O's Summary    29 Aug 2021 07:01  -  30 Aug 2021 07:00  --------------------------------------------------------  IN: 810 mL / OUT: 830 mL / NET: -20 mL    30 Aug 2021 07:01  -  30 Aug 2021 14:40  --------------------------------------------------------  IN: 200 mL / OUT: 0 mL / NET: 200 mL        Appearance: Normal	  HEENT:   Normal oral mucosa, PERRL, EOMI	  Lymphatic: No lymphadenopathy  Cardiovascular: Normal S1 S2, No JVD, No murmurs, No edema  Respiratory: dec b/s r/ct in place  Gastrointestinal:  Soft, Non-tender, + BS	  Skin: No rashes, No ecchymoses, No cyanosis	  Neurologic: Non-focal  Extremities: Normal range of motion, No clubbing, cyanosis or edema  Vascular: Peripheral pulses palpable 2+ bilaterally    TELEMETRY: 	    ECG:  	  RADIOLOGY:  OTHER: 	  	  LABS:	 	    CARDIAC MARKERS:                                10.2   14.13 )-----------( 378      ( 30 Aug 2021 00:30 )             33.1     08-30    138  |  97  |  17  ----------------------------<  115<H>  3.8   |  34<H>  |  0.77    Ca    9.0      30 Aug 2021 00:30  Phos  3.3     08-30  Mg     2.4     08-30    TPro  6.9  /  Alb  2.9<L>  /  TBili  0.2  /  DBili  x   /  AST  21  /  ALT  25  /  AlkPhos  60  08-30    proBNP:   Lipid Profile:   HgA1c:   TSH:

## 2021-08-30 NOTE — PROGRESS NOTE ADULT - SUBJECTIVE AND OBJECTIVE BOX
Interval Events: Pt complains of cough preventing him from sleeping overnight. Potassium was repleted overnight. Chest tube continues to drain 10cc.     ROS as indicated above; otherwise negative  CONSTITUTIONAL: No weakness, fevers or chills  RESPIRATORY: + cough but no wheezing, hemoptysis; No shortness of breath  CARDIOVASCULAR: +chest pain but no palpitations  GASTROINTESTINAL: No abdominal or epigastric pain. No nausea, vomiting, or hematemesis; No diarrhea or constipation. No melena or hematochezia.  GENITOURINARY: No dysuria, frequency or hematuria  NEUROLOGICAL: No numbness or weakness      OBJECTIVE:  ICU Vital Signs Last 24 Hrs  T(C): 36.7 (30 Aug 2021 08:00), Max: 37.1 (29 Aug 2021 16:00)  T(F): 98 (30 Aug 2021 08:00), Max: 98.7 (29 Aug 2021 16:00)  HR: 78 (30 Aug 2021 13:00) (51 - 111)  BP: 115/68 (30 Aug 2021 13:00) (99/61 - 127/76)  BP(mean): 85 (30 Aug 2021 13:00) (71 - 93)  ABP: --  ABP(mean): --  RR: 18 (30 Aug 2021 13:00) (15 - 40)  SpO2: 95% (30 Aug 2021 13:00) (92% - 98%)        08-29 @ 07:01  -  08-30 @ 07:00  --------------------------------------------------------  IN: 810 mL / OUT: 830 mL / NET: -20 mL      CAPILLARY BLOOD GLUCOSE          PHYSICAL EXAM:  General: NAD  HEENT: clear conjunctiva  Respiratory: +rhonchi R>L +on HFNC. right sided chest tube draining; air leak suspected  Cardiovascular: +S1/S2; RRR  Abdomen: soft, NT/ND  Extremities: radial pulses intact bilaterally; no LE edema. Crepitus in right chest , neck and arm.   Skin: normal color  Neurological: Ax        HOSPITAL MEDICATIONS:  Standing Meds:  benzocaine 15 mG/menthol 3.6 mG (Sugar-Free) Lozenge 1 Lozenge Oral once  benzonatate 200 milliGRAM(s) Oral every 8 hours  budesonide 160 MICROgram(s)/formoterol 4.5 MICROgram(s) Inhaler 2 Puff(s) Inhalation two times a day  chlorhexidine 4% Liquid 1 Application(s) Topical <User Schedule>  chlorhexidine 4% Liquid 1 Application(s) Topical <User Schedule>  cholecalciferol 2000 Unit(s) Oral daily  clonazePAM  Tablet 0.5 milliGRAM(s) Oral every 8 hours  enoxaparin Injectable 90 milliGRAM(s) SubCutaneous every 12 hours  FIRST- Mouthwash  BLM 5 milliLiter(s) Swish and Spit every 6 hours  lidocaine   4% Patch 1 Patch Transdermal every 24 hours  melatonin 3 milliGRAM(s) Oral at bedtime  multivitamin 1 Tablet(s) Oral daily  oxyCODONE    IR 5 milliGRAM(s) Oral every 6 hours  pantoprazole  Injectable 40 milliGRAM(s) IV Push daily  piperacillin/tazobactam IVPB.. 3.375 Gram(s) IV Intermittent every 8 hours  polyethylene glycol 3350 17 Gram(s) Oral daily  senna 2 Tablet(s) Oral at bedtime      PRN Meds:  acetaminophen   Tablet .. 975 milliGRAM(s) Oral every 6 hours PRN  albuterol/ipratropium for Nebulization 3 milliLiter(s) Nebulizer every 6 hours PRN  aluminum hydroxide/magnesium hydroxide/simethicone Suspension 30 milliLiter(s) Oral every 4 hours PRN  benzocaine 15 mG/menthol 3.6 mG (Sugar-Free) Lozenge 1 Lozenge Oral four times a day PRN  cyclobenzaprine 5 milliGRAM(s) Oral three times a day PRN  hydrocodone/homatropine Syrup 5 milliLiter(s) Oral every 6 hours PRN  sodium chloride 0.65% Nasal 1 Spray(s) Both Nostrils every 2 hours PRN      LABS:                        10.2   14.13 )-----------( 378      ( 30 Aug 2021 00:30 )             33.1     Hgb Trend: 10.2<--, 10.6<--, 11.2<--, 11.1<--, 11.7<--  08-30    138  |  97  |  17  ----------------------------<  115<H>  3.8   |  34<H>  |  0.77    Ca    9.0      30 Aug 2021 00:30  Phos  3.3     08-30  Mg     2.4     08-30    TPro  6.9  /  Alb  2.9<L>  /  TBili  0.2  /  DBili  x   /  AST  21  /  ALT  25  /  AlkPhos  60  08-30    Creatinine Trend: 0.77<--, 0.47<--, 0.60<--, 0.63<--, 0.63<--, 0.64<--  PT/INR - ( 30 Aug 2021 00:30 )   PT: 14.6 sec;   INR: 1.23 ratio         PTT - ( 30 Aug 2021 00:30 )  PTT:29.3 sec    Arterial Blood Gas:  08-30 @ 00:26  7.43/65/79/43/97.8/16.2  ABG lactate: --  Arterial Blood Gas:  08-29 @ 04:43  7.48/58/69/43/96.6/17.1  ABG lactate: --

## 2021-08-31 DIAGNOSIS — I82.90 ACUTE EMBOLISM AND THROMBOSIS OF UNSPECIFIED VEIN: ICD-10-CM

## 2021-08-31 DIAGNOSIS — J93.9 PNEUMOTHORAX, UNSPECIFIED: ICD-10-CM

## 2021-08-31 DIAGNOSIS — Z29.9 ENCOUNTER FOR PROPHYLACTIC MEASURES, UNSPECIFIED: ICD-10-CM

## 2021-08-31 LAB
ALBUMIN SERPL ELPH-MCNC: 2.7 G/DL — LOW (ref 3.3–5)
ALP SERPL-CCNC: 65 U/L — SIGNIFICANT CHANGE UP (ref 40–120)
ALT FLD-CCNC: 39 U/L — SIGNIFICANT CHANGE UP (ref 10–45)
ANION GAP SERPL CALC-SCNC: 9 MMOL/L — SIGNIFICANT CHANGE UP (ref 5–17)
AST SERPL-CCNC: 24 U/L — SIGNIFICANT CHANGE UP (ref 10–40)
BASOPHILS # BLD AUTO: 0.09 K/UL — SIGNIFICANT CHANGE UP (ref 0–0.2)
BASOPHILS NFR BLD AUTO: 0.5 % — SIGNIFICANT CHANGE UP (ref 0–2)
BILIRUB SERPL-MCNC: 0.2 MG/DL — SIGNIFICANT CHANGE UP (ref 0.2–1.2)
BUN SERPL-MCNC: 10 MG/DL — SIGNIFICANT CHANGE UP (ref 7–23)
CALCIUM SERPL-MCNC: 8.9 MG/DL — SIGNIFICANT CHANGE UP (ref 8.4–10.5)
CHLORIDE SERPL-SCNC: 92 MMOL/L — LOW (ref 96–108)
CO2 SERPL-SCNC: 32 MMOL/L — HIGH (ref 22–31)
CREAT SERPL-MCNC: 0.64 MG/DL — SIGNIFICANT CHANGE UP (ref 0.5–1.3)
CULTURE RESULTS: SIGNIFICANT CHANGE UP
EOSINOPHIL # BLD AUTO: 0.39 K/UL — SIGNIFICANT CHANGE UP (ref 0–0.5)
EOSINOPHIL NFR BLD AUTO: 2 % — SIGNIFICANT CHANGE UP (ref 0–6)
GLUCOSE SERPL-MCNC: 105 MG/DL — HIGH (ref 70–99)
HCT VFR BLD CALC: 40.7 % — SIGNIFICANT CHANGE UP (ref 39–50)
HGB BLD-MCNC: 12.2 G/DL — LOW (ref 13–17)
IMM GRANULOCYTES NFR BLD AUTO: 2.7 % — HIGH (ref 0–1.5)
LYMPHOCYTES # BLD AUTO: 13.2 % — SIGNIFICANT CHANGE UP (ref 13–44)
LYMPHOCYTES # BLD AUTO: 2.58 K/UL — SIGNIFICANT CHANGE UP (ref 1–3.3)
MAGNESIUM SERPL-MCNC: 2.2 MG/DL — SIGNIFICANT CHANGE UP (ref 1.6–2.6)
MCHC RBC-ENTMCNC: 27.7 PG — SIGNIFICANT CHANGE UP (ref 27–34)
MCHC RBC-ENTMCNC: 30 GM/DL — LOW (ref 32–36)
MCV RBC AUTO: 92.5 FL — SIGNIFICANT CHANGE UP (ref 80–100)
MONOCYTES # BLD AUTO: 1.7 K/UL — HIGH (ref 0–0.9)
MONOCYTES NFR BLD AUTO: 8.7 % — SIGNIFICANT CHANGE UP (ref 2–14)
NEUTROPHILS # BLD AUTO: 14.32 K/UL — HIGH (ref 1.8–7.4)
NEUTROPHILS NFR BLD AUTO: 72.9 % — SIGNIFICANT CHANGE UP (ref 43–77)
NRBC # BLD: 0 /100 WBCS — SIGNIFICANT CHANGE UP (ref 0–0)
PHOSPHATE SERPL-MCNC: 3.9 MG/DL — SIGNIFICANT CHANGE UP (ref 2.5–4.5)
PLATELET # BLD AUTO: 445 K/UL — HIGH (ref 150–400)
POTASSIUM SERPL-MCNC: 4.5 MMOL/L — SIGNIFICANT CHANGE UP (ref 3.5–5.3)
POTASSIUM SERPL-SCNC: 4.5 MMOL/L — SIGNIFICANT CHANGE UP (ref 3.5–5.3)
PROT SERPL-MCNC: 7.2 G/DL — SIGNIFICANT CHANGE UP (ref 6–8.3)
RBC # BLD: 4.4 M/UL — SIGNIFICANT CHANGE UP (ref 4.2–5.8)
RBC # FLD: 13 % — SIGNIFICANT CHANGE UP (ref 10.3–14.5)
SODIUM SERPL-SCNC: 133 MMOL/L — LOW (ref 135–145)
SPECIMEN SOURCE: SIGNIFICANT CHANGE UP
WBC # BLD: 19.61 K/UL — HIGH (ref 3.8–10.5)
WBC # FLD AUTO: 19.61 K/UL — HIGH (ref 3.8–10.5)

## 2021-08-31 PROCEDURE — ZZZZZ: CPT

## 2021-08-31 PROCEDURE — 71045 X-RAY EXAM CHEST 1 VIEW: CPT | Mod: 26

## 2021-08-31 PROCEDURE — 99232 SBSQ HOSP IP/OBS MODERATE 35: CPT

## 2021-08-31 RX ORDER — HYDROMORPHONE HYDROCHLORIDE 2 MG/ML
1 INJECTION INTRAMUSCULAR; INTRAVENOUS; SUBCUTANEOUS EVERY 4 HOURS
Refills: 0 | Status: DISCONTINUED | OUTPATIENT
Start: 2021-08-31 | End: 2021-09-07

## 2021-08-31 RX ORDER — HYDROMORPHONE HYDROCHLORIDE 2 MG/ML
0.5 INJECTION INTRAMUSCULAR; INTRAVENOUS; SUBCUTANEOUS ONCE
Refills: 0 | Status: DISCONTINUED | OUTPATIENT
Start: 2021-08-31 | End: 2021-08-31

## 2021-08-31 RX ORDER — TRAMADOL HYDROCHLORIDE 50 MG/1
25 TABLET ORAL EVERY 6 HOURS
Refills: 0 | Status: DISCONTINUED | OUTPATIENT
Start: 2021-08-31 | End: 2021-09-07

## 2021-08-31 RX ORDER — OXYCODONE HYDROCHLORIDE 5 MG/1
2.5 TABLET ORAL EVERY 4 HOURS
Refills: 0 | Status: DISCONTINUED | OUTPATIENT
Start: 2021-08-31 | End: 2021-08-31

## 2021-08-31 RX ORDER — KETOROLAC TROMETHAMINE 30 MG/ML
15 SYRINGE (ML) INJECTION ONCE
Refills: 0 | Status: DISCONTINUED | OUTPATIENT
Start: 2021-08-31 | End: 2021-08-31

## 2021-08-31 RX ADMIN — Medication 0.5 MILLIGRAM(S): at 13:12

## 2021-08-31 RX ADMIN — ENOXAPARIN SODIUM 90 MILLIGRAM(S): 100 INJECTION SUBCUTANEOUS at 05:24

## 2021-08-31 RX ADMIN — Medication 15 MILLIGRAM(S): at 13:30

## 2021-08-31 RX ADMIN — LIDOCAINE 1 PATCH: 4 CREAM TOPICAL at 13:07

## 2021-08-31 RX ADMIN — ENOXAPARIN SODIUM 90 MILLIGRAM(S): 100 INJECTION SUBCUTANEOUS at 17:26

## 2021-08-31 RX ADMIN — LIDOCAINE 1 PATCH: 4 CREAM TOPICAL at 00:21

## 2021-08-31 RX ADMIN — Medication 3 MILLIGRAM(S): at 21:43

## 2021-08-31 RX ADMIN — Medication 1 TABLET(S): at 13:08

## 2021-08-31 RX ADMIN — Medication 200 MILLIGRAM(S): at 05:18

## 2021-08-31 RX ADMIN — HYDROMORPHONE HYDROCHLORIDE 0.5 MILLIGRAM(S): 2 INJECTION INTRAMUSCULAR; INTRAVENOUS; SUBCUTANEOUS at 05:10

## 2021-08-31 RX ADMIN — BUDESONIDE AND FORMOTEROL FUMARATE DIHYDRATE 2 PUFF(S): 160; 4.5 AEROSOL RESPIRATORY (INHALATION) at 17:06

## 2021-08-31 RX ADMIN — PIPERACILLIN AND TAZOBACTAM 25 GRAM(S): 4; .5 INJECTION, POWDER, LYOPHILIZED, FOR SOLUTION INTRAVENOUS at 13:07

## 2021-08-31 RX ADMIN — HYDROMORPHONE HYDROCHLORIDE 0.5 MILLIGRAM(S): 2 INJECTION INTRAMUSCULAR; INTRAVENOUS; SUBCUTANEOUS at 04:36

## 2021-08-31 RX ADMIN — TRAMADOL HYDROCHLORIDE 25 MILLIGRAM(S): 50 TABLET ORAL at 11:12

## 2021-08-31 RX ADMIN — BUDESONIDE AND FORMOTEROL FUMARATE DIHYDRATE 2 PUFF(S): 160; 4.5 AEROSOL RESPIRATORY (INHALATION) at 05:28

## 2021-08-31 RX ADMIN — DIPHENHYDRAMINE HYDROCHLORIDE AND LIDOCAINE HYDROCHLORIDE AND ALUMINUM HYDROXIDE AND MAGNESIUM HYDRO 5 MILLILITER(S): KIT at 13:07

## 2021-08-31 RX ADMIN — PANTOPRAZOLE SODIUM 40 MILLIGRAM(S): 20 TABLET, DELAYED RELEASE ORAL at 13:10

## 2021-08-31 RX ADMIN — Medication 0.5 MILLIGRAM(S): at 05:19

## 2021-08-31 RX ADMIN — TRAMADOL HYDROCHLORIDE 25 MILLIGRAM(S): 50 TABLET ORAL at 12:00

## 2021-08-31 RX ADMIN — LIDOCAINE 1 PATCH: 4 CREAM TOPICAL at 00:22

## 2021-08-31 RX ADMIN — DIPHENHYDRAMINE HYDROCHLORIDE AND LIDOCAINE HYDROCHLORIDE AND ALUMINUM HYDROXIDE AND MAGNESIUM HYDRO 5 MILLILITER(S): KIT at 00:35

## 2021-08-31 RX ADMIN — Medication 0.5 MILLIGRAM(S): at 21:44

## 2021-08-31 RX ADMIN — DIPHENHYDRAMINE HYDROCHLORIDE AND LIDOCAINE HYDROCHLORIDE AND ALUMINUM HYDROXIDE AND MAGNESIUM HYDRO 5 MILLILITER(S): KIT at 05:19

## 2021-08-31 RX ADMIN — POLYETHYLENE GLYCOL 3350 17 GRAM(S): 17 POWDER, FOR SOLUTION ORAL at 13:10

## 2021-08-31 RX ADMIN — DIPHENHYDRAMINE HYDROCHLORIDE AND LIDOCAINE HYDROCHLORIDE AND ALUMINUM HYDROXIDE AND MAGNESIUM HYDRO 5 MILLILITER(S): KIT at 23:04

## 2021-08-31 RX ADMIN — Medication 2000 UNIT(S): at 13:08

## 2021-08-31 RX ADMIN — Medication 15 MILLIGRAM(S): at 13:04

## 2021-08-31 RX ADMIN — PIPERACILLIN AND TAZOBACTAM 25 GRAM(S): 4; .5 INJECTION, POWDER, LYOPHILIZED, FOR SOLUTION INTRAVENOUS at 20:37

## 2021-08-31 RX ADMIN — Medication 200 MILLIGRAM(S): at 21:44

## 2021-08-31 RX ADMIN — PIPERACILLIN AND TAZOBACTAM 25 GRAM(S): 4; .5 INJECTION, POWDER, LYOPHILIZED, FOR SOLUTION INTRAVENOUS at 04:36

## 2021-08-31 RX ADMIN — LIDOCAINE 1 PATCH: 4 CREAM TOPICAL at 21:37

## 2021-08-31 RX ADMIN — DIPHENHYDRAMINE HYDROCHLORIDE AND LIDOCAINE HYDROCHLORIDE AND ALUMINUM HYDROXIDE AND MAGNESIUM HYDRO 5 MILLILITER(S): KIT at 17:26

## 2021-08-31 RX ADMIN — Medication 200 MILLIGRAM(S): at 13:11

## 2021-08-31 NOTE — PROGRESS NOTE ADULT - ASSESSMENT
pt w/ covid  hypoxia   s/p steroids   s/p remdesivir   id / f/u   pulm f/u   c/e resp support//h/f as needed  h/f as needed   cards f.u  cta noted. Continue abs  id f/u  s/p toci  gi / dvt proph  o2  hx htn/   Pneumothorax - s/p CT  thoracic f/u  imaging as per micu/thoracic  subq emphysema noted  analgesics

## 2021-08-31 NOTE — PROGRESS NOTE ADULT - SUBJECTIVE AND OBJECTIVE BOX
CARDIOLOGY FOLLOW UP - Dr. Terry  Date of Service: 8/31/21  CC: pain at chest tube site, dyspnea improving  tx to medicine floor    Review of Systems:  Constitutional: No fever, weight loss, or fatigue  Respiratory: No cough, wheezing, or hemoptysis, no shortness of breath  Cardiovascular: No chest pain, palpitations, passing out, dizziness, or leg swelling  Gastrointestinal: No abd or epigastric pain.  No nausea, vomiting, or hematemesis; no diarrhea or constipation, no melena or hematochezia  Vascular: no edema       PHYSICAL EXAM:  T(C): 36.9 (08-31-21 @ 10:27), Max: 36.9 (08-30-21 @ 16:00)  HR: 102 (08-31-21 @ 10:27) (71 - 118)  BP: 113/72 (08-31-21 @ 10:27) (110/63 - 146/71)  RR: 34 (08-31-21 @ 10:27) (20 - 38)  SpO2: 95% (08-31-21 @ 10:27) (91% - 97%)  Wt(kg): --  I&O's Summary    30 Aug 2021 07:01  -  31 Aug 2021 07:00  --------------------------------------------------------  IN: 500 mL / OUT: 1990 mL / NET: -1490 mL        Appearance: Normal	  Cardiovascular: Normal S1 S2,RRR, No JVD, No murmurs  Respiratory: diminished   Gastrointestinal:  Soft, Non-tender, + BS	  Extremities: Normal range of motion, No clubbing, cyanosis or edema      Home Medications:  Albuterol (Eqv-ProAir HFA) 90 mcg/inh inhalation aerosol: 2 puff(s) inhaled every 6 hours, As Needed (29 Jul 2021 09:08)  ivermectin 3 mg oral tablet: 1 tab(s) orally once a day (29 Jul 2021 09:08)  levoFLOXacin 500 mg oral tablet: 1 tab(s) orally every 24 hours (29 Jul 2021 09:08)  predniSONE 50 mg oral tablet: 1 tab(s) orally once a day (29 Jul 2021 09:08)      MEDICATIONS  (STANDING):  benzocaine 15 mG/menthol 3.6 mG (Sugar-Free) Lozenge 1 Lozenge Oral once  benzonatate 200 milliGRAM(s) Oral every 8 hours  budesonide 160 MICROgram(s)/formoterol 4.5 MICROgram(s) Inhaler 2 Puff(s) Inhalation two times a day  cholecalciferol 2000 Unit(s) Oral daily  clonazePAM  Tablet 0.5 milliGRAM(s) Oral every 8 hours  enoxaparin Injectable 90 milliGRAM(s) SubCutaneous every 12 hours  FIRST- Mouthwash  BLM 5 milliLiter(s) Swish and Spit every 6 hours  ketorolac   Injectable 15 milliGRAM(s) IV Push once  lidocaine   4% Patch 1 Patch Transdermal every 24 hours  melatonin 3 milliGRAM(s) Oral at bedtime  multivitamin 1 Tablet(s) Oral daily  pantoprazole  Injectable 40 milliGRAM(s) IV Push daily  piperacillin/tazobactam IVPB.. 3.375 Gram(s) IV Intermittent every 8 hours  polyethylene glycol 3350 17 Gram(s) Oral daily  senna 2 Tablet(s) Oral at bedtime      TELEMETRY: 	    ECG:  	  RADIOLOGY:   DIAGNOSTIC TESTING:  [ ] Echocardiogram:  [ ]  Catheterization:  [ ] Stress Test:    OTHER: 	    LABS:	 	                            12.2   19.61 )-----------( 445      ( 31 Aug 2021 07:21 )             40.7     08-31    133<L>  |  92<L>  |  10  ----------------------------<  105<H>  4.5   |  32<H>  |  0.64    Ca    8.9      31 Aug 2021 07:21  Phos  3.9     08-31  Mg     2.2     08-31    TPro  7.2  /  Alb  2.7<L>  /  TBili  0.2  /  DBili  x   /  AST  24  /  ALT  39  /  AlkPhos  65  08-31    PT/INR - ( 30 Aug 2021 00:30 )   PT: 14.6 sec;   INR: 1.23 ratio         PTT - ( 30 Aug 2021 00:30 )  PTT:29.3 sec

## 2021-08-31 NOTE — PROGRESS NOTE ADULT - ASSESSMENT
Echo 8/24/21: EF 65%, mild MR, grossly nl lv sys fx    a/p  54yo M with Hx of DVT s/p achilles tendon repair years ago not on AC presenting with complaints of SOB. intermittent and fevers with cough productive of white sputum for past week, tested positive for covid 2. Now transferred to MICU for tension pneumothorax, s/p chest tube.     #Atypical Chest Pain  -RRT 8/23 x2 for chest pain - exacerbated by cough and inspiration  -improved, secondary to covid PNA, PNX  -trop negative  -no ADHF noted on exam   -CTA without PE   -Echo noted w grossly nl lv sys fx, mild MR     #Covid -19, PNX  -s/p completed courses of Remdesivir x 5 days and Decadron x 10 days   -S/p Tocilizumab 7/30 per ID   -CTA as above   -s/p R chest tube for pnx  -pulm f/u   -thoracic f/u    #Sinus tachycardia  -resolved  -likely secondary to covid PNA, volume, pain, PNX  -cont to monitor   -echo as above     #DVT (hx)  -LE doppler 8/25 with no evidence of deep venous thrombosis in either lower extremity. Incidental note of thrombosis of the left tibial peroneal trunk with diminished flow flow within the left popliteal artery.  -on full dose AC    #s/p steroids for laryngitis/obstruction

## 2021-08-31 NOTE — PROGRESS NOTE ADULT - PROBLEM SELECTOR PLAN 4
Incidental note of thrombosis of the left tibial peroneal trunk with diminished flow flow within the left popliteal artery.  - lovenox 90 BID, full AC

## 2021-08-31 NOTE — PROGRESS NOTE ADULT - PROBLEM SELECTOR PLAN 4
2nd to COVID PNA, superimposed bacterial PNA, ptx   -S/p RRT 8/3 and 8/4 for hypoxia  -Tx to 5ICU 8/10 for further management, tx to 4M   -S/p RRT x2 8/23 for R sided chest pain, pain appears pleuritic in nature  -CTA chest 8/25 with no clear PE  -Tx back to MICU 8/26  -Now seen on [facility]   -Keep sats >90% with supplemental O2 (currently HFNC 40L/60%)

## 2021-08-31 NOTE — PROGRESS NOTE ADULT - PROBLEM SELECTOR PLAN 1
S/p COVID with complications of PTX and empyema s/p chest tube placed by CT on 8/26  - f/u CT surg recs  - daily AM CXR per CTSx  - C/w HFNC, 40L@50% FiO2 titrate to maintain SaO2 > 90%  - f/u AM VBG given chronic retention and elevated bicarb (+diamox?)

## 2021-08-31 NOTE — PROGRESS NOTE ADULT - SUBJECTIVE AND OBJECTIVE BOX
Patient is a 55y old  Male who presents with a chief complaint of covid pna (30 Aug 2021 10:16)      SUBJECTIVE: " "    Vital Signs Last 24 Hrs  T(C): 36.8 (08-31-21 @ 05:51), Max: 36.9 (08-30-21 @ 16:00)  T(F): 98.3 (08-31-21 @ 05:51), Max: 98.4 (08-30-21 @ 16:00)  HR: 82 (08-31-21 @ 05:51) (64 - 118)  BP: 146/71 (08-31-21 @ 05:51) (110/62 - 146/71)  RR: 20 (08-31-21 @ 05:51) (16 - 34)  SpO2: 95% (08-31-21 @ 05:51) (91% - 97%)                08-30-21 @ 07:01  -  08-31-21 @ 07:00  --------------------------------------------------------  IN: 500 mL / OUT: 1990 mL / NET: -1490 mL        Daily     Daily                           12.2   19.61 )-----------( 445      ( 31 Aug 2021 07:21 )             40.7     08-31    133<L>  |  92<L>  |  10  ----------------------------<  105<H>  4.5   |  32<H>  |  0.64    Ca    8.9      31 Aug 2021 07:21  Phos  3.9     08-31  Mg     2.2     08-31    TPro  7.2  /  Alb  2.7<L>  /  TBili  0.2  /  DBili  x   /  AST  24  /  ALT  39  /  AlkPhos  65  08-31          PHYSICAL EXAM  Neurology: A&Ox3, NAD, no gross deficits  CV : RRR+S1S2  Lungs: Respirations non-labored, B/L BS  Abdomen: Soft, NT/ND, +BSx4Q  Extremities: B/L LE edema, negative calf tenderness, +PP           CHEST TUBES:  Left CT     [  ]LWS  [  ]H2O seal  Right CT   [ X ]LWS  [  ]H2O seal          MEDICATIONS  acetaminophen   Tablet .. 975 milliGRAM(s) Oral every 6 hours PRN  albuterol/ipratropium for Nebulization 3 milliLiter(s) Nebulizer every 6 hours PRN  aluminum hydroxide/magnesium hydroxide/simethicone Suspension 30 milliLiter(s) Oral every 4 hours PRN  benzocaine 15 mG/menthol 3.6 mG (Sugar-Free) Lozenge 1 Lozenge Oral four times a day PRN  benzocaine 15 mG/menthol 3.6 mG (Sugar-Free) Lozenge 1 Lozenge Oral once  benzonatate 200 milliGRAM(s) Oral every 8 hours  budesonide 160 MICROgram(s)/formoterol 4.5 MICROgram(s) Inhaler 2 Puff(s) Inhalation two times a day  cholecalciferol 2000 Unit(s) Oral daily  clonazePAM  Tablet 0.5 milliGRAM(s) Oral every 8 hours  cyclobenzaprine 5 milliGRAM(s) Oral three times a day PRN  enoxaparin Injectable 90 milliGRAM(s) SubCutaneous every 12 hours  FIRST- Mouthwash  BLM 5 milliLiter(s) Swish and Spit every 6 hours  hydrocodone/homatropine Syrup 5 milliLiter(s) Oral every 6 hours PRN  lidocaine   4% Patch 1 Patch Transdermal every 24 hours  melatonin 3 milliGRAM(s) Oral at bedtime  multivitamin 1 Tablet(s) Oral daily  oxyCODONE    IR 5 milliGRAM(s) Oral every 6 hours  oxyCODONE    IR 2.5 milliGRAM(s) Oral every 4 hours PRN  pantoprazole  Injectable 40 milliGRAM(s) IV Push daily  piperacillin/tazobactam IVPB.. 3.375 Gram(s) IV Intermittent every 8 hours  polyethylene glycol 3350 17 Gram(s) Oral daily  senna 2 Tablet(s) Oral at bedtime  sodium chloride 0.65% Nasal 1 Spray(s) Both Nostrils every 2 hours PRN

## 2021-08-31 NOTE — PROGRESS NOTE ADULT - SUBJECTIVE AND OBJECTIVE BOX
DATE OF SERVICE: 08-31-21 @ 11:40  CHIEF COMPLAINT:Patient is a 55y old  Male who presents with a chief complaint of SOB (31 Aug 2021 09:31)    	        PAST MEDICAL & SURGICAL HISTORY:  Hyperlipidemia    HTN (hypertension)    Rupture, tendon, quadriceps    History of Achilles tendon repair            REVIEW OF SYSTEMS:  CONSTITUTIONAL: No fever, weight loss, or fatigue  EYES: No eye pain, visual disturbances, or discharge  NECK: No pain or stiffness  RESPIRATORY: breathing better  CARDIOVASCULAR:pain  by ct   GASTROINTESTINAL: No abdominal or epigastric pain. No nausea, vomiting, or hematemesis; No diarrhea or constipation. No melena or hematochezia.  GENITOURINARY: No dysuria, frequency, hematuria, or incontinence  NEUROLOGICAL: No headaches,    Medications:  MEDICATIONS  (STANDING):  benzocaine 15 mG/menthol 3.6 mG (Sugar-Free) Lozenge 1 Lozenge Oral once  benzonatate 200 milliGRAM(s) Oral every 8 hours  budesonide 160 MICROgram(s)/formoterol 4.5 MICROgram(s) Inhaler 2 Puff(s) Inhalation two times a day  cholecalciferol 2000 Unit(s) Oral daily  clonazePAM  Tablet 0.5 milliGRAM(s) Oral every 8 hours  enoxaparin Injectable 90 milliGRAM(s) SubCutaneous every 12 hours  FIRST- Mouthwash  BLM 5 milliLiter(s) Swish and Spit every 6 hours  ketorolac   Injectable 15 milliGRAM(s) IV Push once  lidocaine   4% Patch 1 Patch Transdermal every 24 hours  melatonin 3 milliGRAM(s) Oral at bedtime  multivitamin 1 Tablet(s) Oral daily  pantoprazole  Injectable 40 milliGRAM(s) IV Push daily  piperacillin/tazobactam IVPB.. 3.375 Gram(s) IV Intermittent every 8 hours  polyethylene glycol 3350 17 Gram(s) Oral daily  senna 2 Tablet(s) Oral at bedtime    MEDICATIONS  (PRN):  acetaminophen   Tablet .. 975 milliGRAM(s) Oral every 6 hours PRN Mild Pain (1 - 3)  albuterol/ipratropium for Nebulization 3 milliLiter(s) Nebulizer every 6 hours PRN Shortness of Breath and/or Wheezing  aluminum hydroxide/magnesium hydroxide/simethicone Suspension 30 milliLiter(s) Oral every 4 hours PRN Dyspepsia  benzocaine 15 mG/menthol 3.6 mG (Sugar-Free) Lozenge 1 Lozenge Oral four times a day PRN Sore Throat  cyclobenzaprine 5 milliGRAM(s) Oral three times a day PRN Muscle Spasm  hydrocodone/homatropine Syrup 5 milliLiter(s) Oral every 6 hours PRN worseneing cough  HYDROmorphone  Injectable 1 milliGRAM(s) IV Push every 4 hours PRN Severe Pain (7 - 10)  sodium chloride 0.65% Nasal 1 Spray(s) Both Nostrils every 2 hours PRN Nasal Congestion  traMADol 25 milliGRAM(s) Oral every 6 hours PRN Moderate Pain (4 - 6)    	    PHYSICAL EXAM:  T(C): 36.9 (08-31-21 @ 10:27), Max: 36.9 (08-30-21 @ 16:00)  HR: 102 (08-31-21 @ 10:27) (70 - 118)  BP: 113/72 (08-31-21 @ 10:27) (110/62 - 146/71)  RR: 34 (08-31-21 @ 10:27) (16 - 38)  SpO2: 95% (08-31-21 @ 10:27) (91% - 97%)  Wt(kg): --  I&O's Summary    30 Aug 2021 07:01  -  31 Aug 2021 07:00  --------------------------------------------------------  IN: 500 mL / OUT: 1990 mL / NET: -1490 mL        Appearance: Normal	  HEENT:   Normal oral mucosa, PERRL, EOMI	  Lymphatic: No lymphadenopathy  Cardiovascular: Normal S1 S2, No JVD, No murmurs, No edema  Respiratory: dec bs r/ctin place  Gastrointestinal:  Soft, Non-tender, + BS	  Skin: No rashes, No ecchymoses, No cyanosis	  Neurologic: Non-focal  Extremities: Normal range of motion, No clubbing, cyanosis or edema  Vascular: Peripheral pulses palpable 2+ bilaterally    TELEMETRY: 	    ECG:  	  RADIOLOGY:  OTHER: 	  	  LABS:	 	    CARDIAC MARKERS:                                12.2   19.61 )-----------( 445      ( 31 Aug 2021 07:21 )             40.7     08-31    133<L>  |  92<L>  |  10  ----------------------------<  105<H>  4.5   |  32<H>  |  0.64    Ca    8.9      31 Aug 2021 07:21  Phos  3.9     08-31  Mg     2.2     08-31    TPro  7.2  /  Alb  2.7<L>  /  TBili  0.2  /  DBili  x   /  AST  24  /  ALT  39  /  AlkPhos  65  08-31    proBNP:   Lipid Profile:   HgA1c:   TSH:

## 2021-08-31 NOTE — PROGRESS NOTE ADULT - PROBLEM SELECTOR PLAN 2
- f/u B glucans  - pain control with tylenol, tramadol, Dilaudid  - continue hycodan  - continue Zosyn (started 8/26, f/u ID for duration)  - trend WBCs  - chest PT

## 2021-08-31 NOTE — PROGRESS NOTE ADULT - SUBJECTIVE AND OBJECTIVE BOX
Follow-up Pulm Progress Note    No new respiratory events overnight.  Denies SOB/CP.     Medications:  MEDICATIONS  (STANDING):  benzocaine 15 mG/menthol 3.6 mG (Sugar-Free) Lozenge 1 Lozenge Oral once  benzonatate 200 milliGRAM(s) Oral every 8 hours  budesonide 160 MICROgram(s)/formoterol 4.5 MICROgram(s) Inhaler 2 Puff(s) Inhalation two times a day  cholecalciferol 2000 Unit(s) Oral daily  clonazePAM  Tablet 0.5 milliGRAM(s) Oral every 8 hours  enoxaparin Injectable 90 milliGRAM(s) SubCutaneous every 12 hours  FIRST- Mouthwash  BLM 5 milliLiter(s) Swish and Spit every 6 hours  ketorolac   Injectable 15 milliGRAM(s) IV Push once  lidocaine   4% Patch 1 Patch Transdermal every 24 hours  melatonin 3 milliGRAM(s) Oral at bedtime  multivitamin 1 Tablet(s) Oral daily  pantoprazole  Injectable 40 milliGRAM(s) IV Push daily  piperacillin/tazobactam IVPB.. 3.375 Gram(s) IV Intermittent every 8 hours  polyethylene glycol 3350 17 Gram(s) Oral daily  senna 2 Tablet(s) Oral at bedtime    MEDICATIONS  (PRN):  acetaminophen   Tablet .. 975 milliGRAM(s) Oral every 6 hours PRN Mild Pain (1 - 3)  albuterol/ipratropium for Nebulization 3 milliLiter(s) Nebulizer every 6 hours PRN Shortness of Breath and/or Wheezing  aluminum hydroxide/magnesium hydroxide/simethicone Suspension 30 milliLiter(s) Oral every 4 hours PRN Dyspepsia  benzocaine 15 mG/menthol 3.6 mG (Sugar-Free) Lozenge 1 Lozenge Oral four times a day PRN Sore Throat  cyclobenzaprine 5 milliGRAM(s) Oral three times a day PRN Muscle Spasm  hydrocodone/homatropine Syrup 5 milliLiter(s) Oral every 6 hours PRN worseneing cough  HYDROmorphone  Injectable 1 milliGRAM(s) IV Push every 4 hours PRN Severe Pain (7 - 10)  sodium chloride 0.65% Nasal 1 Spray(s) Both Nostrils every 2 hours PRN Nasal Congestion  traMADol 25 milliGRAM(s) Oral every 6 hours PRN Moderate Pain (4 - 6)          Vital Signs Last 24 Hrs  T(C): 36.9 (31 Aug 2021 10:27), Max: 36.9 (30 Aug 2021 16:00)  T(F): 98.4 (31 Aug 2021 10:27), Max: 98.4 (30 Aug 2021 16:00)  HR: 102 (31 Aug 2021 10:27) (70 - 118)  BP: 113/72 (31 Aug 2021 10:27) (110/62 - 146/71)  BP(mean): 84 (30 Aug 2021 18:00) (79 - 90)  RR: 34 (31 Aug 2021 10:27) (16 - 38)  SpO2: 95% (31 Aug 2021 10:27) (91% - 97%)    ABG - ( 30 Aug 2021 00:26 )  pH, Arterial: 7.43  pH, Blood: x     /  pCO2: 65    /  pO2: 79    / HCO3: 43    / Base Excess: 16.2  /  SaO2: 97.8                  08-30 @ 07:01  -  08-31 @ 07:00  --------------------------------------------------------  IN: 500 mL / OUT: 1990 mL / NET: -1490 mL          LABS:                        12.2   19.61 )-----------( 445      ( 31 Aug 2021 07:21 )             40.7     08-31    133<L>  |  92<L>  |  10  ----------------------------<  105<H>  4.5   |  32<H>  |  0.64    Ca    8.9      31 Aug 2021 07:21  Phos  3.9     08-31  Mg     2.2     08-31    TPro  7.2  /  Alb  2.7<L>  /  TBili  0.2  /  DBili  x   /  AST  24  /  ALT  39  /  AlkPhos  65  08-31          CAPILLARY BLOOD GLUCOSE        PT/INR - ( 30 Aug 2021 00:30 )   PT: 14.6 sec;   INR: 1.23 ratio         PTT - ( 30 Aug 2021 00:30 )  PTT:29.3 sec                Fluid characteristics  -- 08-26 @ 20:39  pH > 7.92  LDH 1640  tprot 4.7    Cell count  Appearance Cloudy  Fluid type --  BF lymph 24  color Orange  eosinophil --  PMN 67  Mesothelial --  Monocyte 9  Other body cells --      CULTURES: (if applicable)    Culture - Blood (collected 08-26-21 @ 00:39)  Source: .Blood Blood  Final Report (08-31-21 @ 01:01):    No Growth Final    Culture - Blood (collected 08-25-21 @ 22:56)  Source: .Blood Blood  Final Report (08-30-21 @ 23:01):    No Growth Final                Culture - Body Fluid with Gram Stain (collected 08-26-21 @ 23:15)  Source: .Body Fluid Pleural Fluid  Gram Stain (08-27-21 @ 03:31):    polymorphonuclear leukocytes    Gram Negative Rods    by cytocentrifuge  Preliminary Report (08-27-21 @ 17:47):    No growth            Culture - Fungal, Body Fluid (collected 08-26-21 @ 23:15)  Source: .Body Fluid Pleural Fluid  Preliminary Report (08-27-21 @ 06:40):    Testing in progress                  Physical Examination:  PULM: Clear to auscultation bilaterally, no significant sputum production  CVS: S1, S2 heard    RADIOLOGY REVIEWED  CXR:     CT chest:    TTE:   Follow-up Pulm Progress Note    O2 sats 95-96% on HFNC 60%/40L  Reporting some pain at chest tube site  Mild dyspnea, worse with coughing   Otherwise denies CP    Medications:  MEDICATIONS  (STANDING):  benzocaine 15 mG/menthol 3.6 mG (Sugar-Free) Lozenge 1 Lozenge Oral once  benzonatate 200 milliGRAM(s) Oral every 8 hours  budesonide 160 MICROgram(s)/formoterol 4.5 MICROgram(s) Inhaler 2 Puff(s) Inhalation two times a day  cholecalciferol 2000 Unit(s) Oral daily  clonazePAM  Tablet 0.5 milliGRAM(s) Oral every 8 hours  enoxaparin Injectable 90 milliGRAM(s) SubCutaneous every 12 hours  FIRST- Mouthwash  BLM 5 milliLiter(s) Swish and Spit every 6 hours  ketorolac   Injectable 15 milliGRAM(s) IV Push once  lidocaine   4% Patch 1 Patch Transdermal every 24 hours  melatonin 3 milliGRAM(s) Oral at bedtime  multivitamin 1 Tablet(s) Oral daily  pantoprazole  Injectable 40 milliGRAM(s) IV Push daily  piperacillin/tazobactam IVPB.. 3.375 Gram(s) IV Intermittent every 8 hours  polyethylene glycol 3350 17 Gram(s) Oral daily  senna 2 Tablet(s) Oral at bedtime    MEDICATIONS  (PRN):  acetaminophen   Tablet .. 975 milliGRAM(s) Oral every 6 hours PRN Mild Pain (1 - 3)  albuterol/ipratropium for Nebulization 3 milliLiter(s) Nebulizer every 6 hours PRN Shortness of Breath and/or Wheezing  aluminum hydroxide/magnesium hydroxide/simethicone Suspension 30 milliLiter(s) Oral every 4 hours PRN Dyspepsia  benzocaine 15 mG/menthol 3.6 mG (Sugar-Free) Lozenge 1 Lozenge Oral four times a day PRN Sore Throat  cyclobenzaprine 5 milliGRAM(s) Oral three times a day PRN Muscle Spasm  hydrocodone/homatropine Syrup 5 milliLiter(s) Oral every 6 hours PRN worseneing cough  HYDROmorphone  Injectable 1 milliGRAM(s) IV Push every 4 hours PRN Severe Pain (7 - 10)  sodium chloride 0.65% Nasal 1 Spray(s) Both Nostrils every 2 hours PRN Nasal Congestion  traMADol 25 milliGRAM(s) Oral every 6 hours PRN Moderate Pain (4 - 6)    Vital Signs Last 24 Hrs  T(C): 36.9 (31 Aug 2021 10:27), Max: 36.9 (30 Aug 2021 16:00)  T(F): 98.4 (31 Aug 2021 10:27), Max: 98.4 (30 Aug 2021 16:00)  HR: 102 (31 Aug 2021 10:27) (71 - 118)  BP: 113/72 (31 Aug 2021 10:27) (110/63 - 146/71)  BP(mean): 84 (30 Aug 2021 18:00) (79 - 90)  RR: 34 (31 Aug 2021 10:27) (18 - 38)  SpO2: 95% (31 Aug 2021 10:27) (91% - 97%)    ABG - ( 30 Aug 2021 00:26 )  pH, Arterial: 7.43  pH, Blood: x     /  pCO2: 65    /  pO2: 79    / HCO3: 43    / Base Excess: 16.2  /  SaO2: 97.8        08-30 @ 07:01  -  08-31 @ 07:00  --------------------------------------------------------  IN: 500 mL / OUT: 1990 mL / NET: -1490 mL      LABS:                        12.2   19.61 )-----------( 445      ( 31 Aug 2021 07:21 )             40.7     08-31    133<L>  |  92<L>  |  10  ----------------------------<  105<H>  4.5   |  32<H>  |  0.64    Ca    8.9      31 Aug 2021 07:21  Phos  3.9     08-31  Mg     2.2     08-31    TPro  7.2  /  Alb  2.7<L>  /  TBili  0.2  /  DBili  x   /  AST  24  /  ALT  39  /  AlkPhos  65  08-31    PT/INR - ( 30 Aug 2021 00:30 )   PT: 14.6 sec;   INR: 1.23 ratio    PTT - ( 30 Aug 2021 00:30 )  PTT:29.3 sec    Fluid characteristics  -- 08-26 @ 20:39  pH > 7.92  LDH 1640  tprot 4.7    Cell count  Appearance Cloudy  Fluid type --  BF lymph 24  color Orange  eosinophil --  PMN 67  Mesothelial --  Monocyte 9  Other body cells --    CULTURES:    Culture - Blood (collected 08-26-21 @ 00:39)  Source: .Blood Blood  Final Report (08-31-21 @ 01:01):    No Growth Final    Culture - Blood (collected 08-25-21 @ 22:56)  Source: .Blood Blood  Final Report (08-30-21 @ 23:01):    No Growth Final      Culture - Body Fluid with Gram Stain (collected 08-26-21 @ 23:15)  Source: .Body Fluid Pleural Fluid  Gram Stain (08-27-21 @ 03:31):    polymorphonuclear leukocytes    Gram Negative Rods    by cytocentrifuge  Preliminary Report (08-27-21 @ 17:47):    No growth      Culture - Fungal, Body Fluid (collected 08-26-21 @ 23:15)  Source: .Body Fluid Pleural Fluid  Preliminary Report (08-27-21 @ 06:40):    Testing in progress    Physical Examination:  PULM: Decreased BS  CVS: RRR    RADIOLOGY REVIEWED  CXR: 8/31 b/l lung opacities, R PTX    CT chest: < from: CT Chest No Cont (08.28.21 @ 12:51) >  FINDINGS:    AIRWAYS, LUNGS and PLEURA: Patent central airways. Diffuse patchy groundglass opacities and consolidative opacities at the left cavitary lesion within the right upper lobe are unchanged. The right chest tube is inserted through the anterior right 4th rib space, the tip is within the right lateral mid pleural space.    MEDIASTINUM AND GREGG: Mildly enlarged mediastinal and hilar lymph nodes are unchanged. Pneumomediastinum and pneumopericardium new from prior.    HEART AND VESSELS: Heart size is normal. No pericardial effusion. Thoracic aorta and pulmonary artery normal in diameter.    VISUALIZED UPPER ABDOMEN: Small calcification within the right kidney, incompletely evaluated.    CHEST WALL AND BONES: No aggressive osseous lesion. Extensive subcutaneous emphysema new from prior.    LOWER NECK: Within normal limits.    IMPRESSION:    In comparison with 8/24/2021, interval insertion of right chest tube. Extensive bilateral patchy groundglass opacities, right upper lobe consolidation and right upper lobe cavitary lesion are unchanged.    Trace right pleural effusion, unchanged. Trace right pneumothorax new from prior.      < end of copied text >

## 2021-08-31 NOTE — PROGRESS NOTE ADULT - ASSESSMENT
56yo M with h/o appendectomy admitted to the hospital with COVID and possible superimposed pneumonia found to have a right moderate-sized pneumothorax with leftward shift and small pleural effusion.     8/26 Right open chest tube placed at bedside, Maintain to wall suction -40mmHg  8/27 Remains on hi flow- increased subq emphysema, repeat CXR w/o signs of increasing PTX, chest tube +airleak, functioning, Maintain to dry suction -40mmHg  8/28 VSS, CXR with subcutaneous emphysema and persistent right ptx, maintain right chest tube to suction.   8/29 VSS, pt still with air leak on chest tube. s/p non-con Chest CT: Trace right pleural effusion, unchanged. Trace right pneumothorax new from prior. Pneumomediastinum, pneumopericardium and bilateral subcutaneous emphysema new from prior.  8/30 +Right chest tube to -40 dry suction, +airleak, pt tolerating high flow nasal cannula. Persistent SubQ emphysema without ventilatory compromise. Cont maintain right chest tube ti -40 mmHg dry suction.   8/31 Cont maintain right chest tube ti -40 mmHg dry suction.

## 2021-08-31 NOTE — PROGRESS NOTE ADULT - SUBJECTIVE AND OBJECTIVE BOX
Joseph Casey, PGY1  Pager [phone]    INCOMPLETE NOTE - IN PROGRESS    Patient is a 55y old  Male who presents with a chief complaint of SOB (31 Aug 2021 09:31)      SUBJECTIVE/INTERVAL EVENTS: Patient seen and examined at bedside. Pt continues to complain of sharp R chest pain with coughing and deep breaths. He feels some shortness of breath due to having trouble taking deep breaths. He states that he would like for his pain medication to last longer and states he has had difficulty sleeping due to pain. Denies fever. No other complaints.    MEDICATIONS  (STANDING):  benzocaine 15 mG/menthol 3.6 mG (Sugar-Free) Lozenge 1 Lozenge Oral once  benzonatate 200 milliGRAM(s) Oral every 8 hours  budesonide 160 MICROgram(s)/formoterol 4.5 MICROgram(s) Inhaler 2 Puff(s) Inhalation two times a day  cholecalciferol 2000 Unit(s) Oral daily  clonazePAM  Tablet 0.5 milliGRAM(s) Oral every 8 hours  enoxaparin Injectable 90 milliGRAM(s) SubCutaneous every 12 hours  FIRST- Mouthwash  BLM 5 milliLiter(s) Swish and Spit every 6 hours  lidocaine   4% Patch 1 Patch Transdermal every 24 hours  melatonin 3 milliGRAM(s) Oral at bedtime  multivitamin 1 Tablet(s) Oral daily  pantoprazole  Injectable 40 milliGRAM(s) IV Push daily  piperacillin/tazobactam IVPB.. 3.375 Gram(s) IV Intermittent every 8 hours  polyethylene glycol 3350 17 Gram(s) Oral daily  senna 2 Tablet(s) Oral at bedtime    MEDICATIONS  (PRN):  acetaminophen   Tablet .. 975 milliGRAM(s) Oral every 6 hours PRN Mild Pain (1 - 3)  albuterol/ipratropium for Nebulization 3 milliLiter(s) Nebulizer every 6 hours PRN Shortness of Breath and/or Wheezing  aluminum hydroxide/magnesium hydroxide/simethicone Suspension 30 milliLiter(s) Oral every 4 hours PRN Dyspepsia  benzocaine 15 mG/menthol 3.6 mG (Sugar-Free) Lozenge 1 Lozenge Oral four times a day PRN Sore Throat  cyclobenzaprine 5 milliGRAM(s) Oral three times a day PRN Muscle Spasm  hydrocodone/homatropine Syrup 5 milliLiter(s) Oral every 6 hours PRN worseneing cough  HYDROmorphone  Injectable 1 milliGRAM(s) IV Push every 4 hours PRN Severe Pain (7 - 10)  sodium chloride 0.65% Nasal 1 Spray(s) Both Nostrils every 2 hours PRN Nasal Congestion  traMADol 25 milliGRAM(s) Oral every 6 hours PRN Moderate Pain (4 - 6)      VITAL SIGNS:  T(F): 98.9 (08-31-21 @ 13:44), Max: 98.9 (08-31-21 @ 13:44)  HR: 106 (08-31-21 @ 13:44) (71 - 118)  BP: 131/74 (08-31-21 @ 13:44) (110/63 - 146/71)  RR: 28 (08-31-21 @ 13:44) (20 - 38)  SpO2: 96% (08-31-21 @ 13:44) (91% - 97%)    I&O's Summary    30 Aug 2021 07:01  -  31 Aug 2021 07:00  --------------------------------------------------------  IN: 500 mL / OUT: 1990 mL / NET: -1490 mL    PHYSICAL EXAM:  Gen: Alert, NAD  HEENT: NCAT, conjunctiva clear, sclera anicteric, no erythema or exudates in the oropharynx, mmm  Neck: Supple, no JVD  CV: RRR, S1S2, no m/r/g  Resp: shallow breaths, inspiratory crackles on right side. On HFNC.  Abd: Soft, nontender, nondistended, normal bowel sounds  Ext: no edema, no clubbing or cyanosis  Neuro: AOx3, CN2-12 grossly intact, LEVIN  SKIN: warm, perfused    LABS:                        12.2   19.61 )-----------( 445      ( 31 Aug 2021 07:21 )             40.7     Hgb Trend: 12.2<--, 10.2<--, 10.6<--, 11.2<--, 11.1<--  08-31    133<L>  |  92<L>  |  10  ----------------------------<  105<H>  4.5   |  32<H>  |  0.64    Ca    8.9      31 Aug 2021 07:21  Phos  3.9     08-31  Mg     2.2     08-31    TPro  7.2  /  Alb  2.7<L>  /  TBili  0.2  /  DBili  x   /  AST  24  /  ALT  39  /  AlkPhos  65  08-31    Creatinine Trend: 0.64<--, 0.77<--, 0.47<--, 0.60<--, 0.63<--, 0.63<--  LIVER FUNCTIONS - ( 31 Aug 2021 07:21 )  Alb: 2.7 g/dL / Pro: 7.2 g/dL / ALK PHOS: 65 U/L / ALT: 39 U/L / AST: 24 U/L / GGT: x           PT/INR - ( 30 Aug 2021 00:30 )   PT: 14.6 sec;   INR: 1.23 ratio         PTT - ( 30 Aug 2021 00:30 )  PTT:29.3 sec        ABG - ( 30 Aug 2021 00:26 )  pH, Arterial: 7.43  pH, Blood: x     /  pCO2: 65    /  pO2: 79    / HCO3: 43    / Base Excess: 16.2  /  SaO2: 97.8              CAPILLARY BLOOD GLUCOSE          RADIOLOGY & ADDITIONAL TESTS: Reviewed    Imaging Personally Reviewed:    Consultant(s) Notes Reviewed:      Care Discussed with Consultants/Other Providers:   Joseph Casey, PGY1  Pager [phone]    Patient is a 55y old  Male who presents with a chief complaint of SOB (31 Aug 2021 09:31)      SUBJECTIVE/INTERVAL EVENTS: Patient seen and examined at bedside. Pt continues to complain of sharp R chest pain with coughing and deep breaths. He feels some shortness of breath due to having trouble taking deep breaths. He states that he would like for his pain medication to last longer and states he has had difficulty sleeping due to pain. Denies fever. No other complaints.    MEDICATIONS  (STANDING):  benzocaine 15 mG/menthol 3.6 mG (Sugar-Free) Lozenge 1 Lozenge Oral once  benzonatate 200 milliGRAM(s) Oral every 8 hours  budesonide 160 MICROgram(s)/formoterol 4.5 MICROgram(s) Inhaler 2 Puff(s) Inhalation two times a day  cholecalciferol 2000 Unit(s) Oral daily  clonazePAM  Tablet 0.5 milliGRAM(s) Oral every 8 hours  enoxaparin Injectable 90 milliGRAM(s) SubCutaneous every 12 hours  FIRST- Mouthwash  BLM 5 milliLiter(s) Swish and Spit every 6 hours  lidocaine   4% Patch 1 Patch Transdermal every 24 hours  melatonin 3 milliGRAM(s) Oral at bedtime  multivitamin 1 Tablet(s) Oral daily  pantoprazole  Injectable 40 milliGRAM(s) IV Push daily  piperacillin/tazobactam IVPB.. 3.375 Gram(s) IV Intermittent every 8 hours  polyethylene glycol 3350 17 Gram(s) Oral daily  senna 2 Tablet(s) Oral at bedtime    MEDICATIONS  (PRN):  acetaminophen   Tablet .. 975 milliGRAM(s) Oral every 6 hours PRN Mild Pain (1 - 3)  albuterol/ipratropium for Nebulization 3 milliLiter(s) Nebulizer every 6 hours PRN Shortness of Breath and/or Wheezing  aluminum hydroxide/magnesium hydroxide/simethicone Suspension 30 milliLiter(s) Oral every 4 hours PRN Dyspepsia  benzocaine 15 mG/menthol 3.6 mG (Sugar-Free) Lozenge 1 Lozenge Oral four times a day PRN Sore Throat  cyclobenzaprine 5 milliGRAM(s) Oral three times a day PRN Muscle Spasm  hydrocodone/homatropine Syrup 5 milliLiter(s) Oral every 6 hours PRN worseneing cough  HYDROmorphone  Injectable 1 milliGRAM(s) IV Push every 4 hours PRN Severe Pain (7 - 10)  sodium chloride 0.65% Nasal 1 Spray(s) Both Nostrils every 2 hours PRN Nasal Congestion  traMADol 25 milliGRAM(s) Oral every 6 hours PRN Moderate Pain (4 - 6)      VITAL SIGNS:  T(F): 98.9 (08-31-21 @ 13:44), Max: 98.9 (08-31-21 @ 13:44)  HR: 106 (08-31-21 @ 13:44) (71 - 118)  BP: 131/74 (08-31-21 @ 13:44) (110/63 - 146/71)  RR: 28 (08-31-21 @ 13:44) (20 - 38)  SpO2: 96% (08-31-21 @ 13:44) (91% - 97%)    I&O's Summary    30 Aug 2021 07:01  -  31 Aug 2021 07:00  --------------------------------------------------------  IN: 500 mL / OUT: 1990 mL / NET: -1490 mL    PHYSICAL EXAM:  Gen: Alert, NAD  HEENT: NCAT, conjunctiva clear, sclera anicteric, no erythema or exudates in the oropharynx, mmm  Neck: Supple, no JVD  CV: RRR, S1S2, no m/r/g  Resp: shallow breaths, inspiratory crackles on right side. On HFNC.  Abd: Soft, nontender, nondistended, normal bowel sounds  Ext: no edema, no clubbing or cyanosis  Neuro: AOx3, CN2-12 grossly intact, LEVIN  SKIN: warm, perfused    LABS:                        12.2   19.61 )-----------( 445      ( 31 Aug 2021 07:21 )             40.7     Hgb Trend: 12.2<--, 10.2<--, 10.6<--, 11.2<--, 11.1<--  08-31    133<L>  |  92<L>  |  10  ----------------------------<  105<H>  4.5   |  32<H>  |  0.64    Ca    8.9      31 Aug 2021 07:21  Phos  3.9     08-31  Mg     2.2     08-31    TPro  7.2  /  Alb  2.7<L>  /  TBili  0.2  /  DBili  x   /  AST  24  /  ALT  39  /  AlkPhos  65  08-31    Creatinine Trend: 0.64<--, 0.77<--, 0.47<--, 0.60<--, 0.63<--, 0.63<--  LIVER FUNCTIONS - ( 31 Aug 2021 07:21 )  Alb: 2.7 g/dL / Pro: 7.2 g/dL / ALK PHOS: 65 U/L / ALT: 39 U/L / AST: 24 U/L / GGT: x           PT/INR - ( 30 Aug 2021 00:30 )   PT: 14.6 sec;   INR: 1.23 ratio         PTT - ( 30 Aug 2021 00:30 )  PTT:29.3 sec        ABG - ( 30 Aug 2021 00:26 )  pH, Arterial: 7.43  pH, Blood: x     /  pCO2: 65    /  pO2: 79    / HCO3: 43    / Base Excess: 16.2  /  SaO2: 97.8              CAPILLARY BLOOD GLUCOSE          RADIOLOGY & ADDITIONAL TESTS: Reviewed    Imaging Personally Reviewed:    Consultant(s) Notes Reviewed:      Care Discussed with Consultants/Other Providers:

## 2021-08-31 NOTE — PROGRESS NOTE ADULT - PROBLEM SELECTOR PLAN 3
CXR 8/26 with R ptx   -S/p R chest tube placement by thoracic 8/26  -+air leak   -CT management per thoracic

## 2021-08-31 NOTE — PROGRESS NOTE ADULT - ASSESSMENT
55yr old unvaccinated male with hx of HTN, RLE DVT, and PE (not on home AC) presents with progressively worsening SOB, intermittent fevers, COVID positive, admitted for COVID PNA. Initially admitted on 7/29 to ICU on HF and BiPAP, never intubated. Downgraded to floors on 8/19. 8/26 found to have tension pneumothorax, returned to ICU, R chest tube placed. 8/28 CT scan showed pneumomediastinum, pneumopericardium, bilateral subQ emphysema as well as bilateral opacities and RUL consolidation plus potential cavitary lesion. Gram negative rounds grown in pleural fluid. 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    8/30-31 downgraded to floors on HFNC. Continuing to have R sided pain with cough and inspiration likely 2/2 to COVID pneumonia, tension PTX, effusion, and RUL consolidation/cavitary lesion. At time of examination today, pt was on HF settings 50%, ~10-15L.   55yr old unvaccinated male with hx of HTN, RLE DVT, and PE (not on home AC) presents with progressively worsening SOB, intermittent fevers, COVID positive, admitted for COVID PNA. Initially admitted on 7/29 to ICU on HF and BiPAP, never intubated. Downgraded to floors on 8/19. 8/26 found to have tension pneumothorax, returned to ICU, R chest tube placed. 8/28 CT scan showed pneumomediastinum, pneumopericardium, bilateral subQ emphysema as well as bilateral opacities and RUL consolidation plus potential cavitary lesion. Gram negative rounds grown in pleural fluid. 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    8/30-31 downgraded to floors on HFNC. Continuing to have R sided pain with cough and inspiration likely 2/2 to COVID pneumonia, tension PTX, effusion, and RUL consolidation/cavitary lesion. At time of examination today, pt was on HF settings 50%, ~40L.

## 2021-09-01 LAB
ALBUMIN SERPL ELPH-MCNC: 2.7 G/DL — LOW (ref 3.3–5)
ALP SERPL-CCNC: 62 U/L — SIGNIFICANT CHANGE UP (ref 40–120)
ALT FLD-CCNC: 34 U/L — SIGNIFICANT CHANGE UP (ref 10–45)
ANION GAP SERPL CALC-SCNC: 10 MMOL/L — SIGNIFICANT CHANGE UP (ref 5–17)
AST SERPL-CCNC: 22 U/L — SIGNIFICANT CHANGE UP (ref 10–40)
BILIRUB SERPL-MCNC: 0.2 MG/DL — SIGNIFICANT CHANGE UP (ref 0.2–1.2)
BUN SERPL-MCNC: 11 MG/DL — SIGNIFICANT CHANGE UP (ref 7–23)
CALCIUM SERPL-MCNC: 9.3 MG/DL — SIGNIFICANT CHANGE UP (ref 8.4–10.5)
CHLORIDE SERPL-SCNC: 93 MMOL/L — LOW (ref 96–108)
CO2 SERPL-SCNC: 34 MMOL/L — HIGH (ref 22–31)
CREAT SERPL-MCNC: 0.66 MG/DL — SIGNIFICANT CHANGE UP (ref 0.5–1.3)
CULTURE RESULTS: SIGNIFICANT CHANGE UP
GLUCOSE BLDC GLUCOMTR-MCNC: 93 MG/DL — SIGNIFICANT CHANGE UP (ref 70–99)
GLUCOSE SERPL-MCNC: 70 MG/DL — SIGNIFICANT CHANGE UP (ref 70–99)
HCT VFR BLD CALC: 39.5 % — SIGNIFICANT CHANGE UP (ref 39–50)
HGB BLD-MCNC: 11.8 G/DL — LOW (ref 13–17)
MAGNESIUM SERPL-MCNC: 2.4 MG/DL — SIGNIFICANT CHANGE UP (ref 1.6–2.6)
MCHC RBC-ENTMCNC: 27.7 PG — SIGNIFICANT CHANGE UP (ref 27–34)
MCHC RBC-ENTMCNC: 29.9 GM/DL — LOW (ref 32–36)
MCV RBC AUTO: 92.7 FL — SIGNIFICANT CHANGE UP (ref 80–100)
NRBC # BLD: 0 /100 WBCS — SIGNIFICANT CHANGE UP (ref 0–0)
PHOSPHATE SERPL-MCNC: 2.8 MG/DL — SIGNIFICANT CHANGE UP (ref 2.5–4.5)
PLATELET # BLD AUTO: 420 K/UL — HIGH (ref 150–400)
POTASSIUM SERPL-MCNC: 4.3 MMOL/L — SIGNIFICANT CHANGE UP (ref 3.5–5.3)
POTASSIUM SERPL-SCNC: 4.3 MMOL/L — SIGNIFICANT CHANGE UP (ref 3.5–5.3)
PROT SERPL-MCNC: 7.2 G/DL — SIGNIFICANT CHANGE UP (ref 6–8.3)
RBC # BLD: 4.26 M/UL — SIGNIFICANT CHANGE UP (ref 4.2–5.8)
RBC # FLD: 12.9 % — SIGNIFICANT CHANGE UP (ref 10.3–14.5)
SODIUM SERPL-SCNC: 137 MMOL/L — SIGNIFICANT CHANGE UP (ref 135–145)
SPECIMEN SOURCE: SIGNIFICANT CHANGE UP
WBC # BLD: 17.46 K/UL — HIGH (ref 3.8–10.5)
WBC # FLD AUTO: 17.46 K/UL — HIGH (ref 3.8–10.5)

## 2021-09-01 PROCEDURE — 99232 SBSQ HOSP IP/OBS MODERATE 35: CPT

## 2021-09-01 PROCEDURE — 71045 X-RAY EXAM CHEST 1 VIEW: CPT | Mod: 26

## 2021-09-01 PROCEDURE — ZZZZZ: CPT

## 2021-09-01 PROCEDURE — 71045 X-RAY EXAM CHEST 1 VIEW: CPT | Mod: 26,77

## 2021-09-01 RX ADMIN — PIPERACILLIN AND TAZOBACTAM 25 GRAM(S): 4; .5 INJECTION, POWDER, LYOPHILIZED, FOR SOLUTION INTRAVENOUS at 04:46

## 2021-09-01 RX ADMIN — Medication 200 MILLIGRAM(S): at 15:02

## 2021-09-01 RX ADMIN — Medication 3 MILLIGRAM(S): at 21:46

## 2021-09-01 RX ADMIN — DIPHENHYDRAMINE HYDROCHLORIDE AND LIDOCAINE HYDROCHLORIDE AND ALUMINUM HYDROXIDE AND MAGNESIUM HYDRO 5 MILLILITER(S): KIT at 12:33

## 2021-09-01 RX ADMIN — Medication 0.5 MILLIGRAM(S): at 05:10

## 2021-09-01 RX ADMIN — HYDROMORPHONE HYDROCHLORIDE 1 MILLIGRAM(S): 2 INJECTION INTRAMUSCULAR; INTRAVENOUS; SUBCUTANEOUS at 04:45

## 2021-09-01 RX ADMIN — DIPHENHYDRAMINE HYDROCHLORIDE AND LIDOCAINE HYDROCHLORIDE AND ALUMINUM HYDROXIDE AND MAGNESIUM HYDRO 5 MILLILITER(S): KIT at 05:10

## 2021-09-01 RX ADMIN — PIPERACILLIN AND TAZOBACTAM 25 GRAM(S): 4; .5 INJECTION, POWDER, LYOPHILIZED, FOR SOLUTION INTRAVENOUS at 21:47

## 2021-09-01 RX ADMIN — HYDROMORPHONE HYDROCHLORIDE 1 MILLIGRAM(S): 2 INJECTION INTRAMUSCULAR; INTRAVENOUS; SUBCUTANEOUS at 16:32

## 2021-09-01 RX ADMIN — Medication 975 MILLIGRAM(S): at 13:32

## 2021-09-01 RX ADMIN — DIPHENHYDRAMINE HYDROCHLORIDE AND LIDOCAINE HYDROCHLORIDE AND ALUMINUM HYDROXIDE AND MAGNESIUM HYDRO 5 MILLILITER(S): KIT at 17:22

## 2021-09-01 RX ADMIN — ENOXAPARIN SODIUM 90 MILLIGRAM(S): 100 INJECTION SUBCUTANEOUS at 05:10

## 2021-09-01 RX ADMIN — Medication 200 MILLIGRAM(S): at 05:10

## 2021-09-01 RX ADMIN — HYDROMORPHONE HYDROCHLORIDE 1 MILLIGRAM(S): 2 INJECTION INTRAMUSCULAR; INTRAVENOUS; SUBCUTANEOUS at 16:02

## 2021-09-01 RX ADMIN — LIDOCAINE 1 PATCH: 4 CREAM TOPICAL at 12:34

## 2021-09-01 RX ADMIN — Medication 0.5 MILLIGRAM(S): at 15:02

## 2021-09-01 RX ADMIN — Medication 200 MILLIGRAM(S): at 21:46

## 2021-09-01 RX ADMIN — Medication 0.5 MILLIGRAM(S): at 21:45

## 2021-09-01 RX ADMIN — SENNA PLUS 2 TABLET(S): 8.6 TABLET ORAL at 21:46

## 2021-09-01 RX ADMIN — PANTOPRAZOLE SODIUM 40 MILLIGRAM(S): 20 TABLET, DELAYED RELEASE ORAL at 12:32

## 2021-09-01 RX ADMIN — Medication 1 TABLET(S): at 12:33

## 2021-09-01 RX ADMIN — PIPERACILLIN AND TAZOBACTAM 25 GRAM(S): 4; .5 INJECTION, POWDER, LYOPHILIZED, FOR SOLUTION INTRAVENOUS at 12:30

## 2021-09-01 RX ADMIN — BUDESONIDE AND FORMOTEROL FUMARATE DIHYDRATE 2 PUFF(S): 160; 4.5 AEROSOL RESPIRATORY (INHALATION) at 05:52

## 2021-09-01 RX ADMIN — Medication 2000 UNIT(S): at 12:34

## 2021-09-01 RX ADMIN — LIDOCAINE 1 PATCH: 4 CREAM TOPICAL at 01:25

## 2021-09-01 RX ADMIN — LIDOCAINE 1 PATCH: 4 CREAM TOPICAL at 19:20

## 2021-09-01 RX ADMIN — POLYETHYLENE GLYCOL 3350 17 GRAM(S): 17 POWDER, FOR SOLUTION ORAL at 12:33

## 2021-09-01 RX ADMIN — BUDESONIDE AND FORMOTEROL FUMARATE DIHYDRATE 2 PUFF(S): 160; 4.5 AEROSOL RESPIRATORY (INHALATION) at 17:16

## 2021-09-01 RX ADMIN — Medication 975 MILLIGRAM(S): at 12:32

## 2021-09-01 NOTE — RAPID RESPONSE TEAM SUMMARY - NSSITUATIONBACKGROUNDRRT_GEN_ALL_CORE
Mr. Crook is a 54yo M with Hx of DVT not on AC who presented for SOB, found to be COVID positive and hypoxic to 80s s/p remdesivir. RRT was called for hypoxia to 70's patient removed HFNC mask and desatted immediately after. Patient was immediately proned and O2 levels improved. Temp 98.5 orally. HFNC already at max 60 lpm at 98.6 O2 concentration. Patient O2 improved to 90-92%. Chest PT performed with improvement in O2 levels. 
55M w/ PMHx admitted for COVID19 s/p steroids, remdesevir, MICU stay (not intubated), RRT called for severe chest pain. Upon arrival, pt in 10/10 chest pain, worse with coughing and inspiration on his R side. Pain has been present for several days, pt receiving oxycodone for pain, however due to severity was not able to take PO. EKG done, sinus tach, other vitals stable, pt on hiflow satting in the 80s-90s w/ good waveform. CXR done, showed no changes from prior, pt already on full a/c so low suspicion for PE. Trop added on to AM labs, however low suspicion for cardiac event. 1mg IV dilaudid given instead of PO oxy 5, with slight improvement in pain. 1g IV tylenol given along with regularly scheduled AM meds (klonopin, hycodan) with improvement in pain. At end of RRT, pt satting in the 90s, pain better controlled, primary team to follow up with AM labs. 
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    RRT called today for tachypnea to the 70s and increased WOB. On arrival, RR 50s, tachycardic to 120s (NSR on cardiac monitor), Afebrile, saturating 92% on BIPAP. Notable increased WOB on physical exam. Complaining of back pain. Given 0.5mg of Dilaudid IV x 1, switched to AAVAPs with improvement in work of breath and RR to the 20s/ w/ adequate tidal volumes 400s-500s. STAT CXR obtained. RRT ended as pt HD stable w/ improved WOB.     Discussed CXR findings w/ radiology. Per radiology, sig for tension PTX. MICU consulted. CT surg consulted for urgent chest tube placement. Pt switched from AVAPS to HFNC given PTX. w/ adequate oxygenation on re-evaluation.     Plan  -F/u w/ MICU eval  -F/u  w/ CT surg for urgent chest tube placement. 
54 y/o M with PMH of DVT/PE s/p achilles tendon repair years ago (not on AC currently). Presents with complaints of SOB, intermittent and fevers with cough productive of white sputum for past week. Tested positive for COVID 2 days ago. Pt is unvaccinated. Endorses progressively worsening SOB over the past 5 days, worse with ambulation, found to be hypoxic on arrival to the  [facility]. CXR with b/l opacities. Course c/b cavitary PNA, PTX.   RRT called for acute on chronic hypoxia low 80's while on high FL 40/30.  Pt stated that he was repositioning him self when this happened. He feels same with his breathing. HighFlow was titrated up to 60/40 and than titrated to 40/30, remains o2sat >90%. R side chest tube with tick bloody flow ; milked , patent , with air-leak. 
patient admitted with COVID PNA. RRT called fro transient hypoxia i/s/o patient not being on BiPAP. On arrival, patient oriented fully in no significant distress, vitals otherwise stable, sat is 89% on HFNC and NRB. Patient proned, placed on BiPAP with significant improvement in symptoms and O2 sat improved to 92-93%. BiPAP 12/7, 100% FiO2, pulling 550 ml Vt with 40 ml leak. ABG attempted to be obtained but unsuccessful, patient declined further attempts.
56yo M with Hx of DVT s/p achilles tendon repair years ago presenting with complaints of SOB. intermittent and fevers with cough productive of white sputum for past week, tested positive for covid.  RRT called for tachypnea and chest pain. Pt complaining of 10/10 chest pain. Oxycodone and lidocaine patch not helping.  OS sat 85% on 60% HiFlow, increased to 70% with saturations in 90%-96%.  CP: EKG showed sinus tach, no TWI & DIO.

## 2021-09-01 NOTE — RAPID RESPONSE TEAM SUMMARY - NSDISPOSITIONRRT_GEN_ALL_CORE
Remained on unit

## 2021-09-01 NOTE — PROGRESS NOTE ADULT - SUBJECTIVE AND OBJECTIVE BOX
CARDIOLOGY FOLLOW UP - Dr. Terry  Date of Service: 9/1/21  CC: mild ORDAZ , no cp     Review of Systems:  Constitutional: No fever, weight loss, or fatigue  Respiratory: No cough, wheezing, or hemoptysis, no shortness of breath  Cardiovascular: No chest pain, palpitations, passing out, dizziness, or leg swelling  Gastrointestinal: No abd or epigastric pain.  No nausea, vomiting, or hematemesis; no diarrhea or constipation, no melena or hematochezia  Vascular: no edema       PHYSICAL EXAM:  T(C): 36.3 (09-01-21 @ 09:37), Max: 37.2 (08-31-21 @ 13:44)  HR: 102 (09-01-21 @ 10:21) (81 - 108)  BP: 96/67 (09-01-21 @ 09:37) (96/67 - 131/74)  RR: 20 (09-01-21 @ 10:21) (20 - 28)  SpO2: 94% (09-01-21 @ 10:21) (93% - 96%)  Wt(kg): --  I&O's Summary    31 Aug 2021 07:01  -  01 Sep 2021 07:00  --------------------------------------------------------  IN: 820 mL / OUT: 735 mL / NET: 85 mL        Appearance: Normal	  Cardiovascular: Normal S1 S2,RRR, No JVD, No murmurs  Respiratory: Lungs clear to auscultation	  Gastrointestinal:  Soft, Non-tender, + BS	  Extremities: Normal range of motion, No clubbing, cyanosis or edema      Home Medications:  Albuterol (Eqv-ProAir HFA) 90 mcg/inh inhalation aerosol: 2 puff(s) inhaled every 6 hours, As Needed (29 Jul 2021 09:08)  ivermectin 3 mg oral tablet: 1 tab(s) orally once a day (29 Jul 2021 09:08)  levoFLOXacin 500 mg oral tablet: 1 tab(s) orally every 24 hours (29 Jul 2021 09:08)  predniSONE 50 mg oral tablet: 1 tab(s) orally once a day (29 Jul 2021 09:08)      MEDICATIONS  (STANDING):  benzocaine 15 mG/menthol 3.6 mG (Sugar-Free) Lozenge 1 Lozenge Oral once  benzonatate 200 milliGRAM(s) Oral every 8 hours  budesonide 160 MICROgram(s)/formoterol 4.5 MICROgram(s) Inhaler 2 Puff(s) Inhalation two times a day  cholecalciferol 2000 Unit(s) Oral daily  clonazePAM  Tablet 0.5 milliGRAM(s) Oral every 8 hours  enoxaparin Injectable 90 milliGRAM(s) SubCutaneous every 12 hours  FIRST- Mouthwash  BLM 5 milliLiter(s) Swish and Spit every 6 hours  lidocaine   4% Patch 1 Patch Transdermal every 24 hours  melatonin 3 milliGRAM(s) Oral at bedtime  multivitamin 1 Tablet(s) Oral daily  pantoprazole  Injectable 40 milliGRAM(s) IV Push daily  piperacillin/tazobactam IVPB.. 3.375 Gram(s) IV Intermittent every 8 hours  polyethylene glycol 3350 17 Gram(s) Oral daily  senna 2 Tablet(s) Oral at bedtime      TELEMETRY: 	    ECG:  	  RADIOLOGY:   < from: Xray Chest 1 View- PORTABLE-Routine (Xray Chest 1 View- PORTABLE-Routine in AM.) (08.31.21 @ 08:58) >  IMPRESSION:    The heart is normal in size. A chest tube is seen in right hemithorax. A pneumothorax is still present.. Subcutaneous emphysema is seen bilaterally extending into the neck. The overall appearance of the chest remain unchanged when compared to previous study done August 30, 2021 at 8:32 AM.    --- End of Report ---    < end of copied text >    DIAGNOSTIC TESTING:  [ ] Echocardiogram:  [ ]  Catheterization:  [ ] Stress Test:    OTHER: 	    LABS:	 	                            11.8   17.46 )-----------( 420      ( 01 Sep 2021 07:38 )             39.5     09-01    137  |  93<L>  |  11  ----------------------------<  70  4.3   |  34<H>  |  0.66    Ca    9.3      01 Sep 2021 07:38  Phos  2.8     09-01  Mg     2.4     09-01    TPro  7.2  /  Alb  2.7<L>  /  TBili  0.2  /  DBili  x   /  AST  22  /  ALT  34  /  AlkPhos  62  09-01

## 2021-09-01 NOTE — PROGRESS NOTE ADULT - SUBJECTIVE AND OBJECTIVE BOX
Joseph Casey, PGY1  Pager [phone]    INCOMPLETE NOTE - IN PROGRESS    Patient is a 55y old  Male who presents with a chief complaint of SOB, COVID PNA (31 Aug 2021 14:53)      SUBJECTIVE/INTERVAL EVENTS: Patient seen and examined at bedside. Pt sleepy at bedside but responsive and cooperative. Says current pain control is working well. No other complaints.  Per nurse, pt will cough and desat at times to mid 80s but will recover soon after. Satting mid 90s when not coughing. Remains on HFNC 50%/40.     MEDICATIONS  (STANDING):  benzocaine 15 mG/menthol 3.6 mG (Sugar-Free) Lozenge 1 Lozenge Oral once  benzonatate 200 milliGRAM(s) Oral every 8 hours  budesonide 160 MICROgram(s)/formoterol 4.5 MICROgram(s) Inhaler 2 Puff(s) Inhalation two times a day  cholecalciferol 2000 Unit(s) Oral daily  clonazePAM  Tablet 0.5 milliGRAM(s) Oral every 8 hours  enoxaparin Injectable 90 milliGRAM(s) SubCutaneous every 12 hours  FIRST- Mouthwash  BLM 5 milliLiter(s) Swish and Spit every 6 hours  lidocaine   4% Patch 1 Patch Transdermal every 24 hours  melatonin 3 milliGRAM(s) Oral at bedtime  multivitamin 1 Tablet(s) Oral daily  pantoprazole  Injectable 40 milliGRAM(s) IV Push daily  piperacillin/tazobactam IVPB.. 3.375 Gram(s) IV Intermittent every 8 hours  polyethylene glycol 3350 17 Gram(s) Oral daily  senna 2 Tablet(s) Oral at bedtime    MEDICATIONS  (PRN):  acetaminophen   Tablet .. 975 milliGRAM(s) Oral every 6 hours PRN Mild Pain (1 - 3)  albuterol/ipratropium for Nebulization 3 milliLiter(s) Nebulizer every 6 hours PRN Shortness of Breath and/or Wheezing  aluminum hydroxide/magnesium hydroxide/simethicone Suspension 30 milliLiter(s) Oral every 4 hours PRN Dyspepsia  benzocaine 15 mG/menthol 3.6 mG (Sugar-Free) Lozenge 1 Lozenge Oral four times a day PRN Sore Throat  cyclobenzaprine 5 milliGRAM(s) Oral three times a day PRN Muscle Spasm  hydrocodone/homatropine Syrup 5 milliLiter(s) Oral every 6 hours PRN worseneing cough  HYDROmorphone  Injectable 1 milliGRAM(s) IV Push every 4 hours PRN Severe Pain (7 - 10)  sodium chloride 0.65% Nasal 1 Spray(s) Both Nostrils every 2 hours PRN Nasal Congestion  traMADol 25 milliGRAM(s) Oral every 6 hours PRN Moderate Pain (4 - 6)      VITAL SIGNS:  T(F): 98.5 (09-01-21 @ 04:34), Max: 98.9 (08-31-21 @ 13:44)  HR: 93 (09-01-21 @ 04:34) (81 - 112)  BP: 107/75 (09-01-21 @ 04:34) (105/75 - 131/74)  RR: 20 (09-01-21 @ 04:34) (20 - 38)  SpO2: 96% (09-01-21 @ 04:34) (93% - 96%)    I&O's Summary    31 Aug 2021 07:01  -  01 Sep 2021 07:00  --------------------------------------------------------  IN: 820 mL / OUT: 735 mL / NET: 85 mL      Daily     Daily     PHYSICAL EXAM:  Gen: Alert, NAD  HEENT: NCAT, conjunctiva clear, sclera anicteric  Neck: Supple, no JVD  CV: RRR, S1S2, no m/r/g  Resp: shallow breaths but more effort than yesterday, RUL inspiratory crackles, L side clear  Abd: Soft, nontender, nondistended, normal bowel sounds  Ext: no edema, no clubbing or cyanosis  Neuro: AOx3, CN2-12 grossly intact, LEVIN  SKIN: warm, perfused    LABS:                        11.8   17.46 )-----------( 420      ( 01 Sep 2021 07:38 )             39.5     Hgb Trend: 11.8<--, 12.2<--, 10.2<--, 10.6<--, 11.2<--  08-31    133<L>  |  92<L>  |  10  ----------------------------<  105<H>  4.5   |  32<H>  |  0.64    Ca    8.9      31 Aug 2021 07:21  Phos  3.9     08-31  Mg     2.2     08-31    TPro  7.2  /  Alb  2.7<L>  /  TBili  0.2  /  DBili  x   /  AST  24  /  ALT  39  /  AlkPhos  65  08-31    Creatinine Trend: 0.64<--, 0.77<--, 0.47<--, 0.60<--, 0.63<--, 0.63<--  LIVER FUNCTIONS - ( 31 Aug 2021 07:21 )  Alb: 2.7 g/dL / Pro: 7.2 g/dL / ALK PHOS: 65 U/L / ALT: 39 U/L / AST: 24 U/L / GGT: x                     CAPILLARY BLOOD GLUCOSE          RADIOLOGY & ADDITIONAL TESTS: Reviewed    Imaging Personally Reviewed:    Consultant(s) Notes Reviewed:      Care Discussed with Consultants/Other Providers:   Joseph Casey, PGY1  Pager [phone]    INCOMPLETE NOTE - IN PROGRESS    Patient is a 55y old  Male who presents with a chief complaint of SOB, COVID PNA (31 Aug 2021 14:53)      SUBJECTIVE/INTERVAL EVENTS: Patient seen and examined at bedside. Pt sleepy at bedside but responsive and cooperative. Says current pain control is working well. No other complaints.  Per nurse, pt will cough and desat at times to mid 80s but will recover soon after. Satting mid 90s when not coughing. Remains on HFNC 50%/40.     MEDICATIONS  (STANDING):  benzocaine 15 mG/menthol 3.6 mG (Sugar-Free) Lozenge 1 Lozenge Oral once  benzonatate 200 milliGRAM(s) Oral every 8 hours  budesonide 160 MICROgram(s)/formoterol 4.5 MICROgram(s) Inhaler 2 Puff(s) Inhalation two times a day  cholecalciferol 2000 Unit(s) Oral daily  clonazePAM  Tablet 0.5 milliGRAM(s) Oral every 8 hours  enoxaparin Injectable 90 milliGRAM(s) SubCutaneous every 12 hours  FIRST- Mouthwash  BLM 5 milliLiter(s) Swish and Spit every 6 hours  lidocaine   4% Patch 1 Patch Transdermal every 24 hours  melatonin 3 milliGRAM(s) Oral at bedtime  multivitamin 1 Tablet(s) Oral daily  pantoprazole  Injectable 40 milliGRAM(s) IV Push daily  piperacillin/tazobactam IVPB.. 3.375 Gram(s) IV Intermittent every 8 hours  polyethylene glycol 3350 17 Gram(s) Oral daily  senna 2 Tablet(s) Oral at bedtime    MEDICATIONS  (PRN):  acetaminophen   Tablet .. 975 milliGRAM(s) Oral every 6 hours PRN Mild Pain (1 - 3)  albuterol/ipratropium for Nebulization 3 milliLiter(s) Nebulizer every 6 hours PRN Shortness of Breath and/or Wheezing  aluminum hydroxide/magnesium hydroxide/simethicone Suspension 30 milliLiter(s) Oral every 4 hours PRN Dyspepsia  benzocaine 15 mG/menthol 3.6 mG (Sugar-Free) Lozenge 1 Lozenge Oral four times a day PRN Sore Throat  cyclobenzaprine 5 milliGRAM(s) Oral three times a day PRN Muscle Spasm  hydrocodone/homatropine Syrup 5 milliLiter(s) Oral every 6 hours PRN worseneing cough  HYDROmorphone  Injectable 1 milliGRAM(s) IV Push every 4 hours PRN Severe Pain (7 - 10)  sodium chloride 0.65% Nasal 1 Spray(s) Both Nostrils every 2 hours PRN Nasal Congestion  traMADol 25 milliGRAM(s) Oral every 6 hours PRN Moderate Pain (4 - 6)      VITAL SIGNS:  T(F): 98.5 (09-01-21 @ 04:34), Max: 98.9 (08-31-21 @ 13:44)  HR: 93 (09-01-21 @ 04:34) (81 - 112)  BP: 107/75 (09-01-21 @ 04:34) (105/75 - 131/74)  RR: 20 (09-01-21 @ 04:34) (20 - 38)  SpO2: 96% (09-01-21 @ 04:34) (93% - 96%)    I&O's Summary    31 Aug 2021 07:01  -  01 Sep 2021 07:00  --------------------------------------------------------  IN: 820 mL / OUT: 735 mL / NET: 85 mL      Daily     Daily     PHYSICAL EXAM:  Gen: Alert, NAD, sleepy  HEENT: NCAT, conjunctiva clear, sclera anicteric, no pupillary constriction  Neck: Supple, no JVD  CV: RRR, S1S2, no m/r/g  Resp: shallow breaths but more effort than yesterday, RUL inspiratory crackles, L side clear  Abd: Soft, nontender, nondistended, normal bowel sounds  Ext: no edema, no clubbing or cyanosis  Neuro: AOx3, CN2-12 grossly intact, LEVIN  SKIN: warm, perfused    LABS:                        11.8   17.46 )-----------( 420      ( 01 Sep 2021 07:38 )             39.5     Hgb Trend: 11.8<--, 12.2<--, 10.2<--, 10.6<--, 11.2<--  08-31    133<L>  |  92<L>  |  10  ----------------------------<  105<H>  4.5   |  32<H>  |  0.64    Ca    8.9      31 Aug 2021 07:21  Phos  3.9     08-31  Mg     2.2     08-31    TPro  7.2  /  Alb  2.7<L>  /  TBili  0.2  /  DBili  x   /  AST  24  /  ALT  39  /  AlkPhos  65  08-31    Creatinine Trend: 0.64<--, 0.77<--, 0.47<--, 0.60<--, 0.63<--, 0.63<--  LIVER FUNCTIONS - ( 31 Aug 2021 07:21 )  Alb: 2.7 g/dL / Pro: 7.2 g/dL / ALK PHOS: 65 U/L / ALT: 39 U/L / AST: 24 U/L / GGT: x                     CAPILLARY BLOOD GLUCOSE          RADIOLOGY & ADDITIONAL TESTS: Reviewed    Imaging Personally Reviewed:    Consultant(s) Notes Reviewed:      Care Discussed with Consultants/Other Providers:   Joseph Casey, PGY1  Pager [phone]    Patient is a 55y old  Male who presents with a chief complaint of SOB, COVID PNA (31 Aug 2021 14:53)      SUBJECTIVE/INTERVAL EVENTS: Patient seen and examined at bedside. Pt sleepy at bedside but responsive and cooperative. Says current pain control is working well. No other complaints.  Per nurse, pt will cough and desat at times to mid 80s but will recover soon after. Satting mid 90s when not coughing. Remains on HFNC 50%/40.   Tolerated wean to HFNC 40%/40 on exam.   Less lethargic and more alert later during day after waking up.    MEDICATIONS  (STANDING):  benzocaine 15 mG/menthol 3.6 mG (Sugar-Free) Lozenge 1 Lozenge Oral once  benzonatate 200 milliGRAM(s) Oral every 8 hours  budesonide 160 MICROgram(s)/formoterol 4.5 MICROgram(s) Inhaler 2 Puff(s) Inhalation two times a day  cholecalciferol 2000 Unit(s) Oral daily  clonazePAM  Tablet 0.5 milliGRAM(s) Oral every 8 hours  enoxaparin Injectable 90 milliGRAM(s) SubCutaneous every 12 hours  FIRST- Mouthwash  BLM 5 milliLiter(s) Swish and Spit every 6 hours  lidocaine   4% Patch 1 Patch Transdermal every 24 hours  melatonin 3 milliGRAM(s) Oral at bedtime  multivitamin 1 Tablet(s) Oral daily  pantoprazole  Injectable 40 milliGRAM(s) IV Push daily  piperacillin/tazobactam IVPB.. 3.375 Gram(s) IV Intermittent every 8 hours  polyethylene glycol 3350 17 Gram(s) Oral daily  senna 2 Tablet(s) Oral at bedtime    MEDICATIONS  (PRN):  acetaminophen   Tablet .. 975 milliGRAM(s) Oral every 6 hours PRN Mild Pain (1 - 3)  albuterol/ipratropium for Nebulization 3 milliLiter(s) Nebulizer every 6 hours PRN Shortness of Breath and/or Wheezing  aluminum hydroxide/magnesium hydroxide/simethicone Suspension 30 milliLiter(s) Oral every 4 hours PRN Dyspepsia  benzocaine 15 mG/menthol 3.6 mG (Sugar-Free) Lozenge 1 Lozenge Oral four times a day PRN Sore Throat  cyclobenzaprine 5 milliGRAM(s) Oral three times a day PRN Muscle Spasm  hydrocodone/homatropine Syrup 5 milliLiter(s) Oral every 6 hours PRN worseneing cough  HYDROmorphone  Injectable 1 milliGRAM(s) IV Push every 4 hours PRN Severe Pain (7 - 10)  sodium chloride 0.65% Nasal 1 Spray(s) Both Nostrils every 2 hours PRN Nasal Congestion  traMADol 25 milliGRAM(s) Oral every 6 hours PRN Moderate Pain (4 - 6)      VITAL SIGNS:  T(F): 98.5 (09-01-21 @ 04:34), Max: 98.9 (08-31-21 @ 13:44)  HR: 93 (09-01-21 @ 04:34) (81 - 112)  BP: 107/75 (09-01-21 @ 04:34) (105/75 - 131/74)  RR: 20 (09-01-21 @ 04:34) (20 - 38)  SpO2: 96% (09-01-21 @ 04:34) (93% - 96%)    I&O's Summary    31 Aug 2021 07:01  -  01 Sep 2021 07:00  --------------------------------------------------------  IN: 820 mL / OUT: 735 mL / NET: 85 mL      Daily     Daily     PHYSICAL EXAM:  Gen: Alert, NAD  HEENT: NCAT, conjunctiva clear, sclera anicteric, no pupillary constriction  Neck: Supple, no JVD  CV: RRR, S1S2, no m/r/g  Resp: shallow breaths but more effort than yesterday, RUL inspiratory crackles, L side clear  Abd: Soft, nontender, nondistended, normal bowel sounds  Ext: no edema, no clubbing or cyanosis  Neuro: AOx3, CN2-12 grossly intact, LEVIN  SKIN: warm, perfused    LABS:                        11.8   17.46 )-----------( 420      ( 01 Sep 2021 07:38 )             39.5     Hgb Trend: 11.8<--, 12.2<--, 10.2<--, 10.6<--, 11.2<--  08-31    133<L>  |  92<L>  |  10  ----------------------------<  105<H>  4.5   |  32<H>  |  0.64    Ca    8.9      31 Aug 2021 07:21  Phos  3.9     08-31  Mg     2.2     08-31    TPro  7.2  /  Alb  2.7<L>  /  TBili  0.2  /  DBili  x   /  AST  24  /  ALT  39  /  AlkPhos  65  08-31    Creatinine Trend: 0.64<--, 0.77<--, 0.47<--, 0.60<--, 0.63<--, 0.63<--  LIVER FUNCTIONS - ( 31 Aug 2021 07:21 )  Alb: 2.7 g/dL / Pro: 7.2 g/dL / ALK PHOS: 65 U/L / ALT: 39 U/L / AST: 24 U/L / GGT: x                     CAPILLARY BLOOD GLUCOSE          RADIOLOGY & ADDITIONAL TESTS: Reviewed    Imaging Personally Reviewed:    Consultant(s) Notes Reviewed:      Care Discussed with Consultants/Other Providers:

## 2021-09-01 NOTE — PROGRESS NOTE ADULT - SUBJECTIVE AND OBJECTIVE BOX
Follow-up Pulm Progress Note    Seen on HFNC 40%/40L with O2 sats 92-93%  Still with mild cough but improving  Mild dyspnea  Denies CP    Medications:  MEDICATIONS  (STANDING):  benzocaine 15 mG/menthol 3.6 mG (Sugar-Free) Lozenge 1 Lozenge Oral once  benzonatate 200 milliGRAM(s) Oral every 8 hours  budesonide 160 MICROgram(s)/formoterol 4.5 MICROgram(s) Inhaler 2 Puff(s) Inhalation two times a day  cholecalciferol 2000 Unit(s) Oral daily  clonazePAM  Tablet 0.5 milliGRAM(s) Oral every 8 hours  enoxaparin Injectable 90 milliGRAM(s) SubCutaneous every 12 hours  FIRST- Mouthwash  BLM 5 milliLiter(s) Swish and Spit every 6 hours  lidocaine   4% Patch 1 Patch Transdermal every 24 hours  melatonin 3 milliGRAM(s) Oral at bedtime  multivitamin 1 Tablet(s) Oral daily  pantoprazole  Injectable 40 milliGRAM(s) IV Push daily  piperacillin/tazobactam IVPB.. 3.375 Gram(s) IV Intermittent every 8 hours  polyethylene glycol 3350 17 Gram(s) Oral daily  senna 2 Tablet(s) Oral at bedtime    MEDICATIONS  (PRN):  acetaminophen   Tablet .. 975 milliGRAM(s) Oral every 6 hours PRN Mild Pain (1 - 3)  albuterol/ipratropium for Nebulization 3 milliLiter(s) Nebulizer every 6 hours PRN Shortness of Breath and/or Wheezing  aluminum hydroxide/magnesium hydroxide/simethicone Suspension 30 milliLiter(s) Oral every 4 hours PRN Dyspepsia  benzocaine 15 mG/menthol 3.6 mG (Sugar-Free) Lozenge 1 Lozenge Oral four times a day PRN Sore Throat  cyclobenzaprine 5 milliGRAM(s) Oral three times a day PRN Muscle Spasm  hydrocodone/homatropine Syrup 5 milliLiter(s) Oral every 6 hours PRN worseneing cough  HYDROmorphone  Injectable 1 milliGRAM(s) IV Push every 4 hours PRN Severe Pain (7 - 10)  sodium chloride 0.65% Nasal 1 Spray(s) Both Nostrils every 2 hours PRN Nasal Congestion  traMADol 25 milliGRAM(s) Oral every 6 hours PRN Moderate Pain (4 - 6)    Vital Signs Last 24 Hrs  T(C): 36.3 (01 Sep 2021 09:37), Max: 37.2 (31 Aug 2021 13:44)  T(F): 97.3 (01 Sep 2021 09:37), Max: 98.9 (31 Aug 2021 13:44)  HR: 102 (01 Sep 2021 10:21) (81 - 108)  BP: 96/67 (01 Sep 2021 09:37) (96/67 - 131/74)  BP(mean): --  RR: 20 (01 Sep 2021 10:21) (20 - 28)  SpO2: 94% (01 Sep 2021 10:21) (93% - 96%)      08-31 @ 07:01  -  09-01 @ 07:00  --------------------------------------------------------  IN: 820 mL / OUT: 735 mL / NET: 85 mL      LABS:                        11.8   17.46 )-----------( 420      ( 01 Sep 2021 07:38 )             39.5     09-01    137  |  93<L>  |  11  ----------------------------<  70  4.3   |  34<H>  |  0.66    Ca    9.3      01 Sep 2021 07:38  Phos  2.8     09-01  Mg     2.4     09-01    TPro  7.2  /  Alb  2.7<L>  /  TBili  0.2  /  DBili  x   /  AST  22  /  ALT  34  /  AlkPhos  62  09-01    Fluid characteristics  -- 08-26 @ 20:39  pH > 7.92  LDH 1640  tprot 4.7    Cell count  Appearance Cloudy  Fluid type --  BF lymph 24  color Orange  eosinophil --  PMN 67  Mesothelial --  Monocyte 9  Other body cells --    CULTURES    Culture - Blood (collected 08-26-21 @ 00:39)  Source: .Blood Blood  Final Report (08-31-21 @ 01:01):    No Growth Final    Culture - Blood (collected 08-25-21 @ 22:56)  Source: .Blood Blood  Final Report (08-30-21 @ 23:01):    No Growth Final    Culture - Body Fluid with Gram Stain (collected 08-26-21 @ 23:15)  Source: .Body Fluid Pleural Fluid  Gram Stain (08-27-21 @ 03:31):    polymorphonuclear leukocytes    Gram Negative Rods    by cytocentrifuge  Preliminary Report (08-27-21 @ 17:47):    No growth    Culture - Fungal, Body Fluid (collected 08-26-21 @ 23:15)  Source: .Body Fluid Pleural Fluid  Preliminary Report (08-27-21 @ 06:40):    Testing in progress    Physical Examination:  PULM: Decreased BS  CVS: RRR    RADIOLOGY REVIEWED  CXR 9/1: b/l opacities, R PTX with subcutaneous emphysema     CT chest: c< from: CT Chest No Cont (08.28.21 @ 12:51) >    PROCEDURE DATE:  08/28/2021      FINDINGS:    AIRWAYS, LUNGS and PLEURA: Patent central airways. Diffuse patchy groundglass opacities and consolidative opacities at the left cavitary lesion within the right upper lobe are unchanged. The right chest tube is inserted through the anterior right 4th rib space, the tip is within the right lateral mid pleural space.    MEDIASTINUM AND GREGG: Mildly enlarged mediastinal and hilar lymph nodes are unchanged. Pneumomediastinum and pneumopericardium new from prior.    HEART AND VESSELS: Heart size is normal. No pericardial effusion. Thoracic aorta and pulmonary artery normal in diameter.    VISUALIZED UPPER ABDOMEN: Small calcification within the right kidney, incompletely evaluated.    CHEST WALL AND BONES: No aggressive osseous lesion. Extensive subcutaneous emphysema new from prior.    LOWER NECK: Within normal limits.    IMPRESSION:    In comparison with 8/24/2021, interval insertion of right chest tube. Extensive bilateral patchy groundglass opacities, right upper lobe consolidation and right upper lobe cavitary lesion are unchanged.    Trace right pleural effusion, unchanged. Trace right pneumothorax new from prior.    Pneumomediastinum, pneumopericardium and bilateral subcutaneous emphysema new from prior.    < end of copied text >    < from: [facility] Duplex Lower Ext Vein Scan, Bilat (08.25.21 @ 16:50) >    FINDINGS:    RIGHT:  Normal compressibility of the RIGHT common femoral, femoral and popliteal veins.  Doppler examination shows normal spontaneous and phasic flow.  No RIGHT calf vein thrombosis is detected.    LEFT:  Normal compressibility of the LEFT common femoral, femoral and popliteal veins.  Doppler examination shows normal spontaneous and phasic flow.  No LEFT calf vein thrombosis is detected.    Incidental note of thrombosis of the left tibial peroneal trunk with diminished flow flow within the left popliteal artery.    IMPRESSION:  No evidence of deep venous thrombosis in either lower extremity.    Incidental note of thrombosis of the left tibial peroneal trunk with diminished flow flow within the left popliteal artery.      < end of copied text >

## 2021-09-01 NOTE — PROGRESS NOTE ADULT - PROBLEM SELECTOR PLAN 1
S/p COVID with complications of PTX and empyema s/p chest tube placed by CT on 8/26  - f/u CT surg recs  - daily AM CXR per CTSx  - C/w HFNC, 40L@50% FiO2 titrate to maintain SaO2 > 90%  - f/u AM VBG given chronic retention and elevated bicarb (+diamox?) S/p COVID with complications of PTX and empyema s/p chest tube placed by CT on 8/26  - f/u CT surg recs  - daily AM CXR per CTSx  - C/w HFNC, 40L@40% FiO2 titrate to maintain SaO2 > 90%  - eventually wean to non-rebreather or venturi  - f/u AM VBG given chronic retention and elevated bicarb (+diamox?)

## 2021-09-01 NOTE — PROGRESS NOTE ADULT - ASSESSMENT
56yo M with h/o appendectomy admitted to the hospital with COVID and possible superimposed pneumonia found to have a right moderate-sized pneumothorax with leftward shift and small pleural effusion.     8/26 Right open chest tube placed at bedside, Maintain to wall suction -40mmHg  8/27 Remains on hi flow- increased subq emphysema, repeat CXR w/o signs of increasing PTX, chest tube +airleak, functioning, Maintain to dry suction -40mmHg  8/28 VSS, CXR with subcutaneous emphysema and persistent right ptx, maintain right chest tube to suction.   8/29 VSS, pt still with air leak on chest tube. s/p non-con Chest CT: Trace right pleural effusion, unchanged. Trace right pneumothorax new from prior. Pneumomediastinum, pneumopericardium and bilateral subcutaneous emphysema new from prior.  8/30 +Right chest tube to -40 dry suction, +airleak, pt tolerating high flow nasal cannula. Persistent SubQ emphysema without ventilatory compromise. Cont maintain right chest tube ti -40 mmHg dry suction.   8/31 Cont maintain right chest tube ti -40 mmHg dry suction.  54yo M with h/o appendectomy admitted to the hospital with COVID and possible superimposed pneumonia found to have a right moderate-sized pneumothorax with leftward shift and small pleural effusion.     8/26 Right open chest tube placed at bedside, Maintain to wall suction -40mmHg  8/27 Remains on hi flow- increased subq emphysema, repeat CXR w/o signs of increasing PTX, chest tube +airleak, functioning, Maintain to dry suction -40mmHg  8/28 VSS, CXR with subcutaneous emphysema and persistent right ptx, maintain right chest tube to suction.   8/29 VSS, pt still with air leak on chest tube. s/p non-con Chest CT: Trace right pleural effusion, unchanged. Trace right pneumothorax new from prior. Pneumomediastinum, pneumopericardium and bilateral subcutaneous emphysema new from prior.  8/30 +Right chest tube to -40 dry suction, +airleak, pt tolerating high flow nasal cannula. Persistent SubQ emphysema without ventilatory compromise. Cont maintain right chest tube ti -40 mmHg dry suction.   8/31 Cont maintain right chest tube ti -40 mmHg dry suction.   9/1 chest tube with bloody clot output- Hold [id number] to prevent further bleed

## 2021-09-01 NOTE — RAPID RESPONSE TEAM SUMMARY - NSOTHERINTERVENTIONSRRT_GEN_ALL_CORE
CXR  ABG  c/w o2sat monitoring   CTsx for R chest tube  CXR  ABG  c/w o2sat monitoring   CTsx for R chest tube   c/w pulmonary toileting   primary team at the bedside

## 2021-09-01 NOTE — PROGRESS NOTE ADULT - ASSESSMENT
Echo 8/24/21: EF 65%, mild MR, grossly nl lv sys fx    a/p  54yo M with Hx of DVT s/p achilles tendon repair years ago not on AC presenting with complaints of SOB. intermittent and fevers with cough productive of white sputum for past week, tested positive for covid 2. Now transferred to MICU for tension pneumothorax, s/p chest tube.     #Atypical Chest Pain  -RRT 8/23 x2 for chest pain - exacerbated by cough and inspiration  -improved, secondary to covid PNA, PNX  -trop negative  -no ADHF noted on exam   -CTA without PE   -Echo noted w grossly nl lv sys fx, mild MR     #Covid -19, PNX  -s/p completed courses of Remdesivir x 5 days and Decadron x 10 days   -S/p Tocilizumab 7/30 per ID   -GNR in gram stain from pleural fluid -- on IV abx   -CTA as above   -s/p R chest tube for pnx  -pulm / thoracic f/u    #Sinus tachycardia  -resolved  -likely secondary to covid PNA, volume, pain, PNX  -cont to monitor   -echo as above     #DVT (hx)  -LE doppler 8/25 with no evidence of deep venous thrombosis in either lower extremity. Incidental note of thrombosis of the left tibial peroneal trunk with diminished flow flow within the left popliteal artery.  -on full dose AC    #s/p steroids for laryngitis/obstruction

## 2021-09-01 NOTE — OCCUPATIONAL THERAPY INITIAL EVALUATION ADULT - RANGE OF MOTION EXAMINATION, UPPER EXTREMITY
right shoulder arom limited sec to chest tube/bilateral UE Active ROM was WFL  (within functional limits)

## 2021-09-01 NOTE — OCCUPATIONAL THERAPY INITIAL EVALUATION ADULT - PERTINENT HX OF CURRENT PROBLEM, REHAB EVAL
55yr old unvaccinated male with hx of HTN, RLE DVT, and PE (not on home AC) presents with progressively worsening SOB, intermittent fevers, COVID positive, admitted for COVID PNA. Initially admitted on 7/29 to ICU on HF and BiPAP,

## 2021-09-01 NOTE — OCCUPATIONAL THERAPY INITIAL EVALUATION ADULT - PRECAUTIONS/LIMITATIONS, REHAB EVAL
continue:8/26 found to have tension pneumothorax, returned to ICU, R chest tube placed. 8/28 CT scan showed pneumomediastinum, pneumopericardium, bilateral subQ emphysema as well as bilateral opacities and RUL consolidation plus potential cavitary lesion. Gram negative rounds grown in pleural fluid. 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 Continuing to have R sided pain with cough and inspiration likely 2/2 to COVID pneumonia, tension PTX, effusion, and RUL consolidation/cavitary lesion.

## 2021-09-01 NOTE — PROGRESS NOTE ADULT - ASSESSMENT
55yr old unvaccinated male with hx of HTN, RLE DVT, and PE (not on home AC) presents with progressively worsening SOB, intermittent fevers, COVID positive, admitted for COVID PNA. Initially admitted on 7/29 to ICU on HF and BiPAP, never intubated. Downgraded to floors on 8/19. 8/26 found to have tension pneumothorax, returned to ICU, R chest tube placed. 8/28 CT scan showed pneumomediastinum, pneumopericardium, bilateral subQ emphysema as well as bilateral opacities and RUL consolidation plus potential cavitary lesion. Gram negative rounds grown in pleural fluid. 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    8/30-31 downgraded to floors on HFNC. Continuing to have R sided pain with cough and inspiration likely 2/2 to COVID pneumonia, tension PTX, effusion, and RUL consolidation/cavitary lesion. At time of examination today, pt was on HF settings 50%, 40L.

## 2021-09-01 NOTE — PROGRESS NOTE ADULT - ASSESSMENT
55 M with covid PNA, CP         completed remdesivir, s/p decadron   sp toci   No fever  CP better- cardio seeing  CXR - no new findings  CT chest with cavitary PNA - cont zosyn, s/p CT by thoracic for PTX/effusion  Plan for 2 weeks abx    Dylan Carter  Attending Physician   Division of Infectious Disease  Pager #[phone]  Available on Microsoft Teams also  After 5pm/weekend or no response, call #[phone]

## 2021-09-01 NOTE — PROGRESS NOTE ADULT - PROBLEM SELECTOR PLAN 3
CXR 8/26 with R ptx   -S/p R chest tube placement by thoracic 8/26  -+air leak with areas of subcutaneous emphysema  -Chest tube to LWS, management per thoracic surgery team   -Daily CXR noted

## 2021-09-01 NOTE — RAPID RESPONSE TEAM SUMMARY - NSDATETIMERRT_GEN_ALL_CORE
01-Sep-2021
23-Aug-2021 05:39
26-Aug-2021 06:56
03-Aug-2021 02:28
[Time Spent: ___ minutes] : I have spent [unfilled] minutes of time on the encounter.
04-Aug-2021 19:24
23-Aug-2021 16:00

## 2021-09-01 NOTE — OCCUPATIONAL THERAPY INITIAL EVALUATION ADULT - ADDITIONAL COMMENTS
Pt is left hand dominant, lives in a private home with wife and children, 2 edvin, bedroom on first floor.  stall shower, PTA independent in all adl's, , working

## 2021-09-01 NOTE — PROGRESS NOTE ADULT - SUBJECTIVE AND OBJECTIVE BOX
Patient is a 55y old  Male who presents with a chief complaint of Covid PNA (01 Sep 2021 08:32)      Vital Signs Last 24 Hrs  T(C): 36.9 (09-01-21 @ 04:34), Max: 37.2 (08-31-21 @ 13:44)  T(F): 98.5 (09-01-21 @ 04:34), Max: 98.9 (08-31-21 @ 13:44)  HR: 93 (09-01-21 @ 04:34) (81 - 112)  BP: 107/75 (09-01-21 @ 04:34) (105/75 - 131/74)  RR: 20 (09-01-21 @ 04:34) (20 - 38)  SpO2: 96% (09-01-21 @ 04:34) (93% - 96%)                08-31-21 @ 07:01  -  09-01-21 @ 07:00  --------------------------------------------------------  IN: 820 mL / OUT: 735 mL / NET: 85 mL        Daily     Daily                           11.8   17.46 )-----------( 420      ( 01 Sep 2021 07:38 )             39.5     09-01    137  |  93<L>  |  11  ----------------------------<  70  4.3   |  34<H>  |  0.66    Ca    9.3      01 Sep 2021 07:38  Phos  2.8     09-01  Mg     2.4     09-01    TPro  7.2  /  Alb  2.7<L>  /  TBili  0.2  /  DBili  x   /  AST  22  /  ALT  34  /  AlkPhos  62  09-01          PHYSICAL EXAM  Neurology: A&Ox3, NAD  CV : RRR+S1S2  Lungs: Respirations non-labored, B/L BS CTA  Abdomen: Soft, NT/ND, +[id number]  Extremities: B/L LE warm, no edema, +PP           MEDICATIONS  acetaminophen   Tablet .. 975 milliGRAM(s) Oral every 6 hours PRN  albuterol/ipratropium for Nebulization 3 milliLiter(s) Nebulizer every 6 hours PRN  aluminum hydroxide/magnesium hydroxide/simethicone Suspension 30 milliLiter(s) Oral every 4 hours PRN  benzocaine 15 mG/menthol 3.6 mG (Sugar-Free) Lozenge 1 Lozenge Oral four times a day PRN  benzocaine 15 mG/menthol 3.6 mG (Sugar-Free) Lozenge 1 Lozenge Oral once  benzonatate 200 milliGRAM(s) Oral every 8 hours  budesonide 160 MICROgram(s)/formoterol 4.5 MICROgram(s) Inhaler 2 Puff(s) Inhalation two times a day  cholecalciferol 2000 Unit(s) Oral daily  clonazePAM  Tablet 0.5 milliGRAM(s) Oral every 8 hours  cyclobenzaprine 5 milliGRAM(s) Oral three times a day PRN  enoxaparin Injectable 90 milliGRAM(s) SubCutaneous every 12 hours  FIRST- Mouthwash  BLM 5 milliLiter(s) Swish and Spit every 6 hours  hydrocodone/homatropine Syrup 5 milliLiter(s) Oral every 6 hours PRN  HYDROmorphone  Injectable 1 milliGRAM(s) IV Push every 4 hours PRN  lidocaine   4% Patch 1 Patch Transdermal every 24 hours  melatonin 3 milliGRAM(s) Oral at bedtime  multivitamin 1 Tablet(s) Oral daily  pantoprazole  Injectable 40 milliGRAM(s) IV Push daily  piperacillin/tazobactam IVPB.. 3.375 Gram(s) IV Intermittent every 8 hours  polyethylene glycol 3350 17 Gram(s) Oral daily  senna 2 Tablet(s) Oral at bedtime  sodium chloride 0.65% Nasal 1 Spray(s) Both Nostrils every 2 hours PRN  traMADol 25 milliGRAM(s) Oral every 6 hours PRN

## 2021-09-01 NOTE — PROGRESS NOTE ADULT - SUBJECTIVE AND OBJECTIVE BOX
KARISSA VILLALBA 55y [id number]    Patient is a 55y old  Male who presents with a chief complaint of COVID PNA (01 Sep 2021 09:01)      Follow Up/CC:  ID following for COVID    Interval History/ROS: no fever, on HFNC     Allergies    No Known Allergies    Intolerances        ANTIMICROBIALS:  piperacillin/tazobactam IVPB.. 3.375 every 8 hours      MEDICATIONS  (STANDING):  benzocaine 15 mG/menthol 3.6 mG (Sugar-Free) Lozenge 1 Lozenge Oral once  benzonatate 200 milliGRAM(s) Oral every 8 hours  budesonide 160 MICROgram(s)/formoterol 4.5 MICROgram(s) Inhaler 2 Puff(s) Inhalation two times a day  cholecalciferol 2000 Unit(s) Oral daily  clonazePAM  Tablet 0.5 milliGRAM(s) Oral every 8 hours  FIRST- Mouthwash  BLM 5 milliLiter(s) Swish and Spit every 6 hours  lidocaine   4% Patch 1 Patch Transdermal every 24 hours  melatonin 3 milliGRAM(s) Oral at bedtime  multivitamin 1 Tablet(s) Oral daily  pantoprazole  Injectable 40 milliGRAM(s) IV Push daily  piperacillin/tazobactam IVPB.. 3.375 Gram(s) IV Intermittent every 8 hours  polyethylene glycol 3350 17 Gram(s) Oral daily  senna 2 Tablet(s) Oral at bedtime    MEDICATIONS  (PRN):  acetaminophen   Tablet .. 975 milliGRAM(s) Oral every 6 hours PRN Mild Pain (1 - 3)  albuterol/ipratropium for Nebulization 3 milliLiter(s) Nebulizer every 6 hours PRN Shortness of Breath and/or Wheezing  aluminum hydroxide/magnesium hydroxide/simethicone Suspension 30 milliLiter(s) Oral every 4 hours PRN Dyspepsia  benzocaine 15 mG/menthol 3.6 mG (Sugar-Free) Lozenge 1 Lozenge Oral four times a day PRN Sore Throat  cyclobenzaprine 5 milliGRAM(s) Oral three times a day PRN Muscle Spasm  hydrocodone/homatropine Syrup 5 milliLiter(s) Oral every 6 hours PRN worseneing cough  HYDROmorphone  Injectable 1 milliGRAM(s) IV Push every 4 hours PRN Severe Pain (7 - 10)  sodium chloride 0.65% Nasal 1 Spray(s) Both Nostrils every 2 hours PRN Nasal Congestion  traMADol 25 milliGRAM(s) Oral every 6 hours PRN Moderate Pain (4 - 6)        Vital Signs Last 24 Hrs  T(C): 36.5 (01 Sep 2021 14:00), Max: 37 (31 Aug 2021 18:05)  T(F): 97.7 (01 Sep 2021 14:00), Max: 98.6 (31 Aug 2021 18:05)  HR: 98 (01 Sep 2021 15:40) (81 - 106)  BP: 114/75 (01 Sep 2021 14:00) (96/67 - 114/75)  BP(mean): --  RR: 20 (01 Sep 2021 15:40) (18 - 25)  SpO2: 94% (01 Sep 2021 15:40) (93% - 96%)    CBC Full  -  ( 01 Sep 2021 07:38 )  WBC Count : 17.46 K/uL  RBC Count : 4.26 M/uL  Hemoglobin : 11.8 g/dL  Hematocrit : 39.5 %  Platelet Count - Automated : 420 K/uL  Mean Cell Volume : 92.7 fl  Mean Cell Hemoglobin : 27.7 pg  Mean Cell Hemoglobin Concentration : 29.9 gm/dL  Auto Neutrophil # : x  Auto Lymphocyte # : x  Auto Monocyte # : x  Auto Eosinophil # : x  Auto Basophil # : x  Auto Neutrophil % : x  Auto Lymphocyte % : x  Auto Monocyte % : x  Auto Eosinophil % : x  Auto Basophil % : x    09-01    137  |  93<L>  |  11  ----------------------------<  70  4.3   |  34<H>  |  0.66    Ca    9.3      01 Sep 2021 07:38  Phos  2.8     09-01  Mg     2.4     09-01    TPro  7.2  /  Alb  2.7<L>  /  TBili  0.2  /  DBili  x   /  AST  22  /  ALT  34  /  AlkPhos  62  09-01    LIVER FUNCTIONS - ( 01 Sep 2021 07:38 )  Alb: 2.7 g/dL / Pro: 7.2 g/dL / ALK PHOS: 62 U/L / ALT: 34 U/L / AST: 22 U/L / GGT: x               MICROBIOLOGY:  .Body Fluid Pleural Fluid  08-26-21   No growth at 6 days.  Organism seen in Gram stain is non-viable after prolonged  incubation and repeated subculture.  --    polymorphonuclear leukocytes  Gram Negative Rods  by cytocentrifuge      .Blood Blood  08-26-21   No Growth Final  --  --      .Blood Blood  08-25-21   No Growth Final  --  --      .Blood Blood-Peripheral  08-11-21   No Growth Final  --  --      .Sputum Sputum, cup  08-04-21   Normal Respiratory Tiki present  --    Few polymorphonuclear leukocytes per low power field  Rare Squamous epithelial cells per low power field  Moderate Gram Positive Cocci in Pairs and Chains per oil power field  Rare Gram Negative Rods per oil power field        RADIOLOGY    < from: Xray Chest 1 View- PORTABLE-Routine (Xray Chest 1 View- PORTABLE-Routine in AM.) (09.01.21 @ 08:54) >  No change in subcutaneous emphysema, right pleural effusion and pneumothorax.    < end of copied text >

## 2021-09-02 ENCOUNTER — TRANSCRIPTION ENCOUNTER (OUTPATIENT)
Age: 56
End: 2021-09-02

## 2021-09-02 LAB
ALBUMIN SERPL ELPH-MCNC: 2.5 G/DL — LOW (ref 3.3–5)
ALP SERPL-CCNC: 57 U/L — SIGNIFICANT CHANGE UP (ref 40–120)
ALT FLD-CCNC: 27 U/L — SIGNIFICANT CHANGE UP (ref 10–45)
ANION GAP SERPL CALC-SCNC: 10 MMOL/L — SIGNIFICANT CHANGE UP (ref 5–17)
AST SERPL-CCNC: 22 U/L — SIGNIFICANT CHANGE UP (ref 10–40)
BASE EXCESS BLDV CALC-SCNC: 15.4 MMOL/L — HIGH (ref -2–2)
BILIRUB SERPL-MCNC: 0.2 MG/DL — SIGNIFICANT CHANGE UP (ref 0.2–1.2)
BUN SERPL-MCNC: 10 MG/DL — SIGNIFICANT CHANGE UP (ref 7–23)
CA-I SERPL-SCNC: 1.2 MMOL/L — SIGNIFICANT CHANGE UP (ref 1.15–1.33)
CALCIUM SERPL-MCNC: 8.6 MG/DL — SIGNIFICANT CHANGE UP (ref 8.4–10.5)
CHLORIDE BLDV-SCNC: 97 MMOL/L — SIGNIFICANT CHANGE UP (ref 96–108)
CHLORIDE SERPL-SCNC: 94 MMOL/L — LOW (ref 96–108)
CO2 BLDV-SCNC: 44 MMOL/L — HIGH (ref 22–26)
CO2 SERPL-SCNC: 31 MMOL/L — SIGNIFICANT CHANGE UP (ref 22–31)
CREAT SERPL-MCNC: 0.66 MG/DL — SIGNIFICANT CHANGE UP (ref 0.5–1.3)
GAS PNL BLDV: 135 MMOL/L — LOW (ref 136–145)
GAS PNL BLDV: SIGNIFICANT CHANGE UP
GAS PNL BLDV: SIGNIFICANT CHANGE UP
GLUCOSE BLDC GLUCOMTR-MCNC: 89 MG/DL — SIGNIFICANT CHANGE UP (ref 70–99)
GLUCOSE BLDV-MCNC: 89 MG/DL — SIGNIFICANT CHANGE UP (ref 70–99)
GLUCOSE SERPL-MCNC: 75 MG/DL — SIGNIFICANT CHANGE UP (ref 70–99)
HCO3 BLDV-SCNC: 42 MMOL/L — HIGH (ref 22–29)
HCT VFR BLD CALC: 36.7 % — LOW (ref 39–50)
HCT VFR BLDA CALC: 35 % — LOW (ref 39–51)
HGB BLD CALC-MCNC: 11.7 G/DL — LOW (ref 12.6–17.4)
HGB BLD-MCNC: 11.3 G/DL — LOW (ref 13–17)
LACTATE BLDV-MCNC: 1.1 MMOL/L — SIGNIFICANT CHANGE UP (ref 0.7–2)
MAGNESIUM SERPL-MCNC: 2.2 MG/DL — SIGNIFICANT CHANGE UP (ref 1.6–2.6)
MCHC RBC-ENTMCNC: 28.2 PG — SIGNIFICANT CHANGE UP (ref 27–34)
MCHC RBC-ENTMCNC: 30.8 GM/DL — LOW (ref 32–36)
MCV RBC AUTO: 91.5 FL — SIGNIFICANT CHANGE UP (ref 80–100)
NRBC # BLD: 0 /100 WBCS — SIGNIFICANT CHANGE UP (ref 0–0)
PCO2 BLDV: 64 MMHG — HIGH (ref 42–55)
PH BLDV: 7.43 — SIGNIFICANT CHANGE UP (ref 7.32–7.43)
PHOSPHATE SERPL-MCNC: 3.3 MG/DL — SIGNIFICANT CHANGE UP (ref 2.5–4.5)
PLATELET # BLD AUTO: 403 K/UL — HIGH (ref 150–400)
PO2 BLDV: 95 MMHG — HIGH (ref 25–45)
POTASSIUM BLDV-SCNC: 4.1 MMOL/L — SIGNIFICANT CHANGE UP (ref 3.5–5.1)
POTASSIUM SERPL-MCNC: 4.5 MMOL/L — SIGNIFICANT CHANGE UP (ref 3.5–5.3)
POTASSIUM SERPL-SCNC: 4.5 MMOL/L — SIGNIFICANT CHANGE UP (ref 3.5–5.3)
PROT SERPL-MCNC: 6.8 G/DL — SIGNIFICANT CHANGE UP (ref 6–8.3)
RBC # BLD: 4.01 M/UL — LOW (ref 4.2–5.8)
RBC # FLD: 12.8 % — SIGNIFICANT CHANGE UP (ref 10.3–14.5)
SAO2 % BLDV: 99.4 % — HIGH (ref 67–88)
SODIUM SERPL-SCNC: 135 MMOL/L — SIGNIFICANT CHANGE UP (ref 135–145)
WBC # BLD: 13.24 K/UL — HIGH (ref 3.8–10.5)
WBC # FLD AUTO: 13.24 K/UL — HIGH (ref 3.8–10.5)

## 2021-09-02 PROCEDURE — 71045 X-RAY EXAM CHEST 1 VIEW: CPT | Mod: 26

## 2021-09-02 PROCEDURE — 99232 SBSQ HOSP IP/OBS MODERATE 35: CPT

## 2021-09-02 PROCEDURE — 99232 SBSQ HOSP IP/OBS MODERATE 35: CPT | Mod: 57

## 2021-09-02 RX ORDER — CLONAZEPAM 1 MG
0.5 TABLET ORAL EVERY 8 HOURS
Refills: 0 | Status: DISCONTINUED | OUTPATIENT
Start: 2021-09-02 | End: 2021-09-07

## 2021-09-02 RX ORDER — PIPERACILLIN AND TAZOBACTAM 4; .5 G/20ML; G/20ML
3.38 INJECTION, POWDER, LYOPHILIZED, FOR SOLUTION INTRAVENOUS EVERY 8 HOURS
Refills: 0 | Status: COMPLETED | OUTPATIENT
Start: 2021-09-02 | End: 2021-09-09

## 2021-09-02 RX ADMIN — BUDESONIDE AND FORMOTEROL FUMARATE DIHYDRATE 2 PUFF(S): 160; 4.5 AEROSOL RESPIRATORY (INHALATION) at 17:00

## 2021-09-02 RX ADMIN — LIDOCAINE 1 PATCH: 4 CREAM TOPICAL at 00:25

## 2021-09-02 RX ADMIN — DIPHENHYDRAMINE HYDROCHLORIDE AND LIDOCAINE HYDROCHLORIDE AND ALUMINUM HYDROXIDE AND MAGNESIUM HYDRO 5 MILLILITER(S): KIT at 06:13

## 2021-09-02 RX ADMIN — HYDROMORPHONE HYDROCHLORIDE 1 MILLIGRAM(S): 2 INJECTION INTRAMUSCULAR; INTRAVENOUS; SUBCUTANEOUS at 07:48

## 2021-09-02 RX ADMIN — TRAMADOL HYDROCHLORIDE 25 MILLIGRAM(S): 50 TABLET ORAL at 08:35

## 2021-09-02 RX ADMIN — DIPHENHYDRAMINE HYDROCHLORIDE AND LIDOCAINE HYDROCHLORIDE AND ALUMINUM HYDROXIDE AND MAGNESIUM HYDRO 5 MILLILITER(S): KIT at 17:40

## 2021-09-02 RX ADMIN — HYDROMORPHONE HYDROCHLORIDE 1 MILLIGRAM(S): 2 INJECTION INTRAMUSCULAR; INTRAVENOUS; SUBCUTANEOUS at 17:39

## 2021-09-02 RX ADMIN — PIPERACILLIN AND TAZOBACTAM 25 GRAM(S): 4; .5 INJECTION, POWDER, LYOPHILIZED, FOR SOLUTION INTRAVENOUS at 04:44

## 2021-09-02 RX ADMIN — Medication 0.5 MILLIGRAM(S): at 06:13

## 2021-09-02 RX ADMIN — Medication 0.5 MILLIGRAM(S): at 21:37

## 2021-09-02 RX ADMIN — Medication 200 MILLIGRAM(S): at 14:11

## 2021-09-02 RX ADMIN — DIPHENHYDRAMINE HYDROCHLORIDE AND LIDOCAINE HYDROCHLORIDE AND ALUMINUM HYDROXIDE AND MAGNESIUM HYDRO 5 MILLILITER(S): KIT at 13:00

## 2021-09-02 RX ADMIN — Medication 0.5 MILLIGRAM(S): at 14:10

## 2021-09-02 RX ADMIN — Medication 1 TABLET(S): at 13:00

## 2021-09-02 RX ADMIN — SENNA PLUS 2 TABLET(S): 8.6 TABLET ORAL at 21:32

## 2021-09-02 RX ADMIN — PIPERACILLIN AND TAZOBACTAM 25 GRAM(S): 4; .5 INJECTION, POWDER, LYOPHILIZED, FOR SOLUTION INTRAVENOUS at 14:09

## 2021-09-02 RX ADMIN — PIPERACILLIN AND TAZOBACTAM 25 GRAM(S): 4; .5 INJECTION, POWDER, LYOPHILIZED, FOR SOLUTION INTRAVENOUS at 21:37

## 2021-09-02 RX ADMIN — TRAMADOL HYDROCHLORIDE 25 MILLIGRAM(S): 50 TABLET ORAL at 14:28

## 2021-09-02 RX ADMIN — TRAMADOL HYDROCHLORIDE 25 MILLIGRAM(S): 50 TABLET ORAL at 15:15

## 2021-09-02 RX ADMIN — Medication 3 MILLIGRAM(S): at 21:37

## 2021-09-02 RX ADMIN — TRAMADOL HYDROCHLORIDE 25 MILLIGRAM(S): 50 TABLET ORAL at 01:00

## 2021-09-02 RX ADMIN — LIDOCAINE 1 PATCH: 4 CREAM TOPICAL at 12:58

## 2021-09-02 RX ADMIN — Medication 200 MILLIGRAM(S): at 06:14

## 2021-09-02 RX ADMIN — Medication 200 MILLIGRAM(S): at 21:32

## 2021-09-02 RX ADMIN — TRAMADOL HYDROCHLORIDE 25 MILLIGRAM(S): 50 TABLET ORAL at 07:37

## 2021-09-02 RX ADMIN — Medication 2000 UNIT(S): at 12:59

## 2021-09-02 RX ADMIN — BUDESONIDE AND FORMOTEROL FUMARATE DIHYDRATE 2 PUFF(S): 160; 4.5 AEROSOL RESPIRATORY (INHALATION) at 05:17

## 2021-09-02 RX ADMIN — LIDOCAINE 1 PATCH: 4 CREAM TOPICAL at 17:44

## 2021-09-02 RX ADMIN — TRAMADOL HYDROCHLORIDE 25 MILLIGRAM(S): 50 TABLET ORAL at 00:23

## 2021-09-02 RX ADMIN — PANTOPRAZOLE SODIUM 40 MILLIGRAM(S): 20 TABLET, DELAYED RELEASE ORAL at 13:00

## 2021-09-02 RX ADMIN — DIPHENHYDRAMINE HYDROCHLORIDE AND LIDOCAINE HYDROCHLORIDE AND ALUMINUM HYDROXIDE AND MAGNESIUM HYDRO 5 MILLILITER(S): KIT at 00:20

## 2021-09-02 RX ADMIN — HYDROMORPHONE HYDROCHLORIDE 1 MILLIGRAM(S): 2 INJECTION INTRAMUSCULAR; INTRAVENOUS; SUBCUTANEOUS at 08:34

## 2021-09-02 NOTE — PROGRESS NOTE ADULT - PROBLEM SELECTOR PLAN 1
+COVID PCR as an outpatient  -CXR 8/9 with b/l lung opacities, CXR 8/16 grossly unchanged   -CXR 8/23 with worsening b/l opacties R>L, ?effusion vs mucus plugging/collapse, also with opacities 2nd to COVID   -S/p Remdesivir x 5 days   -S/p Decadron 6mg IVP qd x 10 days (completed 8/10)  -S/p Tocilizumab 7/30 per ID for worsening hypoxic respiratory failure  -Sputum culture 8/4 with normal respiratory aba.   -CTA 8/24 with no clear PE, but ddimer remains elevated. LE duplex 8/25 neg DVT, but with incidental note of thrombosis of the L tibial peroneal trunk with diminished flow within the left popliteal artery. Full dose AC being held 2nd to bloody output from R chest tube. Okay to resume ppx dose AC per thoracic.

## 2021-09-02 NOTE — DISCHARGE NOTE PROVIDER - PROVIDER TOKENS
PROVIDER:[TOKEN:[[id number]],FOLLOWUP:[1 week],ESTABLISHEDPATIENT:[T]],PROVIDER:[[id number]],FOLLOWUP:[1 week],ESTABLISHEDPATIENT:[T]]

## 2021-09-02 NOTE — PROGRESS NOTE ADULT - ASSESSMENT
Echo 8/24/21: EF 65%, mild MR, grossly nl lv sys fx    a/p  54yo M with Hx of DVT s/p achilles tendon repair years ago not on AC presenting with complaints of SOB. intermittent and fevers with cough productive of white sputum for past week, tested positive for covid 2. Now transferred to MICU for tension pneumothorax, s/p chest tube.     #Atypical Chest Pain  -RRT 8/23 x2 for chest pain - exacerbated by cough and inspiration  -improved, secondary to covid PNA, PNX  -trop negative  -no ADHF noted on exam   -CTA without PE   -Echo noted w grossly nl lv sys fx, mild MR     #Covid -19, PNX  -s/p completed courses of Remdesivir x 5 days and Decadron x 10 days   -S/p Tocilizumab 7/30 per ID   -GNR in gram stain from pleural fluid -- on IV abx   -CTA as above   -RRT on 9/1 for hypoxia - HFNC settings adjusted - repeat CXR noted    -R chest tube for pnx -- mgmt per thoracic   -pulm / thoracic f/u    #Sinus tachycardia  -resolved  -likely secondary to covid PNA, volume, pain, PNX  -cont to monitor   -echo as above     #DVT (hx)  -LE doppler 8/25 with no evidence of deep venous thrombosis in either lower extremity. Incidental note of thrombosis of the left tibial peroneal trunk with diminished flow flow within the left popliteal artery.  -AC on hold given inc bloody outpt noted in Chest tube   -med f/u     #s/p steroids for laryngitis/obstruction

## 2021-09-02 NOTE — DISCHARGE NOTE PROVIDER - NSDCCPCAREPLAN_GEN_ALL_CORE_FT
PRINCIPAL DISCHARGE DIAGNOSIS  Diagnosis: Pneumonia due to COVID-19 virus  Assessment and Plan of Treatment: You were hospitalized for pneumonia due to COVID-19 virus which caused you to experience shortness of breath and respiratory failure. Your condition was severe enough to be admitted to the ICU.      SECONDARY DISCHARGE DIAGNOSES  Diagnosis: Pneumothorax  Assessment and Plan of Treatment:     Diagnosis: Cavitary pneumonia  Assessment and Plan of Treatment:     Diagnosis: Hypoxia  Assessment and Plan of Treatment:     Diagnosis: Pneumonia due to COVID-19 virus  Assessment and Plan of Treatment: Pneumonia due to COVID-19 virus     PRINCIPAL DISCHARGE DIAGNOSIS  Diagnosis: Pneumonia due to COVID-19 virus  Assessment and Plan of Treatment: You were hospitalized for pneumonia due to COVID-19 virus which caused you to experience shortness of breath and respiratory failure. Your condition was severe enough to be admitted to the ICU where you received forms of respiratory support in the form of high-flow nasal cannula and BiPAP.   Please follow-up with your PCP as well as a pulmonlogist within 2 weeks.  If you have shortness of breath, fever, severe cough, please visit the ED.      SECONDARY DISCHARGE DIAGNOSES  Diagnosis: Pneumothorax  Assessment and Plan of Treatment: Your hospital course was complicated by pneumothorax on the right side. This diagnosis means that air is present in your chest between your chest wall and lung. This air can create pressure and prevent your lung from properly inflating.  The cardiothoracic team treated this by inserting a chest tube into your right side which both relieved the air as well as removed fluid that had built up in that space.  Please follow-up with your PCP as well as a pulmonlogist within 2 weeks.    Diagnosis: Cavitary pneumonia  Assessment and Plan of Treatment: Your hospital course was complicated by the a cavitary pneumonia found in your right upper lung. You were treated with this using intravenous antibiotics for a total of 14 days.   Please follow-up with your PCP as well as a pulmonlogist within 2 weeks.    Diagnosis: Hypoxia  Assessment and Plan of Treatment:     Diagnosis: Pneumonia due to COVID-19 virus  Assessment and Plan of Treatment: Pneumonia due to COVID-19 virus     PRINCIPAL DISCHARGE DIAGNOSIS  Diagnosis: Pneumonia due to COVID-19 virus  Assessment and Plan of Treatment: You were hospitalized for pneumonia due to COVID-19 virus which caused you to experience shortness of breath and respiratory failure. Your condition was severe enough to be admitted to the ICU where you received forms of respiratory support in the form of high-flow nasal cannula and BiPAP.   Please follow-up with your PCP as well as a pulmonlogist within 2 weeks.  If you have shortness of breath, fever, severe cough, please visit the ED.      SECONDARY DISCHARGE DIAGNOSES  Diagnosis: Pneumothorax  Assessment and Plan of Treatment: Your hospital course was complicated by pneumothorax on the right side. This diagnosis means that air is present in your chest between your chest wall and lung. This air can create pressure and prevent your lung from properly inflating.  The cardiothoracic team treated this by inserting a chest tube into your right side which both relieved the air as well as removed fluid that had built up in that space.  Please follow-up with your PCP as well as a pulmonlogist within 2 weeks.    Diagnosis: Cavitary pneumonia  Assessment and Plan of Treatment: Your hospital course was complicated by the a cavitary pneumonia found in your right upper lung. You were treated with this using intravenous antibiotics for a total of 14 days.   Please follow-up with your PCP as well as a pulmonlogist within 2 weeks.     PRINCIPAL DISCHARGE DIAGNOSIS  Diagnosis: Pneumonia due to COVID-19 virus  Assessment and Plan of Treatment: You were hospitalized for pneumonia due to COVID-19 virus which caused you to experience shortness of breath and respiratory failure. Your condition was severe enough to be admitted to the ICU where you received forms of respiratory support in the form of high-flow nasal cannula and BiPAP.   Please follow-up with your PCP as well as a pulmonlogist within 2 weeks.  If you have shortness of breath, fever, severe cough, please visit the ED.      SECONDARY DISCHARGE DIAGNOSES  Diagnosis: Pneumothorax  Assessment and Plan of Treatment: Your hospital course was complicated by pneumothorax on the right side. This diagnosis means that air was present in your chest between your chest wall and lung. This air can create pressure and prevent your lung from properly inflating.  The cardiothoracic team treated this by inserting a chest tube into your right side which both relieved the air as well as removed fluid that had built up in that space.  Please follow-up with your PCP as well as a pulmonlogist within 2 weeks.    Diagnosis: Cavitary pneumonia  Assessment and Plan of Treatment: Your hospital course was complicated by the a cavitary pneumonia found in your right upper lung. You were treated with this using intravenous antibiotics for a total of 14 days.   Please follow-up with your PCP as well as a pulmonlogist within 2 weeks.    Diagnosis: Thrombus  Assessment and Plan of Treatment:      PRINCIPAL DISCHARGE DIAGNOSIS  Diagnosis: Pneumonia due to COVID-19 virus  Assessment and Plan of Treatment: You were hospitalized for pneumonia due to COVID-19 virus which caused you to experience shortness of breath and respiratory failure. Your condition was severe enough to be admitted to the ICU where you received forms of respiratory support in the form of high-flow nasal cannula and BiPAP.   Please follow-up with your PCP as well as a pulmonlogist within 2 weeks.  If you have shortness of breath, fever, severe cough, please visit the ED.      SECONDARY DISCHARGE DIAGNOSES  Diagnosis: Pneumothorax  Assessment and Plan of Treatment: Your hospital course was complicated by pneumothorax on the right side. This diagnosis means that air was present in your chest between your chest wall and lung. This air can create pressure and prevent your lung from properly inflating.  The cardiothoracic team treated this by inserting a chest tube into your right side which both relieved the air as well as removed fluid that had built up in that space.  Please follow-up with your PCP as well as a pulmonlogist within 2 weeks. Continue taking oxygen as prescribed.    Diagnosis: Cavitary pneumonia  Assessment and Plan of Treatment: Your hospital course was complicated by the a cavitary pneumonia found in your right upper lung. You were treated with this using intravenous antibiotics for a total of 14 days.   Please follow-up with your PCP as well as a pulmonlogist within 2 weeks.    Diagnosis: Thrombus  Assessment and Plan of Treatment: You have a blood clot in an artery in your left leg. We started you on a blood thinner called xarelto, please take this with food. This medication requires a 21 day load (15 mg twice a day) before switching to 20 mg once daily. You will need to be on this for at least 3 months. Please follow-up with vascular cardiology as an outpatient to determine the exact duration of anticoagulation. If you develop any signs of bleeding including black or red stool, please stop taking the xarelto and go to the ED immediately.     PRINCIPAL DISCHARGE DIAGNOSIS  Diagnosis: Pneumonia due to COVID-19 virus  Assessment and Plan of Treatment: You were hospitalized for pneumonia due to COVID-19 virus which caused you to experience shortness of breath and respiratory failure. Your condition was severe enough to be admitted to the ICU where you received forms of respiratory support in the form of high-flow nasal cannula and BiPAP.   Please follow-up with your PCP as well as a pulmonlogist within 2 weeks.  If you have shortness of breath, fever, severe cough, please visit the ED.      SECONDARY DISCHARGE DIAGNOSES  Diagnosis: Pneumothorax  Assessment and Plan of Treatment: Your hospital course was complicated by pneumothorax on the right side. This diagnosis means that air was present in your chest between your chest wall and lung. This air can create pressure and prevent your lung from properly inflating.  The cardiothoracic team treated this by inserting a chest tube into your right side which both relieved the air as well as removed fluid that had built up in that space.  Please follow-up with your PCP as well as a pulmonlogist within 2 weeks. Continue taking oxygen as prescribed.    Diagnosis: Cavitary pneumonia  Assessment and Plan of Treatment: Your hospital course was complicated by the a cavitary pneumonia found in your right upper lung. You were treated with this using intravenous antibiotics for a total of 14 days.   Please follow-up with your PCP as well as a pulmonlogist within 2 weeks.    Diagnosis: Thrombus  Assessment and Plan of Treatment: You have a blood clot in an artery in your left leg. We started you on a blood thinner called xarelto, please take this with food. This medication requires a 21 day load (15 mg twice a day) before switching to 20 mg once daily. You will need to be on this for at least 3 months. Please follow-up with vascular cardiology as an outpatient to determine the exact duration of anticoagulation. If you develop any signs of bleeding including black or red stool, please stop taking the xarelto and go to the ED immediately.    Diagnosis: Anxiety  Assessment and Plan of Treatment: You were started on a medication called clonzepam while in the hospital which can help with anxiety and breathing. You do not need this medication indefinitely but stopping this medication suddenly can cause withdrawal symptoms. We are gradually lowering the dose over the next few days to prevent withdrawal. Please take clonazepam 0.5 mg two times a day for the next three days, then take clonazepam 0.5 mg once a day until the bottle is finished.  If you experience worsening anxiety, agitation, tremors, nausea, itching, these may be signs of withdrawal. Please contact your PCP or go to the ER. If you have any seizures, please go to the ER.

## 2021-09-02 NOTE — PROGRESS NOTE ADULT - SUBJECTIVE AND OBJECTIVE BOX
Joseph Casey, PGY1  Pager [phone]    INCOMPLETE NOTE - IN PROGRESS    Patient is a 55y old  Male who presents with a chief complaint of COVID PNA (01 Sep 2021 09:01)      SUBJECTIVE/INTERVAL EVENTS: Patient seen and examined at bedside.    MEDICATIONS  (STANDING):  benzocaine 15 mG/menthol 3.6 mG (Sugar-Free) Lozenge 1 Lozenge Oral once  benzonatate 200 milliGRAM(s) Oral every 8 hours  budesonide 160 MICROgram(s)/formoterol 4.5 MICROgram(s) Inhaler 2 Puff(s) Inhalation two times a day  cholecalciferol 2000 Unit(s) Oral daily  clonazePAM  Tablet 0.5 milliGRAM(s) Oral every 8 hours  FIRST- Mouthwash  BLM 5 milliLiter(s) Swish and Spit every 6 hours  lidocaine   4% Patch 1 Patch Transdermal every 24 hours  melatonin 3 milliGRAM(s) Oral at bedtime  multivitamin 1 Tablet(s) Oral daily  pantoprazole  Injectable 40 milliGRAM(s) IV Push daily  polyethylene glycol 3350 17 Gram(s) Oral daily  senna 2 Tablet(s) Oral at bedtime    MEDICATIONS  (PRN):  acetaminophen   Tablet .. 975 milliGRAM(s) Oral every 6 hours PRN Mild Pain (1 - 3)  albuterol/ipratropium for Nebulization 3 milliLiter(s) Nebulizer every 6 hours PRN Shortness of Breath and/or Wheezing  aluminum hydroxide/magnesium hydroxide/simethicone Suspension 30 milliLiter(s) Oral every 4 hours PRN Dyspepsia  benzocaine 15 mG/menthol 3.6 mG (Sugar-Free) Lozenge 1 Lozenge Oral four times a day PRN Sore Throat  cyclobenzaprine 5 milliGRAM(s) Oral three times a day PRN Muscle Spasm  hydrocodone/homatropine Syrup 5 milliLiter(s) Oral every 6 hours PRN worseneing cough  HYDROmorphone  Injectable 1 milliGRAM(s) IV Push every 4 hours PRN Severe Pain (7 - 10)  sodium chloride 0.65% Nasal 1 Spray(s) Both Nostrils every 2 hours PRN Nasal Congestion  traMADol 25 milliGRAM(s) Oral every 6 hours PRN Moderate Pain (4 - 6)      VITAL SIGNS:  T(F): 98.3 (09-01-21 @ 23:23), Max: 98.3 (09-01-21 @ 23:23)  HR: 84 (09-02-21 @ 07:40) (83 - 106)  BP: 116/78 (09-02-21 @ 07:40) (96/67 - 119/79)  RR: 25 (09-02-21 @ 07:40) (18 - 25)  SpO2: 93% (09-02-21 @ 07:40) (90% - 98%)    I&O's Summary    01 Sep 2021 07:01  -  02 Sep 2021 07:00  --------------------------------------------------------  IN: 1040 mL / OUT: 570 mL / NET: 470 mL      Daily     Daily     PHYSICAL EXAM:  Gen: Alert, NAD  HEENT: NCAT, conjunctiva clear, sclera anicteric, no erythema or exudates in the oropharynx, mmm  Neck: Supple, no JVD  CV: RRR, S1S2, no m/r/g  Resp: CTAB, normal respiratory effort  Abd: Soft, nontender, nondistended, normal bowel sounds  Ext: no edema, no clubbing or cyanosis  Neuro: AOx3, CN2-12 grossly intact, LEVIN  SKIN: warm, perfused    LABS:                        11.8   17.46 )-----------( 420      ( 01 Sep 2021 07:38 )             39.5     Hgb Trend: 11.8<--, 12.2<--, 10.2<--, 10.6<--, 11.2<--  09-01    137  |  93<L>  |  11  ----------------------------<  70  4.3   |  34<H>  |  0.66    Ca    9.3      01 Sep 2021 07:38  Phos  2.8     09-01  Mg     2.4     09-01    TPro  7.2  /  Alb  2.7<L>  /  TBili  0.2  /  DBili  x   /  AST  22  /  ALT  34  /  AlkPhos  62  09-01    Creatinine Trend: 0.66<--, 0.64<--, 0.77<--, 0.47<--, 0.60<--, 0.63<--  LIVER FUNCTIONS - ( 01 Sep 2021 07:38 )  Alb: 2.7 g/dL / Pro: 7.2 g/dL / ALK PHOS: 62 U/L / ALT: 34 U/L / AST: 22 U/L / GGT: x                     CAPILLARY BLOOD GLUCOSE      POCT Blood Glucose.: 93 mg/dL (01 Sep 2021 22:31)      RADIOLOGY & ADDITIONAL TESTS: Reviewed    Imaging Personally Reviewed:    Consultant(s) Notes Reviewed:      Care Discussed with Consultants/Other Providers:

## 2021-09-02 NOTE — PROGRESS NOTE ADULT - PROBLEM SELECTOR PLAN 4
Incidental note of thrombosis of the left tibial peroneal trunk with diminished flow flow within the left popliteal artery.  - lovenox 90 BID, full AC Incidental note of thrombosis of the left tibial peroneal trunk with diminished flow flow within the left popliteal artery.  - now holding lovenox 90 BID as of 9/1 PM for bloody drainage from chest tube

## 2021-09-02 NOTE — PROGRESS NOTE ADULT - PROBLEM SELECTOR PLAN 1
S/p COVID with complications of PTX and empyema s/p chest tube placed by CT on 8/26  - f/u CT surg recs  - daily AM CXR per CTSx  - C/w HFNC, titrate to maintain SaO2 > 90%  - eventually wean to non-rebreather or venturi  - f/u AM VBG given chronic retention and elevated bicarb (+diamox?)

## 2021-09-02 NOTE — DISCHARGE NOTE PROVIDER - NSDCMRMEDTOKEN_GEN_ALL_CORE_FT
Albuterol (Eqv-ProAir HFA) 90 mcg/inh inhalation aerosol: 2 puff(s) inhaled every 6 hours, As Needed  ivermectin 3 mg oral tablet: 1 tab(s) orally once a day  levoFLOXacin 500 mg oral tablet: 1 tab(s) orally every 24 hours  predniSONE 50 mg oral tablet: 1 tab(s) orally once a day   cholecalciferol oral tablet: 2000 unit(s) orally once a day  Multiple Vitamins oral tablet: 1 tab(s) orally once a day

## 2021-09-02 NOTE — PROGRESS NOTE ADULT - ASSESSMENT
55yr old unvaccinated male with hx of HTN, RLE DVT, and PE (not on home AC) presents with progressively worsening SOB, intermittent fevers, COVID positive, admitted for COVID PNA. Initially admitted on 7/29 to ICU on HF and BiPAP, never intubated. Downgraded to floors on 8/19. 8/26 found to have tension pneumothorax, returned to ICU, R chest tube placed. 8/28 CT scan showed pneumomediastinum, pneumopericardium, bilateral subQ emphysema as well as bilateral opacities and RUL consolidation plus potential cavitary lesion. Gram negative rounds grown in pleural fluid. 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    8/30-31 downgraded to floors on HFNC. Continuing to have R sided pain with cough and inspiration likely 2/2 to COVID pneumonia, tension PTX, effusion, and RUL consolidation/cavitary lesion. At time of examination today, pt was on HF settings 40/40.

## 2021-09-02 NOTE — PROGRESS NOTE ADULT - SUBJECTIVE AND OBJECTIVE BOX
CARDIOLOGY FOLLOW UP - Dr. Terry  Date of Service: 9/2/21  CC: events noted - RRT for hypoxia HiFlow was titrated to 60/40 and then back down to 40/30 by RRT team with O2 sat remaining > 90%    dyspnea unchanged, no cp     Review of Systems:  Constitutional: No fever, weight loss, or fatigue  Respiratory: No cough, wheezing, or hemoptysis, no shortness of breath  Cardiovascular: No chest pain, palpitations, passing out, dizziness, or leg swelling  Gastrointestinal: No abd or epigastric pain.  No nausea, vomiting, or hematemesis; no diarrhea or constipation, no melena or hematochezia  Vascular: no edema       PHYSICAL EXAM:  T(C): 37.2 (09-02-21 @ 11:34), Max: 37.2 (09-02-21 @ 11:34)  HR: 84 (09-02-21 @ 11:34) (83 - 106)  BP: 108/74 (09-02-21 @ 11:34) (108/74 - 119/79)  RR: 20 (09-02-21 @ 11:34) (18 - 25)  SpO2: 95% (09-02-21 @ 11:34) (90% - 98%)  Wt(kg): --  I&O's Summary    01 Sep 2021 07:01  -  02 Sep 2021 07:00  --------------------------------------------------------  IN: 1040 mL / OUT: 570 mL / NET: 470 mL        Appearance: Normal	  Cardiovascular: Normal S1 S2,RRR, No JVD, No murmurs  Respiratory: Lungs clear to auscultation	  Gastrointestinal:  Soft, Non-tender, + BS	  Extremities: Normal range of motion, No clubbing, cyanosis or edema      Home Medications:  Albuterol (Eqv-ProAir HFA) 90 mcg/inh inhalation aerosol: 2 puff(s) inhaled every 6 hours, As Needed (29 Jul 2021 09:08)  ivermectin 3 mg oral tablet: 1 tab(s) orally once a day (29 Jul 2021 09:08)  levoFLOXacin 500 mg oral tablet: 1 tab(s) orally every 24 hours (29 Jul 2021 09:08)  predniSONE 50 mg oral tablet: 1 tab(s) orally once a day (29 Jul 2021 09:08)      MEDICATIONS  (STANDING):  benzocaine 15 mG/menthol 3.6 mG (Sugar-Free) Lozenge 1 Lozenge Oral once  benzonatate 200 milliGRAM(s) Oral every 8 hours  budesonide 160 MICROgram(s)/formoterol 4.5 MICROgram(s) Inhaler 2 Puff(s) Inhalation two times a day  cholecalciferol 2000 Unit(s) Oral daily  clonazePAM  Tablet 0.5 milliGRAM(s) Oral every 8 hours  FIRST- Mouthwash  BLM 5 milliLiter(s) Swish and Spit every 6 hours  lidocaine   4% Patch 1 Patch Transdermal every 24 hours  melatonin 3 milliGRAM(s) Oral at bedtime  multivitamin 1 Tablet(s) Oral daily  pantoprazole  Injectable 40 milliGRAM(s) IV Push daily  piperacillin/tazobactam IVPB.. 3.375 Gram(s) IV Intermittent every 8 hours  polyethylene glycol 3350 17 Gram(s) Oral daily  senna 2 Tablet(s) Oral at bedtime      TELEMETRY: 	    ECG:  	  RADIOLOGY:   < from: Xray Chest 1 View- PORTABLE-Routine (Xray Chest 1 View- PORTABLE-Routine in AM.) (09.02.21 @ 08:28) >  INTERPRETATION:    Heart size and the mediastinum cannot be accurately evaluated on this projection.  Right chest tube not significantly changed in position.  Multiple lucencies projecting over the upper and mid right chest, likely a combination of a known cavitary process and loculated hydropneumothorax, are not significantly changed.  Bilateral subcutaneous emphysema, more extensive on the right, again seen.  No significant interval change in patchy right mid and lower lung opacities.  Mild patchy left lung opacities are not significantly changed.  No acute bony abnormality.    AP portable chest x-ray from September 2, 2021 at 8:27 AM:    CLINICAL INFORMATION: Right chest tube. Pneumonia.    INTERPRETATION:    Rotated.  The right chest tube has been slightly withdrawn. Tip and side-port project over the lateral right base.  Other findings are not significantly changed.              IMPRESSION: Rotation on the images limits evaluation for mediastinal and cardiac shift and assessment of tension.    Right chest tube has been slightly withdrawn almost recent image from September 2, 2021. Tip and side-port project over the lateral right base.    Multiple lucencies projecting over the upper and mid right chest, likely a combination of a known cavitary process and a loculated hydropneumothorax, not significantly changed.    Bilateral subcutaneous emphysema, more extensive on the right, again seen.    Patchy right mid and lower lung opacities and mild patchy left lung opacities, not significantly changed.    < end of copied text >    DIAGNOSTIC TESTING:  [ ] Echocardiogram:  [ ]  Catheterization:  [ ] Stress Test:    OTHER: 	    LABS:	 	                            11.3   13.24 )-----------( 403      ( 02 Sep 2021 07:36 )             36.7     09-02    135  |  94<L>  |  10  ----------------------------<  75  4.5   |  31  |  0.66    Ca    8.6      02 Sep 2021 07:37  Phos  3.3     09-02  Mg     2.2     09-02    TPro  6.8  /  Alb  2.5<L>  /  TBili  0.2  /  DBili  x   /  AST  22  /  ALT  27  /  AlkPhos  57  09-02

## 2021-09-02 NOTE — PROGRESS NOTE ADULT - PROBLEM SELECTOR PLAN 4
2nd to COVID PNA, superimposed bacterial PNA, & now ptx   -S/p RRT 8/3 and 8/4 for hypoxia  -Tx to 5ICU 8/10 for further management, tx to 4M   -S/p RRT x2 8/23 for R sided chest pain, pain appears pleuritic in nature  -CTA chest 8/25 with no clear PE  -Tx back to MICU 8/26, now on 5monti (off COVID isolation)   -Hypoxia slowly improving, seen on HFNC 40L/40% with O2 sats mid 90s. Would like to limit pressure if possible - placed on 6LNC - sats maintaining mid 90s.   -Continue to wean O2 as tolerated, keep sats >90%

## 2021-09-02 NOTE — PROGRESS NOTE ADULT - SUBJECTIVE AND OBJECTIVE BOX
Joseph Casey, PGY1  Pager [phone]    Patient is a 55y old  Male who presents with a chief complaint of COVID PNA (01 Sep 2021 09:01)    SUBJECTIVE/INTERVAL EVENTS: RR called late 9/1 PM after pt desaturated to low 80s. Nurse believes it was because he was moving to lie on his side. Pt asymptomatic. No major changes on CXR. HFNC increased to 60/40 and then titrated back to 40/30 after pt sats returned to mid 90s. CT called to re-evaluate pt's chest tube.    Patient seen and examined at bedside this AM. Pt continues to have intermittent cough with sore throat. Pt states pain has been manageable with medication but is still significant if medication not given frequently. No other complaints.    Pt's HFNC increased to 40/40 in room because satting low 90s.     Off AC since yesterday because of reports chest tube putting out bloodier output. No drainage from last night, chest tube appears to have clot.          MEDICATIONS  (STANDING):  benzocaine 15 mG/menthol 3.6 mG (Sugar-Free) Lozenge 1 Lozenge Oral once  benzonatate 200 milliGRAM(s) Oral every 8 hours  budesonide 160 MICROgram(s)/formoterol 4.5 MICROgram(s) Inhaler 2 Puff(s) Inhalation two times a day  cholecalciferol 2000 Unit(s) Oral daily  clonazePAM  Tablet 0.5 milliGRAM(s) Oral every 8 hours  FIRST- Mouthwash  BLM 5 milliLiter(s) Swish and Spit every 6 hours  lidocaine   4% Patch 1 Patch Transdermal every 24 hours  melatonin 3 milliGRAM(s) Oral at bedtime  multivitamin 1 Tablet(s) Oral daily  pantoprazole  Injectable 40 milliGRAM(s) IV Push daily  polyethylene glycol 3350 17 Gram(s) Oral daily  senna 2 Tablet(s) Oral at bedtime    MEDICATIONS  (PRN):  acetaminophen   Tablet .. 975 milliGRAM(s) Oral every 6 hours PRN Mild Pain (1 - 3)  albuterol/ipratropium for Nebulization 3 milliLiter(s) Nebulizer every 6 hours PRN Shortness of Breath and/or Wheezing  aluminum hydroxide/magnesium hydroxide/simethicone Suspension 30 milliLiter(s) Oral every 4 hours PRN Dyspepsia  benzocaine 15 mG/menthol 3.6 mG (Sugar-Free) Lozenge 1 Lozenge Oral four times a day PRN Sore Throat  cyclobenzaprine 5 milliGRAM(s) Oral three times a day PRN Muscle Spasm  hydrocodone/homatropine Syrup 5 milliLiter(s) Oral every 6 hours PRN worseneing cough  HYDROmorphone  Injectable 1 milliGRAM(s) IV Push every 4 hours PRN Severe Pain (7 - 10)  sodium chloride 0.65% Nasal 1 Spray(s) Both Nostrils every 2 hours PRN Nasal Congestion  traMADol 25 milliGRAM(s) Oral every 6 hours PRN Moderate Pain (4 - 6)      VITAL SIGNS:  T(F): 98.3 (09-01-21 @ 23:23), Max: 98.3 (09-01-21 @ 23:23)  HR: 84 (09-02-21 @ 07:40) (83 - 106)  BP: 116/78 (09-02-21 @ 07:40) (96/67 - 119/79)  RR: 25 (09-02-21 @ 07:40) (18 - 25)  SpO2: 93% (09-02-21 @ 07:40) (90% - 98%)    I&O's Summary    01 Sep 2021 07:01  -  02 Sep 2021 07:00  --------------------------------------------------------  IN: 1040 mL / OUT: 570 mL / NET: 470 mL      Daily     Daily     PHYSICAL EXAM:  Gen: Alert, NAD  HEENT: NCAT, conjunctiva clear  Neck: soft, no external lesions  CV: RRR, S1S2,  Resp: shallow breaths but less so after pain medication given, more clear on left, right breath sounds decreased  Abd: Soft, nontender, nondistended  Ext: no edema, no clubbing or cyanosis  Neuro: AOx3, CN2-12 grossly intact, LEVIN  SKIN: warm, perfused    LABS:                        11.3   13.24 )-----------( 403      ( 02 Sep 2021 07:36 )             36.7     Hgb Trend: 11.3<--, 11.8<--, 12.2<--, 10.2<--, 10.6<--  09-02    135  |  94<L>  |  10  ----------------------------<  75  4.5   |  31  |  0.66    Ca    8.6      02 Sep 2021 07:37  Phos  3.3     09-02  Mg     2.2     09-02    TPro  6.8  /  Alb  2.5<L>  /  TBili  0.2  /  DBili  x   /  AST  22  /  ALT  27  /  AlkPhos  57  09-02    Creatinine Trend: 0.66<--, 0.66<--, 0.64<--, 0.77<--, 0.47<--, 0.60<--  LIVER FUNCTIONS - ( 02 Sep 2021 07:37 )  Alb: 2.5 g/dL / Pro: 6.8 g/dL / ALK PHOS: 57 U/L / ALT: 27 U/L / AST: 22 U/L / GGT: x                     CAPILLARY BLOOD GLUCOSE      POCT Blood Glucose.: 93 mg/dL (01 Sep 2021 22:31)      RADIOLOGY & ADDITIONAL TESTS: Reviewed    Imaging Personally Reviewed:    Consultant(s) Notes Reviewed:      Care Discussed with Consultants/Other Providers:

## 2021-09-02 NOTE — DISCHARGE NOTE PROVIDER - CARE PROVIDER_API CALL
Chuck Pavon)  Internal Medicine; Pulmonary Disease  [address] [facility], [address]  [place] [address]  Phone: [phone]  Fax: [phone]  Established Patient  Follow Up Time: 1 week    DEQUAN VARGAS  Internal Medicine  [address] [place] [address]  Phone: [phone]  Fax: [phone]  Established Patient  Follow Up Time: 1 week

## 2021-09-02 NOTE — PROGRESS NOTE ADULT - PROBLEM SELECTOR PLAN 3
CXR 8/26 with R ptx   -S/p R chest tube placement by thoracic 8/26  -+air leak with areas of subcutaneous emphysema  -Large clot removed from tube 9/2, air leak seems to be improving  -Chest tube to LWS, management per thoracic surgery team   -Daily CXR

## 2021-09-02 NOTE — DISCHARGE NOTE PROVIDER - NSFOLLOWUPCLINICS_GEN_ALL_ED_FT
[facility]  Pulmonology  [address] [place] [address], [place] [address]  Phone: [phone]  Fax:   Follow Up Time: 2 weeks

## 2021-09-02 NOTE — PROGRESS NOTE ADULT - SUBJECTIVE AND OBJECTIVE BOX
Follow-up Pulm Progress Note    RRT overnight for hypoxia   Large clot evacuated from R chest tube by thoracic this AM   Transitioned to 6LNC - sats maintaining 94-95%   Dyspnea unchanged  Denies CP     Medications:  MEDICATIONS  (STANDING):  benzocaine 15 mG/menthol 3.6 mG (Sugar-Free) Lozenge 1 Lozenge Oral once  benzonatate 200 milliGRAM(s) Oral every 8 hours  budesonide 160 MICROgram(s)/formoterol 4.5 MICROgram(s) Inhaler 2 Puff(s) Inhalation two times a day  cholecalciferol 2000 Unit(s) Oral daily  clonazePAM  Tablet 0.5 milliGRAM(s) Oral every 8 hours  FIRST- Mouthwash  BLM 5 milliLiter(s) Swish and Spit every 6 hours  lidocaine   4% Patch 1 Patch Transdermal every 24 hours  melatonin 3 milliGRAM(s) Oral at bedtime  multivitamin 1 Tablet(s) Oral daily  pantoprazole  Injectable 40 milliGRAM(s) IV Push daily  piperacillin/tazobactam IVPB.. 3.375 Gram(s) IV Intermittent every 8 hours  polyethylene glycol 3350 17 Gram(s) Oral daily  senna 2 Tablet(s) Oral at bedtime    MEDICATIONS  (PRN):  acetaminophen   Tablet .. 975 milliGRAM(s) Oral every 6 hours PRN Mild Pain (1 - 3)  albuterol/ipratropium for Nebulization 3 milliLiter(s) Nebulizer every 6 hours PRN Shortness of Breath and/or Wheezing  aluminum hydroxide/magnesium hydroxide/simethicone Suspension 30 milliLiter(s) Oral every 4 hours PRN Dyspepsia  benzocaine 15 mG/menthol 3.6 mG (Sugar-Free) Lozenge 1 Lozenge Oral four times a day PRN Sore Throat  cyclobenzaprine 5 milliGRAM(s) Oral three times a day PRN Muscle Spasm  hydrocodone/homatropine Syrup 5 milliLiter(s) Oral every 6 hours PRN worseneing cough  HYDROmorphone  Injectable 1 milliGRAM(s) IV Push every 4 hours PRN Severe Pain (7 - 10)  sodium chloride 0.65% Nasal 1 Spray(s) Both Nostrils every 2 hours PRN Nasal Congestion  traMADol 25 milliGRAM(s) Oral every 6 hours PRN Moderate Pain (4 - 6)          Vital Signs Last 24 Hrs  T(C): 37.2 (02 Sep 2021 11:34), Max: 37.2 (02 Sep 2021 11:34)  T(F): 99 (02 Sep 2021 11:34), Max: 99 (02 Sep 2021 11:34)  HR: 88 (02 Sep 2021 12:01) (83 - 106)  BP: 108/74 (02 Sep 2021 11:34) (108/74 - 119/79)  BP(mean): --  RR: 22 (02 Sep 2021 12:01) (18 - 25)  SpO2: 95% (02 Sep 2021 12:01) (90% - 98%)      VBG pH 7.43 09-02 @ 07:24    VBG pCO2 64 09-02 @ 07:24    VBG O2 sat 99.4 09-02 @ 07:24    VBG lactate 1.1 09-02 @ 07:24      09-01 @ 07:01  -  09-02 @ 07:00  --------------------------------------------------------  IN: 1040 mL / OUT: 570 mL / NET: 470 mL          LABS:                        11.3   13.24 )-----------( 403      ( 02 Sep 2021 07:36 )             36.7     09-02    135  |  94<L>  |  10  ----------------------------<  75  4.5   |  31  |  0.66    Ca    8.6      02 Sep 2021 07:37  Phos  3.3     09-02  Mg     2.2     09-02    TPro  6.8  /  Alb  2.5<L>  /  TBili  0.2  /  DBili  x   /  AST  22  /  ALT  27  /  AlkPhos  57  09-02          CAPILLARY BLOOD GLUCOSE      POCT Blood Glucose.: 89 mg/dL (02 Sep 2021 08:44)              Fluid characteristics  -- 08-26 @ 20:39  pH > 7.92  LDH 1640  tprot 4.7    Cell count  Appearance Cloudy  Fluid type --  BF lymph 24  color Orange  eosinophil --  PMN 67  Mesothelial --  Monocyte 9  Other body cells --      CULTURES:     Culture - Blood (collected 08-26-21 @ 00:39)  Source: .Blood Blood  Final Report (08-31-21 @ 01:01):    No Growth Final    Culture - Blood (collected 08-25-21 @ 22:56)  Source: .Blood Blood  Final Report (08-30-21 @ 23:01):    No Growth Final                Culture - Body Fluid with Gram Stain (collected 08-26-21 @ 23:15)  Source: .Body Fluid Pleural Fluid  Gram Stain (08-27-21 @ 03:31):    polymorphonuclear leukocytes    Gram Negative Rods    by cytocentrifuge  Final Report (09-01-21 @ 16:57):    No growth at 6 days.    Organism seen in Gram stain is non-viable after prolonged    incubation and repeated subculture.            Culture - Fungal, Body Fluid (collected 08-26-21 @ 23:15)  Source: .Body Fluid Pleural Fluid  Preliminary Report (08-27-21 @ 06:40):    Testing in progress                  Physical Examination:  PULM: coarse bilaterally   CVS: S1, S2 heard    RADIOLOGY REVIEWED  CXR: b/l opacities  R hydro ptx

## 2021-09-02 NOTE — PROGRESS NOTE ADULT - SUBJECTIVE AND OBJECTIVE BOX
subjective ' I am ok this morning    Vital Signs Last 24 Hrs  T(C): 36.8 (09-01-21 @ 23:23), Max: 36.8 (09-01-21 @ 23:23)  T(F): 98.3 (09-01-21 @ 23:23), Max: 98.3 (09-01-21 @ 23:23)  HR: 85 (09-02-21 @ 09:02) (83 - 106)  BP: 116/78 (09-02-21 @ 07:40) (114/75 - 119/79)  RR: 24 (09-02-21 @ 09:02) (18 - 25)  SpO2: 94% (09-02-21 @ 09:02) (90% - 98%)           09-01 @ 07:01  -  09-02 @ 07:00  --------------------------------------------------------  IN: 1040 mL / OUT: 570 mL / NET: 470 mL                          11.3   13.24 )-----------( 403      ( 02 Sep 2021 07:36 )             36.7     09-02    135  |  94<L>  |  10  ----------------------------<  75  4.5   |  31  |  0.66    Ca    8.6      02 Sep 2021 07:37  Phos  3.3     09-02  Mg     2.2     09-02    TPro  6.8  /  Alb  2.5<L>  /  TBili  0.2  /  DBili  x   /  AST  22  /  ALT  27  /  AlkPhos  57  09-02      < from: Xray Chest 1 View- PORTABLE-Routine (Xray Chest 1 View- PORTABLE-Routine in AM.) (09.02.21 @ 08:28) >    Heart size and the mediastinum cannot be accurately evaluated on this projection.  Right chest tube not significantly changed in position.  Multiple lucencies projecting over the upper and mid right chest, likely a combination of a known cavitary process and loculated hydropneumothorax, are not significantly changed.  Bilateral subcutaneous emphysema, more extensive on the right, again seen.  No significant interval change in patchy right mid and lower lung opacities.  Mild patchy left lung opacities are not significantly changed.  No acute bony abnormality.    AP portable chest x-ray from September 2, 2021 at 8:27 AM:    CLINICAL INFORMATION: Right chest tube. Pneumonia.    INTERPRETATION:    Rotated.  The right chest tube has been slightly withdrawn. Tip and side-port project over the lateral right base.  Other findings are not significantly changed.    < end of copied text >    MEDICATIONS  (STANDING):  benzocaine 15 mG/menthol 3.6 mG (Sugar-Free) Lozenge 1 Lozenge Oral once  benzonatate 200 milliGRAM(s) Oral every 8 hours  budesonide 160 MICROgram(s)/formoterol 4.5 MICROgram(s) Inhaler 2 Puff(s) Inhalation two times a day  cholecalciferol 2000 Unit(s) Oral daily  clonazePAM  Tablet 0.5 milliGRAM(s) Oral every 8 hours  FIRST- Mouthwash  BLM 5 milliLiter(s) Swish and Spit every 6 hours  lidocaine   4% Patch 1 Patch Transdermal every 24 hours  melatonin 3 milliGRAM(s) Oral at bedtime  multivitamin 1 Tablet(s) Oral daily  pantoprazole  Injectable 40 milliGRAM(s) IV Push daily  piperacillin/tazobactam IVPB.. 3.375 Gram(s) IV Intermittent every 8 hours  polyethylene glycol 3350 17 Gram(s) Oral daily  senna 2 Tablet(s) Oral at bedtime    MEDICATIONS  (PRN):  acetaminophen   Tablet .. 975 milliGRAM(s) Oral every 6 hours PRN Mild Pain (1 - 3)  albuterol/ipratropium for Nebulization 3 milliLiter(s) Nebulizer every 6 hours PRN Shortness of Breath and/or Wheezing  aluminum hydroxide/magnesium hydroxide/simethicone Suspension 30 milliLiter(s) Oral every 4 hours PRN Dyspepsia  benzocaine 15 mG/menthol 3.6 mG (Sugar-Free) Lozenge 1 Lozenge Oral four times a day PRN Sore Throat  cyclobenzaprine 5 milliGRAM(s) Oral three times a day PRN Muscle Spasm  hydrocodone/homatropine Syrup 5 milliLiter(s) Oral every 6 hours PRN worseneing cough  HYDROmorphone  Injectable 1 milliGRAM(s) IV Push every 4 hours PRN Severe Pain (7 - 10)  sodium chloride 0.65% Nasal 1 Spray(s) Both Nostrils every 2 hours PRN Nasal Congestion  traMADol 25 milliGRAM(s) Oral every 6 hours PRN Moderate Pain (4 - 6)        POCT Blood Glucose.: 89 mg/dL (02 Sep 2021 08:44)  POCT Blood Glucose.: 93 mg/dL (01 Sep 2021 22:31)          Drains:     MS         [  ] Drainage:                 L Pleural  [  ]  Drainage:                R Pleural  [ x]  Drainage:/                  PHYSICAL EXAM  Neurology: alert and oriented x 3, nonfocal, no gross deficits    CV :S1S2  Lungs: B/l rhochrus breath sounds on hi vivian   Rt Pleura CT - dry suction      Abdomen: soft, nontender, nondistended, positive bowel sounds,    : ambrose              Extremities:  equal strength throughout                                            Discussed with Cardiothoracic Team at AM rounds.

## 2021-09-02 NOTE — PROGRESS NOTE ADULT - PROBLEM SELECTOR PLAN 1
8/26 Right open chest tube placed at bedside for pneumothorax  8/28 CT Chest reviewed by Dr. Staton, no thoracic surgery intervention indicated, recommend continuing chest tube to LWS.  9/2 clot in tube evacuated  maitanin suction  Maintain right chest tube to dry suction -40mmHg  Monitor chest tube for air leak  Daily CXR while chest tube in place  Continue care as per primary team

## 2021-09-02 NOTE — PROGRESS NOTE ADULT - PROBLEM SELECTOR PLAN 2
CTA chest 8/24 with RUL consolidation/cavitation suggestive of superimposed bacterial PNA in addition to COVID PNA  -C/w Zosyn (started 8/25)  -GNR in gram stain from pleural fluid. Suggest at least 2 week course of abx. Zosyn re-instated for another 7 days.   -ID f/u

## 2021-09-02 NOTE — DISCHARGE NOTE PROVIDER - HOSPITAL COURSE
55M unvaccinated with hx of HTN, RLE DVT, and PE (not on home AC) initially presented to ED on 7/29 with worsening SOB, intermittent fevers 2/2 to COVID pneumonia.  Pt initially admitted to ICU on HFNC and BiPAP, never intubated. Later downgraded to floors on 8/19. On 8/26, patient was found to have tension pneumothorax on the right side and returned to the ICU. Cardiothoracic team placed a R chest tube. A CT scan taken on 8/28 showed pneumomediastinum/pneumopericardium/bilateral subQ emphysema, bilateral opacities, pleural effusion, and RUL consolidation/ potential cavitary lesion. Pleural fluid grew gram negative rods. Per ID recommendations, patietn was started on Zosyn.  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    On 8/30, pt was downgraded to floors on HFNC. He had significant R sided chest pain, pleuritic and worsened with cough.  Pt experienced episodes of desaturation to mid-low 80s at times, such as when coughing and re-positioning self. In one such case, a   rapid response was called on 9/1 PM, though pt was asymptomatic. Pt gradually weaned off HFNC. 55M unvaccinated with hx of HTN, RLE DVT, and PE (not on home AC) initially presented to ED on 7/29 with worsening SOB, intermittent fevers 2/2 to COVID pneumonia.  Pt initially admitted to ICU on HFNC and BiPAP, never intubated. Later downgraded to floors on 8/19. On 8/26, patient was found to have tension pneumothorax on the right side and returned to the ICU. Cardiothoracic team placed a R chest tube. A CT scan taken on 8/28 showed pneumomediastinum/pneumopericardium/bilateral subQ emphysema, bilateral opacities, pleural effusion, and RUL consolidation/ potential cavitary lesion. Pleural fluid grew gram negative rods. Per ID recommendations, patietn was started on Zosyn.  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    On 8/30, pt was downgraded to floors on HFNC. He had significant R sided chest pain, pleuritic and worsened with cough.  Pt experienced episodes of desaturation to mid-low 80s at times, such as when coughing and re-positioning self. In one such case, a   rapid response was called on 9/1 PM, though pt was asymptomatic. Pt gradually weaned off HFNC.      -CXR 8/9 with b/l lung opacities, CXR 8/16 grossly unchanged   -CXR 8/23 with worsening b/l opacties R>L, ?effusion vs mucus plugging/collapse, also with opacities 2nd to COVID   -S/p Remdesivir x 5 days   -S/p Decadron 6mg IVP qd x 10 days (completed 8/10)  -S/p Tocilizumab 7/30 per ID for worsening hypoxic respiratory failure  -Sputum culture 8/4 with normal respiratory aba.   -CTA 8/24 with no clear PE, but ddimer remains elevated. LE duplex 8/25 neg DVT, but with incidental note of thrombosis of the L tibial peroneal trunk with diminished flow within the left popliteal artery. Full dose AC being held 2nd to bloody output from R chest tube. Okay to resume ppx dose AC per thoracic.   55M unvaccinated with hx of HTN, RLE DVT, and PE (not on home AC) initially presented to ED on 7/29 with worsening SOB, intermittent fevers 2/2 to COVID pneumonia.  Pt initially admitted to ICU on HFNC and BiPAP, never intubated. Later downgraded to floors on 8/19. On 8/26, patient was found to have tension pneumothorax on the right side and returned to the ICU. Cardiothoracic team placed a R chest tube. A CT scan taken on 8/28 showed pneumomediastinum/pneumopericardium/bilateral subQ emphysema, bilateral opacities, pleural effusion, and RUL consolidation/ potential cavitary lesion. Pleural fluid grew gram negative rods. Per ID recommendations, patient was started on Zosyn.  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    On 8/30, pt was downgraded to floors on HFNC. He had significant R sided chest pain, pleuritic and worsened with cough. Pt experienced episodes of desaturation to mid-low 80s at times, such as when coughing and re-positioning self. In one such case, a rapid response was called on 9/1 PM, though pt was asymptomatic. Pt gradually weaned off HFNC to NC.  CT on 9/4 showed increased loculated R PTX and new chest tube was inserted.    -CXR 8/9 with b/l lung opacities, CXR 8/16 grossly unchanged   -CXR 8/23 with worsening b/l opacties R>L, ?effusion vs mucus plugging/collapse, also with opacities 2nd to COVID   -S/p Remdesivir x 5 days   -S/p Decadron 6mg IVP qd x 10 days (completed 8/10)  -S/p Tocilizumab 7/30 per ID for worsening hypoxic respiratory failure  -Sputum culture 8/4 with normal respiratory aba.   -CTA 8/24 with no clear PE, but ddimer remains elevated. LE duplex 8/25 neg DVT, but with incidental note of thrombosis of the L tibial peroneal trunk with diminished flow within the left popliteal artery. Full dose AC being held 2nd to bloody output from R chest tube. Okay to resume ppx dose AC per thoracic.  -9/4 CT chest notable for increased moderate loculated R PTX not communicating with existing chest tube.  -9/5 new chest tube inserted to right anterior apical area      Pt will follow-up with XXX 55M unvaccinated with hx of HTN, RLE DVT, and PE (not on home AC) initially presented to ED on 7/29 with worsening SOB, intermittent fevers 2/2 to COVID pneumonia.  Pt initially admitted to ICU on HFNC and BiPAP, never intubated. Later downgraded to floors on 8/19. On 8/26, patient was found to have tension pneumothorax on the right side and returned to the ICU. Cardiothoracic team placed a R chest tube. A CT scan taken on 8/28 showed pneumomediastinum/pneumopericardium/bilateral subQ emphysema, bilateral opacities, pleural effusion, and RUL consolidation/ potential cavitary lesion. Pleural fluid grew gram negative rods. Per ID recommendations, patient was started on Zosyn.  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    On 8/30, pt was downgraded to floors on HFNC. He had significant R sided chest pain, pleuritic and worsened with cough. Pt experienced episodes of desaturation to mid-low 80s at times, such as when coughing and re-positioning self. In one such case, a rapid response was called on 9/1 PM, though pt was asymptomatic. Pt gradually weaned off HFNC to NC.  CT on 9/4-5 showed increased loculated R PTX and new chest tube was inserted. Repeat X-rays showed no new pneumothorax. Chest tube removed on 9/10. Pt's platelets downtrended. Heparin antibody was negative as was b/l DVT study. Overtime patient was returned to lovenox for DVT ppx and then full AC with xarelto.    Pt will be discharged home with home PT. He will receive a rolling walker as well as O2 at *****L.  Pt will follow-up with his PCP within 1 week.  Pt will follow-up with pulmonologist within 2 weeks.  He will continue taking Xarelto (started 9/12) for DVT at home with dosage change after 21 days. Follow-up with PCP for discontinuation date. 55M unvaccinated with hx of HTN, RLE DVT, and PE (not on home AC) initially presented to ED on 7/29 with worsening SOB, intermittent fevers 2/2 to COVID pneumonia.  Pt initially admitted to ICU on HFNC and BiPAP, never intubated. Later downgraded to floors on 8/19. On 8/26, patient was found to have tension pneumothorax on the right side and returned to the ICU. Cardiothoracic team placed a R chest tube. A CT scan taken on 8/28 showed pneumomediastinum/pneumopericardium/bilateral subQ emphysema, bilateral opacities, pleural effusion, and RUL consolidation/ potential cavitary lesion. Pleural fluid grew gram negative rods. Per ID recommendations, patient was started on Zosyn.  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    On 8/30, pt was downgraded to floors on HFNC. He had significant R sided chest pain, pleuritic and worsened with cough. Pt experienced episodes of desaturation to mid-low 80s at times, such as when coughing and re-positioning self. In one such case, a rapid response was called on 9/1 PM, though pt was asymptomatic. Pt gradually weaned off HFNC to NC.  CT on 9/4-5 showed increased loculated R PTX and new chest tube was inserted. Repeat X-rays showed no new pneumothorax. Chest tube removed on 9/10. Pt's platelets downtrended. Heparin antibody was negative as was b/l DVT study. Overtime patient was returned to lovenox for DVT ppx and then full AC with xarelto.    Pt will be discharged home with home PT. He will receive a rolling walker as well as O2 at 1L at rest, 4L with ambulation due to persistent hypoxia 2/2 COVID19 complications.   Pt will follow-up with his PCP within 1 week.  Pt will follow-up with pulmonologist within 2 weeks.  He will continue taking Xarelto (started 9/12) for acute thrombus seen at left tibial peroneal trunk. Follow-up with PCP/vascular for discontinuation date.

## 2021-09-02 NOTE — PROGRESS NOTE ADULT - PROBLEM SELECTOR PLAN 1
S/p COVID with complications of PTX and empyema s/p chest tube placed by CT on 8/26  - f/u CT surg recs  - daily AM CXR per CTSx  - C/w HFNC, 40L@40% FiO2 titrate to maintain SaO2 > 90%  - eventually wean to non-rebreather or venturi  - f/u AM VBG given chronic retention and elevated bicarb (+diamox?)

## 2021-09-02 NOTE — PROGRESS NOTE ADULT - ASSESSMENT
56yo M with h/o appendectomy admitted to the hospital with COVID and possible superimposed pneumonia found to have a right moderate-sized pneumothorax with leftward shift and small pleural effusion.     8/26 Right open chest tube placed at bedside, Maintain to wall suction -40mmHg  8/27 Remains on hi flow- increased subq emphysema, repeat CXR w/o signs of increasing PTX, chest tube +airleak, functioning, Maintain to dry suction -40mmHg  8/28 VSS, CXR with subcutaneous emphysema and persistent right ptx, maintain right chest tube to suction.   8/29 VSS, pt still with air leak on chest tube. s/p non-con Chest CT: Trace right pleural effusion, unchanged. Trace right pneumothorax new from prior. Pneumomediastinum, pneumopericardium and bilateral subcutaneous emphysema new from prior.  8/30 +Right chest tube to -40 dry suction, +airleak, pt tolerating high flow nasal cannula. Persistent SubQ emphysema without ventilatory compromise. Cont maintain right chest tube ti -40 mmHg dry suction.   8/31 Cont maintain right chest tube ti -40 mmHg dry suction.   9/1 chest tube with bloody clot output- Hold [id number] to prevent further bleed  9/2  seen and examined in company of Dr Tejada chest tube clot removed under sterile condition tube patent daily xray

## 2021-09-03 LAB
ALBUMIN SERPL ELPH-MCNC: 2.8 G/DL — LOW (ref 3.3–5)
ALP SERPL-CCNC: 59 U/L — SIGNIFICANT CHANGE UP (ref 40–120)
ALT FLD-CCNC: 26 U/L — SIGNIFICANT CHANGE UP (ref 10–45)
ANION GAP SERPL CALC-SCNC: 8 MMOL/L — SIGNIFICANT CHANGE UP (ref 5–17)
AST SERPL-CCNC: 16 U/L — SIGNIFICANT CHANGE UP (ref 10–40)
BILIRUB SERPL-MCNC: 0.2 MG/DL — SIGNIFICANT CHANGE UP (ref 0.2–1.2)
BUN SERPL-MCNC: 8 MG/DL — SIGNIFICANT CHANGE UP (ref 7–23)
CALCIUM SERPL-MCNC: 9.2 MG/DL — SIGNIFICANT CHANGE UP (ref 8.4–10.5)
CHLORIDE SERPL-SCNC: 93 MMOL/L — LOW (ref 96–108)
CO2 SERPL-SCNC: 36 MMOL/L — HIGH (ref 22–31)
CREAT SERPL-MCNC: 0.65 MG/DL — SIGNIFICANT CHANGE UP (ref 0.5–1.3)
GLUCOSE SERPL-MCNC: 93 MG/DL — SIGNIFICANT CHANGE UP (ref 70–99)
HCT VFR BLD CALC: 35.3 % — LOW (ref 39–50)
HGB BLD-MCNC: 10.7 G/DL — LOW (ref 13–17)
MAGNESIUM SERPL-MCNC: 2.3 MG/DL — SIGNIFICANT CHANGE UP (ref 1.6–2.6)
MCHC RBC-ENTMCNC: 27.4 PG — SIGNIFICANT CHANGE UP (ref 27–34)
MCHC RBC-ENTMCNC: 30.3 GM/DL — LOW (ref 32–36)
MCV RBC AUTO: 90.5 FL — SIGNIFICANT CHANGE UP (ref 80–100)
NRBC # BLD: 0 /100 WBCS — SIGNIFICANT CHANGE UP (ref 0–0)
PHOSPHATE SERPL-MCNC: 3.1 MG/DL — SIGNIFICANT CHANGE UP (ref 2.5–4.5)
PLATELET # BLD AUTO: 412 K/UL — HIGH (ref 150–400)
POTASSIUM SERPL-MCNC: 3.7 MMOL/L — SIGNIFICANT CHANGE UP (ref 3.5–5.3)
POTASSIUM SERPL-SCNC: 3.7 MMOL/L — SIGNIFICANT CHANGE UP (ref 3.5–5.3)
PROT SERPL-MCNC: 7 G/DL — SIGNIFICANT CHANGE UP (ref 6–8.3)
RBC # BLD: 3.9 M/UL — LOW (ref 4.2–5.8)
RBC # FLD: 12.8 % — SIGNIFICANT CHANGE UP (ref 10.3–14.5)
SODIUM SERPL-SCNC: 137 MMOL/L — SIGNIFICANT CHANGE UP (ref 135–145)
WBC # BLD: 12.63 K/UL — HIGH (ref 3.8–10.5)
WBC # FLD AUTO: 12.63 K/UL — HIGH (ref 3.8–10.5)

## 2021-09-03 PROCEDURE — 99232 SBSQ HOSP IP/OBS MODERATE 35: CPT | Mod: 57

## 2021-09-03 PROCEDURE — 71045 X-RAY EXAM CHEST 1 VIEW: CPT | Mod: 26

## 2021-09-03 PROCEDURE — 99232 SBSQ HOSP IP/OBS MODERATE 35: CPT

## 2021-09-03 PROCEDURE — 71045 X-RAY EXAM CHEST 1 VIEW: CPT | Mod: 26,77

## 2021-09-03 RX ORDER — ENOXAPARIN SODIUM 100 MG/ML
40 INJECTION SUBCUTANEOUS DAILY
Refills: 0 | Status: DISCONTINUED | OUTPATIENT
Start: 2021-09-03 | End: 2021-09-04

## 2021-09-03 RX ADMIN — DIPHENHYDRAMINE HYDROCHLORIDE AND LIDOCAINE HYDROCHLORIDE AND ALUMINUM HYDROXIDE AND MAGNESIUM HYDRO 5 MILLILITER(S): KIT at 05:33

## 2021-09-03 RX ADMIN — LIDOCAINE 1 PATCH: 4 CREAM TOPICAL at 00:08

## 2021-09-03 RX ADMIN — Medication 200 MILLIGRAM(S): at 13:39

## 2021-09-03 RX ADMIN — Medication 200 MILLIGRAM(S): at 05:34

## 2021-09-03 RX ADMIN — LIDOCAINE 1 PATCH: 4 CREAM TOPICAL at 11:54

## 2021-09-03 RX ADMIN — BUDESONIDE AND FORMOTEROL FUMARATE DIHYDRATE 2 PUFF(S): 160; 4.5 AEROSOL RESPIRATORY (INHALATION) at 06:15

## 2021-09-03 RX ADMIN — HYDROMORPHONE HYDROCHLORIDE 1 MILLIGRAM(S): 2 INJECTION INTRAMUSCULAR; INTRAVENOUS; SUBCUTANEOUS at 16:34

## 2021-09-03 RX ADMIN — HYDROMORPHONE HYDROCHLORIDE 1 MILLIGRAM(S): 2 INJECTION INTRAMUSCULAR; INTRAVENOUS; SUBCUTANEOUS at 21:59

## 2021-09-03 RX ADMIN — HYDROMORPHONE HYDROCHLORIDE 1 MILLIGRAM(S): 2 INJECTION INTRAMUSCULAR; INTRAVENOUS; SUBCUTANEOUS at 06:29

## 2021-09-03 RX ADMIN — PIPERACILLIN AND TAZOBACTAM 25 GRAM(S): 4; .5 INJECTION, POWDER, LYOPHILIZED, FOR SOLUTION INTRAVENOUS at 05:33

## 2021-09-03 RX ADMIN — PIPERACILLIN AND TAZOBACTAM 25 GRAM(S): 4; .5 INJECTION, POWDER, LYOPHILIZED, FOR SOLUTION INTRAVENOUS at 13:39

## 2021-09-03 RX ADMIN — Medication 0.5 MILLIGRAM(S): at 13:39

## 2021-09-03 RX ADMIN — TRAMADOL HYDROCHLORIDE 25 MILLIGRAM(S): 50 TABLET ORAL at 03:26

## 2021-09-03 RX ADMIN — Medication 2000 UNIT(S): at 11:54

## 2021-09-03 RX ADMIN — Medication 3 MILLIGRAM(S): at 21:47

## 2021-09-03 RX ADMIN — DIPHENHYDRAMINE HYDROCHLORIDE AND LIDOCAINE HYDROCHLORIDE AND ALUMINUM HYDROXIDE AND MAGNESIUM HYDRO 5 MILLILITER(S): KIT at 18:08

## 2021-09-03 RX ADMIN — PANTOPRAZOLE SODIUM 40 MILLIGRAM(S): 20 TABLET, DELAYED RELEASE ORAL at 11:53

## 2021-09-03 RX ADMIN — HYDROMORPHONE HYDROCHLORIDE 1 MILLIGRAM(S): 2 INJECTION INTRAMUSCULAR; INTRAVENOUS; SUBCUTANEOUS at 11:45

## 2021-09-03 RX ADMIN — SENNA PLUS 2 TABLET(S): 8.6 TABLET ORAL at 21:56

## 2021-09-03 RX ADMIN — BENZOCAINE AND MENTHOL 1 LOZENGE: 5; 1 LIQUID ORAL at 22:08

## 2021-09-03 RX ADMIN — Medication 1 TABLET(S): at 11:54

## 2021-09-03 RX ADMIN — HYDROMORPHONE HYDROCHLORIDE 1 MILLIGRAM(S): 2 INJECTION INTRAMUSCULAR; INTRAVENOUS; SUBCUTANEOUS at 22:20

## 2021-09-03 RX ADMIN — Medication 200 MILLIGRAM(S): at 21:48

## 2021-09-03 RX ADMIN — LIDOCAINE 1 PATCH: 4 CREAM TOPICAL at 19:23

## 2021-09-03 RX ADMIN — PIPERACILLIN AND TAZOBACTAM 25 GRAM(S): 4; .5 INJECTION, POWDER, LYOPHILIZED, FOR SOLUTION INTRAVENOUS at 21:45

## 2021-09-03 RX ADMIN — HYDROMORPHONE HYDROCHLORIDE 1 MILLIGRAM(S): 2 INJECTION INTRAMUSCULAR; INTRAVENOUS; SUBCUTANEOUS at 05:44

## 2021-09-03 RX ADMIN — ENOXAPARIN SODIUM 40 MILLIGRAM(S): 100 INJECTION SUBCUTANEOUS at 21:46

## 2021-09-03 RX ADMIN — DIPHENHYDRAMINE HYDROCHLORIDE AND LIDOCAINE HYDROCHLORIDE AND ALUMINUM HYDROXIDE AND MAGNESIUM HYDRO 5 MILLILITER(S): KIT at 11:54

## 2021-09-03 RX ADMIN — LIDOCAINE 1 PATCH: 4 CREAM TOPICAL at 23:24

## 2021-09-03 RX ADMIN — Medication 0.5 MILLIGRAM(S): at 05:35

## 2021-09-03 RX ADMIN — HYDROMORPHONE HYDROCHLORIDE 1 MILLIGRAM(S): 2 INJECTION INTRAMUSCULAR; INTRAVENOUS; SUBCUTANEOUS at 17:00

## 2021-09-03 RX ADMIN — Medication 0.5 MILLIGRAM(S): at 21:47

## 2021-09-03 RX ADMIN — BENZOCAINE AND MENTHOL 1 LOZENGE: 5; 1 LIQUID ORAL at 05:33

## 2021-09-03 RX ADMIN — TRAMADOL HYDROCHLORIDE 25 MILLIGRAM(S): 50 TABLET ORAL at 04:25

## 2021-09-03 RX ADMIN — HYDROMORPHONE HYDROCHLORIDE 1 MILLIGRAM(S): 2 INJECTION INTRAMUSCULAR; INTRAVENOUS; SUBCUTANEOUS at 12:00

## 2021-09-03 RX ADMIN — DIPHENHYDRAMINE HYDROCHLORIDE AND LIDOCAINE HYDROCHLORIDE AND ALUMINUM HYDROXIDE AND MAGNESIUM HYDRO 5 MILLILITER(S): KIT at 00:09

## 2021-09-03 NOTE — PROGRESS NOTE ADULT - SUBJECTIVE AND OBJECTIVE BOX
CARDIOLOGY FOLLOW UP - Dr. Terry  Date of Service: 9/3/21  CC: dyspnea improving, weaned off HFNC on 6L, no cp     Review of Systems:  Constitutional: No fever, weight loss, or fatigue  Respiratory: No cough, wheezing, or hemoptysis, no shortness of breath  Cardiovascular: No chest pain, palpitations, passing out, dizziness, or leg swelling  Gastrointestinal: No abd or epigastric pain.  No nausea, vomiting, or hematemesis; no diarrhea or constipation, no melena or hematochezia  Vascular: no edema       PHYSICAL EXAM:  T(C): 36.9 (09-03-21 @ 10:17), Max: 37.2 (09-02-21 @ 11:34)  HR: 111 (09-03-21 @ 10:17) (82 - 111)  BP: 107/72 (09-03-21 @ 10:17) (107/72 - 112/72)  RR: 19 (09-03-21 @ 10:17) (18 - 22)  SpO2: 95% (09-03-21 @ 10:17) (93% - 96%)  Wt(kg): --  I&O's Summary    02 Sep 2021 07:01  -  03 Sep 2021 07:00  --------------------------------------------------------  IN: 1100 mL / OUT: 700 mL / NET: 400 mL        Appearance: Normal	  Cardiovascular: Normal S1 S2,RRR, No JVD, No murmurs  Respiratory: diminished   Gastrointestinal:  Soft, Non-tender, + BS	  Extremities: Normal range of motion, No clubbing, cyanosis or edema      Home Medications:  Albuterol (Eqv-ProAir HFA) 90 mcg/inh inhalation aerosol: 2 puff(s) inhaled every 6 hours, As Needed (29 Jul 2021 09:08)  ivermectin 3 mg oral tablet: 1 tab(s) orally once a day (29 Jul 2021 09:08)  levoFLOXacin 500 mg oral tablet: 1 tab(s) orally every 24 hours (29 Jul 2021 09:08)  predniSONE 50 mg oral tablet: 1 tab(s) orally once a day (29 Jul 2021 09:08)      MEDICATIONS  (STANDING):  benzocaine 15 mG/menthol 3.6 mG (Sugar-Free) Lozenge 1 Lozenge Oral once  benzonatate 200 milliGRAM(s) Oral every 8 hours  budesonide 160 MICROgram(s)/formoterol 4.5 MICROgram(s) Inhaler 2 Puff(s) Inhalation two times a day  cholecalciferol 2000 Unit(s) Oral daily  clonazePAM  Tablet 0.5 milliGRAM(s) Oral every 8 hours  FIRST- Mouthwash  BLM 5 milliLiter(s) Swish and Spit every 6 hours  lidocaine   4% Patch 1 Patch Transdermal every 24 hours  melatonin 3 milliGRAM(s) Oral at bedtime  multivitamin 1 Tablet(s) Oral daily  pantoprazole  Injectable 40 milliGRAM(s) IV Push daily  piperacillin/tazobactam IVPB.. 3.375 Gram(s) IV Intermittent every 8 hours  polyethylene glycol 3350 17 Gram(s) Oral daily  senna 2 Tablet(s) Oral at bedtime      TELEMETRY: 	    ECG:  	  RADIOLOGY:   DIAGNOSTIC TESTING:  [ ] Echocardiogram:  [ ]  Catheterization:  [ ] Stress Test:    OTHER: 	    LABS:	 	                            10.7   12.63 )-----------( 412      ( 03 Sep 2021 07:42 )             35.3     09-03    137  |  93<L>  |  8   ----------------------------<  93  3.7   |  36<H>  |  0.65    Ca    9.2      03 Sep 2021 07:35  Phos  3.1     09-03  Mg     2.3     09-03    TPro  7.0  /  Alb  2.8<L>  /  TBili  0.2  /  DBili  x   /  AST  16  /  ALT  26  /  AlkPhos  59  09-03

## 2021-09-03 NOTE — PROGRESS NOTE ADULT - ASSESSMENT
56yo M with h/o appendectomy admitted to the hospital with COVID and possible superimposed pneumonia found to have a right moderate-sized pneumothorax with leftward shift and small pleural effusion.     8/26 Right open chest tube placed at bedside, Maintain to wall suction -40mmHg  8/27 Remains on hi flow- increased subq emphysema, repeat CXR w/o signs of increasing PTX, chest tube +airleak, functioning, Maintain to dry suction -40mmHg  8/28 VSS, CXR with subcutaneous emphysema and persistent right ptx, maintain right chest tube to suction.   8/29 VSS, pt still with air leak on chest tube. s/p non-con Chest CT: Trace right pleural effusion, unchanged. Trace right pneumothorax new from prior. Pneumomediastinum, pneumopericardium and bilateral subcutaneous emphysema new from prior.  8/30 +Right chest tube to -40 dry suction, +airleak, pt tolerating high flow nasal cannula. Persistent SubQ emphysema without ventilatory compromise. Cont maintain right chest tube ti -40 mmHg dry suction.   8/31 Cont maintain right chest tube ti -40 mmHg dry suction.   9/1 chest tube with bloody clot output- Hold [id number] to prevent further bleed  9/2  seen and examined in company of Dr Tejada chest tube clot removed under sterile condition tube patent daily xray 54yo M with h/o appendectomy admitted to the hospital with COVID and possible superimposed pneumonia found to have a right moderate-sized pneumothorax with leftward shift and small pleural effusion.     8/26 Right open chest tube placed at bedside, Maintain to wall suction -40mmHg  8/27 Remains on hi flow- increased subq emphysema, repeat CXR w/o signs of increasing PTX, chest tube +airleak, functioning, Maintain to dry suction -40mmHg  8/28 VSS, CXR with subcutaneous emphysema and persistent right ptx, maintain right chest tube to suction.   8/29 VSS, pt still with air leak on chest tube. s/p non-con Chest CT: Trace right pleural effusion, unchanged. Trace right pneumothorax new from prior. Pneumomediastinum, pneumopericardium and bilateral subcutaneous emphysema new from prior.  8/30 +Right chest tube to -40 dry suction, +airleak, pt tolerating high flow nasal cannula. Persistent SubQ emphysema without ventilatory compromise. Cont maintain right chest tube ti -40 mmHg dry suction.   8/31 Cont maintain right chest tube ti -40 mmHg dry suction.   9/1 chest tube with bloody clot output- Hold [id number] to prevent further bleed  9/2  seen and examined in company of Dr Tejada chest tube clot removed under sterile condition tube patent daily xray  9/3  Patient seen and examined with Dr. Tejada.  Chest tube clamped and will get repeat Xray in 4 hours (2030) to evaluate pneumothorax.  Denies dyspnea/shortness of breath.  O2 sats 97%

## 2021-09-03 NOTE — PROGRESS NOTE ADULT - SUBJECTIVE AND OBJECTIVE BOX
Follow-up Pulm Progress Note    OOB to chair  O2 sats 95% on 6LNC  Still with intermittent cough  Denies CP, SOB    Medications:  MEDICATIONS  (STANDING):  benzocaine 15 mG/menthol 3.6 mG (Sugar-Free) Lozenge 1 Lozenge Oral once  benzonatate 200 milliGRAM(s) Oral every 8 hours  budesonide 160 MICROgram(s)/formoterol 4.5 MICROgram(s) Inhaler 2 Puff(s) Inhalation two times a day  cholecalciferol 2000 Unit(s) Oral daily  clonazePAM  Tablet 0.5 milliGRAM(s) Oral every 8 hours  FIRST- Mouthwash  BLM 5 milliLiter(s) Swish and Spit every 6 hours  lidocaine   4% Patch 1 Patch Transdermal every 24 hours  melatonin 3 milliGRAM(s) Oral at bedtime  multivitamin 1 Tablet(s) Oral daily  pantoprazole  Injectable 40 milliGRAM(s) IV Push daily  piperacillin/tazobactam IVPB.. 3.375 Gram(s) IV Intermittent every 8 hours  polyethylene glycol 3350 17 Gram(s) Oral daily  senna 2 Tablet(s) Oral at bedtime    MEDICATIONS  (PRN):  acetaminophen   Tablet .. 975 milliGRAM(s) Oral every 6 hours PRN Mild Pain (1 - 3)  albuterol/ipratropium for Nebulization 3 milliLiter(s) Nebulizer every 6 hours PRN Shortness of Breath and/or Wheezing  aluminum hydroxide/magnesium hydroxide/simethicone Suspension 30 milliLiter(s) Oral every 4 hours PRN Dyspepsia  benzocaine 15 mG/menthol 3.6 mG (Sugar-Free) Lozenge 1 Lozenge Oral four times a day PRN Sore Throat  cyclobenzaprine 5 milliGRAM(s) Oral three times a day PRN Muscle Spasm  hydrocodone/homatropine Syrup 5 milliLiter(s) Oral every 6 hours PRN worseneing cough  HYDROmorphone  Injectable 1 milliGRAM(s) IV Push every 4 hours PRN Severe Pain (7 - 10)  sodium chloride 0.65% Nasal 1 Spray(s) Both Nostrils every 2 hours PRN Nasal Congestion  traMADol 25 milliGRAM(s) Oral every 6 hours PRN Moderate Pain (4 - 6)    Vital Signs Last 24 Hrs  T(C): 36.9 (03 Sep 2021 10:17), Max: 37.1 (02 Sep 2021 20:04)  T(F): 98.5 (03 Sep 2021 10:17), Max: 98.8 (02 Sep 2021 20:04)  HR: 120 (03 Sep 2021 11:00) (82 - 120)  BP: 107/72 (03 Sep 2021 10:17) (107/72 - 112/72)  BP(mean): --  RR: 19 (03 Sep 2021 10:17) (18 - 22)  SpO2: 95% (03 Sep 2021 10:17) (93% - 96%)      VBG pH 7.43 09-02 @ 07:24    VBG pCO2 64 09-02 @ 07:24    VBG O2 sat 99.4 09-02 @ 07:24    VBG lactate 1.1 09-02 @ 07:24      09-02 @ 07:01  -  09-03 @ 07:00  --------------------------------------------------------  IN: 1100 mL / OUT: 700 mL / NET: 400 mL      LABS:                        10.7   12.63 )-----------( 412      ( 03 Sep 2021 07:42 )             35.3     09-03    137  |  93<L>  |  8   ----------------------------<  93  3.7   |  36<H>  |  0.65    Ca    9.2      03 Sep 2021 07:35  Phos  3.1     09-03  Mg     2.3     09-03    TPro  7.0  /  Alb  2.8<L>  /  TBili  0.2  /  DBili  x   /  AST  16  /  ALT  26  /  AlkPhos  59  09-03    CAPILLARY BLOOD GLUCOSE  POCT Blood Glucose.: 89 mg/dL (02 Sep 2021 08:44)    Fluid characteristics  -- 08-26 @ 20:39  pH > 7.92  LDH 1640  tprot 4.7    Cell count  Appearance Cloudy  Fluid type --  BF lymph 24  color Orange  eosinophil --  PMN 67  Mesothelial --  Monocyte 9  Other body cells --    CULTURES:    Culture - Blood (collected 08-26-21 @ 00:39)  Source: .Blood Blood  Final Report (08-31-21 @ 01:01):    No Growth Final    Culture - Blood (collected 08-25-21 @ 22:56)  Source: .Blood Blood  Final Report (08-30-21 @ 23:01):    No Growth Final    Culture - Body Fluid with Gram Stain (collected 08-26-21 @ 23:15)  Source: .Body Fluid Pleural Fluid  Gram Stain (08-27-21 @ 03:31):    polymorphonuclear leukocytes    Gram Negative Rods    by cytocentrifuge  Final Report (09-01-21 @ 16:57):    No growth at 6 days.    Organism seen in Gram stain is non-viable after prolonged    incubation and repeated subculture.    Culture - Fungal, Body Fluid (collected 08-26-21 @ 23:15)  Source: .Body Fluid Pleural Fluid  Preliminary Report (08-27-21 @ 06:40):    Testing in progress    Physical Examination:  PULM: coarse BS b/l  CVS: RRR    RADIOLOGY REVIEWED  CXR: b/l opacities, R hydro ptx  appears improved     CT chest:< from: CT Chest No Cont (08.28.21 @ 12:51) >  FINDINGS:    AIRWAYS, LUNGS and PLEURA: Patent central airways. Diffuse patchy groundglass opacities and consolidative opacities at the left cavitary lesion within the right upper lobe are unchanged. The right chest tube is inserted through the anterior right 4th rib space, the tip is within the right lateral mid pleural space.    MEDIASTINUM AND GREGG: Mildly enlarged mediastinal and hilar lymph nodes are unchanged. Pneumomediastinum and pneumopericardium new from prior.    HEART AND VESSELS: Heart size is normal. No pericardial effusion. Thoracic aorta and pulmonary artery normal in diameter.    VISUALIZED UPPER ABDOMEN: Small calcification within the right kidney, incompletely evaluated.    CHEST WALL AND BONES: No aggressive osseous lesion. Extensive subcutaneous emphysema new from prior.    LOWER NECK: Within normal limits.    IMPRESSION:    In comparison with 8/24/2021, interval insertion of right chest tube. Extensive bilateral patchy groundglass opacities, right upper lobe consolidation and right upper lobe cavitary lesion are unchanged.    Trace right pleural effusion, unchanged. Trace right pneumothorax new from prior.    Pneumomediastinum, pneumopericardium and bilateral subcutaneous emphysema new from prior.    < end of copied text >

## 2021-09-03 NOTE — PROGRESS NOTE ADULT - PROBLEM SELECTOR PLAN 3
CXR 8/26 with R ptx   -S/p R chest tube placement by thoracic 8/26  -+air leak with areas of subcutaneous emphysema  -Large clot removed from tube 9/2  -Air leak seems to have improved today   -Chest tube to LWS, management per thoracic surgery team   -Daily CXR  -Eventual repeat CT scan, discussed with thoracic surgery NP

## 2021-09-03 NOTE — PROGRESS NOTE ADULT - PROBLEM SELECTOR PLAN 1
S/p COVID with complications of PTX and empyema s/p chest tube placed by CT on 8/26  - f/u CT surg recs  - daily AM CXR per CTSx  - C/w HFNC, titrate to maintain SaO2 > 90%, weaning to NC S/p COVID with complications of PTX and empyema s/p chest tube placed by CT on 8/26  - f/u CT surg recs  - daily AM CXR per CTSx  - d/c HFNC, weaning NC  - per CT, likely eventual repeat CT scan but after possible adjustment of tube to drain other pocket of air

## 2021-09-03 NOTE — PROGRESS NOTE ADULT - PROBLEM SELECTOR PLAN 1
8/26 Right open chest tube placed at bedside for pneumothorax  8/28 CT Chest reviewed by Dr. Staton, no thoracic surgery intervention indicated, recommend continuing chest tube to LWS.  9/2 clot in tube evacuated  maintain suction  9/3 No clots noted in tubing.  No air leak noted.   Maintain right chest tube to dry suction -40mmHg  Monitor chest tube for air leak  Daily CXR while chest tube in place  Continue care as per primary team

## 2021-09-03 NOTE — PROGRESS NOTE ADULT - PROBLEM SELECTOR PLAN 1
+COVID PCR as an outpatient  -CXR 8/9 with b/l lung opacities, CXR 8/16 grossly unchanged   -CXR 8/23 with worsening b/l opacties R>L, ?effusion vs mucus plugging/collapse, also with opacities 2nd to COVID   -S/p Remdesivir x 5 days   -S/p Decadron 6mg IVP qd x 10 days (completed 8/10)  -S/p Tocilizumab 7/30 per ID for worsening hypoxic respiratory failure  -Sputum culture 8/4 with normal respiratory aba.   -CTA 8/24 with no clear PE, but ddimer remains elevated. LE duplex 8/25 neg DVT, but with incidental note of thrombosis of the L tibial peroneal trunk with diminished flow within the left popliteal artery. Full dose AC being held 2nd to bloody output from R chest tube. Okay to resume ppx dose AC per thoracic. Discussed with primary team.

## 2021-09-03 NOTE — PROGRESS NOTE ADULT - ASSESSMENT
55yr old unvaccinated male with hx of HTN, RLE DVT, and PE (not on home AC) presents with progressively worsening SOB, intermittent fevers, COVID positive, admitted for COVID PNA. Initially admitted on 7/29 to ICU on HF and BiPAP, never intubated. Downgraded to floors on 8/19. 8/26 found to have tension pneumothorax, returned to ICU, R chest tube placed. 8/28 CT scan showed pneumomediastinum, pneumopericardium, bilateral subQ emphysema as well as bilateral opacities and RUL consolidation plus potential cavitary lesion. Gram negative rounds grown in pleural fluid. 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    8/30-31 downgraded to floors on HFNC. Continuing to have R sided pain with cough and inspiration likely 2/2 to COVID pneumonia, tension PTX, effusion, and RUL consolidation/cavitary lesion. At time of examination today, pt was on HF settings 40/40.   55yr old unvaccinated male with hx of HTN, RLE DVT, and PE (not on home AC) presents with progressively worsening SOB, intermittent fevers, COVID positive, admitted for COVID PNA. Initially admitted on 7/29 to ICU on HF and BiPAP, never intubated. Downgraded to floors on 8/19. 8/26 found to have tension pneumothorax, returned to ICU, R chest tube placed. 8/28 CT scan showed pneumomediastinum, pneumopericardium, bilateral subQ emphysema as well as bilateral opacities and RUL consolidation plus potential cavitary lesion. Gram negative rounds grown in pleural fluid. 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    8/30-31 downgraded to floors on HFNC. Continuing to have R sided pain with cough and inspiration likely 2/2 to COVID pneumonia, tension PTX, effusion, and RUL consolidation/cavitary lesion. Pt comfortable on 6L NC.

## 2021-09-03 NOTE — PROGRESS NOTE ADULT - SUBJECTIVE AND OBJECTIVE BOX
Joseph Casey, PGY1  Pager [phone]    INCOMPLETE NOTE - IN PROGRESS    Patient is a 55y old  Male who presents with a chief complaint of SOB (02 Sep 2021 18:11)      SUBJECTIVE/INTERVAL EVENTS: Patient seen and examined at bedside.    MEDICATIONS  (STANDING):  benzocaine 15 mG/menthol 3.6 mG (Sugar-Free) Lozenge 1 Lozenge Oral once  benzonatate 200 milliGRAM(s) Oral every 8 hours  budesonide 160 MICROgram(s)/formoterol 4.5 MICROgram(s) Inhaler 2 Puff(s) Inhalation two times a day  cholecalciferol 2000 Unit(s) Oral daily  clonazePAM  Tablet 0.5 milliGRAM(s) Oral every 8 hours  FIRST- Mouthwash  BLM 5 milliLiter(s) Swish and Spit every 6 hours  lidocaine   4% Patch 1 Patch Transdermal every 24 hours  melatonin 3 milliGRAM(s) Oral at bedtime  multivitamin 1 Tablet(s) Oral daily  pantoprazole  Injectable 40 milliGRAM(s) IV Push daily  piperacillin/tazobactam IVPB.. 3.375 Gram(s) IV Intermittent every 8 hours  polyethylene glycol 3350 17 Gram(s) Oral daily  senna 2 Tablet(s) Oral at bedtime    MEDICATIONS  (PRN):  acetaminophen   Tablet .. 975 milliGRAM(s) Oral every 6 hours PRN Mild Pain (1 - 3)  albuterol/ipratropium for Nebulization 3 milliLiter(s) Nebulizer every 6 hours PRN Shortness of Breath and/or Wheezing  aluminum hydroxide/magnesium hydroxide/simethicone Suspension 30 milliLiter(s) Oral every 4 hours PRN Dyspepsia  benzocaine 15 mG/menthol 3.6 mG (Sugar-Free) Lozenge 1 Lozenge Oral four times a day PRN Sore Throat  cyclobenzaprine 5 milliGRAM(s) Oral three times a day PRN Muscle Spasm  hydrocodone/homatropine Syrup 5 milliLiter(s) Oral every 6 hours PRN worseneing cough  HYDROmorphone  Injectable 1 milliGRAM(s) IV Push every 4 hours PRN Severe Pain (7 - 10)  sodium chloride 0.65% Nasal 1 Spray(s) Both Nostrils every 2 hours PRN Nasal Congestion  traMADol 25 milliGRAM(s) Oral every 6 hours PRN Moderate Pain (4 - 6)      VITAL SIGNS:  T(F): 98.2 (09-03-21 @ 03:31), Max: 99 (09-02-21 @ 11:34)  HR: 100 (09-03-21 @ 06:15) (82 - 102)  BP: 107/72 (09-03-21 @ 03:31) (107/72 - 116/78)  RR: 20 (09-03-21 @ 03:31) (20 - 25)  SpO2: 96% (09-03-21 @ 06:15) (93% - 96%)    I&O's Summary    01 Sep 2021 07:01  -  02 Sep 2021 07:00  --------------------------------------------------------  IN: 1040 mL / OUT: 570 mL / NET: 470 mL    02 Sep 2021 07:01  -  03 Sep 2021 06:29  --------------------------------------------------------  IN: 800 mL / OUT: 250 mL / NET: 550 mL      Daily     Daily     PHYSICAL EXAM:  Gen: Alert, NAD  HEENT: NCAT, conjunctiva clear, sclera anicteric, no erythema or exudates in the oropharynx, mmm  Neck: Supple, no JVD  CV: RRR, S1S2, no m/r/g  Resp: CTAB, normal respiratory effort  Abd: Soft, nontender, nondistended, normal bowel sounds  Ext: no edema, no clubbing or cyanosis  Neuro: AOx3, CN2-12 grossly intact, LEVIN  SKIN: warm, perfused    LABS:                        11.3   13.24 )-----------( 403      ( 02 Sep 2021 07:36 )             36.7     Hgb Trend: 11.3<--, 11.8<--, 12.2<--, 10.2<--, 10.6<--  09-02    135  |  94<L>  |  10  ----------------------------<  75  4.5   |  31  |  0.66    Ca    8.6      02 Sep 2021 07:37  Phos  3.3     09-02  Mg     2.2     09-02    TPro  6.8  /  Alb  2.5<L>  /  TBili  0.2  /  DBili  x   /  AST  22  /  ALT  27  /  AlkPhos  57  09-02    Creatinine Trend: 0.66<--, 0.66<--, 0.64<--, 0.77<--, 0.47<--, 0.60<--  LIVER FUNCTIONS - ( 02 Sep 2021 07:37 )  Alb: 2.5 g/dL / Pro: 6.8 g/dL / ALK PHOS: 57 U/L / ALT: 27 U/L / AST: 22 U/L / GGT: x                     CAPILLARY BLOOD GLUCOSE      POCT Blood Glucose.: 89 mg/dL (02 Sep 2021 08:44)      RADIOLOGY & ADDITIONAL TESTS: Reviewed    Imaging Personally Reviewed:    Consultant(s) Notes Reviewed:      Care Discussed with Consultants/Other Providers:   Joseph Casey, PGY1  Pager [phone]    Patient is a 55y old  Male who presents with a chief complaint of SOB (02 Sep 2021 18:11)    SUBJECTIVE/INTERVAL EVENTS: Patient seen and examined at bedside. Pt feels better, pain has improved. Breathing comfortable on NC. No other complaints.    MEDICATIONS  (STANDING):  benzocaine 15 mG/menthol 3.6 mG (Sugar-Free) Lozenge 1 Lozenge Oral once  benzonatate 200 milliGRAM(s) Oral every 8 hours  budesonide 160 MICROgram(s)/formoterol 4.5 MICROgram(s) Inhaler 2 Puff(s) Inhalation two times a day  cholecalciferol 2000 Unit(s) Oral daily  clonazePAM  Tablet 0.5 milliGRAM(s) Oral every 8 hours  FIRST- Mouthwash  BLM 5 milliLiter(s) Swish and Spit every 6 hours  lidocaine   4% Patch 1 Patch Transdermal every 24 hours  melatonin 3 milliGRAM(s) Oral at bedtime  multivitamin 1 Tablet(s) Oral daily  pantoprazole  Injectable 40 milliGRAM(s) IV Push daily  piperacillin/tazobactam IVPB.. 3.375 Gram(s) IV Intermittent every 8 hours  polyethylene glycol 3350 17 Gram(s) Oral daily  senna 2 Tablet(s) Oral at bedtime    MEDICATIONS  (PRN):  acetaminophen   Tablet .. 975 milliGRAM(s) Oral every 6 hours PRN Mild Pain (1 - 3)  albuterol/ipratropium for Nebulization 3 milliLiter(s) Nebulizer every 6 hours PRN Shortness of Breath and/or Wheezing  aluminum hydroxide/magnesium hydroxide/simethicone Suspension 30 milliLiter(s) Oral every 4 hours PRN Dyspepsia  benzocaine 15 mG/menthol 3.6 mG (Sugar-Free) Lozenge 1 Lozenge Oral four times a day PRN Sore Throat  cyclobenzaprine 5 milliGRAM(s) Oral three times a day PRN Muscle Spasm  hydrocodone/homatropine Syrup 5 milliLiter(s) Oral every 6 hours PRN worseneing cough  HYDROmorphone  Injectable 1 milliGRAM(s) IV Push every 4 hours PRN Severe Pain (7 - 10)  sodium chloride 0.65% Nasal 1 Spray(s) Both Nostrils every 2 hours PRN Nasal Congestion  traMADol 25 milliGRAM(s) Oral every 6 hours PRN Moderate Pain (4 - 6)      VITAL SIGNS:  T(F): 98.2 (09-03-21 @ 03:31), Max: 99 (09-02-21 @ 11:34)  HR: 100 (09-03-21 @ 06:15) (82 - 102)  BP: 107/72 (09-03-21 @ 03:31) (107/72 - 116/78)  RR: 20 (09-03-21 @ 03:31) (20 - 25)  SpO2: 96% (09-03-21 @ 06:15) (93% - 96%)    I&O's Summary    01 Sep 2021 07:01  -  02 Sep 2021 07:00  --------------------------------------------------------  IN: 1040 mL / OUT: 570 mL / NET: 470 mL    02 Sep 2021 07:01  -  03 Sep 2021 06:29  --------------------------------------------------------  IN: 800 mL / OUT: 250 mL / NET: 550 mL      Daily     Daily     PHYSICAL EXAM:  Gen: Alert, NAD  HEENT: NCAT, conjunctiva clear, sclera anicteric, no erythema or exudates in the oropharynx, mmm  Neck: Supple, no JVD  CV: RRR, S1S2, no m/r/g  Resp: R side diffuse crackles, L more clear. no respiratory distress.  Abd: Soft, nontender, nondistended, normal bowel sounds  Ext: no edema, no clubbing or cyanosis  Neuro: AOx3, CN2-12 grossly intact, LEVIN  SKIN: warm, perfused, subQ emphysema palpable around chest    LABS:                        11.3   13.24 )-----------( 403      ( 02 Sep 2021 07:36 )             36.7     Hgb Trend: 11.3<--, 11.8<--, 12.2<--, 10.2<--, 10.6<--  09-02    135  |  94<L>  |  10  ----------------------------<  75  4.5   |  31  |  0.66    Ca    8.6      02 Sep 2021 07:37  Phos  3.3     09-02  Mg     2.2     09-02    TPro  6.8  /  Alb  2.5<L>  /  TBili  0.2  /  DBili  x   /  AST  22  /  ALT  27  /  AlkPhos  57  09-02    Creatinine Trend: 0.66<--, 0.66<--, 0.64<--, 0.77<--, 0.47<--, 0.60<--  LIVER FUNCTIONS - ( 02 Sep 2021 07:37 )  Alb: 2.5 g/dL / Pro: 6.8 g/dL / ALK PHOS: 57 U/L / ALT: 27 U/L / AST: 22 U/L / GGT: x                     CAPILLARY BLOOD GLUCOSE      POCT Blood Glucose.: 89 mg/dL (02 Sep 2021 08:44)      RADIOLOGY & ADDITIONAL TESTS: Reviewed    Imaging Personally Reviewed:    Consultant(s) Notes Reviewed:      Care Discussed with Consultants/Other Providers:

## 2021-09-03 NOTE — PROGRESS NOTE ADULT - SUBJECTIVE AND OBJECTIVE BOX
Subjective: "My breathing feels ok when is this tube coming out "  Patient denies chest pain/shortness of breath      VITAL SIGNS    Vital Signs Last 24 Hrs  T(C): 36.9 (09-03-21 @ 10:17), Max: 37.2 (09-02-21 @ 11:34)  T(F): 98.5 (09-03-21 @ 10:17), Max: 99 (09-02-21 @ 11:34)  HR: 111 (09-03-21 @ 10:17) (82 - 111)  BP: 107/72 (09-03-21 @ 10:17) (107/72 - 112/72)  RR: 19 (09-03-21 @ 10:17) (18 - 22)  SpO2: 95% (09-03-21 @ 10:17) (93% - 96%)            09-02 @ 07:01  -  09-03 @ 07:00  --------------------------------------------------------  IN: 1100 mL / OUT: 700 mL / NET: 400 mL       Daily     Daily   Admit Wt: Drug Dosing Weight  Height (cm): 170.2 (30 Jul 2021 15:42)  Weight (kg): 92 (30 Jul 2021 15:42)  BMI (kg/m2): 31.8 (30 Jul 2021 15:42)  BSA (m2): 2.03 (30 Jul 2021 15:42)    Bilirubin Total, Serum: 0.2 mg/dL (09-03 @ 07:35)    CAPILLARY BLOOD GLUCOSE      PHYSICAL EXAM    Neurology: alert and oriented x 3, nonfocal, no gross deficits  CV : RRR +S1/A2  Lungs: clear. RR easy, unlabored   Abdomen: soft, nontender, nondistended, positive bowel sounds  Extremities:   LEVIN; No peripheral edema, neg calf tenderness.   PPP bilaterally        Chest tubes: Right pleural chest tube, tubing appears patent.  Currently on 40mmHg suction. Draining scant amount of serosanguinous drainage.  No output overnight.  No air leak noted. Chest tube dressing is c/d/i          acetaminophen   Tablet .. 975 milliGRAM(s) Oral every 6 hours PRN  albuterol/ipratropium for Nebulization 3 milliLiter(s) Nebulizer every 6 hours PRN  aluminum hydroxide/magnesium hydroxide/simethicone Suspension 30 milliLiter(s) Oral every 4 hours PRN  benzocaine 15 mG/menthol 3.6 mG (Sugar-Free) Lozenge 1 Lozenge Oral four times a day PRN  benzocaine 15 mG/menthol 3.6 mG (Sugar-Free) Lozenge 1 Lozenge Oral once  benzonatate 200 milliGRAM(s) Oral every 8 hours  budesonide 160 MICROgram(s)/formoterol 4.5 MICROgram(s) Inhaler 2 Puff(s) Inhalation two times a day  cholecalciferol 2000 Unit(s) Oral daily  clonazePAM  Tablet 0.5 milliGRAM(s) Oral every 8 hours  cyclobenzaprine 5 milliGRAM(s) Oral three times a day PRN  FIRST- Mouthwash  BLM 5 milliLiter(s) Swish and Spit every 6 hours  hydrocodone/homatropine Syrup 5 milliLiter(s) Oral every 6 hours PRN  HYDROmorphone  Injectable 1 milliGRAM(s) IV Push every 4 hours PRN  lidocaine   4% Patch 1 Patch Transdermal every 24 hours  melatonin 3 milliGRAM(s) Oral at bedtime  multivitamin 1 Tablet(s) Oral daily  pantoprazole  Injectable 40 milliGRAM(s) IV Push daily  piperacillin/tazobactam IVPB.. 3.375 Gram(s) IV Intermittent every 8 hours  polyethylene glycol 3350 17 Gram(s) Oral daily  senna 2 Tablet(s) Oral at bedtime  sodium chloride 0.65% Nasal 1 Spray(s) Both Nostrils every 2 hours PRN  traMADol 25 milliGRAM(s) Oral every 6 hours PRN

## 2021-09-03 NOTE — PROGRESS NOTE ADULT - ASSESSMENT
55 M with covid PNA, CP         completed remdesivir, s/p decadron   sp toci   No fever  CP better- cardio seeing  CXR - no new findings  CT chest with cavitary PNA - cont zosyn, s/p CT by thoracic for PTX/effusion  Plan for 2 weeks abx - cont abx another 7 days    Dylan Carter  Attending Physician   Division of Infectious Disease  Pager #[phone]  Available on Microsoft Teams also  After 5pm/weekend or no response, call #[phone]

## 2021-09-03 NOTE — PROGRESS NOTE ADULT - PROBLEM SELECTOR PLAN 4
Incidental note of thrombosis of the left tibial peroneal trunk with diminished flow flow within the left popliteal artery.  - now holding lovenox 90 BID as of 9/1 PM for bloody drainage from chest tube Incidental note of thrombosis of the left tibial peroneal trunk with diminished flow flow within the left popliteal artery.  - now holding lovenox 90 BID as of 9/1 PM for bloody drainage from chest tube  - 9/3 restarting DVT ppx lovenox.

## 2021-09-03 NOTE — PROGRESS NOTE ADULT - EYES
"Encounter Date: 2020       History     Chief Complaint   Patient presents with    Loss of Consciousness     daughter states pt was sitting in chair stated she needed to go to bathroom and suddenly  passed out.  daughter states pt was unconscious for about 10 minutes. EMS reports pt was alert and oriented on their arrival but did not remember event. on arrival to ER pt isawake and alert. follows commands and sppech is clear. states she still do not remember event  and voices no current distres.     69 yo female presents via [facility] with syncope today. Patient went to her brother's  earlier in the day, and ate food at the repast before going home with her daughter. (Daughter did not eat this food.) Patient was in the house and told daughter she had to use the restroom. Patient then slumped backwards in her wheelchair and was unresponsive and "hot" for a few minutes. Daughter called 911. Patient woke up to paramedics and then vomited. Patient denies chest pain or shortness of breath or abdominal pain. No memory of incident.     Patient is wheelchair-bound due to prior CVAs. Her left side is weaker than her right. Daughter is caregiver.     PMH: CVA/TIA, HTN, DLD, UTI    TTE 19  · Normal left ventricular systolic function. The estimated ejection fraction is 65%  · No wall motion abnormalities.  · Concentric left ventricular remodeling.  · Normal right ventricular systolic function.  · Patent foramen ovale present with right to left shunting indicated by saline contrast. 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  · Consider YAA for further assessment if clinically warranted.     PCP is KAREN Sterling     Meds:  Amlodipine 10mg PO qday  Atorvastatin 40mg PO qday  ASA 81mg PO qday        Review of patient's allergies indicates:  No Known Allergies  Past Medical History:   Diagnosis Date    Essential hypertension 2015    Obesity (BMI 35.0-39.9) 2015    Stroke 2015    Left " side weakness    Wheelchair dependence      Past Surgical History:   Procedure Laterality Date    HYSTERECTOMY       Family History   Problem Relation Age of Onset    Stroke Mother     Cancer Father      Social History     Tobacco Use    Smoking status: Never Smoker    Smokeless tobacco: Never Used   Substance Use Topics    Alcohol use: No    Drug use: No     Review of Systems   Constitutional: Negative for fever.   HENT: Negative for sore throat.    Eyes: Negative for visual disturbance.   Respiratory: Negative for shortness of breath.    Cardiovascular: Negative for chest pain.   Gastrointestinal: Positive for vomiting. Negative for abdominal pain.   Genitourinary: Negative for dysuria.   Musculoskeletal: Positive for gait problem (wheelchair-dependent). Negative for neck stiffness.   Skin: Negative for rash.   Neurological: Positive for syncope.   Hematological: Does not bruise/bleed easily.       Physical Exam     Initial Vitals   BP Pulse Resp Temp SpO2   01/04/20 1946 01/04/20 1946 01/04/20 1946 01/04/20 1950 01/04/20 1946   129/71 82 15 98.3 °F (36.8 °C) 98 %      MAP       --                Physical Exam    Nursing note and vitals reviewed.  Constitutional: She appears well-developed and well-nourished. She is not diaphoretic.   Awake, alert, nontoxic, speaking in complete sentences.   HENT:   Head: Normocephalic and atraumatic.   Mouth/Throat: Oropharynx is clear and moist.   Eyes: Conjunctivae and EOM are normal. Pupils are equal, round, and reactive to light.   Neck: Normal range of motion. Neck supple.   Cardiovascular: Normal rate, regular rhythm, normal heart sounds and intact distal pulses.   No murmur heard.  Pulmonary/Chest: Breath sounds normal. No respiratory distress. She has no wheezes. She has no rhonchi. She has no rales.   Abdominal: Soft. There is no tenderness.   Musculoskeletal: She exhibits no edema or tenderness.   Neurological: She is alert and oriented to person, place, and  time.   Moving all extremities. LUE strength 4+/5 vs 5/5 on RUE. Weakness of bilateral LEs.   Skin: Skin is warm and dry. No erythema. No pallor.   Psychiatric: She has a normal mood and affect.         ED Course   Procedures  Labs Reviewed   CULTURE, URINE - Abnormal; Notable for the following components:       Result Value    Urine Culture, Routine   (*)     Value: ESCHERICHIA COLI ESBL  > 100,000 cfu/ml      All other components within normal limits    Narrative:     Preferred Collection Type->Urine, Catheterized   COMPREHENSIVE METABOLIC PANEL - Abnormal; Notable for the following components:    Glucose 112 (*)     Total Protein 8.8 (*)     All other components within normal limits   CBC W/ AUTO DIFFERENTIAL - Abnormal; Notable for the following components:    Mean Corpuscular Volume 78 (*)     Mean Corpuscular Hemoglobin 25.2 (*)     Gran # (ANC) 9.1 (*)     Immature Grans (Abs) 0.06 (*)     Gran% 77.5 (*)     Lymph% 13.4 (*)     All other components within normal limits   URINALYSIS, REFLEX TO URINE CULTURE - Abnormal; Notable for the following components:    Appearance, UA Cloudy (*)     Protein, UA 1+ (*)     Occult Blood UA 1+ (*)     Urobilinogen, UA 2.0-3.0 (*)     Leukocytes, UA 3+ (*)     All other components within normal limits    Narrative:     Preferred Collection Type->Urine, Catheterized   URINALYSIS MICROSCOPIC - Abnormal; Notable for the following components:    WBC, UA 30 (*)     Bacteria Many (*)     Hyaline Casts, UA 2 (*)     Granular Casts, UA 1 (*)     Trichomonas, UA Few (*)     All other components within normal limits    Narrative:     Preferred Collection Type->Urine, Catheterized   POCT GLUCOSE - Abnormal; Notable for the following components:    POCT Glucose 124 (*)     All other components within normal limits   B-TYPE NATRIURETIC PEPTIDE   TSH   TROPONIN I   TROPONIN I     EKG Readings: (Independently Interpreted)   19:49: NSR, HR 80. L axis. No STEMI.        Imaging Results           X-Ray Chest AP Portable (Final result)  Result time 01/04/20 21:10:42    Final result by James Benitez MD (01/04/20 21:10:42)                 Impression:      No acute cardiopulmonary process identified.    Suspected moderate size hiatal hernia.      Electronically signed by: James Benitez MD  Date:    01/04/2020  Time:    21:10             Narrative:    EXAMINATION:  XR CHEST AP PORTABLE    CLINICAL HISTORY:  syncope;    TECHNIQUE:  Single frontal view of the chest was performed.    COMPARISON:  January 2019.    FINDINGS:  Cardiac silhouette is stable in size.  Lungs are symmetrically expanded.  No evidence of focal consolidative process, pneumothorax, or significant effusion.  Rounded opacity with air-fluid level is seen suggestive for moderate size hiatal hernia.  No acute osseous abnormality identified.                              X-Rays:   Independently Interpreted Readings:   Other Readings:  CXR NAD    Medical Decision Making:   History:   Old Medical Records: I decided to obtain old medical records.  Old Records Summarized: records from previous admission(s) and records from another hospital.  Initial Assessment:   69 yo female s/p syncopal episode. Vomited subsequent to episode.  Differential Diagnosis:   Ddx includes dysrhythmia, occult infection, hypoglycemia, food poisoning, other.    Independently Interpreted Test(s):   I have ordered and independently interpreted X-rays - see prior notes.  I have ordered and independently interpreted EKG Reading(s) - see prior notes  Clinical Tests:   Lab Tests: Ordered and Reviewed  Radiological Study: Ordered and Reviewed  Medical Tests: Ordered and Reviewed  ED Management:  EKG no STEMI.    CXR NAD.    Labs: Reassuring CBC, CMP, TSH, troponin x 2, BNP. UA 3+ leuks.    Patient without recurrent incident during time in ED. Feels well to go home. Started on Rocephin for UTI and d/c'ed on Keflex. Strongly encouraged to f/u to PMD for further testing as  warranted.                                   Clinical Impression:       ICD-10-CM ICD-9-CM   1. Syncope R55 780.2   2. Acute cystitis with hematuria N30.01 595.0                             Alma Brand MD  01/11/20 1248     conjunctiva clear detailed exam

## 2021-09-03 NOTE — PROGRESS NOTE ADULT - SUBJECTIVE AND OBJECTIVE BOX
KARISSA VILLALBA 55y [id number]    Patient is a 55y old  Male who presents with a chief complaint of SOB (02 Sep 2021 18:11)      Follow Up/CC:  ID following for covid/cavitary PNA    Interval History/ROS: no fever, discomfort near CT    Allergies    No Known Allergies    Intolerances        ANTIMICROBIALS:  piperacillin/tazobactam IVPB.. 3.375 every 8 hours      MEDICATIONS  (STANDING):  benzocaine 15 mG/menthol 3.6 mG (Sugar-Free) Lozenge 1 Lozenge Oral once  benzonatate 200 milliGRAM(s) Oral every 8 hours  budesonide 160 MICROgram(s)/formoterol 4.5 MICROgram(s) Inhaler 2 Puff(s) Inhalation two times a day  cholecalciferol 2000 Unit(s) Oral daily  clonazePAM  Tablet 0.5 milliGRAM(s) Oral every 8 hours  FIRST- Mouthwash  BLM 5 milliLiter(s) Swish and Spit every 6 hours  lidocaine   4% Patch 1 Patch Transdermal every 24 hours  melatonin 3 milliGRAM(s) Oral at bedtime  multivitamin 1 Tablet(s) Oral daily  pantoprazole  Injectable 40 milliGRAM(s) IV Push daily  piperacillin/tazobactam IVPB.. 3.375 Gram(s) IV Intermittent every 8 hours  polyethylene glycol 3350 17 Gram(s) Oral daily  senna 2 Tablet(s) Oral at bedtime    MEDICATIONS  (PRN):  acetaminophen   Tablet .. 975 milliGRAM(s) Oral every 6 hours PRN Mild Pain (1 - 3)  albuterol/ipratropium for Nebulization 3 milliLiter(s) Nebulizer every 6 hours PRN Shortness of Breath and/or Wheezing  aluminum hydroxide/magnesium hydroxide/simethicone Suspension 30 milliLiter(s) Oral every 4 hours PRN Dyspepsia  benzocaine 15 mG/menthol 3.6 mG (Sugar-Free) Lozenge 1 Lozenge Oral four times a day PRN Sore Throat  cyclobenzaprine 5 milliGRAM(s) Oral three times a day PRN Muscle Spasm  hydrocodone/homatropine Syrup 5 milliLiter(s) Oral every 6 hours PRN worseneing cough  HYDROmorphone  Injectable 1 milliGRAM(s) IV Push every 4 hours PRN Severe Pain (7 - 10)  sodium chloride 0.65% Nasal 1 Spray(s) Both Nostrils every 2 hours PRN Nasal Congestion  traMADol 25 milliGRAM(s) Oral every 6 hours PRN Moderate Pain (4 - 6)        Vital Signs Last 24 Hrs  T(C): 36.9 (03 Sep 2021 06:30), Max: 37.2 (02 Sep 2021 11:34)  T(F): 98.5 (03 Sep 2021 06:30), Max: 99 (02 Sep 2021 11:34)  HR: 89 (03 Sep 2021 06:30) (82 - 102)  BP: 110/75 (03 Sep 2021 06:30) (107/72 - 112/72)  BP(mean): --  RR: 20 (03 Sep 2021 06:30) (20 - 22)  SpO2: 93% (03 Sep 2021 06:30) (93% - 96%)    CBC Full  -  ( 03 Sep 2021 07:42 )  WBC Count : 12.63 K/uL  RBC Count : 3.90 M/uL  Hemoglobin : 10.7 g/dL  Hematocrit : 35.3 %  Platelet Count - Automated : 412 K/uL  Mean Cell Volume : 90.5 fl  Mean Cell Hemoglobin : 27.4 pg  Mean Cell Hemoglobin Concentration : 30.3 gm/dL  Auto Neutrophil # : x  Auto Lymphocyte # : x  Auto Monocyte # : x  Auto Eosinophil # : x  Auto Basophil # : x  Auto Neutrophil % : x  Auto Lymphocyte % : x  Auto Monocyte % : x  Auto Eosinophil % : x  Auto Basophil % : x    09-03    137  |  93<L>  |  8   ----------------------------<  93  3.7   |  36<H>  |  0.65    Ca    9.2      03 Sep 2021 07:35  Phos  3.1     09-03  Mg     2.3     09-03    TPro  7.0  /  Alb  2.8<L>  /  TBili  0.2  /  DBili  x   /  AST  16  /  ALT  26  /  AlkPhos  59  09-03    LIVER FUNCTIONS - ( 03 Sep 2021 07:35 )  Alb: 2.8 g/dL / Pro: 7.0 g/dL / ALK PHOS: 59 U/L / ALT: 26 U/L / AST: 16 U/L / GGT: x               MICROBIOLOGY:  .Body Fluid Pleural Fluid  08-26-21   No growth at 6 days.  Organism seen in Gram stain is non-viable after prolonged  incubation and repeated subculture.  --    polymorphonuclear leukocytes  Gram Negative Rods  by cytocentrifuge      .Blood Blood  08-26-21   No Growth Final  --  --      .Blood Blood  08-25-21   No Growth Final  --  --      .Blood Blood-Peripheral  08-11-21   No Growth Final  --  --              v            RADIOLOGY

## 2021-09-03 NOTE — PROGRESS NOTE ADULT - PROBLEM SELECTOR PLAN 2
- f/u B glucans  - pain control with tylenol, tramadol, Dilaudid  - continue hycodan  - continue Zosyn (started 8/26, 2 weeks per ID, last day 9/9)  - trend WBCs  - chest PT

## 2021-09-03 NOTE — PROGRESS NOTE ADULT - ASSESSMENT
Echo 8/24/21: EF 65%, mild MR, grossly nl lv sys fx    a/p  54yo M with Hx of DVT s/p achilles tendon repair years ago not on AC presenting with complaints of SOB. intermittent and fevers with cough productive of white sputum for past week, tested positive for covid 2. Now transferred to MICU for tension pneumothorax, s/p chest tube.     #Atypical Chest Pain  -RRT 8/23 x2 for chest pain - exacerbated by cough and inspiration  -improved, secondary to covid PNA, PNX  -trop negative  -no ADHF noted on exam   -CTA without PE   -Echo noted w grossly nl lv sys fx, mild MR     #Covid -19, PNX  -s/p completed courses of Remdesivir x 5 days and Decadron x 10 days   -S/p Tocilizumab 7/30 per ID   -GNR in gram stain from pleural fluid -- on IV abx   -CTA as above   -RRT on 9/1 for hypoxia - HFNC settings adjusted - repeat CXR noted    -R chest tube for pnx -- mgmt per thoracic -- chest tube clot removed   -AC remains on hold   -pulm / thoracic f/u    #Sinus tachycardia  -resolved  -likely secondary to covid PNA, volume, pain, PNX  -cont to monitor   -echo as above     #DVT (hx)  -LE doppler 8/25 with no evidence of deep venous thrombosis in either lower extremity. Incidental note of thrombosis of the left tibial peroneal trunk with diminished flow flow within the left popliteal artery.  -AC on hold given inc bloody outpt noted in Chest tube   -med f/u     #s/p steroids for laryngitis/obstruction

## 2021-09-03 NOTE — PROGRESS NOTE ADULT - PROBLEM SELECTOR PLAN 4
2nd to COVID PNA, superimposed bacterial PNA, & now ptx   -S/p RRT 8/3 and 8/4 for hypoxia  -Tx to 5ICU 8/10 for further management, tx to 4M   -S/p RRT x2 8/23 for R sided chest pain, pain appears pleuritic in nature  -CTA chest 8/25 with no clear PE  -Tx back to MICU 8/26, now on 5monti (off COVID isolation)   -Hypoxia slowly improving  -Now tolerating 6LNC, O2 sats maintaining mid 90s  -Continue to wean O2 as tolerated, keep sats >90%

## 2021-09-03 NOTE — PROGRESS NOTE ADULT - PROBLEM SELECTOR PLAN 2
CTA chest 8/24 with RUL consolidation/cavitation suggestive of superimposed bacterial PNA in addition to COVID PNA  -C/w Zosyn (started 8/25)  -GNR in gram stain from pleural fluid. Suggest at least 2 week course of abx. Zosyn re-instated for another 7 days.   -ID f/u  -Eventual repeat CT scan

## 2021-09-04 LAB
ALBUMIN SERPL ELPH-MCNC: 2.6 G/DL — LOW (ref 3.3–5)
ALP SERPL-CCNC: 64 U/L — SIGNIFICANT CHANGE UP (ref 40–120)
ALT FLD-CCNC: 24 U/L — SIGNIFICANT CHANGE UP (ref 10–45)
ANION GAP SERPL CALC-SCNC: 13 MMOL/L — SIGNIFICANT CHANGE UP (ref 5–17)
AST SERPL-CCNC: 23 U/L — SIGNIFICANT CHANGE UP (ref 10–40)
BASE EXCESS BLDV CALC-SCNC: 16.2 MMOL/L — HIGH (ref -2–2)
BILIRUB SERPL-MCNC: 0.2 MG/DL — SIGNIFICANT CHANGE UP (ref 0.2–1.2)
BUN SERPL-MCNC: 6 MG/DL — LOW (ref 7–23)
CA-I SERPL-SCNC: 1.19 MMOL/L — SIGNIFICANT CHANGE UP (ref 1.15–1.33)
CALCIUM SERPL-MCNC: 9.4 MG/DL — SIGNIFICANT CHANGE UP (ref 8.4–10.5)
CHLORIDE BLDV-SCNC: 96 MMOL/L — SIGNIFICANT CHANGE UP (ref 96–108)
CHLORIDE SERPL-SCNC: 94 MMOL/L — LOW (ref 96–108)
CO2 BLDV-SCNC: 46 MMOL/L — HIGH (ref 22–26)
CO2 SERPL-SCNC: 32 MMOL/L — HIGH (ref 22–31)
CREAT SERPL-MCNC: 0.58 MG/DL — SIGNIFICANT CHANGE UP (ref 0.5–1.3)
GAS PNL BLDV: 135 MMOL/L — LOW (ref 136–145)
GAS PNL BLDV: SIGNIFICANT CHANGE UP
GAS PNL BLDV: SIGNIFICANT CHANGE UP
GLUCOSE BLDV-MCNC: 90 MG/DL — SIGNIFICANT CHANGE UP (ref 70–99)
GLUCOSE SERPL-MCNC: 98 MG/DL — SIGNIFICANT CHANGE UP (ref 70–99)
HCO3 BLDV-SCNC: 44 MMOL/L — HIGH (ref 22–29)
HCT VFR BLD CALC: 34.9 % — LOW (ref 39–50)
HCT VFR BLDA CALC: 34 % — LOW (ref 39–51)
HGB BLD CALC-MCNC: 11.3 G/DL — LOW (ref 12.6–17.4)
HGB BLD-MCNC: 10.6 G/DL — LOW (ref 13–17)
HOROWITZ INDEX BLDV+IHG-RTO: SIGNIFICANT CHANGE UP
LACTATE BLDV-MCNC: 1.3 MMOL/L — SIGNIFICANT CHANGE UP (ref 0.7–2)
MAGNESIUM SERPL-MCNC: 2.1 MG/DL — SIGNIFICANT CHANGE UP (ref 1.6–2.6)
MCHC RBC-ENTMCNC: 27.7 PG — SIGNIFICANT CHANGE UP (ref 27–34)
MCHC RBC-ENTMCNC: 30.4 GM/DL — LOW (ref 32–36)
MCV RBC AUTO: 91.1 FL — SIGNIFICANT CHANGE UP (ref 80–100)
NRBC # BLD: 0 /100 WBCS — SIGNIFICANT CHANGE UP (ref 0–0)
PCO2 BLDV: 71 MMHG — HIGH (ref 42–55)
PH BLDV: 7.4 — SIGNIFICANT CHANGE UP (ref 7.32–7.43)
PHOSPHATE SERPL-MCNC: 2.3 MG/DL — LOW (ref 2.5–4.5)
PLATELET # BLD AUTO: 330 K/UL — SIGNIFICANT CHANGE UP (ref 150–400)
PO2 BLDV: 127 MMHG — HIGH (ref 25–45)
POTASSIUM BLDV-SCNC: 4 MMOL/L — SIGNIFICANT CHANGE UP (ref 3.5–5.1)
POTASSIUM SERPL-MCNC: 4.2 MMOL/L — SIGNIFICANT CHANGE UP (ref 3.5–5.3)
POTASSIUM SERPL-SCNC: 4.2 MMOL/L — SIGNIFICANT CHANGE UP (ref 3.5–5.3)
PROT SERPL-MCNC: 7.1 G/DL — SIGNIFICANT CHANGE UP (ref 6–8.3)
RBC # BLD: 3.83 M/UL — LOW (ref 4.2–5.8)
RBC # FLD: 12.7 % — SIGNIFICANT CHANGE UP (ref 10.3–14.5)
SAO2 % BLDV: 99.5 % — HIGH (ref 67–88)
SODIUM SERPL-SCNC: 139 MMOL/L — SIGNIFICANT CHANGE UP (ref 135–145)
WBC # BLD: 11.85 K/UL — HIGH (ref 3.8–10.5)
WBC # FLD AUTO: 11.85 K/UL — HIGH (ref 3.8–10.5)

## 2021-09-04 PROCEDURE — 71045 X-RAY EXAM CHEST 1 VIEW: CPT | Mod: 26

## 2021-09-04 PROCEDURE — 99232 SBSQ HOSP IP/OBS MODERATE 35: CPT

## 2021-09-04 PROCEDURE — 71250 CT THORAX DX C-: CPT | Mod: 26

## 2021-09-04 RX ORDER — LIDOCAINE HYDROCHLORIDE AND EPINEPHRINE 10; 10 MG/ML; UG/ML
30 INJECTION, SOLUTION INFILTRATION; PERINEURAL ONCE
Refills: 0 | Status: DISCONTINUED | OUTPATIENT
Start: 2021-09-04 | End: 2021-09-07

## 2021-09-04 RX ADMIN — LIDOCAINE 1 PATCH: 4 CREAM TOPICAL at 11:43

## 2021-09-04 RX ADMIN — BUDESONIDE AND FORMOTEROL FUMARATE DIHYDRATE 2 PUFF(S): 160; 4.5 AEROSOL RESPIRATORY (INHALATION) at 17:26

## 2021-09-04 RX ADMIN — Medication 0.5 MILLIGRAM(S): at 14:09

## 2021-09-04 RX ADMIN — Medication 0.5 MILLIGRAM(S): at 05:57

## 2021-09-04 RX ADMIN — DIPHENHYDRAMINE HYDROCHLORIDE AND LIDOCAINE HYDROCHLORIDE AND ALUMINUM HYDROXIDE AND MAGNESIUM HYDRO 5 MILLILITER(S): KIT at 17:59

## 2021-09-04 RX ADMIN — HYDROMORPHONE HYDROCHLORIDE 1 MILLIGRAM(S): 2 INJECTION INTRAMUSCULAR; INTRAVENOUS; SUBCUTANEOUS at 09:21

## 2021-09-04 RX ADMIN — TRAMADOL HYDROCHLORIDE 25 MILLIGRAM(S): 50 TABLET ORAL at 06:02

## 2021-09-04 RX ADMIN — HYDROMORPHONE HYDROCHLORIDE 1 MILLIGRAM(S): 2 INJECTION INTRAMUSCULAR; INTRAVENOUS; SUBCUTANEOUS at 02:49

## 2021-09-04 RX ADMIN — Medication 1 TABLET(S): at 11:43

## 2021-09-04 RX ADMIN — Medication 3 MILLIGRAM(S): at 21:55

## 2021-09-04 RX ADMIN — Medication 200 MILLIGRAM(S): at 14:09

## 2021-09-04 RX ADMIN — LIDOCAINE 1 PATCH: 4 CREAM TOPICAL at 19:00

## 2021-09-04 RX ADMIN — TRAMADOL HYDROCHLORIDE 25 MILLIGRAM(S): 50 TABLET ORAL at 23:02

## 2021-09-04 RX ADMIN — HYDROMORPHONE HYDROCHLORIDE 1 MILLIGRAM(S): 2 INJECTION INTRAMUSCULAR; INTRAVENOUS; SUBCUTANEOUS at 15:16

## 2021-09-04 RX ADMIN — DIPHENHYDRAMINE HYDROCHLORIDE AND LIDOCAINE HYDROCHLORIDE AND ALUMINUM HYDROXIDE AND MAGNESIUM HYDRO 5 MILLILITER(S): KIT at 05:58

## 2021-09-04 RX ADMIN — DIPHENHYDRAMINE HYDROCHLORIDE AND LIDOCAINE HYDROCHLORIDE AND ALUMINUM HYDROXIDE AND MAGNESIUM HYDRO 5 MILLILITER(S): KIT at 11:42

## 2021-09-04 RX ADMIN — HYDROMORPHONE HYDROCHLORIDE 1 MILLIGRAM(S): 2 INJECTION INTRAMUSCULAR; INTRAVENOUS; SUBCUTANEOUS at 03:00

## 2021-09-04 RX ADMIN — Medication 2000 UNIT(S): at 11:43

## 2021-09-04 RX ADMIN — HYDROMORPHONE HYDROCHLORIDE 1 MILLIGRAM(S): 2 INJECTION INTRAMUSCULAR; INTRAVENOUS; SUBCUTANEOUS at 10:00

## 2021-09-04 RX ADMIN — HYDROMORPHONE HYDROCHLORIDE 1 MILLIGRAM(S): 2 INJECTION INTRAMUSCULAR; INTRAVENOUS; SUBCUTANEOUS at 19:14

## 2021-09-04 RX ADMIN — PANTOPRAZOLE SODIUM 40 MILLIGRAM(S): 20 TABLET, DELAYED RELEASE ORAL at 11:51

## 2021-09-04 RX ADMIN — Medication 200 MILLIGRAM(S): at 05:58

## 2021-09-04 RX ADMIN — Medication 0.5 MILLIGRAM(S): at 21:53

## 2021-09-04 RX ADMIN — TRAMADOL HYDROCHLORIDE 25 MILLIGRAM(S): 50 TABLET ORAL at 06:55

## 2021-09-04 RX ADMIN — LIDOCAINE 1 PATCH: 4 CREAM TOPICAL at 22:58

## 2021-09-04 RX ADMIN — PIPERACILLIN AND TAZOBACTAM 25 GRAM(S): 4; .5 INJECTION, POWDER, LYOPHILIZED, FOR SOLUTION INTRAVENOUS at 05:57

## 2021-09-04 RX ADMIN — TRAMADOL HYDROCHLORIDE 25 MILLIGRAM(S): 50 TABLET ORAL at 21:53

## 2021-09-04 RX ADMIN — PIPERACILLIN AND TAZOBACTAM 25 GRAM(S): 4; .5 INJECTION, POWDER, LYOPHILIZED, FOR SOLUTION INTRAVENOUS at 14:09

## 2021-09-04 RX ADMIN — PIPERACILLIN AND TAZOBACTAM 25 GRAM(S): 4; .5 INJECTION, POWDER, LYOPHILIZED, FOR SOLUTION INTRAVENOUS at 21:54

## 2021-09-04 RX ADMIN — HYDROMORPHONE HYDROCHLORIDE 1 MILLIGRAM(S): 2 INJECTION INTRAMUSCULAR; INTRAVENOUS; SUBCUTANEOUS at 15:45

## 2021-09-04 RX ADMIN — BUDESONIDE AND FORMOTEROL FUMARATE DIHYDRATE 2 PUFF(S): 160; 4.5 AEROSOL RESPIRATORY (INHALATION) at 05:54

## 2021-09-04 RX ADMIN — BENZOCAINE AND MENTHOL 1 LOZENGE: 5; 1 LIQUID ORAL at 21:54

## 2021-09-04 RX ADMIN — Medication 200 MILLIGRAM(S): at 21:55

## 2021-09-04 RX ADMIN — HYDROMORPHONE HYDROCHLORIDE 1 MILLIGRAM(S): 2 INJECTION INTRAMUSCULAR; INTRAVENOUS; SUBCUTANEOUS at 19:20

## 2021-09-04 NOTE — PROGRESS NOTE ADULT - SUBJECTIVE AND OBJECTIVE BOX
Joseph Casey, PGY1  Pager [phone]    INCOMPLETE NOTE - IN PROGRESS    Patient is a 55y old  Male who presents with a chief complaint of COVID PNA (03 Sep 2021 10:38)      SUBJECTIVE/INTERVAL EVENTS: Patient seen and examined at bedside.    MEDICATIONS  (STANDING):  benzocaine 15 mG/menthol 3.6 mG (Sugar-Free) Lozenge 1 Lozenge Oral once  benzonatate 200 milliGRAM(s) Oral every 8 hours  budesonide 160 MICROgram(s)/formoterol 4.5 MICROgram(s) Inhaler 2 Puff(s) Inhalation two times a day  cholecalciferol 2000 Unit(s) Oral daily  clonazePAM  Tablet 0.5 milliGRAM(s) Oral every 8 hours  FIRST- Mouthwash  BLM 5 milliLiter(s) Swish and Spit every 6 hours  lidocaine   4% Patch 1 Patch Transdermal every 24 hours  melatonin 3 milliGRAM(s) Oral at bedtime  multivitamin 1 Tablet(s) Oral daily  pantoprazole  Injectable 40 milliGRAM(s) IV Push daily  piperacillin/tazobactam IVPB.. 3.375 Gram(s) IV Intermittent every 8 hours  polyethylene glycol 3350 17 Gram(s) Oral daily  senna 2 Tablet(s) Oral at bedtime    MEDICATIONS  (PRN):  acetaminophen   Tablet .. 975 milliGRAM(s) Oral every 6 hours PRN Mild Pain (1 - 3)  albuterol/ipratropium for Nebulization 3 milliLiter(s) Nebulizer every 6 hours PRN Shortness of Breath and/or Wheezing  aluminum hydroxide/magnesium hydroxide/simethicone Suspension 30 milliLiter(s) Oral every 4 hours PRN Dyspepsia  benzocaine 15 mG/menthol 3.6 mG (Sugar-Free) Lozenge 1 Lozenge Oral four times a day PRN Sore Throat  cyclobenzaprine 5 milliGRAM(s) Oral three times a day PRN Muscle Spasm  hydrocodone/homatropine Syrup 5 milliLiter(s) Oral every 6 hours PRN worseneing cough  HYDROmorphone  Injectable 1 milliGRAM(s) IV Push every 4 hours PRN Severe Pain (7 - 10)  sodium chloride 0.65% Nasal 1 Spray(s) Both Nostrils every 2 hours PRN Nasal Congestion  traMADol 25 milliGRAM(s) Oral every 6 hours PRN Moderate Pain (4 - 6)      VITAL SIGNS:  T(F): 98.4 (09-04-21 @ 04:21), Max: 98.5 (09-03-21 @ 10:17)  HR: 81 (09-04-21 @ 04:21) (81 - 120)  BP: 115/74 (09-04-21 @ 04:21) (106/70 - 118/72)  RR: 20 (09-04-21 @ 04:21) (18 - 20)  SpO2: 93% (09-04-21 @ 04:21) (93% - 98%)    I&O's Summary    02 Sep 2021 07:01  -  03 Sep 2021 07:00  --------------------------------------------------------  IN: 1100 mL / OUT: 700 mL / NET: 400 mL      Daily     Daily     PHYSICAL EXAM:  Gen: Alert, NAD  HEENT: NCAT, conjunctiva clear, sclera anicteric, no erythema or exudates in the oropharynx, mmm  Neck: Supple, no JVD  CV: RRR, S1S2, no m/r/g  Resp: CTAB, normal respiratory effort  Abd: Soft, nontender, nondistended, normal bowel sounds  Ext: no edema, no clubbing or cyanosis  Neuro: AOx3, CN2-12 grossly intact, LEVIN  SKIN: warm, perfused    LABS:                        10.7   12.63 )-----------( 412      ( 03 Sep 2021 07:42 )             35.3     Hgb Trend: 10.7<--, 11.3<--, 11.8<--, 12.2<--, 10.2<--  09-03    137  |  93<L>  |  8   ----------------------------<  93  3.7   |  36<H>  |  0.65    Ca    9.2      03 Sep 2021 07:35  Phos  3.1     09-03  Mg     2.3     09-03    TPro  7.0  /  Alb  2.8<L>  /  TBili  0.2  /  DBili  x   /  AST  16  /  ALT  26  /  AlkPhos  59  09-03    Creatinine Trend: 0.65<--, 0.66<--, 0.66<--, 0.64<--, 0.77<--, 0.47<--  LIVER FUNCTIONS - ( 03 Sep 2021 07:35 )  Alb: 2.8 g/dL / Pro: 7.0 g/dL / ALK PHOS: 59 U/L / ALT: 26 U/L / AST: 16 U/L / GGT: x                     CAPILLARY BLOOD GLUCOSE          RADIOLOGY & ADDITIONAL TESTS: Reviewed    Imaging Personally Reviewed:    Consultant(s) Notes Reviewed:      Care Discussed with Consultants/Other Providers:   Joseph Casey, PGY1  Pager [phone]    Patient is a 55y old  Male who presents with a chief complaint of COVID PNA (03 Sep 2021 10:38)      SUBJECTIVE/INTERVAL EVENTS: Patient seen and examined at bedside. Pt breathing comfortable, states pain is improving. No other complaints.      MEDICATIONS  (STANDING):  benzocaine 15 mG/menthol 3.6 mG (Sugar-Free) Lozenge 1 Lozenge Oral once  benzonatate 200 milliGRAM(s) Oral every 8 hours  budesonide 160 MICROgram(s)/formoterol 4.5 MICROgram(s) Inhaler 2 Puff(s) Inhalation two times a day  cholecalciferol 2000 Unit(s) Oral daily  clonazePAM  Tablet 0.5 milliGRAM(s) Oral every 8 hours  FIRST- Mouthwash  BLM 5 milliLiter(s) Swish and Spit every 6 hours  lidocaine   4% Patch 1 Patch Transdermal every 24 hours  melatonin 3 milliGRAM(s) Oral at bedtime  multivitamin 1 Tablet(s) Oral daily  pantoprazole  Injectable 40 milliGRAM(s) IV Push daily  piperacillin/tazobactam IVPB.. 3.375 Gram(s) IV Intermittent every 8 hours  polyethylene glycol 3350 17 Gram(s) Oral daily  senna 2 Tablet(s) Oral at bedtime    MEDICATIONS  (PRN):  acetaminophen   Tablet .. 975 milliGRAM(s) Oral every 6 hours PRN Mild Pain (1 - 3)  albuterol/ipratropium for Nebulization 3 milliLiter(s) Nebulizer every 6 hours PRN Shortness of Breath and/or Wheezing  aluminum hydroxide/magnesium hydroxide/simethicone Suspension 30 milliLiter(s) Oral every 4 hours PRN Dyspepsia  benzocaine 15 mG/menthol 3.6 mG (Sugar-Free) Lozenge 1 Lozenge Oral four times a day PRN Sore Throat  cyclobenzaprine 5 milliGRAM(s) Oral three times a day PRN Muscle Spasm  hydrocodone/homatropine Syrup 5 milliLiter(s) Oral every 6 hours PRN worseneing cough  HYDROmorphone  Injectable 1 milliGRAM(s) IV Push every 4 hours PRN Severe Pain (7 - 10)  sodium chloride 0.65% Nasal 1 Spray(s) Both Nostrils every 2 hours PRN Nasal Congestion  traMADol 25 milliGRAM(s) Oral every 6 hours PRN Moderate Pain (4 - 6)      VITAL SIGNS:  T(F): 98.4 (09-04-21 @ 04:21), Max: 98.5 (09-03-21 @ 10:17)  HR: 81 (09-04-21 @ 04:21) (81 - 120)  BP: 115/74 (09-04-21 @ 04:21) (106/70 - 118/72)  RR: 20 (09-04-21 @ 04:21) (18 - 20)  SpO2: 93% (09-04-21 @ 04:21) (93% - 98%)    I&O's Summary    02 Sep 2021 07:01  -  03 Sep 2021 07:00  --------------------------------------------------------  IN: 1100 mL / OUT: 700 mL / NET: 400 mL      Daily     Daily     PHYSICAL EXAM:  Gen: Alert, NAD  HEENT: NCAT, conjunctiva clear, sclera anicteric, no erythema or exudates in the oropharynx, mmm  Neck: Supple, no JVD  CV: RRR, S1S2, no m/r/g  Resp: R side diffuse crackles, L more clear. no respiratory distress.  Abd: Soft, nontender, nondistended, normal bowel sounds  Ext: no edema, no clubbing or cyanosis  Neuro: AOx3, CN2-12 grossly intact, LEVIN  SKIN: warm, perfused, subQ emphysema palpable around chest    LABS:                        10.7   12.63 )-----------( 412      ( 03 Sep 2021 07:42 )             35.3     Hgb Trend: 10.7<--, 11.3<--, 11.8<--, 12.2<--, 10.2<--  09-03    137  |  93<L>  |  8   ----------------------------<  93  3.7   |  36<H>  |  0.65    Ca    9.2      03 Sep 2021 07:35  Phos  3.1     09-03  Mg     2.3     09-03    TPro  7.0  /  Alb  2.8<L>  /  TBili  0.2  /  DBili  x   /  AST  16  /  ALT  26  /  AlkPhos  59  09-03    Creatinine Trend: 0.65<--, 0.66<--, 0.66<--, 0.64<--, 0.77<--, 0.47<--  LIVER FUNCTIONS - ( 03 Sep 2021 07:35 )  Alb: 2.8 g/dL / Pro: 7.0 g/dL / ALK PHOS: 59 U/L / ALT: 26 U/L / AST: 16 U/L / GGT: x                     CAPILLARY BLOOD GLUCOSE          RADIOLOGY & ADDITIONAL TESTS: Reviewed    Imaging Personally Reviewed:    Consultant(s) Notes Reviewed:      Care Discussed with Consultants/Other Providers:

## 2021-09-04 NOTE — PROGRESS NOTE ADULT - ASSESSMENT
54yo M with h/o appendectomy admitted to the hospital with COVID and possible superimposed pneumonia found to have a right moderate-sized pneumothorax with leftward shift and small pleural effusion.     8/26 Right open chest tube placed at bedside, Maintain to wall suction -40mmHg  8/27 Remains on hi flow- increased subq emphysema, repeat CXR w/o signs of increasing PTX, chest tube +airleak, functioning, Maintain to dry suction -40mmHg  8/28 VSS, CXR with subcutaneous emphysema and persistent right ptx, maintain right chest tube to suction.   8/29 VSS, pt still with air leak on chest tube. s/p non-con Chest CT: Trace right pleural effusion, unchanged. Trace right pneumothorax new from prior. Pneumomediastinum, pneumopericardium and bilateral subcutaneous emphysema new from prior.  8/30 +Right chest tube to -40 dry suction, +airleak, pt tolerating high flow nasal cannula. Persistent SubQ emphysema without ventilatory compromise. Cont maintain right chest tube ti -40 mmHg dry suction.   8/31 Cont maintain right chest tube ti -40 mmHg dry suction.   9/1 chest tube with bloody clot output- Hold [id number] to prevent further bleed  9/2  seen and examined in company of Dr Tejada chest tube clot removed under sterile condition tube patent daily xray  9/3  Patient seen and examined with Dr. Tejada.  Chest tube clamped and will get repeat Xray in 4 hours (2030) to evaluate pneumothorax.  Denies dyspnea/shortness of breath.  O2 sats 97%  9/4 VSS.  O2 sats 95% on 5LNC.  Patient seen and examined with Dr. Fraser.  Plan is to get CT Chest Non Con to evaluate right hydropneumothorax with plan for possible IR insertion of chest tube.

## 2021-09-04 NOTE — PROGRESS NOTE ADULT - ASSESSMENT
Echo 8/24/21: EF 65%, mild MR, grossly nl lv sys fx    a/p  56yo M with Hx of DVT s/p achilles tendon repair years ago not on AC presenting with complaints of SOB. intermittent and fevers with cough productive of white sputum for past week, tested positive for covid 2. s/p MICU for tension pneumothorax, s/p chest tube.     #Atypical Chest Pain  -RRT 8/23 x2 for chest pain - exacerbated by cough and inspiration  -improved, secondary to covid PNA, PNX  -trop negative  -no ADHF noted on exam   -CTA without PE   -Echo noted w grossly nl lv sys fx, mild MR     #Covid -19, PNX  -s/p completed courses of Remdesivir x 5 days and Decadron x 10 days   -S/p Tocilizumab 7/30 per ID   -GNR in gram stain from pleural fluid -- on IV abx   -CTA as above   -RRT on 9/1 for hypoxia - HFNC settings adjusted - repeat CXR noted    -R chest tube for pnx -- mgmt per thoracic -- xray noted: per cts "  Plan for CT Chest Non Con to evaluate right hydropneumothorax with plan for possible IR insertion of chest tube.  -AC remains on hold   -pulm / thoracic f/u    #Sinus tachycardia  -resolved  -likely secondary to covid PNA, volume, pain, PNX  -cont to monitor   -echo as above     #DVT (hx)  -LE doppler 8/25 with no evidence of deep venous thrombosis in either lower extremity. Incidental note of thrombosis of the left tibial peroneal trunk with diminished flow flow within the left popliteal artery.  -AC on hold given inc bloody outpt noted in Chest tube   -med f/u     #s/p steroids for laryngitis/obstruction

## 2021-09-04 NOTE — PROGRESS NOTE ADULT - PROBLEM SELECTOR PLAN 1
S/p COVID with complications of PTX and empyema s/p chest tube placed by CT on 8/26  - f/u CT surg recs  - daily AM CXR per CTSx  - d/c HFNC, weaning NC  - per CT, likely eventual repeat CT scan but after possible adjustment of tube to drain other pocket of air  - re-insertion of chest tube 9/4 (AM lovenox held) S/p COVID with complications of PTX and empyema s/p chest tube placed by CT on 8/26  - f/u CT surg recs  - daily AM CXR per CTSx  - d/c HFNC, weaning NC  - per CT, likely eventual repeat CT scan but after possible adjustment of tube to drain other pocket of air  - re-insertion of chest tube 9/4 (AM lovenox held), potentially after CT scan, management per thoracic

## 2021-09-04 NOTE — PROGRESS NOTE ADULT - SUBJECTIVE AND OBJECTIVE BOX
CARDIOLOGY FOLLOW UP - Dr. Terry  DATE OF SERVICE:    CC      REVIEW OF SYSTEMS:  CONSTITUTIONAL: No fever, weight loss, or fatigue  RESPIRATORY: No cough, wheezing, chills or hemoptysis; No Shortness of Breath  CARDIOVASCULAR: No chest pain, palpitations, passing out, dizziness, or leg swelling  GASTROINTESTINAL: No abdominal or epigastric pain. No nausea, vomiting, or hematemesis; No diarrhea or constipation. No melena or hematochezia.  VASCULAR: No edema     PHYSICAL EXAM:  T(C): 36.6 (09-04-21 @ 09:45), Max: 36.9 (09-04-21 @ 04:21)  HR: 85 (09-04-21 @ 11:52) (81 - 100)  BP: 114/73 (09-04-21 @ 09:45) (106/70 - 118/72)  RR: 20 (09-04-21 @ 11:52) (18 - 20)  SpO2: 95% (09-04-21 @ 11:52) (93% - 98%)  Wt(kg): --  I&O's Summary    03 Sep 2021 07:01  -  04 Sep 2021 07:00  --------------------------------------------------------  IN: 200 mL / OUT: 250 mL / NET: -50 mL        Appearance: Normal	  Cardiovascular: Normal S1 S2,RRR, No JVD, No murmurs  Respiratory: diminsihed + chest tube   Gastrointestinal:  Soft, Non-tender, + BS	  Extremities: Normal range of motion, No clubbing, cyanosis or edema      Home Medications:  Albuterol (Eqv-ProAir HFA) 90 mcg/inh inhalation aerosol: 2 puff(s) inhaled every 6 hours, As Needed (29 Jul 2021 09:08)  ivermectin 3 mg oral tablet: 1 tab(s) orally once a day (29 Jul 2021 09:08)  levoFLOXacin 500 mg oral tablet: 1 tab(s) orally every 24 hours (29 Jul 2021 09:08)  predniSONE 50 mg oral tablet: 1 tab(s) orally once a day (29 Jul 2021 09:08)      MEDICATIONS  (STANDING):  benzocaine 15 mG/menthol 3.6 mG (Sugar-Free) Lozenge 1 Lozenge Oral once  benzonatate 200 milliGRAM(s) Oral every 8 hours  budesonide 160 MICROgram(s)/formoterol 4.5 MICROgram(s) Inhaler 2 Puff(s) Inhalation two times a day  cholecalciferol 2000 Unit(s) Oral daily  clonazePAM  Tablet 0.5 milliGRAM(s) Oral every 8 hours  FIRST- Mouthwash  BLM 5 milliLiter(s) Swish and Spit every 6 hours  lidocaine   4% Patch 1 Patch Transdermal every 24 hours  lidocaine 1%/epinephrine 1:100,000 Inj 30 milliLiter(s) Local Injection once  melatonin 3 milliGRAM(s) Oral at bedtime  multivitamin 1 Tablet(s) Oral daily  pantoprazole  Injectable 40 milliGRAM(s) IV Push daily  piperacillin/tazobactam IVPB.. 3.375 Gram(s) IV Intermittent every 8 hours  polyethylene glycol 3350 17 Gram(s) Oral daily  senna 2 Tablet(s) Oral at bedtime      TELEMETRY: 	    ECG:  	  RADIOLOGY: < from: Xray Chest 1 View- PORTABLE-Urgent (Xray Chest 1 View- PORTABLE-Urgent .) (09.04.21 @ 05:25) >  FINDINGS/  IMPRESSION:  Right chest tube again noted. Right chest tube tip appears pulled back compared to prior exam. Correlate clinically Subcutaneous emphysema and hydropneumothorax with increased opacification overlying lateral right lung.    Heart size cannot be accurately assessed in this projection.    --- End of Report ---    < end of copied text >    DIAGNOSTIC TESTING:  [ ] Echocardiogram:  [ ]  Catheterization:  [ ] Stress Test:    OTHER: 	    LABS:	 	                            10.6   11.85 )-----------( 330      ( 04 Sep 2021 07:44 )             34.9     09-04    139  |  94<L>  |  6<L>  ----------------------------<  98  4.2   |  32<H>  |  0.58    Ca    9.4      04 Sep 2021 07:42  Phos  2.3     09-04  Mg     2.1     09-04    TPro  7.1  /  Alb  2.6<L>  /  TBili  0.2  /  DBili  x   /  AST  23  /  ALT  24  /  AlkPhos  64  09-04             CARDIOLOGY FOLLOW UP - Dr. Terry  DATE OF SERVICE:    CC  no new events    REVIEW OF SYSTEMS:  CONSTITUTIONAL: No fever, weight loss, or fatigue  RESPIRATORY: No cough, wheezing, chills or hemoptysis; No Shortness of Breath  CARDIOVASCULAR: No chest pain, palpitations, passing out, dizziness, or leg swelling  GASTROINTESTINAL: No abdominal or epigastric pain. No nausea, vomiting, or hematemesis; No diarrhea or constipation. No melena or hematochezia.  VASCULAR: No edema     PHYSICAL EXAM:  T(C): 36.6 (09-04-21 @ 09:45), Max: 36.9 (09-04-21 @ 04:21)  HR: 85 (09-04-21 @ 11:52) (81 - 100)  BP: 114/73 (09-04-21 @ 09:45) (106/70 - 118/72)  RR: 20 (09-04-21 @ 11:52) (18 - 20)  SpO2: 95% (09-04-21 @ 11:52) (93% - 98%)  Wt(kg): --  I&O's Summary    03 Sep 2021 07:01  -  04 Sep 2021 07:00  --------------------------------------------------------  IN: 200 mL / OUT: 250 mL / NET: -50 mL        Appearance: Normal	  Cardiovascular: Normal S1 S2,RRR, No JVD, No murmurs  Respiratory: diminsihed + chest tube   Gastrointestinal:  Soft, Non-tender, + BS	  Extremities: Normal range of motion, No clubbing, cyanosis or edema      Home Medications:  Albuterol (Eqv-ProAir HFA) 90 mcg/inh inhalation aerosol: 2 puff(s) inhaled every 6 hours, As Needed (29 Jul 2021 09:08)  ivermectin 3 mg oral tablet: 1 tab(s) orally once a day (29 Jul 2021 09:08)  levoFLOXacin 500 mg oral tablet: 1 tab(s) orally every 24 hours (29 Jul 2021 09:08)  predniSONE 50 mg oral tablet: 1 tab(s) orally once a day (29 Jul 2021 09:08)      MEDICATIONS  (STANDING):  benzocaine 15 mG/menthol 3.6 mG (Sugar-Free) Lozenge 1 Lozenge Oral once  benzonatate 200 milliGRAM(s) Oral every 8 hours  budesonide 160 MICROgram(s)/formoterol 4.5 MICROgram(s) Inhaler 2 Puff(s) Inhalation two times a day  cholecalciferol 2000 Unit(s) Oral daily  clonazePAM  Tablet 0.5 milliGRAM(s) Oral every 8 hours  FIRST- Mouthwash  BLM 5 milliLiter(s) Swish and Spit every 6 hours  lidocaine   4% Patch 1 Patch Transdermal every 24 hours  lidocaine 1%/epinephrine 1:100,000 Inj 30 milliLiter(s) Local Injection once  melatonin 3 milliGRAM(s) Oral at bedtime  multivitamin 1 Tablet(s) Oral daily  pantoprazole  Injectable 40 milliGRAM(s) IV Push daily  piperacillin/tazobactam IVPB.. 3.375 Gram(s) IV Intermittent every 8 hours  polyethylene glycol 3350 17 Gram(s) Oral daily  senna 2 Tablet(s) Oral at bedtime      TELEMETRY: 	    ECG:  	  RADIOLOGY: < from: Xray Chest 1 View- PORTABLE-Urgent (Xray Chest 1 View- PORTABLE-Urgent .) (09.04.21 @ 05:25) >  FINDINGS/  IMPRESSION:  Right chest tube again noted. Right chest tube tip appears pulled back compared to prior exam. Correlate clinically Subcutaneous emphysema and hydropneumothorax with increased opacification overlying lateral right lung.    Heart size cannot be accurately assessed in this projection.    --- End of Report ---    < end of copied text >    DIAGNOSTIC TESTING:  [ ] Echocardiogram:  [ ]  Catheterization:  [ ] Stress Test:    OTHER: 	    LABS:	 	                            10.6   11.85 )-----------( 330      ( 04 Sep 2021 07:44 )             34.9     09-04    139  |  94<L>  |  6<L>  ----------------------------<  98  4.2   |  32<H>  |  0.58    Ca    9.4      04 Sep 2021 07:42  Phos  2.3     09-04  Mg     2.1     09-04    TPro  7.1  /  Alb  2.6<L>  /  TBili  0.2  /  DBili  x   /  AST  23  /  ALT  24  /  AlkPhos  64  09-04

## 2021-09-04 NOTE — PROGRESS NOTE ADULT - SUBJECTIVE AND OBJECTIVE BOX
Subjective: "I feel ok " Patient denies chest pain, shortness of breath.       VITAL SIGNS    Vital Signs Last 24 Hrs  T(C): 36.6 (09-04-21 @ 09:45), Max: 36.9 (09-04-21 @ 04:21)  T(F): 97.9 (09-04-21 @ 09:45), Max: 98.4 (09-04-21 @ 04:21)  HR: 100 (09-04-21 @ 09:45) (81 - 100)  BP: 114/73 (09-04-21 @ 09:45) (106/70 - 118/72)  RR: 18 (09-04-21 @ 09:45) (18 - 20)  SpO2: 94% (09-04-21 @ 09:45) (93% - 98%)            09-03 @ 07:01  -  09-04 @ 07:00  --------------------------------------------------------  IN: 200 mL / OUT: 250 mL / NET: -50 mL       Daily     Daily   Admit Wt: Drug Dosing Weight  Height (cm): 170.2 (30 Jul 2021 15:42)  Weight (kg): 92 (30 Jul 2021 15:42)  BMI (kg/m2): 31.8 (30 Jul 2021 15:42)  BSA (m2): 2.03 (30 Jul 2021 15:42)    Bilirubin Total, Serum: 0.2 mg/dL (09-04 @ 07:42)    CAPILLARY BLOOD GLUCOSE                  PHYSICAL EXAM    Neurology: alert and oriented x 3, nonfocal, no gross deficits  CV : RRR +S1/S2  Lungs: clear/diminished. RR easy, unlabored   Abdomen: soft, nontender, nondistended, positive bowel sounds  :  pt voiding without difficulty   Extremities:   LEVIN; No peripheral edema, neg calf tenderness.   PPP bilaterally          Chest tube- Right pleural chest tube, tubing appears patent.  Currently on 40mmHg suction. Draining scant amount of serosanguinous drainage.  No output overnight documentedt.  Chest tube dressing is c/d/i.              acetaminophen   Tablet .. 975 milliGRAM(s) Oral every 6 hours PRN  albuterol/ipratropium for Nebulization 3 milliLiter(s) Nebulizer every 6 hours PRN  aluminum hydroxide/magnesium hydroxide/simethicone Suspension 30 milliLiter(s) Oral every 4 hours PRN  benzocaine 15 mG/menthol 3.6 mG (Sugar-Free) Lozenge 1 Lozenge Oral four times a day PRN  benzocaine 15 mG/menthol 3.6 mG (Sugar-Free) Lozenge 1 Lozenge Oral once  benzonatate 200 milliGRAM(s) Oral every 8 hours  budesonide 160 MICROgram(s)/formoterol 4.5 MICROgram(s) Inhaler 2 Puff(s) Inhalation two times a day  cholecalciferol 2000 Unit(s) Oral daily  clonazePAM  Tablet 0.5 milliGRAM(s) Oral every 8 hours  cyclobenzaprine 5 milliGRAM(s) Oral three times a day PRN  FIRST- Mouthwash  BLM 5 milliLiter(s) Swish and Spit every 6 hours  hydrocodone/homatropine Syrup 5 milliLiter(s) Oral every 6 hours PRN  HYDROmorphone  Injectable 1 milliGRAM(s) IV Push every 4 hours PRN  lidocaine   4% Patch 1 Patch Transdermal every 24 hours  lidocaine 1%/epinephrine 1:100,000 Inj 30 milliLiter(s) Local Injection once  melatonin 3 milliGRAM(s) Oral at bedtime  multivitamin 1 Tablet(s) Oral daily  pantoprazole  Injectable 40 milliGRAM(s) IV Push daily  piperacillin/tazobactam IVPB.. 3.375 Gram(s) IV Intermittent every 8 hours  polyethylene glycol 3350 17 Gram(s) Oral daily  senna 2 Tablet(s) Oral at bedtime  sodium chloride 0.65% Nasal 1 Spray(s) Both Nostrils every 2 hours PRN  traMADol 25 milliGRAM(s) Oral every 6 hours PRN

## 2021-09-04 NOTE — PROGRESS NOTE ADULT - PROBLEM SELECTOR PLAN 1
8/26 Right open chest tube placed at bedside for pneumothorax  8/28 CT Chest reviewed by Dr. Staton, no thoracic surgery intervention indicated, recommend continuing chest tube to L[facility].  9/2 clot in tube evacuated  maintain suction  9/3 No clots noted in tubing.  No air leak noted.   9/4 CT Chest evaluate hydropneumothorax need for possible IR insertion of chest tube  Maintain right chest tube to dry suction -40mmHg  Monitor chest tube for air leak  Daily CXR while chest tube in place  Continue care as per primary team

## 2021-09-04 NOTE — PROGRESS NOTE ADULT - PROBLEM SELECTOR PLAN 4
2nd to COVID PNA, superimposed bacterial PNA, & now ptx   -S/p RRT 8/3 and 8/4 for hypoxia  -Tx to 5ICU 8/10 for further management, tx to 4M   -S/p RRT x2 8/23 for R sided chest pain, pain appears pleuritic in nature  -CTA chest 8/25 with no clear PE  -Tx back to MICU 8/26, now on 5monti (off COVID isolation)   -Hypoxia slowly improving  -Tolerating 6LNC, O2 sats maintaining mid 90s  -Continue to wean O2 as tolerated, keep sats >90%

## 2021-09-04 NOTE — PROGRESS NOTE ADULT - PROBLEM SELECTOR PLAN 1
+COVID PCR as an outpatient  -CXR 8/9 with b/l lung opacities, CXR 8/16 grossly unchanged   -CXR 8/23 with worsening b/l opacties R>L, ?effusion vs mucus plugging/collapse, also with opacities 2nd to COVID   -S/p Remdesivir x 5 days   -S/p Decadron 6mg IVP qd x 10 days (completed 8/10)  -S/p Tocilizumab 7/30 per ID for worsening hypoxic respiratory failure  -Sputum culture 8/4 with normal respiratory aba.   -CTA 8/24 with no clear PE, but ddimer remains elevated. LE duplex 8/25 neg DVT, but with incidental note of thrombosis of the L tibial peroneal trunk with diminished flow within the left popliteal artery. Full dose AC being held 2nd to bloody output from R chest tube. Ppx now held pending adjustment/reinsertion of CT per thoracic.

## 2021-09-04 NOTE — PROGRESS NOTE ADULT - PROBLEM SELECTOR PLAN 4
Incidental note of thrombosis of the left tibial peroneal trunk with diminished flow flow within the left popliteal artery.  - now holding lovenox 90 BID as of 9/1 PM for bloody drainage from chest tube  - 9/3 restarting DVT ppx lovenox. Incidental note of thrombosis of the left tibial peroneal trunk with diminished flow flow within the left popliteal artery.  - now holding lovenox 90 BID as of 9/1 PM for bloody drainage from chest tube  - holding DVT ppx pending thoracic decision to replace chest tube

## 2021-09-04 NOTE — PROGRESS NOTE ADULT - ASSESSMENT
55yr old unvaccinated male with hx of HTN, RLE DVT, and PE (not on home AC) presents with progressively worsening SOB, intermittent fevers, COVID positive, admitted for COVID PNA. Initially admitted on 7/29 to ICU on HF and BiPAP, never intubated. Downgraded to floors on 8/19. 8/26 found to have tension pneumothorax, returned to ICU, R chest tube placed. 8/28 CT scan showed pneumomediastinum, pneumopericardium, bilateral subQ emphysema as well as bilateral opacities and RUL consolidation plus potential cavitary lesion. Gram negative rounds grown in pleural fluid. 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    8/30-31 downgraded to floors on HFNC. Continuing to have R sided pain with cough and inspiration likely 2/2 to COVID pneumonia, tension PTX, effusion, and RUL consolidation/cavitary lesion. Pt comfortable on 6L NC.

## 2021-09-04 NOTE — PROGRESS NOTE ADULT - SUBJECTIVE AND OBJECTIVE BOX
Follow-up Pulm Progress Note    O2 sats 93% on 6LNC  Mild dyspnea  Denies CP    Medications:  MEDICATIONS  (STANDING):  benzocaine 15 mG/menthol 3.6 mG (Sugar-Free) Lozenge 1 Lozenge Oral once  benzonatate 200 milliGRAM(s) Oral every 8 hours  budesonide 160 MICROgram(s)/formoterol 4.5 MICROgram(s) Inhaler 2 Puff(s) Inhalation two times a day  cholecalciferol 2000 Unit(s) Oral daily  clonazePAM  Tablet 0.5 milliGRAM(s) Oral every 8 hours  FIRST- Mouthwash  BLM 5 milliLiter(s) Swish and Spit every 6 hours  lidocaine   4% Patch 1 Patch Transdermal every 24 hours  lidocaine 1%/epinephrine 1:100,000 Inj 30 milliLiter(s) Local Injection once  melatonin 3 milliGRAM(s) Oral at bedtime  multivitamin 1 Tablet(s) Oral daily  pantoprazole  Injectable 40 milliGRAM(s) IV Push daily  piperacillin/tazobactam IVPB.. 3.375 Gram(s) IV Intermittent every 8 hours  polyethylene glycol 3350 17 Gram(s) Oral daily  senna 2 Tablet(s) Oral at bedtime    MEDICATIONS  (PRN):  acetaminophen   Tablet .. 975 milliGRAM(s) Oral every 6 hours PRN Mild Pain (1 - 3)  albuterol/ipratropium for Nebulization 3 milliLiter(s) Nebulizer every 6 hours PRN Shortness of Breath and/or Wheezing  aluminum hydroxide/magnesium hydroxide/simethicone Suspension 30 milliLiter(s) Oral every 4 hours PRN Dyspepsia  benzocaine 15 mG/menthol 3.6 mG (Sugar-Free) Lozenge 1 Lozenge Oral four times a day PRN Sore Throat  cyclobenzaprine 5 milliGRAM(s) Oral three times a day PRN Muscle Spasm  hydrocodone/homatropine Syrup 5 milliLiter(s) Oral every 6 hours PRN worseneing cough  HYDROmorphone  Injectable 1 milliGRAM(s) IV Push every 4 hours PRN Severe Pain (7 - 10)  sodium chloride 0.65% Nasal 1 Spray(s) Both Nostrils every 2 hours PRN Nasal Congestion  traMADol 25 milliGRAM(s) Oral every 6 hours PRN Moderate Pain (4 - 6)      Vital Signs Last 24 Hrs  T(C): 36.6 (04 Sep 2021 09:45), Max: 36.9 (04 Sep 2021 04:21)  T(F): 97.9 (04 Sep 2021 09:45), Max: 98.4 (04 Sep 2021 04:21)  HR: 100 (04 Sep 2021 09:45) (81 - 120)  BP: 114/73 (04 Sep 2021 09:45) (106/70 - 118/72)  BP(mean): --  RR: 18 (04 Sep 2021 09:45) (18 - 20)  SpO2: 94% (04 Sep 2021 09:45) (93% - 98%)    VBG pH 7.40 09-04 @ 07:47    VBG pCO2 71 09-04 @ 07:47    VBG O2 sat 99.5 09-04 @ 07:47    VBG lactate 1.3 09-04 @ 07:47      09-03 @ 07:01  -  09-04 @ 07:00  --------------------------------------------------------  IN: 200 mL / OUT: 250 mL / NET: -50 mL    LABS:                        10.6   11.85 )-----------( 330      ( 04 Sep 2021 07:44 )             34.9     09-04    139  |  94<L>  |  6<L>  ----------------------------<  98  4.2   |  32<H>  |  0.58    Ca    9.4      04 Sep 2021 07:42  Phos  2.3     09-04  Mg     2.1     09-04    TPro  7.1  /  Alb  2.6<L>  /  TBili  0.2  /  DBili  x   /  AST  23  /  ALT  24  /  AlkPhos  64  09-04    Fluid characteristics  -- 08-26 @ 20:39  pH > 7.92  LDH 1640  tprot 4.7    Cell count  Appearance Cloudy  Fluid type --  BF lymph 24  color Orange  eosinophil --  PMN 67  Mesothelial --  Monocyte 9  Other body cells --      CULTURES:    Culture - Blood (collected 08-26-21 @ 00:39)  Source: .Blood Blood  Final Report (08-31-21 @ 01:01):    No Growth Final    Culture - Blood (collected 08-25-21 @ 22:56)  Source: .Blood Blood  Final Report (08-30-21 @ 23:01):    No Growth Final  Culture - Body Fluid with Gram Stain (collected 08-26-21 @ 23:15)  Source: .Body Fluid Pleural Fluid  Gram Stain (08-27-21 @ 03:31):    polymorphonuclear leukocytes    Gram Negative Rods    by cytocentrifuge  Final Report (09-01-21 @ 16:57):    No growth at 6 days.    Organism seen in Gram stain is non-viable after prolonged    incubation and repeated subculture.    Culture - Fungal, Body Fluid (collected 08-26-21 @ 23:15)  Source: .Body Fluid Pleural Fluid  Preliminary Report (08-27-21 @ 06:40):    Testing in progress    Physical Examination:  PULM: coarse BS b/l  CVS: RRR    RADIOLOGY REVIEWED  CXR: b/l opacities, R hydro ptx    CT chest:< from: CT Chest No Cont (08.28.21 @ 12:51) >  FINDINGS:    AIRWAYS, LUNGS and PLEURA: Patent central airways. Diffuse patchy groundglass opacities and consolidative opacities at the left cavitary lesion within the right upper lobe are unchanged. The right chest tube is inserted through the anterior right 4th rib space, the tip is within the right lateral mid pleural space.    MEDIASTINUM AND GREGG: Mildly enlarged mediastinal and hilar lymph nodes are unchanged. Pneumomediastinum and pneumopericardium new from prior.    HEART AND VESSELS: Heart size is normal. No pericardial effusion. Thoracic aorta and pulmonary artery normal in diameter.    VISUALIZED UPPER ABDOMEN: Small calcification within the right kidney, incompletely evaluated.    CHEST WALL AND BONES: No aggressive osseous lesion. Extensive subcutaneous emphysema new from prior.    LOWER NECK: Within normal limits.    IMPRESSION:    In comparison with 8/24/2021, interval insertion of right chest tube. Extensive bilateral patchy groundglass opacities, right upper lobe consolidation and right upper lobe cavitary lesion are unchanged.    Trace right pleural effusion, unchanged. Trace right pneumothorax new from prior.    Pneumomediastinum, pneumopericardium and bilateral subcutaneous emphysema new from prior.    --- End of Report ---        < end of copied text >

## 2021-09-04 NOTE — PROGRESS NOTE ADULT - PROBLEM SELECTOR PLAN 3
CXR 8/26 with R ptx   -S/p R chest tube placement by thoracic 8/26  -+air leak with areas of subcutaneous emphysema, appears to be improving   -Large clot removed from tube 9/2  -Plan for adjustment/reinsertion of CT today per thoracic   -Chest tube to LWS, management per thoracic surgery team   -Daily CXR  -Eventual repeat CT scan

## 2021-09-05 LAB
ALBUMIN SERPL ELPH-MCNC: 2.7 G/DL — LOW (ref 3.3–5)
ALP SERPL-CCNC: 65 U/L — SIGNIFICANT CHANGE UP (ref 40–120)
ALT FLD-CCNC: 24 U/L — SIGNIFICANT CHANGE UP (ref 10–45)
ANION GAP SERPL CALC-SCNC: 8 MMOL/L — SIGNIFICANT CHANGE UP (ref 5–17)
AST SERPL-CCNC: 18 U/L — SIGNIFICANT CHANGE UP (ref 10–40)
BILIRUB SERPL-MCNC: 0.2 MG/DL — SIGNIFICANT CHANGE UP (ref 0.2–1.2)
BUN SERPL-MCNC: 6 MG/DL — LOW (ref 7–23)
CALCIUM SERPL-MCNC: 9.3 MG/DL — SIGNIFICANT CHANGE UP (ref 8.4–10.5)
CHLORIDE SERPL-SCNC: 95 MMOL/L — LOW (ref 96–108)
CO2 SERPL-SCNC: 34 MMOL/L — HIGH (ref 22–31)
CREAT SERPL-MCNC: 0.74 MG/DL — SIGNIFICANT CHANGE UP (ref 0.5–1.3)
GLUCOSE SERPL-MCNC: 99 MG/DL — SIGNIFICANT CHANGE UP (ref 70–99)
HCT VFR BLD CALC: 37.1 % — LOW (ref 39–50)
HGB BLD-MCNC: 11 G/DL — LOW (ref 13–17)
MAGNESIUM SERPL-MCNC: 2.2 MG/DL — SIGNIFICANT CHANGE UP (ref 1.6–2.6)
MCHC RBC-ENTMCNC: 27.7 PG — SIGNIFICANT CHANGE UP (ref 27–34)
MCHC RBC-ENTMCNC: 29.6 GM/DL — LOW (ref 32–36)
MCV RBC AUTO: 93.5 FL — SIGNIFICANT CHANGE UP (ref 80–100)
NRBC # BLD: 0 /100 WBCS — SIGNIFICANT CHANGE UP (ref 0–0)
PHOSPHATE SERPL-MCNC: 2.7 MG/DL — SIGNIFICANT CHANGE UP (ref 2.5–4.5)
PLATELET # BLD AUTO: 284 K/UL — SIGNIFICANT CHANGE UP (ref 150–400)
POTASSIUM SERPL-MCNC: 4.3 MMOL/L — SIGNIFICANT CHANGE UP (ref 3.5–5.3)
POTASSIUM SERPL-SCNC: 4.3 MMOL/L — SIGNIFICANT CHANGE UP (ref 3.5–5.3)
PROT SERPL-MCNC: 7 G/DL — SIGNIFICANT CHANGE UP (ref 6–8.3)
RBC # BLD: 3.97 M/UL — LOW (ref 4.2–5.8)
RBC # FLD: 12.7 % — SIGNIFICANT CHANGE UP (ref 10.3–14.5)
SARS-COV-2 RNA SPEC QL NAA+PROBE: SIGNIFICANT CHANGE UP
SODIUM SERPL-SCNC: 137 MMOL/L — SIGNIFICANT CHANGE UP (ref 135–145)
WBC # BLD: 14.2 K/UL — HIGH (ref 3.8–10.5)
WBC # FLD AUTO: 14.2 K/UL — HIGH (ref 3.8–10.5)

## 2021-09-05 PROCEDURE — 71045 X-RAY EXAM CHEST 1 VIEW: CPT | Mod: 26

## 2021-09-05 PROCEDURE — 71045 X-RAY EXAM CHEST 1 VIEW: CPT | Mod: 26,77

## 2021-09-05 PROCEDURE — 99232 SBSQ HOSP IP/OBS MODERATE 35: CPT

## 2021-09-05 RX ORDER — ENOXAPARIN SODIUM 100 MG/ML
40 INJECTION SUBCUTANEOUS DAILY
Refills: 0 | Status: DISCONTINUED | OUTPATIENT
Start: 2021-09-05 | End: 2021-09-10

## 2021-09-05 RX ADMIN — DIPHENHYDRAMINE HYDROCHLORIDE AND LIDOCAINE HYDROCHLORIDE AND ALUMINUM HYDROXIDE AND MAGNESIUM HYDRO 5 MILLILITER(S): KIT at 17:31

## 2021-09-05 RX ADMIN — HYDROMORPHONE HYDROCHLORIDE 1 MILLIGRAM(S): 2 INJECTION INTRAMUSCULAR; INTRAVENOUS; SUBCUTANEOUS at 01:00

## 2021-09-05 RX ADMIN — Medication 1 TABLET(S): at 11:01

## 2021-09-05 RX ADMIN — DIPHENHYDRAMINE HYDROCHLORIDE AND LIDOCAINE HYDROCHLORIDE AND ALUMINUM HYDROXIDE AND MAGNESIUM HYDRO 5 MILLILITER(S): KIT at 05:02

## 2021-09-05 RX ADMIN — HYDROMORPHONE HYDROCHLORIDE 1 MILLIGRAM(S): 2 INJECTION INTRAMUSCULAR; INTRAVENOUS; SUBCUTANEOUS at 20:29

## 2021-09-05 RX ADMIN — Medication 3 MILLIGRAM(S): at 21:25

## 2021-09-05 RX ADMIN — Medication 200 MILLIGRAM(S): at 21:25

## 2021-09-05 RX ADMIN — TRAMADOL HYDROCHLORIDE 25 MILLIGRAM(S): 50 TABLET ORAL at 13:30

## 2021-09-05 RX ADMIN — SENNA PLUS 2 TABLET(S): 8.6 TABLET ORAL at 21:25

## 2021-09-05 RX ADMIN — Medication 0.5 MILLIGRAM(S): at 05:02

## 2021-09-05 RX ADMIN — HYDROMORPHONE HYDROCHLORIDE 1 MILLIGRAM(S): 2 INJECTION INTRAMUSCULAR; INTRAVENOUS; SUBCUTANEOUS at 05:02

## 2021-09-05 RX ADMIN — HYDROMORPHONE HYDROCHLORIDE 1 MILLIGRAM(S): 2 INJECTION INTRAMUSCULAR; INTRAVENOUS; SUBCUTANEOUS at 14:00

## 2021-09-05 RX ADMIN — Medication 2000 UNIT(S): at 11:02

## 2021-09-05 RX ADMIN — LIDOCAINE 1 PATCH: 4 CREAM TOPICAL at 10:34

## 2021-09-05 RX ADMIN — ENOXAPARIN SODIUM 40 MILLIGRAM(S): 100 INJECTION SUBCUTANEOUS at 17:31

## 2021-09-05 RX ADMIN — HYDROMORPHONE HYDROCHLORIDE 1 MILLIGRAM(S): 2 INJECTION INTRAMUSCULAR; INTRAVENOUS; SUBCUTANEOUS at 10:30

## 2021-09-05 RX ADMIN — PIPERACILLIN AND TAZOBACTAM 25 GRAM(S): 4; .5 INJECTION, POWDER, LYOPHILIZED, FOR SOLUTION INTRAVENOUS at 21:24

## 2021-09-05 RX ADMIN — PANTOPRAZOLE SODIUM 40 MILLIGRAM(S): 20 TABLET, DELAYED RELEASE ORAL at 11:02

## 2021-09-05 RX ADMIN — BUDESONIDE AND FORMOTEROL FUMARATE DIHYDRATE 2 PUFF(S): 160; 4.5 AEROSOL RESPIRATORY (INHALATION) at 17:33

## 2021-09-05 RX ADMIN — PIPERACILLIN AND TAZOBACTAM 25 GRAM(S): 4; .5 INJECTION, POWDER, LYOPHILIZED, FOR SOLUTION INTRAVENOUS at 05:03

## 2021-09-05 RX ADMIN — PIPERACILLIN AND TAZOBACTAM 25 GRAM(S): 4; .5 INJECTION, POWDER, LYOPHILIZED, FOR SOLUTION INTRAVENOUS at 13:21

## 2021-09-05 RX ADMIN — BUDESONIDE AND FORMOTEROL FUMARATE DIHYDRATE 2 PUFF(S): 160; 4.5 AEROSOL RESPIRATORY (INHALATION) at 05:52

## 2021-09-05 RX ADMIN — Medication 0.5 MILLIGRAM(S): at 13:21

## 2021-09-05 RX ADMIN — HYDROMORPHONE HYDROCHLORIDE 1 MILLIGRAM(S): 2 INJECTION INTRAMUSCULAR; INTRAVENOUS; SUBCUTANEOUS at 01:20

## 2021-09-05 RX ADMIN — TRAMADOL HYDROCHLORIDE 25 MILLIGRAM(S): 50 TABLET ORAL at 12:57

## 2021-09-05 RX ADMIN — HYDROMORPHONE HYDROCHLORIDE 1 MILLIGRAM(S): 2 INJECTION INTRAMUSCULAR; INTRAVENOUS; SUBCUTANEOUS at 05:20

## 2021-09-05 RX ADMIN — Medication 0.5 MILLIGRAM(S): at 21:25

## 2021-09-05 RX ADMIN — HYDROMORPHONE HYDROCHLORIDE 1 MILLIGRAM(S): 2 INJECTION INTRAMUSCULAR; INTRAVENOUS; SUBCUTANEOUS at 14:30

## 2021-09-05 RX ADMIN — Medication 200 MILLIGRAM(S): at 13:23

## 2021-09-05 RX ADMIN — LIDOCAINE 1 PATCH: 4 CREAM TOPICAL at 17:45

## 2021-09-05 RX ADMIN — HYDROMORPHONE HYDROCHLORIDE 1 MILLIGRAM(S): 2 INJECTION INTRAMUSCULAR; INTRAVENOUS; SUBCUTANEOUS at 20:59

## 2021-09-05 RX ADMIN — Medication 200 MILLIGRAM(S): at 05:03

## 2021-09-05 RX ADMIN — DIPHENHYDRAMINE HYDROCHLORIDE AND LIDOCAINE HYDROCHLORIDE AND ALUMINUM HYDROXIDE AND MAGNESIUM HYDRO 5 MILLILITER(S): KIT at 11:02

## 2021-09-05 RX ADMIN — HYDROMORPHONE HYDROCHLORIDE 1 MILLIGRAM(S): 2 INJECTION INTRAMUSCULAR; INTRAVENOUS; SUBCUTANEOUS at 09:42

## 2021-09-05 NOTE — PROCEDURE NOTE - ADDITIONAL PROCEDURE DETAILS
14Fr Hayden pneumothorax pigtail catheter placed at right anterior superior chest wall with return of air.

## 2021-09-05 NOTE — PROGRESS NOTE ADULT - ASSESSMENT
Echo 8/24/21: EF 65%, mild MR, grossly nl lv sys fx    a/p  56yo M with Hx of DVT s/p achilles tendon repair years ago not on AC presenting with complaints of SOB. intermittent and fevers with cough productive of white sputum for past week, tested positive for covid 2. Now transferred to MICU for tension pneumothorax, s/p chest tube.     #Atypical Chest Pain  -RRT 8/23 x2 for chest pain - exacerbated by cough and inspiration  -improved, secondary to covid PNA, PNX  -trop negative  -no ADHF noted on exam   -CTA without PE   -Echo noted w grossly nl lv sys fx, mild MR     #Covid -19, PNX  -s/p completed courses of Remdesivir x 5 days and Decadron x 10 days   -S/p Tocilizumab 7/30 per ID   -GNR in gram stain from pleural fluid -- on IV abx   -CTA as above   -RRT on 9/1 for hypoxia - HFNC settings adjusted - repeat CXR noted    -s/p new R chest tube for pnx -- mgmt per thoracic -- chest tube clot removed   -AC remains on hold   -pulm / thoracic f/u    #Sinus tachycardia  -resolved  -likely secondary to covid PNA, volume, pain, PNX  -cont to monitor   -echo as above     #DVT (hx)  -LE doppler 8/25 with no evidence of deep venous thrombosis in either lower extremity. Incidental note of thrombosis of the left tibial peroneal trunk with diminished flow flow within the left popliteal artery.  -AC on hold given inc bloody outpt noted in Chest tube   -med f/u     #s/p steroids for laryngitis/obstruction

## 2021-09-05 NOTE — PROGRESS NOTE ADULT - PROBLEM SELECTOR PLAN 4
Incidental note of thrombosis of the left tibial peroneal trunk with diminished flow flow within the left popliteal artery.  - now holding lovenox 90 BID as of 9/1 PM for bloody drainage from chest tube  - holding DVT ppx pending thoracic decision to replace chest tube

## 2021-09-05 NOTE — PROGRESS NOTE ADULT - SUBJECTIVE AND OBJECTIVE BOX
Alvaro Mcknight MD, PGY-3  Internal Medicine  NS: [phone]//[facility]: [id number]    Patient is a 55y old  Male who presents with a chief complaint of COVID PNA (03 Sep 2021 10:38)      SUBJECTIVE/INTERVAL EVENTS: Chest tube placed overnight. Otherwise PREETI overnight. Patient seen and examined at bedside.      MEDICATIONS  (STANDING):  benzocaine 15 mG/menthol 3.6 mG (Sugar-Free) Lozenge 1 Lozenge Oral once  benzonatate 200 milliGRAM(s) Oral every 8 hours  budesonide 160 MICROgram(s)/formoterol 4.5 MICROgram(s) Inhaler 2 Puff(s) Inhalation two times a day  cholecalciferol 2000 Unit(s) Oral daily  clonazePAM  Tablet 0.5 milliGRAM(s) Oral every 8 hours  FIRST- Mouthwash  BLM 5 milliLiter(s) Swish and Spit every 6 hours  lidocaine   4% Patch 1 Patch Transdermal every 24 hours  lidocaine 1%/epinephrine 1:100,000 Inj 30 milliLiter(s) Local Injection once  melatonin 3 milliGRAM(s) Oral at bedtime  multivitamin 1 Tablet(s) Oral daily  pantoprazole  Injectable 40 milliGRAM(s) IV Push daily  piperacillin/tazobactam IVPB.. 3.375 Gram(s) IV Intermittent every 8 hours  polyethylene glycol 3350 17 Gram(s) Oral daily  senna 2 Tablet(s) Oral at bedtime    MEDICATIONS  (PRN):  acetaminophen   Tablet .. 975 milliGRAM(s) Oral every 6 hours PRN Mild Pain (1 - 3)  albuterol/ipratropium for Nebulization 3 milliLiter(s) Nebulizer every 6 hours PRN Shortness of Breath and/or Wheezing  aluminum hydroxide/magnesium hydroxide/simethicone Suspension 30 milliLiter(s) Oral every 4 hours PRN Dyspepsia  benzocaine 15 mG/menthol 3.6 mG (Sugar-Free) Lozenge 1 Lozenge Oral four times a day PRN Sore Throat  cyclobenzaprine 5 milliGRAM(s) Oral three times a day PRN Muscle Spasm  hydrocodone/homatropine Syrup 5 milliLiter(s) Oral every 6 hours PRN worseneing cough  HYDROmorphone  Injectable 1 milliGRAM(s) IV Push every 4 hours PRN Severe Pain (7 - 10)  sodium chloride 0.65% Nasal 1 Spray(s) Both Nostrils every 2 hours PRN Nasal Congestion  traMADol 25 milliGRAM(s) Oral every 6 hours PRN Moderate Pain (4 - 6)      VITAL SIGNS:    Vital Signs Last 24 Hrs  T(C): 36.5 (05 Sep 2021 05:13), Max: 36.9 (04 Sep 2021 21:42)  T(F): 97.7 (05 Sep 2021 05:13), Max: 98.4 (04 Sep 2021 21:42)  HR: 78 (05 Sep 2021 06:05) (78 - 107)  BP: 117/77 (05 Sep 2021 05:13) (111/74 - 119/74)  BP(mean): --  RR: 18 (05 Sep 2021 05:13) (18 - 20)  SpO2: 98% (05 Sep 2021 06:05) (94% - 98%)    I&O's Summary    04 Sep 2021 07:01  -  05 Sep 2021 07:00  --------------------------------------------------------  IN: 660 mL / OUT: 45 mL / NET: 615 mL      Daily     Daily     PHYSICAL EXAM:  Gen: Alert, NAD  HEENT: NCAT, conjunctiva clear, sclera anicteric, no erythema or exudates in the oropharynx, mmm  Neck: Supple, no JVD  CV: RRR, S1S2, no m/r/g  Resp: R side diffuse crackles, L more clear. no respiratory distress.  Abd: Soft, nontender, nondistended, normal bowel sounds  Ext: no edema, no clubbing or cyanosis  Neuro: AOx3, CN2-12 grossly intact, LEVIN  SKIN: warm, perfused, subQ emphysema palpable around chest    LABS:                       11.0   14.20 )-----------( 284      ( 05 Sep 2021 06:47 )             37.1     09-05    137  |  95<L>  |  6<L>  ----------------------------<  99  4.3   |  34<H>  |  0.74    Ca    9.3      05 Sep 2021 06:47  Phos  2.7     09-05  Mg     2.2     09-05    TPro  7.0  /  Alb  2.7<L>  /  TBili  0.2  /  DBili  x   /  AST  18  /  ALT  24  /  AlkPhos  65  09-05    CAPILLARY BLOOD GLUCOSE    RADIOLOGY & ADDITIONAL TESTS: Reviewed    Imaging Personally Reviewed:    Consultant(s) Notes Reviewed:      Care Discussed with Consultants/Other Providers:

## 2021-09-05 NOTE — PROGRESS NOTE ADULT - PROBLEM SELECTOR PLAN 1
Right chest tube removed without incident. Await cxr post pull  Right apical pigtail catheter remains in place (placed yesterday) with improvement of PTX. Continue chest tube to wall suction for today.  Ok to restart DVT PPX  Thoracic to follow.

## 2021-09-05 NOTE — PROGRESS NOTE ADULT - PROBLEM SELECTOR PLAN 1
S/p COVID with complications of PTX and empyema s/p chest tube placed by CT on 8/26  - f/u CT surg recs  - daily AM CXR per CTSx  - d/c HFNC, weaning NC  - per CT, likely eventual repeat CT scan but after possible adjustment of tube to drain other pocket of air  - re-insertion of chest tube 9/4 (AM lovenox held), potentially after CT scan, management per thoracic

## 2021-09-05 NOTE — PROGRESS NOTE ADULT - ASSESSMENT
54yo M with h/o appendectomy admitted to the hospital with COVID and possible superimposed pneumonia found to have a right moderate-sized pneumothorax with leftward shift and small pleural effusion.     8/26 Right open chest tube placed at bedside, Maintain to wall suction -40mmHg  8/27 Remains on hi flow- increased subq emphysema, repeat CXR w/o signs of increasing PTX, chest tube +airleak, functioning, Maintain to dry suction -40mmHg  8/28 VSS, CXR with subcutaneous emphysema and persistent right ptx, maintain right chest tube to suction.   8/29 VSS, pt still with air leak on chest tube. s/p non-con Chest CT: Trace right pleural effusion, unchanged. Trace right pneumothorax new from prior. Pneumomediastinum, pneumopericardium and bilateral subcutaneous emphysema new from prior.  8/30 +Right chest tube to -40 dry suction, +airleak, pt tolerating high flow nasal cannula. Persistent SubQ emphysema without ventilatory compromise. Cont maintain right chest tube ti -40 mmHg dry suction.   8/31 Cont maintain right chest tube ti -40 mmHg dry suction.   9/1 chest tube with bloody clot output- Hold [id number] to prevent further bleed  9/2  seen and examined in company of Dr Tejada chest tube clot removed under sterile condition tube patent daily xray  9/3  Patient seen and examined with Dr. Tejada.  Chest tube clamped and will get repeat Xray in 4 hours (2030) to evaluate pneumothorax.  Denies dyspnea/shortness of breath.  O2 sats 97%  9/5 Removal of R chest tube. Continue R apical pigtail catheter to LCWS. Ok to restart DVT PPX. D/w Dr. Fraser F/u cxr post pull  9/4 VSS.  O2 sats 95% on 5LNC.  Patient seen and examined with Dr. Fraser.  Plan is to get CT Chest Non Con to evaluate right hydropneumothorax with plan for possible IR insertion of chest tube.

## 2021-09-05 NOTE — PROGRESS NOTE ADULT - SUBJECTIVE AND OBJECTIVE BOX
CC: events noted s/p new  r ct placement    TELEMETRY:     PHYSICAL EXAM:    T(C): 36.5 (09-05-21 @ 05:13), Max: 36.9 (09-04-21 @ 21:42)  HR: 78 (09-05-21 @ 06:05) (78 - 107)  BP: 117/77 (09-05-21 @ 05:13) (111/74 - 119/74)  RR: 18 (09-05-21 @ 05:13) (18 - 20)  SpO2: 98% (09-05-21 @ 06:05) (94% - 98%)  Wt(kg): --  I&O's Summary    04 Sep 2021 07:01  -  05 Sep 2021 07:00  --------------------------------------------------------  IN: 660 mL / OUT: 45 mL / NET: 615 mL        Appearance: Normal	  Cardiovascular: Normal S1 S2,RRR, No JVD, No murmurs  Respiratory: Lungs clear to auscultation	  Gastrointestinal:  Soft, Non-tender, + BS	  Extremities: Normal range of motion, No clubbing, cyanosis or edema  Vascular: Peripheral pulses palpable 2+ bilaterally     LABS:	 	                          11.0   14.20 )-----------( 284      ( 05 Sep 2021 06:47 )             37.1     09-05    137  |  95<L>  |  6<L>  ----------------------------<  99  4.3   |  34<H>  |  0.74    Ca    9.3      05 Sep 2021 06:47  Phos  2.7     09-05  Mg     2.2     09-05    TPro  7.0  /  Alb  2.7<L>  /  TBili  0.2  /  DBili  x   /  AST  18  /  ALT  24  /  AlkPhos  65  09-05          CARDIAC MARKERS:        MEDICATIONS  (STANDING):  benzocaine 15 mG/menthol 3.6 mG (Sugar-Free) Lozenge 1 Lozenge Oral once  benzonatate 200 milliGRAM(s) Oral every 8 hours  budesonide 160 MICROgram(s)/formoterol 4.5 MICROgram(s) Inhaler 2 Puff(s) Inhalation two times a day  cholecalciferol 2000 Unit(s) Oral daily  clonazePAM  Tablet 0.5 milliGRAM(s) Oral every 8 hours  FIRST- Mouthwash  BLM 5 milliLiter(s) Swish and Spit every 6 hours  lidocaine   4% Patch 1 Patch Transdermal every 24 hours  lidocaine 1%/epinephrine 1:100,000 Inj 30 milliLiter(s) Local Injection once  melatonin 3 milliGRAM(s) Oral at bedtime  multivitamin 1 Tablet(s) Oral daily  pantoprazole  Injectable 40 milliGRAM(s) IV Push daily  piperacillin/tazobactam IVPB.. 3.375 Gram(s) IV Intermittent every 8 hours  polyethylene glycol 3350 17 Gram(s) Oral daily  senna 2 Tablet(s) Oral at bedtime

## 2021-09-05 NOTE — PROGRESS NOTE ADULT - SUBJECTIVE AND OBJECTIVE BOX
Subjective: Pt states" " Denies any CP, SOB, palpitations. No acute events overnight.    Vital Signs:  Vital Signs Last 24 Hrs  T(C): 36.8 (09-05-21 @ 12:04), Max: 36.9 (09-04-21 @ 21:42)  T(F): 98.2 (09-05-21 @ 12:04), Max: 98.4 (09-04-21 @ 21:42)  HR: 122 (09-05-21 @ 12:04) (78 - 122)  BP: 108/74 (09-05-21 @ 12:04) (108/74 - 119/74)  RR: 20 (09-05-21 @ 12:04) (18 - 20)  SpO2: 98% (09-05-21 @ 12:04) (95% - 98%) on (O2)        Relevant labs, radiology and Medications reviewed                        11.0   14.20 )-----------( 284      ( 05 Sep 2021 06:47 )             37.1     09-05    137  |  95<L>  |  6<L>  ----------------------------<  99  4.3   |  34<H>  |  0.74    Ca    9.3      05 Sep 2021 06:47  Phos  2.7     09-05  Mg     2.2     09-05    TPro  7.0  /  Alb  2.7<L>  /  TBili  0.2  /  DBili  x   /  AST  18  /  ALT  24  /  AlkPhos  65  09-05      MEDICATIONS  (STANDING):  benzocaine 15 mG/menthol 3.6 mG (Sugar-Free) Lozenge 1 Lozenge Oral once  benzonatate 200 milliGRAM(s) Oral every 8 hours  budesonide 160 MICROgram(s)/formoterol 4.5 MICROgram(s) Inhaler 2 Puff(s) Inhalation two times a day  cholecalciferol 2000 Unit(s) Oral daily  clonazePAM  Tablet 0.5 milliGRAM(s) Oral every 8 hours  FIRST- Mouthwash  BLM 5 milliLiter(s) Swish and Spit every 6 hours  lidocaine   4% Patch 1 Patch Transdermal every 24 hours  lidocaine 1%/epinephrine 1:100,000 Inj 30 milliLiter(s) Local Injection once  melatonin 3 milliGRAM(s) Oral at bedtime  multivitamin 1 Tablet(s) Oral daily  pantoprazole  Injectable 40 milliGRAM(s) IV Push daily  piperacillin/tazobactam IVPB.. 3.375 Gram(s) IV Intermittent every 8 hours  polyethylene glycol 3350 17 Gram(s) Oral daily  senna 2 Tablet(s) Oral at bedtime    MEDICATIONS  (PRN):  acetaminophen   Tablet .. 975 milliGRAM(s) Oral every 6 hours PRN Mild Pain (1 - 3)  albuterol/ipratropium for Nebulization 3 milliLiter(s) Nebulizer every 6 hours PRN Shortness of Breath and/or Wheezing  aluminum hydroxide/magnesium hydroxide/simethicone Suspension 30 milliLiter(s) Oral every 4 hours PRN Dyspepsia  benzocaine 15 mG/menthol 3.6 mG (Sugar-Free) Lozenge 1 Lozenge Oral four times a day PRN Sore Throat  cyclobenzaprine 5 milliGRAM(s) Oral three times a day PRN Muscle Spasm  hydrocodone/homatropine Syrup 5 milliLiter(s) Oral every 6 hours PRN worseneing cough  HYDROmorphone  Injectable 1 milliGRAM(s) IV Push every 4 hours PRN Severe Pain (7 - 10)  sodium chloride 0.65% Nasal 1 Spray(s) Both Nostrils every 2 hours PRN Nasal Congestion  traMADol 25 milliGRAM(s) Oral every 6 hours PRN Moderate Pain (4 - 6)      I&O's Summary    04 Sep 2021 07:01  -  05 Sep 2021 07:00  --------------------------------------------------------  IN: 660 mL / OUT: 45 mL / NET: 615 mL        IMAGING reviewed by Dr. Fraser    CXR:    CT Chest:    PAST MEDICAL & SURGICAL HISTORY:  Hyperlipidemia    HTN (hypertension)    Rupture, tendon, quadriceps    History of Achilles tendon repair         Physical Exam:  Neurology: A&Ox3, nonfocal, LEVIN x 4, NAD  Respiratory: B/L BS CTA, diminished at bases, No wheezing, rales, rhonchi  CV: RRR, S1S2

## 2021-09-06 LAB
ALBUMIN SERPL ELPH-MCNC: 2.5 G/DL — LOW (ref 3.3–5)
ALP SERPL-CCNC: 60 U/L — SIGNIFICANT CHANGE UP (ref 40–120)
ALT FLD-CCNC: 19 U/L — SIGNIFICANT CHANGE UP (ref 10–45)
ANION GAP SERPL CALC-SCNC: 9 MMOL/L — SIGNIFICANT CHANGE UP (ref 5–17)
AST SERPL-CCNC: 14 U/L — SIGNIFICANT CHANGE UP (ref 10–40)
BILIRUB SERPL-MCNC: 0.2 MG/DL — SIGNIFICANT CHANGE UP (ref 0.2–1.2)
BUN SERPL-MCNC: 7 MG/DL — SIGNIFICANT CHANGE UP (ref 7–23)
CALCIUM SERPL-MCNC: 9.1 MG/DL — SIGNIFICANT CHANGE UP (ref 8.4–10.5)
CHLORIDE SERPL-SCNC: 95 MMOL/L — LOW (ref 96–108)
CO2 SERPL-SCNC: 33 MMOL/L — HIGH (ref 22–31)
CREAT SERPL-MCNC: 0.66 MG/DL — SIGNIFICANT CHANGE UP (ref 0.5–1.3)
GLUCOSE SERPL-MCNC: 91 MG/DL — SIGNIFICANT CHANGE UP (ref 70–99)
HCT VFR BLD CALC: 33.4 % — LOW (ref 39–50)
HGB BLD-MCNC: 10.2 G/DL — LOW (ref 13–17)
MAGNESIUM SERPL-MCNC: 2.1 MG/DL — SIGNIFICANT CHANGE UP (ref 1.6–2.6)
MCHC RBC-ENTMCNC: 27.9 PG — SIGNIFICANT CHANGE UP (ref 27–34)
MCHC RBC-ENTMCNC: 30.5 GM/DL — LOW (ref 32–36)
MCV RBC AUTO: 91.5 FL — SIGNIFICANT CHANGE UP (ref 80–100)
NRBC # BLD: 0 /100 WBCS — SIGNIFICANT CHANGE UP (ref 0–0)
PHOSPHATE SERPL-MCNC: 2.6 MG/DL — SIGNIFICANT CHANGE UP (ref 2.5–4.5)
PLATELET # BLD AUTO: 247 K/UL — SIGNIFICANT CHANGE UP (ref 150–400)
POTASSIUM SERPL-MCNC: 4.1 MMOL/L — SIGNIFICANT CHANGE UP (ref 3.5–5.3)
POTASSIUM SERPL-SCNC: 4.1 MMOL/L — SIGNIFICANT CHANGE UP (ref 3.5–5.3)
PROT SERPL-MCNC: 6.8 G/DL — SIGNIFICANT CHANGE UP (ref 6–8.3)
RBC # BLD: 3.65 M/UL — LOW (ref 4.2–5.8)
RBC # FLD: 12.8 % — SIGNIFICANT CHANGE UP (ref 10.3–14.5)
SODIUM SERPL-SCNC: 137 MMOL/L — SIGNIFICANT CHANGE UP (ref 135–145)
WBC # BLD: 14.46 K/UL — HIGH (ref 3.8–10.5)
WBC # FLD AUTO: 14.46 K/UL — HIGH (ref 3.8–10.5)

## 2021-09-06 PROCEDURE — 99232 SBSQ HOSP IP/OBS MODERATE 35: CPT

## 2021-09-06 RX ADMIN — Medication 200 MILLIGRAM(S): at 16:00

## 2021-09-06 RX ADMIN — PIPERACILLIN AND TAZOBACTAM 25 GRAM(S): 4; .5 INJECTION, POWDER, LYOPHILIZED, FOR SOLUTION INTRAVENOUS at 16:00

## 2021-09-06 RX ADMIN — Medication 2000 UNIT(S): at 11:39

## 2021-09-06 RX ADMIN — DIPHENHYDRAMINE HYDROCHLORIDE AND LIDOCAINE HYDROCHLORIDE AND ALUMINUM HYDROXIDE AND MAGNESIUM HYDRO 5 MILLILITER(S): KIT at 19:03

## 2021-09-06 RX ADMIN — Medication 3 MILLIGRAM(S): at 22:45

## 2021-09-06 RX ADMIN — Medication 0.5 MILLIGRAM(S): at 22:45

## 2021-09-06 RX ADMIN — PIPERACILLIN AND TAZOBACTAM 25 GRAM(S): 4; .5 INJECTION, POWDER, LYOPHILIZED, FOR SOLUTION INTRAVENOUS at 22:36

## 2021-09-06 RX ADMIN — BUDESONIDE AND FORMOTEROL FUMARATE DIHYDRATE 2 PUFF(S): 160; 4.5 AEROSOL RESPIRATORY (INHALATION) at 17:47

## 2021-09-06 RX ADMIN — TRAMADOL HYDROCHLORIDE 25 MILLIGRAM(S): 50 TABLET ORAL at 14:13

## 2021-09-06 RX ADMIN — PIPERACILLIN AND TAZOBACTAM 25 GRAM(S): 4; .5 INJECTION, POWDER, LYOPHILIZED, FOR SOLUTION INTRAVENOUS at 05:58

## 2021-09-06 RX ADMIN — SENNA PLUS 2 TABLET(S): 8.6 TABLET ORAL at 22:45

## 2021-09-06 RX ADMIN — HYDROMORPHONE HYDROCHLORIDE 1 MILLIGRAM(S): 2 INJECTION INTRAMUSCULAR; INTRAVENOUS; SUBCUTANEOUS at 20:48

## 2021-09-06 RX ADMIN — HYDROMORPHONE HYDROCHLORIDE 1 MILLIGRAM(S): 2 INJECTION INTRAMUSCULAR; INTRAVENOUS; SUBCUTANEOUS at 16:33

## 2021-09-06 RX ADMIN — HYDROMORPHONE HYDROCHLORIDE 1 MILLIGRAM(S): 2 INJECTION INTRAMUSCULAR; INTRAVENOUS; SUBCUTANEOUS at 06:14

## 2021-09-06 RX ADMIN — BUDESONIDE AND FORMOTEROL FUMARATE DIHYDRATE 2 PUFF(S): 160; 4.5 AEROSOL RESPIRATORY (INHALATION) at 05:18

## 2021-09-06 RX ADMIN — DIPHENHYDRAMINE HYDROCHLORIDE AND LIDOCAINE HYDROCHLORIDE AND ALUMINUM HYDROXIDE AND MAGNESIUM HYDRO 5 MILLILITER(S): KIT at 05:40

## 2021-09-06 RX ADMIN — HYDROMORPHONE HYDROCHLORIDE 1 MILLIGRAM(S): 2 INJECTION INTRAMUSCULAR; INTRAVENOUS; SUBCUTANEOUS at 16:13

## 2021-09-06 RX ADMIN — HYDROMORPHONE HYDROCHLORIDE 1 MILLIGRAM(S): 2 INJECTION INTRAMUSCULAR; INTRAVENOUS; SUBCUTANEOUS at 12:00

## 2021-09-06 RX ADMIN — Medication 1 TABLET(S): at 11:39

## 2021-09-06 RX ADMIN — POLYETHYLENE GLYCOL 3350 17 GRAM(S): 17 POWDER, FOR SOLUTION ORAL at 11:39

## 2021-09-06 RX ADMIN — PANTOPRAZOLE SODIUM 40 MILLIGRAM(S): 20 TABLET, DELAYED RELEASE ORAL at 11:38

## 2021-09-06 RX ADMIN — Medication 200 MILLIGRAM(S): at 22:44

## 2021-09-06 RX ADMIN — TRAMADOL HYDROCHLORIDE 25 MILLIGRAM(S): 50 TABLET ORAL at 22:45

## 2021-09-06 RX ADMIN — DIPHENHYDRAMINE HYDROCHLORIDE AND LIDOCAINE HYDROCHLORIDE AND ALUMINUM HYDROXIDE AND MAGNESIUM HYDRO 5 MILLILITER(S): KIT at 00:05

## 2021-09-06 RX ADMIN — TRAMADOL HYDROCHLORIDE 25 MILLIGRAM(S): 50 TABLET ORAL at 14:20

## 2021-09-06 RX ADMIN — TRAMADOL HYDROCHLORIDE 25 MILLIGRAM(S): 50 TABLET ORAL at 23:30

## 2021-09-06 RX ADMIN — DIPHENHYDRAMINE HYDROCHLORIDE AND LIDOCAINE HYDROCHLORIDE AND ALUMINUM HYDROXIDE AND MAGNESIUM HYDRO 5 MILLILITER(S): KIT at 11:39

## 2021-09-06 RX ADMIN — HYDROMORPHONE HYDROCHLORIDE 1 MILLIGRAM(S): 2 INJECTION INTRAMUSCULAR; INTRAVENOUS; SUBCUTANEOUS at 11:38

## 2021-09-06 RX ADMIN — LIDOCAINE 1 PATCH: 4 CREAM TOPICAL at 19:04

## 2021-09-06 RX ADMIN — ENOXAPARIN SODIUM 40 MILLIGRAM(S): 100 INJECTION SUBCUTANEOUS at 11:38

## 2021-09-06 RX ADMIN — Medication 200 MILLIGRAM(S): at 05:58

## 2021-09-06 RX ADMIN — Medication 0.5 MILLIGRAM(S): at 05:40

## 2021-09-06 RX ADMIN — LIDOCAINE 1 PATCH: 4 CREAM TOPICAL at 12:09

## 2021-09-06 RX ADMIN — HYDROMORPHONE HYDROCHLORIDE 1 MILLIGRAM(S): 2 INJECTION INTRAMUSCULAR; INTRAVENOUS; SUBCUTANEOUS at 02:43

## 2021-09-06 RX ADMIN — Medication 0.5 MILLIGRAM(S): at 16:00

## 2021-09-06 RX ADMIN — HYDROMORPHONE HYDROCHLORIDE 1 MILLIGRAM(S): 2 INJECTION INTRAMUSCULAR; INTRAVENOUS; SUBCUTANEOUS at 03:13

## 2021-09-06 RX ADMIN — HYDROMORPHONE HYDROCHLORIDE 1 MILLIGRAM(S): 2 INJECTION INTRAMUSCULAR; INTRAVENOUS; SUBCUTANEOUS at 20:18

## 2021-09-06 NOTE — PROGRESS NOTE ADULT - PROBLEM SELECTOR PLAN 1
Right chest tube removed without incident.   Maintain Right apical pigtail catheter with improvement of PTX. Continue chest tube to wall suction for today.  Ok to restart DVT PPX  Thoracic to follow.

## 2021-09-06 NOTE — PROGRESS NOTE ADULT - PROBLEM SELECTOR PLAN 2
- f/u B glucans  - pain control with tylenol, tramadol, Dilaudid  - continue hycodan for cough  - continue Zosyn (started 8/26, 2 weeks per ID, last day 9/9)  - trend WBCs  - chest PT

## 2021-09-06 NOTE — PROGRESS NOTE ADULT - SUBJECTIVE AND OBJECTIVE BOX
Joseph Casey, PGY1  Pager [phone]    INCOMPLETE NOTE - IN PROGRESS    Patient is a 55y old  Male who presents with a chief complaint of covid pna (04 Sep 2021 11:43)      SUBJECTIVE/INTERVAL EVENTS: Patient seen and examined at bedside.    MEDICATIONS  (STANDING):  benzocaine 15 mG/menthol 3.6 mG (Sugar-Free) Lozenge 1 Lozenge Oral once  benzonatate 200 milliGRAM(s) Oral every 8 hours  budesonide 160 MICROgram(s)/formoterol 4.5 MICROgram(s) Inhaler 2 Puff(s) Inhalation two times a day  cholecalciferol 2000 Unit(s) Oral daily  clonazePAM  Tablet 0.5 milliGRAM(s) Oral every 8 hours  enoxaparin Injectable 40 milliGRAM(s) SubCutaneous daily  FIRST- Mouthwash  BLM 5 milliLiter(s) Swish and Spit every 6 hours  lidocaine   4% Patch 1 Patch Transdermal every 24 hours  lidocaine 1%/epinephrine 1:100,000 Inj 30 milliLiter(s) Local Injection once  melatonin 3 milliGRAM(s) Oral at bedtime  multivitamin 1 Tablet(s) Oral daily  pantoprazole  Injectable 40 milliGRAM(s) IV Push daily  piperacillin/tazobactam IVPB.. 3.375 Gram(s) IV Intermittent every 8 hours  polyethylene glycol 3350 17 Gram(s) Oral daily  senna 2 Tablet(s) Oral at bedtime    MEDICATIONS  (PRN):  acetaminophen   Tablet .. 975 milliGRAM(s) Oral every 6 hours PRN Mild Pain (1 - 3)  albuterol/ipratropium for Nebulization 3 milliLiter(s) Nebulizer every 6 hours PRN Shortness of Breath and/or Wheezing  aluminum hydroxide/magnesium hydroxide/simethicone Suspension 30 milliLiter(s) Oral every 4 hours PRN Dyspepsia  benzocaine 15 mG/menthol 3.6 mG (Sugar-Free) Lozenge 1 Lozenge Oral four times a day PRN Sore Throat  cyclobenzaprine 5 milliGRAM(s) Oral three times a day PRN Muscle Spasm  hydrocodone/homatropine Syrup 5 milliLiter(s) Oral every 6 hours PRN worseneing cough  HYDROmorphone  Injectable 1 milliGRAM(s) IV Push every 4 hours PRN Severe Pain (7 - 10)  sodium chloride 0.65% Nasal 1 Spray(s) Both Nostrils every 2 hours PRN Nasal Congestion  traMADol 25 milliGRAM(s) Oral every 6 hours PRN Moderate Pain (4 - 6)      VITAL SIGNS:  T(F): 97.7 (09-06-21 @ 04:51), Max: 100.4 (09-05-21 @ 20:33)  HR: 103 (09-06-21 @ 04:51) (102 - 122)  BP: 100/69 (09-06-21 @ 04:51) (100/69 - 110/62)  RR: 18 (09-06-21 @ 04:51) (18 - 20)  SpO2: 97% (09-06-21 @ 04:51) (94% - 98%)    I&O's Summary    04 Sep 2021 07:01  -  05 Sep 2021 07:00  --------------------------------------------------------  IN: 660 mL / OUT: 45 mL / NET: 615 mL    05 Sep 2021 07:01  -  06 Sep 2021 06:06  --------------------------------------------------------  IN: 0 mL / OUT: 730 mL / NET: -730 mL      Daily     Daily     PHYSICAL EXAM:  Gen: Alert, NAD  HEENT: NCAT, conjunctiva clear, sclera anicteric, no erythema or exudates in the oropharynx, mmm  Neck: Supple, no JVD  CV: RRR, S1S2, no m/r/g  Resp: CTAB, normal respiratory effort  Abd: Soft, nontender, nondistended, normal bowel sounds  Ext: no edema, no clubbing or cyanosis  Neuro: AOx3, CN2-12 grossly intact, LEVIN  SKIN: warm, perfused    LABS:                        11.0   14.20 )-----------( 284      ( 05 Sep 2021 06:47 )             37.1     Hgb Trend: 11.0<--, 10.6<--, 10.7<--, 11.3<--, 11.8<--  09-05    137  |  95<L>  |  6<L>  ----------------------------<  99  4.3   |  34<H>  |  0.74    Ca    9.3      05 Sep 2021 06:47  Phos  2.7     09-05  Mg     2.2     09-05    TPro  7.0  /  Alb  2.7<L>  /  TBili  0.2  /  DBili  x   /  AST  18  /  ALT  24  /  AlkPhos  65  09-05    Creatinine Trend: 0.74<--, 0.58<--, 0.65<--, 0.66<--, 0.66<--, 0.64<--  LIVER FUNCTIONS - ( 05 Sep 2021 06:47 )  Alb: 2.7 g/dL / Pro: 7.0 g/dL / ALK PHOS: 65 U/L / ALT: 24 U/L / AST: 18 U/L / GGT: x                     CAPILLARY BLOOD GLUCOSE          RADIOLOGY & ADDITIONAL TESTS: Reviewed    Imaging Personally Reviewed:    Consultant(s) Notes Reviewed:      Care Discussed with Consultants/Other Providers:   Joseph Casey, PGY1  Pager [phone]      Patient is a 55y old  Male who presents with a chief complaint of covid pna (04 Sep 2021 11:43)      SUBJECTIVE/INTERVAL EVENTS: Patient seen and examined at bedside. Patient has no complaints. Stated new chest tube insertion was not painful. He is breathing comfortably and looks forward to going home.    MEDICATIONS  (STANDING):  benzocaine 15 mG/menthol 3.6 mG (Sugar-Free) Lozenge 1 Lozenge Oral once  benzonatate 200 milliGRAM(s) Oral every 8 hours  budesonide 160 MICROgram(s)/formoterol 4.5 MICROgram(s) Inhaler 2 Puff(s) Inhalation two times a day  cholecalciferol 2000 Unit(s) Oral daily  clonazePAM  Tablet 0.5 milliGRAM(s) Oral every 8 hours  enoxaparin Injectable 40 milliGRAM(s) SubCutaneous daily  FIRST- Mouthwash  BLM 5 milliLiter(s) Swish and Spit every 6 hours  lidocaine   4% Patch 1 Patch Transdermal every 24 hours  lidocaine 1%/epinephrine 1:100,000 Inj 30 milliLiter(s) Local Injection once  melatonin 3 milliGRAM(s) Oral at bedtime  multivitamin 1 Tablet(s) Oral daily  pantoprazole  Injectable 40 milliGRAM(s) IV Push daily  piperacillin/tazobactam IVPB.. 3.375 Gram(s) IV Intermittent every 8 hours  polyethylene glycol 3350 17 Gram(s) Oral daily  senna 2 Tablet(s) Oral at bedtime    MEDICATIONS  (PRN):  acetaminophen   Tablet .. 975 milliGRAM(s) Oral every 6 hours PRN Mild Pain (1 - 3)  albuterol/ipratropium for Nebulization 3 milliLiter(s) Nebulizer every 6 hours PRN Shortness of Breath and/or Wheezing  aluminum hydroxide/magnesium hydroxide/simethicone Suspension 30 milliLiter(s) Oral every 4 hours PRN Dyspepsia  benzocaine 15 mG/menthol 3.6 mG (Sugar-Free) Lozenge 1 Lozenge Oral four times a day PRN Sore Throat  cyclobenzaprine 5 milliGRAM(s) Oral three times a day PRN Muscle Spasm  hydrocodone/homatropine Syrup 5 milliLiter(s) Oral every 6 hours PRN worseneing cough  HYDROmorphone  Injectable 1 milliGRAM(s) IV Push every 4 hours PRN Severe Pain (7 - 10)  sodium chloride 0.65% Nasal 1 Spray(s) Both Nostrils every 2 hours PRN Nasal Congestion  traMADol 25 milliGRAM(s) Oral every 6 hours PRN Moderate Pain (4 - 6)      VITAL SIGNS:  T(F): 97.7 (09-06-21 @ 04:51), Max: 100.4 (09-05-21 @ 20:33)  HR: 103 (09-06-21 @ 04:51) (102 - 122)  BP: 100/69 (09-06-21 @ 04:51) (100/69 - 110/62)  RR: 18 (09-06-21 @ 04:51) (18 - 20)  SpO2: 97% (09-06-21 @ 04:51) (94% - 98%)    I&O's Summary    04 Sep 2021 07:01  -  05 Sep 2021 07:00  --------------------------------------------------------  IN: 660 mL / OUT: 45 mL / NET: 615 mL    05 Sep 2021 07:01  -  06 Sep 2021 06:06  --------------------------------------------------------  IN: 0 mL / OUT: 730 mL / NET: -730 mL      Daily     Daily     PHYSICAL EXAM:  Gen: Alert, NAD  HEENT: NCAT, conjunctiva clear, sclera anicteric, no erythema or exudates in the oropharynx, mmm  Neck: Supple, no JVD  CV: RRR, S1S2, no m/r/g  Resp: inspiratory crackles on R. L side clear anteriorly. No increased WOB.  Abd: Soft, nontender, nondistended, normal bowel sounds  Ext: no edema, no clubbing or cyanosis  Neuro: AOx3, CN2-12 grossly intact, LEVIN  SKIN: warm, perfused. Decreasing subQ emphysema anterior chest wall.    LABS:                        11.0   14.20 )-----------( 284      ( 05 Sep 2021 06:47 )             37.1     Hgb Trend: 11.0<--, 10.6<--, 10.7<--, 11.3<--, 11.8<--  09-05    137  |  95<L>  |  6<L>  ----------------------------<  99  4.3   |  34<H>  |  0.74    Ca    9.3      05 Sep 2021 06:47  Phos  2.7     09-05  Mg     2.2     09-05    TPro  7.0  /  Alb  2.7<L>  /  TBili  0.2  /  DBili  x   /  AST  18  /  ALT  24  /  AlkPhos  65  09-05    Creatinine Trend: 0.74<--, 0.58<--, 0.65<--, 0.66<--, 0.66<--, 0.64<--  LIVER FUNCTIONS - ( 05 Sep 2021 06:47 )  Alb: 2.7 g/dL / Pro: 7.0 g/dL / ALK PHOS: 65 U/L / ALT: 24 U/L / AST: 18 U/L / GGT: x                     CAPILLARY BLOOD GLUCOSE          RADIOLOGY & ADDITIONAL TESTS: Reviewed    Imaging Personally Reviewed:    Consultant(s) Notes Reviewed:      Care Discussed with Consultants/Other Providers:   Joseph Casey, PGY1  Pager [phone]      Patient is a 55y old  Male who presents with a chief complaint of covid pna (04 Sep 2021 11:43)      SUBJECTIVE/INTERVAL EVENTS: Patient seen and examined at bedside. Patient has no complaints. Stated new chest tube insertion was not painful. He is breathing comfortably and looks forward to going home.    Tolerated wean to 4L NC well  Per nurse, pt produced 300mL from chest tube upon insertion and additional 250 overnight.    MEDICATIONS  (STANDING):  benzocaine 15 mG/menthol 3.6 mG (Sugar-Free) Lozenge 1 Lozenge Oral once  benzonatate 200 milliGRAM(s) Oral every 8 hours  budesonide 160 MICROgram(s)/formoterol 4.5 MICROgram(s) Inhaler 2 Puff(s) Inhalation two times a day  cholecalciferol 2000 Unit(s) Oral daily  clonazePAM  Tablet 0.5 milliGRAM(s) Oral every 8 hours  enoxaparin Injectable 40 milliGRAM(s) SubCutaneous daily  FIRST- Mouthwash  BLM 5 milliLiter(s) Swish and Spit every 6 hours  lidocaine   4% Patch 1 Patch Transdermal every 24 hours  lidocaine 1%/epinephrine 1:100,000 Inj 30 milliLiter(s) Local Injection once  melatonin 3 milliGRAM(s) Oral at bedtime  multivitamin 1 Tablet(s) Oral daily  pantoprazole  Injectable 40 milliGRAM(s) IV Push daily  piperacillin/tazobactam IVPB.. 3.375 Gram(s) IV Intermittent every 8 hours  polyethylene glycol 3350 17 Gram(s) Oral daily  senna 2 Tablet(s) Oral at bedtime    MEDICATIONS  (PRN):  acetaminophen   Tablet .. 975 milliGRAM(s) Oral every 6 hours PRN Mild Pain (1 - 3)  albuterol/ipratropium for Nebulization 3 milliLiter(s) Nebulizer every 6 hours PRN Shortness of Breath and/or Wheezing  aluminum hydroxide/magnesium hydroxide/simethicone Suspension 30 milliLiter(s) Oral every 4 hours PRN Dyspepsia  benzocaine 15 mG/menthol 3.6 mG (Sugar-Free) Lozenge 1 Lozenge Oral four times a day PRN Sore Throat  cyclobenzaprine 5 milliGRAM(s) Oral three times a day PRN Muscle Spasm  hydrocodone/homatropine Syrup 5 milliLiter(s) Oral every 6 hours PRN worseneing cough  HYDROmorphone  Injectable 1 milliGRAM(s) IV Push every 4 hours PRN Severe Pain (7 - 10)  sodium chloride 0.65% Nasal 1 Spray(s) Both Nostrils every 2 hours PRN Nasal Congestion  traMADol 25 milliGRAM(s) Oral every 6 hours PRN Moderate Pain (4 - 6)      VITAL SIGNS:  T(F): 97.7 (09-06-21 @ 04:51), Max: 100.4 (09-05-21 @ 20:33)  HR: 103 (09-06-21 @ 04:51) (102 - 122)  BP: 100/69 (09-06-21 @ 04:51) (100/69 - 110/62)  RR: 18 (09-06-21 @ 04:51) (18 - 20)  SpO2: 97% (09-06-21 @ 04:51) (94% - 98%)    I&O's Summary    04 Sep 2021 07:01  -  05 Sep 2021 07:00  --------------------------------------------------------  IN: 660 mL / OUT: 45 mL / NET: 615 mL    05 Sep 2021 07:01  -  06 Sep 2021 06:06  --------------------------------------------------------  IN: 0 mL / OUT: 730 mL / NET: -730 mL      Daily     Daily     PHYSICAL EXAM:  Gen: Alert, NAD  HEENT: NCAT, conjunctiva clear, sclera anicteric, no erythema or exudates in the oropharynx, mmm  Neck: Supple, no JVD  CV: RRR, S1S2, no m/r/g  Resp: inspiratory crackles on R. L side clear anteriorly. No increased WOB.  Abd: Soft, nontender, nondistended, normal bowel sounds  Ext: no edema, no clubbing or cyanosis  Neuro: AOx3, CN2-12 grossly intact, LEVIN  SKIN: warm, perfused. Decreasing subQ emphysema anterior chest wall.    LABS:                        11.0   14.20 )-----------( 284      ( 05 Sep 2021 06:47 )             37.1     Hgb Trend: 11.0<--, 10.6<--, 10.7<--, 11.3<--, 11.8<--  09-05    137  |  95<L>  |  6<L>  ----------------------------<  99  4.3   |  34<H>  |  0.74    Ca    9.3      05 Sep 2021 06:47  Phos  2.7     09-05  Mg     2.2     09-05    TPro  7.0  /  Alb  2.7<L>  /  TBili  0.2  /  DBili  x   /  AST  18  /  ALT  24  /  AlkPhos  65  09-05    Creatinine Trend: 0.74<--, 0.58<--, 0.65<--, 0.66<--, 0.66<--, 0.64<--  LIVER FUNCTIONS - ( 05 Sep 2021 06:47 )  Alb: 2.7 g/dL / Pro: 7.0 g/dL / ALK PHOS: 65 U/L / ALT: 24 U/L / AST: 18 U/L / GGT: x                     CAPILLARY BLOOD GLUCOSE          RADIOLOGY & ADDITIONAL TESTS: Reviewed    Imaging Personally Reviewed:    Consultant(s) Notes Reviewed:      Care Discussed with Consultants/Other Providers:

## 2021-09-06 NOTE — PROGRESS NOTE ADULT - SUBJECTIVE AND OBJECTIVE BOX
VITAL SIGNS      Vital Signs Last 24 Hrs  T(C): 36.5 (09-06-21 @ 09:48), Max: 38 (09-05-21 @ 20:33)  T(F): 97.7 (09-06-21 @ 09:48), Max: 100.4 (09-05-21 @ 20:33)  HR: 97 (09-06-21 @ 09:48) (97 - 111)  BP: 99/69 (09-06-21 @ 09:48) (99/69 - 110/62)  RR: 22 (09-06-21 @ 09:48) (18 - 22)  SpO2: 100% (09-06-21 @ 09:48) (95% - 100%)            09-05 @ 07:01  -  09-06 @ 07:00  --------------------------------------------------------  IN: 0 mL / OUT: 755 mL / NET: -755 mL       Daily     Daily   Admit Wt: Drug Dosing Weight  Height (cm): 170.2 (30 Jul 2021 15:42)  Weight (kg): 92 (30 Jul 2021 15:42)  BMI (kg/m2): 31.8 (30 Jul 2021 15:42)  BSA (m2): 2.03 (30 Jul 2021 15:42)    Bilirubin Total, Serum: 0.2 mg/dL (09-06 @ 07:16)    CAPILLARY BLOOD GLUCOSE              MEDICATIONS  acetaminophen   Tablet .. 975 milliGRAM(s) Oral every 6 hours PRN  albuterol/ipratropium for Nebulization 3 milliLiter(s) Nebulizer every 6 hours PRN  aluminum hydroxide/magnesium hydroxide/simethicone Suspension 30 milliLiter(s) Oral every 4 hours PRN  benzocaine 15 mG/menthol 3.6 mG (Sugar-Free) Lozenge 1 Lozenge Oral once  benzocaine 15 mG/menthol 3.6 mG (Sugar-Free) Lozenge 1 Lozenge Oral four times a day PRN  benzonatate 200 milliGRAM(s) Oral every 8 hours  budesonide 160 MICROgram(s)/formoterol 4.5 MICROgram(s) Inhaler 2 Puff(s) Inhalation two times a day  cholecalciferol 2000 Unit(s) Oral daily  clonazePAM  Tablet 0.5 milliGRAM(s) Oral every 8 hours  cyclobenzaprine 5 milliGRAM(s) Oral three times a day PRN  enoxaparin Injectable 40 milliGRAM(s) SubCutaneous daily  FIRST- Mouthwash  BLM 5 milliLiter(s) Swish and Spit every 6 hours  hydrocodone/homatropine Syrup 5 milliLiter(s) Oral every 6 hours PRN  HYDROmorphone  Injectable 1 milliGRAM(s) IV Push every 4 hours PRN  lidocaine   4% Patch 1 Patch Transdermal every 24 hours  lidocaine 1%/epinephrine 1:100,000 Inj 30 milliLiter(s) Local Injection once  melatonin 3 milliGRAM(s) Oral at bedtime  multivitamin 1 Tablet(s) Oral daily  pantoprazole  Injectable 40 milliGRAM(s) IV Push daily  piperacillin/tazobactam IVPB.. 3.375 Gram(s) IV Intermittent every 8 hours  polyethylene glycol 3350 17 Gram(s) Oral daily  senna 2 Tablet(s) Oral at bedtime  sodium chloride 0.65% Nasal 1 Spray(s) Both Nostrils every 2 hours PRN  traMADol 25 milliGRAM(s) Oral every 6 hours PRN      >>> <<<  PHYSICAL EXAM  Subjective: NAD, rush  Neurology: alert and oriented x 3, nonfocal, no gross deficits  CV : s1s2  Lungs: diminished b/l bases, right anterior chest tube to lws, no air leak -10/55ml  Abdomen: soft, NT,ND, ( +)BM  :  voiding  Extremities:  -c/c/e     LABS  09-06    137  |  95<L>  |  7   ----------------------------<  91  4.1   |  33<H>  |  0.66    Ca    9.1      06 Sep 2021 07:16  Phos  2.6     09-06  Mg     2.1     09-06    TPro  6.8  /  Alb  2.5<L>  /  TBili  0.2  /  DBili  x   /  AST  14  /  ALT  19  /  AlkPhos  60  09-06                                 10.2   14.46 )-----------( 247      ( 06 Sep 2021 07:16 )             33.4                 PAST MEDICAL & SURGICAL HISTORY:  Hyperlipidemia    HTN (hypertension)    Rupture, tendon, quadriceps    History of Achilles tendon repair

## 2021-09-06 NOTE — PROGRESS NOTE ADULT - SUBJECTIVE AND OBJECTIVE BOX
CC: no events    TELEMETRY:     PHYSICAL EXAM:    T(C): 36.5 (09-06-21 @ 04:51), Max: 38 (09-05-21 @ 20:33)  HR: 103 (09-06-21 @ 04:51) (102 - 122)  BP: 100/69 (09-06-21 @ 04:51) (100/69 - 110/62)  RR: 18 (09-06-21 @ 04:51) (18 - 20)  SpO2: 97% (09-06-21 @ 04:51) (94% - 98%)  Wt(kg): --  I&O's Summary    05 Sep 2021 07:01  -  06 Sep 2021 07:00  --------------------------------------------------------  IN: 0 mL / OUT: 755 mL / NET: -755 mL        Appearance: Normal	  Cardiovascular: Normal S1 S2,RRR, No JVD, No murmurs  Respiratory: Lungs clear to auscultation	  Gastrointestinal:  Soft, Non-tender, + BS	  Extremities: Normal range of motion, No clubbing, cyanosis or edema  Vascular: Peripheral pulses palpable 2+ bilaterally     LABS:	 	                          11.0   14.20 )-----------( 284      ( 05 Sep 2021 06:47 )             37.1     09-05    137  |  95<L>  |  6<L>  ----------------------------<  99  4.3   |  34<H>  |  0.74    Ca    9.3      05 Sep 2021 06:47  Phos  2.7     09-05  Mg     2.2     09-05    TPro  7.0  /  Alb  2.7<L>  /  TBili  0.2  /  DBili  x   /  AST  18  /  ALT  24  /  AlkPhos  65  09-05          CARDIAC MARKERS:

## 2021-09-06 NOTE — PROGRESS NOTE ADULT - ASSESSMENT
54yo M with h/o appendectomy admitted to the hospital with COVID and possible superimposed pneumonia found to have a right moderate-sized pneumothorax with leftward shift and small pleural effusion.     8/26 Right open chest tube placed at bedside, Maintain to wall suction -40mmHg  8/27 Remains on hi flow- increased subq emphysema, repeat CXR w/o signs of increasing PTX, chest tube +airleak, functioning, Maintain to dry suction -40mmHg  8/28 VSS, CXR with subcutaneous emphysema and persistent right ptx, maintain right chest tube to suction.   8/29 VSS, pt still with air leak on chest tube. s/p non-con Chest CT: Trace right pleural effusion, unchanged. Trace right pneumothorax new from prior. Pneumomediastinum, pneumopericardium and bilateral subcutaneous emphysema new from prior.  8/30 +Right chest tube to -40 dry suction, +airleak, pt tolerating high flow nasal cannula. Persistent SubQ emphysema without ventilatory compromise. Cont maintain right chest tube ti -40 mmHg dry suction.   8/31 Cont maintain right chest tube ti -40 mmHg dry suction.   9/1 chest tube with bloody clot output- Hold [id number] to prevent further bleed  9/2  seen and examined in company of Dr Tejada chest tube clot removed under sterile condition tube patent daily xray  9/3  Patient seen and examined with Dr. Tejada.  Chest tube clamped and will get repeat Xray in 4 hours (2030) to evaluate pneumothorax.  Denies dyspnea/shortness of breath.  O2 sats 97%  9/5 Removal of R chest tube. Continue R apical pigtail catheter to LCWS. Ok to restart DVT PPX. D/w Dr. Fraser F/u cxr post pull  9/4 VSS.  O2 sats 95% on 5LNC.  Patient seen and examined with Dr. Fraser.  Plan is to get CT Chest Non Con to evaluate right hydropneumothorax with plan for possible IR insertion of chest tube.    9/6 Chest xray  demonstrates b/l pleural effusion. Maintain right pigtail, CXR in am, no airleak , minimal output

## 2021-09-06 NOTE — PROGRESS NOTE ADULT - ASSESSMENT
Echo 8/24/21: EF 65%, mild MR, grossly nl lv sys fx    a/p  54yo M with Hx of DVT s/p achilles tendon repair years ago not on AC presenting with complaints of SOB. intermittent and fevers with cough productive of white sputum for past week, tested positive for covid 2. Now transferred to MICU for tension pneumothorax, s/p chest tube.     #Atypical Chest Pain  -RRT 8/23 x2 for chest pain - exacerbated by cough and inspiration  -improved, secondary to covid PNA, PNX  -trop negative  -no ADHF noted on exam   -CTA without PE   -Echo noted w grossly nl lv sys fx, mild MR     #Covid -19, PNX  -s/p completed courses of Remdesivir x 5 days and Decadron x 10 days   -S/p Tocilizumab 7/30 per ID   -GNR in gram stain from pleural fluid -- on IV abx   -CTA as above   -RRT on 9/1 for hypoxia - HFNC settings adjusted - repeat CXR noted    -s/p new R chest tube for pnx -- mgmt per thoracic -- chest tube clot removed   -AC remains on hold   -pulm / thoracic f/u    #Sinus tachycardia  -resolved  -likely secondary to covid PNA, volume, pain, PNX  -cont to monitor   -echo as above     #DVT (hx)  -LE doppler 8/25 with no evidence of deep venous thrombosis in either lower extremity. Incidental note of thrombosis of the left tibial peroneal trunk with diminished flow flow within the left popliteal artery.  -AC on hold given inc bloody outpt noted in Chest tube   -med f/u     #s/p steroids for laryngitis/obstruction

## 2021-09-06 NOTE — PROGRESS NOTE ADULT - PROBLEM SELECTOR PLAN 4
Incidental note of thrombosis of the left tibial peroneal trunk with diminished flow flow within the left popliteal artery.  - now holding lovenox 90 BID as of 9/1 PM for bloody drainage from chest tube

## 2021-09-06 NOTE — PROGRESS NOTE ADULT - PROBLEM SELECTOR PLAN 1
S/p COVID with complications of PTX and empyema s/p chest tube placed by CT on 8/26  - 9/4 CT: increased loculated R PTX  - f/u thoracic recs  - daily AM CXR per thoracic  - NC  -9/5 pigtail cath placed R ant sup chest wall to wall suction

## 2021-09-07 LAB
ALBUMIN SERPL ELPH-MCNC: 2.6 G/DL — LOW (ref 3.3–5)
ALP SERPL-CCNC: 57 U/L — SIGNIFICANT CHANGE UP (ref 40–120)
ALT FLD-CCNC: 21 U/L — SIGNIFICANT CHANGE UP (ref 10–45)
ANION GAP SERPL CALC-SCNC: 9 MMOL/L — SIGNIFICANT CHANGE UP (ref 5–17)
AST SERPL-CCNC: 17 U/L — SIGNIFICANT CHANGE UP (ref 10–40)
BILIRUB SERPL-MCNC: 0.2 MG/DL — SIGNIFICANT CHANGE UP (ref 0.2–1.2)
BUN SERPL-MCNC: 4 MG/DL — LOW (ref 7–23)
CALCIUM SERPL-MCNC: 9.3 MG/DL — SIGNIFICANT CHANGE UP (ref 8.4–10.5)
CHLORIDE SERPL-SCNC: 94 MMOL/L — LOW (ref 96–108)
CO2 SERPL-SCNC: 33 MMOL/L — HIGH (ref 22–31)
CREAT SERPL-MCNC: 0.63 MG/DL — SIGNIFICANT CHANGE UP (ref 0.5–1.3)
GLUCOSE SERPL-MCNC: 98 MG/DL — SIGNIFICANT CHANGE UP (ref 70–99)
HCT VFR BLD CALC: 32.1 % — LOW (ref 39–50)
HGB BLD-MCNC: 9.9 G/DL — LOW (ref 13–17)
MAGNESIUM SERPL-MCNC: 2 MG/DL — SIGNIFICANT CHANGE UP (ref 1.6–2.6)
MCHC RBC-ENTMCNC: 27.8 PG — SIGNIFICANT CHANGE UP (ref 27–34)
MCHC RBC-ENTMCNC: 30.8 GM/DL — LOW (ref 32–36)
MCV RBC AUTO: 90.2 FL — SIGNIFICANT CHANGE UP (ref 80–100)
NRBC # BLD: 0 /100 WBCS — SIGNIFICANT CHANGE UP (ref 0–0)
PHOSPHATE SERPL-MCNC: 3.1 MG/DL — SIGNIFICANT CHANGE UP (ref 2.5–4.5)
PLATELET # BLD AUTO: 190 K/UL — SIGNIFICANT CHANGE UP (ref 150–400)
POTASSIUM SERPL-MCNC: 3.8 MMOL/L — SIGNIFICANT CHANGE UP (ref 3.5–5.3)
POTASSIUM SERPL-SCNC: 3.8 MMOL/L — SIGNIFICANT CHANGE UP (ref 3.5–5.3)
PROT SERPL-MCNC: 6.8 G/DL — SIGNIFICANT CHANGE UP (ref 6–8.3)
RBC # BLD: 3.56 M/UL — LOW (ref 4.2–5.8)
RBC # FLD: 13 % — SIGNIFICANT CHANGE UP (ref 10.3–14.5)
SODIUM SERPL-SCNC: 136 MMOL/L — SIGNIFICANT CHANGE UP (ref 135–145)
WBC # BLD: 13.21 K/UL — HIGH (ref 3.8–10.5)
WBC # FLD AUTO: 13.21 K/UL — HIGH (ref 3.8–10.5)

## 2021-09-07 PROCEDURE — 99232 SBSQ HOSP IP/OBS MODERATE 35: CPT | Mod: 57

## 2021-09-07 PROCEDURE — 71045 X-RAY EXAM CHEST 1 VIEW: CPT | Mod: 26

## 2021-09-07 RX ORDER — HYDROMORPHONE HYDROCHLORIDE 2 MG/ML
1 INJECTION INTRAMUSCULAR; INTRAVENOUS; SUBCUTANEOUS EVERY 4 HOURS
Refills: 0 | Status: DISCONTINUED | OUTPATIENT
Start: 2021-09-07 | End: 2021-09-11

## 2021-09-07 RX ORDER — TRAMADOL HYDROCHLORIDE 50 MG/1
25 TABLET ORAL EVERY 6 HOURS
Refills: 0 | Status: DISCONTINUED | OUTPATIENT
Start: 2021-09-07 | End: 2021-09-13

## 2021-09-07 RX ORDER — CLONAZEPAM 1 MG
0.5 TABLET ORAL EVERY 8 HOURS
Refills: 0 | Status: DISCONTINUED | OUTPATIENT
Start: 2021-09-07 | End: 2021-09-12

## 2021-09-07 RX ADMIN — PANTOPRAZOLE SODIUM 40 MILLIGRAM(S): 20 TABLET, DELAYED RELEASE ORAL at 12:06

## 2021-09-07 RX ADMIN — TRAMADOL HYDROCHLORIDE 25 MILLIGRAM(S): 50 TABLET ORAL at 21:29

## 2021-09-07 RX ADMIN — ENOXAPARIN SODIUM 40 MILLIGRAM(S): 100 INJECTION SUBCUTANEOUS at 12:06

## 2021-09-07 RX ADMIN — BUDESONIDE AND FORMOTEROL FUMARATE DIHYDRATE 2 PUFF(S): 160; 4.5 AEROSOL RESPIRATORY (INHALATION) at 17:09

## 2021-09-07 RX ADMIN — PIPERACILLIN AND TAZOBACTAM 25 GRAM(S): 4; .5 INJECTION, POWDER, LYOPHILIZED, FOR SOLUTION INTRAVENOUS at 13:13

## 2021-09-07 RX ADMIN — SENNA PLUS 2 TABLET(S): 8.6 TABLET ORAL at 21:30

## 2021-09-07 RX ADMIN — LIDOCAINE 1 PATCH: 4 CREAM TOPICAL at 00:00

## 2021-09-07 RX ADMIN — TRAMADOL HYDROCHLORIDE 25 MILLIGRAM(S): 50 TABLET ORAL at 22:29

## 2021-09-07 RX ADMIN — DIPHENHYDRAMINE HYDROCHLORIDE AND LIDOCAINE HYDROCHLORIDE AND ALUMINUM HYDROXIDE AND MAGNESIUM HYDRO 5 MILLILITER(S): KIT at 18:26

## 2021-09-07 RX ADMIN — Medication 0.5 MILLIGRAM(S): at 21:29

## 2021-09-07 RX ADMIN — Medication 3 MILLIGRAM(S): at 21:30

## 2021-09-07 RX ADMIN — HYDROMORPHONE HYDROCHLORIDE 1 MILLIGRAM(S): 2 INJECTION INTRAMUSCULAR; INTRAVENOUS; SUBCUTANEOUS at 12:55

## 2021-09-07 RX ADMIN — PIPERACILLIN AND TAZOBACTAM 25 GRAM(S): 4; .5 INJECTION, POWDER, LYOPHILIZED, FOR SOLUTION INTRAVENOUS at 21:30

## 2021-09-07 RX ADMIN — TRAMADOL HYDROCHLORIDE 25 MILLIGRAM(S): 50 TABLET ORAL at 07:33

## 2021-09-07 RX ADMIN — DIPHENHYDRAMINE HYDROCHLORIDE AND LIDOCAINE HYDROCHLORIDE AND ALUMINUM HYDROXIDE AND MAGNESIUM HYDRO 5 MILLILITER(S): KIT at 05:08

## 2021-09-07 RX ADMIN — Medication 200 MILLIGRAM(S): at 13:13

## 2021-09-07 RX ADMIN — Medication 0.5 MILLIGRAM(S): at 05:07

## 2021-09-07 RX ADMIN — POLYETHYLENE GLYCOL 3350 17 GRAM(S): 17 POWDER, FOR SOLUTION ORAL at 12:06

## 2021-09-07 RX ADMIN — DIPHENHYDRAMINE HYDROCHLORIDE AND LIDOCAINE HYDROCHLORIDE AND ALUMINUM HYDROXIDE AND MAGNESIUM HYDRO 5 MILLILITER(S): KIT at 00:15

## 2021-09-07 RX ADMIN — Medication 200 MILLIGRAM(S): at 05:07

## 2021-09-07 RX ADMIN — DIPHENHYDRAMINE HYDROCHLORIDE AND LIDOCAINE HYDROCHLORIDE AND ALUMINUM HYDROXIDE AND MAGNESIUM HYDRO 5 MILLILITER(S): KIT at 12:06

## 2021-09-07 RX ADMIN — BUDESONIDE AND FORMOTEROL FUMARATE DIHYDRATE 2 PUFF(S): 160; 4.5 AEROSOL RESPIRATORY (INHALATION) at 05:45

## 2021-09-07 RX ADMIN — HYDROMORPHONE HYDROCHLORIDE 1 MILLIGRAM(S): 2 INJECTION INTRAMUSCULAR; INTRAVENOUS; SUBCUTANEOUS at 18:42

## 2021-09-07 RX ADMIN — PIPERACILLIN AND TAZOBACTAM 25 GRAM(S): 4; .5 INJECTION, POWDER, LYOPHILIZED, FOR SOLUTION INTRAVENOUS at 05:07

## 2021-09-07 RX ADMIN — Medication 2000 UNIT(S): at 12:07

## 2021-09-07 RX ADMIN — Medication 200 MILLIGRAM(S): at 21:30

## 2021-09-07 RX ADMIN — Medication 1 TABLET(S): at 12:06

## 2021-09-07 RX ADMIN — TRAMADOL HYDROCHLORIDE 25 MILLIGRAM(S): 50 TABLET ORAL at 08:15

## 2021-09-07 RX ADMIN — Medication 0.5 MILLIGRAM(S): at 14:27

## 2021-09-07 RX ADMIN — HYDROMORPHONE HYDROCHLORIDE 1 MILLIGRAM(S): 2 INJECTION INTRAMUSCULAR; INTRAVENOUS; SUBCUTANEOUS at 04:29

## 2021-09-07 RX ADMIN — HYDROMORPHONE HYDROCHLORIDE 1 MILLIGRAM(S): 2 INJECTION INTRAMUSCULAR; INTRAVENOUS; SUBCUTANEOUS at 12:28

## 2021-09-07 RX ADMIN — HYDROMORPHONE HYDROCHLORIDE 1 MILLIGRAM(S): 2 INJECTION INTRAMUSCULAR; INTRAVENOUS; SUBCUTANEOUS at 04:59

## 2021-09-07 RX ADMIN — HYDROMORPHONE HYDROCHLORIDE 1 MILLIGRAM(S): 2 INJECTION INTRAMUSCULAR; INTRAVENOUS; SUBCUTANEOUS at 19:42

## 2021-09-07 RX ADMIN — DIPHENHYDRAMINE HYDROCHLORIDE AND LIDOCAINE HYDROCHLORIDE AND ALUMINUM HYDROXIDE AND MAGNESIUM HYDRO 5 MILLILITER(S): KIT at 23:46

## 2021-09-07 NOTE — PROGRESS NOTE ADULT - PROBLEM SELECTOR PLAN 3
CXR 8/26 with R ptx   -S/p R chest tube placement by thoracic 8/26  -+air leak with areas of subcutaneous emphysema, appears to be improving   -Large clot removed from tube 9/2  -Chest tube removed 9/5 and R apical pigtail placed by thoracic surgery   -Management per thoracic surgery

## 2021-09-07 NOTE — PROGRESS NOTE ADULT - ASSESSMENT
Echo 8/24/21: EF 65%, mild MR, grossly nl lv sys fx    a/p  56yo M with Hx of DVT s/p achilles tendon repair years ago not on AC presenting with complaints of SOB. intermittent and fevers with cough productive of white sputum for past week, tested positive for covid 2. Now transferred to MICU for tension pneumothorax, s/p chest tube.     #Atypical Chest Pain  -RRT 8/23 x2 for chest pain - exacerbated by cough and inspiration  -improved, secondary to covid PNA, PNX  -trop negative  -no ADHF noted on exam   -CTA without PE   -Echo noted w grossly nl lv sys fx, mild MR     #Covid -19, PNX  -s/p completed courses of Remdesivir x 5 days and Decadron x 10 days   -S/p Tocilizumab 7/30 per ID   -GNR in gram stain from pleural fluid -- on IV abx   -CTA as above   -RRT on 9/1 for hypoxia - HFNC settings adjusted - repeat CXR noted    -s/p new R chest tube for pnx -- mgmt per thoracic -- chest tube clot removed , Failed clamp trial over weekend.   -AC remains on hold   -pulm / thoracic f/u    #Sinus tachycardia  -resolved  -likely secondary to covid PNA, volume, pain, PNX  -cont to monitor   -echo as above     #DVT (hx)  -LE doppler 8/25 with no evidence of deep venous thrombosis in either lower extremity. Incidental note of thrombosis of the left tibial peroneal trunk with diminished flow flow within the left popliteal artery.  -AC on hold given inc bloody outpt noted in Chest tube   -med f/u     #s/p steroids for laryngitis/obstruction

## 2021-09-07 NOTE — PROGRESS NOTE ADULT - ASSESSMENT
56yo M with h/o appendectomy admitted to the hospital with COVID and possible superimposed pneumonia found to have a right moderate-sized pneumothorax with leftward shift and small pleural effusion.     8/26 Right open chest tube placed at bedside, Maintain to wall suction -40mmHg  8/27 Remains on hi flow- increased subq emphysema, repeat CXR w/o signs of increasing PTX, chest tube +airleak, functioning, Maintain to dry suction -40mmHg  8/28 VSS, CXR with subcutaneous emphysema and persistent right ptx, maintain right chest tube to suction.   8/29 VSS, pt still with air leak on chest tube. s/p non-con Chest CT: Trace right pleural effusion, unchanged. Trace right pneumothorax new from prior. Pneumomediastinum, pneumopericardium and bilateral subcutaneous emphysema new from prior.  8/30 +Right chest tube to -40 dry suction, +airleak, pt tolerating high flow nasal cannula. Persistent SubQ emphysema without ventilatory compromise. Cont maintain right chest tube ti -40 mmHg dry suction.   8/31 Cont maintain right chest tube ti -40 mmHg dry suction.   9/1 chest tube with bloody clot output- Hold [id number] to prevent further bleed  9/2  seen and examined in company of Dr Tejada chest tube clot removed under sterile condition tube patent daily xray  9/3  Patient seen and examined with Dr. Tejada. Chest tube clamped and will get repeat Xray in 4 hours (2030) to evaluate pneumothorax.  Denies dyspnea/SOB. O2 sat 97%  9/5 Removal of R chest tube. Continue R apical pigtail catheter to LCWS. Ok to restart DVT PPX. D/w Dr. Fraser F/u cxr post pull  9/4 VSS.  O2 sats 95% on 5LNC.  Patient seen and examined with Dr. Fraser.  Plan is to get CT Chest Non Con to evaluate right hydropneumothorax with plan for possible IR insertion of chest tube.    9/6 Chest xray  demonstrates b/l pleural effusion. Maintain right pigtail, CXR in am, no airleak , minimal output  9/7 54yo M with h/o appendectomy admitted to the hospital with COVID and possible superimposed pneumonia found to have a right moderate-sized pneumothorax with leftward shift and small pleural effusion.     8/26 Right open chest tube placed at bedside, Maintain to wall suction -40mmHg  8/27 Remains on hi flow- increased subq emphysema, repeat CXR w/o signs of increasing PTX, chest tube +airleak, functioning, Maintain to dry suction -40mmHg  8/28 VSS, CXR with subcutaneous emphysema and persistent right ptx, maintain right chest tube to suction.   8/29 VSS, pt still with air leak on chest tube. s/p non-con Chest CT: Trace right pleural effusion, unchanged. Trace right pneumothorax new from prior. Pneumomediastinum, pneumopericardium and bilateral subcutaneous emphysema new from prior.  8/30 +Right chest tube to -40 dry suction, +airleak, pt tolerating high flow nasal cannula. Persistent SubQ emphysema without ventilatory compromise. Cont maintain right chest tube ti -40 mmHg dry suction.   8/31 Cont maintain right chest tube ti -40 mmHg dry suction.   9/1 chest tube with bloody clot output- Hold [id number] to prevent further bleed  9/2  seen and examined in company of Dr Tejada chest tube clot removed under sterile condition tube patent daily xray  9/3  Patient seen and examined with Dr. Tejada. Chest tube clamped and will get repeat Xray in 4 hours (2030) to evaluate pneumothorax.  Denies dyspnea/SOB. O2 sat 97%  9/5 Removal of R chest tube. Continue R apical pigtail catheter to LCWS. Ok to restart DVT PPX. D/w Dr. Fraser F/u cxr post pull  9/4 VSS.  O2 sats 95% on 5LNC.  Patient seen and examined with Dr. Fraser.  Plan is to get CT Chest Non Con to evaluate right hydropneumothorax with plan for possible IR insertion of chest tube.    9/6 Chest xray  demonstrates b/l pleural effusion. Maintain right pigtail, CXR in am, no airleak , minimal output  9/7 VSS, maintain right pigtail to continuous dry suction at -40mmhg. Failed clamp trial over weekend.

## 2021-09-07 NOTE — PROGRESS NOTE ADULT - SUBJECTIVE AND OBJECTIVE BOX
Follow-up Pulm Progress Note    Old chest tube removed, R apical pigtail placed by thoracic surgery over weekend   decrease in SQ air  Sats high 90s on 2LNC  Denies SOB/CP     Medications:  MEDICATIONS  (STANDING):  benzocaine 15 mG/menthol 3.6 mG (Sugar-Free) Lozenge 1 Lozenge Oral once  benzonatate 200 milliGRAM(s) Oral every 8 hours  budesonide 160 MICROgram(s)/formoterol 4.5 MICROgram(s) Inhaler 2 Puff(s) Inhalation two times a day  cholecalciferol 2000 Unit(s) Oral daily  clonazePAM  Tablet 0.5 milliGRAM(s) Oral every 8 hours  enoxaparin Injectable 40 milliGRAM(s) SubCutaneous daily  FIRST- Mouthwash  BLM 5 milliLiter(s) Swish and Spit every 6 hours  lidocaine   4% Patch 1 Patch Transdermal every 24 hours  melatonin 3 milliGRAM(s) Oral at bedtime  multivitamin 1 Tablet(s) Oral daily  pantoprazole  Injectable 40 milliGRAM(s) IV Push daily  piperacillin/tazobactam IVPB.. 3.375 Gram(s) IV Intermittent every 8 hours  polyethylene glycol 3350 17 Gram(s) Oral daily  senna 2 Tablet(s) Oral at bedtime    MEDICATIONS  (PRN):  acetaminophen   Tablet .. 975 milliGRAM(s) Oral every 6 hours PRN Mild Pain (1 - 3)  albuterol/ipratropium for Nebulization 3 milliLiter(s) Nebulizer every 6 hours PRN Shortness of Breath and/or Wheezing  aluminum hydroxide/magnesium hydroxide/simethicone Suspension 30 milliLiter(s) Oral every 4 hours PRN Dyspepsia  benzocaine 15 mG/menthol 3.6 mG (Sugar-Free) Lozenge 1 Lozenge Oral four times a day PRN Sore Throat  cyclobenzaprine 5 milliGRAM(s) Oral three times a day PRN Muscle Spasm  HYDROmorphone  Injectable 1 milliGRAM(s) IV Push every 4 hours PRN Severe Pain (7 - 10)  sodium chloride 0.65% Nasal 1 Spray(s) Both Nostrils every 2 hours PRN Nasal Congestion  traMADol 25 milliGRAM(s) Oral every 6 hours PRN Moderate Pain (4 - 6)          Vital Signs Last 24 Hrs  T(C): 36.6 (07 Sep 2021 12:39), Max: 36.8 (07 Sep 2021 04:36)  T(F): 97.9 (07 Sep 2021 12:39), Max: 98.3 (07 Sep 2021 04:36)  HR: 97 (07 Sep 2021 12:39) (93 - 117)  BP: 102/69 (07 Sep 2021 12:39) (101/68 - 109/74)  BP(mean): --  RR: 18 (07 Sep 2021 12:39) (18 - 20)  SpO2: 98% (07 Sep 2021 12:39) (94% - 99%)          09-06 @ 07:01  -  09-07 @ 07:00  --------------------------------------------------------  IN: 100 mL / OUT: 1075 mL / NET: -975 mL          LABS:                        9.9    13.21 )-----------( 190      ( 07 Sep 2021 06:53 )             32.1     09-07    136  |  94<L>  |  4<L>  ----------------------------<  98  3.8   |  33<H>  |  0.63    Ca    9.3      07 Sep 2021 06:53  Phos  3.1     09-07  Mg     2.0     09-07    TPro  6.8  /  Alb  2.6<L>  /  TBili  0.2  /  DBili  x   /  AST  17  /  ALT  21  /  AlkPhos  57  09-07                Physical Examination:  PULM: coarse bilaterally   CVS: S1, S2 heard    RADIOLOGY REVIEWED  CXR: b/l opacities  RUL dense consolidation     CT chest: < from: CT Chest No Cont (09.04.21 @ 14:50) >    FINDINGS:    Lungs, airways and pleura: Patent airways to the segmental bronchi. Decreased diffuse groundglass. Decreased right apical cystic lucencies which may be pneumatoceles. Unchanged collapse of the right upper lobe. Increased loculated right pneumothorax in the anterior hemithorax, separate from the course of the chest tube which courses in the lateral right hemithorax. Stable small right pleural effusion.    Lymph nodes, mediastinum and lower neck: Unremarkable thyroid. Multiple predominantly subcentimeter likely reactive mediastinal lymph nodes. Decreased mild pneumomediastinum.    Heart, pericardium and vasculature: Normal heart size. Normal caliber aorta and main pulmonary artery.    Bones and soft tissues: Decreased subcutaneous emphysema within the chest wall, lower neck, and right upper extremity. Increased fluid/hematoma along the course of the chest tube in the right anterolateral chest wall.    Other: Unremarkable partially included upper abdomen.        IMPRESSION:    Since 8/20/2021:    Increased moderate loculated right pneumothorax that does not communicate with the course of the right chest tube.    Decreased pneumomediastinum and subcutaneous gas.    Increased fluid/hematoma along surrounding the chest tube in the right chest wall.    Decreased diffuse groundglass consistent with COVID/secondary acute lung injury.      --- End of Report ---      < end of copied text >

## 2021-09-07 NOTE — PROGRESS NOTE ADULT - SUBJECTIVE AND OBJECTIVE BOX
Joseph Casey, PGY1  Pager [phone] [facility], [phone] [place]    INCOMPLETE NOTE - IN PROGRESS    Patient is a 55y old  Male who presents with a chief complaint of right ptx (06 Sep 2021 13:54)      SUBJECTIVE/INTERVAL EVENTS: Patient seen and examined at bedside.    MEDICATIONS  (STANDING):  benzocaine 15 mG/menthol 3.6 mG (Sugar-Free) Lozenge 1 Lozenge Oral once  benzonatate 200 milliGRAM(s) Oral every 8 hours  budesonide 160 MICROgram(s)/formoterol 4.5 MICROgram(s) Inhaler 2 Puff(s) Inhalation two times a day  cholecalciferol 2000 Unit(s) Oral daily  clonazePAM  Tablet 0.5 milliGRAM(s) Oral every 8 hours  enoxaparin Injectable 40 milliGRAM(s) SubCutaneous daily  FIRST- Mouthwash  BLM 5 milliLiter(s) Swish and Spit every 6 hours  lidocaine   4% Patch 1 Patch Transdermal every 24 hours  lidocaine 1%/epinephrine 1:100,000 Inj 30 milliLiter(s) Local Injection once  melatonin 3 milliGRAM(s) Oral at bedtime  multivitamin 1 Tablet(s) Oral daily  pantoprazole  Injectable 40 milliGRAM(s) IV Push daily  piperacillin/tazobactam IVPB.. 3.375 Gram(s) IV Intermittent every 8 hours  polyethylene glycol 3350 17 Gram(s) Oral daily  senna 2 Tablet(s) Oral at bedtime    MEDICATIONS  (PRN):  acetaminophen   Tablet .. 975 milliGRAM(s) Oral every 6 hours PRN Mild Pain (1 - 3)  albuterol/ipratropium for Nebulization 3 milliLiter(s) Nebulizer every 6 hours PRN Shortness of Breath and/or Wheezing  aluminum hydroxide/magnesium hydroxide/simethicone Suspension 30 milliLiter(s) Oral every 4 hours PRN Dyspepsia  benzocaine 15 mG/menthol 3.6 mG (Sugar-Free) Lozenge 1 Lozenge Oral four times a day PRN Sore Throat  cyclobenzaprine 5 milliGRAM(s) Oral three times a day PRN Muscle Spasm  HYDROmorphone  Injectable 1 milliGRAM(s) IV Push every 4 hours PRN Severe Pain (7 - 10)  sodium chloride 0.65% Nasal 1 Spray(s) Both Nostrils every 2 hours PRN Nasal Congestion  traMADol 25 milliGRAM(s) Oral every 6 hours PRN Moderate Pain (4 - 6)      VITAL SIGNS:  T(F): 98.3 (09-07-21 @ 04:36), Max: 98.3 (09-07-21 @ 04:36)  HR: 94 (09-07-21 @ 04:36) (94 - 117)  BP: 103/68 (09-07-21 @ 04:36) (99/69 - 109/74)  RR: 20 (09-07-21 @ 04:36) (19 - 22)  SpO2: 98% (09-07-21 @ 04:36) (94% - 100%)    I&O's Summary    05 Sep 2021 07:01  -  06 Sep 2021 07:00  --------------------------------------------------------  IN: 0 mL / OUT: 755 mL / NET: -755 mL    06 Sep 2021 07:01  -  07 Sep 2021 05:50  --------------------------------------------------------  IN: 100 mL / OUT: 870 mL / NET: -770 mL      Daily     Daily     PHYSICAL EXAM:  Gen: Alert, NAD  HEENT: NCAT, conjunctiva clear, sclera anicteric, no erythema or exudates in the oropharynx, mmm  Neck: Supple, no JVD  CV: RRR, S1S2, no m/r/g  Resp: CTAB, normal respiratory effort  Abd: Soft, nontender, nondistended, normal bowel sounds  Ext: no edema, no clubbing or cyanosis  Neuro: AOx3, CN2-12 grossly intact, LEVIN  SKIN: warm, perfused    LABS:                        10.2   14.46 )-----------( 247      ( 06 Sep 2021 07:16 )             33.4     Hgb Trend: 10.2<--, 11.0<--, 10.6<--, 10.7<--, 11.3<--  09-06    137  |  95<L>  |  7   ----------------------------<  91  4.1   |  33<H>  |  0.66    Ca    9.1      06 Sep 2021 07:16  Phos  2.6     09-06  Mg     2.1     09-06    TPro  6.8  /  Alb  2.5<L>  /  TBili  0.2  /  DBili  x   /  AST  14  /  ALT  19  /  AlkPhos  60  09-06    Creatinine Trend: 0.66<--, 0.74<--, 0.58<--, 0.65<--, 0.66<--, 0.66<--  LIVER FUNCTIONS - ( 06 Sep 2021 07:16 )  Alb: 2.5 g/dL / Pro: 6.8 g/dL / ALK PHOS: 60 U/L / ALT: 19 U/L / AST: 14 U/L / GGT: x                     CAPILLARY BLOOD GLUCOSE          RADIOLOGY & ADDITIONAL TESTS: Reviewed    Imaging Personally Reviewed:    Consultant(s) Notes Reviewed:      Care Discussed with Consultants/Other Providers:   Joseph Casey, PGY1  Pager [phone] [facility], [phone] [place]    Patient is a 55y old  Male who presents with a chief complaint of right ptx (06 Sep 2021 13:54)      SUBJECTIVE/INTERVAL EVENTS: Patient seen and examined at bedside. Pt comfortable, pleasant, eager to go home.  Weaned to 2L NC while in room, patient tolerated well maintain sats. Pain tolerable with medication. No new symptoms.  Overnight nurse reported increased crepitus on right chest.    MEDICATIONS  (STANDING):  benzocaine 15 mG/menthol 3.6 mG (Sugar-Free) Lozenge 1 Lozenge Oral once  benzonatate 200 milliGRAM(s) Oral every 8 hours  budesonide 160 MICROgram(s)/formoterol 4.5 MICROgram(s) Inhaler 2 Puff(s) Inhalation two times a day  cholecalciferol 2000 Unit(s) Oral daily  clonazePAM  Tablet 0.5 milliGRAM(s) Oral every 8 hours  enoxaparin Injectable 40 milliGRAM(s) SubCutaneous daily  FIRST- Mouthwash  BLM 5 milliLiter(s) Swish and Spit every 6 hours  lidocaine   4% Patch 1 Patch Transdermal every 24 hours  lidocaine 1%/epinephrine 1:100,000 Inj 30 milliLiter(s) Local Injection once  melatonin 3 milliGRAM(s) Oral at bedtime  multivitamin 1 Tablet(s) Oral daily  pantoprazole  Injectable 40 milliGRAM(s) IV Push daily  piperacillin/tazobactam IVPB.. 3.375 Gram(s) IV Intermittent every 8 hours  polyethylene glycol 3350 17 Gram(s) Oral daily  senna 2 Tablet(s) Oral at bedtime    MEDICATIONS  (PRN):  acetaminophen   Tablet .. 975 milliGRAM(s) Oral every 6 hours PRN Mild Pain (1 - 3)  albuterol/ipratropium for Nebulization 3 milliLiter(s) Nebulizer every 6 hours PRN Shortness of Breath and/or Wheezing  aluminum hydroxide/magnesium hydroxide/simethicone Suspension 30 milliLiter(s) Oral every 4 hours PRN Dyspepsia  benzocaine 15 mG/menthol 3.6 mG (Sugar-Free) Lozenge 1 Lozenge Oral four times a day PRN Sore Throat  cyclobenzaprine 5 milliGRAM(s) Oral three times a day PRN Muscle Spasm  HYDROmorphone  Injectable 1 milliGRAM(s) IV Push every 4 hours PRN Severe Pain (7 - 10)  sodium chloride 0.65% Nasal 1 Spray(s) Both Nostrils every 2 hours PRN Nasal Congestion  traMADol 25 milliGRAM(s) Oral every 6 hours PRN Moderate Pain (4 - 6)      VITAL SIGNS:  T(F): 98.3 (09-07-21 @ 04:36), Max: 98.3 (09-07-21 @ 04:36)  HR: 94 (09-07-21 @ 04:36) (94 - 117)  BP: 103/68 (09-07-21 @ 04:36) (99/69 - 109/74)  RR: 20 (09-07-21 @ 04:36) (19 - 22)  SpO2: 98% (09-07-21 @ 04:36) (94% - 100%)    I&O's Summary    05 Sep 2021 07:01  -  06 Sep 2021 07:00  --------------------------------------------------------  IN: 0 mL / OUT: 755 mL / NET: -755 mL    06 Sep 2021 07:01  -  07 Sep 2021 05:50  --------------------------------------------------------  IN: 100 mL / OUT: 870 mL / NET: -770 mL      Daily     Daily     PHYSICAL EXAM:  Gen: Alert, NAD  HEENT: NCAT, conjunctiva clear  Neck: Supple, no JVD  CV: RRR, S1S2, no m/r/g, borderline tachycardic  Resp: normal respiratory effort, L anterior clear, R decreased crackles compared to days past  Abd: Soft, nontender, nondistended, normal bowel sounds  Ext: no edema, no clubbing or cyanosis  Neuro: AOx3, CN2-12 grossly intact, LEVIN  SKIN: warm, perfused    LABS:                        10.2   14.46 )-----------( 247      ( 06 Sep 2021 07:16 )             33.4     Hgb Trend: 10.2<--, 11.0<--, 10.6<--, 10.7<--, 11.3<--  09-06    137  |  95<L>  |  7   ----------------------------<  91  4.1   |  33<H>  |  0.66    Ca    9.1      06 Sep 2021 07:16  Phos  2.6     09-06  Mg     2.1     09-06    TPro  6.8  /  Alb  2.5<L>  /  TBili  0.2  /  DBili  x   /  AST  14  /  ALT  19  /  AlkPhos  60  09-06    Creatinine Trend: 0.66<--, 0.74<--, 0.58<--, 0.65<--, 0.66<--, 0.66<--  LIVER FUNCTIONS - ( 06 Sep 2021 07:16 )  Alb: 2.5 g/dL / Pro: 6.8 g/dL / ALK PHOS: 60 U/L / ALT: 19 U/L / AST: 14 U/L / GGT: x                     CAPILLARY BLOOD GLUCOSE          RADIOLOGY & ADDITIONAL TESTS: Reviewed    Imaging Personally Reviewed:    Consultant(s) Notes Reviewed:      Care Discussed with Consultants/Other Providers:

## 2021-09-07 NOTE — PROVIDER CONTACT NOTE (OTHER) - BACKGROUND
COVID
Pt. has right chest tube in place from MICU.
+ covid
+COVID
Dx: COVID
RT called to place patient back on BiPAP, O2sat monitored continuously..     Dx: COVID
dx: + COVID
pt admitted for covid pneumonia
COVID
Covid 19
PMH HTN, HLD, DVT/PE
COVID-19 pnumonia on BiPAPs/p RDV , s/p Dexa X 10 days, s/p Toci on 7/30, ICU admission, however no intubation
pt admitted with covid 19
Admitted for COVID. S/p RDV & TOCI. Currently on decadron IV Push daily (administered early this AM). RRT previously called 8/4 change of shift 7pm.
pt admitted for COVID PNA
pt admitted with Covid 19

## 2021-09-07 NOTE — PROGRESS NOTE ADULT - PROBLEM SELECTOR PLAN 1
9/5 Right chest tube removed without incident and new right apical pigtail placed with improvement of PTX   Maintain right pigtail to LWS -40 dry suction  Monitor for air leak  Daily CXR while chest tube is in place  Care as per primary team

## 2021-09-07 NOTE — PROGRESS NOTE ADULT - SUBJECTIVE AND OBJECTIVE BOX
CARDIOLOGY FOLLOW UP - Dr. Terry  Date of Service: 9/7/21  CC: denies cp, sob, and palpitations     Review of Systems:  Constitutional: No fever, weight loss, or fatigue  Respiratory: No cough, wheezing, or hemoptysis, no shortness of breath  Cardiovascular: No chest pain, palpitations, passing out, dizziness, or leg swelling  Gastrointestinal: No abd or epigastric pain.  No nausea, vomiting, or hematemesis; no diarrhea or constipation, no melena or hematochezia  Vascular: no edema       PHYSICAL EXAM:  T(C): 36.6 (09-07-21 @ 12:39), Max: 36.8 (09-07-21 @ 04:36)  HR: 97 (09-07-21 @ 12:39) (93 - 117)  BP: 102/69 (09-07-21 @ 12:39) (102/68 - 109/74)  RR: 18 (09-07-21 @ 12:39) (18 - 20)  SpO2: 98% (09-07-21 @ 12:39) (94% - 99%)  Wt(kg): --  I&O's Summary    06 Sep 2021 07:01  -  07 Sep 2021 07:00  --------------------------------------------------------  IN: 100 mL / OUT: 1075 mL / NET: -975 mL    07 Sep 2021 07:01  -  07 Sep 2021 14:50  --------------------------------------------------------  IN: 0 mL / OUT: 1250 mL / NET: -1250 mL        Appearance: Normal	  Cardiovascular: Normal S1 S2,RRR, No JVD, No murmurs  Respiratory: Lungs clear to auscultation	  Gastrointestinal:  Soft, Non-tender, + BS	  Extremities: Normal range of motion, No clubbing, cyanosis or edema      Home Medications:  Albuterol (Eqv-ProAir HFA) 90 mcg/inh inhalation aerosol: 2 puff(s) inhaled every 6 hours, As Needed (29 Jul 2021 09:08)  ivermectin 3 mg oral tablet: 1 tab(s) orally once a day (29 Jul 2021 09:08)  levoFLOXacin 500 mg oral tablet: 1 tab(s) orally every 24 hours (29 Jul 2021 09:08)  predniSONE 50 mg oral tablet: 1 tab(s) orally once a day (29 Jul 2021 09:08)      MEDICATIONS  (STANDING):  benzocaine 15 mG/menthol 3.6 mG (Sugar-Free) Lozenge 1 Lozenge Oral once  benzonatate 200 milliGRAM(s) Oral every 8 hours  budesonide 160 MICROgram(s)/formoterol 4.5 MICROgram(s) Inhaler 2 Puff(s) Inhalation two times a day  cholecalciferol 2000 Unit(s) Oral daily  clonazePAM  Tablet 0.5 milliGRAM(s) Oral every 8 hours  enoxaparin Injectable 40 milliGRAM(s) SubCutaneous daily  FIRST- Mouthwash  BLM 5 milliLiter(s) Swish and Spit every 6 hours  lidocaine   4% Patch 1 Patch Transdermal every 24 hours  melatonin 3 milliGRAM(s) Oral at bedtime  multivitamin 1 Tablet(s) Oral daily  pantoprazole  Injectable 40 milliGRAM(s) IV Push daily  piperacillin/tazobactam IVPB.. 3.375 Gram(s) IV Intermittent every 8 hours  polyethylene glycol 3350 17 Gram(s) Oral daily  senna 2 Tablet(s) Oral at bedtime      TELEMETRY: 	    ECG:  	  RADIOLOGY:   < from: Xray Chest 1 View- PORTABLE-Urgent (Xray Chest 1 View- PORTABLE-Urgent .) (09.07.21 @ 05:28) >    FINDINGS:  A right-sided pigtail catheter is again identified.  The heart is not well assessed on an AP film.  There is dense consolidation is again seen in the right upper lung. There is volume loss and patchy airspace disease at the right lung base and in the left lung.  There is a small right pleural effusion.  There is no pneumothorax. Subcutaneous emphysema is again seen on the right.    IMPRESSION:    Dense consolidation in the right upper lung with patchy atelectasis bilaterally. Small right pleural effusion.    No definite pneumothorax. Pigtail catheter in the right thorax.    --- End of Report ---    < end of copied text >    DIAGNOSTIC TESTING:  [ ] Echocardiogram:  [ ]  Catheterization:  [ ] Stress Test:    OTHER: 	    LABS:	 	                            9.9    13.21 )-----------( 190      ( 07 Sep 2021 06:53 )             32.1     09-07    136  |  94<L>  |  4<L>  ----------------------------<  98  3.8   |  33<H>  |  0.63    Ca    9.3      07 Sep 2021 06:53  Phos  3.1     09-07  Mg     2.0     09-07    TPro  6.8  /  Alb  2.6<L>  /  TBili  0.2  /  DBili  x   /  AST  17  /  ALT  21  /  AlkPhos  57  09-07

## 2021-09-07 NOTE — PROGRESS NOTE ADULT - PROBLEM SELECTOR PLAN 1
+COVID PCR as an outpatient  -CXR 8/9 with b/l lung opacities, CXR 8/16 grossly unchanged   -CXR 8/23 with worsening b/l opacties R>L, ?effusion vs mucus plugging/collapse, also with opacities 2nd to COVID   -S/p Remdesivir x 5 days   -S/p Decadron 6mg IVP qd x 10 days (completed 8/10)  -S/p Tocilizumab 7/30 per ID for worsening hypoxic respiratory failure  -Sputum culture 8/4 with normal respiratory aba.   -CTA 8/24 with no clear PE, but ddimer remains elevated. LE duplex 8/25 neg DVT, but with incidental note of thrombosis of the L tibial peroneal trunk with diminished flow within the left popliteal artery. Full dose AC being held per thoracic surgery, okay with ppx dose

## 2021-09-07 NOTE — PROVIDER CONTACT NOTE (OTHER) - ASSESSMENT
Pt due for PO med mucinex, Pt back on bipap after desatting, unable to tolerate PO, med held
at 1:40 pt was complaining of pain, pt was given hydocan syrup for cough with some effect
crepitus felt near chest tube dressing site, Lung sounds on R Ant chest wall diminished. pt satting 9% on 4LNC and did not desat during shift. pt denies SOB or chest pain.
lethargic but easily arousable maintaining mental status. vitals as charted.. -see RRT sheet.
on bipap. vitals as charted. free from s/s of distress at this time.
patient aaox4... O2 briefly dropped to 81-83 on max high flow settings. PAtient denies pain or discomfort. denies chest pain, palpitations.
patient on Bipap, vitals as charted. infrequent coughing throughout shift. denies shortness of breath at this time.
pt complaining of 8/10 pain
vitals as charted. patient denies increased shortness of breath, chest pain, palpitations. free from s/s of distress.
Pt oxygen saturation 88% & RR of 26. All other VSS.
A/Ox4, complaints of chest pain. Patient bp and temp stable. Spo2 was 86% but respiratory increased level, spo2 now 91%.
Pt put on HFNC this AM from Bipap, 02 sat remained stable. Pt moving in bed, 02 sat dropped to 75%, proned, unable to come up to stable level, Placed back on Bipap by RN
Pt. remains neurologically stable. According to Respiratory came to re evaluated the patient and he stated that the pt. was unable to tolerated the HFNC.
on bipap
pt a&o4, other VSS, pt denies chest pain, HA, n/v, dizziness. pt proned at this time and saturation 86% on max settings HFNC
pt complaining of pain
vss. free from s/s of distress.
Intermittent bubbling noted on chest tube. Lines traced no disconnection noted.
pt side lying, left side-coughing 10/10 pain- pt states-when I cough on the left side I feel like my chest is going to explode; VSS-tachycardic 100s; afebrile; tachypnic-hypoxic 80s O2 on HFNC 60L; 100%;

## 2021-09-07 NOTE — PROGRESS NOTE ADULT - PROBLEM SELECTOR PLAN 4
2nd to COVID PNA, superimposed bacterial PNA, & now ptx   -S/p RRT 8/3 and 8/4 for hypoxia  -Tx to 5ICU 8/10 for further management, tx to 4M   -S/p RRT x2 8/23 for R sided chest pain, pain appears pleuritic in nature  -CTA chest 8/25 with no clear PE  -Tx back to MICU 8/26, now on 5monti (off COVID isolation)   -Hypoxia continues to improve  -Continue to wean O2 as tolerated, keep sats >90% (currently 2LNC)

## 2021-09-07 NOTE — PROGRESS NOTE ADULT - SUBJECTIVE AND OBJECTIVE BOX
Patient is a 55y old  Male who presents with a chief complaint of right ptx (06 Sep 2021 13:54)      Vital Signs Last 24 Hrs  T(C): 36.8 (09-07-21 @ 04:36), Max: 36.8 (09-07-21 @ 04:36)  T(F): 98.3 (09-07-21 @ 04:36), Max: 98.3 (09-07-21 @ 04:36)  HR: 93 (09-07-21 @ 05:45) (93 - 117)  BP: 103/68 (09-07-21 @ 04:36) (99/69 - 109/74)  RR: 20 (09-07-21 @ 04:36) (19 - 22)  SpO2: 98% (09-07-21 @ 05:45) (94% - 100%)              09-06-21 @ 07:01  -  09-07-21 @ 07:00  --------------------------------------------------------  IN: 100 mL / OUT: 1075 mL / NET: -975 mL                            9.9    13.21 )-----------( 190      ( 07 Sep 2021 06:53 )             32.1     136  |  94<L>  |  4<L>  ----------------------------<  98  3.8   |  33<H>  |  0.63              PHYSICAL EXAM  Neurology: A&Ox3, NAD  CV : RRR+S1S2  Lungs: Respirations non-labored, B/L BS CTA  Abdomen: Soft, NT/ND, +BSx4Q  Extremities: B/L LE warm, no edema, +PP           MEDICATIONS  acetaminophen   Tablet .. 975 milliGRAM(s) Oral every 6 hours PRN  albuterol/ipratropium for Nebulization 3 milliLiter(s) Nebulizer every 6 hours PRN  aluminum hydroxide/magnesium hydroxide/simethicone Suspension 30 milliLiter(s) Oral every 4 hours PRN  benzocaine 15 mG/menthol 3.6 mG (Sugar-Free) Lozenge 1 Lozenge Oral four times a day PRN  benzocaine 15 mG/menthol 3.6 mG (Sugar-Free) Lozenge 1 Lozenge Oral once  benzonatate 200 milliGRAM(s) Oral every 8 hours  budesonide 160 MICROgram(s)/formoterol 4.5 MICROgram(s) Inhaler 2 Puff(s) Inhalation two times a day  cholecalciferol 2000 Unit(s) Oral daily  clonazePAM  Tablet 0.5 milliGRAM(s) Oral every 8 hours  cyclobenzaprine 5 milliGRAM(s) Oral three times a day PRN  enoxaparin Injectable 40 milliGRAM(s) SubCutaneous daily  FIRST- Mouthwash  BLM 5 milliLiter(s) Swish and Spit every 6 hours  HYDROmorphone  Injectable 1 milliGRAM(s) IV Push every 4 hours PRN  lidocaine   4% Patch 1 Patch Transdermal every 24 hours  lidocaine 1%/epinephrine 1:100,000 Inj 30 milliLiter(s) Local Injection once  melatonin 3 milliGRAM(s) Oral at bedtime  multivitamin 1 Tablet(s) Oral daily  pantoprazole  Injectable 40 milliGRAM(s) IV Push daily  piperacillin/tazobactam IVPB.. 3.375 Gram(s) IV Intermittent every 8 hours  polyethylene glycol 3350 17 Gram(s) Oral daily  senna 2 Tablet(s) Oral at bedtime  sodium chloride 0.65% Nasal 1 Spray(s) Both Nostrils every 2 hours PRN  traMADol 25 milliGRAM(s) Oral every 6 hours PRN     Patient is a 55y old  Male who presents with a chief complaint of right ptx (06 Sep 2021 13:54)      Vital Signs Last 24 Hrs  T(C): 36.8 (09-07-21 @ 04:36), Max: 36.8 (09-07-21 @ 04:36)  T(F): 98.3 (09-07-21 @ 04:36), Max: 98.3 (09-07-21 @ 04:36)  HR: 93 (09-07-21 @ 05:45) (93 - 117)  BP: 103/68 (09-07-21 @ 04:36) (99/69 - 109/74)  RR: 20 (09-07-21 @ 04:36) (19 - 22)  SpO2: 98% (09-07-21 @ 05:45) (94% - 100%)              09-06-21 @ 07:01  -  09-07-21 @ 07:00  --------------------------------------------------------  IN: 100 mL / OUT: 1075 mL / NET: -975 mL                            9.9    13.21 )-----------( 190      ( 07 Sep 2021 06:53 )             32.1     136  |  94<L>  |  4<L>  ----------------------------<  98  3.8   |  33<H>  |  0.63           PHYSICAL EXAM  Neurology: A&Ox3, NAD  CV : RRR+S1S2  Lungs: Respirations non-labored, B/L BS clear, diminished at bases  +Right pigtail with serous drainage to dry suction -40, no air leak  Abdomen: Soft, NT/ND, +BSx4Q  Extremities: B/L LE warm, no edema, +PP             MEDICATIONS  acetaminophen   Tablet .. 975 milliGRAM(s) Oral every 6 hours PRN  albuterol/ipratropium for Nebulization 3 milliLiter(s) Nebulizer every 6 hours PRN  aluminum hydroxide/magnesium hydroxide/simethicone Suspension 30 milliLiter(s) Oral every 4 hours PRN  benzocaine 15 mG/menthol 3.6 mG (Sugar-Free) Lozenge 1 Lozenge Oral four times a day PRN  benzocaine 15 mG/menthol 3.6 mG (Sugar-Free) Lozenge 1 Lozenge Oral once  benzonatate 200 milliGRAM(s) Oral every 8 hours  budesonide 160 MICROgram(s)/formoterol 4.5 MICROgram(s) Inhaler 2 Puff(s) Inhalation two times a day  cholecalciferol 2000 Unit(s) Oral daily  clonazePAM  Tablet 0.5 milliGRAM(s) Oral every 8 hours  cyclobenzaprine 5 milliGRAM(s) Oral three times a day PRN  enoxaparin Injectable 40 milliGRAM(s) SubCutaneous daily  FIRST- Mouthwash  BLM 5 milliLiter(s) Swish and Spit every 6 hours  HYDROmorphone  Injectable 1 milliGRAM(s) IV Push every 4 hours PRN  lidocaine   4% Patch 1 Patch Transdermal every 24 hours  lidocaine 1%/epinephrine 1:100,000 Inj 30 milliLiter(s) Local Injection once  melatonin 3 milliGRAM(s) Oral at bedtime  multivitamin 1 Tablet(s) Oral daily  pantoprazole  Injectable 40 milliGRAM(s) IV Push daily  piperacillin/tazobactam IVPB.. 3.375 Gram(s) IV Intermittent every 8 hours  polyethylene glycol 3350 17 Gram(s) Oral daily  senna 2 Tablet(s) Oral at bedtime  sodium chloride 0.65% Nasal 1 Spray(s) Both Nostrils every 2 hours PRN  traMADol 25 milliGRAM(s) Oral every 6 hours PRN

## 2021-09-08 PROCEDURE — 71045 X-RAY EXAM CHEST 1 VIEW: CPT | Mod: 26,77

## 2021-09-08 PROCEDURE — 99232 SBSQ HOSP IP/OBS MODERATE 35: CPT

## 2021-09-08 PROCEDURE — 71045 X-RAY EXAM CHEST 1 VIEW: CPT | Mod: 26

## 2021-09-08 PROCEDURE — 99232 SBSQ HOSP IP/OBS MODERATE 35: CPT | Mod: 57

## 2021-09-08 RX ADMIN — Medication 200 MILLIGRAM(S): at 05:21

## 2021-09-08 RX ADMIN — HYDROMORPHONE HYDROCHLORIDE 1 MILLIGRAM(S): 2 INJECTION INTRAMUSCULAR; INTRAVENOUS; SUBCUTANEOUS at 22:06

## 2021-09-08 RX ADMIN — Medication 0.5 MILLIGRAM(S): at 13:00

## 2021-09-08 RX ADMIN — HYDROMORPHONE HYDROCHLORIDE 1 MILLIGRAM(S): 2 INJECTION INTRAMUSCULAR; INTRAVENOUS; SUBCUTANEOUS at 21:36

## 2021-09-08 RX ADMIN — HYDROMORPHONE HYDROCHLORIDE 1 MILLIGRAM(S): 2 INJECTION INTRAMUSCULAR; INTRAVENOUS; SUBCUTANEOUS at 15:28

## 2021-09-08 RX ADMIN — TRAMADOL HYDROCHLORIDE 25 MILLIGRAM(S): 50 TABLET ORAL at 11:48

## 2021-09-08 RX ADMIN — PANTOPRAZOLE SODIUM 40 MILLIGRAM(S): 20 TABLET, DELAYED RELEASE ORAL at 11:49

## 2021-09-08 RX ADMIN — BUDESONIDE AND FORMOTEROL FUMARATE DIHYDRATE 2 PUFF(S): 160; 4.5 AEROSOL RESPIRATORY (INHALATION) at 17:11

## 2021-09-08 RX ADMIN — Medication 3 MILLIGRAM(S): at 21:36

## 2021-09-08 RX ADMIN — HYDROMORPHONE HYDROCHLORIDE 1 MILLIGRAM(S): 2 INJECTION INTRAMUSCULAR; INTRAVENOUS; SUBCUTANEOUS at 16:00

## 2021-09-08 RX ADMIN — SENNA PLUS 2 TABLET(S): 8.6 TABLET ORAL at 21:35

## 2021-09-08 RX ADMIN — Medication 0.5 MILLIGRAM(S): at 05:21

## 2021-09-08 RX ADMIN — Medication 0.5 MILLIGRAM(S): at 21:36

## 2021-09-08 RX ADMIN — Medication 1 TABLET(S): at 11:50

## 2021-09-08 RX ADMIN — Medication 2000 UNIT(S): at 11:49

## 2021-09-08 RX ADMIN — PIPERACILLIN AND TAZOBACTAM 25 GRAM(S): 4; .5 INJECTION, POWDER, LYOPHILIZED, FOR SOLUTION INTRAVENOUS at 21:36

## 2021-09-08 RX ADMIN — PIPERACILLIN AND TAZOBACTAM 25 GRAM(S): 4; .5 INJECTION, POWDER, LYOPHILIZED, FOR SOLUTION INTRAVENOUS at 05:21

## 2021-09-08 RX ADMIN — Medication 200 MILLIGRAM(S): at 13:00

## 2021-09-08 RX ADMIN — ENOXAPARIN SODIUM 40 MILLIGRAM(S): 100 INJECTION SUBCUTANEOUS at 11:49

## 2021-09-08 RX ADMIN — TRAMADOL HYDROCHLORIDE 25 MILLIGRAM(S): 50 TABLET ORAL at 12:30

## 2021-09-08 RX ADMIN — HYDROMORPHONE HYDROCHLORIDE 1 MILLIGRAM(S): 2 INJECTION INTRAMUSCULAR; INTRAVENOUS; SUBCUTANEOUS at 03:00

## 2021-09-08 RX ADMIN — PIPERACILLIN AND TAZOBACTAM 25 GRAM(S): 4; .5 INJECTION, POWDER, LYOPHILIZED, FOR SOLUTION INTRAVENOUS at 13:00

## 2021-09-08 RX ADMIN — DIPHENHYDRAMINE HYDROCHLORIDE AND LIDOCAINE HYDROCHLORIDE AND ALUMINUM HYDROXIDE AND MAGNESIUM HYDRO 5 MILLILITER(S): KIT at 11:49

## 2021-09-08 RX ADMIN — Medication 200 MILLIGRAM(S): at 21:36

## 2021-09-08 RX ADMIN — DIPHENHYDRAMINE HYDROCHLORIDE AND LIDOCAINE HYDROCHLORIDE AND ALUMINUM HYDROXIDE AND MAGNESIUM HYDRO 5 MILLILITER(S): KIT at 17:05

## 2021-09-08 RX ADMIN — BUDESONIDE AND FORMOTEROL FUMARATE DIHYDRATE 2 PUFF(S): 160; 4.5 AEROSOL RESPIRATORY (INHALATION) at 06:20

## 2021-09-08 RX ADMIN — HYDROMORPHONE HYDROCHLORIDE 1 MILLIGRAM(S): 2 INJECTION INTRAMUSCULAR; INTRAVENOUS; SUBCUTANEOUS at 02:11

## 2021-09-08 RX ADMIN — DIPHENHYDRAMINE HYDROCHLORIDE AND LIDOCAINE HYDROCHLORIDE AND ALUMINUM HYDROXIDE AND MAGNESIUM HYDRO 5 MILLILITER(S): KIT at 05:21

## 2021-09-08 NOTE — PROGRESS NOTE ADULT - SUBJECTIVE AND OBJECTIVE BOX
KARISSA VILLALBA 55y [id number]    Patient is a 55y old  Male who presents with a chief complaint of right ptx (06 Sep 2021 13:54)      Follow Up/CC:  ID following for COVID    Interval History/ROS: no fever, right CW pigtail catheter in    Allergies    No Known Allergies    Intolerances        ANTIMICROBIALS:  piperacillin/tazobactam IVPB.. 3.375 every 8 hours      MEDICATIONS  (STANDING):  benzocaine 15 mG/menthol 3.6 mG (Sugar-Free) Lozenge 1 Lozenge Oral once  benzonatate 200 milliGRAM(s) Oral every 8 hours  budesonide 160 MICROgram(s)/formoterol 4.5 MICROgram(s) Inhaler 2 Puff(s) Inhalation two times a day  cholecalciferol 2000 Unit(s) Oral daily  clonazePAM  Tablet 0.5 milliGRAM(s) Oral every 8 hours  enoxaparin Injectable 40 milliGRAM(s) SubCutaneous daily  FIRST- Mouthwash  BLM 5 milliLiter(s) Swish and Spit every 6 hours  lidocaine   4% Patch 1 Patch Transdermal every 24 hours  melatonin 3 milliGRAM(s) Oral at bedtime  multivitamin 1 Tablet(s) Oral daily  pantoprazole  Injectable 40 milliGRAM(s) IV Push daily  piperacillin/tazobactam IVPB.. 3.375 Gram(s) IV Intermittent every 8 hours  polyethylene glycol 3350 17 Gram(s) Oral daily  senna 2 Tablet(s) Oral at bedtime    MEDICATIONS  (PRN):  acetaminophen   Tablet .. 975 milliGRAM(s) Oral every 6 hours PRN Mild Pain (1 - 3)  albuterol/ipratropium for Nebulization 3 milliLiter(s) Nebulizer every 6 hours PRN Shortness of Breath and/or Wheezing  aluminum hydroxide/magnesium hydroxide/simethicone Suspension 30 milliLiter(s) Oral every 4 hours PRN Dyspepsia  benzocaine 15 mG/menthol 3.6 mG (Sugar-Free) Lozenge 1 Lozenge Oral four times a day PRN Sore Throat  cyclobenzaprine 5 milliGRAM(s) Oral three times a day PRN Muscle Spasm  HYDROmorphone  Injectable 1 milliGRAM(s) IV Push every 4 hours PRN Severe Pain (7 - 10)  sodium chloride 0.65% Nasal 1 Spray(s) Both Nostrils every 2 hours PRN Nasal Congestion  traMADol 25 milliGRAM(s) Oral every 6 hours PRN Moderate Pain (4 - 6)        Vital Signs Last 24 Hrs  T(C): 37 (08 Sep 2021 04:00), Max: 37.1 (07 Sep 2021 21:39)  T(F): 98.6 (08 Sep 2021 04:00), Max: 98.8 (07 Sep 2021 21:39)  HR: 96 (08 Sep 2021 06:20) (90 - 100)  BP: 135/82 (08 Sep 2021 04:00) (109/66 - 135/82)  BP(mean): --  RR: 20 (08 Sep 2021 04:00) (18 - 20)  SpO2: 99% (08 Sep 2021 06:20) (97% - 99%)    CBC Full  -  ( 07 Sep 2021 06:53 )  WBC Count : 13.21 K/uL  RBC Count : 3.56 M/uL  Hemoglobin : 9.9 g/dL  Hematocrit : 32.1 %  Platelet Count - Automated : 190 K/uL  Mean Cell Volume : 90.2 fl  Mean Cell Hemoglobin : 27.8 pg  Mean Cell Hemoglobin Concentration : 30.8 gm/dL  Auto Neutrophil # : x  Auto Lymphocyte # : x  Auto Monocyte # : x  Auto Eosinophil # : x  Auto Basophil # : x  Auto Neutrophil % : x  Auto Lymphocyte % : x  Auto Monocyte % : x  Auto Eosinophil % : x  Auto Basophil % : x    09-07    136  |  94<L>  |  4<L>  ----------------------------<  98  3.8   |  33<H>  |  0.63    Ca    9.3      07 Sep 2021 06:53  Phos  3.1     09-07  Mg     2.0     09-07    TPro  6.8  /  Alb  2.6<L>  /  TBili  0.2  /  DBili  x   /  AST  17  /  ALT  21  /  AlkPhos  57  09-07    LIVER FUNCTIONS - ( 07 Sep 2021 06:53 )  Alb: 2.6 g/dL / Pro: 6.8 g/dL / ALK PHOS: 57 U/L / ALT: 21 U/L / AST: 17 U/L / GGT: x               MICROBIOLOGY:  .Body Fluid Pleural Fluid  08-26-21   No growth at 6 days.  Organism seen in Gram stain is non-viable after prolonged  incubation and repeated subculture.  --    polymorphonuclear leukocytes  Gram Negative Rods  by cytocentrifuge      .Blood Blood  08-26-21   No Growth Final  --  --      .Blood Blood  08-25-21   No Growth Final  --  --      .Blood Blood-Peripheral  08-11-21   No Growth Final  --  --              v            RADIOLOGY

## 2021-09-08 NOTE — PROGRESS NOTE ADULT - PROBLEM SELECTOR PLAN 3
CXR 8/26 with R ptx   -S/p R chest tube placement by thoracic 8/26  -+air leak with areas of subcutaneous emphysema, appears to be improving   -Large clot removed from tube 9/2  -Chest tube removed 9/5 and R apical pigtail placed by thoracic surgery   -Management per thoracic surgery, now tube to water seal

## 2021-09-08 NOTE — PROGRESS NOTE ADULT - ASSESSMENT
55 M with covid PNA, CP         completed remdesivir, s/p decadron   sp toci   No fever  CP better- cardio seeing  CXR - no new findings  CT chest with cavitary PNA - cont zosyn, s/p CT by thoracic for PTX/effusion  Plan for 2 weeks abx - cont abx till 9/8    Dylan Carter  Attending Physician   Division of Infectious Disease  Pager #[phone]  Available on [employer] Teams also  After 5pm/weekend or no response, call #[phone]

## 2021-09-08 NOTE — PROVIDER CONTACT NOTE (OTHER) - ACTION/TREATMENT ORDERED:
MD notified. Dr. marks discussed with thoracic team who stated patients dressing can be changed with gauze and tape. Will continue to monitor patient.
PA notified. Pt placed on bipap, tolerating well. Will continue to monitor
Provider notified. Provider to call MICU to come see pt STAT. Provider to bedside.
No new order at this time, cont plan of care, leave Pt on Bipap
See RRT sheet.
CXR ordered. will continue to monitor patient
MD to write provider to RN to give 0000 oxycodone early
NP Autumn awaree, no order at this time, keep Pt on Bipap, cont to monitior
pt was given iv dilaudid at 1400, md to order PO oxycodone for prn.
will continue to monitor patient.
Dr. Dickinson made aware. CT aware of air leak from 8/26. Will continue to monitor. MD will order daily chest xrays.
RRT provider ordered: dilaudid 1mg IV push, 1Gram IV tylenol; increase HFNC 60L; 100%; STAT labs;  end RRT once stable;
Rapid reponse called. Patient prone placed back on BiPAP. Rapid ended with patient aaox4, prone, vitals stable.
DANIEL Perez made aware. Safety maintained
MD to order one time dose of iv Dilaudid, RN to maker 6 am oxycodone as refused/
Patient assisted to prone position immediately while on high flow and O2 in 80s. Resp Therapist called and came to place patient back on biPAP.. will continue to monitor patient.
will continue to monitor patient.
will continue to monitor patient.
patient placed back on bipap.. will continue to monitor patient

## 2021-09-08 NOTE — PROGRESS NOTE ADULT - ASSESSMENT
Echo 8/24/21: EF 65%, mild MR, grossly nl lv sys fx    a/p  54yo M with Hx of DVT s/p achilles tendon repair years ago not on AC presenting with complaints of SOB. intermittent and fevers with cough productive of white sputum for past week, tested positive for covid 2. Now transferred to MICU for tension pneumothorax, s/p chest tube.     #Atypical Chest Pain  -RRT 8/23 x2 for chest pain - exacerbated by cough and inspiration  -improved, secondary to covid PNA, PNX  -trop negative  -no ADHF noted on exam   -CTA without PE   -Echo noted w grossly nl lv sys fx, mild MR     #Covid -19, PNX  -s/p completed courses of Remdesivir x 5 days and Decadron x 10 days   -S/p Tocilizumab 7/30 per ID   -GNR in gram stain from pleural fluid -- on IV abx   -CTA as above   -RRT on 9/1 for hypoxia - HFNC settings adjusted - repeat CXR noted    -s/p new R chest tube for pnx -- mgmt per thoracic -Large clot removed from tube 9/2  -Chest tube removed 9/5 and R apical pigtail placed by thoracic surgery   -AC remains on hold   -pulm / thoracic f/u    #Sinus tachycardia  -resolved  -likely secondary to covid PNA, volume, pain, PNX  -cont to monitor   -echo as above     #DVT (hx)  -LE doppler 8/25 with no evidence of deep venous thrombosis in either lower extremity. Incidental note of thrombosis of the left tibial peroneal trunk with diminished flow flow within the left popliteal artery.  -AC on hold given inc bloody outpt noted in Chest tube   -med f/u     #s/p steroids for laryngitis/obstruction

## 2021-09-08 NOTE — PROVIDER CONTACT NOTE (OTHER) - DATE AND TIME:
23-Aug-2021 06:05
23-Aug-2021 18:00
03-Aug-2021 04:12
04-Aug-2021 13:10
04-Aug-2021 17:41
04-Aug-2021 19:36
08-Sep-2021 08:30
05-Aug-2021 13:23
06-Aug-2021 18:34
07-Aug-2021 18:47
07-Sep-2021 05:02
31-Aug-2021 02:35
03-Aug-2021 17:59
30-Aug-2021 20:30
05-Aug-2021 04:45
06-Aug-2021 16:27
07-Aug-2021 15:17
30-Aug-2021 21:11
30-Jul-2021 17:30
31-Aug-2021 04:20

## 2021-09-08 NOTE — PROGRESS NOTE ADULT - ASSESSMENT
54yo M with h/o appendectomy admitted to the hospital with COVID and possible superimposed pneumonia found to have a right moderate-sized pneumothorax with leftward shift and small pleural effusion.     8/26 Right open chest tube placed at bedside, Maintain to wall suction -40mmHg  8/27 Remains on hi flow- increased subq emphysema, repeat CXR w/o signs of increasing PTX, chest tube +airleak, functioning, Maintain to dry suction -40mmHg  8/28 VSS, CXR with subcutaneous emphysema and persistent right ptx, maintain right chest tube to suction.   8/29 VSS, pt still with air leak on chest tube. s/p non-con Chest CT: Trace right pleural effusion, unchanged. Trace right pneumothorax new from prior. Pneumomediastinum, pneumopericardium and bilateral subcutaneous emphysema new from prior.  8/30 +Right chest tube to -40 dry suction, +airleak, pt tolerating high flow nasal cannula. Persistent SubQ emphysema without ventilatory compromise. Cont maintain right chest tube ti -40 mmHg dry suction.   8/31 Cont maintain right chest tube ti -40 mmHg dry suction.   9/1 chest tube with bloody clot output- Hold [id number] to prevent further bleed  9/2  seen and examined in company of Dr Tejada chest tube clot removed under sterile condition tube patent daily xray  9/3  Patient seen and examined with Dr. Tejada. Chest tube clamped and will get repeat Xray in 4 hours (2030) to evaluate pneumothorax.  Denies dyspnea/SOB. O2 sat 97%  9/5 Removal of R chest tube. Continue R apical pigtail catheter to LCWS. Ok to restart DVT PPX. D/w Dr. Fraser F/u cxr post pull  9/4 VSS.  O2 sats 95% on 5LNC.  Patient seen and examined with Dr. Fraser.  Plan is to get CT Chest Non Con to evaluate right hydropneumothorax with plan for possible IR insertion of chest tube.    9/6 Chest xray  demonstrates b/l pleural effusion. Maintain right pigtail, CXR in am, no airleak , minimal output  9/7 VSS, maintain right pigtail to continuous dry suction at -40mmhg. Failed clamp trial over weekend.   9/8 VSS, no air leak on right pigtail - placed to water seal, will repeat CXR in 4 hours at 1600.

## 2021-09-08 NOTE — PROGRESS NOTE ADULT - PROBLEM SELECTOR PLAN 1
S/p COVID with complications of PTX and empyema s/p chest tube placed by CT on 8/26  - 9/4 CT: increased loculated R PTX  - f/u thoracic recs  - daily AM CXR per thoracic  - NC  -9/5 pigtail cath placed R ant sup chest wall to wall suction S/p COVID with complications of PTX and empyema s/p chest tube placed by CT on 8/26  - 9/4 CT: increased loculated R PTX  - f/u thoracic recs  - daily AM CXR per thoracic  - NC 2L  -9/5 pigtail cath placed R ant sup chest wall to water seal

## 2021-09-08 NOTE — PROGRESS NOTE ADULT - SUBJECTIVE AND OBJECTIVE BOX
Follow-up Pulm Progress Note    No new respiratory events overnight.  Denies SOB/CP.   Sats 100% 2LNC    Medications:  MEDICATIONS  (STANDING):  benzocaine 15 mG/menthol 3.6 mG (Sugar-Free) Lozenge 1 Lozenge Oral once  benzonatate 200 milliGRAM(s) Oral every 8 hours  budesonide 160 MICROgram(s)/formoterol 4.5 MICROgram(s) Inhaler 2 Puff(s) Inhalation two times a day  cholecalciferol 2000 Unit(s) Oral daily  clonazePAM  Tablet 0.5 milliGRAM(s) Oral every 8 hours  enoxaparin Injectable 40 milliGRAM(s) SubCutaneous daily  FIRST- Mouthwash  BLM 5 milliLiter(s) Swish and Spit every 6 hours  lidocaine   4% Patch 1 Patch Transdermal every 24 hours  melatonin 3 milliGRAM(s) Oral at bedtime  multivitamin 1 Tablet(s) Oral daily  pantoprazole  Injectable 40 milliGRAM(s) IV Push daily  piperacillin/tazobactam IVPB.. 3.375 Gram(s) IV Intermittent every 8 hours  polyethylene glycol 3350 17 Gram(s) Oral daily  senna 2 Tablet(s) Oral at bedtime    MEDICATIONS  (PRN):  acetaminophen   Tablet .. 975 milliGRAM(s) Oral every 6 hours PRN Mild Pain (1 - 3)  albuterol/ipratropium for Nebulization 3 milliLiter(s) Nebulizer every 6 hours PRN Shortness of Breath and/or Wheezing  aluminum hydroxide/magnesium hydroxide/simethicone Suspension 30 milliLiter(s) Oral every 4 hours PRN Dyspepsia  benzocaine 15 mG/menthol 3.6 mG (Sugar-Free) Lozenge 1 Lozenge Oral four times a day PRN Sore Throat  cyclobenzaprine 5 milliGRAM(s) Oral three times a day PRN Muscle Spasm  HYDROmorphone  Injectable 1 milliGRAM(s) IV Push every 4 hours PRN Severe Pain (7 - 10)  sodium chloride 0.65% Nasal 1 Spray(s) Both Nostrils every 2 hours PRN Nasal Congestion  traMADol 25 milliGRAM(s) Oral every 6 hours PRN Moderate Pain (4 - 6)          Vital Signs Last 24 Hrs  T(C): 37 (08 Sep 2021 04:00), Max: 37.1 (07 Sep 2021 21:39)  T(F): 98.6 (08 Sep 2021 04:00), Max: 98.8 (07 Sep 2021 21:39)  HR: 96 (08 Sep 2021 06:20) (90 - 100)  BP: 135/82 (08 Sep 2021 04:00) (109/66 - 135/82)  BP(mean): --  RR: 20 (08 Sep 2021 04:00) (18 - 20)  SpO2: 99% (08 Sep 2021 06:20) (97% - 99%)          09-07 @ 07:01  -  09-08 @ 07:00  --------------------------------------------------------  IN: 0 mL / OUT: 1457 mL / NET: -1457 mL          LABS:                        9.9    13.21 )-----------( 190      ( 07 Sep 2021 06:53 )             32.1     09-07    136  |  94<L>  |  4<L>  ----------------------------<  98  3.8   |  33<H>  |  0.63    Ca    9.3      07 Sep 2021 06:53  Phos  3.1     09-07  Mg     2.0     09-07    TPro  6.8  /  Alb  2.6<L>  /  TBili  0.2  /  DBili  x   /  AST  17  /  ALT  21  /  AlkPhos  57  09-07            Physical Examination:  PULM: decreased BS   CVS: S1, S2 heard    RADIOLOGY REVIEWED  CXR: b/l opacities  slowly improving on R   f/u official report

## 2021-09-08 NOTE — PROVIDER CONTACT NOTE (OTHER) - SITUATION
pt on bipap for covid and can not take oral medication this time.
Pt. get OOB to used the urinal without using the call bell. Pt. was educated about fall risk preventions. Pt. c/o feeling hungry and if the mask can be changed. Pt. is currently on BIPAP and Bed alarm
Bubbling noted to dry suction chest tube.
Pt dx COVID+ PNA
Pt has been maintaining oxygen saturation of 88% on BiPAP & proned
md contacted at 1:45 am, was in rapid. md contacted again at 2:00, still in rapid. MD contacted again at 230. Pt complaining of pain at chest tube site, was given standing oxycodone at 2133.
pt complaining of 8/10 pain on lateral side
pt's right chest tube was removed on 9/5. Can dressing be changed today?
Pt dx COVID+, PNA
RRT called for patient desat to high 70s on max high flow... maintaining 77-80s for several mins
per pt oxycodone doesn't relieve pain, wants iv dilaudid. was given 0.5 mg of iv dilaudid at 1300 8/30
pt hypoxic to 86% on HFNC, pt asymptomatic
RRT: 10/10 pain Right side chest
Patient admitted for COVID. changed from bipap to highflow to assess if patient tolerates highflow while eating lunch. patient O2sat decreased to 80s.
Patient changed from BiPAP to high-flow by respiratory therapist in order for the patient to eat breakfast. O2 sat dropped to 81-83 patient assisted to prone position & respiratory therapist called...
RRT called for tachypnea and chest pain. Pt complaining of 10/10 chest pain. Oxycodone and lidocaine patch not helping.
patient was on bipap for most of my shift changed to high-flow for dinner.. patient able to tolerate off bipap for dinner and one hour after O2 sat 89-92
slight crepitus felt near chest tube dressing site

## 2021-09-08 NOTE — PROGRESS NOTE ADULT - SUBJECTIVE AND OBJECTIVE BOX
CARDIOLOGY FOLLOW UP - Dr. Terry  Date of Service: 9/8/21  CC: sob improved, no cp     Review of Systems:  Constitutional: No fever, weight loss, or fatigue  Respiratory: No cough, wheezing, or hemoptysis, no shortness of breath  Cardiovascular: No chest pain, palpitations, passing out, dizziness, or leg swelling  Gastrointestinal: No abd or epigastric pain.  No nausea, vomiting, or hematemesis; no diarrhea or constipation, no melena or hematochezia  Vascular: no edema       PHYSICAL EXAM:  T(C): 37 (09-08-21 @ 04:00), Max: 37.1 (09-07-21 @ 21:39)  HR: 96 (09-08-21 @ 06:20) (90 - 100)  BP: 135/82 (09-08-21 @ 04:00) (102/69 - 135/82)  RR: 20 (09-08-21 @ 04:00) (18 - 20)  SpO2: 99% (09-08-21 @ 06:20) (97% - 99%)  Wt(kg): --  I&O's Summary    07 Sep 2021 07:01  -  08 Sep 2021 07:00  --------------------------------------------------------  IN: 0 mL / OUT: 1457 mL / NET: -1457 mL    08 Sep 2021 07:01  -  08 Sep 2021 11:30  --------------------------------------------------------  IN: 0 mL / OUT: 1 mL / NET: -1 mL        Appearance: Normal	  Cardiovascular: Normal S1 S2,RRR, No JVD, No murmurs  Respiratory: Lungs clear to auscultation	  Gastrointestinal:  Soft, Non-tender, + BS	  Extremities: Normal range of motion, No clubbing, cyanosis or edema      Home Medications:  Albuterol (Eqv-ProAir HFA) 90 mcg/inh inhalation aerosol: 2 puff(s) inhaled every 6 hours, As Needed (29 Jul 2021 09:08)  ivermectin 3 mg oral tablet: 1 tab(s) orally once a day (29 Jul 2021 09:08)  levoFLOXacin 500 mg oral tablet: 1 tab(s) orally every 24 hours (29 Jul 2021 09:08)  predniSONE 50 mg oral tablet: 1 tab(s) orally once a day (29 Jul 2021 09:08)      MEDICATIONS  (STANDING):  benzocaine 15 mG/menthol 3.6 mG (Sugar-Free) Lozenge 1 Lozenge Oral once  benzonatate 200 milliGRAM(s) Oral every 8 hours  budesonide 160 MICROgram(s)/formoterol 4.5 MICROgram(s) Inhaler 2 Puff(s) Inhalation two times a day  cholecalciferol 2000 Unit(s) Oral daily  clonazePAM  Tablet 0.5 milliGRAM(s) Oral every 8 hours  enoxaparin Injectable 40 milliGRAM(s) SubCutaneous daily  FIRST- Mouthwash  BLM 5 milliLiter(s) Swish and Spit every 6 hours  lidocaine   4% Patch 1 Patch Transdermal every 24 hours  melatonin 3 milliGRAM(s) Oral at bedtime  multivitamin 1 Tablet(s) Oral daily  pantoprazole  Injectable 40 milliGRAM(s) IV Push daily  piperacillin/tazobactam IVPB.. 3.375 Gram(s) IV Intermittent every 8 hours  polyethylene glycol 3350 17 Gram(s) Oral daily  senna 2 Tablet(s) Oral at bedtime      TELEMETRY: 	    ECG:  	  RADIOLOGY: < from: Xray Chest 1 View- PORTABLE-Urgent (Xray Chest 1 View- PORTABLE-Urgent .) (09.07.21 @ 05:28) >  FINDINGS:  A right-sided pigtail catheter is again identified.  The heart is not well assessed on an AP film.  There is dense consolidation is again seen in the right upper lung. There is volume loss and patchy airspace disease at the right lung base and in the left lung.  There is a small right pleural effusion.  There is no pneumothorax. Subcutaneous emphysema is again seen on the right.    IMPRESSION:    Dense consolidation in the right upper lung with patchy atelectasis bilaterally. Small right pleural effusion.    No definite pneumothorax. Pigtail catheter in the right thorax.    --- End of Report ---      < end of copied text >    DIAGNOSTIC TESTING:  [ ] Echocardiogram:  [ ]  Catheterization:  [ ] Stress Test:    OTHER: 	    LABS:	 	                            9.9    13.21 )-----------( 190      ( 07 Sep 2021 06:53 )             32.1     09-07    136  |  94<L>  |  4<L>  ----------------------------<  98  3.8   |  33<H>  |  0.63    Ca    9.3      07 Sep 2021 06:53  Phos  3.1     09-07  Mg     2.0     09-07    TPro  6.8  /  Alb  2.6<L>  /  TBili  0.2  /  DBili  x   /  AST  17  /  ALT  21  /  AlkPhos  57  09-07

## 2021-09-08 NOTE — PROGRESS NOTE ADULT - PROBLEM SELECTOR PLAN 1
9/5 Right chest tube removed without incident and new right apical pigtail placed with improvement of PTX   Maintain right pigtail to water seal  Monitor for air leak  Daily CXR while chest tube is in place  Care as per primary team

## 2021-09-08 NOTE — PROGRESS NOTE ADULT - SUBJECTIVE AND OBJECTIVE BOX
Patient is a 55y old  Male who presents with a chief complaint of right ptx (06 Sep 2021 13:54)      Vital Signs Last 24 Hrs  T(C): 37 (21 @ 04:00), Max: 37.1 (21 @ 21:39)  T(F): 98.6 (21 @ 04:00), Max: 98.8 (21 @ 21:39)  HR: 96 (21 @ 06:20) (90 - 100)  BP: 135/82 (21 @ 04:00) (102/69 - 135/82)  RR: 20 (21 @ 04:00) (18 - 20)  SpO2: 99% (21 @ 06:20) (97% - 99%)             21 @ 07:01  -  21 @ 07:00  --------------------------------------------------------  IN: 0 mL / OUT: 1457 mL / NET: -1457 mL       Daily Weight in k.4 (08 Sep 2021 09:00)                          9.9    13.21 )-----------( 190      ( 07 Sep 2021 06:53 )             32.1     136  |  94<L>  |  4<L>  ----------------------------<  98  3.8   |  33<H>  |  0.63        PHYSICAL EXAM  Neurology: A&Ox3, NAD  CV : RRR+S1S2  Lungs: Respirations non-labored, B/L BS clear, diminished at bases  +Right pigtail with serous drainage to dry suction -40, no air leak  Abdomen: Soft, NT/ND, +[id number]  Extremities: B/L LE warm, no edema, +PP            MEDICATIONS  acetaminophen   Tablet .. 975 milliGRAM(s) Oral every 6 hours PRN  albuterol/ipratropium for Nebulization 3 milliLiter(s) Nebulizer every 6 hours PRN  aluminum hydroxide/magnesium hydroxide/simethicone Suspension 30 milliLiter(s) Oral every 4 hours PRN  benzocaine 15 mG/menthol 3.6 mG (Sugar-Free) Lozenge 1 Lozenge Oral four times a day PRN  benzocaine 15 mG/menthol 3.6 mG (Sugar-Free) Lozenge 1 Lozenge Oral once  benzonatate 200 milliGRAM(s) Oral every 8 hours  budesonide 160 MICROgram(s)/formoterol 4.5 MICROgram(s) Inhaler 2 Puff(s) Inhalation two times a day  cholecalciferol 2000 Unit(s) Oral daily  clonazePAM  Tablet 0.5 milliGRAM(s) Oral every 8 hours  cyclobenzaprine 5 milliGRAM(s) Oral three times a day PRN  enoxaparin Injectable 40 milliGRAM(s) SubCutaneous daily  FIRST- Mouthwash  BLM 5 milliLiter(s) Swish and Spit every 6 hours  HYDROmorphone  Injectable 1 milliGRAM(s) IV Push every 4 hours PRN  lidocaine   4% Patch 1 Patch Transdermal every 24 hours  melatonin 3 milliGRAM(s) Oral at bedtime  multivitamin 1 Tablet(s) Oral daily  pantoprazole  Injectable 40 milliGRAM(s) IV Push daily  piperacillin/tazobactam IVPB.. 3.375 Gram(s) IV Intermittent every 8 hours  polyethylene glycol 3350 17 Gram(s) Oral daily  senna 2 Tablet(s) Oral at bedtime  sodium chloride 0.65% Nasal 1 Spray(s) Both Nostrils every 2 hours PRN  traMADol 25 milliGRAM(s) Oral every 6 hours PRN

## 2021-09-08 NOTE — PROGRESS NOTE ADULT - SUBJECTIVE AND OBJECTIVE BOX
Joseph Casey, PGY1  Pager [phone] [facility], [phone] [place]    INCOMPLETE NOTE - IN PROGRESS    Patient is a 55y old  Male who presents with a chief complaint of right ptx (06 Sep 2021 13:54)      SUBJECTIVE/INTERVAL EVENTS: Patient seen and examined at bedside.    MEDICATIONS  (STANDING):  benzocaine 15 mG/menthol 3.6 mG (Sugar-Free) Lozenge 1 Lozenge Oral once  benzonatate 200 milliGRAM(s) Oral every 8 hours  budesonide 160 MICROgram(s)/formoterol 4.5 MICROgram(s) Inhaler 2 Puff(s) Inhalation two times a day  cholecalciferol 2000 Unit(s) Oral daily  clonazePAM  Tablet 0.5 milliGRAM(s) Oral every 8 hours  enoxaparin Injectable 40 milliGRAM(s) SubCutaneous daily  FIRST- Mouthwash  BLM 5 milliLiter(s) Swish and Spit every 6 hours  lidocaine   4% Patch 1 Patch Transdermal every 24 hours  melatonin 3 milliGRAM(s) Oral at bedtime  multivitamin 1 Tablet(s) Oral daily  pantoprazole  Injectable 40 milliGRAM(s) IV Push daily  piperacillin/tazobactam IVPB.. 3.375 Gram(s) IV Intermittent every 8 hours  polyethylene glycol 3350 17 Gram(s) Oral daily  senna 2 Tablet(s) Oral at bedtime    MEDICATIONS  (PRN):  acetaminophen   Tablet .. 975 milliGRAM(s) Oral every 6 hours PRN Mild Pain (1 - 3)  albuterol/ipratropium for Nebulization 3 milliLiter(s) Nebulizer every 6 hours PRN Shortness of Breath and/or Wheezing  aluminum hydroxide/magnesium hydroxide/simethicone Suspension 30 milliLiter(s) Oral every 4 hours PRN Dyspepsia  benzocaine 15 mG/menthol 3.6 mG (Sugar-Free) Lozenge 1 Lozenge Oral four times a day PRN Sore Throat  cyclobenzaprine 5 milliGRAM(s) Oral three times a day PRN Muscle Spasm  HYDROmorphone  Injectable 1 milliGRAM(s) IV Push every 4 hours PRN Severe Pain (7 - 10)  sodium chloride 0.65% Nasal 1 Spray(s) Both Nostrils every 2 hours PRN Nasal Congestion  traMADol 25 milliGRAM(s) Oral every 6 hours PRN Moderate Pain (4 - 6)      VITAL SIGNS:  T(F): 98.6 (09-08-21 @ 04:00), Max: 98.8 (09-07-21 @ 21:39)  HR: 96 (09-08-21 @ 06:20) (90 - 100)  BP: 135/82 (09-08-21 @ 04:00) (102/69 - 135/82)  RR: 20 (09-08-21 @ 04:00) (18 - 20)  SpO2: 99% (09-08-21 @ 06:20) (97% - 99%)    I&O's Summary    06 Sep 2021 07:01  -  07 Sep 2021 07:00  --------------------------------------------------------  IN: 100 mL / OUT: 1075 mL / NET: -975 mL    07 Sep 2021 07:01  -  08 Sep 2021 06:32  --------------------------------------------------------  IN: 0 mL / OUT: 1255 mL / NET: -1255 mL      Daily     Daily     PHYSICAL EXAM:  Gen: Alert, NAD  HEENT: NCAT, conjunctiva clear, sclera anicteric, no erythema or exudates in the oropharynx, mmm  Neck: Supple, no JVD  CV: RRR, S1S2, no m/r/g  Resp: CTAB, normal respiratory effort  Abd: Soft, nontender, nondistended, normal bowel sounds  Ext: no edema, no clubbing or cyanosis  Neuro: AOx3, CN2-12 grossly intact, LEVIN  SKIN: warm, perfused    LABS:                        9.9    13.21 )-----------( 190      ( 07 Sep 2021 06:53 )             32.1     Hgb Trend: 9.9<--, 10.2<--, 11.0<--, 10.6<--, 10.7<--  09-07    136  |  94<L>  |  4<L>  ----------------------------<  98  3.8   |  33<H>  |  0.63    Ca    9.3      07 Sep 2021 06:53  Phos  3.1     09-07  Mg     2.0     09-07    TPro  6.8  /  Alb  2.6<L>  /  TBili  0.2  /  DBili  x   /  AST  17  /  ALT  21  /  AlkPhos  57  09-07    Creatinine Trend: 0.63<--, 0.66<--, 0.74<--, 0.58<--, 0.65<--, 0.66<--  LIVER FUNCTIONS - ( 07 Sep 2021 06:53 )  Alb: 2.6 g/dL / Pro: 6.8 g/dL / ALK PHOS: 57 U/L / ALT: 21 U/L / AST: 17 U/L / GGT: x                     CAPILLARY BLOOD GLUCOSE          RADIOLOGY & ADDITIONAL TESTS: Reviewed    Imaging Personally Reviewed:    Consultant(s) Notes Reviewed:      Care Discussed with Consultants/Other Providers:   Joseph Casey, PGY1  Pager [phone] [facility], [phone] [place]    Patient is a 55y old  Male who presents with a chief complaint of right ptx (06 Sep 2021 13:54)      SUBJECTIVE/INTERVAL EVENTS: Patient seen and examined at bedside. Pt has no complaints, is breathing comfortably. Eager to go home.   Tried titrating NC down 0L while in room, pt desat to high 80s (pt asymptomatic), left back at 2LNC.   Pt states pain has steadily decreasing.  No air leak noticed in chest tube chamber.    MEDICATIONS  (STANDING):  benzocaine 15 mG/menthol 3.6 mG (Sugar-Free) Lozenge 1 Lozenge Oral once  benzonatate 200 milliGRAM(s) Oral every 8 hours  budesonide 160 MICROgram(s)/formoterol 4.5 MICROgram(s) Inhaler 2 Puff(s) Inhalation two times a day  cholecalciferol 2000 Unit(s) Oral daily  clonazePAM  Tablet 0.5 milliGRAM(s) Oral every 8 hours  enoxaparin Injectable 40 milliGRAM(s) SubCutaneous daily  FIRST- Mouthwash  BLM 5 milliLiter(s) Swish and Spit every 6 hours  lidocaine   4% Patch 1 Patch Transdermal every 24 hours  melatonin 3 milliGRAM(s) Oral at bedtime  multivitamin 1 Tablet(s) Oral daily  pantoprazole  Injectable 40 milliGRAM(s) IV Push daily  piperacillin/tazobactam IVPB.. 3.375 Gram(s) IV Intermittent every 8 hours  polyethylene glycol 3350 17 Gram(s) Oral daily  senna 2 Tablet(s) Oral at bedtime    MEDICATIONS  (PRN):  acetaminophen   Tablet .. 975 milliGRAM(s) Oral every 6 hours PRN Mild Pain (1 - 3)  albuterol/ipratropium for Nebulization 3 milliLiter(s) Nebulizer every 6 hours PRN Shortness of Breath and/or Wheezing  aluminum hydroxide/magnesium hydroxide/simethicone Suspension 30 milliLiter(s) Oral every 4 hours PRN Dyspepsia  benzocaine 15 mG/menthol 3.6 mG (Sugar-Free) Lozenge 1 Lozenge Oral four times a day PRN Sore Throat  cyclobenzaprine 5 milliGRAM(s) Oral three times a day PRN Muscle Spasm  HYDROmorphone  Injectable 1 milliGRAM(s) IV Push every 4 hours PRN Severe Pain (7 - 10)  sodium chloride 0.65% Nasal 1 Spray(s) Both Nostrils every 2 hours PRN Nasal Congestion  traMADol 25 milliGRAM(s) Oral every 6 hours PRN Moderate Pain (4 - 6)      VITAL SIGNS:  T(F): 98.6 (09-08-21 @ 04:00), Max: 98.8 (09-07-21 @ 21:39)  HR: 96 (09-08-21 @ 06:20) (90 - 100)  BP: 135/82 (09-08-21 @ 04:00) (102/69 - 135/82)  RR: 20 (09-08-21 @ 04:00) (18 - 20)  SpO2: 99% (09-08-21 @ 06:20) (97% - 99%)    I&O's Summary    06 Sep 2021 07:01  -  07 Sep 2021 07:00  --------------------------------------------------------  IN: 100 mL / OUT: 1075 mL / NET: -975 mL    07 Sep 2021 07:01  -  08 Sep 2021 06:32  --------------------------------------------------------  IN: 0 mL / OUT: 1255 mL / NET: -1255 mL      Daily     Daily     PHYSICAL EXAM:  Gen: Alert, NAD  HEENT: NCAT, conjunctiva clear  Neck: Supple, no JVD  CV: RRR, S1S2, no m/r/g, borderline tachycardic  Resp: normal respiratory effort, L anterior clear, R decreased crackles compared to days past  Abd: Soft, nontender, nondistended, normal bowel sounds  Ext: no edema, no clubbing or cyanosis  Neuro: AOx3, CN2-12 grossly intact, LEVIN  SKIN: warm, perfused  LABS:                        9.9    13.21 )-----------( 190      ( 07 Sep 2021 06:53 )             32.1     Hgb Trend: 9.9<--, 10.2<--, 11.0<--, 10.6<--, 10.7<--  09-07    136  |  94<L>  |  4<L>  ----------------------------<  98  3.8   |  33<H>  |  0.63    Ca    9.3      07 Sep 2021 06:53  Phos  3.1     09-07  Mg     2.0     09-07    TPro  6.8  /  Alb  2.6<L>  /  TBili  0.2  /  DBili  x   /  AST  17  /  ALT  21  /  AlkPhos  57  09-07    Creatinine Trend: 0.63<--, 0.66<--, 0.74<--, 0.58<--, 0.65<--, 0.66<--  LIVER FUNCTIONS - ( 07 Sep 2021 06:53 )  Alb: 2.6 g/dL / Pro: 6.8 g/dL / ALK PHOS: 57 U/L / ALT: 21 U/L / AST: 17 U/L / GGT: x                     CAPILLARY BLOOD GLUCOSE          RADIOLOGY & ADDITIONAL TESTS: Reviewed    Imaging Personally Reviewed:    Consultant(s) Notes Reviewed:      Care Discussed with Consultants/Other Providers:   Joseph Casey, PGY1  Pager [phone] [facility], [phone] [place]    Patient is a 55y old  Male who presents with a chief complaint of right ptx (06 Sep 2021 13:54)      SUBJECTIVE/INTERVAL EVENTS: Patient seen and examined at bedside. Pt has no complaints, is breathing comfortably. Eager to go home.   Tried titrating NC down 0L while in room, pt desat to high 80s (pt asymptomatic), left back at 2LNC.  Pt states pain has steadily decreasing.  No air leak noticed in chest tube chamber. Minimal new fluid in chest tube chamber over past 24 hours.  Pt refused AM labs.    MEDICATIONS  (STANDING):  benzocaine 15 mG/menthol 3.6 mG (Sugar-Free) Lozenge 1 Lozenge Oral once  benzonatate 200 milliGRAM(s) Oral every 8 hours  budesonide 160 MICROgram(s)/formoterol 4.5 MICROgram(s) Inhaler 2 Puff(s) Inhalation two times a day  cholecalciferol 2000 Unit(s) Oral daily  clonazePAM  Tablet 0.5 milliGRAM(s) Oral every 8 hours  enoxaparin Injectable 40 milliGRAM(s) SubCutaneous daily  FIRST- Mouthwash  BLM 5 milliLiter(s) Swish and Spit every 6 hours  lidocaine   4% Patch 1 Patch Transdermal every 24 hours  melatonin 3 milliGRAM(s) Oral at bedtime  multivitamin 1 Tablet(s) Oral daily  pantoprazole  Injectable 40 milliGRAM(s) IV Push daily  piperacillin/tazobactam IVPB.. 3.375 Gram(s) IV Intermittent every 8 hours  polyethylene glycol 3350 17 Gram(s) Oral daily  senna 2 Tablet(s) Oral at bedtime    MEDICATIONS  (PRN):  acetaminophen   Tablet .. 975 milliGRAM(s) Oral every 6 hours PRN Mild Pain (1 - 3)  albuterol/ipratropium for Nebulization 3 milliLiter(s) Nebulizer every 6 hours PRN Shortness of Breath and/or Wheezing  aluminum hydroxide/magnesium hydroxide/simethicone Suspension 30 milliLiter(s) Oral every 4 hours PRN Dyspepsia  benzocaine 15 mG/menthol 3.6 mG (Sugar-Free) Lozenge 1 Lozenge Oral four times a day PRN Sore Throat  cyclobenzaprine 5 milliGRAM(s) Oral three times a day PRN Muscle Spasm  HYDROmorphone  Injectable 1 milliGRAM(s) IV Push every 4 hours PRN Severe Pain (7 - 10)  sodium chloride 0.65% Nasal 1 Spray(s) Both Nostrils every 2 hours PRN Nasal Congestion  traMADol 25 milliGRAM(s) Oral every 6 hours PRN Moderate Pain (4 - 6)      VITAL SIGNS:  T(F): 98.6 (09-08-21 @ 04:00), Max: 98.8 (09-07-21 @ 21:39)  HR: 96 (09-08-21 @ 06:20) (90 - 100)  BP: 135/82 (09-08-21 @ 04:00) (102/69 - 135/82)  RR: 20 (09-08-21 @ 04:00) (18 - 20)  SpO2: 99% (09-08-21 @ 06:20) (97% - 99%)    I&O's Summary    06 Sep 2021 07:01  -  07 Sep 2021 07:00  --------------------------------------------------------  IN: 100 mL / OUT: 1075 mL / NET: -975 mL    07 Sep 2021 07:01  -  08 Sep 2021 06:32  --------------------------------------------------------  IN: 0 mL / OUT: 1255 mL / NET: -1255 mL      Daily     Daily     PHYSICAL EXAM:  Gen: Alert, NAD  HEENT: NCAT, conjunctiva clear  Neck: Supple, no JVD  CV: RRR, S1S2, no m/r/g, borderline tachycardic  Resp: normal respiratory effort, L anterior clear, R decreased crackles compared to days past  Abd: Soft, nontender, nondistended, normal bowel sounds  Ext: no edema, no clubbing or cyanosis  Neuro: AOx3, CN2-12 grossly intact, LEVIN  SKIN: warm, perfused  LABS:                        9.9    13.21 )-----------( 190      ( 07 Sep 2021 06:53 )             32.1     Hgb Trend: 9.9<--, 10.2<--, 11.0<--, 10.6<--, 10.7<--  09-07    136  |  94<L>  |  4<L>  ----------------------------<  98  3.8   |  33<H>  |  0.63    Ca    9.3      07 Sep 2021 06:53  Phos  3.1     09-07  Mg     2.0     09-07    TPro  6.8  /  Alb  2.6<L>  /  TBili  0.2  /  DBili  x   /  AST  17  /  ALT  21  /  AlkPhos  57  09-07    Creatinine Trend: 0.63<--, 0.66<--, 0.74<--, 0.58<--, 0.65<--, 0.66<--  LIVER FUNCTIONS - ( 07 Sep 2021 06:53 )  Alb: 2.6 g/dL / Pro: 6.8 g/dL / ALK PHOS: 57 U/L / ALT: 21 U/L / AST: 17 U/L / GGT: x                     CAPILLARY BLOOD GLUCOSE          RADIOLOGY & ADDITIONAL TESTS: Reviewed    Imaging Personally Reviewed:    Consultant(s) Notes Reviewed:      Care Discussed with Consultants/Other Providers:

## 2021-09-08 NOTE — PROVIDER CONTACT NOTE (OTHER) - REASON
Pt desat on HFNC
Pt. get OOB without using call bell.
per pt oxycodone doesn't relieve pain, wants iv dilaudid
Chest tube dressing
patient unable to tolerate PO Mucinex
Discontinuation of COVID Isolation
Crepitus felt near chest tube dressing site
RRT called for tachypnea and chest pain
cant give med due to bipap
pt complaining of 8/10 pain on lateral side
Patient desat to 80s on highflow
pt hypoxic to 86% on HFNC, pt asymptomatic
Patient desat 81-83 while on high flow
Patient on Bipap unable to administer mucinex
Pt unable to take PO med; mucinex held
RRT: 10/10 pain Right side chest
Patient able to tolerate one hour of high-flow to eat dinner.
pt complaining of pain, no PRN's ordered
Air leak noted on chest tube
Hypoxia

## 2021-09-09 LAB
ALBUMIN SERPL ELPH-MCNC: 2.6 G/DL — LOW (ref 3.3–5)
ALP SERPL-CCNC: 63 U/L — SIGNIFICANT CHANGE UP (ref 40–120)
ALT FLD-CCNC: 18 U/L — SIGNIFICANT CHANGE UP (ref 10–45)
ANION GAP SERPL CALC-SCNC: 12 MMOL/L — SIGNIFICANT CHANGE UP (ref 5–17)
AST SERPL-CCNC: 15 U/L — SIGNIFICANT CHANGE UP (ref 10–40)
BILIRUB SERPL-MCNC: 0.2 MG/DL — SIGNIFICANT CHANGE UP (ref 0.2–1.2)
BUN SERPL-MCNC: 4 MG/DL — LOW (ref 7–23)
CALCIUM SERPL-MCNC: 9.3 MG/DL — SIGNIFICANT CHANGE UP (ref 8.4–10.5)
CHLORIDE SERPL-SCNC: 96 MMOL/L — SIGNIFICANT CHANGE UP (ref 96–108)
CO2 SERPL-SCNC: 30 MMOL/L — SIGNIFICANT CHANGE UP (ref 22–31)
CREAT SERPL-MCNC: 0.77 MG/DL — SIGNIFICANT CHANGE UP (ref 0.5–1.3)
GLUCOSE SERPL-MCNC: 86 MG/DL — SIGNIFICANT CHANGE UP (ref 70–99)
HCT VFR BLD CALC: 32 % — LOW (ref 39–50)
HGB BLD-MCNC: 9.7 G/DL — LOW (ref 13–17)
MAGNESIUM SERPL-MCNC: 2.1 MG/DL — SIGNIFICANT CHANGE UP (ref 1.6–2.6)
MCHC RBC-ENTMCNC: 27 PG — SIGNIFICANT CHANGE UP (ref 27–34)
MCHC RBC-ENTMCNC: 30.3 GM/DL — LOW (ref 32–36)
MCV RBC AUTO: 89.1 FL — SIGNIFICANT CHANGE UP (ref 80–100)
NRBC # BLD: 0 /100 WBCS — SIGNIFICANT CHANGE UP (ref 0–0)
PHOSPHATE SERPL-MCNC: 3.2 MG/DL — SIGNIFICANT CHANGE UP (ref 2.5–4.5)
PLATELET # BLD AUTO: 143 K/UL — LOW (ref 150–400)
POTASSIUM SERPL-MCNC: 3.9 MMOL/L — SIGNIFICANT CHANGE UP (ref 3.5–5.3)
POTASSIUM SERPL-SCNC: 3.9 MMOL/L — SIGNIFICANT CHANGE UP (ref 3.5–5.3)
PROT SERPL-MCNC: 6.8 G/DL — SIGNIFICANT CHANGE UP (ref 6–8.3)
RBC # BLD: 3.59 M/UL — LOW (ref 4.2–5.8)
RBC # FLD: 13 % — SIGNIFICANT CHANGE UP (ref 10.3–14.5)
SODIUM SERPL-SCNC: 138 MMOL/L — SIGNIFICANT CHANGE UP (ref 135–145)
WBC # BLD: 10.62 K/UL — HIGH (ref 3.8–10.5)
WBC # FLD AUTO: 10.62 K/UL — HIGH (ref 3.8–10.5)

## 2021-09-09 PROCEDURE — 99232 SBSQ HOSP IP/OBS MODERATE 35: CPT | Mod: 57

## 2021-09-09 PROCEDURE — 71045 X-RAY EXAM CHEST 1 VIEW: CPT | Mod: 26

## 2021-09-09 PROCEDURE — 99232 SBSQ HOSP IP/OBS MODERATE 35: CPT

## 2021-09-09 PROCEDURE — 71045 X-RAY EXAM CHEST 1 VIEW: CPT | Mod: 26,77

## 2021-09-09 RX ADMIN — TRAMADOL HYDROCHLORIDE 25 MILLIGRAM(S): 50 TABLET ORAL at 20:20

## 2021-09-09 RX ADMIN — Medication 2000 UNIT(S): at 11:30

## 2021-09-09 RX ADMIN — Medication 1 TABLET(S): at 11:31

## 2021-09-09 RX ADMIN — Medication 0.5 MILLIGRAM(S): at 13:04

## 2021-09-09 RX ADMIN — DIPHENHYDRAMINE HYDROCHLORIDE AND LIDOCAINE HYDROCHLORIDE AND ALUMINUM HYDROXIDE AND MAGNESIUM HYDRO 5 MILLILITER(S): KIT at 06:26

## 2021-09-09 RX ADMIN — PANTOPRAZOLE SODIUM 40 MILLIGRAM(S): 20 TABLET, DELAYED RELEASE ORAL at 11:29

## 2021-09-09 RX ADMIN — Medication 200 MILLIGRAM(S): at 13:05

## 2021-09-09 RX ADMIN — Medication 0.5 MILLIGRAM(S): at 21:40

## 2021-09-09 RX ADMIN — ENOXAPARIN SODIUM 40 MILLIGRAM(S): 100 INJECTION SUBCUTANEOUS at 11:30

## 2021-09-09 RX ADMIN — DIPHENHYDRAMINE HYDROCHLORIDE AND LIDOCAINE HYDROCHLORIDE AND ALUMINUM HYDROXIDE AND MAGNESIUM HYDRO 5 MILLILITER(S): KIT at 17:58

## 2021-09-09 RX ADMIN — Medication 0.5 MILLIGRAM(S): at 06:25

## 2021-09-09 RX ADMIN — BUDESONIDE AND FORMOTEROL FUMARATE DIHYDRATE 2 PUFF(S): 160; 4.5 AEROSOL RESPIRATORY (INHALATION) at 17:45

## 2021-09-09 RX ADMIN — Medication 3 MILLIGRAM(S): at 21:40

## 2021-09-09 RX ADMIN — TRAMADOL HYDROCHLORIDE 25 MILLIGRAM(S): 50 TABLET ORAL at 19:43

## 2021-09-09 RX ADMIN — SENNA PLUS 2 TABLET(S): 8.6 TABLET ORAL at 21:40

## 2021-09-09 RX ADMIN — DIPHENHYDRAMINE HYDROCHLORIDE AND LIDOCAINE HYDROCHLORIDE AND ALUMINUM HYDROXIDE AND MAGNESIUM HYDRO 5 MILLILITER(S): KIT at 01:00

## 2021-09-09 RX ADMIN — Medication 200 MILLIGRAM(S): at 21:40

## 2021-09-09 RX ADMIN — PIPERACILLIN AND TAZOBACTAM 25 GRAM(S): 4; .5 INJECTION, POWDER, LYOPHILIZED, FOR SOLUTION INTRAVENOUS at 06:26

## 2021-09-09 RX ADMIN — DIPHENHYDRAMINE HYDROCHLORIDE AND LIDOCAINE HYDROCHLORIDE AND ALUMINUM HYDROXIDE AND MAGNESIUM HYDRO 5 MILLILITER(S): KIT at 11:28

## 2021-09-09 RX ADMIN — BUDESONIDE AND FORMOTEROL FUMARATE DIHYDRATE 2 PUFF(S): 160; 4.5 AEROSOL RESPIRATORY (INHALATION) at 05:17

## 2021-09-09 RX ADMIN — Medication 200 MILLIGRAM(S): at 06:25

## 2021-09-09 NOTE — PROGRESS NOTE ADULT - PROBLEM SELECTOR PLAN 1
S/p COVID with complications of PTX and empyema s/p chest tube placed by CT on 8/26  - 9/4 CT: increased loculated R PTX  - f/u thoracic recs  - daily AM CXR per thoracic  - NC 2L  -9/5 pigtail cath placed R ant sup chest wall to water seal

## 2021-09-09 NOTE — PROGRESS NOTE ADULT - PROBLEM SELECTOR PLAN 1
9/5 Right chest tube removed without incident and new right apical pigtail placed with improvement of PTX   Maintain right pigtail to water seal, clamped on 9/9 at 1000. Will repeat Xray at 2pm.  If stable will leave clamped overnight f/u xray in AM  monitor for air leak   Daily CXR while chest tube is in place  Care as per primary team

## 2021-09-09 NOTE — PROGRESS NOTE ADULT - SUBJECTIVE AND OBJECTIVE BOX
Leroy Burger MD, PGY-3  Available on [employer] Teams | Pager: [phone] | [facility]: [phone]  ---------------------------------------------------------------------------------------------  Patient is a 55y old  Male who presents with a chief complaint of right ptx (06 Sep 2021 13:54)      SUBJECTIVE / OVERNIGHT EVENTS:  ADDITIONAL REVIEW OF SYSTEMS:    MEDICATIONS  (STANDING):  benzocaine 15 mG/menthol 3.6 mG (Sugar-Free) Lozenge 1 Lozenge Oral once  benzonatate 200 milliGRAM(s) Oral every 8 hours  budesonide 160 MICROgram(s)/formoterol 4.5 MICROgram(s) Inhaler 2 Puff(s) Inhalation two times a day  cholecalciferol 2000 Unit(s) Oral daily  clonazePAM  Tablet 0.5 milliGRAM(s) Oral every 8 hours  enoxaparin Injectable 40 milliGRAM(s) SubCutaneous daily  FIRST- Mouthwash  BLM 5 milliLiter(s) Swish and Spit every 6 hours  lidocaine   4% Patch 1 Patch Transdermal every 24 hours  melatonin 3 milliGRAM(s) Oral at bedtime  multivitamin 1 Tablet(s) Oral daily  pantoprazole  Injectable 40 milliGRAM(s) IV Push daily  polyethylene glycol 3350 17 Gram(s) Oral daily  senna 2 Tablet(s) Oral at bedtime    MEDICATIONS  (PRN):  acetaminophen   Tablet .. 975 milliGRAM(s) Oral every 6 hours PRN Mild Pain (1 - 3)  albuterol/ipratropium for Nebulization 3 milliLiter(s) Nebulizer every 6 hours PRN Shortness of Breath and/or Wheezing  aluminum hydroxide/magnesium hydroxide/simethicone Suspension 30 milliLiter(s) Oral every 4 hours PRN Dyspepsia  benzocaine 15 mG/menthol 3.6 mG (Sugar-Free) Lozenge 1 Lozenge Oral four times a day PRN Sore Throat  cyclobenzaprine 5 milliGRAM(s) Oral three times a day PRN Muscle Spasm  HYDROmorphone  Injectable 1 milliGRAM(s) IV Push every 4 hours PRN Severe Pain (7 - 10)  sodium chloride 0.65% Nasal 1 Spray(s) Both Nostrils every 2 hours PRN Nasal Congestion  traMADol 25 milliGRAM(s) Oral every 6 hours PRN Moderate Pain (4 - 6)      CAPILLARY BLOOD GLUCOSE        I&O's Summary    08 Sep 2021 07:01  -  09 Sep 2021 07:00  --------------------------------------------------------  IN: 360 mL / OUT: 1 mL / NET: 359 mL        PHYSICAL EXAM:  Vital Signs Last 24 Hrs  T(C): 36.6 (09 Sep 2021 04:10), Max: 36.8 (08 Sep 2021 20:49)  T(F): 97.8 (09 Sep 2021 04:10), Max: 98.2 (08 Sep 2021 20:49)  HR: 87 (09 Sep 2021 05:35) (87 - 111)  BP: 105/70 (09 Sep 2021 04:10) (105/70 - 115/75)  BP(mean): --  RR: 18 (09 Sep 2021 04:10) (18 - 20)  SpO2: 97% (09 Sep 2021 05:35) (97% - 99%)    PHYSICAL EXAM:  Gen: Alert, NAD  HEENT: NCAT, conjunctiva clear  Neck: Supple, no JVD  CV: RRR, S1S2, no m/r/g, borderline tachycardic  Resp: normal respiratory effort, L anterior clear, R decreased crackles compared to days past  Abd: Soft, nontender, nondistended, normal bowel sounds  Ext: no edema, no clubbing or cyanosis  Neuro: AOx3, CN2-12 grossly intact, LEVIN  SKIN: warm, perfused; prominent R chest subcutaneous emphysema         LABS:                        9.7    10.62 )-----------( 143      ( 09 Sep 2021 06:45 )             32.0     09-09    138  |  96  |  4<L>  ----------------------------<  86  3.9   |  30  |  0.77    Ca    9.3      09 Sep 2021 06:45  Phos  3.2     09-09  Mg     2.1     09-09    TPro  6.8  /  Alb  2.6<L>  /  TBili  0.2  /  DBili  x   /  AST  15  /  ALT  18  /  AlkPhos  63  09-09    RADIOLOGY & ADDITIONAL TESTS:  Results Reviewed:   Imaging Personally Reviewed:  Electrocardiogram Personally Reviewed:    COORDINATION OF CARE:  Care Discussed with Consultants/Other Providers [Y/N]:  Prior or Outpatient Records Reviewed [Y/N]:

## 2021-09-09 NOTE — PROGRESS NOTE ADULT - PROBLEM SELECTOR PLAN 3
CXR 8/26 with R ptx   -S/p R chest tube placement by thoracic 8/26  -Subcutaneous emphysema, appears to be improving.   -Large clot removed from tube 9/2  -Chest tube removed 9/5 and R apical pigtail placed by thoracic surgery   -No air leak noted now.   -Management per thoracic surgery, now tube clamped

## 2021-09-09 NOTE — PROGRESS NOTE ADULT - SUBJECTIVE AND OBJECTIVE BOX
Subjective: "Im ok "  Patient denies chest pain, shortness of breath.       VITAL SIGNS    Vital Signs Last 24 Hrs  T(C): 37.1 (09-09-21 @ 12:00), Max: 37.1 (09-09-21 @ 12:00)  T(F): 98.7 (09-09-21 @ 12:00), Max: 98.7 (09-09-21 @ 12:00)  HR: 95 (09-09-21 @ 11:24) (87 - 111)  BP: 103/71 (09-09-21 @ 11:24) (103/71 - 115/75)  RR: 16 (09-09-21 @ 11:24) (16 - 20)  SpO2: 100% (09-09-21 @ 11:24) (86% - 100%)            09-08 @ 07:01  -  09-09 @ 07:00  --------------------------------------------------------  IN: 360 mL / OUT: 1 mL / NET: 359 mL    09-09 @ 07:01  -  09-09 @ 13:30  --------------------------------------------------------  IN: 0 mL / OUT: 900 mL / NET: -900 mL       Daily     Daily   Admit Wt: Drug Dosing Weight  Height (cm): 170.2 (30 Jul 2021 15:42)  Weight (kg): 92 (30 Jul 2021 15:42)  BMI (kg/m2): 31.8 (30 Jul 2021 15:42)  BSA (m2): 2.03 (30 Jul 2021 15:42)    Bilirubin Total, Serum: 0.2 mg/dL (09-09 @ 06:45)    CAPILLARY BLOOD GLUCOSE            PHYSICAL EXAM    Neurology: alert and oriented x 3, nonfocal, no gross deficits  CV : RRR +S1/S2  Lungs: CTA BL RR easy, unlabored.  Right anterior pigtail drain in place.  Dressing c/d/i  Abdomen: soft, nontender, nondistended, positive bowel sounds  :  pt voiding without difficulty   Extremities:   LEVIN; No peripheral edema, neg calf tenderness.   PPP bilaterally        Chest tubes: Right apical pigtail drain placed 9/5 no output overnight.  Clamped at 1000 with Dr. Tejada present.  Will repeat Xray at 2pm      acetaminophen   Tablet .. 975 milliGRAM(s) Oral every 6 hours PRN  albuterol/ipratropium for Nebulization 3 milliLiter(s) Nebulizer every 6 hours PRN  aluminum hydroxide/magnesium hydroxide/simethicone Suspension 30 milliLiter(s) Oral every 4 hours PRN  benzocaine 15 mG/menthol 3.6 mG (Sugar-Free) Lozenge 1 Lozenge Oral four times a day PRN  benzocaine 15 mG/menthol 3.6 mG (Sugar-Free) Lozenge 1 Lozenge Oral once  benzonatate 200 milliGRAM(s) Oral every 8 hours  budesonide 160 MICROgram(s)/formoterol 4.5 MICROgram(s) Inhaler 2 Puff(s) Inhalation two times a day  cholecalciferol 2000 Unit(s) Oral daily  clonazePAM  Tablet 0.5 milliGRAM(s) Oral every 8 hours  cyclobenzaprine 5 milliGRAM(s) Oral three times a day PRN  enoxaparin Injectable 40 milliGRAM(s) SubCutaneous daily  FIRST- Mouthwash  BLM 5 milliLiter(s) Swish and Spit every 6 hours  HYDROmorphone  Injectable 1 milliGRAM(s) IV Push every 4 hours PRN  lidocaine   4% Patch 1 Patch Transdermal every 24 hours  melatonin 3 milliGRAM(s) Oral at bedtime  multivitamin 1 Tablet(s) Oral daily  pantoprazole  Injectable 40 milliGRAM(s) IV Push daily  polyethylene glycol 3350 17 Gram(s) Oral daily  senna 2 Tablet(s) Oral at bedtime  sodium chloride 0.65% Nasal 1 Spray(s) Both Nostrils every 2 hours PRN  traMADol 25 milliGRAM(s) Oral every 6 hours PRN

## 2021-09-09 NOTE — CHART NOTE - NSCHARTNOTEFT_GEN_A_CORE
Nutrition Follow Up Note  Patient seen for: malnutrition follow up.    Chart reviewed, events noted. Pt is a "55yr old unvaccinated male with hx of HTN, RLE DVT, and PE (not on home AC) presents with progressively worsening SOB, intermittent fevers, COVID positive, admitted for COVID PNA. Initially admitted on  to ICU on HF and BiPAP, never intubated. Downgraded to floors on .  found to have tension pneumothorax, returned to ICU, R chest tube placed.  CT scan showed pneumomediastinum, pneumopericardium, bilateral subQ emphysema as well as bilateral opacities and RUL consolidation plus potential cavitary lesion. Gram negative rounds grown in pleural fluid. 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    - downgraded to floors on HFNC. Continuing to have R sided pain with cough and inspiration likely 2/2 to COVID pneumonia, tension PTX, effusion, and RUL consolidation/cavitary lesion." Pt now on NC.     Source: [x] Patient       [x] EMR        [] RN        [] Family at bedside       [] Other:    -If unable to interview patient: [] Trach/Vent/BiPAP  [] Disoriented/confused/inappropriate to interview    Diet Order:   Diet, Soft (21)  + Orgain supplement 2 x daily    - Is current order appropriate/adequate? [x] Yes  []  No:     - PO intake :   [] >75%  Adequate    [x] 50-75%  Fair       [] <50%  Poor    - Nutrition-related concerns:    - Pt reports fair appetite, eating >50% of meals. Eating food from outside facility at visit - appears to be takeout chinese food.    - Pt endorses occasional intake of Orgain supplement, states he can only tolerate one per day as his "stomach has shrunk." Discussed drinking supplement between meals to optimize PO intake.    - Food preferences obtained and updated.    GI:  Last BM 9/8 x 2.   Bowel Regimen? [x] Yes   [] No    Weights:   Daily Weight in k.4 ()    Nutritionally Pertinent MEDICATIONS  (STANDING):  cholecalciferol  multivitamin  pantoprazole  Injectable  polyethylene glycol 3350  senna    Pertinent Labs:  @ 06:45: Na 138, BUN 4<L>, Cr 0.77, BG 86, K+ 3.9, Phos 3.2, Mg 2.1, Alk Phos 63, ALT/SGPT 18, AST/SGOT 15, HbA1c --    A1C with Estimated Average Glucose Result: 6.0 % (08-10-21 @ 09:48)  A1C with Estimated Average Glucose Result: 6.3 % (21 @ 14:51)    Finger Sticks: n/a    Skin per nursing documentation: no pressure injuries noted.  Edema: No noted edema as per flow sheets.     Estimated Needs:   [x] no change since previous assessment  [] recalculated:     Previous Nutrition Diagnosis: Severe Malnutrition  Nutrition Diagnosis is: [x] ongoing  [] resolved [] not applicable     Previous Nutrition Diagnosis: Obesity  Nutrition Diagnosis is: [] ongoing  [] resolved [x] not applicable     New Nutrition Diagnosis: [x] Not applicable    Nutrition Care Plan:  [x] In Progress  [] Achieved  [] Not applicable    Nutrition Interventions:     Education Provided:       [x] Yes: Provided recommendations to optimize PO and protein intake, recommended small frequent meals by ordering nutrient-dense snacks and leaving non-perishable food away from tray for later consumption during the day or between meals, to start with protein, and sips of supplement throughout the day; reviewed foods with protein and menu order procedures in hospital. Discussed increased demand for protein and calories, pt expressed understanding.    Recommendations:      1. Continue soft diet.  2. Continue Orgain 2 x daily.  3. Continue multivitamin supplementation as medically feasible.  4. Provide encouragement with PO intake, menu selections, and assistance with meals as needed.     Monitoring and Evaluation:   Continue to monitor nutritional intake, tolerance to diet prescription, weights, labs, skin integrity    RD remains available upon request and will follow up per protocol    Tika Albert MS, RD, CDN Pager# [phone]

## 2021-09-09 NOTE — PROGRESS NOTE ADULT - ASSESSMENT
Echo 8/24/21: EF 65%, mild MR, grossly nl lv sys fx    a/p  54yo M with Hx of DVT s/p achilles tendon repair years ago not on AC presenting with complaints of SOB. intermittent and fevers with cough productive of white sputum for past week, tested positive for covid 2. Now transferred to MICU for tension pneumothorax, s/p chest tube.     #Atypical Chest Pain  -RRT 8/23 x2 for chest pain - exacerbated by cough and inspiration  -improved, secondary to covid PNA, PNX  -trop negative  -no ADHF noted on exam   -CTA without PE   -Echo noted w grossly nl lv sys fx, mild MR     #Covid -19, PNX  -s/p completed courses of Remdesivir x 5 days and Decadron x 10 days   -S/p Tocilizumab 7/30 per ID   -GNR in gram stain from pleural fluid -- on IV abx   -CTA as above   -RRT on 9/1 for hypoxia - repeat CXR noted    -s/p new R chest tube for pnx -- mgmt per thoracic -Large clot removed from tube 9/2  -Chest tube removed 9/5 and R apical pigtail placed by thoracic surgery -- now clamped   -AC remains on hold   -wean O2 as able   -pulm / thoracic f/u    #Sinus tachycardia  -resolved  -likely secondary to covid PNA, volume, pain, PNX  -cont to monitor   -echo as above     #DVT (hx)  -LE doppler 8/25 with no evidence of deep venous thrombosis in either lower extremity. Incidental note of thrombosis of the left tibial peroneal trunk with diminished flow flow within the left popliteal artery.  -AC on hold given inc bloody outpt noted in Chest tube   -med f/u     #s/p steroids for laryngitis/obstruction

## 2021-09-09 NOTE — PROGRESS NOTE ADULT - ASSESSMENT
55 M with covid PNA, CP         completed remdesivir, s/p decadron   sp toci   No fever  CP better- cardio seeing  CXR - no new findings  CT chest with cavitary PNA - cont zosyn, s/p CT by thoracic for PTX/effusion  Plan for 2 weeks abx - cont abx till 9/8    Dylan Carter  Attending Physician   Division of Infectious Disease  Pager #[phone]  Available on [employer] Teams also  After 5pm/weekend or no response, call #[phone]         55 M with covid PNA, CP         completed remdesivir, s/p decadron   sp toci   No fever  CP better- cardio seeing  CXR - no new findings  CT chest with cavitary PNA - s/p 2 weeks zosyn, s/p CT by thoracic for PTX/effusion      Dylan Carter  Attending Physician   Division of Infectious Disease  Pager #[phone]  Available on [employer] Teams also  After 5pm/weekend or no response, call #[phone]

## 2021-09-09 NOTE — PROGRESS NOTE ADULT - ASSESSMENT
56yo M with h/o appendectomy admitted to the hospital with COVID and possible superimposed pneumonia found to have a right moderate-sized pneumothorax with leftward shift and small pleural effusion.     8/26 Right open chest tube placed at bedside, Maintain to wall suction -40mmHg  8/27 Remains on hi flow- increased subq emphysema, repeat CXR w/o signs of increasing PTX, chest tube +airleak, functioning, Maintain to dry suction -40mmHg  8/28 VSS, CXR with subcutaneous emphysema and persistent right ptx, maintain right chest tube to suction.   8/29 VSS, pt still with air leak on chest tube. s/p non-con Chest CT: Trace right pleural effusion, unchanged. Trace right pneumothorax new from prior. Pneumomediastinum, pneumopericardium and bilateral subcutaneous emphysema new from prior.  8/30 +Right chest tube to -40 dry suction, +airleak, pt tolerating high flow nasal cannula. Persistent SubQ emphysema without ventilatory compromise. Cont maintain right chest tube ti -40 mmHg dry suction.   8/31 Cont maintain right chest tube ti -40 mmHg dry suction.   9/1 chest tube with bloody clot output- Hold [id number] to prevent further bleed  9/2  seen and examined in company of Dr Tejada chest tube clot removed under sterile condition tube patent daily xray  9/3  Patient seen and examined with Dr. Tejada. Chest tube clamped and will get repeat Xray in 4 hours (2030) to evaluate pneumothorax.  Denies dyspnea/SOB. O2 sat 97%  9/5 Removal of R chest tube. Continue R apical pigtail catheter to LCWS. Ok to restart DVT PPX. D/w Dr. Fraser F/u cxr post pull  9/4 VSS.  O2 sats 95% on 5LNC.  Patient seen and examined with Dr. Fraser.  Plan is to get CT Chest Non Con to evaluate right hydropneumothorax with plan for possible IR insertion of chest tube.    9/6 Chest xray  demonstrates b/l pleural effusion. Maintain right pigtail, CXR in am, no airleak , minimal output  9/7 VSS, maintain right pigtail to continuous dry suction at -40mmhg. Failed clamp trial over weekend.   9/8 VSS, no air leak on right pigtail - placed to water seal, will repeat CXR in 4 hours at 1600.  9/9 VSS Right apical pigtail drain placed 9/5 no output overnight.  Clamped at 1000 with Dr. Tejada present.  Will repeat Xray at 2pm. If xray stable will leave tube clamped overnight with repeat Xray in AM

## 2021-09-09 NOTE — PROGRESS NOTE ADULT - SUBJECTIVE AND OBJECTIVE BOX
CARDIOLOGY FOLLOW UP - Dr. Terry  Date of Service: 9/9/21  CC: denies cp, sob, and palpitations     Review of Systems:  Constitutional: No fever, weight loss, or fatigue  Respiratory: No cough, wheezing, or hemoptysis, no shortness of breath  Cardiovascular: No chest pain, palpitations, passing out, dizziness, or leg swelling  Gastrointestinal: No abd or epigastric pain.  No nausea, vomiting, or hematemesis; no diarrhea or constipation, no melena or hematochezia  Vascular: no edema       PHYSICAL EXAM:  T(C): 37.1 (09-09-21 @ 12:00), Max: 37.1 (09-09-21 @ 12:00)  HR: 95 (09-09-21 @ 11:24) (87 - 111)  BP: 103/71 (09-09-21 @ 11:24) (103/71 - 115/75)  RR: 16 (09-09-21 @ 11:24) (16 - 20)  SpO2: 100% (09-09-21 @ 11:24) (86% - 100%)  Wt(kg): --  I&O's Summary    08 Sep 2021 07:01  -  09 Sep 2021 07:00  --------------------------------------------------------  IN: 360 mL / OUT: 1 mL / NET: 359 mL    09 Sep 2021 07:01  -  09 Sep 2021 13:37  --------------------------------------------------------  IN: 0 mL / OUT: 900 mL / NET: -900 mL        Appearance: Normal	  Cardiovascular: Normal S1 S2,RRR, No JVD, No murmurs  Respiratory: Lungs clear to auscultation	  Gastrointestinal:  Soft, Non-tender, + BS	  Extremities: Normal range of motion, No clubbing, cyanosis or edema      Home Medications:  Albuterol (Eqv-ProAir HFA) 90 mcg/inh inhalation aerosol: 2 puff(s) inhaled every 6 hours, As Needed (29 Jul 2021 09:08)  ivermectin 3 mg oral tablet: 1 tab(s) orally once a day (29 Jul 2021 09:08)  levoFLOXacin 500 mg oral tablet: 1 tab(s) orally every 24 hours (29 Jul 2021 09:08)  predniSONE 50 mg oral tablet: 1 tab(s) orally once a day (29 Jul 2021 09:08)      MEDICATIONS  (STANDING):  benzocaine 15 mG/menthol 3.6 mG (Sugar-Free) Lozenge 1 Lozenge Oral once  benzonatate 200 milliGRAM(s) Oral every 8 hours  budesonide 160 MICROgram(s)/formoterol 4.5 MICROgram(s) Inhaler 2 Puff(s) Inhalation two times a day  cholecalciferol 2000 Unit(s) Oral daily  clonazePAM  Tablet 0.5 milliGRAM(s) Oral every 8 hours  enoxaparin Injectable 40 milliGRAM(s) SubCutaneous daily  FIRST- Mouthwash  BLM 5 milliLiter(s) Swish and Spit every 6 hours  lidocaine   4% Patch 1 Patch Transdermal every 24 hours  melatonin 3 milliGRAM(s) Oral at bedtime  multivitamin 1 Tablet(s) Oral daily  pantoprazole  Injectable 40 milliGRAM(s) IV Push daily  polyethylene glycol 3350 17 Gram(s) Oral daily  senna 2 Tablet(s) Oral at bedtime      TELEMETRY: 	    ECG:  	  RADIOLOGY:   DIAGNOSTIC TESTING:  [ ] Echocardiogram:  [ ]  Catheterization:  [ ] Stress Test:    OTHER: 	    LABS:	 	                            9.7    10.62 )-----------( 143      ( 09 Sep 2021 06:45 )             32.0     09-09    138  |  96  |  4<L>  ----------------------------<  86  3.9   |  30  |  0.77    Ca    9.3      09 Sep 2021 06:45  Phos  3.2     09-09  Mg     2.1     09-09    TPro  6.8  /  Alb  2.6<L>  /  TBili  0.2  /  DBili  x   /  AST  15  /  ALT  18  /  AlkPhos  63  09-09

## 2021-09-09 NOTE — PROGRESS NOTE ADULT - SUBJECTIVE AND OBJECTIVE BOX
Follow-up Pulm Progress Note    No new respiratory events overnight.  Denies SOB/CP.   Sats 99% 2LNC  R pigtail clamped by thoracic     Medications:  MEDICATIONS  (STANDING):  benzocaine 15 mG/menthol 3.6 mG (Sugar-Free) Lozenge 1 Lozenge Oral once  benzonatate 200 milliGRAM(s) Oral every 8 hours  budesonide 160 MICROgram(s)/formoterol 4.5 MICROgram(s) Inhaler 2 Puff(s) Inhalation two times a day  cholecalciferol 2000 Unit(s) Oral daily  clonazePAM  Tablet 0.5 milliGRAM(s) Oral every 8 hours  enoxaparin Injectable 40 milliGRAM(s) SubCutaneous daily  FIRST- Mouthwash  BLM 5 milliLiter(s) Swish and Spit every 6 hours  lidocaine   4% Patch 1 Patch Transdermal every 24 hours  melatonin 3 milliGRAM(s) Oral at bedtime  multivitamin 1 Tablet(s) Oral daily  pantoprazole  Injectable 40 milliGRAM(s) IV Push daily  polyethylene glycol 3350 17 Gram(s) Oral daily  senna 2 Tablet(s) Oral at bedtime    MEDICATIONS  (PRN):  acetaminophen   Tablet .. 975 milliGRAM(s) Oral every 6 hours PRN Mild Pain (1 - 3)  albuterol/ipratropium for Nebulization 3 milliLiter(s) Nebulizer every 6 hours PRN Shortness of Breath and/or Wheezing  aluminum hydroxide/magnesium hydroxide/simethicone Suspension 30 milliLiter(s) Oral every 4 hours PRN Dyspepsia  benzocaine 15 mG/menthol 3.6 mG (Sugar-Free) Lozenge 1 Lozenge Oral four times a day PRN Sore Throat  cyclobenzaprine 5 milliGRAM(s) Oral three times a day PRN Muscle Spasm  HYDROmorphone  Injectable 1 milliGRAM(s) IV Push every 4 hours PRN Severe Pain (7 - 10)  sodium chloride 0.65% Nasal 1 Spray(s) Both Nostrils every 2 hours PRN Nasal Congestion  traMADol 25 milliGRAM(s) Oral every 6 hours PRN Moderate Pain (4 - 6)          Vital Signs Last 24 Hrs  T(C): 37.1 (09 Sep 2021 12:00), Max: 37.1 (09 Sep 2021 12:00)  T(F): 98.7 (09 Sep 2021 12:00), Max: 98.7 (09 Sep 2021 12:00)  HR: 95 (09 Sep 2021 11:24) (87 - 111)  BP: 103/71 (09 Sep 2021 11:24) (103/71 - 115/75)  BP(mean): --  RR: 16 (09 Sep 2021 11:24) (16 - 20)  SpO2: 100% (09 Sep 2021 11:24) (86% - 100%)          09-08 @ 07:01  -  09-09 @ 07:00  --------------------------------------------------------  IN: 360 mL / OUT: 1 mL / NET: 359 mL          LABS:                        9.7    10.62 )-----------( 143      ( 09 Sep 2021 06:45 )             32.0     09-09    138  |  96  |  4<L>  ----------------------------<  86  3.9   |  30  |  0.77    Ca    9.3      09 Sep 2021 06:45  Phos  3.2     09-09  Mg     2.1     09-09    TPro  6.8  /  Alb  2.6<L>  /  TBili  0.2  /  DBili  x   /  AST  15  /  ALT  18  /  AlkPhos  63  09-09          Physical Examination:  PULM: decreased BS   CVS: S1, S2 heard    RADIOLOGY REVIEWED  CXR: b/l opacities  slowly improving on R

## 2021-09-10 DIAGNOSIS — D69.6 THROMBOCYTOPENIA, UNSPECIFIED: ICD-10-CM

## 2021-09-10 LAB
ALBUMIN SERPL ELPH-MCNC: 2.4 G/DL — LOW (ref 3.3–5)
ALP SERPL-CCNC: 66 U/L — SIGNIFICANT CHANGE UP (ref 40–120)
ALT FLD-CCNC: 18 U/L — SIGNIFICANT CHANGE UP (ref 10–45)
ANION GAP SERPL CALC-SCNC: 10 MMOL/L — SIGNIFICANT CHANGE UP (ref 5–17)
APTT BLD: 30 SEC — SIGNIFICANT CHANGE UP (ref 27.5–35.5)
AST SERPL-CCNC: 16 U/L — SIGNIFICANT CHANGE UP (ref 10–40)
BILIRUB SERPL-MCNC: 0.2 MG/DL — SIGNIFICANT CHANGE UP (ref 0.2–1.2)
BUN SERPL-MCNC: 7 MG/DL — SIGNIFICANT CHANGE UP (ref 7–23)
CALCIUM SERPL-MCNC: 9.1 MG/DL — SIGNIFICANT CHANGE UP (ref 8.4–10.5)
CHLORIDE SERPL-SCNC: 101 MMOL/L — SIGNIFICANT CHANGE UP (ref 96–108)
CO2 SERPL-SCNC: 29 MMOL/L — SIGNIFICANT CHANGE UP (ref 22–31)
CREAT SERPL-MCNC: 0.71 MG/DL — SIGNIFICANT CHANGE UP (ref 0.5–1.3)
FERRITIN SERPL-MCNC: 529 NG/ML — HIGH (ref 30–400)
FIBRINOGEN PPP-MCNC: 681 MG/DL — HIGH (ref 290–520)
FOLATE SERPL-MCNC: 17.1 NG/ML — SIGNIFICANT CHANGE UP
GLUCOSE SERPL-MCNC: 86 MG/DL — SIGNIFICANT CHANGE UP (ref 70–99)
HCT VFR BLD CALC: 32.2 % — LOW (ref 39–50)
HEPARIN-PF4 AB RESULT: <0.6 U/ML — SIGNIFICANT CHANGE UP (ref 0–0.9)
HGB BLD-MCNC: 9.9 G/DL — LOW (ref 13–17)
INR BLD: 1.25 RATIO — HIGH (ref 0.88–1.16)
IRON SATN MFR SERPL: 25 % — SIGNIFICANT CHANGE UP (ref 16–55)
IRON SATN MFR SERPL: 41 UG/DL — LOW (ref 45–165)
MAGNESIUM SERPL-MCNC: 2 MG/DL — SIGNIFICANT CHANGE UP (ref 1.6–2.6)
MCHC RBC-ENTMCNC: 27.4 PG — SIGNIFICANT CHANGE UP (ref 27–34)
MCHC RBC-ENTMCNC: 30.7 GM/DL — LOW (ref 32–36)
MCV RBC AUTO: 89.2 FL — SIGNIFICANT CHANGE UP (ref 80–100)
NRBC # BLD: 0 /100 WBCS — SIGNIFICANT CHANGE UP (ref 0–0)
PF4 HEPARIN CMPLX AB SER-ACNC: NEGATIVE — SIGNIFICANT CHANGE UP
PHOSPHATE SERPL-MCNC: 3.2 MG/DL — SIGNIFICANT CHANGE UP (ref 2.5–4.5)
PLATELET # BLD AUTO: 121 K/UL — LOW (ref 150–400)
POTASSIUM SERPL-MCNC: 3.7 MMOL/L — SIGNIFICANT CHANGE UP (ref 3.5–5.3)
POTASSIUM SERPL-SCNC: 3.7 MMOL/L — SIGNIFICANT CHANGE UP (ref 3.5–5.3)
PROT SERPL-MCNC: 6.9 G/DL — SIGNIFICANT CHANGE UP (ref 6–8.3)
PROTHROM AB SERPL-ACNC: 14.8 SEC — HIGH (ref 10.6–13.6)
RBC # BLD: 3.61 M/UL — LOW (ref 4.2–5.8)
RBC # FLD: 13.1 % — SIGNIFICANT CHANGE UP (ref 10.3–14.5)
SODIUM SERPL-SCNC: 140 MMOL/L — SIGNIFICANT CHANGE UP (ref 135–145)
TIBC SERPL-MCNC: 162 UG/DL — LOW (ref 220–430)
UIBC SERPL-MCNC: 121 UG/DL — SIGNIFICANT CHANGE UP (ref 110–370)
VIT B12 SERPL-MCNC: 600 PG/ML — SIGNIFICANT CHANGE UP (ref 232–1245)
WBC # BLD: 9.48 K/UL — SIGNIFICANT CHANGE UP (ref 3.8–10.5)
WBC # FLD AUTO: 9.48 K/UL — SIGNIFICANT CHANGE UP (ref 3.8–10.5)

## 2021-09-10 PROCEDURE — 99232 SBSQ HOSP IP/OBS MODERATE 35: CPT

## 2021-09-10 PROCEDURE — 71045 X-RAY EXAM CHEST 1 VIEW: CPT | Mod: 26,76

## 2021-09-10 PROCEDURE — 99232 SBSQ HOSP IP/OBS MODERATE 35: CPT | Mod: 57

## 2021-09-10 RX ADMIN — Medication 3 MILLIGRAM(S): at 21:43

## 2021-09-10 RX ADMIN — Medication 200 MILLIGRAM(S): at 15:31

## 2021-09-10 RX ADMIN — BUDESONIDE AND FORMOTEROL FUMARATE DIHYDRATE 2 PUFF(S): 160; 4.5 AEROSOL RESPIRATORY (INHALATION) at 17:40

## 2021-09-10 RX ADMIN — Medication 0.5 MILLIGRAM(S): at 15:31

## 2021-09-10 RX ADMIN — TRAMADOL HYDROCHLORIDE 25 MILLIGRAM(S): 50 TABLET ORAL at 05:38

## 2021-09-10 RX ADMIN — ENOXAPARIN SODIUM 40 MILLIGRAM(S): 100 INJECTION SUBCUTANEOUS at 12:08

## 2021-09-10 RX ADMIN — Medication 2000 UNIT(S): at 12:08

## 2021-09-10 RX ADMIN — HYDROMORPHONE HYDROCHLORIDE 1 MILLIGRAM(S): 2 INJECTION INTRAMUSCULAR; INTRAVENOUS; SUBCUTANEOUS at 01:50

## 2021-09-10 RX ADMIN — Medication 0.5 MILLIGRAM(S): at 05:33

## 2021-09-10 RX ADMIN — POLYETHYLENE GLYCOL 3350 17 GRAM(S): 17 POWDER, FOR SOLUTION ORAL at 12:09

## 2021-09-10 RX ADMIN — PANTOPRAZOLE SODIUM 40 MILLIGRAM(S): 20 TABLET, DELAYED RELEASE ORAL at 12:07

## 2021-09-10 RX ADMIN — DIPHENHYDRAMINE HYDROCHLORIDE AND LIDOCAINE HYDROCHLORIDE AND ALUMINUM HYDROXIDE AND MAGNESIUM HYDRO 5 MILLILITER(S): KIT at 17:15

## 2021-09-10 RX ADMIN — DIPHENHYDRAMINE HYDROCHLORIDE AND LIDOCAINE HYDROCHLORIDE AND ALUMINUM HYDROXIDE AND MAGNESIUM HYDRO 5 MILLILITER(S): KIT at 12:09

## 2021-09-10 RX ADMIN — Medication 200 MILLIGRAM(S): at 05:34

## 2021-09-10 RX ADMIN — TRAMADOL HYDROCHLORIDE 25 MILLIGRAM(S): 50 TABLET ORAL at 06:10

## 2021-09-10 RX ADMIN — Medication 0.5 MILLIGRAM(S): at 21:42

## 2021-09-10 RX ADMIN — Medication 1 TABLET(S): at 12:08

## 2021-09-10 RX ADMIN — HYDROMORPHONE HYDROCHLORIDE 1 MILLIGRAM(S): 2 INJECTION INTRAMUSCULAR; INTRAVENOUS; SUBCUTANEOUS at 01:24

## 2021-09-10 RX ADMIN — Medication 200 MILLIGRAM(S): at 21:42

## 2021-09-10 RX ADMIN — BUDESONIDE AND FORMOTEROL FUMARATE DIHYDRATE 2 PUFF(S): 160; 4.5 AEROSOL RESPIRATORY (INHALATION) at 05:20

## 2021-09-10 RX ADMIN — DIPHENHYDRAMINE HYDROCHLORIDE AND LIDOCAINE HYDROCHLORIDE AND ALUMINUM HYDROXIDE AND MAGNESIUM HYDRO 5 MILLILITER(S): KIT at 05:34

## 2021-09-10 NOTE — PROGRESS NOTE ADULT - NEGATIVE GASTROINTESTINAL SYMPTOMS
no vomiting/no diarrhea/no abdominal pain

## 2021-09-10 NOTE — PROGRESS NOTE ADULT - ASSESSMENT
55 M with covid PNA, CP         completed remdesivir, s/p decadron   sp toci   No fever  CP better- cardio seeing  CXR - no new findings  CT chest with cavitary PNA - s/p 2 weeks zosyn, s/p CT by thoracic for PTX/effusion      Dylan Carter  Attending Physician   Division of Infectious Disease  Pager #[phone]  Available on [employer] Teams also  After 5pm/weekend or no response, call #[phone]

## 2021-09-10 NOTE — PROGRESS NOTE ADULT - NEGATIVE OPHTHALMOLOGIC SYMPTOMS
no pain L/no pain R

## 2021-09-10 NOTE — PROGRESS NOTE ADULT - SUBJECTIVE AND OBJECTIVE BOX
KARISSA VILLALBA 55y [id number]    Patient is a 55y old  Male who presents with a chief complaint of COVID PNA (10 Sep 2021 09:21)      Follow Up/CC:  ID following for covid    Interval History/ROS: no fever, awaiting possible CT removal     Allergies    No Known Allergies    Intolerances        ANTIMICROBIALS:      MEDICATIONS  (STANDING):  benzocaine 15 mG/menthol 3.6 mG (Sugar-Free) Lozenge 1 Lozenge Oral once  benzonatate 200 milliGRAM(s) Oral every 8 hours  budesonide 160 MICROgram(s)/formoterol 4.5 MICROgram(s) Inhaler 2 Puff(s) Inhalation two times a day  cholecalciferol 2000 Unit(s) Oral daily  clonazePAM  Tablet 0.5 milliGRAM(s) Oral every 8 hours  enoxaparin Injectable 40 milliGRAM(s) SubCutaneous daily  FIRST- Mouthwash  BLM 5 milliLiter(s) Swish and Spit every 6 hours  lidocaine   4% Patch 1 Patch Transdermal every 24 hours  melatonin 3 milliGRAM(s) Oral at bedtime  multivitamin 1 Tablet(s) Oral daily  pantoprazole  Injectable 40 milliGRAM(s) IV Push daily  polyethylene glycol 3350 17 Gram(s) Oral daily  senna 2 Tablet(s) Oral at bedtime    MEDICATIONS  (PRN):  acetaminophen   Tablet .. 975 milliGRAM(s) Oral every 6 hours PRN Mild Pain (1 - 3)  albuterol/ipratropium for Nebulization 3 milliLiter(s) Nebulizer every 6 hours PRN Shortness of Breath and/or Wheezing  aluminum hydroxide/magnesium hydroxide/simethicone Suspension 30 milliLiter(s) Oral every 4 hours PRN Dyspepsia  benzocaine 15 mG/menthol 3.6 mG (Sugar-Free) Lozenge 1 Lozenge Oral four times a day PRN Sore Throat  cyclobenzaprine 5 milliGRAM(s) Oral three times a day PRN Muscle Spasm  HYDROmorphone  Injectable 1 milliGRAM(s) IV Push every 4 hours PRN Severe Pain (7 - 10)  sodium chloride 0.65% Nasal 1 Spray(s) Both Nostrils every 2 hours PRN Nasal Congestion  traMADol 25 milliGRAM(s) Oral every 6 hours PRN Moderate Pain (4 - 6)        Vital Signs Last 24 Hrs  T(C): 36.7 (10 Sep 2021 14:02), Max: 36.9 (09 Sep 2021 21:05)  T(F): 98 (10 Sep 2021 14:02), Max: 98.4 (09 Sep 2021 21:05)  HR: 89 (10 Sep 2021 14:02) (81 - 95)  BP: 107/71 (10 Sep 2021 14:02) (101/69 - 112/75)  BP(mean): --  RR: 20 (10 Sep 2021 14:02) (17 - 20)  SpO2: 100% (10 Sep 2021 14:02) (96% - 100%)    CBC Full  -  ( 10 Sep 2021 06:40 )  WBC Count : 9.48 K/uL  RBC Count : 3.61 M/uL  Hemoglobin : 9.9 g/dL  Hematocrit : 32.2 %  Platelet Count - Automated : 121 K/uL  Mean Cell Volume : 89.2 fl  Mean Cell Hemoglobin : 27.4 pg  Mean Cell Hemoglobin Concentration : 30.7 gm/dL  Auto Neutrophil # : x  Auto Lymphocyte # : x  Auto Monocyte # : x  Auto Eosinophil # : x  Auto Basophil # : x  Auto Neutrophil % : x  Auto Lymphocyte % : x  Auto Monocyte % : x  Auto Eosinophil % : x  Auto Basophil % : x    09-10    140  |  101  |  7   ----------------------------<  86  3.7   |  29  |  0.71    Ca    9.1      10 Sep 2021 06:40  Phos  3.2     09-10  Mg     2.0     09-10    TPro  6.9  /  Alb  2.4<L>  /  TBili  0.2  /  DBili  x   /  AST  16  /  ALT  18  /  AlkPhos  66  09-10    LIVER FUNCTIONS - ( 10 Sep 2021 06:40 )  Alb: 2.4 g/dL / Pro: 6.9 g/dL / ALK PHOS: 66 U/L / ALT: 18 U/L / AST: 16 U/L / GGT: x               MICROBIOLOGY:  .Body Fluid Pleural Fluid  08-26-21   No growth at 6 days.  Organism seen in Gram stain is non-viable after prolonged  incubation and repeated subculture.  --    polymorphonuclear leukocytes  Gram Negative Rods  by cytocentrifuge      .Blood Blood  08-26-21   No Growth Final  --  --      .Blood Blood  08-25-21   No Growth Final  --  --      RADIOLOGY    < from: Xray Chest 1 View- PORTABLE-Urgent (Xray Chest 1 View- PORTABLE-Urgent .) (09.10.21 @ 13:15) >  S/P removal of right chest tube.  No pneumothorax.    < end of copied text >

## 2021-09-10 NOTE — CONSULT NOTE ADULT - SUBJECTIVE AND OBJECTIVE BOX
Patient is a 55y old  Male who presents with a chief complaint of COVID PNA (10 Sep 2021 09:21)      HPI:  54yo M with Hx of DVT s/p achilles tendon repair years ago not on AC presenting with complaints of SOB. intermittent and fevers with cough productive of white sputum for past week, tested positive for covid 2 days ago.  pt is unvaccinated   progressively worsening SOB over the past 5 days, worse with ambulation. found to be hypoxic on arrival to the  er    (29 Jul 2021 10:31)    Pt now with prolonged hospitalization-- s/p Remdesivir, decadron and tocilizumab for worsening hypoxia; CTA with no PE, Duplex with no DVT, incidental L tibial peroneal trunk thrombosis, full AC was held due to CT drainage, 9/1-9/5 and then restarted on prophylactic dose Lovenox 9/5. Pt also s/p Abx for superimposed bacterial PNA.    platelets noted to be down trending the last week.     Pt reports DVT post achilles tendon repair approx 5 yr previous- was on xarelto, unclear for how long. no other hx of VTE. no family hx of VTE    Pt seen in chair- resting, ambulated from bed to chair; no dizziness, no dyspnea at rest, +weakness, no leg pain    CT removed today    PAST MEDICAL & SURGICAL HISTORY:  Hyperlipidemia    HTN (hypertension)    Rupture, tendon, quadriceps    History of Achilles tendon repair        SOCIAL HISTORY:  Smoking - Non smoker   Alcohol - occ  Drugs - No drug use        FAMILY HISTORY:  No pertinent family history in first degree relatives    Maternal aunt- breast cancer    no other malignancy, no heme issues, no VTE      MEDICATIONS  (STANDING):  benzocaine 15 mG/menthol 3.6 mG (Sugar-Free) Lozenge 1 Lozenge Oral once  benzonatate 200 milliGRAM(s) Oral every 8 hours  budesonide 160 MICROgram(s)/formoterol 4.5 MICROgram(s) Inhaler 2 Puff(s) Inhalation two times a day  cholecalciferol 2000 Unit(s) Oral daily  clonazePAM  Tablet 0.5 milliGRAM(s) Oral every 8 hours  enoxaparin Injectable 40 milliGRAM(s) SubCutaneous daily  FIRST- Mouthwash  BLM 5 milliLiter(s) Swish and Spit every 6 hours  lidocaine   4% Patch 1 Patch Transdermal every 24 hours  melatonin 3 milliGRAM(s) Oral at bedtime  multivitamin 1 Tablet(s) Oral daily  pantoprazole  Injectable 40 milliGRAM(s) IV Push daily  polyethylene glycol 3350 17 Gram(s) Oral daily  senna 2 Tablet(s) Oral at bedtime    MEDICATIONS  (PRN):  acetaminophen   Tablet .. 975 milliGRAM(s) Oral every 6 hours PRN Mild Pain (1 - 3)  albuterol/ipratropium for Nebulization 3 milliLiter(s) Nebulizer every 6 hours PRN Shortness of Breath and/or Wheezing  aluminum hydroxide/magnesium hydroxide/simethicone Suspension 30 milliLiter(s) Oral every 4 hours PRN Dyspepsia  benzocaine 15 mG/menthol 3.6 mG (Sugar-Free) Lozenge 1 Lozenge Oral four times a day PRN Sore Throat  cyclobenzaprine 5 milliGRAM(s) Oral three times a day PRN Muscle Spasm  HYDROmorphone  Injectable 1 milliGRAM(s) IV Push every 4 hours PRN Severe Pain (7 - 10)  sodium chloride 0.65% Nasal 1 Spray(s) Both Nostrils every 2 hours PRN Nasal Congestion  traMADol 25 milliGRAM(s) Oral every 6 hours PRN Moderate Pain (4 - 6)      Allergies    No Known Allergies    Intolerances    ROS  gen- no f/c,   heent- no sore throat, no difficulty swallowing, no epistaxis, no gingival bleed  cv- no chest pain  resp- dyspnea stable, improving cough  gi- no n/v/abd pain, no bleeding  gu- no hematuria, no dysuria  musculosk- no back pain, no leg pain  neuro- no HA, no dizziness, no numbness/tingling  skin- no rash  ROS otherwise reviewed and negative      Vital Signs Last 24 Hrs  T(C): 36.7 (10 Sep 2021 14:02), Max: 36.9 (09 Sep 2021 21:05)  T(F): 98 (10 Sep 2021 14:02), Max: 98.4 (09 Sep 2021 21:05)  HR: 89 (10 Sep 2021 14:02) (81 - 95)  BP: 107/71 (10 Sep 2021 14:02) (101/69 - 112/75)  BP(mean): --  RR: 20 (10 Sep 2021 14:02) (17 - 20)  SpO2: 100% (10 Sep 2021 14:02) (96% - 100%)    PHYSICAL EXAM  General: adult in NAD  HEENT: clear oropharynx, anicteric sclera, pink conjunctiva  Neck: supple  CV: normal S1/S2 with no murmur rubs or gallops  Lungs: decreased BS bases  Abdomen: soft non-tender non-distended, no hepatosplenomegaly, positive bowel sounds  Ext: no clubbing cyanosis or edema  Skin: no rashes and no petechiae  Lymph Nodes: No LAD in axillae, groin, neck  Neuro: alert and oriented X 3, no focal deficits    LABS:                          9.9    9.48  )-----------( 121      ( 10 Sep 2021 06:40 )             32.2         Mean Cell Volume : 89.2 fl  Mean Cell Hemoglobin : 27.4 pg  Mean Cell Hemoglobin Concentration : 30.7 gm/dL  Auto Neutrophil # : x  Auto Lymphocyte # : x  Auto Monocyte # : x  Auto Eosinophil # : x  Auto Basophil # : x  Auto Neutrophil % : x  Auto Lymphocyte % : x  Auto Monocyte % : x  Auto Eosinophil % : x  Auto Basophil % : x      Serial CBC's  09-10 @ 06:40  Hct-32.2 / Hgb-9.9 / Plat-121 / RBC-3.61 / WBC-9.48  Serial CBC's  09-09 @ 06:45  Hct-32.0 / Hgb-9.7 / Plat-143 / RBC-3.59 / WBC-10.62  Serial CBC's  09-07 @ 06:53  Hct-32.1 / Hgb-9.9 / Plat-190 / RBC-3.56 / WBC-13.21      09-10    140  |  101  |  7   ----------------------------<  86  3.7   |  29  |  0.71    Ca    9.1      10 Sep 2021 06:40  Phos  3.2     09-10  Mg     2.0     09-10    TPro  6.9  /  Alb  2.4<L>  /  TBili  0.2  /  DBili  x   /  AST  16  /  ALT  18  /  AlkPhos  66  09-10          Radiology:    < from: CT Chest No Cont (09.04.21 @ 14:50) >  IMPRESSION:    Since 8/20/2021:    Increased moderate loculated right pneumothorax that does not communicate with the course of the right chest tube.    Decreased pneumomediastinum and subcutaneous gas.    Increased fluid/hematoma along surrounding the chest tube in the right chest wall.    Decreased diffuse groundglass consistent with COVID/secondary acute lung injury.    < end of copied text >  < from: [facility] Duplex Lower Ext Vein Scan, Bilat (08.25.21 @ 16:50) >  IMPRESSION:  No evidence of deep venous thrombosis in either lower extremity.    Incidental note of thrombosis of the left tibial peroneal trunk with diminished flow flow within the left popliteal artery.    < end of copied text >

## 2021-09-10 NOTE — PROGRESS NOTE ADULT - REASON FOR ADMISSION
COVID PNA
COVID-19
SOB
covid
covid pna
covid pna
covid
Respiratory distress
covid
covid pna
sob
SOB, COVID PNA
covid
COVID PNA
COVID PNA
right ptx
covid pna

## 2021-09-10 NOTE — PROGRESS NOTE ADULT - NEGATIVE ALLERGY TYPES
no reactions to medicines

## 2021-09-10 NOTE — PROGRESS NOTE ADULT - ASSESSMENT
54yo M with h/o appendectomy admitted to the hospital with COVID and possible superimposed pneumonia found to have a right moderate-sized pneumothorax with leftward shift and small pleural effusion.     8/26 Right open chest tube placed at bedside, Maintain to wall suction -40mmHg  8/27 Remains on hi flow- increased subq emphysema, repeat CXR w/o signs of increasing PTX, chest tube +airleak, functioning, Maintain to dry suction -40mmHg  8/28 VSS, CXR with subcutaneous emphysema and persistent right ptx, maintain right chest tube to suction.   8/29 VSS, pt still with air leak on chest tube. s/p non-con Chest CT: Trace right pleural effusion, unchanged. Trace right pneumothorax new from prior. Pneumomediastinum, pneumopericardium and bilateral subcutaneous emphysema new from prior.  8/30 +Right chest tube to -40 dry suction, +airleak, pt tolerating high flow nasal cannula. Persistent SubQ emphysema without ventilatory compromise. Cont maintain right chest tube ti -40 mmHg dry suction.   8/31 Cont maintain right chest tube ti -40 mmHg dry suction.   9/1 chest tube with bloody clot output- Hold [id number] to prevent further bleed  9/2  seen and examined in company of Dr Tejada chest tube clot removed under sterile condition tube patent daily xray  9/3  Patient seen and examined with Dr. Tejada. Chest tube clamped and will get repeat Xray in 4 hours (2030) to evaluate pneumothorax.  Denies dyspnea/SOB. O2 sat 97%  9/5 Removal of R chest tube. Continue R apical pigtail catheter to LCWS. Ok to restart DVT PPX. D/w Dr. Fraser F/u cxr post pull  9/4 VSS.  O2 sats 95% on 5LNC.  Patient seen and examined with Dr. Fraser.  Plan is to get CT Chest Non Con to evaluate right hydropneumothorax with plan for possible IR insertion of chest tube.    9/6 Chest xray  demonstrates b/l pleural effusion. Maintain right pigtail, CXR in am, no airleak , minimal output  9/7 VSS, maintain right pigtail to continuous dry suction at -40mmhg. Failed clamp trial over weekend.   9/8 VSS, no air leak on right pigtail - placed to water seal, will repeat CXR in 4 hours at 1600.  9/9 VSS Right apical pigtail drain placed 9/5 no output overnight.  Clamped at 1000 with Dr. Tejada present.  Will repeat Xray at 2pm. If xray stable will leave tube clamped overnight with repeat Xray in AM   9/10 If no PTX on AM CXR, no A/L - D/c R Pigtail

## 2021-09-10 NOTE — PROGRESS NOTE ADULT - NSPROGADDITIONALINFOA_GEN_ALL_CORE
I am away on 9/2 and will return 9/3. ID coverage available. Call ID office @ [phone] with questions.
Please call the ID service [phone] with questions or concerns over the weekend.
discussed with wife over phone
discussed with Dr. Devine
Will sign off, recall ID if needed #[phone].
ID coverage available over Labor Day 3-day weekend if needed. Call #[phone] for questions/concerns.
D/w Dr. Devine

## 2021-09-10 NOTE — PROGRESS NOTE ADULT - NEGATIVE HEMATOLOGY SYMPTOMS
no nose bleeding

## 2021-09-10 NOTE — CONSULT NOTE ADULT - CONSULT REASON
Throat Tightness and Voice Change.
Hypoxia
chest pain
covid
Right chest tube placement
thrombocytopenia
COVID PNA

## 2021-09-10 NOTE — PROGRESS NOTE ADULT - RS GEN HX ROS MEA POS PC
dyspnea/cough

## 2021-09-10 NOTE — PROGRESS NOTE ADULT - CARDIOVASCULAR DETAILS
positive S1/positive S2

## 2021-09-10 NOTE — CONSULT NOTE ADULT - ASSESSMENT
55M hx of DVT s/p Achilles tendon repair presented with COVID pneumonia, now s/p prolonged hospitalization, noted to have declining plt count    #thrombocytopenia  - plt have fluctuated during admission, currently with trend down  - has been on AC, lovenox was full dose until 9/1 and then held, prophylactic dose was restarted 9/5  - heparin PF4 Ab is pending; would DC lovenox until PF4 is resulted, received dose today  - repeat Duplex LE, previous with no DVT  - no sign of DIC, check coags/fibrinogen  - check B12/folate  - monitor platelet count    #hx of DVT  - Duplex have been negative    #L tibial peroneal thrombosis with decreased flow popliteal artery  - has been on AC    #ID- COVID pneumonia, superimposed bacterial PNA  - s/p remdesivir, s/p toci, s/p zosyn course  - oxygenation has been improving, now on 4L NC  - Pulmonary following    # PTX,   - s/p R chest tube, s/p R pigtail  - pigtail removed today  - thoracic following    d/w Dr. Higginbotham, d/w [facility] 55M hx of DVT s/p Achilles tendon repair presented with COVID pneumonia, now s/p prolonged hospitalization, noted to have declining plt count    #thrombocytopenia  - plt have fluctuated during admission, currently with trend down  - has been on AC, lovenox was full dose until 9/1 and then held, prophylactic dose was restarted 9/5  - heparin PF4 Ab is pending; would DC lovenox until PF4 is resulted, received dose today; if PF4 +, will need AC with argatroban, LFTs are normal  - repeat Duplex LE, previous with no DVT  - no sign of DIC, check coags/fibrinogen  - check B12/folate  - monitor platelet count    #Anemia, normocytic  - likely secondary to prolonged hospitalization/ acute illness  - no overt bleeding  - check iron studies, b12/folate    #hx of DVT  - Duplex have been negative    #L tibial peroneal thrombosis with decreased flow popliteal artery  - has been on AC    #ID- COVID pneumonia, superimposed bacterial PNA  - s/p remdesivir, s/p toci, s/p zosyn course  - oxygenation has been improving, now on 4L NC  - Pulmonary following    # PTX,   - s/p R chest tube, s/p R pigtail  - pigtail removed today  - thoracic following    d/w Dr. Higginbotham, d/w [facility]

## 2021-09-10 NOTE — PROGRESS NOTE ADULT - NEUROLOGICAL DETAILS
alert and oriented x 3/responds to verbal commands
responds to verbal commands
alert and oriented x 3/responds to verbal commands
responds to verbal commands
alert and oriented x 3/responds to verbal commands

## 2021-09-10 NOTE — PROGRESS NOTE ADULT - SUBJECTIVE AND OBJECTIVE BOX
Joseph Casey, PGY1  Pager [phone] [facility], [phone] NS    INCOMPLETE NOTE - IN PROGRESS    Patient is a 55y old  Male who presents with a chief complaint of covid pna (09 Sep 2021 13:29)      SUBJECTIVE/INTERVAL EVENTS: Patient seen and examined at bedside.    MEDICATIONS  (STANDING):  benzocaine 15 mG/menthol 3.6 mG (Sugar-Free) Lozenge 1 Lozenge Oral once  benzonatate 200 milliGRAM(s) Oral every 8 hours  budesonide 160 MICROgram(s)/formoterol 4.5 MICROgram(s) Inhaler 2 Puff(s) Inhalation two times a day  cholecalciferol 2000 Unit(s) Oral daily  clonazePAM  Tablet 0.5 milliGRAM(s) Oral every 8 hours  enoxaparin Injectable 40 milliGRAM(s) SubCutaneous daily  FIRST- Mouthwash  BLM 5 milliLiter(s) Swish and Spit every 6 hours  lidocaine   4% Patch 1 Patch Transdermal every 24 hours  melatonin 3 milliGRAM(s) Oral at bedtime  multivitamin 1 Tablet(s) Oral daily  pantoprazole  Injectable 40 milliGRAM(s) IV Push daily  polyethylene glycol 3350 17 Gram(s) Oral daily  senna 2 Tablet(s) Oral at bedtime    MEDICATIONS  (PRN):  acetaminophen   Tablet .. 975 milliGRAM(s) Oral every 6 hours PRN Mild Pain (1 - 3)  albuterol/ipratropium for Nebulization 3 milliLiter(s) Nebulizer every 6 hours PRN Shortness of Breath and/or Wheezing  aluminum hydroxide/magnesium hydroxide/simethicone Suspension 30 milliLiter(s) Oral every 4 hours PRN Dyspepsia  benzocaine 15 mG/menthol 3.6 mG (Sugar-Free) Lozenge 1 Lozenge Oral four times a day PRN Sore Throat  cyclobenzaprine 5 milliGRAM(s) Oral three times a day PRN Muscle Spasm  HYDROmorphone  Injectable 1 milliGRAM(s) IV Push every 4 hours PRN Severe Pain (7 - 10)  sodium chloride 0.65% Nasal 1 Spray(s) Both Nostrils every 2 hours PRN Nasal Congestion  traMADol 25 milliGRAM(s) Oral every 6 hours PRN Moderate Pain (4 - 6)      VITAL SIGNS:  T(F): 98.4 (09-10-21 @ 04:52), Max: 98.7 (09-09-21 @ 12:00)  HR: 90 (09-10-21 @ 04:52) (86 - 105)  BP: 102/66 (09-10-21 @ 04:52) (101/69 - 103/71)  RR: 20 (09-10-21 @ 04:52) (16 - 20)  SpO2: 98% (09-10-21 @ 04:52) (86% - 100%)    I&O's Summary    08 Sep 2021 07:01  -  09 Sep 2021 07:00  --------------------------------------------------------  IN: 360 mL / OUT: 1 mL / NET: 359 mL    09 Sep 2021 07:01  -  10 Sep 2021 06:09  --------------------------------------------------------  IN: 410 mL / OUT: 900 mL / NET: -490 mL      Daily     Daily     PHYSICAL EXAM:  Gen: Alert, NAD  HEENT: NCAT, conjunctiva clear, sclera anicteric, no erythema or exudates in the oropharynx, mmm  Neck: Supple, no JVD  CV: RRR, S1S2, no m/r/g  Resp: CTAB, normal respiratory effort  Abd: Soft, nontender, nondistended, normal bowel sounds  Ext: no edema, no clubbing or cyanosis  Neuro: AOx3, CN2-12 grossly intact, LEVIN  SKIN: warm, perfused    LABS:                        9.7    10.62 )-----------( 143      ( 09 Sep 2021 06:45 )             32.0     Hgb Trend: 9.7<--, 9.9<--, 10.2<--, 11.0<--, 10.6<--  09-09    138  |  96  |  4<L>  ----------------------------<  86  3.9   |  30  |  0.77    Ca    9.3      09 Sep 2021 06:45  Phos  3.2     09-09  Mg     2.1     09-09    TPro  6.8  /  Alb  2.6<L>  /  TBili  0.2  /  DBili  x   /  AST  15  /  ALT  18  /  AlkPhos  63  09-09    Creatinine Trend: 0.77<--, 0.63<--, 0.66<--, 0.74<--, 0.58<--, 0.65<--  LIVER FUNCTIONS - ( 09 Sep 2021 06:45 )  Alb: 2.6 g/dL / Pro: 6.8 g/dL / ALK PHOS: 63 U/L / ALT: 18 U/L / AST: 15 U/L / GGT: x                     CAPILLARY BLOOD GLUCOSE          RADIOLOGY & ADDITIONAL TESTS: Reviewed    Imaging Personally Reviewed:    Consultant(s) Notes Reviewed:      Care Discussed with Consultants/Other Providers:   Joseph Casey, PGY1  Pager [phone] [facility], [phone] NS    Patient is a 55y old  Male who presents with a chief complaint of covid pna (09 Sep 2021 13:29)      SUBJECTIVE/INTERVAL EVENTS: Patient seen and examined at bedside. Pt feels well, notes decreasing pain, denies shortness of breath. Eager to go home. No other complaints. On 2LNC. No overnight events.    MEDICATIONS  (STANDING):  benzocaine 15 mG/menthol 3.6 mG (Sugar-Free) Lozenge 1 Lozenge Oral once  benzonatate 200 milliGRAM(s) Oral every 8 hours  budesonide 160 MICROgram(s)/formoterol 4.5 MICROgram(s) Inhaler 2 Puff(s) Inhalation two times a day  cholecalciferol 2000 Unit(s) Oral daily  clonazePAM  Tablet 0.5 milliGRAM(s) Oral every 8 hours  enoxaparin Injectable 40 milliGRAM(s) SubCutaneous daily  FIRST- Mouthwash  BLM 5 milliLiter(s) Swish and Spit every 6 hours  lidocaine   4% Patch 1 Patch Transdermal every 24 hours  melatonin 3 milliGRAM(s) Oral at bedtime  multivitamin 1 Tablet(s) Oral daily  pantoprazole  Injectable 40 milliGRAM(s) IV Push daily  polyethylene glycol 3350 17 Gram(s) Oral daily  senna 2 Tablet(s) Oral at bedtime    MEDICATIONS  (PRN):  acetaminophen   Tablet .. 975 milliGRAM(s) Oral every 6 hours PRN Mild Pain (1 - 3)  albuterol/ipratropium for Nebulization 3 milliLiter(s) Nebulizer every 6 hours PRN Shortness of Breath and/or Wheezing  aluminum hydroxide/magnesium hydroxide/simethicone Suspension 30 milliLiter(s) Oral every 4 hours PRN Dyspepsia  benzocaine 15 mG/menthol 3.6 mG (Sugar-Free) Lozenge 1 Lozenge Oral four times a day PRN Sore Throat  cyclobenzaprine 5 milliGRAM(s) Oral three times a day PRN Muscle Spasm  HYDROmorphone  Injectable 1 milliGRAM(s) IV Push every 4 hours PRN Severe Pain (7 - 10)  sodium chloride 0.65% Nasal 1 Spray(s) Both Nostrils every 2 hours PRN Nasal Congestion  traMADol 25 milliGRAM(s) Oral every 6 hours PRN Moderate Pain (4 - 6)      VITAL SIGNS:  T(F): 98.4 (09-10-21 @ 04:52), Max: 98.7 (09-09-21 @ 12:00)  HR: 90 (09-10-21 @ 04:52) (86 - 105)  BP: 102/66 (09-10-21 @ 04:52) (101/69 - 103/71)  RR: 20 (09-10-21 @ 04:52) (16 - 20)  SpO2: 98% (09-10-21 @ 04:52) (86% - 100%)    I&O's Summary    08 Sep 2021 07:01  -  09 Sep 2021 07:00  --------------------------------------------------------  IN: 360 mL / OUT: 1 mL / NET: 359 mL    09 Sep 2021 07:01  -  10 Sep 2021 06:09  --------------------------------------------------------  IN: 410 mL / OUT: 900 mL / NET: -490 mL      Daily     Daily     PHYSICAL EXAM:  Gen: Alert, NAD  HEENT: NCAT, conjunctiva clear, sclera anicteric, no erythema or exudates in the oropharynx, mmm  Neck: Supple, no JVD  CV: RRR, S1S2, no m/r/g  Resp: no respiratory distress, 2LNC  Abd: Soft, nontender, nondistended, normal bowel sounds  Ext: no edema, no clubbing or cyanosis  Neuro: AOx3, CN2-12 grossly intact, LEVIN  SKIN: warm, perfused, decreasing creptius ant chest wall    LABS:                        9.7    10.62 )-----------( 143      ( 09 Sep 2021 06:45 )             32.0     Hgb Trend: 9.7<--, 9.9<--, 10.2<--, 11.0<--, 10.6<--  09-09    138  |  96  |  4<L>  ----------------------------<  86  3.9   |  30  |  0.77    Ca    9.3      09 Sep 2021 06:45  Phos  3.2     09-09  Mg     2.1     09-09    TPro  6.8  /  Alb  2.6<L>  /  TBili  0.2  /  DBili  x   /  AST  15  /  ALT  18  /  AlkPhos  63  09-09    Creatinine Trend: 0.77<--, 0.63<--, 0.66<--, 0.74<--, 0.58<--, 0.65<--  LIVER FUNCTIONS - ( 09 Sep 2021 06:45 )  Alb: 2.6 g/dL / Pro: 6.8 g/dL / ALK PHOS: 63 U/L / ALT: 18 U/L / AST: 15 U/L / GGT: x                     CAPILLARY BLOOD GLUCOSE          RADIOLOGY & ADDITIONAL TESTS: Reviewed    Imaging Personally Reviewed:    Consultant(s) Notes Reviewed:      Care Discussed with Consultants/Other Providers:

## 2021-09-10 NOTE — PROGRESS NOTE ADULT - PROBLEM SELECTOR PLAN 5
-home with OT  -lovenox 40mg qD Incidental note of thrombosis of the left tibial peroneal trunk with diminished flow flow within the left popliteal artery.  - now holding lovenox 90 BID as of 9/1 PM for bloody drainage from chest tube

## 2021-09-10 NOTE — PROGRESS NOTE ADULT - RS GEN PE MLT RESP DETAILS PC
coarse BS+
coarse BS/diminished breath sounds, R
right upper CW pigtail/respirations non-labored/no wheezes
respirations non-labored/diminished breath sounds, R
coarse BS+
no wheezes/diminished breath sounds, R
right CW pigtail+/diminished breath sounds, R
right side CT/no wheezes/diminished breath sounds, R
respirations non-labored/diminished breath sounds, L

## 2021-09-10 NOTE — PROGRESS NOTE ADULT - MS EXT PE MLT D E PC
no cyanosis
no cyanosis/no pedal edema
no cyanosis
no cyanosis

## 2021-09-10 NOTE — PROGRESS NOTE ADULT - NEGATIVE NEUROLOGICAL SYMPTOMS
no headache

## 2021-09-10 NOTE — PROGRESS NOTE ADULT - PROBLEM SELECTOR PLAN 2
- f/u B glucans  - pain control with tylenol, tramadol, Dilaudid  - continue hycodan for cough  - s/p Zosyn (started 8/26, 2 weeks per ID, last day 9/9)  - trend WBCs  - chest PT

## 2021-09-10 NOTE — PROGRESS NOTE ADULT - SUBJECTIVE AND OBJECTIVE BOX
Follow-up Pulm Progress Note    No new respiratory events overnight.  Denies SOB/CP.   R pigtail remained clamped overnight per thoracic     Medications:  MEDICATIONS  (STANDING):  benzocaine 15 mG/menthol 3.6 mG (Sugar-Free) Lozenge 1 Lozenge Oral once  benzonatate 200 milliGRAM(s) Oral every 8 hours  budesonide 160 MICROgram(s)/formoterol 4.5 MICROgram(s) Inhaler 2 Puff(s) Inhalation two times a day  cholecalciferol 2000 Unit(s) Oral daily  clonazePAM  Tablet 0.5 milliGRAM(s) Oral every 8 hours  enoxaparin Injectable 40 milliGRAM(s) SubCutaneous daily  FIRST- Mouthwash  BLM 5 milliLiter(s) Swish and Spit every 6 hours  lidocaine   4% Patch 1 Patch Transdermal every 24 hours  melatonin 3 milliGRAM(s) Oral at bedtime  multivitamin 1 Tablet(s) Oral daily  pantoprazole  Injectable 40 milliGRAM(s) IV Push daily  polyethylene glycol 3350 17 Gram(s) Oral daily  senna 2 Tablet(s) Oral at bedtime    MEDICATIONS  (PRN):  acetaminophen   Tablet .. 975 milliGRAM(s) Oral every 6 hours PRN Mild Pain (1 - 3)  albuterol/ipratropium for Nebulization 3 milliLiter(s) Nebulizer every 6 hours PRN Shortness of Breath and/or Wheezing  aluminum hydroxide/magnesium hydroxide/simethicone Suspension 30 milliLiter(s) Oral every 4 hours PRN Dyspepsia  benzocaine 15 mG/menthol 3.6 mG (Sugar-Free) Lozenge 1 Lozenge Oral four times a day PRN Sore Throat  cyclobenzaprine 5 milliGRAM(s) Oral three times a day PRN Muscle Spasm  HYDROmorphone  Injectable 1 milliGRAM(s) IV Push every 4 hours PRN Severe Pain (7 - 10)  sodium chloride 0.65% Nasal 1 Spray(s) Both Nostrils every 2 hours PRN Nasal Congestion  traMADol 25 milliGRAM(s) Oral every 6 hours PRN Moderate Pain (4 - 6)          Vital Signs Last 24 Hrs  T(C): 36.4 (10 Sep 2021 09:10), Max: 37.1 (09 Sep 2021 12:00)  T(F): 97.6 (10 Sep 2021 09:10), Max: 98.7 (09 Sep 2021 12:00)  HR: 81 (10 Sep 2021 09:10) (81 - 95)  BP: 112/75 (10 Sep 2021 09:10) (101/69 - 112/75)  BP(mean): --  RR: 20 (10 Sep 2021 09:10) (16 - 20)  SpO2: 99% (10 Sep 2021 09:10) (96% - 100%)          09-09 @ 07:01  -  09-10 @ 07:00  --------------------------------------------------------  IN: 470 mL / OUT: 900 mL / NET: -430 mL          LABS:                        9.9    9.48  )-----------( 121      ( 10 Sep 2021 06:40 )             32.2     09-10    140  |  101  |  7   ----------------------------<  86  3.7   |  29  |  0.71    Ca    9.1      10 Sep 2021 06:40  Phos  3.2     09-10  Mg     2.0     09-10    TPro  6.9  /  Alb  2.4<L>  /  TBili  0.2  /  DBili  x   /  AST  16  /  ALT  18  /  AlkPhos  66  09-10          Physical Examination:  PULM: bibasilar crackles   CVS: S1, S2 heard    RADIOLOGY REVIEWED  CXR: b/l opacities  slowly improving on R   f/u official report

## 2021-09-10 NOTE — PROGRESS NOTE ADULT - CARDIOVASCULAR SYMPTOMS
chest pain
better/chest pain
chest pain
better/chest pain
chest pain

## 2021-09-10 NOTE — PROGRESS NOTE ADULT - GASTROINTESTINAL DETAILS
soft/nontender/no distention/no guarding/no rigidity
soft/nontender/no distention/no rebound tenderness/no guarding
soft/nontender/no guarding/no rigidity
soft/nontender/no rebound tenderness/no guarding/no rigidity
soft/nontender/no distention/no rebound tenderness/no guarding
soft/nontender/no guarding/no rigidity
soft/nontender/no distention/no rebound tenderness/no guarding
soft/nontender/no distention/no rebound tenderness/no guarding
soft/nontender/no distention/no guarding/no rigidity

## 2021-09-10 NOTE — PROGRESS NOTE ADULT - PROBLEM SELECTOR PLAN 1
9/5 Right chest tube removed without incident and new right apical pigtail placed with improvement of PTX   9/9 Maintain right pigtail to water seal, clamped on 9/9 at 1000   monitor for air leak   9/10 Plan to d'c R pigtail -f/u post CXR  Daily CXR while chest tube is in place  Care as per primary team 9/5 Right chest tube removed without incident and new right apical pigtail placed with improvement of PTX   9/9 Maintain right pigtail to water seal, clamped on 9/9 at 1000   monitor for air leak   9/10 Plan to d'c R pigtail -f/u post CXR  If No PTX- Thoracic Surgery Will Sign off  Care as per primary team 9/5 Right chest tube removed without incident and new right apical pigtail placed with improvement of PTX   9/9 Maintain right pigtail to water seal, clamped on 9/9 at 1000   monitor for air leak   9/10 Plan to d'c R pigtail -f/u post CXR  ** Update - R Pigtail d'cd - CXR no PTX  Thoracic Surgery Will Sign off  Care as per primary team

## 2021-09-10 NOTE — PROGRESS NOTE ADULT - ASSESSMENT
Echo 8/24/21: EF 65%, mild MR, grossly nl lv sys fx    a/p  54yo M with Hx of DVT s/p achilles tendon repair years ago not on AC presenting with complaints of SOB. intermittent and fevers with cough productive of white sputum for past week, tested positive for covid 2. Now transferred to MICU for tension pneumothorax, s/p chest tube.     #Atypical Chest Pain  -RRT 8/23 x2 for chest pain - exacerbated by cough and inspiration  -improved, secondary to covid PNA, PNX  -trop negative  -no ADHF noted on exam   -CTA without PE   -Echo noted w grossly nl lv sys fx, mild MR     #Covid -19, PNX  -s/p completed courses of Remdesivir x 5 days and Decadron x 10 days   -S/p Tocilizumab 7/30 per ID   -GNR in gram stain from pleural fluid -- on IV abx   -CTA as above   -RRT on 9/1 for hypoxia - repeat CXR noted    -s/p new R chest tube for pnx -- mgmt per thoracic -Large clot removed from tube 9/2  -Chest tube removed 9/5 and R apical pigtail placed by thoracic surgery -- now clamped   -AC remains on hold , resume when feasible.   -wean O2 as able   -pulm / thoracic f/u    #Sinus tachycardia  -resolved  -likely secondary to covid PNA, volume, pain, PNX  -cont to monitor   -echo as above     #DVT (hx)  -LE doppler 8/25 with no evidence of deep venous thrombosis in either lower extremity. Incidental note of thrombosis of the left tibial peroneal trunk with diminished flow flow within the left popliteal artery.  -resume AC when feasible.   -med f/u     #s/p steroids for laryngitis/obstruction

## 2021-09-10 NOTE — CONSULT NOTE ADULT - CONSULT REQUESTED DATE/TIME
26-Aug-2021 06:30
29-Jul-2021
10-Sep-2021
27-Aug-2021 22:44
23-Aug-2021 13:22
29-Jul-2021 12:00
05-Aug-2021 06:21

## 2021-09-10 NOTE — PROGRESS NOTE ADULT - PROBLEM SELECTOR PLAN 3
CXR 8/26 with R ptx   -S/p R chest tube placement by thoracic 8/26  -Subcutaneous emphysema, now appears to be improving.   -Large clot removed from tube 9/2  -Chest tube removed 9/5 and R apical pigtail placed by thoracic surgery   -No air leak noted now.   -Pigtail clamped since yesterday. Likely plan to d/c tube today per thoracic.   -Thoracic surgery f/u

## 2021-09-10 NOTE — PROGRESS NOTE ADULT - SUBJECTIVE AND OBJECTIVE BOX
CARDIOLOGY FOLLOW UP - Dr. Terry  DATE OF SERVICE: 09-10-21     CC no cp/sob     REVIEW OF SYSTEMS:   CONSTITUTIONAL: No fever, weight loss, or fatigue   RESPIRATORY:  No cough, wheezing, chills or hemoptysis; No SOB  CARDIOVASCULAR: No chest pain, palpitations, passing out, dizziness, or leg swelling   GASTROINTESTINAL: No abdominal or epigastric pain. No nausea, vomiting, or hematemesis, no diarreha, or constipation, No melena or hematochezia   VASCULAR: no edema.       PHYSICAL EXAM:  T(C): 36.4 (09-10-21 @ 09:10), Max: 36.9 (09-09-21 @ 21:05)  HR: 81 (09-10-21 @ 09:10) (81 - 95)  BP: 112/75 (09-10-21 @ 09:10) (101/69 - 112/75)  RR: 20 (09-10-21 @ 09:10) (17 - 20)  SpO2: 99% (09-10-21 @ 09:10) (96% - 100%)  Wt(kg): --  I&O's Summary    09 Sep 2021 07:01  -  10 Sep 2021 07:00  --------------------------------------------------------  IN: 470 mL / OUT: 900 mL / NET: -430 mL        Appearance: Normal	  Cardiovascular: Normal S1 S2,RRR, No JVD, No murmurs  Respiratory: Lungs clear to auscultation	  Gastrointestinal:  Soft, Non-tender, + BS	  Extremities: Normal range of motion, No clubbing, cyanosis or edema      HOME MEDICATIONS:  Albuterol (Eqv-ProAir HFA) 90 mcg/inh inhalation aerosol: 2 puff(s) inhaled every 6 hours, As Needed (29 Jul 2021 09:08)  ivermectin 3 mg oral tablet: 1 tab(s) orally once a day (29 Jul 2021 09:08)  levoFLOXacin 500 mg oral tablet: 1 tab(s) orally every 24 hours (29 Jul 2021 09:08)  predniSONE 50 mg oral tablet: 1 tab(s) orally once a day (29 Jul 2021 09:08)      MEDICATIONS  (STANDING):  benzocaine 15 mG/menthol 3.6 mG (Sugar-Free) Lozenge 1 Lozenge Oral once  benzonatate 200 milliGRAM(s) Oral every 8 hours  budesonide 160 MICROgram(s)/formoterol 4.5 MICROgram(s) Inhaler 2 Puff(s) Inhalation two times a day  cholecalciferol 2000 Unit(s) Oral daily  clonazePAM  Tablet 0.5 milliGRAM(s) Oral every 8 hours  enoxaparin Injectable 40 milliGRAM(s) SubCutaneous daily  FIRST- Mouthwash  BLM 5 milliLiter(s) Swish and Spit every 6 hours  lidocaine   4% Patch 1 Patch Transdermal every 24 hours  melatonin 3 milliGRAM(s) Oral at bedtime  multivitamin 1 Tablet(s) Oral daily  pantoprazole  Injectable 40 milliGRAM(s) IV Push daily  polyethylene glycol 3350 17 Gram(s) Oral daily  senna 2 Tablet(s) Oral at bedtime      TELEMETRY: 	    ECG:  	  RADIOLOGY:   DIAGNOSTIC TESTING:  [ ] Echocardiogram:  [ ]  Catheterization:  [ ] Stress Test:    OTHER: 	    LABS:	 	                                9.9    9.48  )-----------( 121      ( 10 Sep 2021 06:40 )             32.2     09-10    140  |  101  |  7   ----------------------------<  86  3.7   |  29  |  0.71    Ca    9.1      10 Sep 2021 06:40  Phos  3.2     09-10  Mg     2.0     09-10    TPro  6.9  /  Alb  2.4<L>  /  TBili  0.2  /  DBili  x   /  AST  16  /  ALT  18  /  AlkPhos  66  09-10

## 2021-09-10 NOTE — PROGRESS NOTE ADULT - CONSTITUTIONAL DETAILS
no distress
no distress
respiratory distress
eating breakfast/no distress
on HFNC/distress due to pain
on HFNC O2
respiratory distress
in micu, on HFNC/respiratory distress
no distress

## 2021-09-10 NOTE — PROGRESS NOTE ADULT - PROBLEM SELECTOR PLAN 1
S/p COVID with complications of PTX and empyema s/p chest tube placed by CT on 8/26  - 9/4 CT: increased loculated R PTX  - f/u thoracic recs  - daily AM CXR per thoracic  - NC 2L  -9/5 pigtail cath placed R ant sup chest wall   -9/9 chest tube clamped

## 2021-09-10 NOTE — PROGRESS NOTE ADULT - SUBJECTIVE AND OBJECTIVE BOX
Patient is a 55y old  Male who presents with a chief complaint of covid pna (09 Sep 2021 13:29)      Vital Signs Last 24 Hrs  T(C): 36.4 (09-10-21 @ 09:10), Max: 37.1 (09-09-21 @ 12:00)  T(F): 97.6 (09-10-21 @ 09:10), Max: 98.7 (09-09-21 @ 12:00)  HR: 81 (09-10-21 @ 09:10) (81 - 105)  BP: 112/75 (09-10-21 @ 09:10) (101/69 - 112/75)  RR: 20 (09-10-21 @ 09:10) (16 - 20)  SpO2: 99% (09-10-21 @ 09:10) (86% - 100%)                09-09-21 @ 07:01  -  09-10-21 @ 07:00  --------------------------------------------------------  IN: 470 mL / OUT: 900 mL / NET: -430 mL        Daily     Daily                           9.9    9.48  )-----------( 121      ( 10 Sep 2021 06:40 )             32.2     09-10    140  |  101  |  7   ----------------------------<  86  3.7   |  29  |  0.71    Ca    9.1      10 Sep 2021 06:40  Phos  3.2     09-10  Mg     2.0     09-10    TPro  6.9  /  Alb  2.4<L>  /  TBili  0.2  /  DBili  x   /  AST  16  /  ALT  18  /  AlkPhos  66  09-10          PHYSICAL EXAM  Neurology: A&Ox3, NAD  CV : RRR+S1S2  Lungs: Respirations non-labored, B/L BS CTA  Abdomen: Soft, NT/ND, +[id number]  Extremities: B/L LE warm, no edema, +PP           MEDICATIONS  acetaminophen   Tablet .. 975 milliGRAM(s) Oral every 6 hours PRN  albuterol/ipratropium for Nebulization 3 milliLiter(s) Nebulizer every 6 hours PRN  aluminum hydroxide/magnesium hydroxide/simethicone Suspension 30 milliLiter(s) Oral every 4 hours PRN  benzocaine 15 mG/menthol 3.6 mG (Sugar-Free) Lozenge 1 Lozenge Oral four times a day PRN  benzocaine 15 mG/menthol 3.6 mG (Sugar-Free) Lozenge 1 Lozenge Oral once  benzonatate 200 milliGRAM(s) Oral every 8 hours  budesonide 160 MICROgram(s)/formoterol 4.5 MICROgram(s) Inhaler 2 Puff(s) Inhalation two times a day  cholecalciferol 2000 Unit(s) Oral daily  clonazePAM  Tablet 0.5 milliGRAM(s) Oral every 8 hours  cyclobenzaprine 5 milliGRAM(s) Oral three times a day PRN  enoxaparin Injectable 40 milliGRAM(s) SubCutaneous daily  FIRST- Mouthwash  BLM 5 milliLiter(s) Swish and Spit every 6 hours  HYDROmorphone  Injectable 1 milliGRAM(s) IV Push every 4 hours PRN  lidocaine   4% Patch 1 Patch Transdermal every 24 hours  melatonin 3 milliGRAM(s) Oral at bedtime  multivitamin 1 Tablet(s) Oral daily  pantoprazole  Injectable 40 milliGRAM(s) IV Push daily  polyethylene glycol 3350 17 Gram(s) Oral daily  senna 2 Tablet(s) Oral at bedtime  sodium chloride 0.65% Nasal 1 Spray(s) Both Nostrils every 2 hours PRN  traMADol 25 milliGRAM(s) Oral every 6 hours PRN

## 2021-09-10 NOTE — PROGRESS NOTE ADULT - NEGATIVE ENMT SYMPTOMS
no ear pain

## 2021-09-10 NOTE — PROGRESS NOTE ADULT - PROBLEM SELECTOR PLAN 2
CTA chest 8/24 with RUL consolidation/cavitation suggestive of superimposed bacterial PNA in addition to COVID PNA  -GNR in gram stain from pleural fluid. 2 week course Zosyn completed 9/9.  -ID f/u

## 2021-09-10 NOTE — PROGRESS NOTE ADULT - PROBLEM SELECTOR PLAN 4
Incidental note of thrombosis of the left tibial peroneal trunk with diminished flow flow within the left popliteal artery.  - now holding lovenox 90 BID as of 9/1 PM for bloody drainage from chest tube - f/u CMV

## 2021-09-11 LAB
ALBUMIN SERPL ELPH-MCNC: 2.8 G/DL — LOW (ref 3.3–5)
ALP SERPL-CCNC: 75 U/L — SIGNIFICANT CHANGE UP (ref 40–120)
ALT FLD-CCNC: 20 U/L — SIGNIFICANT CHANGE UP (ref 10–45)
ANION GAP SERPL CALC-SCNC: 13 MMOL/L — SIGNIFICANT CHANGE UP (ref 5–17)
AST SERPL-CCNC: 22 U/L — SIGNIFICANT CHANGE UP (ref 10–40)
BILIRUB SERPL-MCNC: 0.2 MG/DL — SIGNIFICANT CHANGE UP (ref 0.2–1.2)
BUN SERPL-MCNC: 6 MG/DL — LOW (ref 7–23)
CALCIUM SERPL-MCNC: 9 MG/DL — SIGNIFICANT CHANGE UP (ref 8.4–10.5)
CHLORIDE SERPL-SCNC: 101 MMOL/L — SIGNIFICANT CHANGE UP (ref 96–108)
CO2 SERPL-SCNC: 25 MMOL/L — SIGNIFICANT CHANGE UP (ref 22–31)
CREAT SERPL-MCNC: 0.71 MG/DL — SIGNIFICANT CHANGE UP (ref 0.5–1.3)
GLUCOSE SERPL-MCNC: 110 MG/DL — HIGH (ref 70–99)
HCT VFR BLD CALC: 31.9 % — LOW (ref 39–50)
HGB BLD-MCNC: 9.9 G/DL — LOW (ref 13–17)
MAGNESIUM SERPL-MCNC: 1.9 MG/DL — SIGNIFICANT CHANGE UP (ref 1.6–2.6)
MCHC RBC-ENTMCNC: 27.7 PG — SIGNIFICANT CHANGE UP (ref 27–34)
MCHC RBC-ENTMCNC: 31 GM/DL — LOW (ref 32–36)
MCV RBC AUTO: 89.1 FL — SIGNIFICANT CHANGE UP (ref 80–100)
NRBC # BLD: 0 /100 WBCS — SIGNIFICANT CHANGE UP (ref 0–0)
PHOSPHATE SERPL-MCNC: 2.6 MG/DL — SIGNIFICANT CHANGE UP (ref 2.5–4.5)
PLATELET # BLD AUTO: 145 K/UL — LOW (ref 150–400)
POTASSIUM SERPL-MCNC: 3.9 MMOL/L — SIGNIFICANT CHANGE UP (ref 3.5–5.3)
POTASSIUM SERPL-SCNC: 3.9 MMOL/L — SIGNIFICANT CHANGE UP (ref 3.5–5.3)
PROT SERPL-MCNC: 7.1 G/DL — SIGNIFICANT CHANGE UP (ref 6–8.3)
RBC # BLD: 3.58 M/UL — LOW (ref 4.2–5.8)
RBC # FLD: 13.2 % — SIGNIFICANT CHANGE UP (ref 10.3–14.5)
SODIUM SERPL-SCNC: 139 MMOL/L — SIGNIFICANT CHANGE UP (ref 135–145)
WBC # BLD: 11 K/UL — HIGH (ref 3.8–10.5)
WBC # FLD AUTO: 11 K/UL — HIGH (ref 3.8–10.5)

## 2021-09-11 PROCEDURE — 71045 X-RAY EXAM CHEST 1 VIEW: CPT | Mod: 26

## 2021-09-11 PROCEDURE — 93970 EXTREMITY STUDY: CPT | Mod: 26

## 2021-09-11 RX ORDER — RIVAROXABAN 15 MG-20MG
1 KIT ORAL
Qty: 42 | Refills: 0
Start: 2021-09-11 | End: 2021-10-01

## 2021-09-11 RX ORDER — HYDROMORPHONE HYDROCHLORIDE 2 MG/ML
2 INJECTION INTRAMUSCULAR; INTRAVENOUS; SUBCUTANEOUS EVERY 4 HOURS
Refills: 0 | Status: DISCONTINUED | OUTPATIENT
Start: 2021-09-11 | End: 2021-09-13

## 2021-09-11 RX ORDER — RIVAROXABAN 15 MG-20MG
15 KIT ORAL
Refills: 0 | Status: DISCONTINUED | OUTPATIENT
Start: 2021-09-12 | End: 2021-09-13

## 2021-09-11 RX ADMIN — Medication 1 TABLET(S): at 12:24

## 2021-09-11 RX ADMIN — PANTOPRAZOLE SODIUM 40 MILLIGRAM(S): 20 TABLET, DELAYED RELEASE ORAL at 12:23

## 2021-09-11 RX ADMIN — BUDESONIDE AND FORMOTEROL FUMARATE DIHYDRATE 2 PUFF(S): 160; 4.5 AEROSOL RESPIRATORY (INHALATION) at 17:19

## 2021-09-11 RX ADMIN — Medication 3 MILLIGRAM(S): at 22:57

## 2021-09-11 RX ADMIN — SENNA PLUS 2 TABLET(S): 8.6 TABLET ORAL at 22:58

## 2021-09-11 RX ADMIN — DIPHENHYDRAMINE HYDROCHLORIDE AND LIDOCAINE HYDROCHLORIDE AND ALUMINUM HYDROXIDE AND MAGNESIUM HYDRO 5 MILLILITER(S): KIT at 12:19

## 2021-09-11 RX ADMIN — Medication 200 MILLIGRAM(S): at 06:28

## 2021-09-11 RX ADMIN — Medication 0.5 MILLIGRAM(S): at 06:28

## 2021-09-11 RX ADMIN — Medication 0.5 MILLIGRAM(S): at 22:57

## 2021-09-11 RX ADMIN — DIPHENHYDRAMINE HYDROCHLORIDE AND LIDOCAINE HYDROCHLORIDE AND ALUMINUM HYDROXIDE AND MAGNESIUM HYDRO 5 MILLILITER(S): KIT at 23:36

## 2021-09-11 RX ADMIN — BENZOCAINE AND MENTHOL 1 LOZENGE: 5; 1 LIQUID ORAL at 12:21

## 2021-09-11 RX ADMIN — DIPHENHYDRAMINE HYDROCHLORIDE AND LIDOCAINE HYDROCHLORIDE AND ALUMINUM HYDROXIDE AND MAGNESIUM HYDRO 5 MILLILITER(S): KIT at 18:06

## 2021-09-11 RX ADMIN — Medication 0.5 MILLIGRAM(S): at 13:51

## 2021-09-11 RX ADMIN — Medication 200 MILLIGRAM(S): at 13:51

## 2021-09-11 RX ADMIN — Medication 200 MILLIGRAM(S): at 22:57

## 2021-09-11 RX ADMIN — Medication 2000 UNIT(S): at 12:21

## 2021-09-11 RX ADMIN — DIPHENHYDRAMINE HYDROCHLORIDE AND LIDOCAINE HYDROCHLORIDE AND ALUMINUM HYDROXIDE AND MAGNESIUM HYDRO 5 MILLILITER(S): KIT at 06:28

## 2021-09-11 RX ADMIN — BUDESONIDE AND FORMOTEROL FUMARATE DIHYDRATE 2 PUFF(S): 160; 4.5 AEROSOL RESPIRATORY (INHALATION) at 05:49

## 2021-09-11 NOTE — PROGRESS NOTE ADULT - PROBLEM SELECTOR PLAN 1
+COVID PCR as an outpatient  -CXR 8/9 with b/l lung opacities, CXR 8/16 grossly unchanged   -CXR 8/23 with worsening b/l opacties R>L, ?effusion vs mucus plugging/collapse, also with opacities 2nd to COVID   -S/p Remdesivir x 5 days   -S/p Decadron 6mg IVP qd x 10 days (completed 8/10)  -S/p Tocilizumab 7/30 per ID for worsening hypoxic respiratory failure  -Sputum culture 8/4 with normal respiratory aba.   -CTA 8/24 with no clear PE, but ddimer remains elevated. LE duplex 8/25 neg DVT, but with incidental note of thrombosis of the L tibial peroneal trunk with diminished flow within the left popliteal artery. Full dose AC was being held per thoracic surgery, okay with ppx dose. F/u repeat LE duplex - AC per heme/onc.

## 2021-09-11 NOTE — PROGRESS NOTE ADULT - ASSESSMENT
Echo 8/24/21: EF 65%, mild MR, grossly nl lv sys fx    a/p  54yo M with Hx of DVT s/p achilles tendon repair years ago not on AC presenting with complaints of SOB. intermittent and fevers with cough productive of white sputum for past week, tested positive for covid 2. Now transferred to MICU for tension pneumothorax, s/p chest tube.     #Atypical Chest Pain  -RRT 8/23 x2 for chest pain - exacerbated by cough and inspiration  -improved, secondary to covid PNA, PNX  -trop negative  -no ADHF noted on exam   -CTA without PE   -Echo noted w grossly nl lv sys fx, mild MR     #Covid -19, PNX  -s/p completed courses of Remdesivir x 5 days and Decadron x 10 days   -S/p Tocilizumab 7/30 per ID   -GNR in gram stain from pleural fluid -- on IV abx   -CTA as above   -RRT on 9/1 for hypoxia - repeat CXR noted    -s/p new R chest tube for pnx -- mgmt per thoracic -Large clot removed from tube 9/2  -Chest tube removed 9/5 and R apical pigtail placed by thoracic surgery -- now clamped   -AC remains on hold , resume when feasible.   -wean O2 as able   -pulm / thoracic f/u    #Sinus tachycardia  -resolved  -likely secondary to covid PNA, volume, pain, PNX  -cont to monitor   -echo as above     #DVT (hx)  -LE doppler 8/25 with no evidence of deep venous thrombosis in either lower extremity. Incidental note of thrombosis of the left tibial peroneal trunk with diminished flow flow within the left popliteal artery.  -resume AC when feasible. - Hem/onc following for further AC recommendations in setting of thrombocytopenia.    -med f/u     #s/p steroids for laryngitis/obstruction

## 2021-09-11 NOTE — PROGRESS NOTE ADULT - ASSESSMENT
55M hx of DVT s/p Achilles tendon repair presented with COVID pneumonia, now s/p prolonged hospitalization, noted to have declining plt count    #thrombocytopenia  - plt have fluctuated during admission,   - has been on AC, lovenox was full dose until 9/1 and then held, prophylactic dose was restarted 9/5  - heparin PF4 Ab negative, platelets improved today  - repeat Duplex LE pending, previous with no DVT  - no sign of DIC, fibrinogen stable  - B12/folate normal  - monitor platelet count- improved    #Anemia, normocytic  - likely secondary to prolonged hospitalization/ acute illness  - no overt bleeding, ? dark stool, check FOBT  - iron studies with [facility]; b12/folate normal    #hx of DVT  - Duplex have been negative for DVT    #L tibial peroneal thrombosis with decreased flow popliteal artery-- arterial thrombosis has also been reported with COVID  - has been on AC  - would restart full AC; would plan to DC on full dose NOAC    #ID- COVID pneumonia, superimposed bacterial PNA  - s/p remdesivir, s/p toci, s/p zosyn course  - oxygenation has been improving, now on NC  - Pulmonary following  - to be evaluated for need for home 02    # PTX,   - s/p R chest tube, s/p R pigtail  - pigtail removed today  - thoracic following    d/w Resident

## 2021-09-11 NOTE — PROGRESS NOTE ADULT - SUBJECTIVE AND OBJECTIVE BOX
Joseph Casey, PGY1  Pager [phone] [facility], [phone] [place]    INCOMPLETE NOTE - IN PROGRESS    Patient is a 55y old  Male who presents with a chief complaint of COVID PNA (10 Sep 2021 09:21)      SUBJECTIVE/INTERVAL EVENTS: Patient seen and examined at bedside.    MEDICATIONS  (STANDING):  benzocaine 15 mG/menthol 3.6 mG (Sugar-Free) Lozenge 1 Lozenge Oral once  benzonatate 200 milliGRAM(s) Oral every 8 hours  budesonide 160 MICROgram(s)/formoterol 4.5 MICROgram(s) Inhaler 2 Puff(s) Inhalation two times a day  cholecalciferol 2000 Unit(s) Oral daily  clonazePAM  Tablet 0.5 milliGRAM(s) Oral every 8 hours  FIRST- Mouthwash  BLM 5 milliLiter(s) Swish and Spit every 6 hours  lidocaine   4% Patch 1 Patch Transdermal every 24 hours  melatonin 3 milliGRAM(s) Oral at bedtime  multivitamin 1 Tablet(s) Oral daily  pantoprazole  Injectable 40 milliGRAM(s) IV Push daily  polyethylene glycol 3350 17 Gram(s) Oral daily  senna 2 Tablet(s) Oral at bedtime    MEDICATIONS  (PRN):  acetaminophen   Tablet .. 975 milliGRAM(s) Oral every 6 hours PRN Mild Pain (1 - 3)  albuterol/ipratropium for Nebulization 3 milliLiter(s) Nebulizer every 6 hours PRN Shortness of Breath and/or Wheezing  aluminum hydroxide/magnesium hydroxide/simethicone Suspension 30 milliLiter(s) Oral every 4 hours PRN Dyspepsia  benzocaine 15 mG/menthol 3.6 mG (Sugar-Free) Lozenge 1 Lozenge Oral four times a day PRN Sore Throat  cyclobenzaprine 5 milliGRAM(s) Oral three times a day PRN Muscle Spasm  HYDROmorphone  Injectable 1 milliGRAM(s) IV Push every 4 hours PRN Severe Pain (7 - 10)  sodium chloride 0.65% Nasal 1 Spray(s) Both Nostrils every 2 hours PRN Nasal Congestion  traMADol 25 milliGRAM(s) Oral every 6 hours PRN Moderate Pain (4 - 6)      VITAL SIGNS:  T(F): 98.5 (09-11-21 @ 06:00), Max: 98.5 (09-11-21 @ 06:00)  HR: 96 (09-11-21 @ 06:00) (81 - 96)  BP: 112/75 (09-11-21 @ 06:00) (99/66 - 112/75)  RR: 20 (09-11-21 @ 06:00) (19 - 20)  SpO2: 96% (09-11-21 @ 06:00) (90% - 100%)    I&O's Summary    09 Sep 2021 07:01  -  10 Sep 2021 07:00  --------------------------------------------------------  IN: 470 mL / OUT: 900 mL / NET: -430 mL    10 Sep 2021 07:01  -  11 Sep 2021 06:31  --------------------------------------------------------  IN: 500 mL / OUT: 0 mL / NET: 500 mL      Daily     Daily     PHYSICAL EXAM:  Gen: Alert, NAD  HEENT: NCAT, conjunctiva clear, sclera anicteric, no erythema or exudates in the oropharynx, mmm  Neck: Supple, no JVD  CV: RRR, S1S2, no m/r/g  Resp: CTAB, normal respiratory effort  Abd: Soft, nontender, nondistended, normal bowel sounds  Ext: no edema, no clubbing or cyanosis  Neuro: AOx3, CN2-12 grossly intact, LEVIN  SKIN: warm, perfused    LABS:                        9.9    9.48  )-----------( 121      ( 10 Sep 2021 06:40 )             32.2     Hgb Trend: 9.9<--, 9.7<--, 9.9<--, 10.2<--, 11.0<--  09-10    140  |  101  |  7   ----------------------------<  86  3.7   |  29  |  0.71    Ca    9.1      10 Sep 2021 06:40  Phos  3.2     09-10  Mg     2.0     09-10    TPro  6.9  /  Alb  2.4<L>  /  TBili  0.2  /  DBili  x   /  AST  16  /  ALT  18  /  AlkPhos  66  09-10    Creatinine Trend: 0.71<--, 0.77<--, 0.63<--, 0.66<--, 0.74<--, 0.58<--  LIVER FUNCTIONS - ( 10 Sep 2021 06:40 )  Alb: 2.4 g/dL / Pro: 6.9 g/dL / ALK PHOS: 66 U/L / ALT: 18 U/L / AST: 16 U/L / GGT: x           PT/INR - ( 10 Sep 2021 15:53 )   PT: 14.8 sec;   INR: 1.25 ratio         PTT - ( 10 Sep 2021 15:53 )  PTT:30.0 sec          CAPILLARY BLOOD GLUCOSE          RADIOLOGY & ADDITIONAL TESTS: Reviewed    Imaging Personally Reviewed:    Consultant(s) Notes Reviewed:      Care Discussed with Consultants/Other Providers:   Joseph Casey, PGY1  Pager [phone] [facility], [phone] [place]    Patient is a 55y old  Male who presents with a chief complaint of COVID PNA (10 Sep 2021 09:21)      SUBJECTIVE/INTERVAL EVENTS: Patient seen and examined at bedside.    MEDICATIONS  (STANDING):  benzocaine 15 mG/menthol 3.6 mG (Sugar-Free) Lozenge 1 Lozenge Oral once  benzonatate 200 milliGRAM(s) Oral every 8 hours  budesonide 160 MICROgram(s)/formoterol 4.5 MICROgram(s) Inhaler 2 Puff(s) Inhalation two times a day  cholecalciferol 2000 Unit(s) Oral daily  clonazePAM  Tablet 0.5 milliGRAM(s) Oral every 8 hours  FIRST- Mouthwash  BLM 5 milliLiter(s) Swish and Spit every 6 hours  lidocaine   4% Patch 1 Patch Transdermal every 24 hours  melatonin 3 milliGRAM(s) Oral at bedtime  multivitamin 1 Tablet(s) Oral daily  pantoprazole  Injectable 40 milliGRAM(s) IV Push daily  polyethylene glycol 3350 17 Gram(s) Oral daily  senna 2 Tablet(s) Oral at bedtime    MEDICATIONS  (PRN):  acetaminophen   Tablet .. 975 milliGRAM(s) Oral every 6 hours PRN Mild Pain (1 - 3)  albuterol/ipratropium for Nebulization 3 milliLiter(s) Nebulizer every 6 hours PRN Shortness of Breath and/or Wheezing  aluminum hydroxide/magnesium hydroxide/simethicone Suspension 30 milliLiter(s) Oral every 4 hours PRN Dyspepsia  benzocaine 15 mG/menthol 3.6 mG (Sugar-Free) Lozenge 1 Lozenge Oral four times a day PRN Sore Throat  cyclobenzaprine 5 milliGRAM(s) Oral three times a day PRN Muscle Spasm  HYDROmorphone  Injectable 1 milliGRAM(s) IV Push every 4 hours PRN Severe Pain (7 - 10)  sodium chloride 0.65% Nasal 1 Spray(s) Both Nostrils every 2 hours PRN Nasal Congestion  traMADol 25 milliGRAM(s) Oral every 6 hours PRN Moderate Pain (4 - 6)      VITAL SIGNS:  T(F): 98.5 (09-11-21 @ 06:00), Max: 98.5 (09-11-21 @ 06:00)  HR: 96 (09-11-21 @ 06:00) (81 - 96)  BP: 112/75 (09-11-21 @ 06:00) (99/66 - 112/75)  RR: 20 (09-11-21 @ 06:00) (19 - 20)  SpO2: 96% (09-11-21 @ 06:00) (90% - 100%)    I&O's Summary    09 Sep 2021 07:01  -  10 Sep 2021 07:00  --------------------------------------------------------  IN: 470 mL / OUT: 900 mL / NET: -430 mL    10 Sep 2021 07:01  -  11 Sep 2021 06:31  --------------------------------------------------------  IN: 500 mL / OUT: 0 mL / NET: 500 mL      Daily     Daily     PHYSICAL EXAM:  Gen: Alert, NAD  HEENT: NCAT, conjunctiva clear, sclera anicteric, no erythema or exudates in the oropharynx, mmm  Neck: Supple, no JVD  CV: RRR, S1S2, no m/r/g  Resp: no respiratory distress, 2LNC  Abd: Soft, nontender, nondistended, normal bowel sounds  Ext: no edema, no clubbing or cyanosis  Neuro: AOx3, CN2-12 grossly intact, LEVIN  SKIN: warm, perfused, decreasing creptius ant chest wall    LABS:                        9.9    9.48  )-----------( 121      ( 10 Sep 2021 06:40 )             32.2     Hgb Trend: 9.9<--, 9.7<--, 9.9<--, 10.2<--, 11.0<--  09-10    140  |  101  |  7   ----------------------------<  86  3.7   |  29  |  0.71    Ca    9.1      10 Sep 2021 06:40  Phos  3.2     09-10  Mg     2.0     09-10    TPro  6.9  /  Alb  2.4<L>  /  TBili  0.2  /  DBili  x   /  AST  16  /  ALT  18  /  AlkPhos  66  09-10    Creatinine Trend: 0.71<--, 0.77<--, 0.63<--, 0.66<--, 0.74<--, 0.58<--  LIVER FUNCTIONS - ( 10 Sep 2021 06:40 )  Alb: 2.4 g/dL / Pro: 6.9 g/dL / ALK PHOS: 66 U/L / ALT: 18 U/L / AST: 16 U/L / GGT: x           PT/INR - ( 10 Sep 2021 15:53 )   PT: 14.8 sec;   INR: 1.25 ratio         PTT - ( 10 Sep 2021 15:53 )  PTT:30.0 sec          CAPILLARY BLOOD GLUCOSE          RADIOLOGY & ADDITIONAL TESTS: Reviewed    Imaging Personally Reviewed:    Consultant(s) Notes Reviewed:      Care Discussed with Consultants/Other Providers:

## 2021-09-11 NOTE — PROGRESS NOTE ADULT - SUBJECTIVE AND OBJECTIVE BOX
CARDIOLOGY FOLLOW UP - Dr. Terry  DATE OF SERVICE: 09-11-21    CC no cp/sob     REVIEW OF SYSTEMS:   CONSTITUTIONAL: No fever, weight loss, or fatigue   RESPIRATORY:  No cough, wheezing, chills or hemoptysis; No SOB  CARDIOVASCULAR: No chest pain, palpitations, passing out, dizziness, or leg swelling   GASTROINTESTINAL: No abdominal or epigastric pain. No nausea, vomiting, or hematemesis, no diarreha, or constipation, No melena or hematochezia   VASCULAR: no edema.       PHYSICAL EXAM:  T(C): 36.9 (09-11-21 @ 06:00), Max: 36.9 (09-11-21 @ 06:00)  HR: 96 (09-11-21 @ 06:00) (86 - 96)  BP: 112/75 (09-11-21 @ 06:00) (99/66 - 112/75)  RR: 20 (09-11-21 @ 06:00) (19 - 20)  SpO2: 96% (09-11-21 @ 06:00) (90% - 100%)  Wt(kg): --  I&O's Summary    10 Sep 2021 07:01  -  11 Sep 2021 07:00  --------------------------------------------------------  IN: 560 mL / OUT: 0 mL / NET: 560 mL        Appearance: Normal	  Cardiovascular: Normal S1 S2,RRR, No JVD, No murmurs  Respiratory: diminished   Gastrointestinal:  Soft, Non-tender, + BS	  Extremities: Normal range of motion, No clubbing, cyanosis or edema      HOME MEDICATIONS:  Albuterol (Eqv-ProAir HFA) 90 mcg/inh inhalation aerosol: 2 puff(s) inhaled every 6 hours, As Needed (29 Jul 2021 09:08)  ivermectin 3 mg oral tablet: 1 tab(s) orally once a day (29 Jul 2021 09:08)  levoFLOXacin 500 mg oral tablet: 1 tab(s) orally every 24 hours (29 Jul 2021 09:08)  predniSONE 50 mg oral tablet: 1 tab(s) orally once a day (29 Jul 2021 09:08)      MEDICATIONS  (STANDING):  benzocaine 15 mG/menthol 3.6 mG (Sugar-Free) Lozenge 1 Lozenge Oral once  benzonatate 200 milliGRAM(s) Oral every 8 hours  budesonide 160 MICROgram(s)/formoterol 4.5 MICROgram(s) Inhaler 2 Puff(s) Inhalation two times a day  cholecalciferol 2000 Unit(s) Oral daily  clonazePAM  Tablet 0.5 milliGRAM(s) Oral every 8 hours  FIRST- Mouthwash  BLM 5 milliLiter(s) Swish and Spit every 6 hours  lidocaine   4% Patch 1 Patch Transdermal every 24 hours  melatonin 3 milliGRAM(s) Oral at bedtime  multivitamin 1 Tablet(s) Oral daily  pantoprazole  Injectable 40 milliGRAM(s) IV Push daily  polyethylene glycol 3350 17 Gram(s) Oral daily  senna 2 Tablet(s) Oral at bedtime      TELEMETRY: 	    ECG:  	  RADIOLOGY:   DIAGNOSTIC TESTING:  [ ] Echocardiogram:  [ ]  Catheterization:  [ ] Stress Test:    OTHER: 	    LABS:	 	                                9.9    9.48  )-----------( 121      ( 10 Sep 2021 06:40 )             32.2     09-10    140  |  101  |  7   ----------------------------<  86  3.7   |  29  |  0.71    Ca    9.1      10 Sep 2021 06:40  Phos  3.2     09-10  Mg     2.0     09-10    TPro  6.9  /  Alb  2.4<L>  /  TBili  0.2  /  DBili  x   /  AST  16  /  ALT  18  /  AlkPhos  66  09-10    PT/INR - ( 10 Sep 2021 15:53 )   PT: 14.8 sec;   INR: 1.25 ratio         PTT - ( 10 Sep 2021 15:53 )  PTT:30.0 sec

## 2021-09-11 NOTE — PROGRESS NOTE ADULT - SUBJECTIVE AND OBJECTIVE BOX
Chief Complaint: fu    History of Present Illness: no f/c, seated in chair, currently off 02- to be ambulated; no n/v/abd pain, no brbpr, no hematuria, stool is dark, no other bleeding, tolerating diet    MEDICATIONS  (STANDING):  benzocaine 15 mG/menthol 3.6 mG (Sugar-Free) Lozenge 1 Lozenge Oral once  benzonatate 200 milliGRAM(s) Oral every 8 hours  budesonide 160 MICROgram(s)/formoterol 4.5 MICROgram(s) Inhaler 2 Puff(s) Inhalation two times a day  cholecalciferol 2000 Unit(s) Oral daily  clonazePAM  Tablet 0.5 milliGRAM(s) Oral every 8 hours  FIRST- Mouthwash  BLM 5 milliLiter(s) Swish and Spit every 6 hours  lidocaine   4% Patch 1 Patch Transdermal every 24 hours  melatonin 3 milliGRAM(s) Oral at bedtime  multivitamin 1 Tablet(s) Oral daily  pantoprazole  Injectable 40 milliGRAM(s) IV Push daily  polyethylene glycol 3350 17 Gram(s) Oral daily  senna 2 Tablet(s) Oral at bedtim    MEDICATIONS  (PRN):  acetaminophen   Tablet .. 975 milliGRAM(s) Oral every 6 hours PRN Mild Pain (1 - 3)  albuterol/ipratropium for Nebulization 3 milliLiter(s) Nebulizer every 6 hours PRN Shortness of Breath and/or Wheezing  aluminum hydroxide/magnesium hydroxide/simethicone Suspension 30 milliLiter(s) Oral every 4 hours PRN Dyspepsia  benzocaine 15 mG/menthol 3.6 mG (Sugar-Free) Lozenge 1 Lozenge Oral four times a day PRN Sore Throat  cyclobenzaprine 5 milliGRAM(s) Oral three times a day PRN Muscle Spasm  HYDROmorphone  Injectable 1 milliGRAM(s) IV Push every 4 hours PRN Severe Pain (7 - 10)  sodium chloride 0.65% Nasal 1 Spray(s) Both Nostrils every 2 hours PRN Nasal Congestion  traMADol 25 milliGRAM(s) Oral every 6 hours PRN Moderate Pain (4 - 6)      Allergies    No Known Allergies    Intolerances        Vital Signs Last 24 Hrs  T(C): 37.1 (11 Sep 2021 12:54), Max: 37.1 (11 Sep 2021 12:54)  T(F): 98.8 (11 Sep 2021 12:54), Max: 98.8 (11 Sep 2021 12:54)  HR: 84 (11 Sep 2021 12:54) (84 - 96)  BP: 102/70 (11 Sep 2021 12:54) (99/66 - 112/75)  BP(mean): --  RR: 16 (11 Sep 2021 12:54) (16 - 20)  SpO2: 94% (11 Sep 2021 12:54) (90% - 100%)    PHYSICAL EXAM  General: adult in NAD  HEENT: clear oropharynx, anicteric sclera, pink conjunctiva  Neck: supple  CV: normal S1/S2   Lungs: clear to auscultation, no wheezes, no rales  Abdomen: soft non-tender non-distended, positive bowel sounds  Ext: no clubbing cyanosis or edema  Skin: no rashes and no petechiae  Lymph Nodes: No LAD in neck  Neuro: alert and oriented X 3, no focal deficits    LABS:                          9.9    11.00 )-----------( 145      ( 11 Sep 2021 11:11 )             31.9         Mean Cell Volume : 89.1 fl  Mean Cell Hemoglobin : 27.7 pg  Mean Cell Hemoglobin Concentration : 31.0 gm/dL  Auto Neutrophil # : x  Auto Lymphocyte # : x  Auto Monocyte # : x  Auto Eosinophil # : x  Auto Basophil # : x  Auto Neutrophil % : x  Auto Lymphocyte % : x  Auto Monocyte % : x  Auto Eosinophil % : x  Auto Basophil % : x      Serial CBC's  09-11 @ 11:11  Hct-31.9 / Hgb-9.9 / Plat-145 / RBC-3.58 / WBC-11.00  Serial CBC's  09-10 @ 06:40  Hct-32.2 / Hgb-9.9 / Plat-121 / RBC-3.61 / WBC-9.48  Serial CBC's  09-09 @ 06:45  Hct-32.0 / Hgb-9.7 / Plat-143 / RBC-3.59 / WBC-10.62      09-11    139  |  101  |  6<L>  ----------------------------<  110<H>  3.9   |  25  |  0.71    Ca    9.0      11 Sep 2021 11:11  Phos  2.6     09-11  Mg     1.9     09-11    TPro  7.1  /  Alb  2.8<L>  /  TBili  0.2  /  DBili  x   /  AST  22  /  ALT  20  /  AlkPhos  75  09-11      PT/INR - ( 10 Sep 2021 15:53 )   PT: 14.8 sec;   INR: 1.25 ratio         PTT - ( 10 Sep 2021 15:53 )  PTT:30.0 sec    Vitamin B12, Serum: 600 pg/mL (09-10 @ 23:00)  Folate, Serum: 17.1 ng/mL (09-10 @ 23:00)  Ferritin, Serum: 529 ng/mL (09-10 @ 23:00)  Iron - Total Binding Capacity.: 162 ug/dL (09-10 @ 21:53)              Radiology:

## 2021-09-11 NOTE — PROGRESS NOTE ADULT - PROBLEM SELECTOR PLAN 3
CXR 8/26 with R ptx   -S/p R chest tube placement by thoracic 8/26  -Subcutaneous emphysema, now appears to be improving.   -Large clot removed from tube 9/2  -Chest tube removed 9/5 and R apical pigtail placed by thoracic surgery. Removed 9/10.  -Thoracic surgery f/u

## 2021-09-11 NOTE — PROGRESS NOTE ADULT - PROBLEM SELECTOR PLAN 2
CTA chest 8/24 with RUL consolidation/cavitation suggestive of superimposed bacterial PNA in addition to COVID PNA  -GNR in gram stain from pleural fluid. 2 week course Zosyn completed 9/9.  -ID f/u  -Will require f/u chest imaging as an outpatient. F/u in office 2 weeks after discharge.

## 2021-09-11 NOTE — PROGRESS NOTE ADULT - PROBLEM SELECTOR PLAN 4
2nd to COVID PNA, superimposed bacterial PNA, & now ptx   -S/p RRT 8/3 and 8/4 for hypoxia  -Tx to 5ICU 8/10 for further management, tx to 4M   -S/p RRT x2 8/23 for R sided chest pain, pain appears pleuritic in nature  -CTA chest 8/25 with no clear PE  -Tx back to MICU 8/26, now on 5monti (off COVID isolation)   -Hypoxia continues to improve  -Will likely require home O2. Check O2 sats on RA at rest and with ambulation - d/w RN at bedside.

## 2021-09-11 NOTE — PROGRESS NOTE ADULT - SUBJECTIVE AND OBJECTIVE BOX
Follow-up Pulm Progress Note    R pigtail removed by thoracic yesterday  Feeling good, eager for d/c planning  Sats 93% 0.5LNC at rest     Medications:  MEDICATIONS  (STANDING):  benzocaine 15 mG/menthol 3.6 mG (Sugar-Free) Lozenge 1 Lozenge Oral once  benzonatate 200 milliGRAM(s) Oral every 8 hours  budesonide 160 MICROgram(s)/formoterol 4.5 MICROgram(s) Inhaler 2 Puff(s) Inhalation two times a day  cholecalciferol 2000 Unit(s) Oral daily  clonazePAM  Tablet 0.5 milliGRAM(s) Oral every 8 hours  FIRST- Mouthwash  BLM 5 milliLiter(s) Swish and Spit every 6 hours  lidocaine   4% Patch 1 Patch Transdermal every 24 hours  melatonin 3 milliGRAM(s) Oral at bedtime  multivitamin 1 Tablet(s) Oral daily  pantoprazole  Injectable 40 milliGRAM(s) IV Push daily  polyethylene glycol 3350 17 Gram(s) Oral daily  senna 2 Tablet(s) Oral at bedtime    MEDICATIONS  (PRN):  acetaminophen   Tablet .. 975 milliGRAM(s) Oral every 6 hours PRN Mild Pain (1 - 3)  albuterol/ipratropium for Nebulization 3 milliLiter(s) Nebulizer every 6 hours PRN Shortness of Breath and/or Wheezing  aluminum hydroxide/magnesium hydroxide/simethicone Suspension 30 milliLiter(s) Oral every 4 hours PRN Dyspepsia  benzocaine 15 mG/menthol 3.6 mG (Sugar-Free) Lozenge 1 Lozenge Oral four times a day PRN Sore Throat  cyclobenzaprine 5 milliGRAM(s) Oral three times a day PRN Muscle Spasm  HYDROmorphone  Injectable 1 milliGRAM(s) IV Push every 4 hours PRN Severe Pain (7 - 10)  sodium chloride 0.65% Nasal 1 Spray(s) Both Nostrils every 2 hours PRN Nasal Congestion  traMADol 25 milliGRAM(s) Oral every 6 hours PRN Moderate Pain (4 - 6)          Vital Signs Last 24 Hrs  T(C): 36.9 (11 Sep 2021 06:00), Max: 36.9 (11 Sep 2021 06:00)  T(F): 98.5 (11 Sep 2021 06:00), Max: 98.5 (11 Sep 2021 06:00)  HR: 96 (11 Sep 2021 06:00) (86 - 96)  BP: 112/75 (11 Sep 2021 06:00) (99/66 - 112/75)  BP(mean): --  RR: 20 (11 Sep 2021 06:00) (19 - 20)  SpO2: 96% (11 Sep 2021 06:00) (90% - 100%)          09-10 @ 07:01  -  09-11 @ 07:00  --------------------------------------------------------  IN: 560 mL / OUT: 0 mL / NET: 560 mL          LABS:                        9.9    9.48  )-----------( 121      ( 10 Sep 2021 06:40 )             32.2     09-10    140  |  101  |  7   ----------------------------<  86  3.7   |  29  |  0.71    Ca    9.1      10 Sep 2021 06:40  Phos  3.2     09-10  Mg     2.0     09-10    TPro  6.9  /  Alb  2.4<L>  /  TBili  0.2  /  DBili  x   /  AST  16  /  ALT  18  /  AlkPhos  66  09-10          PT/INR - ( 10 Sep 2021 15:53 )   PT: 14.8 sec;   INR: 1.25 ratio         PTT - ( 10 Sep 2021 15:53 )  PTT:30.0 sec        Physical Examination:  PULM: bibasilar crackles   CVS: S1, S2 heard    RADIOLOGY REVIEWED  CXR: b/l opacities  slowly improving on R   f/u official report

## 2021-09-12 LAB
ALBUMIN SERPL ELPH-MCNC: 2.9 G/DL — LOW (ref 3.3–5)
ALP SERPL-CCNC: 75 U/L — SIGNIFICANT CHANGE UP (ref 40–120)
ALT FLD-CCNC: 19 U/L — SIGNIFICANT CHANGE UP (ref 10–45)
ANION GAP SERPL CALC-SCNC: 11 MMOL/L — SIGNIFICANT CHANGE UP (ref 5–17)
AST SERPL-CCNC: 16 U/L — SIGNIFICANT CHANGE UP (ref 10–40)
BILIRUB SERPL-MCNC: 0.2 MG/DL — SIGNIFICANT CHANGE UP (ref 0.2–1.2)
BUN SERPL-MCNC: 6 MG/DL — LOW (ref 7–23)
CALCIUM SERPL-MCNC: 9.1 MG/DL — SIGNIFICANT CHANGE UP (ref 8.4–10.5)
CHLORIDE SERPL-SCNC: 105 MMOL/L — SIGNIFICANT CHANGE UP (ref 96–108)
CO2 SERPL-SCNC: 25 MMOL/L — SIGNIFICANT CHANGE UP (ref 22–31)
CREAT SERPL-MCNC: 0.7 MG/DL — SIGNIFICANT CHANGE UP (ref 0.5–1.3)
GLUCOSE SERPL-MCNC: 85 MG/DL — SIGNIFICANT CHANGE UP (ref 70–99)
HCT VFR BLD CALC: 35.1 % — LOW (ref 39–50)
HGB BLD-MCNC: 10.8 G/DL — LOW (ref 13–17)
MAGNESIUM SERPL-MCNC: 2 MG/DL — SIGNIFICANT CHANGE UP (ref 1.6–2.6)
MCHC RBC-ENTMCNC: 26.9 PG — LOW (ref 27–34)
MCHC RBC-ENTMCNC: 30.8 GM/DL — LOW (ref 32–36)
MCV RBC AUTO: 87.5 FL — SIGNIFICANT CHANGE UP (ref 80–100)
NRBC # BLD: 0 /100 WBCS — SIGNIFICANT CHANGE UP (ref 0–0)
OB PNL STL: NEGATIVE — SIGNIFICANT CHANGE UP
PHOSPHATE SERPL-MCNC: 3.5 MG/DL — SIGNIFICANT CHANGE UP (ref 2.5–4.5)
PLATELET # BLD AUTO: 151 K/UL — SIGNIFICANT CHANGE UP (ref 150–400)
POTASSIUM SERPL-MCNC: 3.6 MMOL/L — SIGNIFICANT CHANGE UP (ref 3.5–5.3)
POTASSIUM SERPL-SCNC: 3.6 MMOL/L — SIGNIFICANT CHANGE UP (ref 3.5–5.3)
PROT SERPL-MCNC: 7.3 G/DL — SIGNIFICANT CHANGE UP (ref 6–8.3)
RBC # BLD: 4.01 M/UL — LOW (ref 4.2–5.8)
RBC # FLD: 13.6 % — SIGNIFICANT CHANGE UP (ref 10.3–14.5)
SARS-COV-2 RNA SPEC QL NAA+PROBE: SIGNIFICANT CHANGE UP
SODIUM SERPL-SCNC: 141 MMOL/L — SIGNIFICANT CHANGE UP (ref 135–145)
WBC # BLD: 9.89 K/UL — SIGNIFICANT CHANGE UP (ref 3.8–10.5)
WBC # FLD AUTO: 9.89 K/UL — SIGNIFICANT CHANGE UP (ref 3.8–10.5)

## 2021-09-12 RX ORDER — IVERMECTIN 3 MG/1
1 TABLET ORAL
Qty: 0 | Refills: 0 | DISCHARGE

## 2021-09-12 RX ORDER — PANTOPRAZOLE SODIUM 20 MG/1
40 TABLET, DELAYED RELEASE ORAL
Refills: 0 | Status: DISCONTINUED | OUTPATIENT
Start: 2021-09-12 | End: 2021-09-13

## 2021-09-12 RX ORDER — CLONAZEPAM 1 MG
0.5 TABLET ORAL EVERY 12 HOURS
Refills: 0 | Status: DISCONTINUED | OUTPATIENT
Start: 2021-09-12 | End: 2021-09-13

## 2021-09-12 RX ORDER — ALBUTEROL 90 UG/1
2 AEROSOL, METERED ORAL
Qty: 0 | Refills: 0 | DISCHARGE

## 2021-09-12 RX ADMIN — Medication 1 TABLET(S): at 11:33

## 2021-09-12 RX ADMIN — BUDESONIDE AND FORMOTEROL FUMARATE DIHYDRATE 2 PUFF(S): 160; 4.5 AEROSOL RESPIRATORY (INHALATION) at 18:09

## 2021-09-12 RX ADMIN — PANTOPRAZOLE SODIUM 40 MILLIGRAM(S): 20 TABLET, DELAYED RELEASE ORAL at 11:34

## 2021-09-12 RX ADMIN — DIPHENHYDRAMINE HYDROCHLORIDE AND LIDOCAINE HYDROCHLORIDE AND ALUMINUM HYDROXIDE AND MAGNESIUM HYDRO 5 MILLILITER(S): KIT at 11:33

## 2021-09-12 RX ADMIN — Medication 3 MILLIGRAM(S): at 21:43

## 2021-09-12 RX ADMIN — BENZOCAINE AND MENTHOL 1 LOZENGE: 5; 1 LIQUID ORAL at 21:43

## 2021-09-12 RX ADMIN — RIVAROXABAN 15 MILLIGRAM(S): KIT at 09:47

## 2021-09-12 RX ADMIN — Medication 0.5 MILLIGRAM(S): at 05:54

## 2021-09-12 RX ADMIN — BUDESONIDE AND FORMOTEROL FUMARATE DIHYDRATE 2 PUFF(S): 160; 4.5 AEROSOL RESPIRATORY (INHALATION) at 05:03

## 2021-09-12 RX ADMIN — Medication 2000 UNIT(S): at 11:33

## 2021-09-12 RX ADMIN — RIVAROXABAN 15 MILLIGRAM(S): KIT at 17:32

## 2021-09-12 RX ADMIN — BENZOCAINE AND MENTHOL 1 LOZENGE: 5; 1 LIQUID ORAL at 13:55

## 2021-09-12 RX ADMIN — Medication 200 MILLIGRAM(S): at 05:54

## 2021-09-12 RX ADMIN — POLYETHYLENE GLYCOL 3350 17 GRAM(S): 17 POWDER, FOR SOLUTION ORAL at 11:34

## 2021-09-12 RX ADMIN — DIPHENHYDRAMINE HYDROCHLORIDE AND LIDOCAINE HYDROCHLORIDE AND ALUMINUM HYDROXIDE AND MAGNESIUM HYDRO 5 MILLILITER(S): KIT at 23:33

## 2021-09-12 RX ADMIN — DIPHENHYDRAMINE HYDROCHLORIDE AND LIDOCAINE HYDROCHLORIDE AND ALUMINUM HYDROXIDE AND MAGNESIUM HYDRO 5 MILLILITER(S): KIT at 05:53

## 2021-09-12 RX ADMIN — DIPHENHYDRAMINE HYDROCHLORIDE AND LIDOCAINE HYDROCHLORIDE AND ALUMINUM HYDROXIDE AND MAGNESIUM HYDRO 5 MILLILITER(S): KIT at 17:32

## 2021-09-12 RX ADMIN — Medication 0.5 MILLIGRAM(S): at 17:32

## 2021-09-12 RX ADMIN — Medication 200 MILLIGRAM(S): at 21:43

## 2021-09-12 RX ADMIN — Medication 200 MILLIGRAM(S): at 13:54

## 2021-09-12 NOTE — PROGRESS NOTE ADULT - TIME-BASED BILLING (NON-CRITICAL CARE)
Time-based billing (NON-critical care)

## 2021-09-12 NOTE — PROGRESS NOTE ADULT - PROBLEM SELECTOR PLAN 3
steadily declining plt count after past few days  - heparin antibody negative steadily declining plt count after past few days  - heparin antibody negative  - no DVT on duplex b/l

## 2021-09-12 NOTE — PROGRESS NOTE ADULT - SUBJECTIVE AND OBJECTIVE BOX
CARDIOLOGY FOLLOW UP - Dr. Terry  DATE OF SERVICE: 09-12-21     CC no cp/sob     REVIEW OF SYSTEMS:   CONSTITUTIONAL: No fever, weight loss, or fatigue   RESPIRATORY:  No cough, wheezing, chills or hemoptysis; No SOB  CARDIOVASCULAR: No chest pain, palpitations, passing out, dizziness, or leg swelling   GASTROINTESTINAL: No abdominal or epigastric pain. No nausea, vomiting, or hematemesis, no diarreha, or constipation, No melena or hematochezia   VASCULAR: no edema.       PHYSICAL EXAM:  T(C): 36.8 (09-12-21 @ 05:54), Max: 37.1 (09-11-21 @ 12:54)  HR: 85 (09-12-21 @ 05:54) (80 - 97)  BP: 105/71 (09-12-21 @ 05:54) (102/70 - 105/71)  RR: 22 (09-12-21 @ 05:54) (16 - 32)  SpO2: 92% (09-12-21 @ 05:54) (92% - 98%)  Wt(kg): --  I&O's Summary    11 Sep 2021 07:01  -  12 Sep 2021 07:00  --------------------------------------------------------  IN: 60 mL / OUT: 400 mL / NET: -340 mL        Appearance: Normal	  Cardiovascular: Normal S1 S2,RRR, No JVD, No murmurs  Respiratory: Lungs clear to auscultation	  Gastrointestinal:  Soft, Non-tender, + BS	  Extremities: Normal range of motion, No clubbing, cyanosis or edema      HOME MEDICATIONS:  Albuterol (Eqv-ProAir HFA) 90 mcg/inh inhalation aerosol: 2 puff(s) inhaled every 6 hours, As Needed (29 Jul 2021 09:08)  ivermectin 3 mg oral tablet: 1 tab(s) orally once a day (29 Jul 2021 09:08)  levoFLOXacin 500 mg oral tablet: 1 tab(s) orally every 24 hours (29 Jul 2021 09:08)  predniSONE 50 mg oral tablet: 1 tab(s) orally once a day (29 Jul 2021 09:08)      MEDICATIONS  (STANDING):  benzocaine 15 mG/menthol 3.6 mG (Sugar-Free) Lozenge 1 Lozenge Oral once  benzonatate 200 milliGRAM(s) Oral every 8 hours  budesonide 160 MICROgram(s)/formoterol 4.5 MICROgram(s) Inhaler 2 Puff(s) Inhalation two times a day  cholecalciferol 2000 Unit(s) Oral daily  clonazePAM  Tablet 0.5 milliGRAM(s) Oral every 8 hours  FIRST- Mouthwash  BLM 5 milliLiter(s) Swish and Spit every 6 hours  lidocaine   4% Patch 1 Patch Transdermal every 24 hours  melatonin 3 milliGRAM(s) Oral at bedtime  multivitamin 1 Tablet(s) Oral daily  pantoprazole  Injectable 40 milliGRAM(s) IV Push daily  polyethylene glycol 3350 17 Gram(s) Oral daily  rivaroxaban 15 milliGRAM(s) Oral two times a day with meals  senna 2 Tablet(s) Oral at bedtime      TELEMETRY: 	    ECG:  	  RADIOLOGY:   DIAGNOSTIC TESTING:  [ ] Echocardiogram:  [ ]  Catheterization:  [ ] Stress Test:    OTHER: 	    LABS:	 	                                10.8   9.89  )-----------( 151      ( 12 Sep 2021 06:56 )             35.1     09-12    141  |  105  |  6<L>  ----------------------------<  85  3.6   |  25  |  0.70    Ca    9.1      12 Sep 2021 06:56  Phos  3.5     09-12  Mg     2.0     09-12    TPro  7.3  /  Alb  2.9<L>  /  TBili  0.2  /  DBili  x   /  AST  16  /  ALT  19  /  AlkPhos  75  09-12    PT/INR - ( 10 Sep 2021 15:53 )   PT: 14.8 sec;   INR: 1.25 ratio         PTT - ( 10 Sep 2021 15:53 )  PTT:30.0 sec

## 2021-09-12 NOTE — PROGRESS NOTE ADULT - SUBJECTIVE AND OBJECTIVE BOX
Joseph Casey, PGY1  Pager [phone] [facility], [phone] [place]    INCOMPLETE NOTE - IN PROGRESS    Patient is a 55y old  Male who presents with a chief complaint of COVID PNA (10 Sep 2021 09:21)      SUBJECTIVE/INTERVAL EVENTS: Patient seen and examined at bedside.    MEDICATIONS  (STANDING):  benzocaine 15 mG/menthol 3.6 mG (Sugar-Free) Lozenge 1 Lozenge Oral once  benzonatate 200 milliGRAM(s) Oral every 8 hours  budesonide 160 MICROgram(s)/formoterol 4.5 MICROgram(s) Inhaler 2 Puff(s) Inhalation two times a day  cholecalciferol 2000 Unit(s) Oral daily  clonazePAM  Tablet 0.5 milliGRAM(s) Oral every 8 hours  FIRST- Mouthwash  BLM 5 milliLiter(s) Swish and Spit every 6 hours  lidocaine   4% Patch 1 Patch Transdermal every 24 hours  melatonin 3 milliGRAM(s) Oral at bedtime  multivitamin 1 Tablet(s) Oral daily  pantoprazole  Injectable 40 milliGRAM(s) IV Push daily  polyethylene glycol 3350 17 Gram(s) Oral daily  rivaroxaban 15 milliGRAM(s) Oral two times a day with meals  senna 2 Tablet(s) Oral at bedtime    MEDICATIONS  (PRN):  acetaminophen   Tablet .. 975 milliGRAM(s) Oral every 6 hours PRN Mild Pain (1 - 3)  albuterol/ipratropium for Nebulization 3 milliLiter(s) Nebulizer every 6 hours PRN Shortness of Breath and/or Wheezing  aluminum hydroxide/magnesium hydroxide/simethicone Suspension 30 milliLiter(s) Oral every 4 hours PRN Dyspepsia  benzocaine 15 mG/menthol 3.6 mG (Sugar-Free) Lozenge 1 Lozenge Oral four times a day PRN Sore Throat  cyclobenzaprine 5 milliGRAM(s) Oral three times a day PRN Muscle Spasm  HYDROmorphone   Tablet 2 milliGRAM(s) Oral every 4 hours PRN Severe Pain (7 - 10)  sodium chloride 0.65% Nasal 1 Spray(s) Both Nostrils every 2 hours PRN Nasal Congestion  traMADol 25 milliGRAM(s) Oral every 6 hours PRN Moderate Pain (4 - 6)      VITAL SIGNS:  T(F): 98.3 (09-12-21 @ 05:54), Max: 98.8 (09-11-21 @ 12:54)  HR: 85 (09-12-21 @ 05:54) (80 - 97)  BP: 105/71 (09-12-21 @ 05:54) (102/70 - 105/71)  RR: 22 (09-12-21 @ 05:54) (16 - 32)  SpO2: 92% (09-12-21 @ 05:54) (92% - 98%)    I&O's Summary    10 Sep 2021 07:01  -  11 Sep 2021 07:00  --------------------------------------------------------  IN: 560 mL / OUT: 0 mL / NET: 560 mL    11 Sep 2021 07:01  -  12 Sep 2021 06:21  --------------------------------------------------------  IN: 60 mL / OUT: 400 mL / NET: -340 mL      Daily     Daily     PHYSICAL EXAM:  Gen: Alert, NAD  HEENT: NCAT, conjunctiva clear, sclera anicteric, no erythema or exudates in the oropharynx, mmm  Neck: Supple, no JVD  CV: RRR, S1S2, no m/r/g  Resp: CTAB, normal respiratory effort  Abd: Soft, nontender, nondistended, normal bowel sounds  Ext: no edema, no clubbing or cyanosis  Neuro: AOx3, CN2-12 grossly intact, LEVIN  SKIN: warm, perfused    LABS:                        9.9    11.00 )-----------( 145      ( 11 Sep 2021 11:11 )             31.9     Hgb Trend: 9.9<--, 9.9<--, 9.7<--, 9.9<--, 10.2<--  09-11    139  |  101  |  6<L>  ----------------------------<  110<H>  3.9   |  25  |  0.71    Ca    9.0      11 Sep 2021 11:11  Phos  2.6     09-11  Mg     1.9     09-11    TPro  7.1  /  Alb  2.8<L>  /  TBili  0.2  /  DBili  x   /  AST  22  /  ALT  20  /  AlkPhos  75  09-11    Creatinine Trend: 0.71<--, 0.71<--, 0.77<--, 0.63<--, 0.66<--, 0.74<--  LIVER FUNCTIONS - ( 11 Sep 2021 11:11 )  Alb: 2.8 g/dL / Pro: 7.1 g/dL / ALK PHOS: 75 U/L / ALT: 20 U/L / AST: 22 U/L / GGT: x           PT/INR - ( 10 Sep 2021 15:53 )   PT: 14.8 sec;   INR: 1.25 ratio         PTT - ( 10 Sep 2021 15:53 )  PTT:30.0 sec          CAPILLARY BLOOD GLUCOSE          RADIOLOGY & ADDITIONAL TESTS: Reviewed    Imaging Personally Reviewed:    Consultant(s) Notes Reviewed:      Care Discussed with Consultants/Other Providers:   Joseph Casey, PGY1  Pager [phone] [facility], [phone] NS    Patient is a 55y old  Male who presents with a chief complaint of COVID PNA (10 Sep 2021 09:21)      SUBJECTIVE/INTERVAL EVENTS: Patient seen and examined at bedside. No complaints, doing well.    MEDICATIONS  (STANDING):  benzocaine 15 mG/menthol 3.6 mG (Sugar-Free) Lozenge 1 Lozenge Oral once  benzonatate 200 milliGRAM(s) Oral every 8 hours  budesonide 160 MICROgram(s)/formoterol 4.5 MICROgram(s) Inhaler 2 Puff(s) Inhalation two times a day  cholecalciferol 2000 Unit(s) Oral daily  clonazePAM  Tablet 0.5 milliGRAM(s) Oral every 8 hours  FIRST- Mouthwash  BLM 5 milliLiter(s) Swish and Spit every 6 hours  lidocaine   4% Patch 1 Patch Transdermal every 24 hours  melatonin 3 milliGRAM(s) Oral at bedtime  multivitamin 1 Tablet(s) Oral daily  pantoprazole  Injectable 40 milliGRAM(s) IV Push daily  polyethylene glycol 3350 17 Gram(s) Oral daily  rivaroxaban 15 milliGRAM(s) Oral two times a day with meals  senna 2 Tablet(s) Oral at bedtime    MEDICATIONS  (PRN):  acetaminophen   Tablet .. 975 milliGRAM(s) Oral every 6 hours PRN Mild Pain (1 - 3)  albuterol/ipratropium for Nebulization 3 milliLiter(s) Nebulizer every 6 hours PRN Shortness of Breath and/or Wheezing  aluminum hydroxide/magnesium hydroxide/simethicone Suspension 30 milliLiter(s) Oral every 4 hours PRN Dyspepsia  benzocaine 15 mG/menthol 3.6 mG (Sugar-Free) Lozenge 1 Lozenge Oral four times a day PRN Sore Throat  cyclobenzaprine 5 milliGRAM(s) Oral three times a day PRN Muscle Spasm  HYDROmorphone   Tablet 2 milliGRAM(s) Oral every 4 hours PRN Severe Pain (7 - 10)  sodium chloride 0.65% Nasal 1 Spray(s) Both Nostrils every 2 hours PRN Nasal Congestion  traMADol 25 milliGRAM(s) Oral every 6 hours PRN Moderate Pain (4 - 6)      VITAL SIGNS:  T(F): 98.3 (09-12-21 @ 05:54), Max: 98.8 (09-11-21 @ 12:54)  HR: 85 (09-12-21 @ 05:54) (80 - 97)  BP: 105/71 (09-12-21 @ 05:54) (102/70 - 105/71)  RR: 22 (09-12-21 @ 05:54) (16 - 32)  SpO2: 92% (09-12-21 @ 05:54) (92% - 98%)    I&O's Summary    10 Sep 2021 07:01  -  11 Sep 2021 07:00  --------------------------------------------------------  IN: 560 mL / OUT: 0 mL / NET: 560 mL    11 Sep 2021 07:01  -  12 Sep 2021 06:21  --------------------------------------------------------  IN: 60 mL / OUT: 400 mL / NET: -340 mL      Daily     Daily     PHYSICAL EXAM:  Gen: Alert, NAD  HEENT: NCAT, conjunctiva clear, sclera anicteric, no erythema or exudates in the oropharynx, mmm  Neck: Supple, no JVD  CV: RRR, S1S2, no m/r/g  Resp: no respiratory distress, 2LNC  Abd: Soft, nontender, nondistended, normal bowel sounds  Ext: no edema, no clubbing or cyanosis  Neuro: AOx3, CN2-12 grossly intact, LEVIN  SKIN: warm, perfused, decreasing creptius ant chest wall      LABS:                        9.9    11.00 )-----------( 145      ( 11 Sep 2021 11:11 )             31.9     Hgb Trend: 9.9<--, 9.9<--, 9.7<--, 9.9<--, 10.2<--  09-11    139  |  101  |  6<L>  ----------------------------<  110<H>  3.9   |  25  |  0.71    Ca    9.0      11 Sep 2021 11:11  Phos  2.6     09-11  Mg     1.9     09-11    TPro  7.1  /  Alb  2.8<L>  /  TBili  0.2  /  DBili  x   /  AST  22  /  ALT  20  /  AlkPhos  75  09-11    Creatinine Trend: 0.71<--, 0.71<--, 0.77<--, 0.63<--, 0.66<--, 0.74<--  LIVER FUNCTIONS - ( 11 Sep 2021 11:11 )  Alb: 2.8 g/dL / Pro: 7.1 g/dL / ALK PHOS: 75 U/L / ALT: 20 U/L / AST: 22 U/L / GGT: x           PT/INR - ( 10 Sep 2021 15:53 )   PT: 14.8 sec;   INR: 1.25 ratio         PTT - ( 10 Sep 2021 15:53 )  PTT:30.0 sec          CAPILLARY BLOOD GLUCOSE          RADIOLOGY & ADDITIONAL TESTS: Reviewed    Imaging Personally Reviewed:    Consultant(s) Notes Reviewed:      Care Discussed with Consultants/Other Providers:

## 2021-09-12 NOTE — PROGRESS NOTE ADULT - TIME BILLING
.
Agree with above NP note.  a/p  54yo M with Hx of DVT s/p achilles tendon repair years ago not on AC presenting with complaints of SOB. intermittent and fevers with cough productive of white sputum for past week, tested positive for covid     #Atypical Chest Pain  -continue pleuritic chest pain  -atypical and secondary to covid PNA   -trop negative, no acute ischemic ecg abnl   -pending CT A r/o PE  -check echo     #Covid -19  -CXR noted, s/p completed courses of Remdesivir x 5 days and Decadron x 10 days   -S/p Tocilizumab 7/30 per ID   -LE duplex 8/4 neg DVT  -pending CTA   -pulm  f/u     #DVT (hx)  -LE doppler as above  -on full dose AC
Agree with above NP note.  cv/hd stable  cont current tx  thoracic f/u  a/c resumed  monitor plat, heme   heme f/u  supp o2 as needed  care per rcu
Agree with above NP note.  cv/hd stable  cont current tx  thoracic f/u  cont full a/c  supp o2 as needed  care per rcu
Agree with above NP note.  cv/hd stable  cont current tx  thoracic f/u  remains off a/c, even resume   monitor plat, heme   heme f/u  supp o2 as needed  care per rcu
Agree with above NP note.  cv/hd stable  cont current tx  thoracic f/u  cont full a/c
Agree with above NP note.  cv/hd stable  cont current tx  thoracic f/u  cont full a/c  supp o2 as needed  care per icu  await chest imaging
Agree with above NP note.  cv/hd stable  cont current tx  thoracic f/u  cont full a/c  supp o2 as needed  care per rcu
Agree with above NP note.  cv/hd stable  cont current tx  thoracic f/u  remains of a/c due to bloody output  supp o2 as needed  care per rcu
Agree with above NP note.  a/p  56yo M with Hx of DVT s/p achilles tendon repair years ago not on AC presenting with complaints of SOB. intermittent and fevers with cough productive of white sputum for past week, tested positive for covid     #Atypical Chest Pain  -continued pleuritic chest pain  -atypical and secondary to covid PNA   -trop negative, no acute ischemic ecg abnl   -CTA with no PE, cavitary PNA  -echo with normal lv fxn, no effusion    #Covid -19  -CXR noted, s/p completed courses of Remdesivir x 5 days and Decadron x 10 days   -S/p Tocilizumab 7/30 per ID   -LE duplex 8/4 neg DVT  -CTA noted  -pulm  f/u     #DVT (hx)  -LE doppler as above  -on full dose AC
Agree with above NP note.  cv/hd stable  cont current tx  thoracic f/u  cont full a/c
Agree with above NP note.  cv/hd stable  cont current tx  thoracic f/u  cont full a/c  supp o2 as needed  care per icu
Agree with above NP note.  cv/hd stable  cont current tx  thoracic f/u  cont full a/c  supp o2 as needed  care per icu
Agree with above NP note.  cv/hd stable  cont current tx  thoracic f/u  remains off a/c   monitor plat, heme   supp o2 as needed  care per rcu
Agree with above NP note.  cv/hd stable  cont current tx per icu   thoracic f/u  cont full a/c
Agree with above NP note.  events noted  tx to icu, chest tube placed for PNX    54yo M with Hx of DVT s/p achilles tendon repair years ago not on AC presenting with complaints of SOB. intermittent and fevers with cough productive of white sputum for past week, tested positive for covid     #Atypical Chest Pain  -atypical and secondary to covid PNA, PNX  -s/p chest tube   -trop negative, no acute ischemic ecg abnl   -CTA with no PE, cavitary PNA  -echo with normal lv fxn, no effusion    #Covid -19  -CXR noted, s/p completed courses of Remdesivir x 5 days and Decadron x 10 days   -S/p Tocilizumab 7/30 per ID   -LE duplex 8/4 neg DVT  -CTA noted  -pulm  f/u     #PNX  -s/p CT  -cts f/u    #DVT (hx)  -LE doppler as above  -on full dose AC      care per icu
Agree with above NP note.  events noted  tx to icu, chest tube placed for PNX    54yo M with Hx of DVT s/p achilles tendon repair years ago not on AC presenting with complaints of SOB. intermittent and fevers with cough productive of white sputum for past week, tested positive for covid     #Atypical Chest Pain  -atypical and secondary to covid PNA, PNX  -s/p chest tube   -trop negative, no acute ischemic ecg abnl   -CTA with no PE, cavitary PNA  -echo with normal lv fxn, no effusion    #Covid -19  -CXR noted, s/p completed courses of Remdesivir x 5 days and Decadron x 10 days   -S/p Tocilizumab 7/30 per ID   -LE duplex 8/4 neg DVT  -CTA noted  -pulm  f/u     #PNX  -s/p CT  -cts f/u    #DVT (hx)  -LE doppler as above  -on full dose AC      care per icu

## 2021-09-12 NOTE — PROGRESS NOTE ADULT - ATTENDING COMMENTS
55M PMH HTN and h/o DVT after achilles tendon repair (2015) who presents with acute hypoxemic respiratory failure due to COVID-19 viral pneumonia, requiring BiPAP and high flow nasal cannula.    - Continue BiPAP 15/10, FiO2 at 80% qhs and intermittently during the day as necessary  - High flow nasal cannula at 50-60 LPM, 100% FiO2 as tolerated during the day  - S/p tocilizumab 7/30  - S/p 10 days of dexamethasone (completed 8/10)  - Continue Symbicort 160-4.5 mcg 2 puffs twice daily  - S/p Remdesivir 5/5 (completed 8/2)  - Benzonatate 200 mg bid for cough  - Continue therapeutic enoxaparin 90 mg sq q12h, HIT negative  - Improving D-dimer 3000s-->2000s  - HD stable  - Awake and alert, following all commands, moving all extremities  - Stable kidney function and lytes  - Regular diet when he is on high flow nasal cannula  - No evidence of bacterial superinfection. No fevers, leukocytosis, or productive cough. Procalcitonin is negative. Trend inflammatory markers  - Close respiratory monitoring in ICU, does not require intubation at this time    CC time spent: 30 min
Patient seen and examined. Patient critically ill requiring frequent bedside visits with therapy change. Patient HTN, prior VTE, initially admitted with COVID pneumonia and hypoxemic respiratory failure with course complicated by tension pneumothorax and chest tube placement. Patient currently in ICU.    1. Acute Hypoxemic Respiratory Failure - due to COVID-19 pneumonia and superimposed bacterial pneumonia. Continue HFNC and titrate with goal O2 sat > 90%.   - Tension pneumothorax with right chest tube - continues to have air leak  - Decreasing subQ emphysema - continue cough suppression  2. Infectious Disease - empyema - as pleural fluid has GNR. Continue Zosyn for extended course. ID followup  3. Shock state - resolved with decompression of pneumothorax. Maintain MAP > 65.  4. Hem/Onc - continue therapeutic AC as patient with prior history of VTE elevated d-dimers in setting of COVID    Long term prognosis guarded.
1. Acute hypoxemic respiratory failure due to covid pneumonia and superimposed bacterial pneumonia. Pt requiring high flow 02. R chest tube placed for R PTX. Pleural drainage reveals gram negative rods. Await identification. Ct chest show two cavitary lesions that are stable  with small loculate R apical PTX. Pt with air leak from chest tube.  Clinically with decreasing sq air from yesterday. Cough improved. Continue narcotics for cough.r. Noindication for new chest tube. . Continue Zosyn. await sputum cx.
Acute hypoxemic respiratory failure due to COVID-19 viral pneumonia, requiring BiPAP and high flow nasal cannula.    - Continue BiPAP 15/10, FiO2 at 90% qhs and prn during the day  - High flow nasal cannula at 50-60 LPM, 100% FiO2 as tolerated during the day  - S/p tocilizumab 7/30  - S/p 10 days of dexamethasone (completed 8/10)  - S/p Remdesivir 5/5 (completed 8/2)  - Continue therapeutic enoxaparin 90 mg sq q12h, check HIT antibody  - Improving D-dimer 3000s-->2000s  - HD stable  - Awake and alert, following all commands, moving all extremities  - Stable kidney function and lytes  - No evidence of bacterial superinfection. CRP is negative and ESR is normalized  - Close respiratory monitoring in ICU, does not require intubation at this time    CC time spent: 35 min.
Critically ill patient with frequent bedside visits. Patient seen and examined on rounds and plan of care discussed with team.     In summary, this is a 55M PMH HTN and h/o DVT after achilles tendon repair (2015) who presents with acute hypoxemic respiratory failure due to COVID-19 viral pneumonia, requiring BiPAP and high flow nasal cannula.  - Continue BiPAP 15/10, FiO2 at 80% qhs and intermittently during the day as necessary  - High flow nasal cannula at 50-60 LPM, 100% FiO2 as tolerated during the day  - S/p tocilizumab 7/30  - S/p 10 days of dexamethasone (completed 8/10)  - Continue Symbicort 160-4.5 mcg 2 puffs twice daily  - S/p Remdesivir 5/5 (completed 8/2)  - Benzonatate 200 mg bid for cough  - Continue therapeutic enoxaparin 90 mg sq q12h.  - HD stable  - Awake and alert, following all commands, moving all extremities  - Stable kidney function and lytes  - Regular diet when he is on high flow nasal cannula  - No evidence of bacterial superinfection. No fevers, leukocytosis, or productive cough. Procalcitonin is negative. Trend inflammatory markers  - Close respiratory monitoring in ICU, does not require intubation at this time  - Add anxiolytic    Overall prognosis guarded.
Acute hypoxemic respiratory failure due to COVID-19 viral pneumonia, requiring BiPAP and high flow nasal cannula.    - Continue BiPAP 15/10, FiO2 at 90% qhs and intermittently during the day as necessary  - High flow nasal cannula at 50-60 LPM, 100% FiO2 as tolerated during the day  - S/p tocilizumab 7/30  - S/p 10 days of dexamethasone (completed 8/10)  - Continue Symbicort 160-4.5 mcg 2 puffs twice daily  - S/p Remdesivir 5/5 (completed 8/2)  - Continue therapeutic enoxaparin 90 mg sq q12h, HIT negative  - Improving D-dimer 3000s-->2000s  - HD stable  - Awake and alert, following all commands, moving all extremities  - Stable kidney function and lytes  - No evidence of bacterial superinfection. CRP is negative and ESR is normalized  - Close respiratory monitoring in ICU, does not require intubation at this time    CC time spent: 35 min
Patient seen and examined. Patient critically ill requiring frequent bedside visits with therapy change. Patient is 55M with HTN and h/o PE/DVT (2015) here with acute hypoxemic respiratory failure due to COVID-19 viral pneumonia, requiring BiPAP and high flow nasal cannula.    1. Acute Hypoxemic Respiratory Failure - currently on HFNC 60L/100% and will titrate down as able. Tolerated 60L/90% for much of yesterday. Will continue BIPAP QHS. Low threshold for intubation but currently with O2 sat 97%. Continue deep breathing exercises.  2. Infectious Disease - patient with COVID-19 and was unfortunately unvaccinated which likely explains his more severe disease. He completed Remdesivir and Dexamethasone and received Tocilizumab (7/30). Continue to monitor for secondary infections.  3. Anxiety - continue medications  4. Venous Thromboembolism - patient with history of PE 2015 and with elevated D-dimer. Will continue therapeutic Lovenox at this time given high risk for VTE  5.  Renal function stable and patient making good urine    Overall prognosis guarded. Patient remains critically ill with a tenuous respiratory status.
Patient seen and examined. Patient critically ill requiring frequent bedside visits with therapy change. Patient is 55M with HTN and h/o PE/DVT (2015) here with acute hypoxemic respiratory failure due to COVID-19 viral pneumonia, requiring BiPAP and high flow nasal cannula.    1. Acute Hypoxemic Respiratory Failure - currently on HFNC 60L/70% and will titrate down as able. Tolerated 60L/80% for much of yesterday. Will continue BIPAP QHS. Low threshold for intubation but currently with O2 sat 97%. Continue deep breathing exercises.  2. Infectious Disease - patient with COVID-19 and was unfortunately unvaccinated which likely explains his more severe disease. He completed Remdesivir and Dexamethasone and received Tocilizumab (7/30). Continue to monitor for secondary infections and trend leukocytosis.  3. Anxiety - continue medications  4. Venous Thromboembolism - patient with history of PE 2015 and with elevated D-dimer. Will continue therapeutic Lovenox at this time given high risk for VTE  5.  Renal function stable and patient making good urine    Overall prognosis guarded. Patient remains critically ill with a tenuous respiratory status. Eventual transition to medical floors if patient remains on HFNC with decreasing FiO2 requirements.
Patient seen and examined. Patient critically ill requiring frequent bedside visits with therapy change. Patient is 55M with HTN and h/o PE/DVT (2015) here with acute hypoxemic respiratory failure due to COVID-19 viral pneumonia, requiring BiPAP and high flow nasal cannula.    1. Acute Hypoxemic Respiratory Failure - currently on HFNC 60L/80% and will titrate down as able. Tolerated 60L/90% for much of yesterday. Will continue BIPAP QHS. Low threshold for intubation but currently with O2 sat 97%. Continue deep breathing exercises.  2. Infectious Disease - patient with COVID-19 and was unfortunately unvaccinated which likely explains his more severe disease. He completed Remdesivir and Dexamethasone and received Tocilizumab (7/30). Continue to monitor for secondary infections and trend leukocytosis.  3. Anxiety - continue medications  4. Venous Thromboembolism - patient with history of PE 2015 and with elevated D-dimer. Will continue therapeutic Lovenox at this time given high risk for VTE  5.  Renal function stable and patient making good urine    Overall prognosis guarded. Patient remains critically ill with a tenuous respiratory status.
Pt seen and examined on 8/18/21. 55 unvaccinated M with medical hx as noted above, now with acute hypoxic resp failure 2/2 COVID19 PNA. Resp status remains tenuous. Currently on high flow NC at 60 L and 80 % alternating with BiPAP as needed. Cont close monitoring of resp status and titrate down FiO2 as tolerated. Completed a course of Decadron/ Remdesivir and also received Tocilizumab. Cont to follow inflammatory markers. currently stable hemodynamic, keep MAP >65. H/o DVT, b/l LE dopplers on 8/4 were negative for DVT but he remains on therapeutic Lovenox due to elevated d-dimer and high risk for DVT. D-dimer now trending down, cont to follow. Cr currently stable, cont close monitoring of volume status and electrolytes. Overall prognosis extremely guarded.
no air leak appreciated - placed to water seal with cxr demonstrating lateral area questionable space given subq emphysema. will repeat in AM. keep to water seal, if no change consider clamp trial again.   patient understood
still with some crepitus noted in the neck b/l down to clavicles.  Reportedly improving from previous.  Scopes over the weekend wnl, tolerating PO diet.  C/w current care, call ENT if there is any worsening.  No oropharyngeal instrumentation, low likelihood of pharyngeal source as source of subq air.
Agree with above  Dyspnea improved after chest tube placement yesterday  Chest tube stopped tidaling for a short while, and some crepitus was seen, but repeat CXR did not show any pneumothorax  Remains hypoxemic on HFNC. NIPPV QHS  F/U results of cultures, empirically on Zosyn  s/p toci / remdesivir now COVID PCR negative but radiological evidence of significant pulmonary damage, unclear what baseline will be.    I have personally provided 60 minutes of critical care time.
tube appears to have migrated. sentinel hole appears to be within chest without continuous air leak. resutured at bedside. continue to suction
55 year old man with HTN and h/o DVT here with acute hypoxemic respiratory failure due to COVID-19 viral pneumonia, requiring BiPAP and high flow nasal cannula.    - Continue BiPAP at night   - High flow nasal cannula at 50LPM, 90% FiO2 during the day wean as tolerated  - S/p tocilizumab 7/30  - S/p 10 days of dexamethasone (completed 8/10)  - S/p Remdesivir 5/5 (completed 8/2)  - Close respiratory monitoring in ICU    Overall prognosis guarded.
agree w above
agree w above
chest tube with clot within - removed under sterile technique. small air leak following unclogging of tube.   continue to suction
continue chest tube to suction .
new tube on suction no air leak. on nasal cannula. if no worsening cxr possible water seal in AM
patient with persistent and worse subq. plan to keep to suction 40mmHg
pigtail clamped - no change in cxr. will await official report, no significant air when unclamped, if stable cxr will d/c tube and repeat post pull
1. Acute hypoxemic respiratory failure due to covid pneumonia and superimposed bacterial pneumonia. Pt requiring high flow 02. R chest tube placed for R PTX. Pleural drainage reveals gram negative rods. Await identification. Ct chest show two cavitary lesions that are stable  with small loculate R apical PTX. Pt with air leak from chest tube. Clinically with worsening sq air. No place for new chest tube. Start pt on around the clocks narcotics to decrease cough. Continue Zosyn.
agree w above
agree w above
chest tube appears more bloody - asked team to hold therapeutic lovenox
maintain sat>90% as able   continue hiflo nc and bipap prn
prognosis guarded
agree w above
patient with no air leak, no ptx appreciated on cxr. plan for clamp trial
sentinel hole appears in subq - no air leak, will clamp - if no worsening ptx can remove. if repeat cxr demonstrates worse ptx may need additional or exchange of tube.
Agree with above  Unvaccinated COVID, has been in hospital for ~1 month  CT shows evidence of evidence of air-fluid levels ?abscess vs ?fungal infection  Now with acute pneumothorax. Chest tube placed at bedside with improvement in dyspnea, and re-expansion of lung  Check B-D-glucan and galactomannan  Empiric antibiotics with Zosyn  Chest tube with tidaling and bubbles.  Monitor in ICU, oxygen as needed to maintain spO2 =90%    I have personally provided 50 minutes of critical care time.
CT with rll cavity suspicious for bacterial superinfection  Would start zosyn  dw id md and wife
ashwini staff
agree w above
chest tube as per thoracic
events noted, pt now sp chest tube, lung mostly inflated on cxr  large air leak  continue hiflo and maintain sat>90%  If improves may require eventual vats  continue abx, fu labs  ashwini butt's  prognosis guarded
agree w above
chest tube as per thoracic  eventual repeat ct
chest tube as per thoracic surgery  maintain sat>90% w O2
fu duplex
pt seen and examined  above reviewed  discussed w/ resident    dr yuri lindsay to cover me till 9/8
pt seen and examined  above reviewed  spoke w/ wife  thoracic / pulm f/u  doing well on n/c  ct car and tx as per thoracic team
above notes  seen and examined  discussed w/ resident  resume prophylactic a/c   pulm f/u  doing well on 6 liters  ct care as per thoracis/ pulm
agree w above
pt seen and examined  above reviewed  discussed w/ resident /pulm  thoracic f/u  resume lovenox as per pulm thoracic sx/pulm
pt with improvement in oxygenation  now on hi vivian 45% 40lpm sat 96%  suggest drop to 30lpm to limit pressure and transition off hi vivian, as pt with smaller but persistant air leak  GNR on gm stain of pl fluid not growing, would treat as empyema and continue abx for at least 14d  prognosis guarded  ashwini butt
pt seen and examined  above reviewed  discussed w/ resident/ pt /
pt seen and examined  above reviewed  thoracic f/u for potential chest tube removal      base
pt seen and 3examined  above reviewed  follow up cxr  thoracic f/u  potential d/c pigtail
seen and examined  above noted and personally confirmed as accurate by me    states feels much improved    PE unchanged  decreased breath sounds at lung bases     chest tube management per IR and thoracic  O2 requirements decreasing  continue to monitor  antibiotics duration per ID    discussed with patient in detail, expresses understanding of treatment plans  discussed with house staff in detail.
seen and examined  above noted and personally confirmed as accurate by me  family at bedside  sitting in bedside chair    PE as above  Lungs decreased breath sounds bases    continue antibiotics per ID  CTS help and IR help appreciated     discussed with patient in detail, expresses understanding of treatment plans.  discussed with family
seen and examined  above noted and personally confirmed as accurate by me    in good spirits  PE as above  Lungs decreased breath sounds at bases       plan for eventual discontinuation of C/T per CTS  will complete antibiotics on 9/11  discharge planning per PT evaluation once C/T discontinued     discussed with patient in detail, expresses understanding of treatment plans.  discussed with house staff in detail
pt seen and examined  aboive reviewed  discussed w/ pt/ family  d/c planning on a/c that heme recommends / o2
pt seen and examined  above reviewed  d/c planning

## 2021-09-12 NOTE — PROGRESS NOTE ADULT - ATTENDING SUPERVISION STATEMENT
ACP
Resident
Resident
ACP
Resident
ACP
Resident
ACP
Resident
ACP
Resident

## 2021-09-12 NOTE — PROGRESS NOTE ADULT - ASSESSMENT
55M hx of DVT s/p Achilles tendon repair presented with COVID pneumonia, now s/p prolonged hospitalization, noted to have declining plt count    #thrombocytopenia  - plt have fluctuated during admission,   - has been on AC, lovenox was full dose until 9/1 and then held, prophylactic dose was restarted 9/5  - heparin PF4 Ab negative, platelets further improved   - repeat Duplex LE no DVT; previous with no DVT  - no sign of DIC, fibrinogen stable  - B12/folate normal  - monitor platelet count- improving    #Anemia, normocytic  - likely secondary to prolonged hospitalization/ acute illness  - no overt bleeding, ? 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

## 2021-09-12 NOTE — PROGRESS NOTE ADULT - PROBLEM SELECTOR PLAN 5
Incidental note of thrombosis of the left tibial peroneal trunk with diminished flow flow within the left popliteal artery.  - now holding lovenox 90 BID as of 9/1 PM for bloody drainage from chest tube  - xarelto started 9/12

## 2021-09-12 NOTE — PROGRESS NOTE ADULT - PROBLEM SELECTOR PLAN 2
- f/u B glucans  - pain control with tylenol, tramadol, Dilaudid  - continue hycodan for cough  - s/p Zosyn (started 8/26, 2 weeks per ID, last day 9/9)  - trend WBCs  - chest PT - f/u B glucans  - pain control with tylenol, tramadol, Dilaudid  - continue hycodan for cough  - s/p Zosyn (started 8/26, 2 weeks per ID, last day 9/9)  - trend WBCs

## 2021-09-12 NOTE — PROGRESS NOTE ADULT - ASSESSMENT
Echo 8/24/21: EF 65%, mild MR, grossly nl lv sys fx    a/p  54yo M with Hx of DVT s/p achilles tendon repair years ago not on AC presenting with complaints of SOB. intermittent and fevers with cough productive of white sputum for past week, tested positive for covid 2. Now transferred to MICU for tension pneumothorax, s/p chest tube.     #Atypical Chest Pain  -RRT 8/23 x2 for chest pain - exacerbated by cough and inspiration  -improved, secondary to covid PNA, PNX  -trop negative  -no ADHF noted on exam   -CTA without PE   -Echo noted w grossly nl lv sys fx, mild MR     #Covid -19, PNX  -s/p completed courses of Remdesivir x 5 days and Decadron x 10 days   -S/p Tocilizumab 7/30 per ID   -GNR in gram stain from pleural fluid -- on IV abx   -CTA as above   -RRT on 9/1 for hypoxia - repeat CXR noted    -s/p new R chest tube for pnx -- mgmt per thoracic -Large clot removed from tube 9/2  -Chest tube removed 9/5 and R apical pigtail placed by thoracic surgery -- now clamped   -AC resumed   -wean O2 as able   -pulm / thoracic f/u    #Sinus tachycardia  -resolved  -likely secondary to covid PNA, volume, pain, PNX  -cont to monitor   -echo as above     #DVT (hx)  -LE doppler 8/25 with no evidence of deep venous thrombosis in either lower extremity. Incidental note of thrombosis of the left tibial peroneal trunk with diminished flow flow within the left popliteal artery.  -AC resumed  -med f/u     #s/p steroids for laryngitis/obstruction

## 2021-09-12 NOTE — PROGRESS NOTE ADULT - ASSESSMENT
55yr old unvaccinated male with hx of HTN, RLE DVT, and PE (not on home AC) presents with progressively worsening SOB, intermittent fevers, COVID positive, admitted for COVID PNA. Initially admitted on 7/29 to ICU on HF and BiPAP, never intubated. Downgraded to floors on 8/19. 8/26 found to have tension pneumothorax, returned to ICU, R chest tube placed. 8/28 CT scan showed pneumomediastinum, pneumopericardium, bilateral subQ emphysema as well as bilateral opacities and RUL consolidation plus potential cavitary lesion. Gram negative rounds grown in pleural fluid. 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    8/30-31 downgraded to floors on HFNC. Continuing to have R sided pain with cough and inspiration likely 2/2 to COVID pneumonia, tension PTX, effusion, and RUL consolidation/cavitary lesion. Pt comfortable on 2L NC/ RA when seated

## 2021-09-12 NOTE — PROGRESS NOTE ADULT - SUBJECTIVE AND OBJECTIVE BOX
Chief Complaint: fu    History of Present Illness: feels ok, requires home 02; no f/c, dyspnea stable, no cough, no f/c, no cp, no n/v/abd pain, no BM yet today, no overt bleeding, tolerating diet, no leg pain      MEDICATIONS  (STANDING):  benzocaine 15 mG/menthol 3.6 mG (Sugar-Free) Lozenge 1 Lozenge Oral once  benzonatate 200 milliGRAM(s) Oral every 8 hours  budesonide 160 MICROgram(s)/formoterol 4.5 MICROgram(s) Inhaler 2 Puff(s) Inhalation two times a day  cholecalciferol 2000 Unit(s) Oral daily  clonazePAM  Tablet 0.5 milliGRAM(s) Oral every 12 hours  FIRST- Mouthwash  BLM 5 milliLiter(s) Swish and Spit every 6 hours  lidocaine   4% Patch 1 Patch Transdermal every 24 hours  melatonin 3 milliGRAM(s) Oral at bedtime  multivitamin 1 Tablet(s) Oral daily  pantoprazole    Tablet 40 milliGRAM(s) Oral before breakfast  polyethylene glycol 3350 17 Gram(s) Oral daily  rivaroxaban 15 milliGRAM(s) Oral two times a day with meals  senna 2 Tablet(s) Oral at bedtime    MEDICATIONS  (PRN):  acetaminophen   Tablet .. 975 milliGRAM(s) Oral every 6 hours PRN Mild Pain (1 - 3)  albuterol/ipratropium for Nebulization 3 milliLiter(s) Nebulizer every 6 hours PRN Shortness of Breath and/or Wheezing  aluminum hydroxide/magnesium hydroxide/simethicone Suspension 30 milliLiter(s) Oral every 4 hours PRN Dyspepsia  benzocaine 15 mG/menthol 3.6 mG (Sugar-Free) Lozenge 1 Lozenge Oral four times a day PRN Sore Throat  cyclobenzaprine 5 milliGRAM(s) Oral three times a day PRN Muscle Spasm  HYDROmorphone   Tablet 2 milliGRAM(s) Oral every 4 hours PRN Severe Pain (7 - 10)  sodium chloride 0.65% Nasal 1 Spray(s) Both Nostrils every 2 hours PRN Nasal Congestion  traMADol 25 milliGRAM(s) Oral every 6 hours PRN Moderate Pain (4 - 6)      Allergies    No Known Allergies    Intolerances        Vital Signs Last 24 Hrs  T(C): 36.8 (12 Sep 2021 05:54), Max: 36.8 (12 Sep 2021 05:54)  T(F): 98.3 (12 Sep 2021 05:54), Max: 98.3 (12 Sep 2021 05:54)  HR: 85 (12 Sep 2021 05:54) (80 - 97)  BP: 105/71 (12 Sep 2021 05:54) (103/67 - 105/71)  BP(mean): --  RR: 22 (12 Sep 2021 05:54) (20 - 32)  SpO2: 92% (12 Sep 2021 05:54) (92% - 98%)    PHYSICAL EXAM  General: adult in NAD  HEENT: clear oropharynx, anicteric sclera, pink conjunctiva  Neck: supple  CV: normal S1/S2  Lungs: clear to auscultation, no wheezes, no rales  Abdomen: soft non-tender non-distended, positive bowel sounds  Ext: no clubbing cyanosis; sl pedal edema; no calf tenderness, pulses intact  Skin: no rashes and no petechiae  Lymph Nodes: No LAD in axillae, groin, neck  Neuro: alert and oriented X 3, no focal deficits    LABS:                          10.8   9.89  )-----------( 151      ( 12 Sep 2021 06:56 )             35.1         Mean Cell Volume : 87.5 fl  Mean Cell Hemoglobin : 26.9 pg  Mean Cell Hemoglobin Concentration : 30.8 gm/dL  Auto Neutrophil # : x  Auto Lymphocyte # : x  Auto Monocyte # : x  Auto Eosinophil # : x  Auto Basophil # : x  Auto Neutrophil % : x  Auto Lymphocyte % : x  Auto Monocyte % : x  Auto Eosinophil % : x  Auto Basophil % : x      Serial CBC's  09-12 @ 06:56  Hct-35.1 / Hgb-10.8 / Plat-151 / RBC-4.01 / WBC-9.89  Serial CBC's  09-11 @ 11:11  Hct-31.9 / Hgb-9.9 / Plat-145 / RBC-3.58 / WBC-11.00  Serial CBC's  09-10 @ 06:40  Hct-32.2 / Hgb-9.9 / Plat-121 / RBC-3.61 / WBC-9.48  Serial CBC's  09-09 @ 06:45  Hct-32.0 / Hgb-9.7 / Plat-143 / RBC-3.59 / WBC-10.62      09-12    141  |  105  |  6<L>  ----------------------------<  85  3.6   |  25  |  0.70    Ca    9.1      12 Sep 2021 06:56  Phos  3.5     09-12  Mg     2.0     09-12    TPro  7.3  /  Alb  2.9<L>  /  TBili  0.2  /  DBili  x   /  AST  16  /  ALT  19  /  AlkPhos  75  09-12      PT/INR - ( 10 Sep 2021 15:53 )   PT: 14.8 sec;   INR: 1.25 ratio         PTT - ( 10 Sep 2021 15:53 )  PTT:30.0 sec    Vitamin B12, Serum: 600 pg/mL (09-10 @ 23:00)  Folate, Serum: 17.1 ng/mL (09-10 @ 23:00)  Ferritin, Serum: 529 ng/mL (09-10 @ 23:00)  Iron - Total Binding Capacity.: 162 ug/dL (09-10 @ 21:53)              Radiology:

## 2021-09-12 NOTE — PROGRESS NOTE ADULT - PROBLEM SELECTOR PLAN 1
S/p COVID with complications of PTX and empyema s/p chest tube placed by CT on 8/26  - 9/4 CT: increased loculated R PTX  - f/u thoracic recs  - daily AM CXR per thoracic  - NC 2L  -9/5 pigtail cath placed R ant sup chest wall   -9/9 chest tube clamped  -9/10 chest tube removed S/p COVID with complications of PTX and empyema s/p chest tube placed by CT on 8/26  - 9/4 CT: increased loculated R PTX  - f/u thoracic recs  - NC 2L  -9/5 pigtail cath placed R ant sup chest wall   -9/9 chest tube clamped  -9/10 chest tube removed

## 2021-09-13 ENCOUNTER — TRANSCRIPTION ENCOUNTER (OUTPATIENT)
Age: 56
End: 2021-09-13

## 2021-09-13 VITALS
HEART RATE: 105 BPM | OXYGEN SATURATION: 96 % | SYSTOLIC BLOOD PRESSURE: 116 MMHG | DIASTOLIC BLOOD PRESSURE: 78 MMHG | RESPIRATION RATE: 20 BRPM

## 2021-09-13 LAB
ALBUMIN SERPL ELPH-MCNC: 2.9 G/DL — LOW (ref 3.3–5)
ALP SERPL-CCNC: 73 U/L — SIGNIFICANT CHANGE UP (ref 40–120)
ALT FLD-CCNC: 17 U/L — SIGNIFICANT CHANGE UP (ref 10–45)
ANION GAP SERPL CALC-SCNC: 11 MMOL/L — SIGNIFICANT CHANGE UP (ref 5–17)
AST SERPL-CCNC: 15 U/L — SIGNIFICANT CHANGE UP (ref 10–40)
BILIRUB SERPL-MCNC: 0.2 MG/DL — SIGNIFICANT CHANGE UP (ref 0.2–1.2)
BUN SERPL-MCNC: 8 MG/DL — SIGNIFICANT CHANGE UP (ref 7–23)
CALCIUM SERPL-MCNC: 9.1 MG/DL — SIGNIFICANT CHANGE UP (ref 8.4–10.5)
CHLORIDE SERPL-SCNC: 104 MMOL/L — SIGNIFICANT CHANGE UP (ref 96–108)
CO2 SERPL-SCNC: 25 MMOL/L — SIGNIFICANT CHANGE UP (ref 22–31)
CREAT SERPL-MCNC: 0.84 MG/DL — SIGNIFICANT CHANGE UP (ref 0.5–1.3)
GLUCOSE SERPL-MCNC: 92 MG/DL — SIGNIFICANT CHANGE UP (ref 70–99)
HCT VFR BLD CALC: 32.3 % — LOW (ref 39–50)
HGB BLD-MCNC: 9.9 G/DL — LOW (ref 13–17)
MAGNESIUM SERPL-MCNC: 1.9 MG/DL — SIGNIFICANT CHANGE UP (ref 1.6–2.6)
MCHC RBC-ENTMCNC: 27 PG — SIGNIFICANT CHANGE UP (ref 27–34)
MCHC RBC-ENTMCNC: 30.7 GM/DL — LOW (ref 32–36)
MCV RBC AUTO: 88 FL — SIGNIFICANT CHANGE UP (ref 80–100)
NRBC # BLD: 0 /100 WBCS — SIGNIFICANT CHANGE UP (ref 0–0)
PHOSPHATE SERPL-MCNC: 3.2 MG/DL — SIGNIFICANT CHANGE UP (ref 2.5–4.5)
PLATELET # BLD AUTO: 174 K/UL — SIGNIFICANT CHANGE UP (ref 150–400)
POTASSIUM SERPL-MCNC: 3.5 MMOL/L — SIGNIFICANT CHANGE UP (ref 3.5–5.3)
POTASSIUM SERPL-SCNC: 3.5 MMOL/L — SIGNIFICANT CHANGE UP (ref 3.5–5.3)
PROT SERPL-MCNC: 7 G/DL — SIGNIFICANT CHANGE UP (ref 6–8.3)
RBC # BLD: 3.67 M/UL — LOW (ref 4.2–5.8)
RBC # FLD: 13.6 % — SIGNIFICANT CHANGE UP (ref 10.3–14.5)
SODIUM SERPL-SCNC: 140 MMOL/L — SIGNIFICANT CHANGE UP (ref 135–145)
WBC # BLD: 10.57 K/UL — HIGH (ref 3.8–10.5)
WBC # FLD AUTO: 10.57 K/UL — HIGH (ref 3.8–10.5)

## 2021-09-13 PROCEDURE — 86665 EPSTEIN-BARR CAPSID VCA: CPT

## 2021-09-13 PROCEDURE — 80048 BASIC METABOLIC PNL TOTAL CA: CPT

## 2021-09-13 PROCEDURE — 87449 NOS EACH ORGANISM AG IA: CPT

## 2021-09-13 PROCEDURE — 83550 IRON BINDING TEST: CPT

## 2021-09-13 PROCEDURE — 89051 BODY FLUID CELL COUNT: CPT

## 2021-09-13 PROCEDURE — 93306 TTE W/DOPPLER COMPLETE: CPT

## 2021-09-13 PROCEDURE — U0003: CPT

## 2021-09-13 PROCEDURE — 84145 PROCALCITONIN (PCT): CPT

## 2021-09-13 PROCEDURE — 82565 ASSAY OF CREATININE: CPT

## 2021-09-13 PROCEDURE — 85652 RBC SED RATE AUTOMATED: CPT

## 2021-09-13 PROCEDURE — 86140 C-REACTIVE PROTEIN: CPT

## 2021-09-13 PROCEDURE — 87205 SMEAR GRAM STAIN: CPT

## 2021-09-13 PROCEDURE — 93005 ELECTROCARDIOGRAM TRACING: CPT

## 2021-09-13 PROCEDURE — 87641 MR-STAPH DNA AMP PROBE: CPT

## 2021-09-13 PROCEDURE — 85014 HEMATOCRIT: CPT

## 2021-09-13 PROCEDURE — 82435 ASSAY OF BLOOD CHLORIDE: CPT

## 2021-09-13 PROCEDURE — 80053 COMPREHEN METABOLIC PANEL: CPT

## 2021-09-13 PROCEDURE — 83605 ASSAY OF LACTIC ACID: CPT

## 2021-09-13 PROCEDURE — 83986 ASSAY PH BODY FLUID NOS: CPT

## 2021-09-13 PROCEDURE — 97116 GAIT TRAINING THERAPY: CPT

## 2021-09-13 PROCEDURE — 97162 PT EVAL MOD COMPLEX 30 MIN: CPT

## 2021-09-13 PROCEDURE — 97110 THERAPEUTIC EXERCISES: CPT

## 2021-09-13 PROCEDURE — 87075 CULTR BACTERIA EXCEPT BLOOD: CPT

## 2021-09-13 PROCEDURE — 82962 GLUCOSE BLOOD TEST: CPT

## 2021-09-13 PROCEDURE — 85610 PROTHROMBIN TIME: CPT

## 2021-09-13 PROCEDURE — 87640 STAPH A DNA AMP PROBE: CPT

## 2021-09-13 PROCEDURE — 86022 PLATELET ANTIBODIES: CPT

## 2021-09-13 PROCEDURE — 87070 CULTURE OTHR SPECIMN AEROBIC: CPT

## 2021-09-13 PROCEDURE — 82947 ASSAY GLUCOSE BLOOD QUANT: CPT

## 2021-09-13 PROCEDURE — 94660 CPAP INITIATION&MGMT: CPT

## 2021-09-13 PROCEDURE — 85027 COMPLETE CBC AUTOMATED: CPT

## 2021-09-13 PROCEDURE — 96374 THER/PROPH/DIAG INJ IV PUSH: CPT

## 2021-09-13 PROCEDURE — 82010 KETONE BODYS QUAN: CPT

## 2021-09-13 PROCEDURE — 85730 THROMBOPLASTIN TIME PARTIAL: CPT

## 2021-09-13 PROCEDURE — 97535 SELF CARE MNGMENT TRAINING: CPT

## 2021-09-13 PROCEDURE — 84100 ASSAY OF PHOSPHORUS: CPT

## 2021-09-13 PROCEDURE — 86480 TB TEST CELL IMMUN MEASURE: CPT

## 2021-09-13 PROCEDURE — 99291 CRITICAL CARE FIRST HOUR: CPT | Mod: 25

## 2021-09-13 PROCEDURE — 85379 FIBRIN DEGRADATION QUANT: CPT

## 2021-09-13 PROCEDURE — 86664 EPSTEIN-BARR NUCLEAR ANTIGEN: CPT

## 2021-09-13 PROCEDURE — 70360 X-RAY EXAM OF NECK: CPT

## 2021-09-13 PROCEDURE — 71250 CT THORAX DX C-: CPT

## 2021-09-13 PROCEDURE — 71045 X-RAY EXAM CHEST 1 VIEW: CPT

## 2021-09-13 PROCEDURE — 83540 ASSAY OF IRON: CPT

## 2021-09-13 PROCEDURE — 99232 SBSQ HOSP IP/OBS MODERATE 35: CPT

## 2021-09-13 PROCEDURE — 82945 GLUCOSE OTHER FLUID: CPT

## 2021-09-13 PROCEDURE — 84295 ASSAY OF SERUM SODIUM: CPT

## 2021-09-13 PROCEDURE — U0005: CPT

## 2021-09-13 PROCEDURE — 87305 ASPERGILLUS AG IA: CPT

## 2021-09-13 PROCEDURE — 86769 SARS-COV-2 COVID-19 ANTIBODY: CPT

## 2021-09-13 PROCEDURE — 82803 BLOOD GASES ANY COMBINATION: CPT

## 2021-09-13 PROCEDURE — 87389 HIV-1 AG W/HIV-1&-2 AB AG IA: CPT

## 2021-09-13 PROCEDURE — 84132 ASSAY OF SERUM POTASSIUM: CPT

## 2021-09-13 PROCEDURE — 71275 CT ANGIOGRAPHY CHEST: CPT

## 2021-09-13 PROCEDURE — 87102 FUNGUS ISOLATION CULTURE: CPT

## 2021-09-13 PROCEDURE — 82746 ASSAY OF FOLIC ACID SERUM: CPT

## 2021-09-13 PROCEDURE — 84157 ASSAY OF PROTEIN OTHER: CPT

## 2021-09-13 PROCEDURE — 85018 HEMOGLOBIN: CPT

## 2021-09-13 PROCEDURE — 94640 AIRWAY INHALATION TREATMENT: CPT

## 2021-09-13 PROCEDURE — 82330 ASSAY OF CALCIUM: CPT

## 2021-09-13 PROCEDURE — 86663 EPSTEIN-BARR ANTIBODY: CPT

## 2021-09-13 PROCEDURE — 93970 EXTREMITY STUDY: CPT

## 2021-09-13 PROCEDURE — 83615 LACTATE (LD) (LDH) ENZYME: CPT

## 2021-09-13 PROCEDURE — 83735 ASSAY OF MAGNESIUM: CPT

## 2021-09-13 PROCEDURE — 84484 ASSAY OF TROPONIN QUANT: CPT

## 2021-09-13 PROCEDURE — 80076 HEPATIC FUNCTION PANEL: CPT

## 2021-09-13 PROCEDURE — 87040 BLOOD CULTURE FOR BACTERIA: CPT

## 2021-09-13 PROCEDURE — 82728 ASSAY OF FERRITIN: CPT

## 2021-09-13 PROCEDURE — 85384 FIBRINOGEN ACTIVITY: CPT

## 2021-09-13 PROCEDURE — 85025 COMPLETE CBC W/AUTO DIFF WBC: CPT

## 2021-09-13 PROCEDURE — 82042 OTHER SOURCE ALBUMIN QUAN EA: CPT

## 2021-09-13 PROCEDURE — 83036 HEMOGLOBIN GLYCOSYLATED A1C: CPT

## 2021-09-13 PROCEDURE — 97530 THERAPEUTIC ACTIVITIES: CPT

## 2021-09-13 PROCEDURE — 82607 VITAMIN B-12: CPT

## 2021-09-13 RX ORDER — CLONAZEPAM 1 MG
1 TABLET ORAL
Qty: 9 | Refills: 0
Start: 2021-09-13 | End: 2021-09-17

## 2021-09-13 RX ORDER — FONDAPARINUX SODIUM 2.5 MG/.5ML
1 INJECTION, SOLUTION SUBCUTANEOUS
Qty: 40 | Refills: 0
Start: 2021-09-13 | End: 2021-10-02

## 2021-09-13 RX ORDER — IPRATROPIUM/ALBUTEROL SULFATE 18-103MCG
3 AEROSOL WITH ADAPTER (GRAM) INHALATION
Qty: 360 | Refills: 0
Start: 2021-09-13 | End: 2021-10-12

## 2021-09-13 RX ORDER — SENNA PLUS 8.6 MG/1
2 TABLET ORAL
Qty: 60 | Refills: 0
Start: 2021-09-13 | End: 2021-10-12

## 2021-09-13 RX ORDER — POLYETHYLENE GLYCOL 3350 17 G/17G
17 POWDER, FOR SOLUTION ORAL
Qty: 510 | Refills: 0
Start: 2021-09-13 | End: 2021-10-12

## 2021-09-13 RX ORDER — RIVAROXABAN 15 MG-20MG
1 KIT ORAL
Qty: 30 | Refills: 0
Start: 2021-09-13 | End: 2021-10-12

## 2021-09-13 RX ORDER — PANTOPRAZOLE SODIUM 20 MG/1
1 TABLET, DELAYED RELEASE ORAL
Qty: 30 | Refills: 0
Start: 2021-09-13 | End: 2021-10-12

## 2021-09-13 RX ORDER — CHOLECALCIFEROL (VITAMIN D3) 125 MCG
2000 CAPSULE ORAL
Qty: 0 | Refills: 0 | DISCHARGE
Start: 2021-09-13

## 2021-09-13 RX ORDER — BUDESONIDE AND FORMOTEROL FUMARATE DIHYDRATE 160; 4.5 UG/1; UG/1
2 AEROSOL RESPIRATORY (INHALATION)
Qty: 120 | Refills: 0
Start: 2021-09-13 | End: 2021-10-12

## 2021-09-13 RX ADMIN — PANTOPRAZOLE SODIUM 40 MILLIGRAM(S): 20 TABLET, DELAYED RELEASE ORAL at 06:00

## 2021-09-13 RX ADMIN — DIPHENHYDRAMINE HYDROCHLORIDE AND LIDOCAINE HYDROCHLORIDE AND ALUMINUM HYDROXIDE AND MAGNESIUM HYDRO 5 MILLILITER(S): KIT at 12:01

## 2021-09-13 RX ADMIN — RIVAROXABAN 15 MILLIGRAM(S): KIT at 09:16

## 2021-09-13 RX ADMIN — Medication 2000 UNIT(S): at 12:00

## 2021-09-13 RX ADMIN — Medication 200 MILLIGRAM(S): at 05:50

## 2021-09-13 RX ADMIN — BUDESONIDE AND FORMOTEROL FUMARATE DIHYDRATE 2 PUFF(S): 160; 4.5 AEROSOL RESPIRATORY (INHALATION) at 05:44

## 2021-09-13 RX ADMIN — DIPHENHYDRAMINE HYDROCHLORIDE AND LIDOCAINE HYDROCHLORIDE AND ALUMINUM HYDROXIDE AND MAGNESIUM HYDRO 5 MILLILITER(S): KIT at 05:51

## 2021-09-13 RX ADMIN — Medication 0.5 MILLIGRAM(S): at 05:50

## 2021-09-13 RX ADMIN — Medication 1 TABLET(S): at 12:01

## 2021-09-13 NOTE — PROGRESS NOTE ADULT - PROBLEM SELECTOR PLAN 4
2nd to COVID PNA, superimposed bacterial PNA, & now ptx   -S/p RRT 8/3 and 8/4 for hypoxia  -Tx to 5ICU 8/10 for further management, tx to 4M   -S/p RRT x2 8/23 for R sided chest pain, pain appears pleuritic in nature  -CTA chest 8/25 with no clear PE  -Tx back to MICU 8/26, now on 5monti (off COVID isolation)   -Hypoxia continues to improve  -D/c planning with home O2 1LNC at rest, 4LNC with exertion.   -Keep sats >90% with supplemental O2.

## 2021-09-13 NOTE — PROGRESS NOTE ADULT - PROBLEM SELECTOR PLAN 3
steadily declining plt count after past few days  - heparin antibody negative  - no DVT on duplex b/l

## 2021-09-13 NOTE — PROGRESS NOTE ADULT - ASSESSMENT
Problem: Acute respiratory failure with hypoxia.   ·  Plan: S/p COVID with complications of PTX and empyema s/p chest tube placed by CT on 8/26  ct removed     Problem/Plan - 2:  ·  Problem: Cavitary pneumonia. s/p abs      Problem/Plan - 3:  ·  Problem: Thrombocytopenia.   improved  apprec heme eval          ·  Problem: Clot.   ·  Plan: Incidental note of thrombosis of the left tibial peroneal trunk with diminished flow flow within the left popliteal artery.  chest tb removed  - xarelto started 9/12.    c/w xarelto    d/c planning w/ home o2/ a/c f/u as outpt  discussed w/ pt/ wife

## 2021-09-13 NOTE — PROGRESS NOTE ADULT - PROBLEM SELECTOR PROBLEM 2
Acute respiratory failure with hypoxia
Cavitary pneumonia
Acute respiratory failure with hypoxia
Cavitary pneumonia
Cavitary pneumonia
Acute respiratory failure with hypoxia
Cavitary pneumonia
Acute respiratory failure with hypoxia
Cavitary pneumonia
Cavitary pneumonia
Acute respiratory failure with hypoxia
Acute respiratory failure with hypoxia
Cavitary pneumonia
Acute respiratory failure with hypoxia
Cavitary pneumonia
Cavitary pneumonia
Acute respiratory failure with hypoxia
Cavitary pneumonia
Acute respiratory failure with hypoxia
Cavitary pneumonia
Acute respiratory failure with hypoxia
Cavitary pneumonia

## 2021-09-13 NOTE — PROGRESS NOTE ADULT - SUBJECTIVE AND OBJECTIVE BOX
DATE OF SERVICE: 09-13-21 @ 09:36  CHIEF COMPLAINT:Patient is a 55y old  Male who presents with a chief complaint of COVID PNA (10 Sep 2021 09:21)    	        PAST MEDICAL & SURGICAL HISTORY:  Hyperlipidemia    HTN (hypertension)    Rupture, tendon, quadriceps    History of Achilles tendon repair            REVIEW OF SYSTEMS:  feels better   RESPIRATORY: No cough, wheezing, chills or hemoptysis; No Shortness of Breath  CARDIOVASCULAR: No chest pain, palpitations, passing out, dizziness, or leg swelling  GASTROINTESTINAL: No abdominal or epigastric pain. No nausea, vomiting, or hematemesis; No diarrhea or constipation. No melena or hematochezia.  GENITOURINARY: No dysuria, frequency, hematuria, or incontinence  NEUROLOGICAL: No headaches,  MUSCULOSKELETAL: No joint pain or swelling; No muscle, back, or extremity pain    Medications:  MEDICATIONS  (STANDING):  benzocaine 15 mG/menthol 3.6 mG (Sugar-Free) Lozenge 1 Lozenge Oral once  benzonatate 200 milliGRAM(s) Oral every 8 hours  budesonide 160 MICROgram(s)/formoterol 4.5 MICROgram(s) Inhaler 2 Puff(s) Inhalation two times a day  cholecalciferol 2000 Unit(s) Oral daily  clonazePAM  Tablet 0.5 milliGRAM(s) Oral every 12 hours  FIRST- Mouthwash  BLM 5 milliLiter(s) Swish and Spit every 6 hours  lidocaine   4% Patch 1 Patch Transdermal every 24 hours  melatonin 3 milliGRAM(s) Oral at bedtime  multivitamin 1 Tablet(s) Oral daily  pantoprazole    Tablet 40 milliGRAM(s) Oral before breakfast  polyethylene glycol 3350 17 Gram(s) Oral daily  rivaroxaban 15 milliGRAM(s) Oral two times a day with meals  senna 2 Tablet(s) Oral at bedtime    MEDICATIONS  (PRN):  acetaminophen   Tablet .. 975 milliGRAM(s) Oral every 6 hours PRN Mild Pain (1 - 3)  albuterol/ipratropium for Nebulization 3 milliLiter(s) Nebulizer every 6 hours PRN Shortness of Breath and/or Wheezing  aluminum hydroxide/magnesium hydroxide/simethicone Suspension 30 milliLiter(s) Oral every 4 hours PRN Dyspepsia  benzocaine 15 mG/menthol 3.6 mG (Sugar-Free) Lozenge 1 Lozenge Oral four times a day PRN Sore Throat  cyclobenzaprine 5 milliGRAM(s) Oral three times a day PRN Muscle Spasm  HYDROmorphone   Tablet 2 milliGRAM(s) Oral every 4 hours PRN Severe Pain (7 - 10)  sodium chloride 0.65% Nasal 1 Spray(s) Both Nostrils every 2 hours PRN Nasal Congestion  traMADol 25 milliGRAM(s) Oral every 6 hours PRN Moderate Pain (4 - 6)    	    PHYSICAL EXAM:  T(C): 36.4 (09-13-21 @ 05:57), Max: 37.2 (09-12-21 @ 21:00)  HR: 91 (09-13-21 @ 05:57) (80 - 96)  BP: 100/68 (09-13-21 @ 05:57) (100/68 - 100/70)  RR: 19 (09-13-21 @ 05:57) (19 - 20)  SpO2: 95% (09-13-21 @ 05:57) (90% - 97%)  Wt(kg): --  I&O's Summary      Appearance: Normal	  HEENT:   Normal oral mucosa, PERRL, EOMI	    Cardiovascular: Normal S1 S2, No JVD, No murmurs, No edema  Respiratory:dec bs   Psychiatry: A & O x 3, Mood & affect appropriate  Gastrointestinal:  Soft, Non-tender, + BS	  Skin: No rashes, No ecchymoses, No cyanosis	  Neurologic: Non-focal  Extremities: Normal range of motion, No clubbing, cyanosis or edema  Vascular: Peripheral pulses palpable 2+ bilaterally    TELEMETRY: 	    ECG:  	  RADIOLOGY:  OTHER: 	  	  LABS:	 	    CARDIAC MARKERS:                                9.9    10.57 )-----------( 174      ( 13 Sep 2021 06:48 )             32.3     09-13    140  |  104  |  8   ----------------------------<  92  3.5   |  25  |  0.84    Ca    9.1      13 Sep 2021 06:47  Phos  3.2     09-13  Mg     1.9     09-13    TPro  7.0  /  Alb  2.9<L>  /  TBili  0.2  /  DBili  x   /  AST  15  /  ALT  17  /  AlkPhos  73  09-13    proBNP:   Lipid Profile:   HgA1c:   TSH:     	         PROGRESS NOTE:   Authored by DAYAN Wade PGY1 Pager: [phone]    Patient is a 55y old  Male who presents with a chief complaint of COVID PNA (10 Sep 2021 09:21)    SUBJECTIVE / OVERNIGHT EVENTS:  No o/n events. Patient states SOB much improved. Currently on 1L NC. Denies n/f/v/c, CP, abdominal pain.     MEDICATIONS  (STANDING):  benzocaine 15 mG/menthol 3.6 mG (Sugar-Free) Lozenge 1 Lozenge Oral once  budesonide 160 MICROgram(s)/formoterol 4.5 MICROgram(s) Inhaler 2 Puff(s) Inhalation two times a day  cholecalciferol 2000 Unit(s) Oral daily  clonazePAM  Tablet 0.5 milliGRAM(s) Oral every 12 hours  FIRST- Mouthwash  BLM 5 milliLiter(s) Swish and Spit every 6 hours  melatonin 3 milliGRAM(s) Oral at bedtime  multivitamin 1 Tablet(s) Oral daily  pantoprazole    Tablet 40 milliGRAM(s) Oral before breakfast  polyethylene glycol 3350 17 Gram(s) Oral daily  rivaroxaban 15 milliGRAM(s) Oral two times a day with meals  senna 2 Tablet(s) Oral at bedtime    MEDICATIONS  (PRN):  acetaminophen   Tablet .. 975 milliGRAM(s) Oral every 6 hours PRN Mild Pain (1 - 3)  albuterol/ipratropium for Nebulization 3 milliLiter(s) Nebulizer every 6 hours PRN Shortness of Breath and/or Wheezing  aluminum hydroxide/magnesium hydroxide/simethicone Suspension 30 milliLiter(s) Oral every 4 hours PRN Dyspepsia  benzocaine 15 mG/menthol 3.6 mG (Sugar-Free) Lozenge 1 Lozenge Oral four times a day PRN Sore Throat  HYDROmorphone   Tablet 2 milliGRAM(s) Oral every 4 hours PRN Severe Pain (7 - 10)  traMADol 25 milliGRAM(s) Oral every 6 hours PRN Moderate Pain (4 - 6)      CAPILLARY BLOOD GLUCOSE    I&O's Summary    Vital Signs Last 24 Hrs  T(C): 36.8 (13 Sep 2021 12:00), Max: 37.2 (12 Sep 2021 21:00)  T(F): 98.2 (13 Sep 2021 12:00), Max: 98.9 (12 Sep 2021 21:00)  HR: 105 (13 Sep 2021 14:21) (80 - 105)  BP: 116/78 (13 Sep 2021 14:21) (100/68 - 116/78)  BP(mean): --  RR: 20 (13 Sep 2021 14:21) (19 - 20)  SpO2: 96% (13 Sep 2021 14:21) (90% - 97%)    CONSTITUTIONAL: NAD, well-developed, well-groomed  EYES: EOMI; conjunctiva and sclera clear  ENMT: Moist oral mucosa  NECK: Supple, no palpable masses; no thyromegaly  RESPIRATORY: Normal respiratory effort; on 1L NC  CARDIOVASCULAR: Regular rate and rhythm, normal S1 and S2, no murmur/rub/gallop; No lower extremity edema; Peripheral pulses are 2+ bilaterally  ABDOMEN: Nontender to palpation, normoactive bowel sounds, no rebound/guarding  MUSCULOSKELETAL:  Normal gait; no clubbing or cyanosis of digits  PSYCH: A+O to person, place, and time; affect appropriate  NEUROLOGY: No focal deficits noted  SKIN: No rashes; no palpable lesions  PHYSICAL EXAM:    LABS:                        9.9    10.57 )-----------( 174      ( 13 Sep 2021 06:48 )             32.3     09-13    140  |  104  |  8   ----------------------------<  92  3.5   |  25  |  0.84    Ca    9.1      13 Sep 2021 06:47  Phos  3.2     09-13  Mg     1.9     09-13    TPro  7.0  /  Alb  2.9<L>  /  TBili  0.2  /  DBili  x   /  AST  15  /  ALT  17  /  AlkPhos  73  09-13      RADIOLOGY & ADDITIONAL TESTS:  Results Reviewed:   Imaging Personally Reviewed:  Electrocardiogram Personally Reviewed:    COORDINATION OF CARE:  Care Discussed with Consultants/Other Providers [Y/N]:  Prior or Outpatient Records Reviewed [Y/N]:

## 2021-09-13 NOTE — PROGRESS NOTE ADULT - PROBLEM SELECTOR PROBLEM 1
Pneumonia due to COVID-19 virus
Pneumothorax
Acute respiratory failure with hypoxia
Pneumonia due to COVID-19 virus
Pneumothorax
Sore throat and laryngitis
Acute respiratory failure with hypoxia
Pneumothorax
Acute respiratory failure with hypoxia
Pneumonia due to COVID-19 virus
Pneumothorax
Acute respiratory failure with hypoxia
Pneumonia due to COVID-19 virus
Pneumothorax
Sore throat and laryngitis
Acute respiratory failure with hypoxia
Acute respiratory failure with hypoxia
Pneumonia due to COVID-19 virus
Pneumothorax
Sore throat and laryngitis
Pneumonia due to COVID-19 virus
Acute respiratory failure with hypoxia
Acute respiratory failure with hypoxia
Pneumonia due to COVID-19 virus
Pneumothorax
Acute respiratory failure with hypoxia
Acute respiratory failure with hypoxia
Pneumonia due to COVID-19 virus
Acute respiratory failure with hypoxia
Pneumonia due to COVID-19 virus
Acute respiratory failure with hypoxia
Acute respiratory failure with hypoxia

## 2021-09-13 NOTE — CHART NOTE - NSCHARTNOTEFT_GEN_A_CORE
Pt seen for post COVID19 complications. While in chronic and stable state my patient is still hypoxic due to COVID19 complications while exerting and rest. SpO2 86% on RA, improves to 95% with 1 LPM by NC. On Ambulation, pt is 80% on RA, and improves to 90% on 4 LPM by NC. Pt will require oxygen for 24 hours. Pt seen for post COVID19 complications. While in chronic and stable state my patient is still hypoxic due to COVID19 complications while exerting and rest. SpO2 86% at rest. On Ambulation, pt is 80% on RA, and improves to 90% on 4 LPM by NC. Pt will require oxygen for 24 hours.

## 2021-09-13 NOTE — PROGRESS NOTE ADULT - SUBJECTIVE AND OBJECTIVE BOX
Follow-up Pulm Progress Note    No new respiratory events overnight.  Denies SOB/CP.   Sats 94% 1LNC    Medications:  MEDICATIONS  (STANDING):  benzocaine 15 mG/menthol 3.6 mG (Sugar-Free) Lozenge 1 Lozenge Oral once  benzonatate 200 milliGRAM(s) Oral every 8 hours  budesonide 160 MICROgram(s)/formoterol 4.5 MICROgram(s) Inhaler 2 Puff(s) Inhalation two times a day  cholecalciferol 2000 Unit(s) Oral daily  clonazePAM  Tablet 0.5 milliGRAM(s) Oral every 12 hours  FIRST- Mouthwash  BLM 5 milliLiter(s) Swish and Spit every 6 hours  lidocaine   4% Patch 1 Patch Transdermal every 24 hours  melatonin 3 milliGRAM(s) Oral at bedtime  multivitamin 1 Tablet(s) Oral daily  pantoprazole    Tablet 40 milliGRAM(s) Oral before breakfast  polyethylene glycol 3350 17 Gram(s) Oral daily  rivaroxaban 15 milliGRAM(s) Oral two times a day with meals  senna 2 Tablet(s) Oral at bedtime    MEDICATIONS  (PRN):  acetaminophen   Tablet .. 975 milliGRAM(s) Oral every 6 hours PRN Mild Pain (1 - 3)  albuterol/ipratropium for Nebulization 3 milliLiter(s) Nebulizer every 6 hours PRN Shortness of Breath and/or Wheezing  aluminum hydroxide/magnesium hydroxide/simethicone Suspension 30 milliLiter(s) Oral every 4 hours PRN Dyspepsia  benzocaine 15 mG/menthol 3.6 mG (Sugar-Free) Lozenge 1 Lozenge Oral four times a day PRN Sore Throat  cyclobenzaprine 5 milliGRAM(s) Oral three times a day PRN Muscle Spasm  HYDROmorphone   Tablet 2 milliGRAM(s) Oral every 4 hours PRN Severe Pain (7 - 10)  sodium chloride 0.65% Nasal 1 Spray(s) Both Nostrils every 2 hours PRN Nasal Congestion  traMADol 25 milliGRAM(s) Oral every 6 hours PRN Moderate Pain (4 - 6)          Vital Signs Last 24 Hrs  T(C): 36.4 (13 Sep 2021 05:57), Max: 37.2 (12 Sep 2021 21:00)  T(F): 97.6 (13 Sep 2021 05:57), Max: 98.9 (12 Sep 2021 21:00)  HR: 91 (13 Sep 2021 05:57) (80 - 96)  BP: 100/68 (13 Sep 2021 05:57) (100/68 - 100/70)  BP(mean): --  RR: 19 (13 Sep 2021 05:57) (19 - 20)  SpO2: 95% (13 Sep 2021 05:57) (90% - 97%)              LABS:                        9.9    10.57 )-----------( 174      ( 13 Sep 2021 06:48 )             32.3     09-13    140  |  104  |  8   ----------------------------<  92  3.5   |  25  |  0.84    Ca    9.1      13 Sep 2021 06:47  Phos  3.2     09-13  Mg     1.9     09-13    TPro  7.0  /  Alb  2.9<L>  /  TBili  0.2  /  DBili  x   /  AST  15  /  ALT  17  /  AlkPhos  73  09-13            Physical Examination:  PULM: trace bibasilar crackles   CVS: S1, S2 heard    RADIOLOGY REVIEWED  CXR 9/11: b/l opacities  partial clearing R side

## 2021-09-13 NOTE — CHART NOTE - NSCHARTNOTEFT_GEN_A_CORE
Night MAR.  Patient was discharged from [facility] today. Night Float received a call from nursing floor that patient's percocet prescription did not process properly. Night float spoke with the patient's wife and assured her the issue was being worked on.   Called pharmacy to verify, and spoke with primary team. Attempted to resend prescription, however the prescription again did not process properly. Spoke with attending, Dr. Angel Higginbotham, who spoke with the pharmacy and handled the issue.    Lobo Jackson MD PGY-3  Internal Medicine  Pager [phone] / [phone]  [facility] [phone]

## 2021-09-13 NOTE — PROGRESS NOTE ADULT - PROBLEM SELECTOR PLAN 1
S/p COVID with complications of PTX and empyema s/p chest tube placed by CT on 8/26  - 9/4 CT: increased loculated R PTX  - f/u thoracic recs  - NC 2L  -9/5 pigtail cath placed R ant sup chest wall   -9/9 chest tube clamped  -9/10 chest tube removed

## 2021-09-13 NOTE — PROGRESS NOTE ADULT - PROBLEM SELECTOR PLAN 2
- f/u B glucans  - pain control with tylenol, tramadol, Dilaudid  - continue hycodan for cough  - s/p Zosyn (started 8/26, 2 weeks per ID, last day 9/9)  - trend WBCs

## 2021-09-13 NOTE — PROGRESS NOTE ADULT - PROBLEM SELECTOR PLAN 6
-home with OT  - requires home O2  - 9/12 started xarelto
-home with OT  - requires home O2  - 9/12 started xarelto  - dischage to home 9/13
-home with OT  -lovenox 40mg qD
-home with OT  -lovenox 40mg qD

## 2021-09-13 NOTE — PROGRESS NOTE ADULT - ASSESSMENT
Echo 8/24/21: EF 65%, mild MR, grossly nl lv sys fx    a/p  54yo M with Hx of DVT s/p achilles tendon repair years ago not on AC presenting with complaints of SOB. intermittent and fevers with cough productive of white sputum for past week, tested positive for covid 2. Now transferred to MICU for tension pneumothorax, s/p chest tube.     #Atypical Chest Pain  -RRT 8/23 x2 for chest pain - exacerbated by cough and inspiration  -improved, secondary to covid PNA, PNX  -trop negative  -no ADHF noted on exam   -CTA without PE   -Echo noted w grossly nl lv sys fx, mild MR     #Covid -19, PNX  -s/p completed courses of Remdesivir x 5 days and Decadron x 10 days   -S/p Tocilizumab 7/30 per ID   -GNR in gram stain from pleural fluid -- on IV abx   -CTA as above   -RRT on 9/1 for hypoxia - repeat CXR noted    -s/p new R chest tube for pnx -- mgmt per thoracic -Large clot removed from tube 9/2  -Chest tube removed 9/5 and R apical pigtail placed by thoracic surgery -- now clamped   -AC resumed   -wean O2 as able   -pulm / thoracic f/u    #Sinus tachycardia  -resolved  -likely secondary to covid PNA, volume, pain, PNX  -cont to monitor   -echo as above     #DVT (hx)  -LE doppler 8/25 with no evidence of deep venous thrombosis in either lower extremity. Incidental note of thrombosis of the left tibial peroneal trunk with diminished flow flow within the left popliteal artery.  -AC resumed  -med f/u     #s/p steroids for laryngitis/obstruction    d/c planning  cv stable       Repair Performed By Another Provider Text (Leave Blank If You Do Not Want): After the tissue was excised the defect was repaired by another provider.

## 2021-09-13 NOTE — PROGRESS NOTE ADULT - PROBLEM SELECTOR PLAN 2
CTA chest 8/24 with RUL consolidation/cavitation suggestive of superimposed bacterial PNA in addition to COVID PNA  -GNR in gram stain from pleural fluid. 2 week course Zosyn completed 9/9.  -ID f/u  -Will require f/u chest imaging as an outpatient. F/u in office 2 weeks after discharge. D/w pt, card given to pt for f/u.

## 2021-09-13 NOTE — PROGRESS NOTE ADULT - PROBLEM SELECTOR PLAN 1
+COVID PCR as an outpatient  -CXR 8/9 with b/l lung opacities, CXR 8/16 grossly unchanged   -CXR 8/23 with worsening b/l opacties R>L, ?effusion vs mucus plugging/collapse, also with opacities 2nd to COVID   -S/p Remdesivir x 5 days   -S/p Decadron 6mg IVP qd x 10 days (completed 8/10)  -S/p Tocilizumab 7/30 per ID for worsening hypoxic respiratory failure  -Sputum culture 8/4 with normal respiratory aba.   -CTA 8/24 with no clear PE, but ddimer remains elevated. LE duplex 8/25 neg DVT, but with incidental note of thrombosis of the L tibial peroneal trunk with diminished flow within the left popliteal artery. Repeat duplex neg DVT. AC per hematology.

## 2021-09-13 NOTE — PROGRESS NOTE ADULT - NUTRITIONAL ASSESSMENT
This patient has been assessed with a concern for Malnutrition and has been determined to have a diagnosis/diagnoses of Severe protein-calorie malnutrition.    This patient is being managed with:   Diet Full Liquid-  No Dairy  Supplement Feeding Modality:  Oral  Ensure Enlive Cans or Servings Per Day:  1       Frequency:  Three Times a day  Entered: Aug 11 2021  6:27PM    
This patient has been assessed with a concern for Malnutrition and has been determined to have a diagnosis/diagnoses of Severe protein-calorie malnutrition.    This patient is being managed with:   Diet Full Liquid-  No Dairy  Supplement Feeding Modality:  Oral  Ensure Enlive Cans or Servings Per Day:  1       Frequency:  Three Times a day  Entered: Aug 11 2021  6:27PM    
This patient has been assessed with a concern for Malnutrition and has been determined to have a diagnosis/diagnoses of Severe protein-calorie malnutrition.    This patient is being managed with:   Diet Regular-  No Beef  No Dairy  No Egg  Entered: Aug 14 2021  7:20PM    
This patient has been assessed with a concern for Malnutrition and has been determined to have a diagnosis/diagnoses of Severe protein-calorie malnutrition.    This patient is being managed with:   Diet Regular-  No Beef  No Dairy  No Egg  Entered: Aug 26 2021 10:25AM    
This patient has been assessed with a concern for Malnutrition and has been determined to have a diagnosis/diagnoses of Severe protein-calorie malnutrition.    This patient is being managed with:   Diet Soft-  Entered: Aug 27 2021 10:38AM    
This patient has been assessed with a concern for Malnutrition and has been determined to have a diagnosis/diagnoses of Severe protein-calorie malnutrition.    This patient is being managed with:   Diet Full Liquid-  No Dairy  Supplement Feeding Modality:  Oral  Ensure Enlive Cans or Servings Per Day:  1       Frequency:  Three Times a day  Entered: Aug 11 2021  6:27PM    
This patient has been assessed with a concern for Malnutrition and has been determined to have a diagnosis/diagnoses of Severe protein-calorie malnutrition.    This patient is being managed with:   Diet Regular-  No Beef  No Dairy  No Egg  Entered: Aug 14 2021  7:20PM    
This patient has been assessed with a concern for Malnutrition and has been determined to have a diagnosis/diagnoses of Severe protein-calorie malnutrition.    This patient is being managed with:   Diet Regular-  No Beef  No Dairy  No Egg  Entered: Aug 26 2021 10:25AM    
This patient has been assessed with a concern for Malnutrition and has been determined to have a diagnosis/diagnoses of Severe protein-calorie malnutrition.    This patient is being managed with:   Diet Soft-  Entered: Aug 27 2021 10:38AM    
This patient has been assessed with a concern for Malnutrition and has been determined to have a diagnosis/diagnoses of Severe protein-calorie malnutrition.    This patient is being managed with:   Diet Regular-  No Beef  No Dairy  No Egg  Entered: Aug 14 2021  7:20PM    
This patient has been assessed with a concern for Malnutrition and has been determined to have a diagnosis/diagnoses of Severe protein-calorie malnutrition.    This patient is being managed with:   Diet Soft-  Entered: Aug 27 2021 10:38AM    
This patient has been assessed with a concern for Malnutrition and has been determined to have a diagnosis/diagnoses of Severe protein-calorie malnutrition.    This patient is being managed with:   Diet Full Liquid-  No Dairy  Supplement Feeding Modality:  Oral  Ensure Enlive Cans or Servings Per Day:  1       Frequency:  Three Times a day  Entered: Aug 11 2021  6:27PM    
This patient has been assessed with a concern for Malnutrition and has been determined to have a diagnosis/diagnoses of Severe protein-calorie malnutrition.    This patient is being managed with:   Diet Regular-  No Beef  Entered: Sep  9 2021  6:05PM    
This patient has been assessed with a concern for Malnutrition and has been determined to have a diagnosis/diagnoses of Severe protein-calorie malnutrition.    This patient is being managed with:   Diet Regular-  No Beef  Entered: Sep  9 2021  6:05PM    
This patient has been assessed with a concern for Malnutrition and has been determined to have a diagnosis/diagnoses of Severe protein-calorie malnutrition.    This patient is being managed with:   Diet Regular-  No Beef  No Dairy  No Egg  Entered: Aug 14 2021  7:20PM    
This patient has been assessed with a concern for Malnutrition and has been determined to have a diagnosis/diagnoses of Severe protein-calorie malnutrition.    This patient is being managed with:   Diet Regular-  No Dairy  Entered: Aug 13 2021  6:07PM    
This patient has been assessed with a concern for Malnutrition and has been determined to have a diagnosis/diagnoses of Severe protein-calorie malnutrition.    This patient is being managed with:   Diet Regular-  No Dairy  Entered: Aug 13 2021  6:07PM    
This patient has been assessed with a concern for Malnutrition and has been determined to have a diagnosis/diagnoses of Severe protein-calorie malnutrition.    This patient is being managed with:   Diet Soft-  Entered: Aug 27 2021 10:38AM    
This patient has been assessed with a concern for Malnutrition and has been determined to have a diagnosis/diagnoses of Severe protein-calorie malnutrition.    This patient is being managed with:   Diet Regular-  No Beef  Entered: Sep  9 2021  6:05PM    

## 2021-09-13 NOTE — DISCHARGE NOTE NURSING/CASE MANAGEMENT/SOCIAL WORK - PATIENT PORTAL LINK FT
You can access the FollowMyHealth Patient Portal offered by [facility] by registering at the following website: [email or web address]. By joining [facility]’s FollowMyHealth portal, you will also be able to view your health information using other applications (apps) compatible with our system.

## 2021-09-13 NOTE — PROGRESS NOTE ADULT - PROBLEM/PLAN-3
DISPLAY PLAN FREE TEXT
no

## 2021-09-16 PROBLEM — I10 ESSENTIAL (PRIMARY) HYPERTENSION: Chronic | Status: ACTIVE | Noted: 2021-07-29

## 2021-09-16 PROBLEM — Z00.00 ENCOUNTER FOR PREVENTIVE HEALTH EXAMINATION: Status: ACTIVE | Noted: 2021-09-16

## 2021-09-22 NOTE — HISTORY OF PRESENT ILLNESS
[Home] : at home, [unfilled] , at the time of the visit. [Medical Office: ([facility])___] : at the medical office located in  [Verbal consent obtained from patient] : the patient, [unfilled] [FreeTextEntry1] : 56 y/o M with PMH of DVT/PE s/p achilles tendon repair years ago (not on AC currently). Presents with complaints of SOB, intermittent and fevers with cough productive of white sputum for past week. Tested positive for COVID 2 days ago.  Pt is unvaccinated. Endorses progressively worsening SOB over the past 5 days, worse with ambulation, found to be hypoxic on arrival to the ER. CXR with b/l opacities. Course complicated by cavitary PNA, PTX

## 2021-09-23 ENCOUNTER — APPOINTMENT (OUTPATIENT)
Dept: PULMONOLOGY | Facility: CLINIC | Age: 56
End: 2021-09-23

## 2021-09-25 LAB
CULTURE RESULTS: SIGNIFICANT CHANGE UP
SPECIMEN SOURCE: SIGNIFICANT CHANGE UP

## 2021-10-20 NOTE — PROGRESS NOTE ADULT - ASSESSMENT
Detail Level: Detailed Quality 130: Documentation Of Current Medications In The Medical Record: Current Medications Documented Quality 110: Preventive Care And Screening: Influenza Immunization: Influenza Immunization Administered during Influenza season Quality 226: Preventive Care And Screening: Tobacco Use: Screening And Cessation Intervention: Patient screened for tobacco use and is an ex/non-smoker 56 y/o M with PMH of DVT/PE s/p achilles tendon repair years ago (not on AC currently). Presents with complaints of SOB, intermittent and fevers with cough productive of white sputum for past week. Tested positive for COVID 2 days ago. Pt is unvaccinated. Endorses progressively worsening SOB over the past 5 days, worse with ambulation, found to be hypoxic on arrival to the  ER. CXR with b/l opacities.

## 2022-09-14 NOTE — PROGRESS NOTE ADULT - PROBLEM SELECTOR PLAN 2
CTA chest 8/24 with RUL consolidation/cavitation suggestive of superimposed bacterial PNA in addition to COVID PNA  -GNR in gram stain from pleural fluid. 2 week course Zosyn completed 9/9.  -ID f/u Hide Additional Notes?: No Detail Level: Zone Additional Note: If lesions become symptomatic treatment options of liquid nitrogen vs shave removal were discussed. Benign nature reviewed. Risks & benefits of cryosurgery discussed, including incomplete treatment, scar, blistering, and dyspigmentation. Risks vs Benefits and cautions of removal discussed with patient. These are including but not limited to : Scar, infection, poor cosmetic outcome and recurrence. Additional Note: Patient to observe site for changes. Discussed the importance of monthly self skin checks and routine skin exams. Discussed the importance of daily sun protection- SPF 35 or higher.

## 2023-05-26 NOTE — PROGRESS NOTE ADULT - SUBJECTIVE AND OBJECTIVE BOX
Patient is a 55y old  Male who presents with a chief complaint of COVID-19 (26 Aug 2021 08:52)        Vital Signs Last 24 Hrs  T(C): 36.7 (08-27-21 @ 17:00), Max: 37.1 (08-27-21 @ 12:00)  T(F): 98 (08-27-21 @ 17:00), Max: 98.8 (08-27-21 @ 12:00)  HR: 98 (08-27-21 @ 19:00) (83 - 132)  BP: 149/79 (08-27-21 @ 19:00) (115/87 - 159/96)  RR: 17 (08-27-21 @ 19:00) (14 - 48)  SpO2: 100% (08-27-21 @ 19:00) (88% - 100%)                08-26-21 @ 07:01  -  08-27-21 @ 07:00  --------------------------------------------------------  IN: 2505 mL / OUT: 1420 mL / NET: 1085 mL    08-27-21 @ 07:01  -  08-27-21 @ 20:21  --------------------------------------------------------  IN: 650 mL / OUT: 150 mL / NET: 500 mL        Daily     Daily                           11.1   15.24 )-----------( 347      ( 27 Aug 2021 00:26 )             36.3     08-27    138  |  96  |  10  ----------------------------<  117<H>  4.4   |  34<H>  |  0.63    Ca    8.9      27 Aug 2021 00:26  Phos  2.8     08-27  Mg     2.0     08-27    TPro  6.8  /  Alb  2.5<L>  /  TBili  0.3  /  DBili  x   /  AST  15  /  ALT  15  /  AlkPhos  68  08-27          PHYSICAL EXAM  Neurology: A&Ox3, NAD, no gross deficits  CV : RRR+S1S2  Lungs: Respirations labored, B/L BS diminished, tachypneic +subQ emphysema  Abdomen: Soft, NT/ND, +BSx4Q  Extremities: B/L LE edema, negative calf tenderness, +PP         right chest tube +airleak +wall suction             MEDICATIONS  acetaminophen   Tablet .. 975 milliGRAM(s) Oral every 6 hours PRN  benzocaine 15 mG/menthol 3.6 mG (Sugar-Free) Lozenge 1 Lozenge Oral four times a day PRN  benzonatate 200 milliGRAM(s) Oral every 8 hours  budesonide 160 MICROgram(s)/formoterol 4.5 MICROgram(s) Inhaler 2 Puff(s) Inhalation two times a day  chlorhexidine 4% Liquid 1 Application(s) Topical <User Schedule>  chlorhexidine 4% Liquid 1 Application(s) Topical <User Schedule>  cholecalciferol 2000 Unit(s) Oral daily  clonazePAM  Tablet 0.5 milliGRAM(s) Oral every 8 hours  cyclobenzaprine 5 milliGRAM(s) Oral three times a day PRN  enoxaparin Injectable 90 milliGRAM(s) SubCutaneous every 12 hours  guaiFENesin ER 1200 milliGRAM(s) Oral every 12 hours  hydrocodone/homatropine Syrup 5 milliLiter(s) Oral every 4 hours PRN  lidocaine   4% Patch 1 Patch Transdermal every 24 hours  melatonin 3 milliGRAM(s) Oral at bedtime  multivitamin 1 Tablet(s) Oral daily  oxyCODONE    IR 5 milliGRAM(s) Oral every 4 hours PRN  pantoprazole  Injectable 40 milliGRAM(s) IV Push daily  piperacillin/tazobactam IVPB.. 3.375 Gram(s) IV Intermittent every 8 hours  polyethylene glycol 3350 17 Gram(s) Oral daily  senna 2 Tablet(s) Oral at bedtime  sodium chloride 0.65% Nasal 1 Spray(s) Both Nostrils every 2 hours PRN         4 = No assist / stand by assistance

## 2023-10-09 ENCOUNTER — APPOINTMENT (OUTPATIENT)
Dept: ORTHOPEDIC SURGERY | Facility: CLINIC | Age: 58
End: 2023-10-09
Payer: MEDICAID

## 2023-10-09 VITALS
SYSTOLIC BLOOD PRESSURE: 116 MMHG | HEART RATE: 88 BPM | HEIGHT: 67 IN | BODY MASS INDEX: 27.47 KG/M2 | WEIGHT: 175 LBS | DIASTOLIC BLOOD PRESSURE: 72 MMHG

## 2023-10-09 DIAGNOSIS — M54.50 LOW BACK PAIN, UNSPECIFIED: ICD-10-CM

## 2023-10-09 DIAGNOSIS — M51.36 OTHER INTERVERTEBRAL DISC DEGENERATION, LUMBAR REGION: ICD-10-CM

## 2023-10-09 DIAGNOSIS — Z78.9 OTHER SPECIFIED HEALTH STATUS: ICD-10-CM

## 2023-10-09 PROCEDURE — 99204 OFFICE O/P NEW MOD 45 MIN: CPT

## 2023-10-09 PROCEDURE — 72100 X-RAY EXAM L-S SPINE 2/3 VWS: CPT

## 2023-10-09 RX ORDER — DICLOFENAC SODIUM 75 MG/1
75 TABLET, DELAYED RELEASE ORAL
Qty: 60 | Refills: 1 | Status: ACTIVE | COMMUNITY
Start: 2023-10-09 | End: 1900-01-01

## 2024-04-05 NOTE — PROGRESS NOTE ADULT - PROBLEM SELECTOR PLAN 1
Regarding: F [place] 77 - Is without amlodipine; /96/feeling pressure in her eyes  ----- Message from Marina Arteaga sent at 4/5/2024  4:44 PM CDT -----  Patient Name: Viviana Galaviz    Specialist or PCP Name: William Hulesch    Symptoms: Is without amlodipine; /96/feeling pressure in her eyes    Pregnant (females aged 13-60. If Yes, how long?) : n/a    Call Back # : [phone]     Which State are you currently located in?: [place]    Name of Clinic Site / Acct# : [facility] [id number]     +COVID PCR as an outpatient  -CXR 8/9 with b/l lung opacities, CXR 8/16 grossly unchanged   -CXR 8/23 with worsening b/l opacties R>L, ?effusion vs mucus plugging/collapse, also with opacities 2nd to COVID   -S/p Remdesivir x 5 days   -S/p Decadron 6mg IVP qd x 10 days (completed 8/10)  -S/p Tocilizumab 7/30 per ID for worsening hypoxic respiratory failure  -Sputum culture 8/4 with normal respiratory aba. WBC normal, PCT normal, afebrile   -LE duplex 8/4 neg DVT.   -Ddimer elevated at one point to 4000. Will need to eventually obtain CTA chest to r/o PE and determine duration of full dose AC once pt able to tolerate NRB for transport. C/w current dose of AC for now.  -Tolerated NRB yesterday per flowsheet, will recheck today - discussed with RN  -CTA chest ordered, will f/u results   -Continue to trend ddimer & inflammatory markers

## 2024-06-26 ENCOUNTER — OFFICE (OUTPATIENT)
Dept: URBAN - METROPOLITAN AREA CLINIC 109 | Facility: CLINIC | Age: 59
Setting detail: OPHTHALMOLOGY
End: 2024-06-26
Payer: COMMERCIAL

## 2024-06-26 DIAGNOSIS — H16.223: ICD-10-CM

## 2024-06-26 PROCEDURE — 92004 COMPRE OPH EXAM NEW PT 1/>: CPT | Performed by: OPHTHALMOLOGY

## 2024-06-26 ASSESSMENT — CONFRONTATIONAL VISUAL FIELD TEST (CVF)
OD_FINDINGS: FULL
OS_FINDINGS: FULL